# Patient Record
Sex: FEMALE | Race: WHITE | NOT HISPANIC OR LATINO | Employment: OTHER | URBAN - METROPOLITAN AREA
[De-identification: names, ages, dates, MRNs, and addresses within clinical notes are randomized per-mention and may not be internally consistent; named-entity substitution may affect disease eponyms.]

---

## 2017-01-16 DIAGNOSIS — M06.09 RHEUMATOID ARTHRITIS OF MULTIPLE SITES WITH NEGATIVE RHEUMATOID FACTOR (H): ICD-10-CM

## 2017-01-16 DIAGNOSIS — Z79.899 HIGH RISK MEDICATION USE: ICD-10-CM

## 2017-01-16 LAB
ALBUMIN SERPL-MCNC: 3.8 G/DL (ref 3.4–5)
ALP SERPL-CCNC: 112 U/L (ref 40–150)
ALT SERPL W P-5'-P-CCNC: 11 U/L (ref 0–50)
AST SERPL W P-5'-P-CCNC: 22 U/L (ref 0–45)
BASOPHILS # BLD AUTO: 0.1 10E9/L (ref 0–0.2)
BASOPHILS NFR BLD AUTO: 1.7 %
BILIRUB DIRECT SERPL-MCNC: <0.1 MG/DL (ref 0–0.2)
BILIRUB SERPL-MCNC: 0.4 MG/DL (ref 0.2–1.3)
CREAT SERPL-MCNC: 0.44 MG/DL (ref 0.52–1.04)
CRP SERPL-MCNC: 8.8 MG/L (ref 0–8)
DIFFERENTIAL METHOD BLD: ABNORMAL
EOSINOPHIL # BLD AUTO: 0.1 10E9/L (ref 0–0.7)
EOSINOPHIL NFR BLD AUTO: 2.1 %
ERYTHROCYTE [DISTWIDTH] IN BLOOD BY AUTOMATED COUNT: 16.5 % (ref 10–15)
ERYTHROCYTE [SEDIMENTATION RATE] IN BLOOD BY WESTERGREN METHOD: 28 MM/H (ref 0–30)
GFR SERPL CREATININE-BSD FRML MDRD: ABNORMAL ML/MIN/1.7M2
HCT VFR BLD AUTO: 36.5 % (ref 35–47)
HGB BLD-MCNC: 11.4 G/DL (ref 11.7–15.7)
LYMPHOCYTES # BLD AUTO: 1.7 10E9/L (ref 0.8–5.3)
LYMPHOCYTES NFR BLD AUTO: 27.2 %
MCH RBC QN AUTO: 28.1 PG (ref 26.5–33)
MCHC RBC AUTO-ENTMCNC: 31.2 G/DL (ref 31.5–36.5)
MCV RBC AUTO: 90 FL (ref 78–100)
MONOCYTES # BLD AUTO: 0.5 10E9/L (ref 0–1.3)
MONOCYTES NFR BLD AUTO: 8.4 %
NEUTROPHILS # BLD AUTO: 3.8 10E9/L (ref 1.6–8.3)
NEUTROPHILS NFR BLD AUTO: 60.6 %
PLATELET # BLD AUTO: 280 10E9/L (ref 150–450)
PROT SERPL-MCNC: 7.8 G/DL (ref 6.8–8.8)
RBC # BLD AUTO: 4.05 10E12/L (ref 3.8–5.2)
WBC # BLD AUTO: 6.3 10E9/L (ref 4–11)

## 2017-01-16 PROCEDURE — 82565 ASSAY OF CREATININE: CPT | Performed by: INTERNAL MEDICINE

## 2017-01-16 PROCEDURE — 85652 RBC SED RATE AUTOMATED: CPT | Performed by: INTERNAL MEDICINE

## 2017-01-16 PROCEDURE — 85025 COMPLETE CBC W/AUTO DIFF WBC: CPT | Performed by: INTERNAL MEDICINE

## 2017-01-16 PROCEDURE — 36415 COLL VENOUS BLD VENIPUNCTURE: CPT | Performed by: INTERNAL MEDICINE

## 2017-01-16 PROCEDURE — 86140 C-REACTIVE PROTEIN: CPT | Performed by: INTERNAL MEDICINE

## 2017-01-16 PROCEDURE — 80076 HEPATIC FUNCTION PANEL: CPT | Performed by: INTERNAL MEDICINE

## 2017-01-18 ENCOUNTER — OFFICE VISIT (OUTPATIENT)
Dept: RHEUMATOLOGY | Facility: CLINIC | Age: 77
End: 2017-01-18
Payer: MEDICARE

## 2017-01-18 VITALS
WEIGHT: 115.4 LBS | OXYGEN SATURATION: 99 % | SYSTOLIC BLOOD PRESSURE: 142 MMHG | HEART RATE: 84 BPM | BODY MASS INDEX: 19.8 KG/M2 | DIASTOLIC BLOOD PRESSURE: 67 MMHG | TEMPERATURE: 95.9 F

## 2017-01-18 DIAGNOSIS — M06.09 RHEUMATOID ARTHRITIS OF MULTIPLE SITES WITH NEGATIVE RHEUMATOID FACTOR (H): Primary | ICD-10-CM

## 2017-01-18 DIAGNOSIS — M15.0 PRIMARY OSTEOARTHRITIS INVOLVING MULTIPLE JOINTS: ICD-10-CM

## 2017-01-18 DIAGNOSIS — R76.8 ANA POSITIVE: ICD-10-CM

## 2017-01-18 DIAGNOSIS — Z79.899 HIGH RISK MEDICATION USE: ICD-10-CM

## 2017-01-18 PROCEDURE — 87340 HEPATITIS B SURFACE AG IA: CPT | Performed by: INTERNAL MEDICINE

## 2017-01-18 PROCEDURE — 36415 COLL VENOUS BLD VENIPUNCTURE: CPT | Performed by: INTERNAL MEDICINE

## 2017-01-18 PROCEDURE — 99214 OFFICE O/P EST MOD 30 MIN: CPT | Performed by: INTERNAL MEDICINE

## 2017-01-18 PROCEDURE — 86480 TB TEST CELL IMMUN MEASURE: CPT | Performed by: INTERNAL MEDICINE

## 2017-01-18 PROCEDURE — 86803 HEPATITIS C AB TEST: CPT | Performed by: INTERNAL MEDICINE

## 2017-01-18 PROCEDURE — 86704 HEP B CORE ANTIBODY TOTAL: CPT | Performed by: INTERNAL MEDICINE

## 2017-01-18 PROCEDURE — 87389 HIV-1 AG W/HIV-1&-2 AB AG IA: CPT | Performed by: INTERNAL MEDICINE

## 2017-01-18 RX ORDER — METHOTREXATE 2.5 MG/1
20 TABLET ORAL WEEKLY
Qty: 96 TABLET | Refills: 1 | Status: SHIPPED | OUTPATIENT
Start: 2017-01-18 | End: 2017-07-07

## 2017-01-18 NOTE — PROGRESS NOTES
Rheumatology Clinic Visit      Alannah Leos MRN# [id number]   YOB: 1940 Age: 76 year old      Date of visit: 1/18/17   PCP: Dr. Leah Blanca    Chief Complaint   Patient presents with:  RECHECK: 3mo follow up  PT states nothing has been bothering her.      Assessment and Plan     1. Seronegative erosive rheumatoid arthritis: Diagnosed in the 1970s. Previously on methotrexate when first diagnosed (unclear if ineffective or not, stopped by patient preference to not treat her RA at that time).  Synovitis, subluxation, and fixed flexion deformities on exam. Steroid responsive. Also with ankylosis of the cervical spine and erosive changes seen on x-rays. Currently on methotrexate 15 mg once weekly. She has improved with methotrexate, but still has severe disease. Therefore, increase methotrexate to 20 mg once weekly and start Orencia. It is unclear about her pharmacy benefits so we will try for Orencia SQ, and if not covered well then may consider Orencia infusions. She will notify me of the specialty pharmacy that her insurance requires. Note that Orencia is preferred over using TNF inhibitors because of her previously positive PIERO and dsDNA.  - Start Orencia SQ if labs are okay  - Increase methotrexate to 20 mg once weekly   - Continue folic acid 1mg daily  - Labs today: Hepatitis B/C, HIV, QuantiFERON TB  - Labs monthly x2: CBC, Cr, Hepatic Panel  - Labs 2-3 days prior to the next rheumatology clinic visit: CBC, Creatinine, Hepatic Panel, ESR, CRP    # Abatacept (Orencia) Risks and Benefits: The risks and benefits of abatacept were discussed in detail and the patient verbalized understanding.  The risks discussed include, but are not limited to, the risk for hypersensitivity, anaphylaxis, anaphylactoid reactions, an increased risk for serious infections leading to hospitalization or death, and an increased risk for more frequent respiratory adverse events in patients with COPD.  If subcutaneous  injections are used, then injection site reactions and/or pain may occur at the site of injection.  The most common side effects were discussed that include headache, upper respiratory tract infections, nasopharyngitis, and nausea.  It was discussed that the medication would need to be discontinued if a serious infection develops.  It was discussed that live vaccinations should not be received while using abatacept or within 30 days prior to starting abatacept.  I encouraged reviewing the package insert and asking any questions about the medication.      2. Positive PIERO and dsDNA: These were noted on record review. She does not have symptoms to support an PIERO-associated rheumatologic disease at this time. No further workup with regard to these labs at this time.    3. Neck pain in the setting of rheumatoid arthritis: No evidence of instability by x-rays on 10/12/2016.    4. Parkinson's Disease: Followed by Dr. Peterson (neurology)    5. Osteoarthritis of multiple joints: Seen by Dr. Hernandez and received steroid injections that were helpful.    Ms. Leos verbalized agreement with and understanding of the rational for the diagnosis and treatment plan.  All questions were answered to best of my ability and the patient's satisfaction. Ms. Leos was advised to contact the clinic with any questions that may arise after the clinic visit.      Thank you for involving me in the care of the patient    Return to clinic: 3 months      HPI   Alannah Leos is a 76 year old female with a medical history significant for Parkinson's disease, osteoarthritis, status post TKA, osteoporosis, and rheumatoid arthritis who presents for follow-up of rheumatoid arthritis.    From record review, she was previously seen by Dr. Tucker (rheumatology) on 7/30/2013. At that time, it is documented that she was diagnosed with rheumatoid arthritis in the 1970s. Methotrexate was ineffective. Fixed flexion deformities of the shoulders, elbows, and  "wrists. Synovitis on exam at that time. Also fusion of the C-spine with decreased range of motion noted. Has osteoporosis and was on Fosamax. PIERO was positive and dsDNA was checked that was positive.    Previously, Ms. Leos reported that she was diagnosed with rheumatoid arthritis in the past but did not want to take toxic medications and did not want to keep taking prednisone because of potential toxicities. Therefore, she decided that she would \"tough out\" her symptoms and try to enjoy her life. Then, more recently she was diagnosed with Parkinson's disease that has been significantly affecting her quality of life. She is treated for the Parkinson's disease and she believes that the treatment is helping. However, she is also having worsening joint pain and stiffness. Morning stiffness lasts for all day. All of her joints hurt, including her bilateral shoulders, neck, spine, hands, wrists, elbows, hips, knees, ankles, and feet. She tells me that she is unable to raise her arms without assistance from the contralateral arm, if she is able to get that hand over to the other side. Mainly, her right shoulder is affected. She tells me that she has a little more mobility in her left shoulder. She tells me that her entire spine is fused, and that it \"occurred naturally.\"  Overall, she tells me that she feels miserable and is willing to start treatment, but is still scared of most of the medication side effects.    At her previous clinic visit, methotrexate was started.    Today, she reports that methotrexate was very helpful. Before, she had bad days every day. Now, she has good days and bad days. She tells me that this is a significant improvement. Her daughter who is with her today, mentions that she is now dressing herself more and has been happier.  Steroid injections by Dr. Hernandez were very helpful.    Denies fevers, chills, nausea, vomiting, constipation, diarrhea. No abdominal pain. No chest pain/pressure, " palpitations, or shortness of breath. No LE swelling.  No oral or nasal sores.  No rash. No sicca symptoms.      Tobacco: none  EtOH: 1 drink at [place] only  Drugs: none    ROS   GEN: No fevers, chills, night sweats, or weight change  SKIN: No itching, rashes, sores  HEENT: No epistaxis. No oral or nasal ulcers.  CV: No chest pain, pressure, palpitations, or dyspnea on exertion.  PULM: No SOB, wheeze, cough.  GI: No nausea, vomiting, constipation, diarrhea. No blood in stool. No abdominal pain.  : No blood in urine.  MSK: See HPI.  NEURO: No numbness or tingling  ENDO: No heat/cold intolerance.  EXT: No LE swelling  PSYCH: Negative    Active Problem List     Patient Active Problem List   Diagnosis     Cataract, OU     Osteoporosis     Rheumatoid arthritis (H)     Parkinson disease (H)     Osteoarthritis of wrist     Osteoarthritis of shoulder     History of total knee arthroplasty     Osteoarthritis of left knee     Advanced directives, counseling/discussion     CARDIOVASCULAR SCREENING; LDL GOAL LESS THAN 160     High risk medication use     Underweight     Past Medical History     Past Medical History   Diagnosis Date     Osteopenia      Osteoarthrosis, unspecified whether generalized or localized, lower leg      Hyperlipidemia      Murmur, heart      hsm     Rheumatoid arthritis(714.0)      Nonsenile cataract      Past Surgical History     Past Surgical History   Procedure Laterality Date     Knee surgery       left      Gallbladder surgery       Laminectomy lumbar one level       Hysterectomy       Tonsillectomy       Allergy     Allergies   Allergen Reactions     Decadrol [Dexamethasone Sodium Phosphate]      Shrimp      Current Medication List     Current Outpatient Prescriptions   Medication Sig     methotrexate sodium 2.5 MG TABS Take 15 mg by mouth once a week . Take all 6 tablets on the same day of each week.     folic acid (FOLVITE) 1 MG tablet Take 1 tablet (1 mg) by mouth daily      "carbidopa-levodopa (SINEMET)  MG per tablet 1.5 tablets at 6 AM, 5 PM and 9 PM. Daily. 2 tablets at 12 Noon daily.     ACE NOT PRESCRIBED, INTENTIONAL, 1 each continuous prn. ACE Inhibitor not prescribed due to Refusal by patient           CALCIUM + D OR 600mg twice a day      ASPIRIN 81 MG PO TABS 1 TABLET DAILY (*)     MULTI-VITAMIN OR 1 a day      No current facility-administered medications for this visit.         Social History   See HPI    Family History     Family History   Problem Relation Age of Onset     CANCER Sister      pancreatic       Physical Exam     Temp Readings from Last 3 Encounters:   01/18/17 95.9  F (35.5  C) Oral   11/03/16 97.1  F (36.2  C) Oral   10/12/16 96.7  F (35.9  C) Oral     BP Readings from Last 5 Encounters:   01/18/17 142/67   11/03/16 118/61   10/27/16 135/73   10/20/16 125/72   10/12/16 137/68     Pulse Readings from Last 1 Encounters:   01/18/17 84     Resp Readings from Last 1 Encounters:   05/26/15 22     Estimated body mass index is 19.8 kg/(m^2) as calculated from the following:    Height as of 11/7/16: 1.626 m (5' 4\").    Weight as of this encounter: 52.345 kg (115 lb 6.4 oz).    GEN: NAD, thin and frail appearing  HEENT: MMM. No oral lesions. Anicteric, noninjected sclera  CV: S1, S2. RRR. No m/r/g.  PULM: CTA bilaterally. No w/c.  MSK: Mild synovial swelling and tenderness palpation of all MCPs and PIPs. Bilateral wrist tender to palpation and with synovial swelling; minimal range of motion of the bilateral wrists. Elbows without swelling or tenderness to palpation. Right shoulder with diffuse muscle atrophy, tenderness to palpation, and almost no active range of motion; limited passive range of motion. Left shoulder with minimal range of motion, muscle atrophy, and tenderness to palpation. Bilateral hips tender to direct palpation. Spine diffusely tender to palpation. Knees without swelling or tenderness to palpation; no increased warmth. Bilateral ankles without " swelling or tenderness to palpation; limited range of motion. MTP squeeze positive bilaterally.  NEURO: UE and LE strengths 5/5 and equal bilaterally.   SKIN: No rash. Hair thinning.  EXT: No LE edema  PSYCH: Alert. Appropriate.    Labs / Imaging (select studies)   RF/CCP  Recent Labs   Lab Test  10/12/16   1142  07/30/13   1621  10/25/12   1127   CCPABY   --   <20 Interpretation:  Negative   --    CCPIGG  2   --    --    RHF  <20   --   9     PIERO/RNP/Sm/SSA/SSB/dsDNA  Recent Labs   Lab Test  07/30/13   1621  10/25/12   1127   ERA   --   3.3*   ENASM  0   --    ENASSA  1   --    ENASSB  0   --    ENARMP  0   --    L2FCBKH  106   --    G8ORCIL  24   --      Recent Labs   Lab Test  07/30/13   1621   DNA  52*     CBC  Recent Labs   Lab Test  01/16/17   1334  12/19/16   1332  11/21/16   1340   WBC  6.3  5.9  6.3   RBC  4.05  4.23  4.45   HGB  11.4*  11.9  12.4   HCT  36.5  37.8  39.4   MCV  90  89  89   RDW  16.5*  16.5*  16.5*   PLT  280  286  270   MCH  28.1  28.1  27.9   MCHC  31.2*  31.5  31.5   NEUTROPHIL  60.6  63.1  64.3   LYMPH  27.2  26.1  25.3   MONOCYTE  8.4  8.1  7.4   EOSINOPHIL  2.1  1.2  1.7   BASOPHIL  1.7  1.5  1.3   ANEU  3.8  3.7  4.1   ALYM  1.7  1.5  1.6   DORITA  0.5  0.5  0.5   AEOS  0.1  0.1  0.1   ABAS  0.1  0.1  0.1     CMP  Recent Labs   Lab Test  01/16/17   1334  12/19/16   1332  11/21/16   1340   05/27/16   1413  05/26/15   1416  04/28/14   1209   NA   --    --    --    --   136  137  136   POTASSIUM   --    --    --    --   3.7  4.0  4.2   CHLORIDE   --    --    --    --   102  103  98   CO2   --    --    --    --   25  28  27   ANIONGAP   --    --    --    --   9  6  11   GLC   --    --    --    --   94  100*  99   BUN   --    --    --    --   11  9  12   CR  0.44*  0.42*  0.51*   < >  0.41*  0.47*  0.52   GFRESTIMATED  >90  Non  GFR Calc    >90  Non  GFR Calc    >90  Non  GFR Calc     < >  >90  Non  GFR Calc    >90  Non   GFR Calc    >90   GFRESTBLACK  >90   GFR Calc    >90   GFR Calc    >90   GFR Calc     < >  >90   GFR Calc    >90   GFR Calc    >90   PAKO   --    --    --    --   9.2  9.2  9.4   BILITOTAL  0.4  0.5  0.5   < >  0.5   --   0.5   ALBUMIN  3.8  3.9  4.0   < >  3.8   --   4.4   PROTTOTAL  7.8  8.0  8.0   < >  8.8   --   8.6   ALKPHOS  112  113  97   < >  108   --   119   AST  22  22  15   < >  15   --   27   ALT  11  8  12   < >  8   --   19    < > = values in this interval not displayed.     HgA1c  Recent Labs   Lab Test  05/27/16   1413  05/26/15   1416  10/28/13   1204   A1C  5.9  6.0  5.8     Calcium/VitaminD  Recent Labs   Lab Test  05/27/16   1413  05/26/15   1416  04/28/14   1209   04/26/13   1128   10/26/11   1113   PAKO  9.2  9.2  9.4   < >  9.2   < >  9.7   D3VIT   --    --    --    --    --    --   46   VITDT  95*   --    --    --   63   --    --     < > = values in this interval not displayed.     ESR/CRP  Recent Labs   Lab Test  01/16/17   1334  10/12/16   1142  07/30/13   1621  10/25/12   1127   SED  28  26  37*  37*   CRP  8.8*  7.0   --   <5.0     CK/Aldolase  Recent Labs   Lab Test  04/26/13   1128  10/02/09   1135   CKT  73  37     TSH/T4  Recent Labs   Lab Test  05/27/16   1413  05/26/15   1416  04/28/14   1209   TSH  2.36  2.43  2.92     Lipid Panel  Recent Labs   Lab Test  05/26/15   1416  04/26/13   1128  04/26/12   1151   CHOL  179  154  159   TRIG  50  44  56   HDL  80  72  71   LDL  89  73  77   VLDL  10  9  11   CHOLHDLRATIO  2.2  2.1  2.2     Recent Results (from the past 744 hour(s))   XR Cervical Spine G/E 4 Views    Narrative    CERVICAL SPINE FOUR OR MORE VIEWS October 12, 2016 1:22 PM     HISTORY: Neck pain in the setting of rheumatoid arthritis; please  evaluated for instability. Rheumatoid arthritis without rheumatoid  factor, multiple sites. Cervicalgia.    COMPARISON: Lateral views of the  cervical spine 7/30/2013.      Impression    IMPRESSION: Ankylosis of the entire cervical spine again noted  consistent with the patient's history of rheumatoid arthritis. There  are no findings to suggest fracture of the cervical spine. There is no  prevertebral soft tissue swelling. The cervical vertebrae remain  rigidly aligned with no evidence for displacement or articulation in  either the flexed or extended positions. There does appear to be  flexion articulation at the C7-T1 level. There are no findings to  suggest instability. There is likely a solid fusion from the occiput  down to C7.    BASIL STUART MD   XR Sacroiliac Joint 1/2 Views    Narrative    XR SACROILIAC JOINT 1/2 VW 10/12/2016 1:22 PM    COMPARISON: None.    HISTORY: Low back pain      Impression    IMPRESSION: Stool and bowel gas significantly obscure the sacrum.  Sacroiliac joints appear grossly patent. Pelvic enthesopathy is noted.    JESSY BILLINGS   XR Chest 2 Views    Narrative    XR CHEST 2 VW  10/12/2016 1:23 PM    HISTORY:  Rheumatoid arthritis without rheumatoid factor, multiple  sites    COMPARISON:  None.      Impression    IMPRESSION:  Hyperinflation. Lungs are clear. Aortic calcification.  Otherwise negative.     ALYCIA AGUIRRE MD   XR Lumbar Spine 2/3 Views    Narrative    XR LUMBAR SPINE 2-3 VIEWS  10/12/2016 1:23 PM    HISTORY:  Low back pain, Other chronic pain    COMPARISON:  None.      Impression    IMPRESSION:  Mild lumbar scoliosis convex left. Multilevel  degenerative changes. Disc space narrowing at L2-S1, most prominent at  L5-S1 where there is complete disc space loss posteriorly. Posterior  facet degenerative changes L5.    ALYCIA AGUIRRE MD   XR Thoracic Spine 3 Views    Narrative    XR THORACIC SPINE 3 VW  10/12/2016 1:23 PM    HISTORY:  Pain in thoracic spine, Other chronic pain    COMPARISON:  None.      Impression    IMPRESSION:  Osteopenia. Thoracolumbar scoliosis. Mild multilevel  degenerative changes. Suspect  mild compression fracture of T12, poorly  visualized and age indeterminate. Aortic calcification.     ALYCIA AGUIRRE MD   XR Foot Bilateral G/E 3 Views    Narrative    XR FOOT BILATERAL G/E 3 VW 10/12/2016 1:24 PM    COMPARISON: 7/30/2013    HISTORY: Rheumatoid arthritis    FINDINGS:  Right foot: Mild hallux valgus with associated mild degenerative  changes at the first metatarsophalangeal joint. Osteopenia is noted  about the right foot. No fractures are seen. No erosive changes are  identified. Achilles enthesopathy is noted.    Left foot: Osteopenia is noted. Mild hallux valgus. No fractures are  seen. No definite erosive changes are identified. Achilles  enthesopathy.      Impression    IMPRESSION: Bilateral foot osteopenia again noted, as well as mild  hallux valgus in both feet. No erosive changes to suggest inflammatory  arthropathy in the feet.    JESSY BILLINGS   XR Hand Bilateral G/E 3 Views    Narrative    HAND BILATERAL THREE OR MORE VIEWS October 12, 2016 1:24 PM    HISTORY: Rheumatoid arthritis.    COMPARISON: 7/30/2015.    FINDINGS:    Right hand: Diffuse osteopenia is again noted. Polyarticular erosive  changes are again demonstrated, and do not appear significantly  changed on radiographs since 7/30/2013. These are most pronounced at  the first interphalangeal joint, second proximal interphalangeal  joint, first, second and third metacarpophalangeal joints.  Additionally, there is near complete destruction of the radiocarpal  joint, this has progressed since 7/30/2013 with the proximal carpal  row being indistinguishable from the distal radius and ulna. No  fractures are seen.    Left hand: Diffuse osteopenia is again noted. Polyarticular erosive  changes are also again seen on this side, unchanged at the first,  second and third metacarpophalangeal joints as well as the first  interphalangeal joint and the second and fifth proximal  interphalangeal joints, but slightly worsened at the third  proximal  interphalangeal joint. Destructive changes in the carpus and  radiocarpal joint are again noted, slightly worsened since 7/30/2013.  No fractures are identified.      Impression    IMPRESSION: Bilateral hand osteopenia and polyarticular erosive  changes consistent with patient's history of rheumatoid arthritis.  Findings are worsened in the carpus and radiocarpal joint on the right  as well as the third proximal interphalangeal joint and  carpus/radiocarpal joint on the left.    JESSY BILLINGS       Immunization History     Immunization History   Administered Date(s) Administered     Pneumococcal (PCV 13) 11/03/2016     Pneumococcal 23 valent 11/01/2007, 11/13/2007     TD (ADULT, 7+) 11/13/2007     Tdap (Adacel,Boostrix) 11/13/2007          Chart documentation done in part with Dragon Voice recognition Software. Although reviewed after completion, some word and grammatical error may remain.    Merrick Del Cid MD

## 2017-01-18 NOTE — PATIENT INSTRUCTIONS
Increase methotrexate 20mg (8 tabs) once weekly    Continue folic acid 1mg daily    Call to notify me what specialty pharmacy your insurance prefers for the Orencia.

## 2017-01-18 NOTE — MR AVS SNAPSHOT
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        Follow-ups after your visit        Your next 10 appointments already scheduled     Feb 20, 2017  1:30 PM   LAB with FZ LAB   [facility] ([facility])    [address] [place] [address] [phone]           Patient must bring picture ID.  Patient should be prepared to give a urine specimen  Please do not eat 10-12 hours before your appointment if you are coming in fasting for labs on lipids, cholesterol, or glucose (sugar).  Pregnant women should follow their Care Team instructions. Water with medications is okay. Do not drink coffee or other fluids.   If you have concerns about taking  your medications, please ask at office or if scheduling via [employer], send a message by clicking on Secure Messaging, Message Your Care Team.            Mar 13, 2017  1:30 PM   New Visit with Marcial Daigle MD   [facility] ([facility]    [address] [facility] [address]   [phone]            Mar 20, 2017  1:30 PM   LAB with FZ LAB   [facility])    [address] [place] [address]   [phone]           Patient must bring picture ID.  Patient should be prepared to give a urine specimen  Please do not eat 10-12 hours before your appointment if you are coming in fasting for labs on lipids,  cholesterol, or glucose (sugar).  Pregnant women should follow their Care Team instructions. Water with medications is okay. Do not drink coffee or other fluids.   If you have concerns about taking  your medications, please ask at office or if scheduling via [employer], send a message by clicking on Secure Messaging, Message Your Care Team.            Apr 17, 2017  1:30 PM   LAB with FZ LAB   [facility] ([facility])    [address] [facility] [address]   [phone]           Patient must bring picture ID.  Patient should be prepared to give a urine specimen  Please do not eat 10-12 hours before your appointment if you are coming in fasting for labs on lipids, cholesterol, or glucose (sugar).  Pregnant women should follow their Care Team instructions. Water with medications is okay. Do not drink coffee or other fluids.   If you have concerns about taking  your medications, please ask at office or if scheduling via [employer], send a message by clicking on Secure Messaging, Message Your Care Team.            Apr 19, 2017  3:00 PM   Return Visit with Merrick Del Cid MD   [facility] ([facility])    [address] [facility]  [place] [address]   [phone]              Future tests that were ordered for you today     Open Standing Orders        Priority Remaining Interval Expires Ordered    CBC with platelets differential Routine 2/2 Every 4 Weeks 7/17/2017 1/18/2017    Creatinine Routine 2/2 Every 4 Weeks 7/17/2017 1/18/2017    Hepatic panel Routine 2/2 Every 4 Weeks 7/17/2017 1/18/2017          Open Future Orders        Priority Expected Expires Ordered    CBC with platelets differential Routine 4/14/2017 5/3/2017 1/18/2017    Creatinine Routine 4/14/2017 5/3/2017 1/18/2017    Erythrocyte sedimentation rate auto Routine 4/14/2017 5/3/2017 1/18/2017    CRP inflammation Routine 4/14/2017 5/3/2017 1/18/2017    Hepatic panel Routine 4/14/2017 5/3/2017  "2017            Who to contact     If you have questions or need follow up information about today's clinic visit or your schedule please contact [facility] directly at [phone].  Normal or non-critical lab and imaging results will be communicated to you by MyChart, letter or phone within 4 business days after the clinic has received the results. If you do not hear from us within 7 days, please contact the clinic through MyChart or phone. If you have a critical or abnormal lab result, we will notify you by phone as soon as possible.  Submit refill requests through [employer] or call your pharmacy and they will forward the refill request to us. Please allow 3 business days for your refill to be completed.          Additional Information About Your Visit        [facility] Information     [employer] lets you send messages to your doctor, view your test results, renew your prescriptions, schedule appointments and more. To sign up, go to [email or web address] . Click on \"Log in\" on the left side of the screen, which will take you to the Welcome page. Then click on \"Sign up Now\" on the right side of the page.     You will be asked to enter the access code listed below, as well as some personal information. Please follow the directions to create your username and password.     Your access code is: [email or web address]  Expires: 2017  3:43 PM     Your access code will  in 90 days. If you need help or a new code, please call your [place] clinic or [phone].        Care EveryWhere ID     This is your Care EveryWhere ID. This could be used by other organizations to access your [facility] medical records  [id number]        Your Vitals Were     Pulse Temperature Pulse Oximetry             84 95.9  F (35.5  C) (Oral) 99%          Blood Pressure from Last 3 Encounters:   17 142/67   16 118/61   10/27/16 135/73    Weight from Last 3 Encounters:   17 52.345 kg (115 lb 6.4 oz)   16 " 49.896 kg (110 lb)   11/03/16 49.896 kg (110 lb)              We Performed the Following     Hepatitis B core antibody     Hepatitis B surface antigen     Hepatitis C Screen Reflex to HCV RNA Quant and Genotype     HIV Antigen Antibody Combo     M Tuberculosis by Quantiferon          Today's Medication Changes          These changes are accurate as of: 1/18/17  3:43 PM.  If you have any questions, ask your nurse or doctor.               These medicines have changed or have updated prescriptions.        Dose/Directions    methotrexate sodium 2.5 MG Tabs   This may have changed:    - how much to take  - additional instructions   Used for:  Rheumatoid arthritis of multiple sites with negative rheumatoid factor (H), High risk medication use   Changed by:  Merrick Del Cid MD        Dose:  20 mg   Take 20 mg by mouth once a week . Take all 8 tablets on the same day of each week.   Quantity:  96 tablet   Refills:  1            Where to get your medicines      These medications were [facility] to [id number] [address] [facility] AT SEC of [address] [address]     Phone:  [phone]    - methotrexate sodium 2.5 MG Tabs             Primary Care Provider Office Phone # Fax #    Leah Shelby Blanca [phone]       [facility] [address] [facility] [address]        Thank you!     Thank you for choosing [facility]  for your care. Our goal is always to provide you with excellent care. Hearing back from our patients is one way we can continue to improve our services. Please take a few minutes to complete the written survey that you may receive in the mail after your visit with us. Thank you!             Your Updated Medication List - Protect others around you: Learn how to safely use, store and throw away your medicines at www.disposemymeds.org.          This list is accurate as of: 1/18/17  3:43 PM.   Always use your most recent med list.                   Brand Name Dispense Instructions for use    ACE NOT PRESCRIBED (INTENTIONAL)     0 each    1 each continuous prn. ACE Inhibitor not prescribed due to Refusal by patient       aspirin 81 MG tablet      1 TABLET DAILY (*)       CALCIUM + D PO      600mg twice a day       carbidopa-levodopa  MG per tablet    SINEMET    540 tablet    1.5 tablets at 6 AM, 5 PM and 9 PM. Daily. 2 tablets at 12 Noon daily.       folic acid 1 MG tablet    FOLVITE    100 tablet    Take 1 tablet (1 mg) by mouth daily       methotrexate sodium 2.5 MG Tabs     96 tablet    Take 20 mg by mouth once a week . Take all 8 tablets on the same day of each week.       MULTI-VITAMIN PO      1 a day

## 2017-01-18 NOTE — NURSING NOTE
"Chief Complaint   Patient presents with     RECHECK     3mo follow up  PT states nothing has been bothering her.       Initial /67 mmHg  Pulse 84  Temp(Src) 95.9  F (35.5  C) (Oral)  Wt 52.345 kg (115 lb 6.4 oz)  SpO2 99% Estimated body mass index is 19.8 kg/(m^2) as calculated from the following:    Height as of 11/7/16: 1.626 m (5' 4\").    Weight as of this encounter: 52.345 kg (115 lb 6.4 oz).  BP completed using cuff size: regular  Dipti Calle MA           RAPID3 (0-30) Cumulative Score  20.8          RAPID3 Weighted Score (divide #4 by 3 and that is the weighted score)  6.9             "

## 2017-01-19 ENCOUNTER — TELEPHONE (OUTPATIENT)
Dept: RHEUMATOLOGY | Facility: CLINIC | Age: 77
End: 2017-01-19

## 2017-01-19 LAB — HCV AB SERPL QL IA: NORMAL

## 2017-01-20 ENCOUNTER — TELEPHONE (OUTPATIENT)
Dept: RHEUMATOLOGY | Facility: CLINIC | Age: 77
End: 2017-01-20

## 2017-01-20 LAB
HBV CORE AB SERPL QL IA: NONREACTIVE
HBV SURFACE AG SERPL QL IA: NONREACTIVE
HIV 1+2 AB+HIV1 P24 AG SERPL QL IA: NORMAL
M TB TUBERC IFN-G BLD QL: NEGATIVE
M TB TUBERC IFN-G/MITOGEN IGNF BLD: 0 IU/ML

## 2017-01-20 NOTE — TELEPHONE ENCOUNTER
Rheumatology Telephone Note:    I called and spoke with Ms. Leos.  She is hesitant to use Orencia because of possible side effects.  She would like to give MTX more time to work.  This is a reasonable option.  Will re-evaluate in 3 months as scheduled.    All questions were answered and she thanked me for the call.     Merrick Del Cid MD  1/20/2017 12:59 PM

## 2017-02-20 DIAGNOSIS — M06.09 RHEUMATOID ARTHRITIS OF MULTIPLE SITES WITH NEGATIVE RHEUMATOID FACTOR (H): ICD-10-CM

## 2017-02-20 DIAGNOSIS — Z79.899 HIGH RISK MEDICATION USE: ICD-10-CM

## 2017-02-20 LAB
ALBUMIN SERPL-MCNC: 4 G/DL (ref 3.4–5)
ALP SERPL-CCNC: 106 U/L (ref 40–150)
ALT SERPL W P-5'-P-CCNC: 10 U/L (ref 0–50)
AST SERPL W P-5'-P-CCNC: 22 U/L (ref 0–45)
BASOPHILS # BLD AUTO: 0.1 10E9/L (ref 0–0.2)
BASOPHILS NFR BLD AUTO: 1.8 %
BILIRUB DIRECT SERPL-MCNC: <0.1 MG/DL (ref 0–0.2)
BILIRUB SERPL-MCNC: 0.5 MG/DL (ref 0.2–1.3)
CREAT SERPL-MCNC: 0.4 MG/DL (ref 0.52–1.04)
DIFFERENTIAL METHOD BLD: ABNORMAL
EOSINOPHIL # BLD AUTO: 0.1 10E9/L (ref 0–0.7)
EOSINOPHIL NFR BLD AUTO: 2.2 %
ERYTHROCYTE [DISTWIDTH] IN BLOOD BY AUTOMATED COUNT: 15.6 % (ref 10–15)
GFR SERPL CREATININE-BSD FRML MDRD: ABNORMAL ML/MIN/1.7M2
HCT VFR BLD AUTO: 37 % (ref 35–47)
HGB BLD-MCNC: 12 G/DL (ref 11.7–15.7)
LYMPHOCYTES # BLD AUTO: 1.8 10E9/L (ref 0.8–5.3)
LYMPHOCYTES NFR BLD AUTO: 33 %
MCH RBC QN AUTO: 29.3 PG (ref 26.5–33)
MCHC RBC AUTO-ENTMCNC: 32.4 G/DL (ref 31.5–36.5)
MCV RBC AUTO: 90 FL (ref 78–100)
MONOCYTES # BLD AUTO: 0.4 10E9/L (ref 0–1.3)
MONOCYTES NFR BLD AUTO: 6.7 %
NEUTROPHILS # BLD AUTO: 3.1 10E9/L (ref 1.6–8.3)
NEUTROPHILS NFR BLD AUTO: 56.3 %
PLATELET # BLD AUTO: 289 10E9/L (ref 150–450)
PROT SERPL-MCNC: 8 G/DL (ref 6.8–8.8)
RBC # BLD AUTO: 4.1 10E12/L (ref 3.8–5.2)
WBC # BLD AUTO: 5.5 10E9/L (ref 4–11)

## 2017-02-20 PROCEDURE — 85025 COMPLETE CBC W/AUTO DIFF WBC: CPT | Performed by: INTERNAL MEDICINE

## 2017-02-20 PROCEDURE — 36415 COLL VENOUS BLD VENIPUNCTURE: CPT | Performed by: INTERNAL MEDICINE

## 2017-02-20 PROCEDURE — 82565 ASSAY OF CREATININE: CPT | Performed by: INTERNAL MEDICINE

## 2017-02-20 PROCEDURE — 80076 HEPATIC FUNCTION PANEL: CPT | Performed by: INTERNAL MEDICINE

## 2017-02-20 NOTE — LETTER
[address] [place] [address]    February 22, 2017    Alannah BREAUX Kennethisabelle  [address] [facility] [address]          Dear MsRubin Leos,   Your labs are normal.   Enclosed is a copy of your results.     Results for orders placed or performed in visit on 02/20/17   CBC with platelets differential   Result Value Ref Range    WBC 5.5 4.0 - 11.0 10e9/L    RBC Count 4.10 3.8 - 5.2 10e12/L    Hemoglobin 12.0 11.7 - 15.7 g/dL    Hematocrit 37.0 35.0 - 47.0 %    MCV 90 78 - 100 fl    MCH 29.3 26.5 - 33.0 pg    MCHC 32.4 31.5 - 36.5 g/dL    RDW 15.6 (H) 10.0 - 15.0 %    Platelet Count 289 150 - 450 10e9/L    Diff Method Automated Method     % Neutrophils 56.3 %    % Lymphocytes 33.0 %    % Monocytes 6.7 %    % Eosinophils 2.2 %    % Basophils 1.8 %    Absolute Neutrophil 3.1 1.6 - 8.3 10e9/L    Absolute Lymphocytes 1.8 0.8 - 5.3 10e9/L    Absolute Monocytes 0.4 0.0 - 1.3 10e9/L    Absolute Eosinophils 0.1 0.0 - 0.7 10e9/L    Absolute Basophils 0.1 0.0 - 0.2 10e9/L   Creatinine   Result Value Ref Range    Creatinine 0.40 (L) 0.52 - 1.04 mg/dL    GFR Estimate >90  Non  GFR Calc   >60 mL/min/1.7m2    GFR Estimate If Black >90   GFR Calc   >60 mL/min/1.7m2   Hepatic panel   Result Value Ref Range    Bilirubin Direct <0.1 0.0 - 0.2 mg/dL    Bilirubin Total 0.5 0.2 - 1.3 mg/dL    Albumin 4.0 3.4 - 5.0 g/dL    Protein Total 8.0 6.8 - 8.8 g/dL    Alkaline Phosphatase 106 40 - 150 U/L    ALT 10 0 - 50 U/L    AST 22 0 - 45 U/L       If you have any questions or concerns, please call myself or my nurse at [phone].      Sincerely,        Merrick Del Cid MD /pb

## 2017-02-21 NOTE — PROGRESS NOTES
"Please mail Ms. Leos her results with the following message.    \"Ms. Leos,    Your labs are normal.    Please let me know if you have any questions.    Sincerely,  Merrick Del Cid MD  2/21/2017 5:10 PM\"  "

## 2017-03-13 ENCOUNTER — OFFICE VISIT (OUTPATIENT)
Dept: OPHTHALMOLOGY | Facility: CLINIC | Age: 77
End: 2017-03-13
Payer: MEDICARE

## 2017-03-13 DIAGNOSIS — H52.4 PRESBYOPIA: ICD-10-CM

## 2017-03-13 DIAGNOSIS — H04.123 DRY EYES, BILATERAL: ICD-10-CM

## 2017-03-13 DIAGNOSIS — H26.9 CATARACT: Primary | ICD-10-CM

## 2017-03-13 PROCEDURE — 92015 DETERMINE REFRACTIVE STATE: CPT | Mod: GY | Performed by: STUDENT IN AN ORGANIZED HEALTH CARE EDUCATION/TRAINING PROGRAM

## 2017-03-13 PROCEDURE — 92014 COMPRE OPH EXAM EST PT 1/>: CPT | Performed by: STUDENT IN AN ORGANIZED HEALTH CARE EDUCATION/TRAINING PROGRAM

## 2017-03-13 ASSESSMENT — VISUAL ACUITY
METHOD: SNELLEN - LINEAR
OS_CC: 20/30
OS_CC: J2
OD_CC: 20/50+3
OD_CC: J2

## 2017-03-13 ASSESSMENT — REFRACTION_WEARINGRX
SPECS_TYPE: READERS
OD_CYLINDER: +1.00
OD_SPHERE: +4.00
OD_CYLINDER: +1.00
OS_CYLINDER: +0.50
OD_AXIS: 035
OD_SPHERE: +1.00
OS_AXIS: 175
OD_AXIS: 035
OS_SPHERE: +4.25
OS_AXIS: 180
OS_SPHERE: +1.25
SPECS_TYPE: SVL
OS_CYLINDER: +0.50

## 2017-03-13 ASSESSMENT — EXTERNAL EXAM - LEFT EYE: OS_EXAM: NORMAL

## 2017-03-13 ASSESSMENT — CONF VISUAL FIELD
OD_NORMAL: 1
OS_NORMAL: 1

## 2017-03-13 ASSESSMENT — REFRACTION_MANIFEST
OD_CYLINDER: +1.00
OS_AXIS: 180
OS_CYLINDER: +0.50
OD_AXIS: 035
OD_AXIS: 020
OD_SPHERE: +4.00
OS_SPHERE: +1.25
OD_ADD: +3.00
OS_AXIS: 180
OS_SPHERE: +4.25
OS_ADD: +3.00
OD_CYLINDER: +1.00
OD_SPHERE: +1.00
OS_CYLINDER: +0.50

## 2017-03-13 ASSESSMENT — CUP TO DISC RATIO
OD_RATIO: 0.5
OS_RATIO: 0.4

## 2017-03-13 ASSESSMENT — SLIT LAMP EXAM - LIDS
COMMENTS: NORMAL
COMMENTS: NORMAL

## 2017-03-13 ASSESSMENT — TONOMETRY
OD_IOP_MMHG: 17
IOP_METHOD: TONOPEN
OS_IOP_MMHG: 16

## 2017-03-13 ASSESSMENT — EXTERNAL EXAM - RIGHT EYE: OD_EXAM: NORMAL

## 2017-03-13 NOTE — MR AVS SNAPSHOT
"              After Visit Summary   3/13/2017    Alannah Leos    MRN: [id number]           Patient Information     Date Of Birth          1940        Visit Information        Provider Department      3/13/2017 1:30 PM Marcial Daigle MD [facility]        Today's Diagnoses     Presbyopia    -  1    Cataract, OU        Dry eyes, bilateral          Care Instructions    Glasses prescription given - optional   Use artificial tears up to 4 times per day (Refresh Plus, Systane Balance, or generic artificial tears are ok. Avoid \"get the red out\" drops).     Marcial Daigle MD  [phone]    Diabetes weakens the blood vessels all over the body, including the eyes. Damage to the blood vessels in the eyes can cause swelling or bleeding into part of the eye (called the retina). This is called diabetic retinopathy (VIRIDIANA-tin--pu-thee). If not treated, this disease can cause vision loss or blindness.   Symptoms may include blurred or distorted vision, but many people have no symptoms. It's important to see your eye doctor regularly to check for problems.   Early treatment and good control can help protect your vision. Here are the things you can do to help prevent vision loss:      1. Keep your blood sugar levels under tight control.      2. Bring high blood pressure under control.      3. No smoking.      4. Have yearly dilated eye exams.          Follow-ups after your visit        Follow-up notes from your care team     Return in about 1 year (around 3/13/2018) for Complete Exam.      Your next 10 appointments already scheduled     Mar 20, 2017  1:30 PM CDT   LAB with FZ LAB   [facility] ([facility])    [address] [facility] [address] [phone]           Patient must bring picture ID.  Patient should be prepared to give a urine specimen  Please do not eat 10-12 hours before your appointment if you are coming in fasting for labs on lipids, " cholesterol, or glucose (sugar).  Pregnant women should follow their Care Team instructions. Water with medications is okay. Do not drink coffee or other fluids.   If you have concerns about taking  your medications, please ask at office or if scheduling via [employer], send a message by clicking on Secure Messaging, Message Your Care Team.            Apr 17, 2017  1:30 PM CDT   LAB with FZ LAB   [facility] ([facility])    [address] [place] [address]   [phone]           Patient must bring picture ID.  Patient should be prepared to give a urine specimen  Please do not eat 10-12 hours before your appointment if you are coming in fasting for labs on lipids, cholesterol, or glucose (sugar).  Pregnant women should follow their Care Team instructions. Water with medications is okay. Do not drink coffee or other fluids.   If you have concerns about taking  your medications, please ask at office or if scheduling via [employer], send a message by clicking on Secure Messaging, Message Your Care Team.            Apr 19, 2017  3:00 PM CDT   Return Visit with Merrick Del Cid MD   [facility] ([facility])    [address] [facility] [address]   [phone]            Apr 26, 2017  1:40 PM CDT   Return Visit with Daja Peterson MD   [facility] ([facility])    [address] [place] [address]   [phone]            May 08, 2017  1:40 PM CDT   Office Visit with Leah Blanca MD   [facility] ([facility])    [address] [place] [id number] [phone]           Bring a current list of meds and any records pertaining to this visit.  For Physicals, please bring immunization records and any forms needing to be filled out.  Please arrive 10 minutes early to complete paperwork.              Who to contact     If you have questions or need follow up  "information about today's clinic visit or your schedule please contact [facility] directly at [phone].  Normal or non-critical lab and imaging results will be communicated to you by MyChart, letter or phone within 4 business days after the clinic has received the results. If you do not hear from us within 7 days, please contact the clinic through [employer] or phone. If you have a critical or abnormal lab result, we will notify you by phone as soon as possible.  Submit refill requests through [employer] or call your pharmacy and they will forward the refill request to us. Please allow 3 business days for your refill to be completed.          Additional Information About Your Visit        [employer] Information     [employer] lets you send messages to your doctor, view your test results, renew your prescriptions, schedule appointments and more. To sign up, go to [email or web address] . Click on \"Log in\" on the left side of the screen, which will take you to the Welcome page. Then click on \"Sign up Now\" on the right side of the page.     You will be asked to enter the access code listed below, as well as some personal information. Please follow the directions to create your username and password.     Your access code is: [email or web address]  Expires: 2017  4:43 PM     Your access code will  in 90 days. If you need help or a new code, please call your [place] clinic or [phone].        Care EveryWhere ID     This is your Care EveryWhere ID. This could be used by other organizations to access your [place] medical records  [id number]         Blood Pressure from Last 3 Encounters:   17 142/67   16 118/61   10/27/16 135/73    Weight from Last 3 Encounters:   17 52.3 kg (115 lb 6.4 oz)   16 49.9 kg (110 lb)   16 49.9 kg (110 lb)              We Performed the Following     EYE EXAM (SIMPLE-NONBILLABLE)     REFRACTIVE STATUS        Primary Care Provider Office Phone # Fax # "    Leah Blanca [phone]       [facility] [address]  [facility] [address]        Thank you!     Thank you for choosing [facility]  for your care. Our goal is always to provide you with excellent care. Hearing back from our patients is one way we can continue to improve our services. Please take a few minutes to complete the written survey that you may receive in the mail after your visit with us. Thank you!             Your Updated Medication List - Protect others around you: Learn how to safely use, store and throw away your medicines at www.disposemymeds.org.          This list is accurate as of: 3/13/17  2:45 PM.  Always use your most recent med list.                   Brand Name Dispense Instructions for use    ACE NOT PRESCRIBED (INTENTIONAL)     0 each    1 each continuous prn. ACE Inhibitor not prescribed due to Refusal by patient       aspirin 81 MG tablet      1 TABLET DAILY (*)       CALCIUM + D PO      600mg twice a day       carbidopa-levodopa  MG per tablet    SINEMET    540 tablet    1.5 tablets at 6 AM, 5 PM and 9 PM. Daily. 2 tablets at 12 Noon daily.       folic acid 1 MG tablet    FOLVITE    100 tablet    Take 1 tablet (1 mg) by mouth daily       methotrexate sodium 2.5 MG Tabs     96 tablet    Take 20 mg by mouth once a week . Take all 8 tablets on the same day of each week.       MULTI-VITAMIN PO      1 a day

## 2017-03-13 NOTE — PROGRESS NOTES
" Current Eye Medications:  no     Subjective:  Comprehensive eye exam.   Pt states she sees better some days than others. Eye comfortable, no pain.   Not diabetic any longer.     Objective:  See Ophthalmology Exam.      Assessment:  Alannah Leos is a 76 year old female who presents with:     Cataract, OU [facility] visually significant      Dry eyes, bilateral        Plan:  Glasses prescription given - optional   Use artificial tears up to 4 times per day (Refresh Plus, Systane Balance, or generic artificial tears are ok. Avoid \"get the red out\" drops).     Marcial Daigle MD  [phone]               "

## 2017-03-13 NOTE — PATIENT INSTRUCTIONS
"Glasses prescription given - optional   Use artificial tears up to 4 times per day (Refresh Plus, Systane Balance, or generic artificial tears are ok. Avoid \"get the red out\" drops).     Marcial Daigle MD  [phone]    "

## 2017-03-20 DIAGNOSIS — Z79.899 HIGH RISK MEDICATION USE: ICD-10-CM

## 2017-03-20 DIAGNOSIS — M06.09 RHEUMATOID ARTHRITIS OF MULTIPLE SITES WITH NEGATIVE RHEUMATOID FACTOR (H): ICD-10-CM

## 2017-03-20 LAB
ALBUMIN SERPL-MCNC: 3.8 G/DL (ref 3.4–5)
ALP SERPL-CCNC: 108 U/L (ref 40–150)
ALT SERPL W P-5'-P-CCNC: 10 U/L (ref 0–50)
AST SERPL W P-5'-P-CCNC: 19 U/L (ref 0–45)
BASOPHILS # BLD AUTO: 0.1 10E9/L (ref 0–0.2)
BASOPHILS NFR BLD AUTO: 1.3 %
BILIRUB DIRECT SERPL-MCNC: <0.1 MG/DL (ref 0–0.2)
BILIRUB SERPL-MCNC: 0.4 MG/DL (ref 0.2–1.3)
CREAT SERPL-MCNC: 0.44 MG/DL (ref 0.52–1.04)
DIFFERENTIAL METHOD BLD: ABNORMAL
EOSINOPHIL # BLD AUTO: 0.1 10E9/L (ref 0–0.7)
EOSINOPHIL NFR BLD AUTO: 2.1 %
ERYTHROCYTE [DISTWIDTH] IN BLOOD BY AUTOMATED COUNT: 15.3 % (ref 10–15)
GFR SERPL CREATININE-BSD FRML MDRD: ABNORMAL ML/MIN/1.7M2
HCT VFR BLD AUTO: 37.8 % (ref 35–47)
HGB BLD-MCNC: 12.2 G/DL (ref 11.7–15.7)
LYMPHOCYTES # BLD AUTO: 1.8 10E9/L (ref 0.8–5.3)
LYMPHOCYTES NFR BLD AUTO: 28.9 %
MCH RBC QN AUTO: 29.1 PG (ref 26.5–33)
MCHC RBC AUTO-ENTMCNC: 32.3 G/DL (ref 31.5–36.5)
MCV RBC AUTO: 90 FL (ref 78–100)
MONOCYTES # BLD AUTO: 0.5 10E9/L (ref 0–1.3)
MONOCYTES NFR BLD AUTO: 8 %
NEUTROPHILS # BLD AUTO: 3.8 10E9/L (ref 1.6–8.3)
NEUTROPHILS NFR BLD AUTO: 59.7 %
PLATELET # BLD AUTO: 280 10E9/L (ref 150–450)
PROT SERPL-MCNC: 7.9 G/DL (ref 6.8–8.8)
RBC # BLD AUTO: 4.19 10E12/L (ref 3.8–5.2)
WBC # BLD AUTO: 6.3 10E9/L (ref 4–11)

## 2017-03-20 PROCEDURE — 36415 COLL VENOUS BLD VENIPUNCTURE: CPT | Performed by: INTERNAL MEDICINE

## 2017-03-20 PROCEDURE — 85025 COMPLETE CBC W/AUTO DIFF WBC: CPT | Performed by: INTERNAL MEDICINE

## 2017-03-20 PROCEDURE — 80076 HEPATIC FUNCTION PANEL: CPT | Performed by: INTERNAL MEDICINE

## 2017-03-20 PROCEDURE — 82565 ASSAY OF CREATININE: CPT | Performed by: INTERNAL MEDICINE

## 2017-03-20 NOTE — LETTER
[address] [place] [address]    March 22, 2017    Alannah Leos  [address] [facility] [address]          Dear Ms. Leos,     Your labs did not show evidence for medication toxicity.     Enclosed is a copy of your results.     Results for orders placed or performed in visit on 03/20/17   CBC with platelets differential   Result Value Ref Range    WBC 6.3 4.0 - 11.0 10e9/L    RBC Count 4.19 3.8 - 5.2 10e12/L    Hemoglobin 12.2 11.7 - 15.7 g/dL    Hematocrit 37.8 35.0 - 47.0 %    MCV 90 78 - 100 fl    MCH 29.1 26.5 - 33.0 pg    MCHC 32.3 31.5 - 36.5 g/dL    RDW 15.3 (H) 10.0 - 15.0 %    Platelet Count 280 150 - 450 10e9/L    Diff Method Automated Method     % Neutrophils 59.7 %    % Lymphocytes 28.9 %    % Monocytes 8.0 %    % Eosinophils 2.1 %    % Basophils 1.3 %    Absolute Neutrophil 3.8 1.6 - 8.3 10e9/L    Absolute Lymphocytes 1.8 0.8 - 5.3 10e9/L    Absolute Monocytes 0.5 0.0 - 1.3 10e9/L    Absolute Eosinophils 0.1 0.0 - 0.7 10e9/L    Absolute Basophils 0.1 0.0 - 0.2 10e9/L   Creatinine   Result Value Ref Range    Creatinine 0.44 (L) 0.52 - 1.04 mg/dL    GFR Estimate >90  Non  GFR Calc   >60 mL/min/1.7m2    GFR Estimate If Black >90   GFR Calc   >60 mL/min/1.7m2   Hepatic panel   Result Value Ref Range    Bilirubin Direct <0.1 0.0 - 0.2 mg/dL    Bilirubin Total 0.4 0.2 - 1.3 mg/dL    Albumin 3.8 3.4 - 5.0 g/dL    Protein Total 7.9 6.8 - 8.8 g/dL    Alkaline Phosphatase 108 40 - 150 U/L    ALT 10 0 - 50 U/L    AST 19 0 - 45 U/L       If you have any questions or concerns, please call myself or my nurse at [phone].      Sincerely,        Merrick Del Cid MD /avelino

## 2017-03-21 NOTE — PROGRESS NOTES
"Please mail Ms. Leos her results with the following message.    \"Ms. Leos,    Your labs did not show evidence for medication toxicity.    Please let me know if you have any questions.    Sincerely,  Merrick Del Cid MD  3/21/2017 5:00 PM\"  "

## 2017-04-14 DIAGNOSIS — G20.A1 PARKINSON DISEASE (H): ICD-10-CM

## 2017-04-14 NOTE — TELEPHONE ENCOUNTER
carbidopa-levodopa (SINEMET)  MG per tablet      Last Written Prescription Date:  9/30/16  Last Fill Quantity: 540,   # refills: 0  Last Office Visit with [facility], [facility] or [facility] prescribing provider: 9/7/16  Future Office visit:    Next 5 appointments (look out 90 days)     Apr 19, 2017  3:00 PM CDT   Return Visit with Merrick Del Cid MD   [facility] ([facility]    [address] [facility] [address]   [phone]            Apr 26, 2017  1:40 PM CDT   Return Visit with Daja Peterson MD   [facility] ([facility])    [address] [facility] [address]   [phone]            May 08, 2017  1:40 PM CDT   [facility] Visit with Leah Blanca [facility]   [facility]    [address] [facility] [address]   [phone]                   Routing refill request to provider for review/approval because:  Drug not on the [facility], [facility] or [facility] refill protocol or controlled substance

## 2017-04-17 DIAGNOSIS — Z79.899 HIGH RISK MEDICATION USE: ICD-10-CM

## 2017-04-17 DIAGNOSIS — M06.09 RHEUMATOID ARTHRITIS OF MULTIPLE SITES WITH NEGATIVE RHEUMATOID FACTOR (H): ICD-10-CM

## 2017-04-17 LAB
ALBUMIN SERPL-MCNC: 3.8 G/DL (ref 3.4–5)
ALP SERPL-CCNC: 104 U/L (ref 40–150)
ALT SERPL W P-5'-P-CCNC: 13 U/L (ref 0–50)
AST SERPL W P-5'-P-CCNC: 23 U/L (ref 0–45)
BASOPHILS # BLD AUTO: 0.1 10E9/L (ref 0–0.2)
BASOPHILS NFR BLD AUTO: 1.7 %
BILIRUB DIRECT SERPL-MCNC: 0.1 MG/DL (ref 0–0.2)
BILIRUB SERPL-MCNC: 0.4 MG/DL (ref 0.2–1.3)
CREAT SERPL-MCNC: 0.49 MG/DL (ref 0.52–1.04)
CRP SERPL-MCNC: 9.5 MG/L (ref 0–8)
DIFFERENTIAL METHOD BLD: ABNORMAL
EOSINOPHIL # BLD AUTO: 0.1 10E9/L (ref 0–0.7)
EOSINOPHIL NFR BLD AUTO: 1.5 %
ERYTHROCYTE [DISTWIDTH] IN BLOOD BY AUTOMATED COUNT: 15.4 % (ref 10–15)
ERYTHROCYTE [SEDIMENTATION RATE] IN BLOOD BY WESTERGREN METHOD: 30 MM/H (ref 0–30)
GFR SERPL CREATININE-BSD FRML MDRD: ABNORMAL ML/MIN/1.7M2
HCT VFR BLD AUTO: 36.1 % (ref 35–47)
HGB BLD-MCNC: 11.7 G/DL (ref 11.7–15.7)
LYMPHOCYTES # BLD AUTO: 1.6 10E9/L (ref 0.8–5.3)
LYMPHOCYTES NFR BLD AUTO: 27.2 %
MCH RBC QN AUTO: 29.1 PG (ref 26.5–33)
MCHC RBC AUTO-ENTMCNC: 32.4 G/DL (ref 31.5–36.5)
MCV RBC AUTO: 90 FL (ref 78–100)
MONOCYTES # BLD AUTO: 0.4 10E9/L (ref 0–1.3)
MONOCYTES NFR BLD AUTO: 7.5 %
NEUTROPHILS # BLD AUTO: 3.7 10E9/L (ref 1.6–8.3)
NEUTROPHILS NFR BLD AUTO: 62.1 %
PLATELET # BLD AUTO: 310 10E9/L (ref 150–450)
PROT SERPL-MCNC: 8.1 G/DL (ref 6.8–8.8)
RBC # BLD AUTO: 4.02 10E12/L (ref 3.8–5.2)
WBC # BLD AUTO: 5.9 10E9/L (ref 4–11)

## 2017-04-17 PROCEDURE — 36415 COLL VENOUS BLD VENIPUNCTURE: CPT | Performed by: INTERNAL MEDICINE

## 2017-04-17 PROCEDURE — 85652 RBC SED RATE AUTOMATED: CPT | Performed by: INTERNAL MEDICINE

## 2017-04-17 PROCEDURE — 86140 C-REACTIVE PROTEIN: CPT | Performed by: INTERNAL MEDICINE

## 2017-04-17 PROCEDURE — 82565 ASSAY OF CREATININE: CPT | Performed by: INTERNAL MEDICINE

## 2017-04-17 PROCEDURE — 85025 COMPLETE CBC W/AUTO DIFF WBC: CPT | Performed by: INTERNAL MEDICINE

## 2017-04-17 PROCEDURE — 80076 HEPATIC FUNCTION PANEL: CPT | Performed by: INTERNAL MEDICINE

## 2017-04-19 ENCOUNTER — OFFICE VISIT (OUTPATIENT)
Dept: RHEUMATOLOGY | Facility: CLINIC | Age: 77
End: 2017-04-19
Payer: MEDICARE

## 2017-04-19 VITALS
BODY MASS INDEX: 19.84 KG/M2 | OXYGEN SATURATION: 98 % | WEIGHT: 116.2 LBS | SYSTOLIC BLOOD PRESSURE: 137 MMHG | DIASTOLIC BLOOD PRESSURE: 72 MMHG | HEIGHT: 64 IN | HEART RATE: 81 BPM

## 2017-04-19 DIAGNOSIS — M19.212 OTHER SECONDARY OSTEOARTHRITIS OF BOTH SHOULDERS: ICD-10-CM

## 2017-04-19 DIAGNOSIS — Z79.899 HIGH RISK MEDICATION USE: ICD-10-CM

## 2017-04-19 DIAGNOSIS — M19.211 OTHER SECONDARY OSTEOARTHRITIS OF BOTH SHOULDERS: ICD-10-CM

## 2017-04-19 DIAGNOSIS — M06.09 RHEUMATOID ARTHRITIS OF MULTIPLE SITES WITH NEGATIVE RHEUMATOID FACTOR (H): Primary | ICD-10-CM

## 2017-04-19 PROCEDURE — 99214 OFFICE O/P EST MOD 30 MIN: CPT | Performed by: INTERNAL MEDICINE

## 2017-04-19 RX ORDER — SULFASALAZINE 500 MG/1
TABLET, DELAYED RELEASE ORAL
Qty: 120 TABLET | Refills: 3 | Status: SHIPPED | OUTPATIENT
Start: 2017-04-19 | End: 2017-07-03

## 2017-04-19 NOTE — NURSING NOTE
"Chief Complaint   Patient presents with     Arthritis     RA, patient states has her ups and downs but doing better. Walking good today, right shoulder is still bothering her       Initial /72 (BP Location: Right arm, Patient Position: Chair, Cuff Size: Adult Small)  Pulse 81  Ht 1.626 m (5' 4\")  Wt 52.7 kg (116 lb 3.2 oz)  SpO2 98%  BMI 19.95 kg/m2 Estimated body mass index is 19.95 kg/(m^2) as calculated from the following:    Height as of this encounter: 1.626 m (5' 4\").    Weight as of this encounter: 52.7 kg (116 lb 3.2 oz).  BP completed using cuff size: small regular         RAPID3 (0-30) Cumulative Score            RAPID3 Weighted Score (divide #4 by 3 and that is the weighted score)           "

## 2017-04-19 NOTE — Clinical Note
Arthritis is improving!  There is a lot that cannot be reversed, but will see how much better she can get.  Merrick

## 2017-04-19 NOTE — PROGRESS NOTES
Rheumatology Clinic Visit      Alannah Leos MRN# [id number]   YOB: 1940 Age: 76 year old      Date of visit: 4/19/17   PCP: Dr. Leah Blanca    Chief Complaint   Patient presents with:  Arthritis: RA, patient states has her ups and downs but doing better. Walking good today, right shoulder is still bothering her      Assessment and Plan     1. Seronegative erosive rheumatoid arthritis: Diagnosed in the 1970s. Previously on methotrexate when first diagnosed (unclear if ineffective or not, stopped by patient preference to not treat her RA at that time).  Synovitis, subluxation, and fixed flexion deformities on exam. Steroid responsive. Also with ankylosis of the cervical spine and erosive changes seen on x-rays. Currently on methotrexate 20 mg once weekly. She has improved with methotrexate, but still has severe disease. She prefers avoiding SQ or IV medications. Therefore, start sulfasalazine today. Depending on how she responds, may also consider starting hydroxychloroquine at the next clinic visit.   - Continue methotrexate 20 mg once weekly (she tells me that she does not need additional refills at this time)  - Continue folic acid 1mg daily  - Start sulfasalazine 500 mg twice daily ×7 days, then 1000 mg twice daily thereafter  - Labs monthly x2: CBC, Cr, Hepatic Panel  - Labs 2-3 days prior to the next rheumatology clinic visit: CBC, Creatinine, Hepatic Panel, ESR, CRP    # Sulfasalazine Risks and Benefits: The risks and benefits of sulfasalazine were discussed in detail and the patient verbalized understanding.  The risks discussed include, but are not limited to, the risk for hypersensitivity, anaphylaxis, anaphylactoid reactions, infections, bone marrow suppression,  hepatotoxicity, nausea, vomiting, and GI upset.  Oligospermia may occur in males.  I encouraged reviewing the package insert and asking any questions about the medication.      2. Positive PIERO and dsDNA: These were noted on  "record review. She does not have symptoms to support an PIERO-associated rheumatologic disease at this time. No further workup with regard to these labs at this time.    3. Neck pain in the setting of rheumatoid arthritis: No evidence of instability by x-rays on 10/12/2016.    4. Parkinson's Disease: Followed by Dr. Peterson (neurology)    5. Osteoarthritis of the bilateral shoulders: Start capsaicin cream PRN.  Depending on her response to capsaicin, may use Voltaren gel in the future.  Note that previous intra-articular steroid injections were ineffective, and PT made her shoulder pain worse.     Ms. Leos verbalized agreement with and understanding of the rational for the diagnosis and treatment plan.  All questions were answered to best of my ability and the patient's satisfaction. Ms. Leos was advised to contact the clinic with any questions that may arise after the clinic visit.      Thank you for involving me in the care of the patient    Return to clinic: 3 months      HPI   Alannah Leos is a 76 year old female with a medical history significant for Parkinson's disease, osteoarthritis, status post TKA, osteoporosis, and rheumatoid arthritis who presents for follow-up of rheumatoid arthritis.    Previously, Ms. Leos reported that she was diagnosed with rheumatoid arthritis in the past but did not want to take toxic medications and did not want to keep taking prednisone because of potential toxicities. Therefore, she decided that she would \"tough out\" her symptoms and try to enjoy her life. Then, more recently she was diagnosed with Parkinson's disease that has been significantly affecting her quality of life. She is treated for the Parkinson's disease and she believes that the treatment is helping. However, she is also having worsening joint pain and stiffness. Morning stiffness lasts for all day. All of her joints hurt, including her bilateral shoulders, neck, spine, hands, wrists, elbows, hips, " "knees, ankles, and feet. She tells me that she is unable to raise her arms without assistance from the contralateral arm, if she is able to get that hand over to the other side. Mainly, her right shoulder is affected. She tells me that she has a little more mobility in her left shoulder. She tells me that her entire spine is fused, and that it \"occurred naturally.\"  Overall, she tells me that she feels miserable and is willing to start treatment, but is still scared of most of the medication side effects.    Today, she reports that methotrexate is very helpful and she is having much less joint pain. She does not have great range of motion though and her shoulder still bother her the most. Her daughter, who is with her today, mentions that the patient is more active now, and appears much more youthful and happy now. Joint pain is worse in the morning. Morning stiffness occurs all day and improves only slightly with activity. She reports that she has had steroid injections of her shoulders in the past that were ineffective. She is reportedly not a surgical candidate with regard to her shoulder arthritis. She prefers oral medications over SQ or IV medications.    Denies fevers, chills, nausea, vomiting, constipation, diarrhea. No abdominal pain. No chest pain/pressure, palpitations, or shortness of breath. No LE swelling.  No oral or nasal sores.  No rash. No sicca symptoms.      Tobacco: none  EtOH: 1 drink at [place] only  Drugs: none    ROS   GEN: No fevers, chills, night sweats, or weight change  SKIN: No itching, rashes, sores  HEENT: No epistaxis. No oral or nasal ulcers.  CV: No chest pain, pressure, palpitations, or dyspnea on exertion.  PULM: No SOB, wheeze, cough.  GI: No nausea, vomiting, constipation, diarrhea. No blood in stool. No abdominal pain.  : No blood in urine.  MSK: See HPI.  NEURO: No numbness or tingling  ENDO: No heat/cold intolerance.  EXT: No LE swelling  PSYCH: Negative    Active Problem " List     Patient Active Problem List   Diagnosis     Cataract, OU     Osteoporosis     Rheumatoid arthritis (H)     Parkinson disease (H)     Osteoarthritis of wrist     Osteoarthritis of shoulder     History of total knee arthroplasty     Osteoarthritis of left knee     Advanced directives, counseling/discussion     CARDIOVASCULAR SCREENING; LDL GOAL LESS THAN 160     High risk medication use     Underweight     Past Medical History     Past Medical History:   Diagnosis Date     Hyperlipidemia      Murmur, heart     hsm     Nonsenile cataract      Osteoarthrosis, unspecified whether generalized or localized, lower leg      Osteopenia      Rheumatoid arthritis(714.0)      Past Surgical History     Past Surgical History:   Procedure Laterality Date     GALLBLADDER SURGERY       HYSTERECTOMY       KNEE SURGERY      left      LAMINECTOMY LUMBAR ONE LEVEL       TONSILLECTOMY       Allergy     Allergies   Allergen Reactions     Decadrol [Dexamethasone Sodium Phosphate]      Shrimp      Current Medication List     Current Outpatient Prescriptions   Medication Sig     methotrexate sodium 2.5 MG TABS Take 20 mg by mouth once a week . Take all 8 tablets on the same day of each week.     folic acid (FOLVITE) 1 MG tablet Take 1 tablet (1 mg) by mouth daily     carbidopa-levodopa (SINEMET)  MG per tablet 1.5 tablets at 6 AM, 5 PM and 9 PM. Daily. 2 tablets at 12 Noon daily.     CALCIUM + D OR 600mg twice a day      ASPIRIN 81 MG PO TABS 1 TABLET DAILY (*)     MULTI-VITAMIN OR 1 a day      ACE NOT PRESCRIBED, INTENTIONAL, 1 each continuous prn. ACE Inhibitor not prescribed due to Refusal by patient           No current facility-administered medications for this visit.          Social History   See HPI    Family History     Family History   Problem Relation Age of Onset     CANCER Sister      pancreatic       Physical Exam     Temp Readings from Last 3 Encounters:   01/18/17 95.9  F (35.5  C) (Oral)   11/03/16 97.1  F (36.2  " C) (Oral)   10/12/16 96.7  F (35.9  C) (Oral)     BP Readings from Last 5 Encounters:   04/19/17 137/72   01/18/17 142/67   11/03/16 118/61   10/27/16 135/73   10/20/16 125/72     Pulse Readings from Last 1 Encounters:   04/19/17 81     Resp Readings from Last 1 Encounters:   05/26/15 22     Estimated body mass index is 19.95 kg/(m^2) as calculated from the following:    Height as of this encounter: 1.626 m (5' 4\").    Weight as of this encounter: 52.7 kg (116 lb 3.2 oz).    GEN: NAD, thin and frail appearing  HEENT: MMM. No oral lesions. Anicteric, noninjected sclera  CV: S1, S2. RRR. No m/r/g.  PULM: CTA bilaterally. No w/c.  MSK: Mild synovial swelling and tenderness palpation of all MCPs and PIPs. Bilateral wrist tender to palpation and with synovial swelling; minimal range of motion of the bilateral wrists. Elbows without swelling or tenderness to palpation. Right shoulder with diffuse muscle atrophy, tenderness to palpation, and almost no active range of motion; limited passive range of motion. Left shoulder with minimal range of motion, muscle atrophy, and tenderness to palpation. Bilateral hips tender to direct palpation. Spine diffusely tender to palpation. Knees without swelling or tenderness to palpation; no increased warmth. Bilateral ankles without swelling or tenderness to palpation; limited range of motion. MTP squeeze positive bilaterally.  NEURO: UE and LE strengths 5/5 and equal bilaterally.   SKIN: No rash. Hair thinning.  EXT: No LE edema  PSYCH: Alert. Appropriate.    Labs / Imaging (select studies)   RF/CCP  Recent Labs   Lab Test  10/12/16   1142  07/30/13   1621  10/25/12   1127   CCPABY   --   <20 Interpretation:  Negative   --    CCPIGG  2   --    --    RHF  <20   --   9     PIERO/RNP/Sm/SSA/SSB/dsDNA  Recent Labs   Lab Test  07/30/13   1621  10/25/12   1127   ERA   --   3.3*   ENASM  0   --    ENASSA  1   --    ENASSB  0   --    ENARMP  0   --    L7UFGAR  106   --    P7BERIZ  24   --  "     Recent Labs   Lab Test  07/30/13   1621   DNA  52*     CBC  Recent Labs   Lab Test  04/17/17   1333  03/20/17   1335  02/20/17   1326   WBC  5.9  6.3  5.5   RBC  4.02  4.19  4.10   HGB  11.7  12.2  12.0   HCT  36.1  37.8  37.0   MCV  90  90  90   RDW  15.4*  15.3*  15.6*   PLT  310  280  289   MCH  29.1  29.1  29.3   MCHC  32.4  32.3  32.4   NEUTROPHIL  62.1  59.7  56.3   LYMPH  27.2  28.9  33.0   MONOCYTE  7.5  8.0  6.7   EOSINOPHIL  1.5  2.1  2.2   BASOPHIL  1.7  1.3  1.8   ANEU  3.7  3.8  3.1   ALYM  1.6  1.8  1.8   DORITA  0.4  0.5  0.4   AEOS  0.1  0.1  0.1   ABAS  0.1  0.1  0.1     CMP  Recent Labs   Lab Test  04/17/17   1333  03/20/17   1335  02/20/17   1326   05/27/16   1413  05/26/15   1416  04/28/14   1209   NA   --    --    --    --   136  137  136   POTASSIUM   --    --    --    --   3.7  4.0  4.2   CHLORIDE   --    --    --    --   102  103  98   CO2   --    --    --    --   25  28  27   ANIONGAP   --    --    --    --   9  6  11   GLC   --    --    --    --   94  100*  99   BUN   --    --    --    --   11  9  12   CR  0.49*  0.44*  0.40*   < >  0.41*  0.47*  0.52   GFRESTIMATED  >90  Non  GFR Calc    >90  Non  GFR Calc    >90  Non  GFR Calc     < >  >90  Non  GFR Calc    >90  Non  GFR Calc    >90   GFRESTBLACK  >90   GFR Calc    >90   GFR Calc    >90   GFR Calc     < >  >90   GFR Calc    >90   GFR Calc    >90   PAKO   --    --    --    --   9.2  9.2  9.4   BILITOTAL  0.4  0.4  0.5   < >  0.5   --   0.5   ALBUMIN  3.8  3.8  4.0   < >  3.8   --   4.4   PROTTOTAL  8.1  7.9  8.0   < >  8.8   --   8.6   ALKPHOS  104  108  106   < >  108   --   119   AST  23  19  22   < >  15   --   27   ALT  13  10  10   < >  8   --   19    < > = values in this interval not displayed.     HgA1c  Recent Labs   Lab Test  05/27/16   1413  05/26/15   1416  10/28/13    1204   A1C  5.9  6.0  5.8     Calcium/VitaminD  Recent Labs   Lab Test  05/27/16   1413  05/26/15   1416  04/28/14   1209   04/26/13   1128   10/26/11   1113   PAKO  9.2  9.2  9.4   < >  9.2   < >  9.7   D3VIT   --    --    --    --    --    --   46   VITDT  95*   --    --    --   63   --    --     < > = values in this interval not displayed.     ESR/CRP  Recent Labs   Lab Test  04/17/17   1333  01/16/17   1334  10/12/16   1142   SED  30  28  26   CRP  9.5*  8.8*  7.0     CK/Aldolase  Recent Labs   Lab Test  04/26/13   1128  10/02/09   1135   CKT  73  37     TSH/T4  Recent Labs   Lab Test  05/27/16   1413  05/26/15   1416  04/28/14   1209   TSH  2.36  2.43  2.92     Lipid Panel  Recent Labs   Lab Test  05/26/15   1416  04/26/13   1128  04/26/12   1151   CHOL  179  154  159   TRIG  50  44  56   HDL  80  72  71   LDL  89  73  77   VLDL  10  9  11   CHOLHDLRATIO  2.2  2.1  2.2     Hepatitis B  Recent Labs   Lab Test  01/18/17   1601   HBCAB  Nonreactive   HEPBANG  Nonreactive     Hepatitis C  Recent Labs   Lab Test  01/18/17   1601   HCVAB  Nonreactive   Assay performance characteristics have not been established for newborns,   infants, and children       Tuberculosis Screening  Recent Labs   Lab Test  01/18/17   1601   TBRSLT  Negative   TBAGN  0.00     HIV Screening  Recent Labs   Lab Test  01/18/17   1601   HIAGAB  Nonreactive   HIV-1 p24 Ag & HIV-1/HIV-2 Ab Not Detected       Recent Results (from the past 744 hour(s))   XR Cervical Spine G/E 4 Views    Narrative    CERVICAL SPINE FOUR OR MORE VIEWS October 12, 2016 1:22 PM     HISTORY: Neck pain in the setting of rheumatoid arthritis; please  evaluated for instability. Rheumatoid arthritis without rheumatoid  factor, multiple sites. Cervicalgia.    COMPARISON: Lateral views of the cervical spine 7/30/2013.      Impression    IMPRESSION: Ankylosis of the entire cervical spine again noted  consistent with the patient's history of rheumatoid arthritis. There  are no  findings to suggest fracture of the cervical spine. There is no  prevertebral soft tissue swelling. The cervical vertebrae remain  rigidly aligned with no evidence for displacement or articulation in  either the flexed or extended positions. There does appear to be  flexion articulation at the C7-T1 level. There are no findings to  suggest instability. There is likely a solid fusion from the occiput  down to C7.    BASIL STUART MD   XR Sacroiliac Joint 1/2 Views    Narrative    XR SACROILIAC JOINT 1/2 VW 10/12/2016 1:22 PM    COMPARISON: None.    HISTORY: Low back pain      Impression    IMPRESSION: Stool and bowel gas significantly obscure the sacrum.  Sacroiliac joints appear grossly patent. Pelvic enthesopathy is noted.    JESSYKEVIN BILLINGS   XR Chest 2 Views    Narrative    XR CHEST 2 VW  10/12/2016 1:23 PM    HISTORY:  Rheumatoid arthritis without rheumatoid factor, multiple  sites    COMPARISON:  None.      Impression    IMPRESSION:  Hyperinflation. Lungs are clear. Aortic calcification.  Otherwise negative.     ALYCIA AGUIRRE MD   XR Lumbar Spine 2/3 Views    Narrative    XR LUMBAR SPINE 2-3 VIEWS  10/12/2016 1:23 PM    HISTORY:  Low back pain, Other chronic pain    COMPARISON:  None.      Impression    IMPRESSION:  Mild lumbar scoliosis convex left. Multilevel  degenerative changes. Disc space narrowing at L2-S1, most prominent at  L5-S1 where there is complete disc space loss posteriorly. Posterior  facet degenerative changes L5.    ALYCIA AGUIRRE MD   XR Thoracic Spine 3 Views    Narrative    XR THORACIC SPINE 3 VW  10/12/2016 1:23 PM    HISTORY:  Pain in thoracic spine, Other chronic pain    COMPARISON:  None.      Impression    IMPRESSION:  Osteopenia. Thoracolumbar scoliosis. Mild multilevel  degenerative changes. Suspect mild compression fracture of T12, poorly  visualized and age indeterminate. Aortic calcification.     ALYCIA AGUIRRE MD   XR Foot Bilateral G/E 3 Views    Narrative    XR FOOT BILATERAL  G/E 3 VW 10/12/2016 1:24 PM    COMPARISON: 7/30/2013    HISTORY: Rheumatoid arthritis    FINDINGS:  Right foot: Mild hallux valgus with associated mild degenerative  changes at the first metatarsophalangeal joint. Osteopenia is noted  about the right foot. No fractures are seen. No erosive changes are  identified. Achilles enthesopathy is noted.    Left foot: Osteopenia is noted. Mild hallux valgus. No fractures are  seen. No definite erosive changes are identified. Achilles  enthesopathy.      Impression    IMPRESSION: Bilateral foot osteopenia again noted, as well as mild  hallux valgus in both feet. No erosive changes to suggest inflammatory  arthropathy in the feet.    JESSY BILLINGS   XR Hand Bilateral G/E 3 Views    Narrative    HAND BILATERAL THREE OR MORE VIEWS October 12, 2016 1:24 PM    HISTORY: Rheumatoid arthritis.    COMPARISON: 7/30/2015.    FINDINGS:    Right hand: Diffuse osteopenia is again noted. Polyarticular erosive  changes are again demonstrated, and do not appear significantly  changed on radiographs since 7/30/2013. These are most pronounced at  the first interphalangeal joint, second proximal interphalangeal  joint, first, second and third metacarpophalangeal joints.  Additionally, there is near complete destruction of the radiocarpal  joint, this has progressed since 7/30/2013 with the proximal carpal  row being indistinguishable from the distal radius and ulna. No  fractures are seen.    Left hand: Diffuse osteopenia is again noted. Polyarticular erosive  changes are also again seen on this side, unchanged at the first,  second and third metacarpophalangeal joints as well as the first  interphalangeal joint and the second and fifth proximal  interphalangeal joints, but slightly worsened at the third proximal  interphalangeal joint. Destructive changes in the carpus and  radiocarpal joint are again noted, slightly worsened since 7/30/2013.  No fractures are identified.      Impression     IMPRESSION: Bilateral hand osteopenia and polyarticular erosive  changes consistent with patient's history of rheumatoid arthritis.  Findings are worsened in the carpus and radiocarpal joint on the right  as well as the third proximal interphalangeal joint and  carpus/radiocarpal joint on the left.    JESSY BILLINGS       Immunization History     Immunization History   Administered Date(s) Administered     Pneumococcal (PCV 13) 11/03/2016     Pneumococcal 23 valent 11/01/2007, 11/13/2007     TD (ADULT, 7+) 11/13/2007     Tdap (Adacel,Boostrix) 11/13/2007          Chart documentation done in part with Dragon Voice recognition Software. Although reviewed after completion, some word and grammatical error may remain.    Merrick Del Cid MD

## 2017-04-19 NOTE — MR AVS SNAPSHOT
After Visit Summary   4/19/2017    Alannah Leos    MRN: [id number]           Patient Information     Date Of Birth          1940        Visit Information        Provider Department      4/19/2017 3:00 PM Merrick Del Cid MD [facility]        Today's Diagnoses     Rheumatoid arthritis of multiple sites with negative rheumatoid factor (H)    -  1    High risk medication use        Other secondary osteoarthritis of both shoulders          Care Instructions    Continue methotrexate 20mg once weekly    Continue folic acid 1mg daily    Start sulfasalazine    Start capsaicin cream for shoulder osteoarthritis        Follow-ups after your visit        Follow-up notes from your care team     Return in about 3 months (around 7/19/2017) for f/u RA.      Your next 10 appointments already scheduled     Apr 26, 2017  1:40 PM CDT   Return Visit with Daja Peterson MD   [facility]    [address] [address]   [phone]            May 08, 2017  1:40 PM CDT   Office Visit with Leah Blanca MD   [facility] ([facility])    [address] [place] [id number] [phone]           Bring a current list of meds and any records pertaining to this visit.  For Physicals, please bring immunization records and any forms needing to be filled out.  Please arrive 10 minutes early to complete paperwork.            May 15, 2017  1:45 PM CDT   LAB with [facility]   [facility]    [address] [address]   [phone]           Patient must bring picture ID.  Patient should be prepared to give a urine specimen  Please do not eat 10-12 hours before your appointment if you are coming in fasting for labs on lipids, cholesterol, or glucose (sugar).  Pregnant women should follow their Care Team instructions. Water with medications is  okay. Do not drink coffee or other fluids.   If you have concerns about taking  your medications, please ask at office or if scheduling via Slipstream, send a message by clicking on Secure Messaging, Message Your Care Team.            Jun 16, 2017  1:30 PM CDT   LAB with [facility]   [facility]    [address]  [place] [address]   [phone]           Patient must bring picture ID.  Patient should be prepared to give a urine specimen  Please do not eat 10-12 hours before your appointment if you are coming in fasting for labs on lipids, cholesterol, or glucose (sugar).  Pregnant women should follow their Care Team instructions. Water with medications is okay. Do not drink coffee or other fluids.   If you have concerns about taking  your medications, please ask at office or if scheduling via Slipstream, send a message by clicking on Secure Messaging, Message Your Care Team.            Jul 17, 2017  1:30 PM CDT   LAB with [facility]   [facility]    [address] [address]   [phone]           Patient must bring picture ID.  Patient should be prepared to give a urine specimen  Please do not eat 10-12 hours before your appointment if you are coming in fasting for labs on lipids, cholesterol, or glucose (sugar).  Pregnant women should follow their Care Team instructions. Water with medications is okay. Do not drink coffee or other fluids.   If you have concerns about taking  your medications, please ask at office or if scheduling via Slipstream, send a message by clicking on Secure Messaging, Message Your Care Team.            Jul 19, 2017  3:00 PM CDT   Return Visit with Merrick Del Cid MD   [facility]    [address] [address] [phone]              Future tests that were ordered for you today     Open Standing Orders        Priority Remaining  "Interval Expires Ordered    CBC with platelets differential Routine 2/2 Every 4 Weeks 10/16/2017 4/19/2017    Creatinine Routine 2/2 Every 4 Weeks 10/16/2017 4/19/2017    Hepatic panel Routine 2/2 Every 4 Weeks 10/16/2017 4/19/2017          Open Future Orders        Priority Expected Expires Ordered    Hepatic panel Routine 7/14/2017 8/2/2017 4/19/2017    CBC with platelets differential Routine 7/14/2017 8/2/2017 4/19/2017    Creatinine Routine 7/14/2017 8/2/2017 4/19/2017    CRP inflammation Routine 7/14/2017 8/2/2017 4/19/2017    Erythrocyte sedimentation rate auto Routine 7/14/2017 8/2/2017 4/19/2017            Who to contact     If you have questions or need follow up information about today's clinic visit or your schedule please contact [facility] directly at [phone].  Normal or non-critical lab and imaging results will be communicated to you by MyChart, letter or phone within 4 business days after the clinic has received the results. If you do not hear from us within 7 days, please contact the clinic through [facility] or phone. If you have a critical or abnormal lab result, we will notify you by phone as soon as possible.  Submit refill requests through [employer] or call your pharmacy and they will forward the refill request to us. Please allow 3 business days for your refill to be completed.          Additional Information About Your Visit        [facility] Information     [employer] lets you send messages to your doctor, view your test results, renew your prescriptions, schedule appointments and more. To sign up, go to [email or web address] . Click on \"Log in\" on the left side of the screen, which will take you to the Welcome page. Then click on \"Sign up Now\" on the right side of the page.     You will be asked to enter the access code listed below, as well as some personal information. Please follow the directions to create your username and password.     Your access code is: " "[email or web address]  Expires: 2017  3:49 PM     Your access code will  in 90 days. If you need help or a new code, please call your [facility] or [phone].        Care EveryWhere ID     This is your Care EveryWhere ID. This could be used by other organizations to access your [place] medical records  [id number]        Your Vitals Were     Pulse Height Pulse Oximetry BMI (Body Mass Index)          81 1.626 m (5' 4\") 98% 19.95 kg/m2         Blood Pressure from Last 3 Encounters:   17 137/72   17 142/67   16 118/61    Weight from Last 3 Encounters:   17 52.7 kg (116 lb 3.2 oz)   17 52.3 kg (115 lb 6.4 oz)   16 49.9 kg (110 lb)                 Today's Medication Changes          These changes are accurate as of: 17  3:49 PM.  If you have any questions, ask your nurse or doctor.               Start taking these medicines.        Dose/Directions    capsaicin 0.035 % Crea   Used for:  Other secondary osteoarthritis of both shoulders   Started by:  Merrick Del Cid MD        Apply thin film to affected areas 3 to 4 times daily   Quantity:  85 g   Refills:  2       sulfaSALAzine  MG EC tablet   Commonly known as:  AZULFIDINE EN   Used for:  Rheumatoid arthritis of multiple sites with negative rheumatoid factor (H)   Started by:  Merrick Del Cid MD        500mg BID for 7 days, then increase to 1000mg BID and continue 1000mg BID thereafter.   Quantity:  120 tablet   Refills:  3            Where to get your medicines      These medications were sent to [facility] [address]   [facility], [facility] [address]     Phone:  [phone]     capsaicin 0.035 % Crea    sulfaSALAzine  MG EC tablet                Primary Care Provider Office Phone # Fax #    Leah Shelby Blanca [phone]       [place] [address]  [facility] [address]      "   Thank you!     Thank you for choosing [facility]  for your care. Our goal is always to provide you with excellent care. Hearing back from our patients is one way we can continue to improve our services. Please take a few minutes to complete the written survey that you may receive in the mail after your visit with us. Thank you!             Your Updated Medication List - Protect others around you: Learn how to safely use, store and throw away your medicines at www.disposemymeds.org.          This list is accurate as of: 4/19/17  3:49 PM.  Always use your most recent med list.                   Brand Name Dispense Instructions for use    ACE NOT PRESCRIBED (INTENTIONAL)     0 each    1 each continuous prn. ACE Inhibitor not prescribed due to Refusal by patient       aspirin 81 MG tablet      1 TABLET DAILY (*)       CALCIUM + D PO      600mg twice a day       capsaicin 0.035 % Crea     85 g    Apply thin film to affected areas 3 to 4 times daily       carbidopa-levodopa  MG per tablet    SINEMET    540 tablet    1.5 tablets at 6 AM, 5 PM and 9 PM. Daily. 2 tablets at 12 Noon daily.       folic acid 1 MG tablet    FOLVITE    100 tablet    Take 1 tablet (1 mg) by mouth daily       methotrexate sodium 2.5 MG Tabs     96 tablet    Take 20 mg by mouth once a week . Take all 8 tablets on the same day of each week.       MULTI-VITAMIN PO      1 a day       sulfaSALAzine  MG EC tablet    AZULFIDINE EN    120 tablet    500mg BID for 7 days, then increase to 1000mg BID and continue 1000mg BID thereafter.

## 2017-04-19 NOTE — PATIENT INSTRUCTIONS
Continue methotrexate 20mg once weekly    Continue folic acid 1mg daily    Start sulfasalazine    Start capsaicin cream for shoulder osteoarthritis

## 2017-04-20 RX ORDER — CARBIDOPA AND LEVODOPA 25; 100 MG/1; MG/1
TABLET ORAL
Qty: 540 TABLET | Refills: 3 | Status: SHIPPED | OUTPATIENT
Start: 2017-04-20 | End: 2017-10-18 | Stop reason: DRUGHIGH

## 2017-04-26 ENCOUNTER — OFFICE VISIT (OUTPATIENT)
Dept: NEUROLOGY | Facility: CLINIC | Age: 77
End: 2017-04-26
Payer: MEDICARE

## 2017-04-26 DIAGNOSIS — G20.A1 PARKINSON DISEASE (H): Primary | ICD-10-CM

## 2017-04-26 PROCEDURE — 99214 OFFICE O/P EST MOD 30 MIN: CPT | Performed by: PSYCHIATRY & NEUROLOGY

## 2017-04-26 NOTE — NURSING NOTE
IMAGING: None  BLOOD TEST RESULTS: Not recommended on previous visit  Referral: PASTORA  Old records: PASTORA  EEG: PASTORA Nettles MA

## 2017-04-26 NOTE — NURSING NOTE
"Chief Complaint   Patient presents with     RECHECK     Follow up on Parkinson's Disease.       Initial /59 (BP Location: Right arm, Patient Position: Chair, Cuff Size: Adult Regular)  Pulse 95  Ht 5' 4\" (1.626 m)  Wt 117 lb (53.1 kg)  BMI 20.08 kg/m2 Estimated body mass index is 20.08 kg/(m^2) as calculated from the following:    Height as of this encounter: 5' 4\" (1.626 m).    Weight as of this encounter: 117 lb (53.1 kg).  Medication Reconciliation: complete   Hyacinth Nettles MA      "

## 2017-04-26 NOTE — PATIENT INSTRUCTIONS
AFTER VISIT SUMMARY (AVS)  Orders Placed This Encounter   Procedures     DERMATOLOGY REFERRAL       No changes in medications at present    Diagnostic possibilities reviewed    Preventive Neurology: Encouraged to keep physically and mentally active with particular emphasis on daily stretching exercises, walking, and healthy eating.    Additional  recommendations after the work-up    To call us for follow-up appointment in next 9 month(s) or earlier if needed.    Thanks

## 2017-04-26 NOTE — MR AVS SNAPSHOT
After Visit Summary   4/26/2017    Alannah Leos    MRN: [id number]           Patient Information     Date Of Birth          1940        Visit Information        Provider Department      4/26/2017 1:40 PM Daja Peterson MD [facility]        Today's Diagnoses     Parkinson disease (H)    -  1      Care Instructions    AFTER VISIT SUMMARY (AVS)  Orders Placed This Encounter   Procedures     DERMATOLOGY REFERRAL       No changes in medications at present    Diagnostic possibilities reviewed    Preventive Neurology: Encouraged to keep physically and mentally active with particular emphasis on daily stretching exercises, walking, and healthy eating.    Additional  recommendations after the work-up    To call us for follow-up appointment in next 9 month(s) or earlier if needed.    Thanks               Follow-ups after your visit        Additional Services     DERMATOLOGY REFERRAL       REASON: Skin cancer screening in view of Parkinson's Disease and taking Sinemet.     Your provider has referred you to: [facility]: [facility] [phone]   [email or web address]    Please be aware that coverage of these services is subject to the terms and limitations of your health insurance plan.  Call member services at your health plan with any benefit or coverage questions.      Please bring the following with you to your appointment:    (1) Any X-Rays, CTs or MRIs which have been performed.  Contact the facility where they were done to arrange for  prior to your scheduled appointment.    (2) List of current medications  (3) This referral request   (4) Any documents/labs given to you for this referral                  Follow-up notes from your care team     Return in about 9 months (around 1/26/2018).      Your next 10 appointments already scheduled     May 08, 2017  1:40 PM CDT   Office Visit with Leah Ryan  MD [place]   [facility] ([facility]    [address] [place] [address]   [phone]           Bring a current list of meds and any records pertaining to this visit.  For Physicals, please bring immunization records and any forms needing to be filled out.  Please arrive 10 minutes early to complete paperwork.            May 15, 2017  1:45 PM CDT   LAB with FZ LAB   [facility] ([place] [address] [facility] [address]   [phone]           Patient must bring picture ID.  Patient should be prepared to give a urine specimen  Please do not eat 10-12 hours before your appointment if you are coming in fasting for labs on lipids, cholesterol, or glucose (sugar).  Pregnant women should follow their Care Team instructions. Water with medications is okay. Do not drink coffee or other fluids.   If you have concerns about taking  your medications, please ask at office or if scheduling via Oration, send a message by clicking on Secure Messaging, Message Your Care Team.            Jun 16, 2017  1:30 PM CDT   LAB with [facility]   [facility]    [address]  [place] [address] [phone]           Patient must bring picture ID.  Patient should be prepared to give a urine specimen  Please do not eat 10-12 hours before your appointment if you are coming in fasting for labs on lipids, cholesterol, or glucose (sugar).  Pregnant women should follow their Care Team instructions. Water with medications is okay. Do not drink coffee or other fluids.   If you have concerns about taking  your medications, please ask at office or if scheduling via Oration, send a message by clicking on Secure Messaging, Message Your Care Team.            Jul 17, 2017  1:30 PM CDT   LAB with FZ LAB   [facility] ([facility]    [address]  [place]  "[id number] [phone]           Patient must bring picture ID.  Patient should be prepared to give a urine specimen  Please do not eat 10-12 hours before your appointment if you are coming in fasting for labs on lipids, cholesterol, or glucose (sugar).  Pregnant women should follow their Care Team instructions. Water with medications is okay. Do not drink coffee or other fluids.   If you have concerns about taking  your medications, please ask at office or if scheduling via [employer], send a message by clicking on Secure Messaging, Message Your Care Team.            Jul 19, 2017  3:00 PM CDT   Return Visit with Merrick Del Cid MD   [facility]    [address] [place] [place] [address] [address] [phone]              Who to contact     If you have questions or need follow up information about today's clinic visit or your schedule please contact [facility] directly at [phone].  Normal or non-critical lab and imaging results will be communicated to you by [employer], letter or phone within 4 business days after the clinic has received the results. If you do not hear from us within 7 days, please contact the clinic through [employer] or phone. If you have a critical or abnormal lab result, we will notify you by phone as soon as possible.  Submit refill requests through [employer] or call your pharmacy and they will forward the refill request to us. Please allow 3 business days for your refill to be completed.          Additional Information About Your Visit        [employer] Information     [employer] lets you send messages to your doctor, view your test results, renew your prescriptions, schedule appointments and more. To sign up, go to [email or web address] . Click on \"Log in\" on the left side of the screen, which will take you to the Welcome page. Then click on \"Sign up Now\" on the right side of the page.     You will be asked to enter the access code listed below, as " well as some personal information. Please follow the directions to create your username and password.     Your access code is: [email or web address]  Expires: 2017  3:49 PM     Your access code will  in 90 days. If you need help or a new code, please call your [place] clinic or [phone].        Care EveryWhere ID     This is your Care EveryWhere ID. This could be used by other organizations to access your [place] medical records  [id number]         Blood Pressure from Last 3 Encounters:   17 137/72   17 142/67   16 118/61    Weight from Last 3 Encounters:   17 52.7 kg (116 lb 3.2 oz)   17 52.3 kg (115 lb 6.4 oz)   16 49.9 kg (110 lb)              We Performed the Following     DERMATOLOGY REFERRAL        Primary Care Provider Office Phone # Fax #    Leah Shelby Blanca [phone]       [facility] [address]  [place] [address]        Thank you!     Thank you for choosing [facility]  for your care. Our goal is always to provide you with excellent care. Hearing back from our patients is one way we can continue to improve our services. Please take a few minutes to complete the written survey that you may receive in the mail after your visit with us. Thank you!             Your Updated Medication List - Protect others around you: Learn how to safely use, store and throw away your medicines at www.disposemymeds.org.          This list is accurate as of: 17  2:16 PM.  Always use your most recent med list.                   Brand Name Dispense Instructions for use    ACE NOT PRESCRIBED (INTENTIONAL)     0 each    1 each continuous prn. ACE Inhibitor not prescribed due to Refusal by patient       aspirin 81 MG tablet      1 TABLET DAILY (*)       CALCIUM + D PO      600mg twice a day       capsaicin 0.035 % Crea     85 g    Apply thin film to affected areas 3 to 4 times daily       carbidopa-levodopa  MG per  tablet    SINEMET    540 tablet    1.5 tablets at 6 AM, 5 PM and 9 PM. Daily. 2 tablets at 12 Noon daily.       folic acid 1 MG tablet    FOLVITE    100 tablet    Take 1 tablet (1 mg) by mouth daily       methotrexate sodium 2.5 MG Tabs     96 tablet    Take 20 mg by mouth once a week . Take all 8 tablets on the same day of each week.       MULTI-VITAMIN PO      1 a day       sulfaSALAzine  MG EC tablet    AZULFIDINE EN    120 tablet    500mg BID for 7 days, then increase to 1000mg BID and continue 1000mg BID thereafter.

## 2017-04-30 NOTE — PROGRESS NOTES
" ESTABLISHED PATIENT NEUROLOGY NOTE  LOCATION: [facility]  DATE OF VISIT:  2017  [id number]  NAME:  Ms.Nancy SAEID Leos  :  1940 (76 year old)    PRIMARY PROVIDER:  Leah Blanca MD    REASON FOR VISIT:  Parkinson's disease follow-up    INTERVAL HISTORY: Ms. Leos is 76 year old female with diagnosis of Parkinson's disease since . Accompanied by daughter    Medications:     Taking Regular CARBIDOPA/LEVODOPA 25/100 mg (SINEMET) as outlined below.    Helping: Yes. Feeling better overall except for infrequent Left hand tremors    Side Effects: None    Walking: With support for many years. No freezing episodes.    Falls: None recently    Activities of Daily Living : Self, without any help    Physical Exercise: Yes including stretching. Not keen to take part in [employer] Program for Parkinson's Disease patients    Mental Exercise: Keeps himself active    Any New Symptoms:    Increase in saliva: Yes. Reports increasing phlegm recently-non smoker. Advised to talk to Leah Blanca MD        Visual hallucinations: None    Memory problems: No    Depression: No      LABS:    Component      Latest Ref Rng & Units 2016   Vitamin D Deficiency screening      20 - 75 ug/L 95 (H)   Annual skin check up for cancer: No  Physical therapy: No    Past medical & surgical history; Personal & Social history reviewed and updated in  EPIC    GENERAL EXAMINATION:  Pleasant woman sitting comfortably in a chair  /59 (BP Location: Right arm, Patient Position: Chair, Cuff Size: Adult Regular)  Pulse 95  Ht 1.626 m (5' 4\")  Wt 53.1 kg (117 lb)  BMI 20.08 kg/m2    Neurological Examination:  (Movement Disorder Examination)     Facial Expression: Mildly expressionless    Resting Hand Tremors:   Right Hand: Absent   Left Hand: Absent     Finger Tapping:   Right Hand:Slower  Left Hand:Fair    Heel Tapping:  Right Foot:Fair  Left Foot:Slower    Cogwheel Rigidity:  Right Wrist:Present  Left " Wrist:Present    Gait:  Posture:Stooped  Stride Length: Small steps  Right Arm Swing: Normal  Left Arm Swing: Decreased  Right hand tremors increased while walking:No  Left hand tremors increased while walking:No      IMPRESSION:  Encounter Diagnoses   Name Primary?     Parkinson disease (H) Yes     COMMENTS: Good response with current medication(s). Advisable to get skin cancer screening because of risk of melanoma with Parkinson's Disease and on Sinemet.    PLAN:   Patient Instructions     AFTER VISIT SUMMARY (AVS)  Orders Placed This Encounter   Procedures     DERMATOLOGY REFERRAL     No changes in medications at present    Diagnostic possibilities reviewed    Preventive Neurology: Encouraged to keep physically and mentally active with particular emphasis on daily stretching exercises, walking, and healthy eating.    Additional  recommendations after the work-up    To call us for follow-up appointment in next 9 month(s) or earlier if needed.     ADDENDUM 4/30/2017  Leah Blanca MD may consider checking her vitamin D level and review as she is taking Calcium with Vitamin D supplement with this out of range vitamin D in 2016.       Thanks to Leah Blanca MDfor allowing me to participate in Ms. Leos's care.  Please feel free to call me with any questions or concerns.     Total time:  25 minutes. More than 50% of time spent counseling patient regarding the counseling and educational aspects of the care.      Daja Peterson MD, [facility]  Neurologist    CC: Leah Blanca MD

## 2017-05-01 VITALS
HEIGHT: 64 IN | DIASTOLIC BLOOD PRESSURE: 59 MMHG | HEART RATE: 95 BPM | SYSTOLIC BLOOD PRESSURE: 115 MMHG | BODY MASS INDEX: 19.97 KG/M2 | WEIGHT: 117 LBS

## 2017-05-08 ENCOUNTER — OFFICE VISIT (OUTPATIENT)
Dept: FAMILY MEDICINE | Facility: CLINIC | Age: 77
End: 2017-05-08
Payer: MEDICARE

## 2017-05-08 VITALS
SYSTOLIC BLOOD PRESSURE: 113 MMHG | TEMPERATURE: 97.5 F | BODY MASS INDEX: 19.81 KG/M2 | DIASTOLIC BLOOD PRESSURE: 58 MMHG | WEIGHT: 116 LBS | HEIGHT: 64 IN | HEART RATE: 86 BPM | OXYGEN SATURATION: 96 %

## 2017-05-08 DIAGNOSIS — K11.7 EXCESSIVE SALIVATION: Primary | ICD-10-CM

## 2017-05-08 DIAGNOSIS — M06.011: ICD-10-CM

## 2017-05-08 DIAGNOSIS — R23.2 HOT FLASHES NOT DUE TO MENOPAUSE: ICD-10-CM

## 2017-05-08 DIAGNOSIS — G20.A1 PARKINSON DISEASE (H): ICD-10-CM

## 2017-05-08 DIAGNOSIS — R53.82 CHRONIC FATIGUE: ICD-10-CM

## 2017-05-08 LAB — TSH SERPL DL<=0.005 MIU/L-ACNC: 2.12 MU/L (ref 0.4–4)

## 2017-05-08 PROCEDURE — 84443 ASSAY THYROID STIM HORMONE: CPT | Performed by: FAMILY MEDICINE

## 2017-05-08 PROCEDURE — 99214 OFFICE O/P EST MOD 30 MIN: CPT | Performed by: FAMILY MEDICINE

## 2017-05-08 PROCEDURE — 36415 COLL VENOUS BLD VENIPUNCTURE: CPT | Performed by: FAMILY MEDICINE

## 2017-05-08 RX ORDER — GUAIFENESIN 200 MG/1
400 TABLET ORAL EVERY 6 HOURS PRN
Qty: 60 TABLET | Refills: 1 | Status: SHIPPED | OUTPATIENT
Start: 2017-05-08 | End: 2017-07-03

## 2017-05-08 NOTE — PROGRESS NOTES
SUBJECTIVE:                                                    Alannah Leos is a 76 year old female who presents to clinic today for the following health issues:    Joint Pain     Onset: ongoing    Description:   Location: left knee and left ankle  Character: sharp    Intensity: mild to moderate    Progression of Symptoms: varies    Accompanying Signs & Symptoms:  Other symptoms: numbness, tingling and swelling   History:   Previous similar pain: YES  ongoing    Precipitating factors:   Trauma or overuse: no     Alleviating factors:  Improved by: nothing       Therapies Tried and outcome: none      Sweating and increased saliva production. Not sure if this is medication side effect or allergy related. Neurology does not think it is related to the Parkinson's disease.     Problem list and histories reviewed & adjusted, as indicated.  Additional history: as documented    Patient Active Problem List   Diagnosis     Cataract, OU     Osteoporosis     Rheumatoid arthritis (H)     Parkinson disease (H)     Osteoarthritis of wrist     Osteoarthritis of shoulder     History of total knee arthroplasty     Osteoarthritis of left knee     Advanced directives, counseling/discussion     CARDIOVASCULAR SCREENING; LDL GOAL LESS THAN 160     High risk medication use     Underweight     Past Surgical History:   Procedure Laterality Date     GALLBLADDER SURGERY       HYSTERECTOMY       KNEE SURGERY      left      LAMINECTOMY LUMBAR ONE LEVEL       TONSILLECTOMY         Social History   Substance Use Topics     Smoking status: Never Smoker     Smokeless tobacco: Never Used     Alcohol use No     Family History   Problem Relation Age of Onset     CANCER Sister      pancreatic         Current Outpatient Prescriptions   Medication Sig Dispense Refill     guaiFENesin (ORGANIDIN) 200 MG TABS tablet Take 2 tablets (400 mg) by mouth every 6 hours as needed for cough 60 tablet 1     carbidopa-levodopa (SINEMET)  MG per tablet 1.5  tablets at 6 AM, 5 PM and 9 PM. Daily. 2 tablets at 12 Noon daily. 540 tablet 3     capsaicin 0.035 % CREA Apply thin film to affected areas 3 to 4 times daily 85 g 2     sulfaSALAzine ER (AZULFIDINE EN) 500 MG EC tablet 500mg BID for 7 days, then increase to 1000mg BID and continue 1000mg BID thereafter. 120 tablet 3     methotrexate sodium 2.5 MG TABS Take 20 mg by mouth once a week . Take all 8 tablets on the same day of each week. 96 tablet 1     folic acid (FOLVITE) 1 MG tablet Take 1 tablet (1 mg) by mouth daily 100 tablet 3     ACE NOT PRESCRIBED, INTENTIONAL, 1 each continuous prn. ACE Inhibitor not prescribed due to Refusal by patient       0 each 0     CALCIUM + D OR 600mg twice a day        ASPIRIN 81 MG PO TABS 1 TABLET DAILY (*)       MULTI-VITAMIN OR 1 a day        Allergies   Allergen Reactions     Decadrol [Dexamethasone Sodium Phosphate]      Shrimp      Recent Labs   Lab Test  05/08/17   1446  04/17/17   1333  03/20/17   1335  02/20/17   1326   05/27/16   1413  05/26/15   1416   10/28/13   1204   04/26/13   1128   04/26/12   1151   A1C   --    --    --    --    --   5.9  6.0   --   5.8   --   6.3*   < >  6.3*   LDL   --    --    --    --    --    --   89   --    --    --   73   --   77   HDL   --    --    --    --    --    --   80   --    --    --   72   --   71   TRIG   --    --    --    --    --    --   50   --    --    --   44   --   56   ALT   --   13  10  10   < >  8   --    < >   --    < >  13   < >  14   CR   --   0.49*  0.44*  0.40*   < >  0.41*  0.47*   < >   --    < >  0.48*   < >  0.54   GFRESTIMATED   --   >90  Non  GFR Calc    >90  Non  GFR Calc    >90  Non  GFR Calc     < >  >90  Non  GFR Calc    >90  Non  GFR Calc     < >   --    < >  >90   < >  >90   GFRESTBLACK   --   >90   GFR Calc    >90   GFR Calc    >90   GFR Calc     < >  >90   GFR  "Calc    >90   GFR Calc     < >   --    < >  >90   < >  >90   POTASSIUM   --    --    --    --    --   3.7  4.0   < >   --    < >  4.0   < >  4.2   TSH  2.12   --    --    --    --   2.36  2.43   < >   --    --   2.41   --    --     < > = values in this interval not displayed.      BP Readings from Last 3 Encounters:   05/08/17 113/58   05/01/17 115/59   04/19/17 137/72    Wt Readings from Last 3 Encounters:   05/08/17 116 lb (52.6 kg)   05/01/17 117 lb (53.1 kg)   04/19/17 116 lb 3.2 oz (52.7 kg)                  Labs reviewed in EPIC    Reviewed and updated as needed this visit by clinical staff       Reviewed and updated as needed this visit by Provider         ROS:  Constitutional, HEENT, cardiovascular, pulmonary, gi and gu systems are negative, except as otherwise noted.    OBJECTIVE:                                                    /58 (BP Location: Left arm, Patient Position: Chair, Cuff Size: Adult Regular)  Pulse 86  Temp 97.5  F (36.4  C) (Oral)  Ht 5' 4\" (1.626 m)  Wt 116 lb (52.6 kg)  SpO2 96%  BMI 19.91 kg/m2  Body mass index is 19.91 kg/(m^2).  GENERAL: elderly, alert, thin, well hydrated, no distress, moves with stiffness but no tremors  HENT: ear canals- normal; TMs- normal; Nose- normal; Mouth- no ulcers, no lesions, missing dentition  NECK: no tenderness, no adenopathy, no asymmetry, no masses, no stiffness; thyroid- normal to palpation  RESP: lungs clear to auscultation - no rales, no rhonchi, no wheezes  CV: regular rates and rhythm, normal S1 S2, no S3 or S4 and no murmur, no click or rub, normal pulses  ABDOMEN: soft, no tenderness, no  hepatosplenomegaly, no masses, normal bowel sounds  MS: extremities- no gross deformities noted, no edema  SKIN: no suspicious lesions, no rashes, age related skin changes with seborrheic keratosis and no actinic keratosis.    NEURO: strength and tone- decreased, sensory exam- grossly normal, reflexes- symmetric  BACK: no CVA " tenderness, no paralumbar tenderness  MENTAL STATUS EXAM:  Appearance/Behavior: no apparent distress, neatly groomed, dressed appropriately for weather, appears stated age and is frail-appearing  Speech: normal  Mood/Affect: normal affect  Insight: Good     Diagnostic Test Results:  Results for orders placed or performed in visit on 05/08/17   TSH with free T4 reflex   Result Value Ref Range    TSH 2.12 0.40 - 4.00 mU/L        ASSESSMENT/PLAN:                                                              ICD-10-CM    1. Excessive salivation- not really clear if this is sputum or saliva and patient seems to be describing both. Seems mostly a problem when it becomes tenacious.  K11.7 guaiFENesin (ORGANIDIN) 200 MG TABS tablet- as needed to loosen up secretions. Did not want to dry up saliva and this would not be recommended with her age and diseases    2. Parkinson disease (H) G20 Stable on medications, Medication Management referral if needed.    3. Rheumatoid arthritis involving right shoulder with negative rheumatoid factor (H) M06.011 Follow up with rheumatology   4. Hot flashes not due to menopause R23.2 Seems to be having episodes similar to hot flashes. Recommend taking temperature when these occur. Follow up if persistent or getting worse   5. Chronic fatigue R53.82 TSH with free T4 reflex- normal       CONSULTATION/REFERRAL to rheumatology as scheduled  FUTURE LABS:       - Schedule fasting labs in 3 months, call in 2 weeks if not better.   FUTURE APPOINTMENTS:       - Follow-up visit in 3 months or sooner if any questions or concerns.   Regular exercise  See Patient Instructions    Leah Blanca MD  [facility]

## 2017-05-08 NOTE — PATIENT INSTRUCTIONS
This summary includes the important diagnoses, test, medications and other important parts of your medical history.  Below are a few good we sites you can use to learn more about these.     Www.[facility].org : Up to date and easily searchable information on multiple topics.  [email or web address] : [facility] $4.99 medications  Www.medlineplus.gov : medication info, interactive tutorials, watch real surgeries online  Www.familydoctor.org : good info from the Academy of Family Physicians  Www.[facility].com : good info from the [facility]  Www.cdc.gov : public health info, travel advisories, epidemics (H1N1)  Www.aap.org : children's health info, normal development, vaccinations  Www.health.[facility].[place].us : [place] dept of heat, public health issues in [place], N1N1    Based on your medical history and these are the current health maintenance or preventive care services that you are due for (some may have been done at this visit:)  Health Maintenance Due   Topic Date Due     SALBADOR QUESTIONNAIRE 1 YEAR  1940     PHQ-9 Q1YR (NO INBASKET)  1940     =================================================================================  Normal Values   Blood pressure  <140/90 for most adults    <130/80 for some chronic diseases (ask your care team about yours)    BMI (body mass index)  18.5-25 kg/m2 (based on height and weight)     Thank you for visiting [facility]    Normal or non-critical lab and imaging results will be communicated to you by MyChart, letter or phone within 7 days.  If you do not hear from us within 10 days, please call the clinic. If you have a critical or abnormal lab result, we will notify you by phone as soon as possible.     If you have any questions regarding your visit please contact:     Team Comfort:   Clinic Hours Telephone Number   Dr. Martínez Saldaña   7am-5pm  Monday - Friday  [phone]  [facility]   Pharmacy 8am-8pm Monday-Thursday      8am-6pm Friday  9am-5pm Saturday-Sunday [phone]   Urgent Care 11am-8pm Monday-Friday        9am-5pm Saturday-Sunday [phone]     After hours, weekend or if you need to make an appointment with your primary provider please call [phone].   After Hours nurse advise: call [facility]: [phone]    Medication Refills:  Call your pharmacy and they will forward the refill to us. Please allow 3 business days for your refills to be completed.    Use [employer] (secure email communication and access to your chart) to send your primary care provider a message or make an appointment. Ask someone on your Team how to sign up for [employer]. To log on to "[employer] or for more information in [employer] please visit the website at [email or web address].  As of October 8, 2013, all password changes, disabled accounts, or ID changes in Eg[employer]/MyHealth will be done by our Access Services Department.   If you need help with your account or password, call: [phone]. Clinic staff no longer has the ability to change passwords.     Reducing Knee Pain and Swelling    Many treatments can help reduce pain and swelling in your knee. Your healthcare provider or physical therapist may suggest one or more of the following treatments:    Icing your knee helps reduce swelling. You may be asked to ice your knee once a day or more. Apply ice for about 15 to 20 minutes at a time, with at least 40 minutes between sessions. Always keep a towel between the ice and your skin.     Keeping your leg raised above your heart helps excess fluid flow out of your knee joint. This reduces swelling.    Compression means wrapping an elastic bandage or neoprene sleeve snugly around your knees. This keeps fluid from collecting in your knee joint.    Electrical stimulation, done by a physical therapist or , can help reduce excess fluid in your knee  joint.    Anti-inflammatory medicines may be prescribed by your healthcare provider. You may take pills or receive injections in your knee.    Isometric (louise) exercises strengthen the muscles that support your knee joint. They also help reduce excess fluid in your knee.    Massage helps fluid drain away from your knee.    9944-5716 [employer]. [address] [place] [address], [place] [address]. All rights reserved. This information is not intended as a substitute for professional medical care. Always follow your healthcare professional's instructions.

## 2017-05-08 NOTE — NURSING NOTE
"Chief Complaint   Patient presents with     Musculoskeletal Problem     left knee pain follow up 6 months       Initial /58 (BP Location: Left arm, Patient Position: Chair, Cuff Size: Adult Regular)  Pulse 86  Temp 97.5  F (36.4  C) (Oral)  Ht 5' 4\" (1.626 m)  Wt 116 lb (52.6 kg)  SpO2 96%  BMI 19.91 kg/m2 Estimated body mass index is 19.91 kg/(m^2) as calculated from the following:    Height as of this encounter: 5' 4\" (1.626 m).    Weight as of this encounter: 116 lb (52.6 kg).  Medication Reconciliation: complete     Pa Shayan Earl MA      "

## 2017-05-08 NOTE — LETTER
[address] [address]  [phone]             May 9, 2017    Alannah Leos  [id number] [address]            Dear Alannah Leos,     Your test results are attached. I am happy to let you know that they are stable and your medications can stay the same.     The thyroid test is normal. Enclosed are the results.  Results for orders placed or performed in visit on 05/08/17   TSH with free T4 reflex   Result Value Ref Range    TSH 2.12 0.40 - 4.00 mU/L       Please call me if you have any questions about these test results or about your care.     Sincerely,     Leah Blanca MD/ant

## 2017-05-08 NOTE — MR AVS SNAPSHOT
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  Below are a few good we sites you can use to learn more about these.     Www.Annapurna Microfinace."Sid[employer] : Up to date and easily searchable information on multiple topics.  Www.Wonder Workshop (Formerly Play-i)/Pharmacy/c_539084.asp : [facility] [facility] $4.99 medications  Www.RhinoCyte.gov : medication info, interactive tutorials, watch real surgeries online  Www.familydoctor.org : good info from the Academy of Family Physicians  Www.Maxcyte.Caustic Graphics : good info from the [facility]  Www.cdc.gov : public health info, travel advisories, epidemics (H1N1)  Www.aap.org : children's health info, normal development, vaccinations  Www.health.state.[place].us : [place] dept of heatlh, public health issues in [place], N1N1    Based on your medical history and these are the current health maintenance or preventive care services that you are due for (some may have been done at this visit:)  Health Maintenance Due   Topic Date Due     SALBADOR QUESTIONNAIRE 1 YEAR  1940     PHQ-9 Q1YR (NO INBASKET)  1940     =================================================================================  Normal Values   Blood pressure  <140/90 for most adults    <130/80 for some chronic diseases (ask your care team about yours)    BMI (body mass index)  18.5-25 kg/m2 (based on height and weight)     Thank you for visiting [facility]  [facility]    Normal or non-critical lab and imaging results will be communicated to you by MyChart, letter or phone within 7 days.  If you do not hear from us within 10 days, please call the clinic. If you have a critical or abnormal lab result, we will notify you by phone as soon as possible.     If you have any questions regarding your visit please contact:     Team Comfort:   Clinic Hours Telephone Number   Dr. Martínez Saldaña   7am-5pm  Monday - Friday [phone]  [facility]   Pharmacy 8am-8pm Monday-Thursday      8am-6pm Friday  9am-5pm Saturday-Sunday [phone]   Urgent Care 11am-8pm Monday-Friday        9am-5pm Saturday-Sunday [phone]     After hours, weekend or if you need to make an appointment with your primary provider please call [phone].   After Hours nurse advise: call [facility]: [phone]    Medication Refills:  Call your pharmacy and they will forward the refill to us. Please allow 3 business days for your refills to be completed.    Use Guidefitter (secure email communication and access to your chart) to send your primary care provider a message or make an appointment. Ask someone on your Team how to sign up for Guidefitter. To log on to Koality or for more information in Embrace please visit the website at [email or web address].  As of October 8, 2013, all password changes, disabled accounts, or ID changes in Guidefitter/MyHealth will be done by our Access Services Department.   If you need help with your account or password, call: [phone]. Clinic staff no longer has the ability to change passwords.     Reducing Knee Pain and Swelling    Many treatments can help reduce pain and swelling in your knee. Your healthcare provider or physical therapist may suggest one or more of the following treatments:    Icing your knee helps reduce swelling. You may be asked to ice your knee once a  day or more. Apply ice for about 15 to 20 minutes at a time, with at least 40 minutes between sessions. Always keep a towel between the ice and your skin.     Keeping your leg raised above your heart helps excess fluid flow out of your knee joint. This reduces swelling.    Compression means wrapping an elastic bandage or neoprene sleeve snugly around your knees. This keeps fluid from collecting in your knee joint.    Electrical stimulation, done by a physical therapist or , can help reduce excess fluid in your knee joint.    Anti-inflammatory medicines may be prescribed by your healthcare provider. You may take pills or receive injections in your knee.    Isometric (louise) exercises strengthen the muscles that support your knee joint. They also help reduce excess fluid in your knee.    Massage helps fluid drain away from your knee.    5584-0505 [employer]. [address] [place] [address]. All rights reserved. This information is not intended as a substitute for professional medical care. Always follow your healthcare professional's instructions.              Follow-ups after your visit        Follow-up notes from your care team     Return in about 3 months (around 8/8/2017) for Physical Exam.      Your next 10 appointments already scheduled     May 15, 2017  1:45 PM CDT   LAB with FZ LAB   [facility]    [address] [id number] [phone]           Patient must bring picture ID.  Patient should be prepared to give a urine specimen  Please do not eat 10-12 hours before your appointment if you are coming in fasting for labs on lipids, cholesterol, or glucose (sugar).  Pregnant women should follow their Care Team instructions. Water with medications is okay. Do not drink coffee or other fluids.   If you have concerns about taking  your medications, please ask at office or if scheduling via Expensify, send a  message by clicking on Secure Messaging, Message Your Care Team.            Jun 16, 2017  1:30 PM CDT   LAB with FZ LAB   [facility] ([facility])    [address]  [place] [address] [phone]           Patient must bring picture ID.  Patient should be prepared to give a urine specimen  Please do not eat 10-12 hours before your appointment if you are coming in fasting for labs on lipids, cholesterol, or glucose (sugar).  Pregnant women should follow their Care Team instructions. Water with medications is okay. Do not drink coffee or other fluids.   If you have concerns about taking  your medications, please ask at office or if scheduling via ElephantDrivet, send a message by clicking on Secure Messaging, Message Your Care Team.            Jul 17, 2017  1:30 PM CDT   LAB with FZ LAB   [facility] [place] ([facility])    [address]  [place] [address] [phone]           Patient must bring picture ID.  Patient should be prepared to give a urine specimen  Please do not eat 10-12 hours before your appointment if you are coming in fasting for labs on lipids, cholesterol, or glucose (sugar).  Pregnant women should follow their Care Team instructions. Water with medications is okay. Do not drink coffee or other fluids.   If you have concerns about taking  your medications, please ask at office or if scheduling via Lemur IMS, send a message by clicking on Secure Messaging, Message Your Care Team.            Jul 19, 2017  3:00 PM CDT   Return Visit with Merrick Del Cid MD   [facility] [place] ([facility])    [address]  [place] [address] [phone]              Who to contact     If you have questions or need follow up information about today's clinic visit or your schedule please contact [facility] directly at [phone].  Normal or non-critical lab and imaging results will be communicated to  "you by MyChart, letter or phone within 4 business days after the clinic has received the results. If you do not hear from us within 7 days, please contact the clinic through [email or web address] or phone. If you have a critical or abnormal lab result, we will notify you by phone as soon as possible.  Submit refill requests through Tranzeo Wireless Technologies or call your pharmacy and they will forward the refill request to us. Please allow 3 business days for your refill to be completed.          Additional Information About Your Visit        Tranzeo Wireless Technologies Information     Tranzeo Wireless Technologies lets you send messages to your doctor, view your test results, renew your prescriptions, schedule appointments and more. To sign up, go to [email or web address] . Click on \"Log in\" on the left side of the screen, which will take you to the Welcome page. Then click on \"Sign up Now\" on the right side of the page.     You will be asked to enter the access code listed below, as well as some personal information. Please follow the directions to create your username and password.     Your access code is: [email or web address]  Expires: 2017  3:49 PM     Your access code will  in 90 days. If you need help or a new code, please call your [place] clinic or [phone].        Care EveryWhere ID     This is your Care EveryWhere ID. This could be used by other organizations to access your [place] medical records  [id number]        Your Vitals Were     Pulse Temperature Height Pulse Oximetry BMI (Body Mass Index)       86 97.5  F (36.4  C) (Oral) 5' 4\" (1.626 m) 96% 19.91 kg/m2        Blood Pressure from Last 3 Encounters:   17 113/58   17 115/59   17 137/72    Weight from Last 3 Encounters:   17 116 lb (52.6 kg)   17 117 lb (53.1 kg)   17 116 lb 3.2 oz (52.7 kg)              We Performed the Following     TSH with free T4 reflex          Today's Medication Changes          These changes are accurate as of: 17  2:43 PM.  If you have any " questions, ask your nurse or doctor.               Start taking these medicines.        Dose/Directions    guaiFENesin 200 MG Tabs tablet   Commonly known as:  ORGANIDIN   Used for:  Excessive salivation   Started by:  Leah Blanca MD        Dose:  400 mg   Take 2 tablets (400 mg) by mouth every 6 hours as needed for cough   Quantity:  60 tablet   Refills:  1            Where to get your medicines      These medications were sent to [facility] [address] [place] [address] [address]  [address] [address]     Phone:  [phone]     guaiFENesin 200 MG Tabs tablet                Primary Care Provider Office Phone # Fax #    Leah Blanca [phone]       [facility] [address]  [facility] [address]        Thank you!     Thank you for choosing [facility]  for your care. Our goal is always to provide you with excellent care. Hearing back from our patients is one way we can continue to improve our services. Please take a few minutes to complete the written survey that you may receive in the mail after your visit with us. Thank you!             Your Updated Medication List - Protect others around you: Learn how to safely use, store and throw away your medicines at www.disposemymeds.org.          This list is accurate as of: 5/8/17  2:43 PM.  Always use your most recent med list.                   Brand Name Dispense Instructions for use    ACE NOT PRESCRIBED (INTENTIONAL)     0 each    1 each continuous prn. ACE Inhibitor not prescribed due to Refusal by patient       aspirin 81 MG tablet      1 TABLET DAILY (*)       CALCIUM + D PO      600mg twice a day       capsaicin 0.035 % Crea     85 g    Apply thin film to affected areas 3 to 4 times daily       carbidopa-levodopa  MG per tablet    SINEMET    540 tablet    1.5 tablets at 6 AM, 5 PM and 9 PM. Daily. 2 tablets at  12 Noon daily.       folic acid 1 MG tablet    FOLVITE    100 tablet    Take 1 tablet (1 mg) by mouth daily       guaiFENesin 200 MG Tabs tablet    ORGANIDIN    60 tablet    Take 2 tablets (400 mg) by mouth every 6 hours as needed for cough       methotrexate sodium 2.5 MG Tabs     96 tablet    Take 20 mg by mouth once a week . Take all 8 tablets on the same day of each week.       MULTI-VITAMIN PO      1 a day       sulfaSALAzine  MG EC tablet    AZULFIDINE EN    120 tablet    500mg BID for 7 days, then increase to 1000mg BID and continue 1000mg BID thereafter.

## 2017-05-09 NOTE — PROGRESS NOTES
Dear Alannah Leos,    Your test results are attached. I am happy to let you know that they are stable and your medications can stay the same.    The thyroid test is normal.     Please call me if you have any questions about these test results or about your care.    Sincerely,    Leah Blanca MD

## 2017-05-15 ENCOUNTER — OFFICE VISIT (OUTPATIENT)
Dept: ORTHOPEDICS | Facility: CLINIC | Age: 77
End: 2017-05-15
Payer: MEDICARE

## 2017-05-15 VITALS — TEMPERATURE: 97.8 F | RESPIRATION RATE: 18 BRPM | WEIGHT: 118 LBS | HEIGHT: 64 IN | BODY MASS INDEX: 20.14 KG/M2

## 2017-05-15 DIAGNOSIS — M06.062 RHEUMATOID ARTHRITIS INVOLVING LEFT KNEE WITH NEGATIVE RHEUMATOID FACTOR (H): ICD-10-CM

## 2017-05-15 DIAGNOSIS — Z79.899 HIGH RISK MEDICATION USE: ICD-10-CM

## 2017-05-15 DIAGNOSIS — M06.09 RHEUMATOID ARTHRITIS OF MULTIPLE SITES WITH NEGATIVE RHEUMATOID FACTOR (H): ICD-10-CM

## 2017-05-15 DIAGNOSIS — M06.011: Primary | ICD-10-CM

## 2017-05-15 LAB
ALBUMIN SERPL-MCNC: 3.8 G/DL (ref 3.4–5)
ALP SERPL-CCNC: 104 U/L (ref 40–150)
ALT SERPL W P-5'-P-CCNC: 10 U/L (ref 0–50)
AST SERPL W P-5'-P-CCNC: 20 U/L (ref 0–45)
BASOPHILS # BLD AUTO: 0.1 10E9/L (ref 0–0.2)
BASOPHILS NFR BLD AUTO: 1.8 %
BILIRUB DIRECT SERPL-MCNC: 0.1 MG/DL (ref 0–0.2)
BILIRUB SERPL-MCNC: 0.4 MG/DL (ref 0.2–1.3)
CREAT SERPL-MCNC: 0.52 MG/DL (ref 0.52–1.04)
DIFFERENTIAL METHOD BLD: ABNORMAL
EOSINOPHIL # BLD AUTO: 0.1 10E9/L (ref 0–0.7)
EOSINOPHIL NFR BLD AUTO: 1.2 %
ERYTHROCYTE [DISTWIDTH] IN BLOOD BY AUTOMATED COUNT: 16.4 % (ref 10–15)
GFR SERPL CREATININE-BSD FRML MDRD: NORMAL ML/MIN/1.7M2
HCT VFR BLD AUTO: 34.7 % (ref 35–47)
HGB BLD-MCNC: 11.3 G/DL (ref 11.7–15.7)
LYMPHOCYTES # BLD AUTO: 1.9 10E9/L (ref 0.8–5.3)
LYMPHOCYTES NFR BLD AUTO: 31.1 %
MCH RBC QN AUTO: 29.4 PG (ref 26.5–33)
MCHC RBC AUTO-ENTMCNC: 32.6 G/DL (ref 31.5–36.5)
MCV RBC AUTO: 90 FL (ref 78–100)
MONOCYTES # BLD AUTO: 0.6 10E9/L (ref 0–1.3)
MONOCYTES NFR BLD AUTO: 9.9 %
NEUTROPHILS # BLD AUTO: 3.4 10E9/L (ref 1.6–8.3)
NEUTROPHILS NFR BLD AUTO: 56 %
PLATELET # BLD AUTO: 274 10E9/L (ref 150–450)
PROT SERPL-MCNC: 7.8 G/DL (ref 6.8–8.8)
RBC # BLD AUTO: 3.85 10E12/L (ref 3.8–5.2)
WBC # BLD AUTO: 6 10E9/L (ref 4–11)

## 2017-05-15 PROCEDURE — 80076 HEPATIC FUNCTION PANEL: CPT | Performed by: INTERNAL MEDICINE

## 2017-05-15 PROCEDURE — 36415 COLL VENOUS BLD VENIPUNCTURE: CPT | Performed by: INTERNAL MEDICINE

## 2017-05-15 PROCEDURE — 20610 DRAIN/INJ JOINT/BURSA W/O US: CPT | Mod: RT | Performed by: ORTHOPAEDIC SURGERY

## 2017-05-15 PROCEDURE — 82565 ASSAY OF CREATININE: CPT | Performed by: INTERNAL MEDICINE

## 2017-05-15 PROCEDURE — 85025 COMPLETE CBC W/AUTO DIFF WBC: CPT | Performed by: INTERNAL MEDICINE

## 2017-05-15 RX ORDER — TRIAMCINOLONE ACETONIDE 40 MG/ML
40 INJECTION, SUSPENSION INTRA-ARTICULAR; INTRAMUSCULAR ONCE
Qty: 1 ML | Refills: 0 | OUTPATIENT
Start: 2017-05-15 | End: 2017-05-15

## 2017-05-15 RX ORDER — METHYLPREDNISOLONE ACETATE 80 MG/ML
80 INJECTION, SUSPENSION INTRA-ARTICULAR; INTRALESIONAL; INTRAMUSCULAR; SOFT TISSUE ONCE
Qty: 1 ML | Refills: 0 | OUTPATIENT
Start: 2017-05-15 | End: 2017-05-15

## 2017-05-15 NOTE — PROGRESS NOTES
The patient's right shoulder was prepped with betadine solution after verification of allergies. Area approximately 10 cm x 10 cm prepped in a sterile fashion. After injection, betadine removed with soap and water and band-aids applied.    1ml kenalog with 1% lidocaine plain injected into patient's right shoulder by Dr. Michael Hernandez  LOT# [id number]  Exp. 06/2018    The patient's left knee was prepped with betadine solution after verification of allergies. Area approximately 10 cm x 10 cm prepped in a sterile fashion. After injection, betadine removed with soap and water and band-aids applied.    1ml depo-medrol with 1% lidocaine plain injected into patient's left knee by Dr. Michael Hernandez  LOT# [id number]  Exp. 08/2019    Mk Reid PA-C  Supervising physician: Michael Hernandez MD  Dept. of Orthopedics  [facility]

## 2017-05-15 NOTE — MR AVS SNAPSHOT
After Visit Summary   5/15/2017    Alannah Leos    MRN: [id number]           Patient Information     Date Of Birth          1940        Visit Information        Provider Department      5/15/2017 2:15 PM Michael Hernandez MD [facility]        Care Instructions    You have had a steroid injection today.  For the first 2 hours there will likely be some numbing in the joint from the lidocaine.  This is a good sign, indicating that the injection is in the right place.  In 2 hours the lidocaine will wear off, and the joint will hurt like you had a shot.  Each day the cortisone makes it feel better.  It reaches peak effect in 2 weeks.  We expect it to last for 3 months.  You may resume regular activity when you feel ready.  If you are diabetic, your glucoses will be quite high for several days.          Follow-ups after your visit        Your next 10 appointments already scheduled     Jun 16, 2017  1:30 PM CDT   LAB with FZ LAB   [facility] ([facility])    [address] [address] [phone]           Patient must bring picture ID.  Patient should be prepared to give a urine specimen  Please do not eat 10-12 hours before your appointment if you are coming in fasting for labs on lipids, cholesterol, or glucose (sugar).  Pregnant women should follow their Care Team instructions. Water with medications is okay. Do not drink coffee or other fluids.   If you have concerns about taking  your medications, please ask at office or if scheduling via Football Meisterhart, send a message by clicking on Secure Messaging, Message Your Care Team.            Jul 03, 2017  1:30 PM CDT   New Visit with Josh Sotomayor MD   [facility] ([facility])    [id number] [place] [id number] [phone]            Jul 17, 2017  1:30 PM CDT   LAB with FZ LAB   [facility])    [address]  [place] [address]   [phone]           Patient must bring picture ID.  Patient should be prepared to give a urine specimen  Please do not eat 10-12 hours before your appointment if you are coming in fasting for labs on lipids, cholesterol, or glucose (sugar).  Pregnant women should follow their Care Team instructions. Water with medications is okay. Do not drink coffee or other fluids.   If you have concerns about taking  your medications, please ask at office or if scheduling via Ossia, send a message by clicking on Secure Messaging, Message Your Care Team.            Jul 20, 2017  1:40 PM CDT   Return Visit with Merrick Del Cid MD   [facility] ([facility])    [address]  [place] [address]   [phone]            Aug 07, 2017 11:00 AM CDT   Office Visit with Leah Blanca MD   [facility]    [address] [place] [id number] [phone]           Bring a current list of meds and any records pertaining to this visit.  For Physicals, please bring immunization records and any forms needing to be filled out.  Please arrive 10 minutes early to complete paperwork.              Who to contact     If you have questions or need follow up information about today's clinic visit or your schedule please contact [facility] directly at [phone].  Normal or non-critical lab and imaging results will be communicated to you by MyChart, letter or phone within 4 business days after the clinic has received the results. If you do not hear from us within 7 days, please contact the clinic through Finconhart or phone. If you have a critical or abnormal lab result, we will notify you by phone as soon as possible.  Submit refill requests through Ossia or call your pharmacy and they will forward the refill request to us. Please allow 3 business days for your refill to be  "completed.          Additional Information About Your Visit        StageeharnuvoTV Information     Bar Pass lets you send messages to your doctor, view your test results, renew your prescriptions, schedule appointments and more. To sign up, go to [email or web address] . Click on \"Log in\" on the left side of the screen, which will take you to the Welcome page. Then click on \"Sign up Now\" on the right side of the page.     You will be asked to enter the access code listed below, as well as some personal information. Please follow the directions to create your username and password.     Your access code is: [email or web address]  Expires: 2017  3:49 PM     Your access code will  in 90 days. If you need help or a new code, please call your [place] clinic or [phone].        Care EveryWhere ID     This is your [facility] EveryWhere ID. This could be used by other organizations to access your [place] medical records  [id number]        Your Vitals Were     Temperature Respirations Height BMI (Body Mass Index)          97.8  F (36.6  C) 18 1.626 m (5' 4\") 20.25 kg/m2         Blood Pressure from Last 3 Encounters:   17 113/58   17 115/59   17 137/72    Weight from Last 3 Encounters:   05/15/17 53.5 kg (118 lb)   17 52.6 kg (116 lb)   17 53.1 kg (117 lb)              Today, you had the following     No orders found for display       Primary Care Provider Office Phone # Fax #    Leah Blanca [phone]       [facility] [address] [place]  [facility] [address]        Thank you!     Thank you for choosing [facility]  for your care. Our goal is always to provide you with excellent care. Hearing back from our patients is one way we can continue to improve our services. Please take a few minutes to complete the written survey that you may receive in the mail after your visit with us. Thank you!             Your Updated Medication List - Protect others " around you: Learn how to safely use, store and throw away your medicines at www.disposemymeds.org.          This list is accurate as of: 5/15/17  3:08 PM.  Always use your most recent med list.                   Brand Name Dispense Instructions for use    ACE NOT PRESCRIBED (INTENTIONAL)     0 each    1 each continuous prn. ACE Inhibitor not prescribed due to Refusal by patient       aspirin 81 MG tablet      1 TABLET DAILY (*)       CALCIUM + D PO      600mg twice a day       capsaicin 0.035 % Crea     85 g    Apply thin film to affected areas 3 to 4 times daily       carbidopa-levodopa  MG per tablet    SINEMET    540 tablet    1.5 tablets at 6 AM, 5 PM and 9 PM. Daily. 2 tablets at 12 Noon daily.       folic acid 1 MG tablet    FOLVITE    100 tablet    Take 1 tablet (1 mg) by mouth daily       guaiFENesin 200 MG Tabs tablet    ORGANIDIN    60 tablet    Take 2 tablets (400 mg) by mouth every 6 hours as needed for cough       methotrexate sodium 2.5 MG Tabs     96 tablet    Take 20 mg by mouth once a week . Take all 8 tablets on the same day of each week.       MULTI-VITAMIN PO      1 a day       sulfaSALAzine  MG EC tablet    AZULFIDINE EN    120 tablet    500mg BID for 7 days, then increase to 1000mg BID and continue 1000mg BID thereafter.

## 2017-05-15 NOTE — PROGRESS NOTES
HISTORY OF PRESENT ILLNESS:  Ms. Alannah Leos is a 76-year-old female seen with left knee and right shoulder pains.  She has had previous diagnosis of rheumatoid arthritis, serologic negative.  She has had right total knee arthroplasty but had significant difficulty regaining motion and has a persistent 15 degree flexion contracture on the right.  She now has increased pain in the left knee and right shoulder.    Steroid injections 11/7/16 did help.  X-ray of the right shoulder previously showed severe arthritis of the glenohumeral joint.  The humeral head was no longer round.  It is squared off and spurred.  We do not have x-rays of the knees on the computer for the last 6 years.      PHYSICAL EXAMINATION:  Shows decreased mobility of the right shoulder.  She has pain with resisted internal rotation, external rotation and adduction.  She has flexion to only about 40 degrees passively and has pain with rotation.  She has tenderness diffusely about the right shoulder.  She has motion of the right knee from  degrees.  Left knee is  degrees.  This is improved from motion 2 years ago.  She has tenderness primarily at the anteromedial aspect of the left knee.  There is a mild effusion on the left, negative on the right.  There is good ligamentous stability, no increased warmth or erythema.      IMPRESSION:  Rheumatoid arthritis, left knee and right shoulder, both with significant arthritis.  We discussed options and she did not wish to consider joint replacement.  Will inject the left knee with 80 mg Depo-Medrol and the right shoulder in the glenohumeral joint with 40 mg Kenolog and lidocaine.  This was performed today.  We will see back jluis.         FRANCIA SPANGLER MD

## 2017-05-15 NOTE — NURSING NOTE
"Chief Complaint   Patient presents with     RECHECK     Left knee swelling, last injection 11/7/16.       Initial Temp 97.8  F (36.6  C)  Resp 18  Ht 1.626 m (5' 4\")  Wt 53.5 kg (118 lb)  BMI 20.25 kg/m2 Estimated body mass index is 20.25 kg/(m^2) as calculated from the following:    Height as of this encounter: 1.626 m (5' 4\").    Weight as of this encounter: 53.5 kg (118 lb).  Medication Reconciliation: complete   Kenna Simeon MA      "

## 2017-05-15 NOTE — LETTER
5/15/2017       RE: Alannah Leos  [address] [facility] [address]           Dear Colleague,    Thank you for referring your patient, Alannah Leos, to the [facility]. Please see a copy of my visit note below.         HISTORY OF PRESENT ILLNESS:  Ms. Alannah Leos is a 76-year-old female seen with left knee and right shoulder pains.  She has had previous diagnosis of rheumatoid arthritis, serologic negative.  She has had right total knee arthroplasty but had significant difficulty regaining motion and has a persistent 15 degree flexion contracture on the right.  She now has increased pain in the left knee and right shoulder.    Steroid injections 11/7/16 did help.  X-ray of the right shoulder previously showed severe arthritis of the glenohumeral joint.  The humeral head was no longer round.  It is squared off and spurred.  We do not have x-rays of the knees on the computer for the last 6 years.      PHYSICAL EXAMINATION:  Shows decreased mobility of the right shoulder.  She has pain with resisted internal rotation, external rotation and adduction.  She has flexion to only about 40 degrees passively and has pain with rotation.  She has tenderness diffusely about the right shoulder.  She has motion of the right knee from  degrees.  Left knee is  degrees.  This is improved from motion 2 years ago.  She has tenderness primarily at the anteromedial aspect of the left knee.  There is a mild effusion on the left, negative on the right.  There is good ligamentous stability, no increased warmth or erythema.      IMPRESSION:  Rheumatoid arthritis, left knee and right shoulder, both with significant arthritis.  We discussed options and she did not wish to consider joint replacement.  Will inject the left knee with 80 mg Depo-Medrol and the right shoulder in the glenohumeral joint with 40 mg Kenolog and lidocaine.  This was performed today.  We will see back manas AMANDA  MD ANÍBAL              The patient's right shoulder was prepped with betadine solution after verification of allergies. Area approximately 10 cm x 10 cm prepped in a sterile fashion. After injection, betadine removed with soap and water and band-aids applied.    1ml kenalog with 1% lidocaine plain injected into patient's right shoulder by Dr. Michael Hernandez  LOT# [id number]  Exp. 06/2018    The patient's left knee was prepped with betadine solution after verification of allergies. Area approximately 10 cm x 10 cm prepped in a sterile fashion. After injection, betadine removed with soap and water and band-aids applied.    1ml depo-medrol with 1% lidocaine plain injected into patient's left knee by Dr. Michael Hernandez  LOT# [id number]  Exp. 08/2019    Mk Reid PA-C  Supervising physician: Michael Hernandez MD  Dept. of Orthopedics  [facility]          Again, thank you for allowing me to participate in the care of your patient.        Sincerely,              Michael Hernandez MD

## 2017-05-16 NOTE — PROGRESS NOTES
Rheumatology team: Please call to inform Ms. Leos that her labs do not show evidence for medication toxicity.    Merrick Del Cid MD  5/16/2017 5:11 PM

## 2017-06-16 DIAGNOSIS — Z79.899 HIGH RISK MEDICATION USE: ICD-10-CM

## 2017-06-16 DIAGNOSIS — M06.09 RHEUMATOID ARTHRITIS OF MULTIPLE SITES WITH NEGATIVE RHEUMATOID FACTOR (H): ICD-10-CM

## 2017-06-16 LAB
ALBUMIN SERPL-MCNC: 3.9 G/DL (ref 3.4–5)
ALP SERPL-CCNC: 101 U/L (ref 40–150)
ALT SERPL W P-5'-P-CCNC: 10 U/L (ref 0–50)
AST SERPL W P-5'-P-CCNC: 20 U/L (ref 0–45)
BASOPHILS # BLD AUTO: 0.1 10E9/L (ref 0–0.2)
BASOPHILS NFR BLD AUTO: 2.3 %
BILIRUB DIRECT SERPL-MCNC: <0.1 MG/DL (ref 0–0.2)
BILIRUB SERPL-MCNC: 0.4 MG/DL (ref 0.2–1.3)
CREAT SERPL-MCNC: 0.5 MG/DL (ref 0.52–1.04)
DIFFERENTIAL METHOD BLD: ABNORMAL
EOSINOPHIL # BLD AUTO: 0.1 10E9/L (ref 0–0.7)
EOSINOPHIL NFR BLD AUTO: 1.3 %
ERYTHROCYTE [DISTWIDTH] IN BLOOD BY AUTOMATED COUNT: 17.2 % (ref 10–15)
GFR SERPL CREATININE-BSD FRML MDRD: ABNORMAL ML/MIN/1.7M2
HCT VFR BLD AUTO: 35.7 % (ref 35–47)
HGB BLD-MCNC: 11.6 G/DL (ref 11.7–15.7)
LYMPHOCYTES # BLD AUTO: 1.8 10E9/L (ref 0.8–5.3)
LYMPHOCYTES NFR BLD AUTO: 31.8 %
MCH RBC QN AUTO: 29.7 PG (ref 26.5–33)
MCHC RBC AUTO-ENTMCNC: 32.5 G/DL (ref 31.5–36.5)
MCV RBC AUTO: 91 FL (ref 78–100)
MONOCYTES # BLD AUTO: 0.5 10E9/L (ref 0–1.3)
MONOCYTES NFR BLD AUTO: 8.6 %
NEUTROPHILS # BLD AUTO: 3.1 10E9/L (ref 1.6–8.3)
NEUTROPHILS NFR BLD AUTO: 56 %
PLATELET # BLD AUTO: 285 10E9/L (ref 150–450)
PROT SERPL-MCNC: 7.9 G/DL (ref 6.8–8.8)
RBC # BLD AUTO: 3.91 10E12/L (ref 3.8–5.2)
WBC # BLD AUTO: 5.6 10E9/L (ref 4–11)

## 2017-06-16 PROCEDURE — 85025 COMPLETE CBC W/AUTO DIFF WBC: CPT | Performed by: INTERNAL MEDICINE

## 2017-06-16 PROCEDURE — 36415 COLL VENOUS BLD VENIPUNCTURE: CPT | Performed by: INTERNAL MEDICINE

## 2017-06-16 PROCEDURE — 80076 HEPATIC FUNCTION PANEL: CPT | Performed by: INTERNAL MEDICINE

## 2017-06-16 PROCEDURE — 82565 ASSAY OF CREATININE: CPT | Performed by: INTERNAL MEDICINE

## 2017-06-19 NOTE — PROGRESS NOTES
Rheumatology team: Please call to inform Ms. Leos that her labs do not show evidence for medication toxicity.    Merrick Del Cid MD  6/19/2017 5:08 PM

## 2017-06-27 ENCOUNTER — TELEPHONE (OUTPATIENT)
Dept: RHEUMATOLOGY | Facility: CLINIC | Age: 77
End: 2017-06-27

## 2017-06-27 NOTE — TELEPHONE ENCOUNTER
Spoke with patient, informed her to stop the sulfasalazine and he would like to see her back in clinic in about 2-3 weeks. Right now we have no openings but I will watch for any cancellations or figure out a way to get her in. Patient understood and agreed to the plan.    Joan Bucio CMA  6/27/2017 5:02 PM

## 2017-06-27 NOTE — TELEPHONE ENCOUNTER
Reason for Call:  Other call back    Detailed comments:  Patient has been taking the sulfasalazine for a while now. She is not feeling well with it. Please call to discuss.     Phone Number Patient can be reached at: Home number on file [phone] (home)    Best Time:  Any     Can we leave a detailed message on this number? YES    Call taken on 6/27/2017 at 9:36 AM by Mayte Griffiths

## 2017-06-27 NOTE — TELEPHONE ENCOUNTER
Marcio with Dr. Del Cid:  Have patient stop the sulfasalazine and have her come in clinic 2-3 weeks.  Joan Bucio CMA  6/27/2017 5:00 PM

## 2017-06-27 NOTE — TELEPHONE ENCOUNTER
RN placed call to patient. Patient states she has had several symptoms since starting sulfasalazine. Patient notes intermittent nausea. Denies vomiting. Does have belching and intermittent stomachaches as well. No diarrhea. Does note itchiness on skin all over x 1 month. No rashes,open sores. Denies bruising or bleeding.has dry, flaky scaley scalp. Also notes salty taste to mouth no metalic taste,bleeding or sores in mouth.  Patient is not sure if related but does have hot and cold sweats specifically no fever. Has episodes of intermittent dizziness. Denies syncope and falls. Does have weepiness of eyes and eyes have slight burning to them. No redness or dryness. Unsure if related to parkinson's or medications. Does note she is on many different medications and is unsure if specifically related to sulfasalazine.   Notes she had several weeks before starting sulfasalazine so does not contribute towards medication.   Wondering if sulfasalazine is contributing to these symptoms or what  thoughts are on. Wondering if she  should continue medication.     Sanaz Vanegas RN

## 2017-07-03 ENCOUNTER — OFFICE VISIT (OUTPATIENT)
Dept: DERMATOLOGY | Facility: CLINIC | Age: 77
End: 2017-07-03
Attending: PSYCHIATRY & NEUROLOGY
Payer: MEDICARE

## 2017-07-03 DIAGNOSIS — D48.5 NEOPLASM OF UNCERTAIN BEHAVIOR OF SKIN: Primary | ICD-10-CM

## 2017-07-03 DIAGNOSIS — L21.9 DERMATITIS, SEBORRHEIC: ICD-10-CM

## 2017-07-03 DIAGNOSIS — L57.0 AK (ACTINIC KERATOSIS): ICD-10-CM

## 2017-07-03 DIAGNOSIS — L85.3 XEROSIS OF SKIN: ICD-10-CM

## 2017-07-03 DIAGNOSIS — D18.01 CHERRY ANGIOMA: ICD-10-CM

## 2017-07-03 DIAGNOSIS — L72.9 CYST OF SKIN: ICD-10-CM

## 2017-07-03 DIAGNOSIS — L82.1 SK (SEBORRHEIC KERATOSIS): ICD-10-CM

## 2017-07-03 PROCEDURE — 17000 DESTRUCT PREMALG LESION: CPT | Performed by: DERMATOLOGY

## 2017-07-03 PROCEDURE — 99203 OFFICE O/P NEW LOW 30 MIN: CPT | Mod: 25 | Performed by: DERMATOLOGY

## 2017-07-03 PROCEDURE — 17003 DESTRUCT PREMALG LES 2-14: CPT | Performed by: DERMATOLOGY

## 2017-07-03 PROCEDURE — 11100 HC DESTRUCT PREMALIGNANT LESION, FIRST: CPT | Mod: 59 | Performed by: DERMATOLOGY

## 2017-07-03 PROCEDURE — 88305 TISSUE EXAM BY PATHOLOGIST: CPT | Performed by: DERMATOLOGY

## 2017-07-03 RX ORDER — BETAMETHASONE VALERATE 0.1 %
LOTION (ML) TOPICAL
Qty: 60 ML | Refills: 1 | Status: SHIPPED | OUTPATIENT
Start: 2017-07-03 | End: 2017-08-07

## 2017-07-03 RX ORDER — AMMONIUM LACTATE 12 G/100G
CREAM TOPICAL
Qty: 385 G | Refills: 11 | Status: SHIPPED | OUTPATIENT
Start: 2017-07-03 | End: 2017-12-28

## 2017-07-03 RX ORDER — LIDOCAINE HYDROCHLORIDE AND EPINEPHRINE 10; 10 MG/ML; UG/ML
3 INJECTION, SOLUTION INFILTRATION; PERINEURAL ONCE
Qty: 0.5 ML | Refills: 0 | OUTPATIENT
Start: 2017-07-03 | End: 2017-07-03

## 2017-07-03 RX ORDER — KETOCONAZOLE 20 MG/ML
SHAMPOO TOPICAL
Qty: 120 ML | Refills: 11 | Status: SHIPPED | OUTPATIENT
Start: 2017-07-03 | End: 2017-08-07 | Stop reason: ALTCHOICE

## 2017-07-03 NOTE — LETTER
7/3/2017       RE: Alannah Leos  [address] [address] [facility] [address]     Dear Colleague,    Thank you for referring your patient, Alannah Leos, to the [facility]. Please see a copy of my visit note below.    [facility] Dermatology Note      Dermatology Problem List:  H/O Parkinson's Disease  1.NUB, right upper back, shave bx 7/3/2017  2. AK, cryo  3.Seborrheic dermatitis: scalp.  -ketoconazole 2% shampoo 2x week, betamethasone valerate 0.1% lotion    Last TBSE: 7/3/2017    Encounter Date: Jul 3, 2017    CC:  Chief Complaint   Patient presents with     Derm Problem     parkinsons- ref by Dr Rogers       History of Present Illness:  Ms. Alannah Leos is a 76 year old female with a hx of Parkinson's disease who presents for a skin check. There are no specific lesions of concern but she was diagnosed with cradle cap by Dr. Blanca and has used hydrocortisone 1% cream and fluocinonide 0.05% foam without improvement. She washes her hair 2-3x a week and has tried Head and Shoulders and Selsun Blue without much relief. There is a lesion on her back that her daughter (who is here with her today) is concerned about. They will fall off and hardened before coming back. Pt uses regular emollients at home.     There is a lesion in the inguinal region that she believes is a wart. It has previously become inflamed. No pruritus. No other lesions of concern.    Past Medical History:   Patient Active Problem List   Diagnosis     Cataract, OU     Osteoporosis     Rheumatoid arthritis (H)     Parkinson disease (H)     Osteoarthritis of wrist     Osteoarthritis of shoulder     History of total knee arthroplasty     Osteoarthritis of left knee     Advanced directives, counseling/discussion     CARDIOVASCULAR SCREENING; LDL GOAL LESS THAN 160     High risk medication use     Underweight     Past Medical History:   Diagnosis Date      Hyperlipidemia      Murmur, heart     hsm     Nonsenile cataract      Osteoarthrosis, unspecified whether generalized or localized, lower leg      Osteopenia      Rheumatoid arthritis(714.0)      Past Surgical History:   Procedure Laterality Date     GALLBLADDER SURGERY       HYSTERECTOMY       KNEE SURGERY      left      LAMINECTOMY LUMBAR ONE LEVEL       TONSILLECTOMY         Social History:  The patient is retired. The patient denies use of tanning beds. Pt denies drinking or smoking    Family History:  There is no family history of skin cancer. There is a family hx of cardiovascular disease.     Medications:  Current Outpatient Prescriptions   Medication Sig Dispense Refill     carbidopa-levodopa (SINEMET)  MG per tablet 1.5 tablets at 6 AM, 5 PM and 9 PM. Daily. 2 tablets at 12 Noon daily. 540 tablet 3     methotrexate sodium 2.5 MG TABS Take 20 mg by mouth once a week . Take all 8 tablets on the same day of each week. 96 tablet 1     folic acid (FOLVITE) 1 MG tablet Take 1 tablet (1 mg) by mouth daily 100 tablet 3     ACE NOT PRESCRIBED, INTENTIONAL, 1 each continuous prn. ACE Inhibitor not prescribed due to Refusal by patient       0 each 0     CALCIUM + D OR 600mg twice a day        ASPIRIN 81 MG PO TABS 1 TABLET DAILY (*)       MULTI-VITAMIN OR 1 a day          Allergies   Allergen Reactions     Decadrol [Dexamethasone Sodium Phosphate]      Shrimp        Review of Systems:  -Skin/Heme New Pt: The patient denies frequent sun exposure. The patient denies excessive scarring or problems healing except as per HPI. The patient denies excessive bleeding.  -Constitutional: The patient denies fatigue, fevers, chills, unintended weight loss, and night sweats.    Physical exam:  Vitals: There were no vitals taken for this visit.  GEN: This is a well developed, well-nourished female in no acute distress, in a pleasant mood.    NEURO: Alert and oriented  PSYCH: In pleasant mood, appropriate affect  SKIN: Total  skin excluding the undergarment areas was performed. The exam included the head/face, neck, both arms, chest, back, abdomen, both legs, digits and/or nails.   -There are bright red some shaped papules scattered on examined surfaces.   -There are waxy stuck on tan to brown papules on the back.  -on the upper back is a pigmented macule that is asymmetric and jagged in appearance  -lower legs are xerotic  -cyst on the left inguinal fold  -there are poorly demarcated, pink, scaly papules on the scalp  -AK on the right and left brows  -No other lesions of concern on areas examined.     Impression/Plan:    Pt w/ h/o Parkinsons have increased risk of melanoma and worse seborrheic dermatitis at baseline.    1. Neoplasm of uncertain behavior on the right upper back. The differential diagnosis includes Dyplastic nevus, ink spot lentigo, solar lentigo, melanoma, SK.     Shave biopsy:  After discussion of benefits and risks including but not limited to bleeding/bruising, pain/swelling, infection, scar, incomplete removal, nerve damage/numbness, recurrence, and non-diagnostic biopsy, written consent, verbal consent and photographs were obtained. Time-out was performed. The area was cleaned with isopropyl alcohol.  was injected to obtain adequate anesthesia of the lesion on the right upper back. 0.5 ml of 1% lidocaine with 1:100,000 epinephrine was injected to obtain adequate anesthesia. A  shave biopsy was performed. Hemostasis was achieved with aluminium chloride. Vaseline and a sterile dressing were applied. The patient tolerated the procedure and no complications were noted. The patient was provided with verbal and written post care instructions.      2. Actinic keratosis, left and right brows: cryo  Cryotherapy procedure note: After verbal consent and discussion of risks and benefits including but no limited to dyspigmentation/scar, blister, and pain, 2 were treated with 1-2mm freeze border for 2 cycles with liquid nitrogen.  Post cryotherapy instructions were provided.     3. Seborrheic dermatitis: mild but often bad in pt with h/o parkinsons.    Start ketoconazole 2% shampoo, 2-3x a week    Start betamethasone valerate 0.1% lotion, 3 weeks then 1 week break    4. Xerosis, bilateral lower legs    Switch emollient to Amlactin    5. Cherry angiomas, Cyst and Seborrheic keratosis, non irritated    No further intervention required. Patient to report changes.     Sun precaution was advised including the use of sun screens of SPF 30 or higher, sun protective clothing, and avoidance of tanning beds.    CC Leah Blanca MD on close of this encounter.  Follow-up in 1-2 months for scalp recheck, 1 year for skin check, earlier for new or changing lesions, pending biopsy results.       Staff Involved:  Scribe/Staff    Scribe Disclosure:   I, Shen Huynh, am serving as a scribe to document services personally performed by Dr. Josh Sotomayor, based on data collection and the provider's statements to me.     Provider Disclosure:   I have reviewed the documentation recorded by the scribe and have edited it as needed. I have personally performed the services documented here and the documentation accurately represents those services and the decisions made by me.     Josh Sotomayor MD, MS    Department of Dermatology  [facility]: Phone: [phone], Fax:[phone]  [facility]: Phone: [phone], Fax: [phone]          Again, thank you for allowing me to participate in the care of your patient.      Sincerely,    Josh Sotomayor MD

## 2017-07-03 NOTE — PATIENT INSTRUCTIONS
Wound Care After a Biopsy    What is a skin biopsy?  A skin biopsy allows the doctor to examine a very small piece of tissue under the microscope to determine the diagnosis and the best treatment for the skin condition. A local anesthetic (numbing medicine)  is injected with a very small needle into the skin area to be tested. A small piece of skin is taken from the area. Sometimes a suture (stitch) is used.     What are the risks of a skin biopsy?  I will experience scar, bleeding, swelling, pain, crusting and redness. I may experience incomplete removal or recurrence. Risks of this procedure are excessive bleeding, bruising, infection, nerve damage, numbness, thick (hypertrophic or keloidal) scar and non-diagnostic biopsy.    How should I care for my wound for the first 24 hours?    Keep the wound dry and covered for 24 hours    If it bleeds, hold direct pressure on the area for 15 minutes. If bleeding does not stop then go to the emergency room    Avoid strenuous exercise the first 1-2 days or as your doctor instructs you    How should I care for the wound after 24 hours?    After 24 hours, remove the bandage    You may bathe or shower as normal    If you had a scalp biopsy, you can shampoo as usual and can use shower water to clean the biopsy site daily    Clean the wound twice a day with gentle soap and water    Do not scrub, be gentle    Apply white petroleum/Vaseline after cleaning the wound with a cotton swab or a clean finger, and keep the site covered with a Bandaid /bandage. Bandages are not necessary with a scalp biopsy    If you are unable to cover the site with a Bandaid /bandage, re-apply ointment 2-3 times a day to keep the site moist. Moisture will help with healing    Avoid strenuous activity for first 1-2 days    Avoid lakes, rivers, pools, and oceans until the stitches are removed or the site is healed    How do I clean my wound?    Wash hands thoroughly with soap or use hand  before all  wound care    Clean the wound with gentle soap and water    Apply white petroleum/Vaseline  to wound after it is clean    Replace the Bandaid /bandage to keep the wound covered for the first few days or as instructed by your doctor    If you had a scalp biopsy, warm shower water to the area on a daily basis should suffice    What should I use to clean my wound?     Cotton-tipped applicators (Qtips )    White petroleum jelly (Vaseline ). Use a clean new container and use Q-tips to apply.    Bandaids   as needed    Gentle soap     How should I care for my wound long term?    Do not get your wound dirty    Keep up with wound care for one week or until the area is healed.    A small scab will form and fall off by itself when the area is completely healed. The area will be red and will become pink in color as it heals. Sun protection is very important for how your scar will turn out. Sunscreen with an SPF 30 or greater is recommended once the area is healed.    You should have some soreness but it should be mild and slowly go away over several days. Talk to your doctor about using tylenol for pain,    When should I call my doctor?  If you have increased:     Pain or swelling    Pus or drainage (clear or slightly yellow drainage is ok)    Temperature over 100F    Spreading redness or warmth around wound    When will I hear about my results?  The biopsy results can take 2-3 weeks to come back. The clinic will call you with the results, send you a [facility] message, or have you schedule a follow-up clinic or phone time to discuss the results. Contact our clinics if you do not hear from us in 3 weeks.     Who should I call with questions?    [facility]: [phone]     [facility]: [phone]    For urgent needs outside of business hours call the [facility] at [phone] and ask for the dermatology resident on  call    __________________________________________________________    Cryotherapy    What is it?    Use of a very cold liquid, such as liquid nitrogen, to freeze and destroy abnormal skin cells that need to be removed    What should I expect?    Tenderness and redness    A small blister that might grow and fill with dark purple blood. There may be crusting.    More than one treatment may be needed if the lesions do not go away.    How do I care for the treated area?    Gently wash the area with your hands when bathing.    Use a thin layer of Vaseline to help with healing. You may use a Band-Aid.     The area should heal within 7-10 days and may leave behind a pink or lighter color.     Do not use an antibiotic or Neosporin ointment.     You may take acetaminophen (Tylenol) for pain.     Call your Doctor if you have:    Severe pain    Signs of infection (warmth, redness, cloudy yellow drainage, and or a bad smell)    Questions or concerns    Who should I call with questions?       [facility]: [phone]       [facility]: [phone]       For urgent needs outside of business hours call the [facility] at [phone]        and ask for the dermatology resident on call    _________________________________________      Switch moisturizer to Amlactin

## 2017-07-03 NOTE — PROGRESS NOTES
[facility] Dermatology Note      Dermatology Problem List:  H/O Parkinson's Disease  1.NUB, right upper back, shave bx 7/3/2017  2. AK, cryo  3.Seborrheic dermatitis: scalp.  -ketoconazole 2% shampoo 2x week, betamethasone valerate 0.1% lotion    Last TBSE: 7/3/2017    Encounter Date: Jul 3, 2017    CC:  Chief Complaint   Patient presents with     Derm Problem     parkinsons- ref by Dr Rogers       History of Present Illness:  Ms. Alannah Leos is a 76 year old female with a hx of Parkinson's disease who presents for a skin check. There are no specific lesions of concern but she was diagnosed with cradle cap by Dr. Blanca and has used hydrocortisone 1% cream and fluocinonide 0.05% foam without improvement. She washes her hair 2-3x a week and has tried Head and Shoulders and Selsun Blue without much relief. There is a lesion on her back that her daughter (who is here with her today) is concerned about. They will fall off and hardened before coming back. Pt uses regular emollients at home.     There is a lesion in the inguinal region that she believes is a wart. It has previously become inflamed. No pruritus. No other lesions of concern.    Past Medical History:   Patient Active Problem List   Diagnosis     Cataract, OU     Osteoporosis     Rheumatoid arthritis (H)     Parkinson disease (H)     Osteoarthritis of wrist     Osteoarthritis of shoulder     History of total knee arthroplasty     Osteoarthritis of left knee     Advanced directives, counseling/discussion     CARDIOVASCULAR SCREENING; LDL GOAL LESS THAN 160     High risk medication use     Underweight     Past Medical History:   Diagnosis Date     Hyperlipidemia      Murmur, heart     hsm     Nonsenile cataract      Osteoarthrosis, unspecified whether generalized or localized, lower leg      Osteopenia      Rheumatoid arthritis(714.0)      Past Surgical History:   Procedure Laterality Date     GALLBLADDER SURGERY       HYSTERECTOMY        KNEE SURGERY      left      LAMINECTOMY LUMBAR ONE LEVEL       TONSILLECTOMY         Social History:  The patient is retired. The patient denies use of tanning beds. Pt denies drinking or smoking    Family History:  There is no family history of skin cancer. There is a family hx of cardiovascular disease.     Medications:  Current Outpatient Prescriptions   Medication Sig Dispense Refill     carbidopa-levodopa (SINEMET)  MG per tablet 1.5 tablets at 6 AM, 5 PM and 9 PM. Daily. 2 tablets at 12 Noon daily. 540 tablet 3     methotrexate sodium 2.5 MG TABS Take 20 mg by mouth once a week . Take all 8 tablets on the same day of each week. 96 tablet 1     folic acid (FOLVITE) 1 MG tablet Take 1 tablet (1 mg) by mouth daily 100 tablet 3     ACE NOT PRESCRIBED, INTENTIONAL, 1 each continuous prn. ACE Inhibitor not prescribed due to Refusal by patient       0 each 0     CALCIUM + D OR 600mg twice a day        ASPIRIN 81 MG PO TABS 1 TABLET DAILY (*)       MULTI-VITAMIN OR 1 a day          Allergies   Allergen Reactions     Decadrol [Dexamethasone Sodium Phosphate]      Shrimp        Review of Systems:  -Skin/Heme New Pt: The patient denies frequent sun exposure. The patient denies excessive scarring or problems healing except as per HPI. The patient denies excessive bleeding.  -Constitutional: The patient denies fatigue, fevers, chills, unintended weight loss, and night sweats.    Physical exam:  Vitals: There were no vitals taken for this visit.  GEN: This is a well developed, well-nourished female in no acute distress, in a pleasant mood.    NEURO: Alert and oriented  PSYCH: In pleasant mood, appropriate affect  SKIN: Total skin excluding the undergarment areas was performed. The exam included the head/face, neck, both arms, chest, back, abdomen, both legs, digits and/or nails.   -There are bright red some shaped papules scattered on examined surfaces.   -There are waxy stuck on tan to brown papules on the back.  -on  the upper back is a pigmented macule that is asymmetric and jagged in appearance  -lower legs are xerotic  -cyst on the left inguinal fold  -there are poorly demarcated, pink, scaly papules on the scalp  -AK on the right and left brows  -No other lesions of concern on areas examined.     Impression/Plan:    Pt w/ h/o Parkinsons have increased risk of melanoma and worse seborrheic dermatitis at baseline.    1. Neoplasm of uncertain behavior on the right upper back. The differential diagnosis includes Dyplastic nevus, ink spot lentigo, solar lentigo, melanoma, SK.     Shave biopsy:  After discussion of benefits and risks including but not limited to bleeding/bruising, pain/swelling, infection, scar, incomplete removal, nerve damage/numbness, recurrence, and non-diagnostic biopsy, written consent, verbal consent and photographs were obtained. Time-out was performed. The area was cleaned with isopropyl alcohol.  was injected to obtain adequate anesthesia of the lesion on the right upper back. 0.5 ml of 1% lidocaine with 1:100,000 epinephrine was injected to obtain adequate anesthesia. A  shave biopsy was performed. Hemostasis was achieved with aluminium chloride. Vaseline and a sterile dressing were applied. The patient tolerated the procedure and no complications were noted. The patient was provided with verbal and written post care instructions.      2. Actinic keratosis, left and right brows: cryo  Cryotherapy procedure note: After verbal consent and discussion of risks and benefits including but no limited to dyspigmentation/scar, blister, and pain, 2 were treated with 1-2mm freeze border for 2 cycles with liquid nitrogen. Post cryotherapy instructions were provided.     3. Seborrheic dermatitis: mild but often bad in pt with h/o parkinsons.    Start ketoconazole 2% shampoo, 2-3x a week    Start betamethasone valerate 0.1% lotion, 3 weeks then 1 week break    4. Xerosis, bilateral lower legs    Switch emollient to  Amlactin    5. Cherry angiomas, Cyst and Seborrheic keratosis, non irritated    No further intervention required. Patient to report changes.     Sun precaution was advised including the use of sun screens of SPF 30 or higher, sun protective clothing, and avoidance of tanning beds.    CC Leah Blanca MD on close of this encounter.  Follow-up in 1-2 months for scalp recheck, 1 year for skin check, earlier for new or changing lesions, pending biopsy results.       Staff Involved:  Scribe/Staff    Scribe Disclosure:   I, Shen Huynh, am serving as a scribe to document services personally performed by Dr. Josh Sotomayor, based on data collection and the provider's statements to me.     Provider Disclosure:   I have reviewed the documentation recorded by the scribe and have edited it as needed. I have personally performed the services documented here and the documentation accurately represents those services and the decisions made by me.     Jsoh Sotomayor MD, MS    Department of Dermatology  [facility]: Phone: [phone], Fax:[phone]  [facility]: Phone: [phone], Fax: [phone]

## 2017-07-03 NOTE — NURSING NOTE
Dermatology Rooming Note    Alannah Leos's goals for this visit include:   Chief Complaint   Patient presents with     Derm Problem     parkinsons- ref by Dr Rogers       Is a scribe okay for this visit:YES    Are records needed for this visit(If yes, obtain release of information): Not applicable     Vitals: There were no vitals taken for this visit.    Referring Provider:  Daja Peterson MD  [facility] [place]  [address] [place] [address]

## 2017-07-03 NOTE — MR AVS SNAPSHOT
After Visit Summary   7/3/2017    Alannah Leso    MRN: [id number]           Patient Information     Date Of Birth          1940        Visit Information        Provider Department      7/3/2017 1:30 PM Josh Sotomayor MD [facility]        Today's Diagnoses     Neoplasm of uncertain behavior of skin    -  1    SK (seborrheic keratosis)        Cherry angioma        Xerosis of skin        Cyst of skin        Dermatitis, seborrheic        AK (actinic keratosis)          Care Instructions    Wound Care After a Biopsy    What is a skin biopsy?  A skin biopsy allows the doctor to examine a very small piece of tissue under the microscope to determine the diagnosis and the best treatment for the skin condition. A local anesthetic (numbing medicine)  is injected with a very small needle into the skin area to be tested. A small piece of skin is taken from the area. Sometimes a suture (stitch) is used.     What are the risks of a skin biopsy?  I will experience scar, bleeding, swelling, pain, crusting and redness. I may experience incomplete removal or recurrence. Risks of this procedure are excessive bleeding, bruising, infection, nerve damage, numbness, thick (hypertrophic or keloidal) scar and non-diagnostic biopsy.    How should I care for my wound for the first 24 hours?    Keep the wound dry and covered for 24 hours    If it bleeds, hold direct pressure on the area for 15 minutes. If bleeding does not stop then go to the emergency room    Avoid strenuous exercise the first 1-2 days or as your doctor instructs you    How should I care for the wound after 24 hours?    After 24 hours, remove the bandage    You may bathe or shower as normal    If you had a scalp biopsy, you can shampoo as usual and can use shower water to clean the biopsy site daily    Clean the wound twice a day with gentle soap and water    Do not scrub, be gentle    Apply white petroleum/Vaseline after cleaning  the wound with a cotton swab or a clean finger, and keep the site covered with a Bandaid /bandage. Bandages are not necessary with a scalp biopsy    If you are unable to cover the site with a Bandaid /bandage, re-apply ointment 2-3 times a day to keep the site moist. Moisture will help with healing    Avoid strenuous activity for first 1-2 days    Avoid lakes, rivers, pools, and oceans until the stitches are removed or the site is healed    How do I clean my wound?    Wash hands thoroughly with soap or use hand  before all wound care    Clean the wound with gentle soap and water    Apply white petroleum/Vaseline  to wound after it is clean    Replace the Bandaid /bandage to keep the wound covered for the first few days or as instructed by your doctor    If you had a scalp biopsy, warm shower water to the area on a daily basis should suffice    What should I use to clean my wound?     Cotton-tipped applicators (Qtips )    White petroleum jelly (Vaseline ). Use a clean new container and use Q-tips to apply.    Bandaids   as needed    Gentle soap     How should I care for my wound long term?    Do not get your wound dirty    Keep up with wound care for one week or until the area is healed.    A small scab will form and fall off by itself when the area is completely healed. The area will be red and will become pink in color as it heals. Sun protection is very important for how your scar will turn out. Sunscreen with an SPF 30 or greater is recommended once the area is healed.    You should have some soreness but it should be mild and slowly go away over several days. Talk to your doctor about using tylenol for pain,    When should I call my doctor?  If you have increased:     Pain or swelling    Pus or drainage (clear or slightly yellow drainage is ok)    Temperature over 100F    Spreading redness or warmth around wound    When will I hear about my results?  The biopsy results can take 2-3 weeks to come back.  The clinic will call you with the results, send you a Jada [employer] message, or have you schedule a follow-up clinic or phone time to discuss the results. Contact our clinics if you do not hear from us in 3 weeks.     Who should I call with questions?    [facility]: [phone]     [facility]: [phone]    For urgent needs outside of business hours call the [facility] at [phone] and ask for the dermatology resident on call    __________________________________________________________    Cryotherapy    What is it?    Use of a very cold liquid, such as liquid nitrogen, to freeze and destroy abnormal skin cells that need to be removed    What should I expect?    Tenderness and redness    A small blister that might grow and fill with dark purple blood. There may be crusting.    More than one treatment may be needed if the lesions do not go away.    How do I care for the treated area?    Gently wash the area with your hands when bathing.    Use a thin layer of Vaseline to help with healing. You may use a Band-Aid.     The area should heal within 7-10 days and may leave behind a pink or lighter color.     Do not use an antibiotic or Neosporin ointment.     You may take acetaminophen (Tylenol) for pain.     Call your Doctor if you have:    Severe pain    Signs of infection (warmth, redness, cloudy yellow drainage, and or a bad smell)    Questions or concerns    Who should I call with questions?       [facility]: [phone]       [facility]: [phone]       For urgent needs outside of business hours call the [facility] at [phone]        and ask for the dermatology resident on call    _________________________________________      Switch moisturizer to Amlactin          Follow-ups after your visit        Your next 10 appointments already scheduled     Jul 17, 2017  1:30 PM  CDT   LAB with [facility]   [facility] ([facility])    [address]  [place] [address]   [phone]           Patient must bring picture ID.  Patient should be prepared to give a urine specimen  Please do not eat 10-12 hours before your appointment if you are coming in fasting for labs on lipids, cholesterol, or glucose (sugar).  Pregnant women should follow their Care Team instructions. Water with medications is okay. Do not drink coffee or other fluids.   If you have concerns about taking  your medications, please ask at office or if scheduling via [employer], send a message by clicking on Secure Messaging, Message Your Care Team.            Jul 20, 2017  1:40 PM CDT   Return Visit with Merrick Del Cid MD   [facility] ([address] [address]   [phone]            Aug 07, 2017 11:00 AM CDT   Office Visit with Leah Blanca MD   [facility]    [address] [id number] [phone]           Bring a current list of meds and any records pertaining to this visit.  For Physicals, please bring immunization records and any forms needing to be filled out.  Please arrive 10 minutes early to complete paperwork.            Aug 21, 2017  1:45 PM CDT   Return Visit with Josh Sotomayor MD   [facility]    [address] [place] [address] [phone]              Who to contact     If you have questions or need follow up information about today's clinic visit or your schedule please contact [facility] directly at [phone].  Normal or non-critical lab and imaging results will be communicated to you by MyChart, letter or phone within 4 business days after the clinic has received the results. If you do not hear from us within 7 days, please contact the  clinic through [employer] or phone. If you have a critical or abnormal lab result, we will notify you by phone as soon as possible.  Submit refill requests through [employer] or call your pharmacy and they will forward the refill request to us. Please allow 3 business days for your refill to be completed.          Additional Information About Your Visit        [employer] Information     [employer] is an electronic gateway that provides easy, online access to your medical records. With [employer], you can request a clinic appointment, read your test results, renew a prescription or communicate with your care team.     To sign up for [employer] visit the website at [email or web address]   You will be asked to enter the access code listed below, as well as some personal information. Please follow the directions to create your username and password.     Your access code is: [email or web address]  Expires: 2017  3:49 PM     Your access code will  in 90 days. If you need help or a new code, please contact your [facility] or call [phone] for assistance.        Care EveryWhere ID     This is your Care EveryWhere ID. This could be used by other organizations to access your [place] medical records  [id number]         Blood Pressure from Last 3 Encounters:   17 113/58   17 115/59   17 137/72    Weight from Last 3 Encounters:   05/15/17 53.5 kg (118 lb)   17 52.6 kg (116 lb)   17 53.1 kg (117 lb)              We Performed the Following     BIOPSY SKIN/SUBQ/MUC MEM, SINGLE LESION     DESTRUCT PREMALIGNANT LESION, 2-14     DESTRUCT PREMALIGNANT LESION, FIRST     Surgical pathology exam          Today's Medication Changes          These changes are accurate as of: 7/3/17  1:45 PM.  If you have any questions, ask your nurse or doctor.               Start taking these medicines.        Dose/Directions    ammonium lactate 12 % cream   Commonly known as:  AMLACTIN   Used for:   Xerosis of skin        Apply once to twice a day to dry skin. Can burn temporarily if there is a cut on the skin.   Quantity:  385 g   Refills:  11       betamethasone valerate 0.1 % lotion   Commonly known as:  VALISONE   Used for:  Dermatitis, seborrheic        Apply twice a day to the affected area of the scalp and brow for up to 3 weeks then need a 1 week break.   Quantity:  60 mL   Refills:  1       ketoconazole 2 % shampoo   Commonly known as:  NIZORAL   Used for:  Dermatitis, seborrheic        Lather onto scalp and let sit for 3-5min then rinse. Do 2-3 days a week. Can use any other product afterwards.   Quantity:  120 mL   Refills:  11       lidocaine 1% with EPINEPHrine 1:100,000 1 %-1:749680 injection   Used for:  Neoplasm of uncertain behavior of skin        Dose:  3 mL   Inject 3 mLs into the skin once for 1 dose   Quantity:  0.5 mL   Refills:  0            Where to get your medicines      These medications were sent to [facility] [address] [place] [address] AT [address] [address]     Phone:  [phone]     ammonium lactate 12 % cream    betamethasone valerate 0.1 % lotion    ketoconazole 2 % shampoo         Some of these will need a paper prescription and others can be bought over the counter.  Ask your nurse if you have questions.     You don't need a prescription for these medications     lidocaine 1% with EPINEPHrine 1:100,000 1 %-1:396510 injection                Primary Care Provider Office Phone # Fax #    Leah Blanca [phone]       [facility] [address]  [facility] [address]        Equal Access to Services     EDDI HOLCOMB AH: Marie Saul, wamacarioda luqadaha, qaybta kaalmada adeegyada, danyell sparrow. So [facility] [phone].    ATENCIÓN: Si habla español, tiene a mahan disposición servicios gratuitos de asistencia lingüística.  Leanne al [phone].    We comply with applicable federal civil rights laws and [place] laws. We do not discriminate on the basis of race, color, national origin, age, disability sex, sexual orientation or gender identity.            Thank you!     Thank you for choosing [facility]  for your care. Our goal is always to provide you with excellent care. Hearing back from our patients is one way we can continue to improve our services. Please take a few minutes to complete the written survey that you may receive in the mail after your visit with us. Thank you!             Your Updated Medication List - Protect others around you: Learn how to safely use, store and throw away your medicines at www.disposemymeds.org.          This list is accurate as of: 7/3/17  1:45 PM.  Always use your most recent med list.                   Brand Name Dispense Instructions for use Diagnosis    ACE NOT PRESCRIBED (INTENTIONAL)     0 each    1 each continuous prn. ACE Inhibitor not prescribed due to Refusal by patient    Type 2 diabetes, HbA1c goal < 7% (H)       ammonium lactate 12 % cream    AMLACTIN    385 g    Apply once to twice a day to dry skin. Can burn temporarily if there is a cut on the skin.    Xerosis of skin       aspirin 81 MG tablet      1 TABLET DAILY (*)        betamethasone valerate 0.1 % lotion    VALISONE    60 mL    Apply twice a day to the affected area of the scalp and brow for up to 3 weeks then need a 1 week break.    Dermatitis, seborrheic       CALCIUM + D PO      600mg twice a day        carbidopa-levodopa  MG per tablet    SINEMET    540 tablet    1.5 tablets at 6 AM, 5 PM and 9 PM. Daily. 2 tablets at 12 Noon daily.    Parkinson disease (H)       folic acid 1 MG tablet    FOLVITE    100 tablet    Take 1 tablet (1 mg) by mouth daily    Rheumatoid arthritis of multiple sites with negative rheumatoid factor (H), High risk medication use       ketoconazole 2 % shampoo    NIZORAL     120 mL    Lather onto scalp and let sit for 3-5min then rinse. Do 2-3 days a week. Can use any other product afterwards.    Dermatitis, seborrheic       lidocaine 1% with EPINEPHrine 1:100,000 1 %-1:263876 injection     0.5 mL    Inject 3 mLs into the skin once for 1 dose    Neoplasm of uncertain behavior of skin       methotrexate sodium 2.5 MG Tabs     96 tablet    Take 20 mg by mouth once a week . Take all 8 tablets on the same day of each week.    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

## 2017-07-07 DIAGNOSIS — M06.09 RHEUMATOID ARTHRITIS OF MULTIPLE SITES WITH NEGATIVE RHEUMATOID FACTOR (H): ICD-10-CM

## 2017-07-07 DIAGNOSIS — Z79.899 HIGH RISK MEDICATION USE: ICD-10-CM

## 2017-07-07 NOTE — TELEPHONE ENCOUNTER
methotrexate sodium 2.5 MG TABS      Last Written Prescription Date:  1/18/17  Last Fill Quantity: 96,   # refills: 1  Last Office Visit with [facility], [facility] or [facility] prescribing provider: 4/19/17  Future Office visit:    Next 5 appointments (look out 90 days)     Jul 20, 2017  1:40 PM CDT   Return Visit with Merrick Del Cid MD   [facility] ([facility]    [address] [place] [address]   [phone]            Aug 07, 2017 11:00 AM CDT   [facility]    [address] [facility] [address]   [phone]            Aug 21, 2017  1:45 PM CDT   Return Visit with Josh Sotomayor MD   [facility] ([facility])    [address] [place] [address]   [phone]                   Routing refill request to provider for review/approval because:  Drug not on the [facility], [facility] or [facility] refill protocol or controlled substance

## 2017-07-10 RX ORDER — METHOTREXATE 2.5 MG/1
20 TABLET ORAL WEEKLY
Qty: 96 TABLET | Refills: 0 | Status: SHIPPED | OUTPATIENT
Start: 2017-07-10 | End: 2017-07-20

## 2017-07-12 ENCOUNTER — TELEPHONE (OUTPATIENT)
Dept: DERMATOLOGY | Facility: CLINIC | Age: 77
End: 2017-07-12

## 2017-07-12 DIAGNOSIS — L21.9 DERMATITIS, SEBORRHEIC: Primary | ICD-10-CM

## 2017-07-12 LAB — COPATH REPORT: NORMAL

## 2017-07-12 RX ORDER — CLOBETASOL PROPIONATE 0.5 MG/ML
SOLUTION TOPICAL
Qty: 50 ML | Refills: 3 | Status: SHIPPED | OUTPATIENT
Start: 2017-07-12 | End: 2018-02-09

## 2017-07-12 NOTE — TELEPHONE ENCOUNTER
Unclear what the issue with the eyes are. Will need follow up for that.    Will switch topical treatment to:  1) flucinolone shampoo a few times a week.  2) clobetasol solution daily on days not using the shampoo.    Josh Sotomayor MD, MS    Department of Dermatology  [facility]: Phone: [phone], Fax:[phone]  [facility]: Phone: [phone], Fax: [phone]

## 2017-07-12 NOTE — TELEPHONE ENCOUNTER
"Patients washing eyes with cold water and using Vaseline around the eye. Patient states right eye is worse than left but she can see fine from both eyes. Patient states that the skin around the eye is reddened and the inside of the eye is burning and itchy at times but if \"I close my eyes really hard this makes my eyes water a bit and helps\". Patient states that her eyes are improving but the scalp has worsened and wondering if she should continue the use of her shampoo.    Dorita Mcdowell, PAL    "

## 2017-07-12 NOTE — TELEPHONE ENCOUNTER
Patient called clinic stating that she has used the Nizoral shampoo twice, on 7/7 and 7/10, to which she states both times the bumps on her scalp have grown larger and multiplied. The areas are raised and itchy and when patient itches them they will start to bleed. Patient has also been using the cream and lotion prescribed but states that she is not seeing improvement and her eyes still continue to burn. Patient questioning if she should continue the treatment or if the plan should change. Writer informed patient to stop the products until we call her back with Dr. Patsy hairston.     Dorita Mcdowell LPN

## 2017-07-12 NOTE — TELEPHONE ENCOUNTER
Writer spoke with patient and informed her that I had spoke with Dr. Sotomayor who will be changing her medication and she is to stop the betamethasone lotion and the nizoral shampoo and a new prescription would be sent to her pharmacy [facility] in [place]. Patient verbalized understanding. Writer informed patient that Dr. Sotomayor would like to see her back in clinic in regards to her eyes so they can be assessed. Patient refused to schedule at this time stating that she has had improvement and therefore she would like to wait a few days to see if they continue to improve before scheduling. Patient will contact clinic if eyes dont improve in the next few days.    Dorita Mcdowell LPN

## 2017-07-13 ENCOUNTER — TELEPHONE (OUTPATIENT)
Dept: DERMATOLOGY | Facility: CLINIC | Age: 77
End: 2017-07-13

## 2017-07-13 NOTE — PROGRESS NOTES
Please let her know that the biopsy came back benign and it was a broad and flat mole. There is nothing to worry about.    Josh Sotomayor MD, MS    Department of Dermatology  [facility]: Phone: [phone], Fax:[phone]  [facility]: Phone: [phone], Fax: [phone]

## 2017-07-17 DIAGNOSIS — M06.09 RHEUMATOID ARTHRITIS OF MULTIPLE SITES WITH NEGATIVE RHEUMATOID FACTOR (H): ICD-10-CM

## 2017-07-17 DIAGNOSIS — Z79.899 HIGH RISK MEDICATION USE: ICD-10-CM

## 2017-07-17 LAB
ALBUMIN SERPL-MCNC: 3.7 G/DL (ref 3.4–5)
ALP SERPL-CCNC: 107 U/L (ref 40–150)
ALT SERPL W P-5'-P-CCNC: 11 U/L (ref 0–50)
AST SERPL W P-5'-P-CCNC: 17 U/L (ref 0–45)
BASOPHILS # BLD AUTO: 0.1 10E9/L (ref 0–0.2)
BASOPHILS NFR BLD AUTO: 1.5 %
BILIRUB DIRECT SERPL-MCNC: <0.1 MG/DL (ref 0–0.2)
BILIRUB SERPL-MCNC: 0.4 MG/DL (ref 0.2–1.3)
CREAT SERPL-MCNC: 0.42 MG/DL (ref 0.52–1.04)
CRP SERPL-MCNC: 17.5 MG/L (ref 0–8)
DIFFERENTIAL METHOD BLD: ABNORMAL
EOSINOPHIL # BLD AUTO: 0.1 10E9/L (ref 0–0.7)
EOSINOPHIL NFR BLD AUTO: 1.7 %
ERYTHROCYTE [DISTWIDTH] IN BLOOD BY AUTOMATED COUNT: 16.2 % (ref 10–15)
ERYTHROCYTE [SEDIMENTATION RATE] IN BLOOD BY WESTERGREN METHOD: 32 MM/H (ref 0–30)
GFR SERPL CREATININE-BSD FRML MDRD: ABNORMAL ML/MIN/1.7M2
HCT VFR BLD AUTO: 36.2 % (ref 35–47)
HGB BLD-MCNC: 11.9 G/DL (ref 11.7–15.7)
LYMPHOCYTES # BLD AUTO: 1.6 10E9/L (ref 0.8–5.3)
LYMPHOCYTES NFR BLD AUTO: 30.9 %
MCH RBC QN AUTO: 29.9 PG (ref 26.5–33)
MCHC RBC AUTO-ENTMCNC: 32.9 G/DL (ref 31.5–36.5)
MCV RBC AUTO: 91 FL (ref 78–100)
MONOCYTES # BLD AUTO: 0.4 10E9/L (ref 0–1.3)
MONOCYTES NFR BLD AUTO: 8 %
NEUTROPHILS # BLD AUTO: 3 10E9/L (ref 1.6–8.3)
NEUTROPHILS NFR BLD AUTO: 57.9 %
PLATELET # BLD AUTO: 317 10E9/L (ref 150–450)
PROT SERPL-MCNC: 7.8 G/DL (ref 6.8–8.8)
RBC # BLD AUTO: 3.98 10E12/L (ref 3.8–5.2)
WBC # BLD AUTO: 5.2 10E9/L (ref 4–11)

## 2017-07-17 PROCEDURE — 85025 COMPLETE CBC W/AUTO DIFF WBC: CPT | Performed by: INTERNAL MEDICINE

## 2017-07-17 PROCEDURE — 80076 HEPATIC FUNCTION PANEL: CPT | Performed by: INTERNAL MEDICINE

## 2017-07-17 PROCEDURE — 85652 RBC SED RATE AUTOMATED: CPT | Performed by: INTERNAL MEDICINE

## 2017-07-17 PROCEDURE — 36415 COLL VENOUS BLD VENIPUNCTURE: CPT | Performed by: INTERNAL MEDICINE

## 2017-07-17 PROCEDURE — 82565 ASSAY OF CREATININE: CPT | Performed by: INTERNAL MEDICINE

## 2017-07-17 PROCEDURE — 86140 C-REACTIVE PROTEIN: CPT | Performed by: INTERNAL MEDICINE

## 2017-07-20 ENCOUNTER — OFFICE VISIT (OUTPATIENT)
Dept: RHEUMATOLOGY | Facility: CLINIC | Age: 77
End: 2017-07-20
Payer: MEDICARE

## 2017-07-20 VITALS
OXYGEN SATURATION: 96 % | SYSTOLIC BLOOD PRESSURE: 142 MMHG | BODY MASS INDEX: 19.36 KG/M2 | DIASTOLIC BLOOD PRESSURE: 66 MMHG | HEART RATE: 88 BPM | WEIGHT: 113.4 LBS | HEIGHT: 64 IN

## 2017-07-20 DIAGNOSIS — R76.8 POSITIVE DOUBLE STRANDED DNA ANTIBODY TEST: ICD-10-CM

## 2017-07-20 DIAGNOSIS — M06.09 RHEUMATOID ARTHRITIS OF MULTIPLE SITES WITH NEGATIVE RHEUMATOID FACTOR (H): Primary | ICD-10-CM

## 2017-07-20 DIAGNOSIS — Z79.899 HIGH RISK MEDICATION USE: ICD-10-CM

## 2017-07-20 DIAGNOSIS — R76.8 ANA POSITIVE: ICD-10-CM

## 2017-07-20 PROCEDURE — 99214 OFFICE O/P EST MOD 30 MIN: CPT | Performed by: INTERNAL MEDICINE

## 2017-07-20 RX ORDER — FOLIC ACID 1 MG/1
1 TABLET ORAL DAILY
Qty: 100 TABLET | Refills: 3 | Status: SHIPPED | OUTPATIENT
Start: 2017-07-20 | End: 2018-05-03

## 2017-07-20 RX ORDER — METHOTREXATE 2.5 MG/1
20 TABLET ORAL WEEKLY
Qty: 96 TABLET | Refills: 0 | Status: SHIPPED | OUTPATIENT
Start: 2017-07-20 | End: 2017-10-19

## 2017-07-20 NOTE — PROGRESS NOTES
Rheumatology Clinic Visit      Alannah Leos MRN# [id number]   YOB: 1940 Age: 76 year old      Date of visit: 7/20/17   PCP: Dr. Leah Blanca    Chief Complaint   Patient presents with:  Arthritis: patient states she is still stiff, has more bad days then good days      Assessment and Plan     1. Seronegative erosive rheumatoid arthritis: Diagnosed in the 1970s. Previously on methotrexate when first diagnosed (unclear if ineffective or not, stopped by patient preference to not treat her RA at that time).  Synovitis, subluxation, and fixed flexion deformities on exam. Steroid responsive. Also with ankylosis of the cervical spine and erosive changes seen on x-rays. Currently on methotrexate 20 mg once weekly. She has improved with methotrexate, but still has severe disease. SSZ caused GI upset.  I do not think that HCQ would provide much benefit as she has severe disease. Therefore, we reviewed biologic DMARDs.  Given her positive PIERO and dsDNA in the past, I would prefer avoiding TNF inhibitors and therefore will start Orencia IV.    - Continue methotrexate 20 mg once weekly (she tells me that she does not need additional refills at this time)  - Continue folic acid 1mg daily  - Labs 2-3 days prior to the next rheumatology clinic visit: CBC, Creatinine, Hepatic Panel, ESR, CRP    # Abatacept (Orencia) Risks and Benefits: The risks and benefits of abatacept were discussed in detail and the patient verbalized understanding.  The risks discussed include, but are not limited to, the risk for hypersensitivity, anaphylaxis, anaphylactoid reactions, an increased risk for serious infections leading to hospitalization or death, and an increased risk for more frequent respiratory adverse events in patients with COPD.  If subcutaneous injections are used, then injection site reactions and/or pain may occur at the site of injection.  The most common side effects were discussed that include headache, upper  respiratory tract infections, nasopharyngitis, and nausea.  It was discussed that the medication would need to be discontinued if a serious infection develops.  It was discussed that live vaccinations should not be received while using abatacept or within 30 days prior to starting abatacept.  I encouraged reviewing the package insert and asking any questions about the medication.      # Etanercept (Enbrel) Risks and Benefits: The risks and benefits of etanercept were discussed in detail and the patient verbalized understanding.  The risks discussed include, but are not limited to, the risk for hypersensitivity, anaphylaxis, anaphylactoid reactions, an increased risk for serious infections leading to hospitalization or death, a possible increased risk for lymphoma and other malignancies, a possible worsening of demyelinating diseases, a possible worsening of heart failure, possible reactivation of hepatitis B, and possible reactivation of latent tuberculosis.  Subcutaneous injections may result in injection site reactions and/or pain at the site of injection.  It was discussed that the medication would need to be discontinued if a serious infection develops.  It was discussed that live vaccinations should not be received while using etanercept or within 30 days prior to starting etanercept.  I encouraged reviewing the package insert and asking any questions about the medication.      # Adalimumab (Humira) Risks and Benefits: The risks and benefits of adalimumab were discussed in detail and the patient verbalized understanding.  The risks discussed include, but are not limited to, the risk for hypersensitivity, anaphylaxis, anaphylactoid reactions, an increased risk for serious infections leading to hospitalization or death, a possible increased risk for lymphoma and other malignancies, a possible worsening of demyelinating diseases, a possible worsening of heart failure, risk for cytopenias, risk for drug induced  lupus, possible reactivation of hepatitis B, and possible reactivation of latent tuberculosis.  Subcutaneous injections may result in injection site reactions and/or pain at the site of injection.  The most common adverse reactions are infections, injection site reactions, headache, and rash.  It was discussed that the medication would need to be discontinued if a serious infection develops.  It was discussed that live vaccinations should not be received while using adalimumab or within 30 days prior to starting adalimumab.  I encouraged reviewing the package insert and asking any questions about the medication.      2. Positive PIERO and dsDNA: These were noted on record review. She does not have symptoms to support an PIERO-associated rheumatologic disease at this time.     3. Neck pain in the setting of rheumatoid arthritis: No evidence of instability by x-rays on 10/12/2016.  Stable symptoms per patient.     4. Parkinson's Disease: Followed by Dr. Peterson (neurology)    5. Osteoarthritis of the bilateral shoulders: Capsaicin cream PRN.  Depending on her response to capsaicin, may use Voltaren gel in the future.  Note that previous intra-articular steroid injections were ineffective, and PT made her shoulder pain worse.     Ms. Leos verbalized agreement with and understanding of the rational for the diagnosis and treatment plan.  All questions were answered to best of my ability and the patient's satisfaction. Ms. Leos was advised to contact the clinic with any questions that may arise after the clinic visit.      Thank you for involving me in the care of the patient    Return to clinic: 3 months      HPI   Alannah Leos is a 76 year old female with a medical history significant for Parkinson's disease, osteoarthritis, status post TKA, osteoporosis, and rheumatoid arthritis who presents for follow-up of rheumatoid arthritis.    Previously, Ms. Leos reported that she was diagnosed with rheumatoid arthritis  "in the past but did not want to take toxic medications and did not want to keep taking prednisone because of potential toxicities. Therefore, she decided that she would \"tough out\" her symptoms and try to enjoy her life. Then, more recently she was diagnosed with Parkinson's disease that has been significantly affecting her quality of life. She is treated for the Parkinson's disease and she believes that the treatment is helping. However, she is also having worsening joint pain and stiffness. Morning stiffness lasts for all day. All of her joints hurt, including her bilateral shoulders, neck, spine, hands, wrists, elbows, hips, knees, ankles, and feet. She tells me that she is unable to raise her arms without assistance from the contralateral arm, if she is able to get that hand over to the other side. Mainly, her right shoulder is affected. She tells me that she has a little more mobility in her left shoulder. She tells me that her entire spine is fused, and that it \"occurred naturally.\"  Overall, she tells me that she feels miserable and is willing to start treatment, but is still scared of most of the medication side effects.    Today, she reports that she continues to do well with methotrexate but still has joint pain and stiffness but does not completely resolve. She started sulfasalazine previously they caused terrible stomach upset and muscle cramping that resolved after she stopped using it. She prefers using IV medication over SQ. Currently with morning stiffness that last all day and improves with activity. Stable neck pain for years.     Denies fevers, chills, nausea, vomiting, constipation, diarrhea. No abdominal pain. No chest pain/pressure, palpitations, or shortness of breath. No LE swelling.  No oral or nasal sores.  No rash. No sicca symptoms.      Tobacco: none  EtOH: 1 drink at [place] only  Drugs: none    ROS   GEN: No fevers, chills, night sweats.  Weight loss when on SSZ due to GI upset that " has resolved with discontinuation of SSZ  SKIN: No itching, rashes, sores  HEENT: No epistaxis. No oral or nasal ulcers.  CV: No chest pain, pressure, palpitations, or dyspnea on exertion.  PULM: No SOB, wheeze, cough.  GI: No nausea, vomiting, constipation, diarrhea. No blood in stool. No abdominal pain.  : No blood in urine.  MSK: See HPI.  NEURO: No numbness or tingling  ENDO: No heat/cold intolerance.  EXT: No LE swelling  PSYCH: Negative    Active Problem List     Patient Active Problem List   Diagnosis     Cataract, OU     Osteoporosis     Rheumatoid arthritis (H)     Parkinson disease (H)     Osteoarthritis of wrist     Osteoarthritis of shoulder     History of total knee arthroplasty     Osteoarthritis of left knee     Advanced directives, counseling/discussion     CARDIOVASCULAR SCREENING; LDL GOAL LESS THAN 160     High risk medication use     Underweight     Past Medical History     Past Medical History:   Diagnosis Date     Hyperlipidemia      Murmur, heart     hsm     Nonsenile cataract      Osteoarthrosis, unspecified whether generalized or localized, lower leg      Osteopenia      Rheumatoid arthritis(714.0)      Past Surgical History     Past Surgical History:   Procedure Laterality Date     GALLBLADDER SURGERY       HYSTERECTOMY       KNEE SURGERY      left      LAMINECTOMY LUMBAR ONE LEVEL       TONSILLECTOMY       Allergy     Allergies   Allergen Reactions     Decadrol [Dexamethasone Sodium Phosphate]      Shrimp      Current Medication List     Current Outpatient Prescriptions   Medication Sig     fluocinolone acetonide 0.01 % SHAM Lather onto scalp, let sit for 5 min, then rinse off.     clobetasol (TEMOVATE) 0.05 % external solution Apply to affected area of the scalp daily on days not shampooing.  Do not apply to face.     methotrexate sodium 2.5 MG TABS Take 20 mg by mouth once a week . Take all 8 tablets on the same day of each week.     betamethasone valerate (VALISONE) 0.1 % lotion Apply  "twice a day to the affected area of the scalp and brow for up to 3 weeks then need a 1 week break.     ketoconazole (NIZORAL) 2 % shampoo Lather onto scalp and let sit for 3-5min then rinse. Do 2-3 days a week. Can use any other product afterwards.     ammonium lactate (AMLACTIN) 12 % cream Apply once to twice a day to dry skin. Can burn temporarily if there is a cut on the skin.     carbidopa-levodopa (SINEMET)  MG per tablet 1.5 tablets at 6 AM, 5 PM and 9 PM. Daily. 2 tablets at 12 Noon daily.     folic acid (FOLVITE) 1 MG tablet Take 1 tablet (1 mg) by mouth daily     ACE NOT PRESCRIBED, INTENTIONAL, 1 each continuous prn. ACE Inhibitor not prescribed due to Refusal by patient           CALCIUM + D OR 600mg twice a day      ASPIRIN 81 MG PO TABS 1 TABLET DAILY (*)     MULTI-VITAMIN OR 1 a day      No current facility-administered medications for this visit.          Social History   See HPI    Family History     Family History   Problem Relation Age of Onset     CANCER Sister      pancreatic       Physical Exam     Temp Readings from Last 3 Encounters:   05/15/17 97.8  F (36.6  C)   05/08/17 97.5  F (36.4  C) (Oral)   01/18/17 95.9  F (35.5  C) (Oral)     BP Readings from Last 5 Encounters:   05/08/17 113/58   05/01/17 115/59   04/19/17 137/72   01/18/17 142/67   11/03/16 118/61     Pulse Readings from Last 1 Encounters:   05/08/17 86     Resp Readings from Last 1 Encounters:   05/15/17 18     Estimated body mass index is 20.25 kg/(m^2) as calculated from the following:    Height as of 5/15/17: 1.626 m (5' 4\").    Weight as of 5/15/17: 53.5 kg (118 lb).    GEN: NAD, thin and frail appearing  HEENT: MMM. No oral lesions. Anicteric, noninjected sclera  CV: S1, S2. RRR. No m/r/g.  PULM: CTA bilaterally. No w/c.  MSK: Mild synovial swelling and tenderness palpation of all MCPs and PIPs. Bilateral wrist tender to palpation and with synovial swelling; minimal range of motion of the bilateral wrists. Elbows " without swelling or tenderness to palpation. Right shoulder with diffuse muscle atrophy, tenderness to palpation, and almost no active range of motion; limited passive range of motion. Left shoulder with minimal range of motion, muscle atrophy, and tenderness to palpation. Bilateral hips tender to direct palpation. Spine diffusely tender to palpation. Knees without swelling or tenderness to palpation; no increased warmth. Bilateral ankles without swelling or tenderness to palpation; limited range of motion. MTP squeeze positive bilaterally.  NEURO: UE and LE strengths 5/5 and equal bilaterally.   SKIN: No rash. Diffuse scalp hair thinning.  EXT: No LE edema  PSYCH: Alert. Appropriate.    Labs / Imaging (select studies)   RF/CCP  Recent Labs   Lab Test  10/12/16   1142  07/30/13   1621  10/25/12   1127   CCPABY   --   <20 Interpretation:  Negative   --    CCPIGG  2   --    --    RHF  <20   --   9     PIERO/RNP/Sm/SSA/SSB  Recent Labs   Lab Test  07/30/13   1621  10/25/12   1127   ERA   --   3.3*   ENASM  0   --    ENASSA  1   --    ENASSB  0   --    ENARMP  0   --      dsDNA  Recent Labs   Lab Test  07/30/13   1621   DNA  52*     C3/C4  Recent Labs   Lab Test  07/30/13   1621   I5AZFLK  106   N8PIBQO  24     CBC  Recent Labs   Lab Test  07/17/17   1341  06/16/17   1326  05/15/17   1346   WBC  5.2  5.6  6.0   RBC  3.98  3.91  3.85   HGB  11.9  11.6*  11.3*   HCT  36.2  35.7  34.7*   MCV  91  91  90   RDW  16.2*  17.2*  16.4*   PLT  317  285  274   MCH  29.9  29.7  29.4   MCHC  32.9  32.5  32.6   NEUTROPHIL  57.9  56.0  56.0   LYMPH  30.9  31.8  31.1   MONOCYTE  8.0  8.6  9.9   EOSINOPHIL  1.7  1.3  1.2   BASOPHIL  1.5  2.3  1.8   ANEU  3.0  3.1  3.4   ALYM  1.6  1.8  1.9   DORITA  0.4  0.5  0.6   AEOS  0.1  0.1  0.1   ABAS  0.1  0.1  0.1     CMP  Recent Labs   Lab Test  07/17/17   1341  06/16/17   1326  05/15/17   1346  04/17/17   1333   05/27/16   1413  05/26/15   1416  04/28/14   1209  07/30/13   1621  04/26/13   1128   10/25/12   1127   NA   --    --    --    --    --   136  137  136  140  138  137   POTASSIUM   --    --    --    --    --   3.7  4.0  4.2  4.2  4.0  4.0   CHLORIDE   --    --    --    --    --   102  103  98  101  97  99   CO2   --    --    --    --    --   25  28  27  28  25  25   ANIONGAP   --    --    --    --    --   9  6  11  11  16  13   GLC   --    --    --    --    --   94  100*  99  113*  107*  108*   BUN   --    --    --    --    --   11  9  12  15  11  11   CR  0.42*  0.50*  0.52  0.49*   < >  0.41*  0.47*  0.52  0.48*  0.48*  0.52   GFRESTIMATED  >90  Non  GFR Calc    >90  Non  GFR Calc    >90  Non  GFR Calc    >90  Non  GFR Calc     < >  >90  Non  GFR Calc    >90  Non  GFR Calc    >90  >90  >90  >90   GFRESTBLACK  >90   GFR Calc    >90   GFR Calc    >90   GFR Calc    >90   GFR Calc     < >  >90   GFR Calc    >90   GFR Calc    >90  >90  >90  >90   PAKO   --    --    --    --    --   9.2  9.2  9.4  9.4  9.2  9.4   BILITOTAL  0.4  0.4  0.4  0.4   < >  0.5   --   0.5  0.3  0.4  0.4   ALBUMIN  3.7  3.9  3.8  3.8   < >  3.8   --   4.4  4.2  4.4  4.3   PROTTOTAL  7.8  7.9  7.8  8.1   < >  8.8   --   8.6  8.4  8.5  8.9*   ALKPHOS  107  101  104  104   < >  108   --   119  105  104  103   AST  17  20  20  23   < >  15   --   27  23  25  26   ALT  11  10  10  13   < >  8   --   19  19  13  21    < > = values in this interval not displayed.     HgA1c  Recent Labs   Lab Test  05/27/16   1413  05/26/15   1416  10/28/13   1204   A1C  5.9  6.0  5.8     Calcium/VitaminD  Recent Labs   Lab Test  05/27/16   1413  05/26/15   1416  04/28/14   1209   04/26/13   1128   10/26/11   1113   PAKO  9.2  9.2  9.4   < >  9.2   < >  9.7   D3VIT   --    --    --    --    --    --   46   VITDT  95*   --    --    --   63   --    --     < > = values  in this interval not displayed.     ESR/CRP  Recent Labs   Lab Test  07/17/17   1341  04/17/17   1333  01/16/17   1334   SED  32*  30  28   CRP  17.5*  9.5*  8.8*     CK/Aldolase  Recent Labs   Lab Test  04/26/13   1128  10/02/09   1135   CKT  73  37     TSH/T4  Recent Labs   Lab Test  05/08/17   1446  05/27/16   1413  05/26/15   1416   TSH  2.12  2.36  2.43     Lipid Panel  Recent Labs   Lab Test  05/26/15   1416  04/26/13   1128  04/26/12   1151   CHOL  179  154  159   TRIG  50  44  56   HDL  80  72  71   LDL  89  73  77   VLDL  10  9  11   CHOLHDLRATIO  2.2  2.1  2.2     Hepatitis B  Recent Labs   Lab Test  01/18/17   1601   HBCAB  Nonreactive   HEPBANG  Nonreactive     Hepatitis C  Recent Labs   Lab Test  01/18/17   1601   HCVAB  Nonreactive   Assay performance characteristics have not been established for newborns,   infants, and children       Tuberculosis Screening  Recent Labs   Lab Test  01/18/17   1601   TBRSLT  Negative   TBAGN  0.00     HIV Screening  Recent Labs   Lab Test  01/18/17   1601   HIAGAB  Nonreactive   HIV-1 p24 Ag & HIV-1/HIV-2 Ab Not Detected       UA  Recent Labs   Lab Test  07/30/13   1621   COLOR  Yellow   APPEARANCE  Clear   URINEGLC  Negative   URINEBILI  Negative   SG  1.019   URINEPH  5.5   PROTEIN  Negative   NITRITE  Negative   UBLD  Negative   LEUKEST  Trace*   WBCU  2-5*   RBCU  O - 2     Urine Microscopic  Recent Labs   Lab Test  07/30/13   1621   WBCU  2-5*   RBCU  O - 2     Urine Protein  Recent Labs   Lab Test  04/26/13   1216  04/26/12   1151  04/26/11   1128   UCRR  96  108  129     PATH  Recent Labs   Lab Test  07/03/17   1330   PATH  Patient Name: URSULA MEDEIROS  MR#: [id number]  Specimen #: [id number]  Collected: 7/3/2017  Received: 7/5/2017  Reported: 7/12/2017 15:43  Ordering Phy(s): MAYCO BRAGA    For improved result formatting, select 'View Enhanced Report Format'  under Linked Documents section.    SPECIMEN(S):  Skin, right upper back    FINAL DIAGNOSIS:  Skin,  "back, right upper:  - Lentiginous junctional melanocytic nevus - (see description)    I have personally reviewed all specimens and or slides, including the  listed special stains, and used them with my medical judgement to  determine the final diagnosis.    Electronically signed out by:    Garrick Woods M.D., [facility]    CLINICAL HISTORY:  The patient is a 76-year-old female.    GROSS:  The specimen is received in formalin with proper patient identification,  labeled \"R. Upper back\" and the specimen consists of a single, irregular  skin shave measuring 0.6 x 0.5 cm.  The skin surface displays diffuse  dark brown discoloration.  The resecti on margin is inked black.  It is  trisected and entirely submitted in cassette 1. (Dictated by: Medina Lechuga [facility] 7/5/2017 01:48 PM)    MICROSCOPIC:  The specimen exhibits a junctional melanocytic proliferation with a  narrow lateral diameter, which is predominantly single celled but with  rare small nests, with melanocytes accentuated at the sides and bases of  rete ridges, and scattered perivascular melanophages.  The lesion  appears to be completely excised.    CPT Codes:  A: 99192-UP3.T, 10447-JV1.P    TESTING LAB LOCATION:  [facility], [address]   [id number] [phone]    COLLECTION SITE:  Client: [facility]  Location: [facility] (B)         Recent Results (from the past 744 hour(s))   XR Cervical Spine G/E 4 Views    Narrative    CERVICAL SPINE FOUR OR MORE VIEWS October 12, 2016 1:22 PM     HISTORY: Neck pain in the setting of rheumatoid arthritis; please  evaluated for instability. Rheumatoid arthritis without rheumatoid  factor, multiple sites. Cervicalgia.    COMPARISON: Lateral views of the cervical spine 7/30/2013.      Impression    IMPRESSION: Ankylosis of the entire cervical spine again noted  consistent with the patient's history of " rheumatoid arthritis. There  are no findings to suggest fracture of the cervical spine. There is no  prevertebral soft tissue swelling. The cervical vertebrae remain  rigidly aligned with no evidence for displacement or articulation in  either the flexed or extended positions. There does appear to be  flexion articulation at the C7-T1 level. There are no findings to  suggest instability. There is likely a solid fusion from the occiput  down to C7.    BASIL STUART MD   XR Sacroiliac Joint 1/2 Views    Narrative    XR SACROILIAC JOINT 1/2 VW 10/12/2016 1:22 PM    COMPARISON: None.    HISTORY: Low back pain      Impression    IMPRESSION: Stool and bowel gas significantly obscure the sacrum.  Sacroiliac joints appear grossly patent. Pelvic enthesopathy is noted.    JESSY BILLINGS   XR Chest 2 Views    Narrative    XR CHEST 2 VW  10/12/2016 1:23 PM    HISTORY:  Rheumatoid arthritis without rheumatoid factor, multiple  sites    COMPARISON:  None.      Impression    IMPRESSION:  Hyperinflation. Lungs are clear. Aortic calcification.  Otherwise negative.     ALYCIA AGUIRRE MD   XR Lumbar Spine 2/3 Views    Narrative    XR LUMBAR SPINE 2-3 VIEWS  10/12/2016 1:23 PM    HISTORY:  Low back pain, Other chronic pain    COMPARISON:  None.      Impression    IMPRESSION:  Mild lumbar scoliosis convex left. Multilevel  degenerative changes. Disc space narrowing at L2-S1, most prominent at  L5-S1 where there is complete disc space loss posteriorly. Posterior  facet degenerative changes L5.    ALYCIA AGUIRRE MD   XR Thoracic Spine 3 Views    Narrative    XR THORACIC SPINE 3 VW  10/12/2016 1:23 PM    HISTORY:  Pain in thoracic spine, Other chronic pain    COMPARISON:  None.      Impression    IMPRESSION:  Osteopenia. Thoracolumbar scoliosis. Mild multilevel  degenerative changes. Suspect mild compression fracture of T12, poorly  visualized and age indeterminate. Aortic calcification.     ALYCIA AGUIRRE MD   XR Foot Bilateral G/E 3 Views     Narrative    XR FOOT BILATERAL G/E 3 VW 10/12/2016 1:24 PM    COMPARISON: 7/30/2013    HISTORY: Rheumatoid arthritis    FINDINGS:  Right foot: Mild hallux valgus with associated mild degenerative  changes at the first metatarsophalangeal joint. Osteopenia is noted  about the right foot. No fractures are seen. No erosive changes are  identified. Achilles enthesopathy is noted.    Left foot: Osteopenia is noted. Mild hallux valgus. No fractures are  seen. No definite erosive changes are identified. Achilles  enthesopathy.      Impression    IMPRESSION: Bilateral foot osteopenia again noted, as well as mild  hallux valgus in both feet. No erosive changes to suggest inflammatory  arthropathy in the feet.    JESSY BILLINGS   XR Hand Bilateral G/E 3 Views    Narrative    HAND BILATERAL THREE OR MORE VIEWS October 12, 2016 1:24 PM    HISTORY: Rheumatoid arthritis.    COMPARISON: 7/30/2015.    FINDINGS:    Right hand: Diffuse osteopenia is again noted. Polyarticular erosive  changes are again demonstrated, and do not appear significantly  changed on radiographs since 7/30/2013. These are most pronounced at  the first interphalangeal joint, second proximal interphalangeal  joint, first, second and third metacarpophalangeal joints.  Additionally, there is near complete destruction of the radiocarpal  joint, this has progressed since 7/30/2013 with the proximal carpal  row being indistinguishable from the distal radius and ulna. No  fractures are seen.    Left hand: Diffuse osteopenia is again noted. Polyarticular erosive  changes are also again seen on this side, unchanged at the first,  second and third metacarpophalangeal joints as well as the first  interphalangeal joint and the second and fifth proximal  interphalangeal joints, but slightly worsened at the third proximal  interphalangeal joint. Destructive changes in the carpus and  radiocarpal joint are again noted, slightly worsened since 7/30/2013.  No fractures are  identified.      Impression    IMPRESSION: Bilateral hand osteopenia and polyarticular erosive  changes consistent with patient's history of rheumatoid arthritis.  Findings are worsened in the carpus and radiocarpal joint on the right  as well as the third proximal interphalangeal joint and  carpus/radiocarpal joint on the left.    JESSY BILLINGS       Immunization History     Immunization History   Administered Date(s) Administered     Pneumococcal (PCV 13) 11/03/2016     Pneumococcal 23 valent 11/01/2007, 11/13/2007     TD (ADULT, 7+) 11/13/2007     Tdap (Adacel,Boostrix) 11/13/2007          Chart documentation done in part with Dragon Voice recognition Software. Although reviewed after completion, some word and grammatical error may remain.    Merrick Del Cid MD

## 2017-07-20 NOTE — NURSING NOTE
"Chief Complaint   Patient presents with     Arthritis     patient states she is still stiff, has more bad days then good days. Has a hard time lifting her arms       Initial /66 (BP Location: Left arm, Patient Position: Chair, Cuff Size: Adult Small)  Pulse 88  Ht 1.626 m (5' 4\")  Wt 51.4 kg (113 lb 6.4 oz)  SpO2 96%  BMI 19.47 kg/m2 Estimated body mass index is 19.47 kg/(m^2) as calculated from the following:    Height as of this encounter: 1.626 m (5' 4\").    Weight as of this encounter: 51.4 kg (113 lb 6.4 oz).  BP completed using cuff size: small regular         RAPID3 (0-30) Cumulative Score  18.2          RAPID3 Weighted Score (divide #4 by 3 and that is the weighted score)  6.07         "

## 2017-07-20 NOTE — PATIENT INSTRUCTIONS
[facility]  [address] [place]  [address]  Ph: [phone]  Monday Friday 8am   5pm

## 2017-07-20 NOTE — MR AVS SNAPSHOT
After Visit Summary   7/20/2017    Alannah Leos    MRN: [id number]           Patient Information     Date Of Birth          1940        Visit Information        Provider Department      7/20/2017 1:40 PM Merrick Del Cid MD [facility]        Today's Diagnoses     Rheumatoid arthritis of multiple sites with negative rheumatoid factor (H)    -  1    High risk medication use          Care Instructions    [facility]  [address] [place] [address]  [address]  Ph: [phone]  Monday - Friday 8am - 5pm          Follow-ups after your visit        Your next 10 appointments already scheduled     Aug 07, 2017 11:00 AM CDT   Office Visit with Leah Blanca MD   [facility] ([facility]    [address] [facility] [address] [phone]           Bring a current list of meds and any records pertaining to this visit.  For Physicals, please bring immunization records and any forms needing to be filled out.  Please arrive 10 minutes early to complete paperwork.            Aug 21, 2017  1:45 PM CDT   [facility]    [address] [place] [address] [phone]            Oct 16, 2017  1:30 PM CDT   LAB with [facility]   [facility]    [address] [facility] [address] [phone]           Patient must bring picture ID.  Patient should be prepared to give a urine specimen  Please do not eat 10-12 hours before your appointment if you are coming in fasting for labs on lipids, cholesterol, or glucose (sugar).  Pregnant women should follow their Care Team instructions. Water with medications is okay. Do not drink coffee or other fluids.   If you have concerns about taking  your medications, please ask at office or if scheduling via [employer], send a message by  "clicking on Secure Messaging, Message Your Care Team.            Oct 19, 2017  1:40 PM CDT   Return Visit with Merrick Del Cid MD   [facility] [place] ([facility])    [address] [facility]  [place] [address] [phone]              Future tests that were ordered for you today     Open Future Orders        Priority Expected Expires Ordered    CRP inflammation Routine 10/14/2017 11/2/2017 7/20/2017    Erythrocyte sedimentation rate auto Routine 10/14/2017 11/2/2017 7/20/2017    Hepatic panel Routine 10/14/2017 11/2/2017 7/20/2017    CBC with platelets differential Routine 10/14/2017 11/2/2017 7/20/2017    Creatinine Routine 10/14/2017 11/2/2017 7/20/2017            Who to contact     If you have questions or need follow up information about today's clinic visit or your schedule please contact [facility] [place] directly at [phone].  Normal or non-critical lab and imaging results will be communicated to you by [employer], letter or phone within 4 business days after the clinic has received the results. If you do not hear from us within 7 days, please contact the clinic through [employer] or phone. If you have a critical or abnormal lab result, we will notify you by phone as soon as possible.  Submit refill requests through [employer] or call your pharmacy and they will forward the refill request to us. Please allow 3 business days for your refill to be completed.          Additional Information About Your Visit        Kinvey Information     [employer]vey lets you send messages to your doctor, view your test results, renew your prescriptions, schedule appointments and more. To sign up, go to [email or web address] . Click on \"Log in\" on the left side of the screen, which will take you to the Welcome page. Then click on \"Sign up Now\" on the right side of the page.     You will be asked to enter the access code listed below, as well as some personal information. Please follow the directions to " "create your username and password.     Your access code is: [email or web address]  Expires: 10/18/2017  2:28 PM     Your access code will  in 90 days. If you need help or a new code, please call your [facility] clinic or [phone].        Care EveryWhere ID     This is your Care EveryWhere ID. This could be used by other organizations to access your [facility] medical records  [id number]        Your Vitals Were     Pulse Height Pulse Oximetry BMI (Body Mass Index)          88 1.626 m (5' 4\") 96% 19.47 kg/m2         Blood Pressure from Last 3 Encounters:   17 142/66   17 113/58   17 115/59    Weight from Last 3 Encounters:   17 51.4 kg (113 lb 6.4 oz)   05/15/17 53.5 kg (118 lb)   17 52.6 kg (116 lb)                 Where to get your medicines      [facility] [address] [place] [address]  [address]  AT [facility]   [facility] [address]     Phone:  [phone]     folic acid 1 MG tablet    methotrexate sodium 2.5 MG Tabs          Primary Care Provider Office Phone # Fax #    Leah Shelby Blanca [phone]       [facility] [address]  [facility] [address]        Equal Access to Services     [facility]: Hadii aad ku hadasho Soomaali, waaxda luqadaha, qaybta kaalmada adeegyada, waxpatti sparrow. So [facility] [phone].    ATENCIÓN: Si habla español, tiene a mahan disposición servicios gratuitos de asistencia lingüística. Llame al [phone].    We comply with applicable federal civil rights laws and [place] laws. We do not discriminate on the basis of race, color, national origin, age, disability sex, sexual orientation or gender identity.            Thank you!     Thank you for choosing [facility]  for your care. Our goal is always to provide you with excellent care. Hearing back from our patients is one way we can continue " to improve our services. Please take a few minutes to complete the written survey that you may receive in the mail after your visit with us. Thank you!             Your Updated Medication List - Protect others around you: Learn how to safely use, store and throw away your medicines at www.disposemymeds.org.          This list is accurate as of: 7/20/17  2:28 PM.  Always use your most recent med list.                   Brand Name Dispense Instructions for use Diagnosis    ACE NOT PRESCRIBED (INTENTIONAL)     0 each    1 each continuous prn. ACE Inhibitor not prescribed due to Refusal by patient    Type 2 diabetes, HbA1c goal < 7% (H)       ammonium lactate 12 % cream    AMLACTIN    385 g    Apply once to twice a day to dry skin. Can burn temporarily if there is a cut on the skin.    Xerosis of skin       aspirin 81 MG tablet      1 TABLET DAILY (*)        betamethasone valerate 0.1 % lotion    VALISONE    60 mL    Apply twice a day to the affected area of the scalp and brow for up to 3 weeks then need a 1 week break.    Dermatitis, seborrheic       CALCIUM + D PO      600mg twice a day        carbidopa-levodopa  MG per tablet    SINEMET    540 tablet    1.5 tablets at 6 AM, 5 PM and 9 PM. Daily. 2 tablets at 12 Noon daily.    Parkinson disease (H)       clobetasol 0.05 % external solution    TEMOVATE    50 mL    Apply to affected area of the scalp daily on days not shampooing.  Do not apply to face.    Dermatitis, seborrheic       fluocinolone acetonide 0.01 % Sham     120 mL    Lather onto scalp, let sit for 5 min, then rinse off.    Dermatitis, seborrheic       folic acid 1 MG tablet    FOLVITE    100 tablet    Take 1 tablet (1 mg) by mouth daily    Rheumatoid arthritis of multiple sites with negative rheumatoid factor (H), High risk medication use       ketoconazole 2 % shampoo    NIZORAL    120 mL    Lather onto scalp and let sit for 3-5min then rinse. Do 2-3 days a week. Can use any other product  afterwards.    Dermatitis, seborrheic       methotrexate sodium 2.5 MG Tabs     96 tablet    Take 20 mg by mouth once a week . Take all 8 tablets on the same day of each week.    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

## 2017-07-20 NOTE — NURSING NOTE
[facility] [place] [facility]  [address] [place]  [address]  Ph: [phone]  Monday - Friday 8am - 5pm

## 2017-07-27 ENCOUNTER — NURSE TRIAGE (OUTPATIENT)
Dept: NURSING | Facility: CLINIC | Age: 77
End: 2017-07-27

## 2017-07-27 ENCOUNTER — TELEPHONE (OUTPATIENT)
Dept: NURSING | Facility: CLINIC | Age: 77
End: 2017-07-27

## 2017-07-27 NOTE — TELEPHONE ENCOUNTER
Please call patient. She said QUIQUE Weber, told her to call if she has questions. She won't elaborate on content of discussion. Please call.  Britt Mina RN-[facility]

## 2017-07-27 NOTE — TELEPHONE ENCOUNTER
Spoke with patient, she is scared to start the [facility]. She is afraid she will get sick and finally she is feeling somewhat better. I told the patient to not think about it for awhile and I will call her back in a couple of weeks and we will discuss it again. Patient was afraid Dr. Del Cid would not see her anymore but I assured her he will be ok with her decision. Patient thanked me for calling her back and making her feel better about her decision.    Dr. Del Cid has been informed that she will not be starting the infusion yet.    Joan Bucio CMA  7/27/2017 1:47 PM

## 2017-08-07 ENCOUNTER — OFFICE VISIT (OUTPATIENT)
Dept: FAMILY MEDICINE | Facility: CLINIC | Age: 77
End: 2017-08-07
Payer: MEDICARE

## 2017-08-07 VITALS
BODY MASS INDEX: 18.95 KG/M2 | DIASTOLIC BLOOD PRESSURE: 65 MMHG | HEART RATE: 88 BPM | HEIGHT: 64 IN | OXYGEN SATURATION: 98 % | SYSTOLIC BLOOD PRESSURE: 134 MMHG | WEIGHT: 111 LBS | TEMPERATURE: 98.3 F

## 2017-08-07 DIAGNOSIS — M06.09 RHEUMATOID ARTHRITIS OF MULTIPLE SITES WITH NEGATIVE RHEUMATOID FACTOR (H): ICD-10-CM

## 2017-08-07 DIAGNOSIS — R63.6 UNDERWEIGHT: ICD-10-CM

## 2017-08-07 DIAGNOSIS — G20.A1 PARKINSON DISEASE (H): ICD-10-CM

## 2017-08-07 DIAGNOSIS — M17.12 PRIMARY OSTEOARTHRITIS OF LEFT KNEE: ICD-10-CM

## 2017-08-07 DIAGNOSIS — L21.9 SEBORRHEIC DERMATITIS: ICD-10-CM

## 2017-08-07 DIAGNOSIS — K42.9 UMBILICAL HERNIA WITHOUT OBSTRUCTION AND WITHOUT GANGRENE: Primary | ICD-10-CM

## 2017-08-07 PROCEDURE — 99214 OFFICE O/P EST MOD 30 MIN: CPT | Performed by: FAMILY MEDICINE

## 2017-08-07 ASSESSMENT — PAIN SCALES - GENERAL: PAINLEVEL: SEVERE PAIN (7)

## 2017-08-07 NOTE — NURSING NOTE
"Chief Complaint   Patient presents with     Derm Problem     Head skin follow up, discoloration belly button       Initial /65 (BP Location: Right arm, Patient Position: Chair, Cuff Size: Adult Regular)  Pulse 88  Temp 98.3  F (36.8  C) (Oral)  Ht 5' 4\" (1.626 m)  Wt 111 lb (50.3 kg)  SpO2 98%  Breastfeeding? No  BMI 19.05 kg/m2 Estimated body mass index is 19.05 kg/(m^2) as calculated from the following:    Height as of this encounter: 5' 4\" (1.626 m).    Weight as of this encounter: 111 lb (50.3 kg).  Medication Reconciliation: complete     Rajat Gray CMA    "

## 2017-08-07 NOTE — PROGRESS NOTES
SUBJECTIVE:                                                    Alannah Leos is a 76 year old female who presents to clinic today for the following health issues:    Concern - Derm Problem  Onset: Persistent since last visit    Description:   Patient's discoloration on belly button have gotten more gray and belly button button protruding more often than going inward the last few weeks. Patient states she was told to monitor in case of possible hernia.    Intensity: moderate    Progression of Symptoms:  worsening    Accompanying Signs & Symptoms:  None    Previous history of similar problem:   Yes    Precipitating factors:   Worsened by: None    Alleviating factors:  Improved by: None    Therapies Tried and outcome: None      Concern - Head Skin Problem  Onset: Persistent since last visit    Description:   Patient seen dermatologist for skin screening and will be seeing the dermatologist again on 8/21/17. Bx normal. Still itching at the end of the day and has red bumps on scalp. Over all doing better. Lost her eye brows.     Intensity: moderate    Progression of Symptoms:  better    Accompanying Signs & Symptoms:  Itchiness    Previous history of similar problem:   Yes    Precipitating factors:   Worsened by: None    Alleviating factors:  Improved by: None    Therapies Tried and outcome: Shampoo, cream    Chronic rheumatoid arthritis and Parkinson's disease and on medications for these. Trying to decide if she will do a newer infusion or not.       Problem list and histories reviewed & adjusted, as indicated.  Additional history: as documented    Patient Active Problem List   Diagnosis     Cataract, OU     Osteoporosis     Rheumatoid arthritis (H)     Parkinson disease (H)     Osteoarthritis of wrist     Osteoarthritis of shoulder     History of total knee arthroplasty     Osteoarthritis of left knee     Advanced directives, counseling/discussion     CARDIOVASCULAR SCREENING; LDL GOAL LESS THAN 160     High risk  medication use     Underweight     Past Surgical History:   Procedure Laterality Date     GALLBLADDER SURGERY       HYSTERECTOMY       KNEE SURGERY      left      LAMINECTOMY LUMBAR ONE LEVEL       TONSILLECTOMY         Social History   Substance Use Topics     Smoking status: Never Smoker     Smokeless tobacco: Never Used     Alcohol use No     Family History   Problem Relation Age of Onset     CANCER Sister      pancreatic         Current Outpatient Prescriptions   Medication Sig Dispense Refill     folic acid (FOLVITE) 1 MG tablet Take 1 tablet (1 mg) by mouth daily 100 tablet 3     methotrexate sodium 2.5 MG TABS Take 20 mg by mouth once a week . Take all 8 tablets on the same day of each week. 96 tablet 0     fluocinolone acetonide 0.01 % SHAM Lather onto scalp, let sit for 5 min, then rinse off. 120 mL 3     clobetasol (TEMOVATE) 0.05 % external solution Apply to affected area of the scalp daily on days not shampooing.  Do not apply to face. 50 mL 3     ammonium lactate (AMLACTIN) 12 % cream Apply once to twice a day to dry skin. Can burn temporarily if there is a cut on the skin. 385 g 11     carbidopa-levodopa (SINEMET)  MG per tablet 1.5 tablets at 6 AM, 5 PM and 9 PM. Daily. 2 tablets at 12 Noon daily. 540 tablet 3     ACE NOT PRESCRIBED, INTENTIONAL, 1 each continuous prn. ACE Inhibitor not prescribed due to Refusal by patient       0 each 0     CALCIUM + D OR 600mg twice a day        ASPIRIN 81 MG PO TABS 1 TABLET DAILY (*)       MULTI-VITAMIN OR 1 a day        Allergies   Allergen Reactions     Decadrol [Dexamethasone Sodium Phosphate]      Shrimp      Recent Labs   Lab Test  07/17/17   1341  06/16/17   1326  05/15/17   1346  05/08/17   1446   05/27/16   1413  05/26/15   1416   10/28/13   1204   04/26/13   1128   04/26/12   1151   A1C   --    --    --    --    --   5.9  6.0   --   5.8   --   6.3*   < >  6.3*   LDL   --    --    --    --    --    --   89   --    --    --   73   --   77   HDL   --    " --    --    --    --    --   80   --    --    --   72   --   71   TRIG   --    --    --    --    --    --   50   --    --    --   44   --   56   ALT  11  10  10   --    < >  8   --    < >   --    < >  13   < >  14   CR  0.42*  0.50*  0.52   --    < >  0.41*  0.47*   < >   --    < >  0.48*   < >  0.54   GFRESTIMATED  >90  Non  GFR Calc    >90  Non  GFR Calc    >90  Non  GFR Calc     --    < >  >90  Non  GFR Calc    >90  Non  GFR Calc     < >   --    < >  >90   < >  >90   GFRESTBLACK  >90   GFR Calc    >90   GFR Calc    >90   GFR Calc     --    < >  >90   GFR Calc    >90   GFR Calc     < >   --    < >  >90   < >  >90   POTASSIUM   --    --    --    --    --   3.7  4.0   < >   --    < >  4.0   < >  4.2   TSH   --    --    --   2.12   --   2.36  2.43   < >   --    --   2.41   --    --     < > = values in this interval not displayed.      BP Readings from Last 3 Encounters:   08/07/17 134/65   07/20/17 142/66   05/08/17 113/58    Wt Readings from Last 3 Encounters:   08/07/17 111 lb (50.3 kg)   07/20/17 113 lb 6.4 oz (51.4 kg)   05/15/17 118 lb (53.5 kg)                  Labs reviewed in EPIC          Reviewed and updated as needed this visit by clinical staffTobao  Allergies  Meds       Reviewed and updated as needed this visit by Provider         ROS:  Constitutional, HEENT, cardiovascular, pulmonary, gi and gu systems are negative, except as otherwise noted.      OBJECTIVE:   /65 (BP Location: Right arm, Patient Position: Chair, Cuff Size: Adult Regular)  Pulse 88  Temp 98.3  F (36.8  C) (Oral)  Ht 5' 4\" (1.626 m)  Wt 111 lb (50.3 kg)  SpO2 98%  Breastfeeding? No  BMI 19.05 kg/m2  Body mass index is 19.05 kg/(m^2).  GENERAL: elderly, alert, thin, well hydrated, no distress  HENT: ear canals- normal; TMs- normal; Nose- normal; Mouth- no " "ulcers, no lesions, missing dentition  NECK: no tenderness, no adenopathy, no asymmetry, no masses, no stiffness; thyroid- normal to palpation  RESP: lungs clear to auscultation - no rales, no rhonchi, no wheezes  CV: regular rates and rhythm, normal S1 S2, no S3 or S4 and no murmur, no click or rub, normal pulses  ABDOMEN: soft, no tenderness, no  hepatosplenomegaly, no masses, normal bowel sounds  MS: extremities- no gross deformities noted, no edema  SKIN: no suspicious lesions, no rashes, age related skin changes with seborrheic keratosis and no actinic keratosis.    NEURO: strength and tone- decreased, sensory exam- grossly normal, reflexes- symmetric  BACK: no CVA tenderness, no paralumbar tenderness  MENTAL STATUS EXAM:  Appearance/Behavior: no apparent distress, neatly groomed, dressed appropriately for weather, appears stated age and is frail-appearing  Speech: normal  Mood/Affect: normal affect  Insight: Good     Diagnostic Test Results:  No results found for this or any previous visit (from the past 24 hour(s)).    ASSESSMENT/PLAN:         Tobacco Cessation:   reports that she has never smoked. She has never used smokeless tobacco.      BMI:   Estimated body mass index is 19.05 kg/(m^2) as calculated from the following:    Height as of this encounter: 5' 4\" (1.626 m).    Weight as of this encounter: 111 lb (50.3 kg).   Weight management plan return visit in 3 months            ICD-10-CM    1. Umbilical hernia without obstruction and without gangrene K42.9 GENERAL SURG ADULT REFERRAL   2. Parkinson disease (H) G20 Some feelings of restlessness lasting 1/2 to 2 hours a day, otherwise doing better.   3. Rheumatoid arthritis of multiple sites with negative rheumatoid factor (H) M06.09 Arthritis symptoms still on methotrexate but not sure that she wants to try a new infusion. Had a bad reaction to the last medication. Wants to try to regain back some weight first.    4. Primary osteoarthritis of left knee " M17.12 Intermittent swelling and pain in left leg. Doing better with some new exercises.   5. Underweight R63.6 Discussed diet and increasing calories/protein   6. Seborrheic dermatitis L21.9 Still having some itching but it is better on new medication and has fewer red bumps.        CONSULTATION/REFERRAL to neurology and rheumatology as scheduled.   FUTURE LABS:       - Schedule fasting labs in 6 months  FUTURE APPOINTMENTS:       - Follow-up visit in 6 months or sooner if any questions or concerns.   Work on weight loss  Regular exercise  See Patient Instructions    Leah Blanca MD  [facility]

## 2017-08-07 NOTE — MR AVS SNAPSHOT
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   Urgent care M-F 11am-9pm  Sat/Sun 9am-5pm.   Pharmacy   Mon-Th:  8:00am-8pm   Fri:  8:00am-6:00pm  Sat/Sun  8:00am-5:00 pm       Hernia (Adult)    A hernia can happen when there is a weakness or defect in the wall of the abdomen or groin. Intestines or nearby tissues may move from their usual location and push through the weakness in the wall. This can cause a hernia (bulge) you may see or feel.  Causes and Risk Factors   A hernia may be present at birth. Or it may be caused by the wear and tear of daily living. Certain factors can make a hernia more likely. These can include:    Heavy lifting    Straining, whether from lifting, movement, or constipation    Chronic cough    Injury to the abdominal wall    Excess weight    Pregnancy    Prior surgery    Older age    Family history of hernia  Symptoms  Symptoms of a hernia may come on suddenly. Or they may appear slowly over time. Some common symptoms include:    Bulge in the groin area, around the navel, or in the scrotum (the bulge may get bigger when you stand and go away when you lie down)    Pain or pressure around the bulge    Pain during activities such as lifting, coughing, or sneezing    A  feeling of weakness or pressure in the groin    Pain or swelling in the scrotum  Types of hernias  There are different types of hernia. The type you have depends on its location:    Inguinal: This type is in the groin or scrotum. It is more common in men.    Femoral: This type is in the groin, upper thigh (where the leg bends), or labia. It is more common in women.    Ventral: This type is in the abdominal wall.    Umbilical: This type occurs around the navel (belly button).    Incisional: This type occurs at the site of a previous surgery.  The condition of the hernia can help determine how urgently it needs to be treated.    Reducible: It goes back in by itself, or it can be pushed back in.    Irreducible: It can t be pushed back in.    Incarcerated/Strangulated: The intestine is trapped (incarcerated). If this happens, you won t be able to push the bulge back in. If the incarcerated hernia isn t treated, it may become strangulated. This means the area loses blood supply and the tissue may die. This requires emergency surgery! Treatment is needed right away!  In most cases, a hernia will not heal on its own. Surgery is usually needed to repair the defect in the abdominal wall or groin. You ll be told more about surgery, if needed.  If your symptoms are not severe, treatment may sometimes be delayed. In such cases, regular follow-up visits with the provider will be needed. You ll be asked to keep track of your symptoms and to watch for signs of more serious problems. You may also be given guidelines similar to the home care instructions below.  Home Care  To help keep a hernia from getting worse, you may be advised to:    Avoid heavy lifting and straining as directed.    Take steps to prevent constipation, such as eating more fiber and drinking more water. This may help reduce straining that can occur when having a bowel movement. Reducing straining may help keep your symptoms from getting worse.    Maintain a  healthy weight or lose excess weight. This can help reduce strain on abdominal muscles and tissues.    Stop smoking. This can help prevent coughing that may also strain abdominal muscles and tissues.  Follow-up care  Follow up with your healthcare provider, or as directed. If imaging tests were done, they will be reviewed a doctor. You will be told the results and any new findings that may affect your care.  When to seek medical advice  Call your healthcare provider right away if any of these occur:    Hernia hardens, swells, or grows larger    Hernia can no longer be pushed back in    Pain moves to the lower right abdomen (just below the waistline), or spreads to the back  Call 911  Call 911 right away if any of these occur:    Nausea and vomiting    Severe pain, redness, or tenderness in the area near the hernia    Pain worsens quickly and doesn t get better    Inability to have a bowel movement or pass gas    Fever of 100.4 F (38 C) or higher    Trouble breathing    Fainting    Rapid heart rate    Vomiting blood    Large amounts of blood in stool  Date Last Reviewed: 6/9/2015 2000-2017 The [employer] [address] [place] [address]. All rights reserved. This information is not intended as a substitute for professional medical care. Always follow your healthcare professional's instructions.                Follow-ups after your visit        Additional Services     GENERAL SURG ADULT REFERRAL       Your provider has referred you to: [facility]: [facility] [phone]   [email or web address]    Please be aware that coverage of these services is subject to the terms and limitations of your health insurance plan.  Call member services at your health plan with any benefit or coverage questions.      Please bring the following with you to your appointment:    (1) Any X-Rays, CTs or MRIs which have been performed.  Contact the facility where  they were done to arrange for  prior to your scheduled appointment.   (2) List of current medications   (3) This referral request   (4) Any documents/labs given to you for this referral                  Follow-up notes from your care team     Return in about 6 months (around 2/7/2018) for medication follow up, Routine Visit.      Your next 10 appointments already scheduled     Aug 21, 2017  1:45 PM CDT   [facility]or [facility]   [facility]    [address] [place] [id number] [phone]            Oct 16, 2017  1:30 PM CDT   LAB with  LAB   [facility]    [address] [place] [id number] [phone]           Patient must bring picture ID. Patient should be prepared to give a urine specimen  Please do not eat 10-12 hours before your appointment if you are coming in fasting for labs on lipids, cholesterol, or glucose (sugar). Pregnant women should follow their Care Team instructions. Water with medications is okay. Do not drink coffee or other fluids. If you have concerns about taking  your medications, please ask at office or if scheduling via [employer], send a message by clicking on Secure Messaging, Message Your Care Team.            Oct 19, 2017  1:40 PM CDT   Return Visit with Merrick Del Cid MD   [facility] ([address] [id number] [phone]              Who to contact     If you have questions or need follow up information about today's clinic visit or your schedule please contact [facility] directly at [phone].  Normal or non-critical lab and imaging results will be communicated to you by MyChart, letter or phone within 4 business days after the clinic has received the results. If you do not hear from us within 7 days, please contact the clinic through MyChart or phone. If you  "have a critical or abnormal lab result, we will notify you by phone as soon as possible.  Submit refill requests through [employer] or call your pharmacy and they will forward the refill request to us. Please allow 3 business days for your refill to be completed.          Additional Information About Your Visit        [employer] Information     [employer] lets you send messages to your doctor, view your test results, renew your prescriptions, schedule appointments and more. To sign up, go to [email or web address] . Click on \"Log in\" on the left side of the screen, which will take you to the Welcome page. Then click on \"Sign up Now\" on the right side of the page.     You will be asked to enter the access code listed below, as well as some personal information. Please follow the directions to create your username and password.     Your access code is: [email or web address]  Expires: 10/18/2017  2:28 PM     Your access code will  in 90 days. If you need help or a new code, please call your [facility] clinic or [phone].        Care EveryWhere ID     This is your Care EveryWhere ID. This could be used by other organizations to access your [facility] medical records  [id number]        Your Vitals Were     Pulse Temperature Height Pulse Oximetry Breastfeeding? BMI (Body Mass Index)    88 98.3  F (36.8  C) (Oral) 5' 4\" (1.626 m) 98% No 19.05 kg/m2       Blood Pressure from Last 3 Encounters:   17 134/65   17 142/66   17 113/58    Weight from Last 3 Encounters:   17 111 lb (50.3 kg)   17 113 lb 6.4 oz (51.4 kg)   05/15/17 118 lb (53.5 kg)              We Performed the Following     GENERAL SURG ADULT REFERRAL          Today's Medication Changes          These changes are accurate as of: 17 11:55 AM.  If you have any questions, ask your nurse or doctor.               Stop taking these medicines if you haven't already. Please contact your care team if you have questions.     ketoconazole 2 % shampoo "   Commonly known as:  NIZORAL   Stopped by:  Leah Blanca MD                    Primary Care Provider Office Phone # Fax #    Leah Blanca [phone]       [facility] [address]  [facility] [address]        Equal Access to Services     KENNETH HOLCOMB : Hadii aad ku hadasho Soomaali, waaxda luqadaha, qaybta kaalmada adeegyada, waxay idiin hayaan adeeg kharash la'aan . So [facility] [phone].    ATENCIÓN: Si habla español, tiene a mahan disposición servicios gratuitos de asistencia lingüística. Llame al [phone].    We comply with applicable federal civil rights laws and [place] laws. We do not discriminate on the basis of race, color, national origin, age, disability sex, sexual orientation or gender identity.            Thank you!     Thank you for choosing [facility]  for your care. Our goal is always to provide you with excellent care. Hearing back from our patients is one way we can continue to improve our services. Please take a few minutes to complete the written survey that you may receive in the mail after your visit with us. Thank you!             Your Updated Medication List - Protect others around you: Learn how to safely use, store and throw away your medicines at www.disposemymeds.org.          This list is accurate as of: 8/7/17 11:55 AM.  Always use your most recent med list.                   Brand Name Dispense Instructions for use Diagnosis    ACE NOT PRESCRIBED (INTENTIONAL)     0 each    1 each continuous prn. ACE Inhibitor not prescribed due to Refusal by patient    Type 2 diabetes, HbA1c goal < 7% (H)       ammonium lactate 12 % cream    AMLACTIN    385 g    Apply once to twice a day to dry skin. Can burn temporarily if there is a cut on the skin.    Xerosis of skin       aspirin 81 MG tablet      1 TABLET DAILY (*)        CALCIUM + D PO      600mg twice a day        carbidopa-levodopa  MG per tablet    SINEMET     540 tablet    1.5 tablets at 6 AM, 5 PM and 9 PM. Daily. 2 tablets at 12 Noon daily.    Parkinson disease (H)       clobetasol 0.05 % external solution    TEMOVATE    50 mL    Apply to affected area of the scalp daily on days not shampooing.  Do not apply to face.    Dermatitis, seborrheic       fluocinolone acetonide 0.01 % Sham     120 mL    Lather onto scalp, let sit for 5 min, then rinse off.    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 Take all 8 tablets on the same day of each week.    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

## 2017-08-07 NOTE — PATIENT INSTRUCTIONS
How to contact your care team: [phone] Pharmacy [phone]   MD CAITLYN ETIENNE PA-C CHRIS JONES, PA-C NAM HO, MD JONATHAN BATES, MD ARVIN VOCAL, MD    Clinic hours M-Th 7am-7pm Fri 7am-5pm.   Urgent care M-F 11am-9pm  Sat/Sun 9am-5pm.   Pharmacy   Mon-Th:  8:00am-8pm   Fri:  8:00am-6:00pm  Sat/Sun  8:00am-5:00 pm       Hernia (Adult)    A hernia can happen when there is a weakness or defect in the wall of the abdomen or groin. Intestines or nearby tissues may move from their usual location and push through the weakness in the wall. This can cause a hernia (bulge) you may see or feel.  Causes and Risk Factors   A hernia may be present at birth. Or it may be caused by the wear and tear of daily living. Certain factors can make a hernia more likely. These can include:    Heavy lifting    Straining, whether from lifting, movement, or constipation    Chronic cough    Injury to the abdominal wall    Excess weight    Pregnancy    Prior surgery    Older age    Family history of hernia  Symptoms  Symptoms of a hernia may come on suddenly. Or they may appear slowly over time. Some common symptoms include:    Bulge in the groin area, around the navel, or in the scrotum (the bulge may get bigger when you stand and go away when you lie down)    Pain or pressure around the bulge    Pain during activities such as lifting, coughing, or sneezing    A feeling of weakness or pressure in the groin    Pain or swelling in the scrotum  Types of hernias  There are different types of hernia. The type you have depends on its location:    Inguinal: This type is in the groin or scrotum. It is more common in men.    Femoral: This type is in the groin, upper thigh (where the leg bends), or labia. It is more common in women.    Ventral: This type is in the abdominal wall.    Umbilical: This type occurs around the navel (belly button).    Incisional: This type occurs at the site of a previous surgery.  The  condition of the hernia can help determine how urgently it needs to be treated.    Reducible: It goes back in by itself, or it can be pushed back in.    Irreducible: It can t be pushed back in.    Incarcerated/Strangulated: The intestine is trapped (incarcerated). If this happens, you won t be able to push the bulge back in. If the incarcerated hernia isn t treated, it may become strangulated. This means the area loses blood supply and the tissue may die. This requires emergency surgery! Treatment is needed right away!  In most cases, a hernia will not heal on its own. Surgery is usually needed to repair the defect in the abdominal wall or groin. You ll be told more about surgery, if needed.  If your symptoms are not severe, treatment may sometimes be delayed. In such cases, regular follow-up visits with the provider will be needed. You ll be asked to keep track of your symptoms and to watch for signs of more serious problems. You may also be given guidelines similar to the home care instructions below.  Home Care  To help keep a hernia from getting worse, you may be advised to:    Avoid heavy lifting and straining as directed.    Take steps to prevent constipation, such as eating more fiber and drinking more water. This may help reduce straining that can occur when having a bowel movement. Reducing straining may help keep your symptoms from getting worse.    Maintain a healthy weight or lose excess weight. This can help reduce strain on abdominal muscles and tissues.    Stop smoking. This can help prevent coughing that may also strain abdominal muscles and tissues.  Follow-up care  Follow up with your healthcare provider, or as directed. If imaging tests were done, they will be reviewed a doctor. You will be told the results and any new findings that may affect your care.  When to seek medical advice  Call your healthcare provider right away if any of these occur:    Hernia hardens, swells, or grows  larger    Hernia can no longer be pushed back in    Pain moves to the lower right abdomen (just below the waistline), or spreads to the back  Call 911  Call 911 right away if any of these occur:    Nausea and vomiting    Severe pain, redness, or tenderness in the area near the hernia    Pain worsens quickly and doesn t get better    Inability to have a bowel movement or pass gas    Fever of 100.4 F (38 C) or higher    Trouble breathing    Fainting    Rapid heart rate    Vomiting blood    Large amounts of blood in stool  Date Last Reviewed: 6/9/2015 2000-2017 [employer]. [address] [place] [address]. All rights reserved. This information is not intended as a substitute for professional medical care. Always follow your healthcare professional's instructions.

## 2017-08-21 ENCOUNTER — OFFICE VISIT (OUTPATIENT)
Dept: DERMATOLOGY | Facility: CLINIC | Age: 77
End: 2017-08-21
Payer: MEDICARE

## 2017-08-21 DIAGNOSIS — L29.9 SCALP ITCH: ICD-10-CM

## 2017-08-21 DIAGNOSIS — Z86.69 H/O PARKINSON'S DISEASE: ICD-10-CM

## 2017-08-21 DIAGNOSIS — L21.9 DERMATITIS, SEBORRHEIC: Primary | ICD-10-CM

## 2017-08-21 PROCEDURE — 99212 OFFICE O/P EST SF 10 MIN: CPT | Performed by: DERMATOLOGY

## 2017-08-21 RX ORDER — BETAMETHASONE DIPROPIONATE 0.5 MG/ML
LOTION, AUGMENTED TOPICAL
Qty: 60 ML | Refills: 3 | Status: SHIPPED | OUTPATIENT
Start: 2017-08-21 | End: 2017-12-28

## 2017-08-21 RX ORDER — GABAPENTIN 300 MG/1
CAPSULE ORAL
Qty: 30 CAPSULE | Refills: 2 | Status: SHIPPED | OUTPATIENT
Start: 2017-08-21 | End: 2017-10-18

## 2017-08-21 NOTE — LETTER
8/21/2017       RE: Alannah Leos  [address] [address] [facility] [facility] [address]     Dear Colleague,    Thank you for referring your patient, Alannah Leos, to the [facility]. Please see a copy of my visit note below.    [facility] Dermatology Note      Dermatology Problem List:  1.H/O Parkinson's Disease  2. AK, cryo  3.Seborrheic dermatitis: scalp.  -Current tx: fluocinolone acetonide 0.01% shampoo, augmented betamethasone 0.05% lotion, gabapentin possible if ok by neurologist (Dr. Peterson)  -s/p clobetasol 0.05% sol'n    Last TBSE: 7/3/2017    Encounter Date: Aug 21, 2017    CC:  Chief Complaint   Patient presents with     RECHECK     scalp and eyebrows- little better no more pustules but still have the red bumps        History of Present Illness:  Ms. Alannah Leos is a 76 year old female with a hx of Parkinson's disease who presents as a follow-up for AKs of the bilateral brows and seborrheic dermatitis. Pt was last seen 7/3/2017 when the AKs were treated with cyro and started fluocinolone acetonide 0.01% shampoo and clobetasol 0.05% solution. Pt is using both medications, ketoconazole x2 a week and clobetasol 3-4x a week. Her scalp is extremely pruritic with the areas most affected including behind the ears, parietal scalp, and occipital scalp. Pt admits to scratching at her scalp. She is experiencing fewer associated lesional breakouts than before.     Pt is with her daughter today.    Past Medical History:   Patient Active Problem List   Diagnosis     Cataract, OU     Osteoporosis     Rheumatoid arthritis (H)     Parkinson disease (H)     Osteoarthritis of wrist     Osteoarthritis of shoulder     History of total knee arthroplasty     Osteoarthritis of left knee     Advanced directives, counseling/discussion     CARDIOVASCULAR SCREENING; LDL GOAL LESS THAN 160     High risk medication use     Underweight     Past Medical  History:   Diagnosis Date     Hyperlipidemia      Murmur, heart     hsm     Nonsenile cataract      Osteoarthrosis, unspecified whether generalized or localized, lower leg      Osteopenia      Rheumatoid arthritis(714.0)      Past Surgical History:   Procedure Laterality Date     GALLBLADDER SURGERY       HYSTERECTOMY       KNEE SURGERY      left      LAMINECTOMY LUMBAR ONE LEVEL       TONSILLECTOMY         Social History:  Reviewed and unchanged but kept in chart for clinician convenience  The patient is retired. The patient denies use of tanning beds. Pt denies drinking or smoking    Family History:  Reviewed and unchanged but kept in chart for clinician convenience  There is no family history of skin cancer. There is a family hx of cardiovascular disease.     Medications:  Current Outpatient Prescriptions   Medication Sig Dispense Refill     folic acid (FOLVITE) 1 MG tablet Take 1 tablet (1 mg) by mouth daily 100 tablet 3     methotrexate sodium 2.5 MG TABS Take 20 mg by mouth once a week . Take all 8 tablets on the same day of each week. 96 tablet 0     fluocinolone acetonide 0.01 % SHAM Lather onto scalp, let sit for 5 min, then rinse off. 120 mL 3     clobetasol (TEMOVATE) 0.05 % external solution Apply to affected area of the scalp daily on days not shampooing.  Do not apply to face. 50 mL 3     ammonium lactate (AMLACTIN) 12 % cream Apply once to twice a day to dry skin. Can burn temporarily if there is a cut on the skin. 385 g 11     carbidopa-levodopa (SINEMET)  MG per tablet 1.5 tablets at 6 AM, 5 PM and 9 PM. Daily. 2 tablets at 12 Noon daily. 540 tablet 3     ACE NOT PRESCRIBED, INTENTIONAL, 1 each continuous prn. ACE Inhibitor not prescribed due to Refusal by patient       0 each 0     CALCIUM + D OR 600mg twice a day        ASPIRIN 81 MG PO TABS 1 TABLET DAILY (*)       MULTI-VITAMIN OR 1 a day          Allergies   Allergen Reactions     Decadrol [Dexamethasone Sodium Phosphate]      Shrimp         Review of Systems:  -Skin Establ Pt: The patient denies any new rash, pruritus, or lesions that are symptomatic, changing or bleeding, except as per HPI.  -Constitutional: The patient denies fatigue, fevers, chills, unintended weight loss, and night sweats.    Physical exam:  Vitals: There were no vitals taken for this visit.  GEN: This is a well developed, well-nourished female in no acute distress, in a pleasant mood.    NEURO: Alert and oriented  PSYCH: In pleasant mood, appropriate affect  SKIN: Focused examination of the scalp, face, and ears was performed.  -scattered pink scaling papules in the scalp with excoriations  -no rash on the ears or brow  -No other lesions of concern on areas examined.     Impression/Plan:    Pt w/ h/o Parkinsons have increased risk of melanoma and worse seborrheic dermatitis at baseline.    1. Seborrheic dermatitis: mild but often bad in pt with h/o parkinsons. Itching is a big issue    Continue fluocinolone acetonide 0.01% shampoo    Stop clobetasol 0.05% sol'n    Start augmented betamethasone 0.05% lotion as this is less drying.    Consider use of Gabapentin for scalp itching. However will need Dr. Peterson to ok medication given h/o Parkinson disease.    CC Leah Blanca MD on close of this encounter.  Follow-up in 3 months with Dr. Ventura.       Staff Involved:  Scribe/Staff    Scribe Disclosure:   I, Shen Huynh, am serving as a scribe to document services personally performed by Dr. Josh Sotomayor, based on data collection and the provider's statements to me.     Provider Disclosure:   I have reviewed the documentation recorded by the scribe and have edited it as needed. I have personally performed the services documented here and the documentation accurately represents those services and the decisions made by me.     Josh Sotomayor MD, MS    Department of Dermatology  [facility]: Phone:  [phone], Fax:[phone]  [facility]: Phone: [phone], Fax: [phone]          Again, thank you for allowing me to participate in the care of your patient.      Sincerely,    Josh Sotomayor MD

## 2017-08-21 NOTE — LETTER
8/21/2017       RE: Alannah Leos  [address] [facility] [address]     Dear Colleague,    Thank you for referring your patient, Alannah Leos, to the [facility] at [facility]. Please see a copy of my visit note below.    [facility] Dermatology Note      Dermatology Problem List:  1.H/O Parkinson's Disease  2. AK, cryo  3.Seborrheic dermatitis: scalp.  -Current tx: fluocinolone acetonide 0.01% shampoo, augmented betamethasone 0.05% lotion, gabapentin 300mg qhs (Ok'd by Dr. Peterson - neurologist)  -s/p clobetasol 0.05% sol'n    Last TBSE: 7/3/2017    Encounter Date: Aug 21, 2017    CC:  Chief Complaint   Patient presents with     RECHECK     scalp and eyebrows- little better no more pustules but still have the red bumps        History of Present Illness:  Ms. Alannah Leos is a 76 year old female with a hx of Parkinson's disease who presents as a follow-up for AKs of the bilateral brows and seborrheic dermatitis. Pt was last seen 7/3/2017 when the AKs were treated with cyro and started fluocinolone acetonide 0.01% shampoo and clobetasol 0.05% solution. Pt is using both medications, ketoconazole x2 a week and clobetasol 3-4x a week. Her scalp is extremely pruritic with the areas most affected including behind the ears, parietal scalp, and occipital scalp. Pt admits to scratching at her scalp. She is experiencing fewer associated lesional breakouts than before.     Pt is with her daughter today.    Past Medical History:   Patient Active Problem List   Diagnosis     Cataract, OU     Osteoporosis     Rheumatoid arthritis (H)     Parkinson disease (H)     Osteoarthritis of wrist     Osteoarthritis of shoulder     History of total knee arthroplasty     Osteoarthritis of left knee     Advanced directives, counseling/discussion     CARDIOVASCULAR SCREENING; LDL GOAL LESS THAN 160     High risk medication use     Underweight     Past Medical  History:   Diagnosis Date     Hyperlipidemia      Murmur, heart     hsm     Nonsenile cataract      Osteoarthrosis, unspecified whether generalized or localized, lower leg      Osteopenia      Rheumatoid arthritis(714.0)      Past Surgical History:   Procedure Laterality Date     GALLBLADDER SURGERY       HYSTERECTOMY       KNEE SURGERY      left      LAMINECTOMY LUMBAR ONE LEVEL       TONSILLECTOMY         Social History:  Reviewed and unchanged but kept in chart for clinician convenience  The patient is retired. The patient denies use of tanning beds. Pt denies drinking or smoking    Family History:  Reviewed and unchanged but kept in chart for clinician convenience  There is no family history of skin cancer. There is a family hx of cardiovascular disease.     Medications:  Current Outpatient Prescriptions   Medication Sig Dispense Refill     folic acid (FOLVITE) 1 MG tablet Take 1 tablet (1 mg) by mouth daily 100 tablet 3     methotrexate sodium 2.5 MG TABS Take 20 mg by mouth once a week . Take all 8 tablets on the same day of each week. 96 tablet 0     fluocinolone acetonide 0.01 % SHAM Lather onto scalp, let sit for 5 min, then rinse off. 120 mL 3     clobetasol (TEMOVATE) 0.05 % external solution Apply to affected area of the scalp daily on days not shampooing.  Do not apply to face. 50 mL 3     ammonium lactate (AMLACTIN) 12 % cream Apply once to twice a day to dry skin. Can burn temporarily if there is a cut on the skin. 385 g 11     carbidopa-levodopa (SINEMET)  MG per tablet 1.5 tablets at 6 AM, 5 PM and 9 PM. Daily. 2 tablets at 12 Noon daily. 540 tablet 3     ACE NOT PRESCRIBED, INTENTIONAL, 1 each continuous prn. ACE Inhibitor not prescribed due to Refusal by patient       0 each 0     CALCIUM + D OR 600mg twice a day        ASPIRIN 81 MG PO TABS 1 TABLET DAILY (*)       MULTI-VITAMIN OR 1 a day          Allergies   Allergen Reactions     Decadrol [Dexamethasone Sodium Phosphate]      Shrimp         Review of Systems:  -Skin Establ Pt: The patient denies any new rash, pruritus, or lesions that are symptomatic, changing or bleeding, except as per HPI.  -Constitutional: The patient denies fatigue, fevers, chills, unintended weight loss, and night sweats.    Physical exam:  Vitals: There were no vitals taken for this visit.  GEN: This is a well developed, well-nourished female in no acute distress, in a pleasant mood.    NEURO: Alert and oriented  PSYCH: In pleasant mood, appropriate affect  SKIN: Focused examination of the scalp, face, and ears was performed.  -scattered pink scaling papules in the scalp with excoriations  -no rash on the ears or brow  -No other lesions of concern on areas examined.     Impression/Plan:    Pt w/ h/o Parkinsons have increased risk of melanoma and worse seborrheic dermatitis at baseline.    1. Seborrheic dermatitis: mild but often bad in pt with h/o parkinsons. Itching is a big issue    Continue fluocinolone acetonide 0.01% shampoo    Stop clobetasol 0.05% sol'n    Start augmented betamethasone 0.05% lotion as this is less drying.    Gabapentin 300mg qhs. Warned of sleepiness. Will be managed by Dr. Peterson and ordered with Dr. Peterson (Neurologist's) knowledge.    Given pt's concern for sleepiness, will only do it qhs and not increase except by Dr. Peterson.    CC Leah Blanca MD on close of this encounter.  Follow-up in 3 months with Dr. Ventura.       Staff Involved:  Scribe/Staff    Scribe Disclosure:   I, Shen Huynh, am serving as a scribe to document services personally performed by Dr. Josh Sotomayor, based on data collection and the provider's statements to me.     Provider Disclosure:   I have reviewed the documentation recorded by the scribe and have edited it as needed. I have personally performed the services documented here and the documentation accurately represents those services and the decisions made by me.     Josh Sotomayor MD, MS    Department  of Dermatology  [facility]: Phone: [phone], Fax:[phone]  [facility]: Phone: [phone], Fax: [phone]          Again, thank you for allowing me to participate in the care of your patient.      Sincerely,    Josh Sotomayor MD

## 2017-08-21 NOTE — NURSING NOTE
Dermatology Rooming Note    Alannah Leos's goals for this visit include:   Chief Complaint   Patient presents with     RECHECK     scalp and eyebrows- little better no more pustules but still have the red bumps        Is a scribe okay for this visit:YES    Are records needed for this visit(If yes, obtain release of information): No     Vitals: There were no vitals taken for this visit.    Referring Provider:  No referring provider defined for this encounter.

## 2017-08-21 NOTE — Clinical Note
Thanks for the message. I have started her on Gabapentin 300mg qhs. She says she is often sensitive to medication so I am leaving it at that dose without increasing it to TID. I'll let you make that decision. Pt's can often take a while to start getting relief with this med.  [place],  Kun Sotomayor

## 2017-08-21 NOTE — MR AVS SNAPSHOT
After Visit Summary   8/21/2017    Alannah Leos    MRN: [id number]           Patient Information     Date Of Birth          1940        Visit Information        Provider Department      8/21/2017 1:45 PM Josh Sotomayor MD [facility]        Today's Diagnoses     Dermatitis, seborrheic    -  1    H/O Parkinson's disease           Follow-ups after your visit        Your next 10 appointments already scheduled     Oct 16, 2017  1:30 PM CDT   LAB with [facility]   [place] [id number] [facility] [address]   [phone]           Patient must bring picture ID. Patient should be prepared to give a urine specimen  Please do not eat 10-12 hours before your appointment if you are coming in fasting for labs on lipids, cholesterol, or glucose (sugar). Pregnant women should follow their Care Team instructions. Water with medications is okay. Do not drink coffee or other fluids. If you have concerns about taking  your medications, please ask at office or if scheduling via [employer], send a message by clicking on Secure Messaging, Message Your Care Team.            Oct 19, 2017  1:40 PM CDT   Return Visit with Merrick Del Cid MD   [place] [id number] [facility] [address]   [phone]            Dec 28, 2017  2:30 PM CST   Return Visit with Cori Ventura MD   [facility] ([facility])    [id number] [place] [id number] [phone]            Mar 12, 2018 11:40 AM CDT   [facility]    [address] [facility] [id number] [phone]           Bring a current list of meds and any records pertaining to this visit. For Physicals, please bring immunization records and any forms needing to be filled out. Please arrive 10  minutes early to complete paperwork.              Who to contact     If you have questions or need follow up information about today's clinic visit or your schedule please contact [facility] directly at [phone].  Normal or non-critical lab and imaging results will be communicated to you by MyChart, letter or phone within 4 business days after the clinic has received the results. If you do not hear from us within 7 days, please contact the clinic through MyChart or phone. If you have a critical or abnormal lab result, we will notify you by phone as soon as possible.  Submit refill requests through [employer] or call your pharmacy and they will forward the refill request to us. Please allow 3 business days for your refill to be completed.          Additional Information About Your Visit        [employer] Information     [employer] is an electronic gateway that provides easy, online access to your medical records. With [employer], you can request a clinic appointment, read your test results, renew a prescription or communicate with your care team.     To sign up for [employer] visit the website at [email or web address]   You will be asked to enter the access code listed below, as well as some personal information. Please follow the directions to create your username and password.     Your access code is: [email or web address]  Expires: 10/18/2017  2:28 PM     Your access code will  in 90 days. If you need help or a new code, please contact your [facility] or call [phone] for assistance.        Care EveryWhere ID     This is your Care EveryWhere ID. This could be used by other organizations to access your [facility] medical records  [id number]         Blood Pressure from Last 3 Encounters:   17 134/65   17 142/66   17 113/58    Weight from Last 3 Encounters:   17 50.3 kg (111 lb)   17 51.4 kg (113 lb 6.4 oz)   05/15/17 53.5 kg (118 lb)               Today, you had the following     No orders found for display         Today's Medication Changes          These changes are accurate as of: 8/21/17  2:31 PM.  If you have any questions, ask your nurse or doctor.               Start taking these medicines.        Dose/Directions    betamethasone (augmented) 0.05 % lotion   Commonly known as:  DIPROLENE   Used for:  Dermatitis, seborrheic, H/O Parkinson's disease        Apply daily on itchy areas of the scalp as needed, on days not using the shampoo.   Quantity:  60 mL   Refills:  3            Where to get your medicines      These medications were sent to "[facility] [address] [facility] [address]  AT [address] [facility] [place]  [address] [facility] [address]     Phone:  [phone]     betamethasone (augmented) 0.05 % lotion                Primary Care Provider Office Phone # Fax #    Leah Shelby Blanca [phone]       [address]  [facility] [address]        Equal Access to Services     [facility]: Hadii aad ku hadasho Soomaali, waaxda luqadaha, qaybta kaalmada adeegyada, danyell kessler . So [facility] [phone].    ATENCIÓN: Si habla español, tiene a mahan disposición servicios gratuitos de asistencia lingüística. Llame al [phone].    We comply with applicable federal civil rights laws and [place] laws. We do not discriminate on the basis of race, color, national origin, age, disability sex, sexual orientation or gender identity.            Thank you!     Thank you for choosing [facility]  for your care. Our goal is always to provide you with excellent care. Hearing back from our patients is one way we can continue to improve our services. Please take a few minutes to complete the written survey that you may receive in the mail after your visit with us. Thank you!             Your Updated Medication List - Protect others around you: Learn how to  safely use, store and throw away your medicines at www.disposemymeds.org.          This list is accurate as of: 8/21/17  2:31 PM.  Always use your most recent med list.                   Brand Name Dispense Instructions for use Diagnosis    ACE NOT PRESCRIBED (INTENTIONAL)     0 each    1 each continuous prn. ACE Inhibitor not prescribed due to Refusal by patient    Type 2 diabetes, HbA1c goal < 7% (H)       ammonium lactate 12 % cream    AMLACTIN    385 g    Apply once to twice a day to dry skin. Can burn temporarily if there is a cut on the skin.    Xerosis of skin       aspirin 81 MG tablet      1 TABLET DAILY (*)        betamethasone (augmented) 0.05 % lotion    DIPROLENE    60 mL    Apply daily on itchy areas of the scalp as needed, on days not using the shampoo.    Dermatitis, seborrheic, H/O Parkinson's disease       CALCIUM + D PO      600mg twice a day        carbidopa-levodopa  MG per tablet    SINEMET    540 tablet    1.5 tablets at 6 AM, 5 PM and 9 PM. Daily. 2 tablets at 12 Noon daily.    Parkinson disease (H)       clobetasol 0.05 % external solution    TEMOVATE    50 mL    Apply to affected area of the scalp daily on days not shampooing.  Do not apply to face.    Dermatitis, seborrheic       fluocinolone acetonide 0.01 % Sham     120 mL    Lather onto scalp, let sit for 5 min, then rinse off.    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 Take all 8 tablets on the same day of each week.    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

## 2017-08-21 NOTE — PROGRESS NOTES
[facility] Dermatology Note      Dermatology Problem List:  1.H/O Parkinson's Disease  2. AK, cryo  3.Seborrheic dermatitis: scalp.  -Current tx: fluocinolone acetonide 0.01% shampoo, augmented betamethasone 0.05% lotion, gabapentin 300mg qhs (Ok'd by Dr. Peterson - neurologist)  -s/p clobetasol 0.05% sol'n    Last TBSE: 7/3/2017    Encounter Date: Aug 21, 2017    CC:  Chief Complaint   Patient presents with     RECHECK     scalp and eyebrows- little better no more pustules but still have the red bumps        History of Present Illness:  Ms. Alannah Leos is a 76 year old female with a hx of Parkinson's disease who presents as a follow-up for AKs of the bilateral brows and seborrheic dermatitis. Pt was last seen 7/3/2017 when the AKs were treated with cyro and started fluocinolone acetonide 0.01% shampoo and clobetasol 0.05% solution. Pt is using both medications, ketoconazole x2 a week and clobetasol 3-4x a week. Her scalp is extremely pruritic with the areas most affected including behind the ears, parietal scalp, and occipital scalp. Pt admits to scratching at her scalp. She is experiencing fewer associated lesional breakouts than before.     Pt is with her daughter today.    Past Medical History:   Patient Active Problem List   Diagnosis     Cataract, OU     Osteoporosis     Rheumatoid arthritis (H)     Parkinson disease (H)     Osteoarthritis of wrist     Osteoarthritis of shoulder     History of total knee arthroplasty     Osteoarthritis of left knee     Advanced directives, counseling/discussion     CARDIOVASCULAR SCREENING; LDL GOAL LESS THAN 160     High risk medication use     Underweight     Past Medical History:   Diagnosis Date     Hyperlipidemia      Murmur, heart     hsm     Nonsenile cataract      Osteoarthrosis, unspecified whether generalized or localized, lower leg      Osteopenia      Rheumatoid arthritis(714.0)      Past Surgical History:   Procedure Laterality Date      GALLBLADDER SURGERY       HYSTERECTOMY       KNEE SURGERY      left      LAMINECTOMY LUMBAR ONE LEVEL       TONSILLECTOMY         Social History:  Reviewed and unchanged but kept in chart for clinician convenience  The patient is retired. The patient denies use of tanning beds. Pt denies drinking or smoking    Family History:  Reviewed and unchanged but kept in chart for clinician convenience  There is no family history of skin cancer. There is a family hx of cardiovascular disease.     Medications:  Current Outpatient Prescriptions   Medication Sig Dispense Refill     folic acid (FOLVITE) 1 MG tablet Take 1 tablet (1 mg) by mouth daily 100 tablet 3     methotrexate sodium 2.5 MG TABS Take 20 mg by mouth once a week . Take all 8 tablets on the same day of each week. 96 tablet 0     fluocinolone acetonide 0.01 % SHAM Lather onto scalp, let sit for 5 min, then rinse off. 120 mL 3     clobetasol (TEMOVATE) 0.05 % external solution Apply to affected area of the scalp daily on days not shampooing.  Do not apply to face. 50 mL 3     ammonium lactate (AMLACTIN) 12 % cream Apply once to twice a day to dry skin. Can burn temporarily if there is a cut on the skin. 385 g 11     carbidopa-levodopa (SINEMET)  MG per tablet 1.5 tablets at 6 AM, 5 PM and 9 PM. Daily. 2 tablets at 12 Noon daily. 540 tablet 3     ACE NOT PRESCRIBED, INTENTIONAL, 1 each continuous prn. ACE Inhibitor not prescribed due to Refusal by patient       0 each 0     CALCIUM + D OR 600mg twice a day        ASPIRIN 81 MG PO TABS 1 TABLET DAILY (*)       MULTI-VITAMIN OR 1 a day          Allergies   Allergen Reactions     Decadrol [Dexamethasone Sodium Phosphate]      Shrimp        Review of Systems:  -Skin Establ Pt: The patient denies any new rash, pruritus, or lesions that are symptomatic, changing or bleeding, except as per HPI.  -Constitutional: The patient denies fatigue, fevers, chills, unintended weight loss, and night sweats.    Physical  exam:  Vitals: There were no vitals taken for this visit.  GEN: This is a well developed, well-nourished female in no acute distress, in a pleasant mood.    NEURO: Alert and oriented  PSYCH: In pleasant mood, appropriate affect  SKIN: Focused examination of the scalp, face, and ears was performed.  -scattered pink scaling papules in the scalp with excoriations  -no rash on the ears or brow  -No other lesions of concern on areas examined.     Impression/Plan:    Pt w/ h/o Parkinsons have increased risk of melanoma and worse seborrheic dermatitis at baseline.    1. Seborrheic dermatitis: mild but often bad in pt with h/o parkinsons. Itching is a big issue    Continue fluocinolone acetonide 0.01% shampoo    Stop clobetasol 0.05% sol'n    Start augmented betamethasone 0.05% lotion as this is less drying.    Gabapentin 300mg qhs. Warned of sleepiness. Will be managed by Dr. Peterson and ordered with Dr. Peterson (Neurologist's) knowledge.    Given pt's concern for sleepiness, will only do it qhs and not increase except by Dr. Peterson.    CC Leah Blanca MD on close of this encounter.  Follow-up in 3 months with Dr. Ventura.       Staff Involved:  Scribe/Staff    Scribe Disclosure:   I, Shen Huynh, am serving as a scribe to document services personally performed by Dr. Josh Sotomayor, based on data collection and the provider's statements to me.     Provider Disclosure:   I have reviewed the documentation recorded by the scribe and have edited it as needed. I have personally performed the services documented here and the documentation accurately represents those services and the decisions made by me.     Josh Sotomayor MD, MS    Department of Dermatology  [facility]: Phone: [phone], Fax:[phone]  [facility] [place]: Phone: [phone], Fax: [phone]

## 2017-09-21 ENCOUNTER — OFFICE VISIT (OUTPATIENT)
Dept: SURGERY | Facility: CLINIC | Age: 77
End: 2017-09-21
Payer: MEDICARE

## 2017-09-21 VITALS
HEART RATE: 88 BPM | HEIGHT: 64 IN | WEIGHT: 115 LBS | SYSTOLIC BLOOD PRESSURE: 126 MMHG | BODY MASS INDEX: 19.63 KG/M2 | DIASTOLIC BLOOD PRESSURE: 66 MMHG

## 2017-09-21 DIAGNOSIS — K42.9 UMBILICAL HERNIA WITHOUT OBSTRUCTION AND WITHOUT GANGRENE: Primary | ICD-10-CM

## 2017-09-21 PROCEDURE — 99204 OFFICE O/P NEW MOD 45 MIN: CPT | Performed by: SURGERY

## 2017-09-21 NOTE — NURSING NOTE
"Chief Complaint   Patient presents with     Hernia       Initial /66  Pulse 88  Ht 5' 4\" (1.626 m)  Wt 115 lb (52.2 kg)  BMI 19.74 kg/m2 Estimated body mass index is 19.74 kg/(m^2) as calculated from the following:    Height as of this encounter: 5' 4\" (1.626 m).    Weight as of this encounter: 115 lb (52.2 kg).  Medication Reconciliation: ashleigh Bucio Cma      "

## 2017-09-21 NOTE — MR AVS SNAPSHOT
After Visit Summary   9/21/2017    Alannah Leos    MRN: [id number]           Patient Information     Date Of Birth          1940        Visit Information        Provider Department      9/21/2017 9:00 AM Martínez Murphy,  [facility]        Today's Diagnoses     Umbilical hernia without obstruction and without gangrene    -  1       Follow-ups after your visit        Your next 10 appointments already scheduled     Oct 16, 2017  1:30 PM CDT   LAB with [facility]   [facility] ([facility])    [address] [facility] [address]   [phone]           Patient must bring picture ID. Patient should be prepared to give a urine specimen  Please do not eat 10-12 hours before your appointment if you are coming in fasting for labs on lipids, cholesterol, or glucose (sugar). Pregnant women should follow their Care Team instructions. Water with medications is okay. Do not drink coffee or other fluids. If you have concerns about taking  your medications, please ask at office or if scheduling via [employer], send a message by clicking on Secure Messaging, Message Your Care Team.            Oct 19, 2017  1:40 PM CDT   Return Visit with Merrick Del Cid MD   [facility] ([facility]    [address] [facility] [address]   [phone]            Dec 28, 2017  2:30 PM CST   Return Visit with Cori Ventura MD   [email or web address] ([email or web address]    [address] [place] [id number] [phone]            Mar 12, 2018 11:40 AM CDT   Office Visit with Leah Blanca MD   [facility] ([facility]    [address] [facility] [id number] [phone]           Bring a current list of meds and any records pertaining to this visit. For Physicals, please bring immunization records and any forms needing to be filled out. Please arrive 10  "minutes early to complete paperwork.              Who to contact     If you have questions or need follow up information about today's clinic visit or your schedule please contact [facility] directly at [phone].  Normal or non-critical lab and imaging results will be communicated to you by MyChart, letter or phone within 4 business days after the clinic has received the results. If you do not hear from us within 7 days, please contact the clinic through MyChart or phone. If you have a critical or abnormal lab result, we will notify you by phone as soon as possible.  Submit refill requests through [employer] or call your pharmacy and they will forward the refill request to us. Please allow 3 business days for your refill to be completed.          Additional Information About Your Visit        [facility] [employer] Information     [employer] lets you send messages to your doctor, view your test results, renew your prescriptions, schedule appointments and more. To sign up, go to [email or web address] . Click on \"Log in\" on the left side of the screen, which will take you to the Welcome page. Then click on \"Sign up Now\" on the right side of the page.     You will be asked to enter the access code listed below, as well as some personal information. Please follow the directions to create your username and password.     Your access code is: [email or web address]  Expires: 10/18/2017  2:28 PM     Your access code will  in 90 days. If you need help or a new code, please call your [place] clinic or [phone].        Care EveryWhere ID     This is your Care EveryWhere ID. This could be used by other organizations to access your [place] medical records  [id number]        Your Vitals Were     Pulse Height BMI (Body Mass Index)             88 1.626 m (5' 4\") 19.74 kg/m2          Blood Pressure from Last 3 Encounters:   17 126/66   17 134/65   17 142/66    Weight from Last 3 Encounters:   17 52.2 kg " (115 lb)   08/07/17 50.3 kg (111 lb)   07/20/17 51.4 kg (113 lb 6.4 oz)              Today, you had the following     No orders found for display       Primary Care Provider Office Phone # Fax #    Leah Shelby Blanca [phone]       [address]  [place] [facility] [address]        Equal Access to Services     [facility]: Hadii aad ku hadasho Soomaali, waaxda luqadaha, qaybta kaalmada adeegyada, waxay idiin hayaan adeeg kharash la'aan . So [facility] [phone].    ATENCIÓN: Si chanda chong, tiene a mahan disposición servicios gratuitos de asistencia lingüística. Llame al [phone].    We comply with applicable federal civil rights laws and [place] laws. We do not discriminate on the basis of race, color, national origin, age, disability sex, sexual orientation or gender identity.            Thank you!     Thank you for choosing [facility]  for your care. Our goal is always to provide you with excellent care. Hearing back from our patients is one way we can continue to improve our services. Please take a few minutes to complete the written survey that you may receive in the mail after your visit with us. Thank you!             Your Updated Medication List - Protect others around you: Learn how to safely use, store and throw away your medicines at www.disposemymeds.org.          This list is accurate as of: 9/21/17 11:39 AM.  Always use your most recent med list.                   Brand Name Dispense Instructions for use Diagnosis    ACE NOT PRESCRIBED (INTENTIONAL)     0 each    1 each continuous prn. ACE Inhibitor not prescribed due to Refusal by patient    Type 2 diabetes, HbA1c goal < 7% (H)       ammonium lactate 12 % cream    AMLACTIN    385 g    Apply once to twice a day to dry skin. Can burn temporarily if there is a cut on the skin.    Xerosis of skin       aspirin 81 MG tablet      1 TABLET DAILY (*)        betamethasone (augmented) 0.05 % lotion    DIPROLENE    60 mL     Apply daily on itchy areas of the scalp as needed, on days not using the shampoo.    Dermatitis, seborrheic, H/O Parkinson's disease       CALCIUM + D PO      600mg twice a day        carbidopa-levodopa  MG per tablet    SINEMET    540 tablet    1.5 tablets at 6 AM, 5 PM and 9 PM. Daily. 2 tablets at 12 Noon daily.    Parkinson disease (H)       clobetasol 0.05 % external solution    TEMOVATE    50 mL    Apply to affected area of the scalp daily on days not shampooing.  Do not apply to face.    Dermatitis, seborrheic       fluocinolone acetonide 0.01 % Sham     120 mL    Lather onto scalp, let sit for 5 min, then rinse off.    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 May cause sleepiness.    Scalp itch       methotrexate sodium 2.5 MG Tabs     96 tablet    Take 20 mg by mouth once a week . Take all 8 tablets on the same day of each week.    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

## 2017-09-21 NOTE — Clinical Note
I had the pleasure of seeing Alannah SAEID Leos in the clinic. She elected to watch the hernia for the time being. I think this is reasonable. She seems very clear on when to come back and see me or when to go the the ER.  Thanks for allowing me the chance to participate in her care.  Sincerely,  Martínez Murphy  General Surgery  Head and Neck/Endocrine Surgery

## 2017-09-21 NOTE — PROGRESS NOTES
"HPI:  Alannah is a 76 year old female who was seen with an umbilical hernia by Dr. Leah Blanca.  She first noticed it  1 year  ago.  She denies any pain, she had some tenderness when Dr. Blanca reduced her hernia.  It occurs with bending, walking and bowel movements. Negative for associated symptoms of nausea, vomiting and constipation.  She has noticed an associated lump.  She has not had a previous surgery in this location. she has not had a previous herniorrhaphy in this location.  Her employment does not require heavy lifting.    Past Medical History:   has a past medical history of Hyperlipidemia; Murmur, heart; Nonsenile cataract; Osteoarthrosis, unspecified whether generalized or localized, lower leg; Osteopenia; and Rheumatoid arthritis(714.0).    Past Surgical History:  Past Surgical History:   Procedure Laterality Date     GALLBLADDER SURGERY       HYSTERECTOMY       KNEE SURGERY      left      LAMINECTOMY LUMBAR ONE LEVEL       TONSILLECTOMY          Review of Systems  General: negative for fever or chills  Skin: negative except mass noted above  Ears/Nose/Throat: negative for, epistaxis, bleeding gums  Respiratory: No shortness of breath, dyspnea on exertion, cough, or hemoptysis  Cardiovascular: negative for and chest pain  Gastrointestinal:  negative for, nausea, vomiting and abdominal pain  Genitourinary: negative for and frequency    Neurologic: negative for and local weakness  Hematologic/Lymphatic/Immunologic: negative for, bleeding disorder and frequent infections  Endocrine: negative for, diabetes, polydipsia and polyuria      PE:    /66  Pulse 88  Ht 1.626 m (5' 4\")  Wt 52.2 kg (115 lb)  BMI 19.74 kg/m2  General - Well developed, well nourished female in no apparent distress  HEENT:  Head normocephalic and atraumatic, pupils equal and round, conjunctivae clear, no scleral icterus, mucous membranes moist, external ears and nose normal  Lymphatic: No cervical, or supraclavicular " lymphadenopathy  Lungs: Clear to auscultation bilaterally  Heart: Regular rate and rhythm, no murmurs  Abdomen:  abdomen is soft without significant tenderness, masses, organomegaly or guarding  Hernia:  umbilical, 1 cm, reducible, mildly tender  Extremities: Warm without edema  Musculoskeletal:  Normal station and gait  Neurologic: Nonfocal  Psychiatric: Mood and affect appropriate  Skin: Without lesions or rashes, or juandice    Assessment: Primary, reducible umbilical hernia.    Plan:    We have discussed observation, reduction techniques and importance, incarceration and strangulation signs, symptoms and importance as well as need to seek emergency treatment.      We have discussed open umbilical hernia repair with mesh in detail, including benefits, alternatives, complications, incision, scar, mesh, infection, anesthesia, bleeding, blood transfusion, DVT, PE, hernia recurrence, lifting and activity limits after surgery.  All questions have been answered to the best of my ability.    She has been given literature to review.   She has elected to watch it at this time, this is reasonable as she doesn't have any symptoms. She will follow up with me as needed. We again reviewed when to seek emergent medical help        Martínez Muprhy, DO

## 2017-10-16 DIAGNOSIS — M06.09 RHEUMATOID ARTHRITIS OF MULTIPLE SITES WITH NEGATIVE RHEUMATOID FACTOR (H): ICD-10-CM

## 2017-10-16 DIAGNOSIS — Z79.899 HIGH RISK MEDICATION USE: ICD-10-CM

## 2017-10-16 LAB
ALBUMIN SERPL-MCNC: 3.7 G/DL (ref 3.4–5)
ALP SERPL-CCNC: 118 U/L (ref 40–150)
ALT SERPL W P-5'-P-CCNC: 10 U/L (ref 0–50)
AST SERPL W P-5'-P-CCNC: 17 U/L (ref 0–45)
BASOPHILS # BLD AUTO: 0.1 10E9/L (ref 0–0.2)
BASOPHILS NFR BLD AUTO: 1.2 %
BILIRUB DIRECT SERPL-MCNC: <0.1 MG/DL (ref 0–0.2)
BILIRUB SERPL-MCNC: 0.6 MG/DL (ref 0.2–1.3)
CREAT SERPL-MCNC: 0.48 MG/DL (ref 0.52–1.04)
CRP SERPL-MCNC: 10.4 MG/L (ref 0–8)
DIFFERENTIAL METHOD BLD: ABNORMAL
EOSINOPHIL # BLD AUTO: 0.1 10E9/L (ref 0–0.7)
EOSINOPHIL NFR BLD AUTO: 1.5 %
ERYTHROCYTE [DISTWIDTH] IN BLOOD BY AUTOMATED COUNT: 16 % (ref 10–15)
ERYTHROCYTE [SEDIMENTATION RATE] IN BLOOD BY WESTERGREN METHOD: 37 MM/H (ref 0–30)
GFR SERPL CREATININE-BSD FRML MDRD: >90 ML/MIN/1.7M2
HCT VFR BLD AUTO: 33.7 % (ref 35–47)
HGB BLD-MCNC: 10.9 G/DL (ref 11.7–15.7)
LYMPHOCYTES # BLD AUTO: 1.7 10E9/L (ref 0.8–5.3)
LYMPHOCYTES NFR BLD AUTO: 25.1 %
MCH RBC QN AUTO: 28.9 PG (ref 26.5–33)
MCHC RBC AUTO-ENTMCNC: 32.3 G/DL (ref 31.5–36.5)
MCV RBC AUTO: 89 FL (ref 78–100)
MONOCYTES # BLD AUTO: 0.5 10E9/L (ref 0–1.3)
MONOCYTES NFR BLD AUTO: 7.8 %
NEUTROPHILS # BLD AUTO: 4.3 10E9/L (ref 1.6–8.3)
NEUTROPHILS NFR BLD AUTO: 64.4 %
PLATELET # BLD AUTO: 313 10E9/L (ref 150–450)
PROT SERPL-MCNC: 8 G/DL (ref 6.8–8.8)
RBC # BLD AUTO: 3.77 10E12/L (ref 3.8–5.2)
WBC # BLD AUTO: 6.7 10E9/L (ref 4–11)

## 2017-10-16 PROCEDURE — 85652 RBC SED RATE AUTOMATED: CPT | Performed by: INTERNAL MEDICINE

## 2017-10-16 PROCEDURE — 36415 COLL VENOUS BLD VENIPUNCTURE: CPT | Performed by: INTERNAL MEDICINE

## 2017-10-16 PROCEDURE — 85025 COMPLETE CBC W/AUTO DIFF WBC: CPT | Performed by: INTERNAL MEDICINE

## 2017-10-16 PROCEDURE — 82565 ASSAY OF CREATININE: CPT | Performed by: INTERNAL MEDICINE

## 2017-10-16 PROCEDURE — 86140 C-REACTIVE PROTEIN: CPT | Performed by: INTERNAL MEDICINE

## 2017-10-16 PROCEDURE — 80076 HEPATIC FUNCTION PANEL: CPT | Performed by: INTERNAL MEDICINE

## 2017-10-18 ENCOUNTER — OFFICE VISIT (OUTPATIENT)
Dept: NEUROLOGY | Facility: CLINIC | Age: 77
End: 2017-10-18
Payer: MEDICARE

## 2017-10-18 VITALS
SYSTOLIC BLOOD PRESSURE: 127 MMHG | HEIGHT: 64 IN | BODY MASS INDEX: 19.19 KG/M2 | WEIGHT: 112.4 LBS | HEART RATE: 87 BPM | DIASTOLIC BLOOD PRESSURE: 67 MMHG

## 2017-10-18 DIAGNOSIS — G20.A1 PARKINSON DISEASE (H): Primary | ICD-10-CM

## 2017-10-18 PROCEDURE — 99213 OFFICE O/P EST LOW 20 MIN: CPT | Performed by: PSYCHIATRY & NEUROLOGY

## 2017-10-18 RX ORDER — CARBIDOPA AND LEVODOPA 25; 100 MG/1; MG/1
TABLET ORAL
Qty: 675 TABLET | Refills: 3 | Status: SHIPPED | OUTPATIENT
Start: 2017-10-18 | End: 2018-02-09

## 2017-10-18 NOTE — MR AVS SNAPSHOT
After Visit Summary   10/18/2017    Alannah Leos    MRN: [id number]           Patient Information     Date Of Birth          1940        Visit Information        Provider Department      10/18/2017 2:40 PM Daja Dale MD [facility]        Today's Diagnoses     Parkinson disease (H)    -  1      Care Instructions    AFTER VISIT SUMMARY (AVS)  Signed Prescriptions:                        Disp   Refills    carbidopa-levodopa (SINEMET)  MG per*675 ta*3        Si.5 tablets at 6 am. 2.5 tablets at Noon. 2 tablets           at 5PM. 0.5  Tablet at 9 PM. 1 tablet at           Midnight.  Authorizing Provider: DAJA DALE      Diagnostic possibilities reviewed    Preventive Neurology: Encouraged to keep physically and mentally active with particular emphasis on daily stretching exercises, walking, and healthy eating.    To call us for follow-up appointment in next 6 month(s) or earlier if needed.                Follow-ups after your visit        Follow-up notes from your care team     Return in about 6 months (around 2018) for Parkinson's disease follow-up.      Your next 10 appointments already scheduled     Oct 19, 2017  1:40 PM CDT   Return Visit with Merrick Del Cid MD   [facility] ([facility]    [address] [place] [id number]   [phone]            Dec 28, 2017  2:30 PM CST   [facility] Visit with Cori [facility]ura MD   [facility]    [id number] [place] [id number] [phone]            Mar 12, 2018 11:40 AM CDT   Office Visit with Leah Blanca MD   [facility] ([facility]    [address] [facility] [id number] [phone]           Bring a current list of meds and any records pertaining to this visit. For Physicals, please bring immunization records and any forms needing to be filled out.  "Please arrive 10 minutes early to complete paperwork.              Who to contact     If you have questions or need follow up information about today's clinic visit or your schedule please contact [facility] directly at [phone].  Normal or non-critical lab and imaging results will be communicated to you by MyChart, letter or phone within 4 business days after the clinic has received the results. If you do not hear from us within 7 days, please contact the clinic through [employer] or phone. If you have a critical or abnormal lab result, we will notify you by phone as soon as possible.  Submit refill requests through [employer] or call your pharmacy and they will forward the refill request to us. Please allow 3 business days for your refill to be completed.          Additional Information About Your Visit        [employer] Information     [employer] lets you send messages to your doctor, view your test results, renew your prescriptions, schedule appointments and more. To sign up, go to [email or web address] . Click on \"Log in\" on the left side of the screen, which will take you to the Welcome page. Then click on \"Sign up Now\" on the right side of the page.     You will be asked to enter the access code listed below, as well as some personal information. Please follow the directions to create your username and password.     Your access code is: [email or web address]  Expires: 2018  3:51 PM     Your access code will  in 90 days. If you need help or a new code, please call your [place] clinic or [phone].        Care EveryWhere ID     This is your Care EveryWhere ID. This could be used by other organizations to access your [place] medical records  [id number]        Your Vitals Were     Pulse Height BMI (Body Mass Index)             87 1.626 m (5' 4\") 19.29 kg/m2          Blood Pressure from Last 3 Encounters:   10/18/17 127/67   17 126/66   17 134/65    Weight from Last 3 Encounters: "   10/18/17 51 kg (112 lb 6.4 oz)   09/21/17 52.2 kg (115 lb)   08/07/17 50.3 kg (111 lb)              Today, you had the following     No orders found for display         Today's Medication Changes          These changes are accurate as of: 10/18/17  3:51 PM.  If you have any questions, ask your nurse or doctor.               These medicines have changed or have updated prescriptions.        Dose/Directions    ammonium lactate 12 % cream   Commonly known as:  AMLACTIN   This may have changed:    - when to take this  - reasons to take this  - additional instructions   Used for:  Xerosis of skin        Apply once to twice a day to dry skin. Can burn temporarily if there is a cut on the skin.   Quantity:  385 g   Refills:  11       carbidopa-levodopa  MG per tablet   Commonly known as:  SINEMET   This may have changed:  additional instructions   Used for:  Parkinson disease (H)   Changed by:  Daja Peterson MD        1.5 tablets at 6 am. 2.5 tablets at Noon. 2 tablets at 5PM. 0.5  Tablet at 9 PM. 1 tablet at Midnight.   Quantity:  675 tablet   Refills:  3       clobetasol 0.05 % external solution   Commonly known as:  TEMOVATE   This may have changed:  additional instructions   Used for:  Dermatitis, seborrheic        Apply to affected area of the scalp daily on days not shampooing.  Do not apply to face.   Quantity:  50 mL   Refills:  3       fluocinolone acetonide 0.01 % Sham   This may have changed:  additional instructions   Used for:  Dermatitis, seborrheic        Lather onto scalp, let sit for 5 min, then rinse off.   Quantity:  120 mL   Refills:  3            Where to get your medicines      These medications [facility] to [facility] [address] [facility] [address] [facility] SEC of [address] [address]     Phone:  [phone]     carbidopa-levodopa  MG per tablet                Primary Care Provider Office Phone # Fax #    Leah  Shelby Blanca [phone]       [address]  [facility] [address]        Equal Access to Services     EDDI HOLCOMB : Marie adelaida jenkins andrey Saul, romelia flower, qaurmilata kaalmada daryn, danyell beltran laadelinajanelle andrews. So [facility] [phone].    ATENCIÓN: Si habla español, tiene a mahan disposición servicios gratuitos de asistencia lingüística. Llame al [phone].    We comply with applicable federal civil rights laws and [place] laws. We do not discriminate on the basis of race, color, national origin, age, disability, sex, sexual orientation, or gender identity.            Thank you!     Thank you for choosing [facility] [place] [facility]\""  for your care. Our goal is always to provide you with excellent care. Hearing back from our patients is one way we can continue to improve our services. Please take a few minutes to complete the written survey that you may receive in the mail after your visit with us. Thank you!             Your Updated Medication List - Protect others around you: Learn how to safely use, store and throw away your medicines at www.disposemymeds.org.          This list is accurate as of: 10/18/17  3:51 PM.  Always use your most recent med list.                   Brand Name Dispense Instructions for use Diagnosis    ACE NOT PRESCRIBED (INTENTIONAL)     0 each    1 each continuous prn. ACE Inhibitor not prescribed due to Refusal by patient    Type 2 diabetes, HbA1c goal < 7% (H)       ammonium lactate 12 % cream    AMLACTIN    385 g    Apply once to twice a day to dry skin. Can burn temporarily if there is a cut on the skin.    Xerosis of skin       aspirin 81 MG tablet      PATIENT REPORTS TAKING 4 TIMES PER WEEK AND NOT EVERYDAY        betamethasone (augmented) 0.05 % lotion    DIPROLENE    60 mL    Apply daily on itchy areas of the scalp as needed, on days not using the shampoo.    Dermatitis, seborrheic, H/O Parkinson's disease       CALCIUM + D PO      600mg  twice a day        carbidopa-levodopa  MG per tablet    SINEMET    675 tablet    1.5 tablets at 6 am. 2.5 tablets at Noon. 2 tablets at 5PM. 0.5  Tablet at 9 PM. 1 tablet at Midnight.    Parkinson disease (H)       clobetasol 0.05 % external solution    TEMOVATE    50 mL    Apply to affected area of the scalp daily on days not shampooing.  Do not apply to face.    Dermatitis, seborrheic       fluocinolone acetonide 0.01 % Sham     120 mL    Lather onto scalp, let sit for 5 min, then rinse off.    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 Take all 8 tablets on the same day of each week.    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

## 2017-10-18 NOTE — NURSING NOTE
"Chief Complaint   Patient presents with     RECHECK     Medication check       Initial /67 (BP Location: Right arm, Patient Position: Chair, Cuff Size: Adult Regular)  Pulse 87  Ht 1.626 m (5' 4\")  Wt 51 kg (112 lb 6.4 oz)  BMI 19.29 kg/m2 Estimated body mass index is 19.29 kg/(m^2) as calculated from the following:    Height as of this encounter: 1.626 m (5' 4\").    Weight as of this encounter: 51 kg (112 lb 6.4 oz).  Medication Reconciliation: complete   Jes Blake MA      "

## 2017-10-18 NOTE — PATIENT INSTRUCTIONS
AFTER VISIT SUMMARY (AVS)  Signed Prescriptions:                        Disp   Refills    carbidopa-levodopa (SINEMET)  MG per*675 ta*3        Si.5 tablets at 6 am. 2.5 tablets at Noon. 2 tablets           at 5PM. 0.5  Tablet at 9 PM. 1 tablet at           Midnight.  Authorizing Provider: DANIEL DALE      Diagnostic possibilities reviewed    Preventive Neurology: Encouraged to keep physically and mentally active with particular emphasis on daily stretching exercises, walking, and healthy eating.    To call us for follow-up appointment in next 6 month(s) or earlier if needed.

## 2017-10-19 ENCOUNTER — OFFICE VISIT (OUTPATIENT)
Dept: RHEUMATOLOGY | Facility: CLINIC | Age: 77
End: 2017-10-19
Payer: MEDICARE

## 2017-10-19 VITALS
WEIGHT: 112 LBS | DIASTOLIC BLOOD PRESSURE: 58 MMHG | OXYGEN SATURATION: 96 % | HEIGHT: 64 IN | RESPIRATION RATE: 14 BRPM | BODY MASS INDEX: 19.12 KG/M2 | HEART RATE: 89 BPM | TEMPERATURE: 98.3 F | SYSTOLIC BLOOD PRESSURE: 112 MMHG

## 2017-10-19 DIAGNOSIS — Z79.899 HIGH RISK MEDICATION USE: ICD-10-CM

## 2017-10-19 DIAGNOSIS — M06.09 RHEUMATOID ARTHRITIS OF MULTIPLE SITES WITH NEGATIVE RHEUMATOID FACTOR (H): Primary | ICD-10-CM

## 2017-10-19 PROCEDURE — 99214 OFFICE O/P EST MOD 30 MIN: CPT | Performed by: INTERNAL MEDICINE

## 2017-10-19 RX ORDER — HYDROXYCHLOROQUINE SULFATE 200 MG/1
200 TABLET, FILM COATED ORAL DAILY
Qty: 30 TABLET | Refills: 3 | Status: SHIPPED | OUTPATIENT
Start: 2017-10-19 | End: 2018-01-25

## 2017-10-19 RX ORDER — METHOTREXATE 2.5 MG/1
20 TABLET ORAL WEEKLY
Qty: 96 TABLET | Refills: 0 | Status: SHIPPED | OUTPATIENT
Start: 2017-10-19 | End: 2018-01-17

## 2017-10-19 NOTE — MR AVS SNAPSHOT
After Visit Summary   10/19/2017    Alannah Leos    MRN: [id number]           Patient Information     Date Of Birth          1940        Visit Information        Provider Department      10/19/2017 1:40 PM Merrick Del Cid MD [facility]        Today's Diagnoses     Rheumatoid arthritis of multiple sites with negative rheumatoid factor (H)    -  1    High risk medication use           Follow-ups after your visit        Additional Services     OPHTHALMOLOGY ADULT REFERRAL       Your provider has referred you to:  [facility]: [facility] [phone]   [email or web address]    Reason for referral: Hydroxychloroquine (plaquenil) toxicity monitoring.     Please be aware that coverage of these services is subject to the terms and limitations of your health insurance plan.  Call member services at your health plan with any benefit or coverage questions.      Please bring the following to your appointment:  >>   Any x-rays, CTs or MRIs which have been performed.  Contact the facility where they were done to arrange for  prior to your scheduled appointment.  Any new CT, MRI or other procedures ordered by your specialist must be performed at a [place] facility or coordinated by your clinic's referral office.    >>   List of current medications   >>   This referral request   >>   Any documents/labs given to you for this referral                  Follow-up notes from your care team     Return in about 3 months (around 1/19/2018) for f/lori RA.      Your next 10 appointments already scheduled     Dec 28, 2017  2:30 PM CST   Return Visit with Cori Ventura MD   [facility] ([facility])    [address] [place] [id number] [phone]            Jan 22, 2018  1:30 PM CST   LAB with [facility]   [facility] ([facility])    [address] [place] [address] [phone]            Patient must bring picture ID. Patient should be prepared to give a urine specimen  Please do not eat 10-12 hours before your appointment if you are coming in fasting for labs on lipids, cholesterol, or glucose (sugar). Pregnant women should follow their Care Team instructions. Water with medications is okay. Do not drink coffee or other fluids. If you have concerns about taking  your medications, please ask at office or if scheduling via [employer], send a message by clicking on Secure Messaging, Message Your Care Team.            Jan 25, 2018  1:00 PM CST   Return Visit with Merrick Del Cid MD   [facility] ([facility])    [address] [place] [id number]   [phone]            Mar 12, 2018 11:40 AM CDT   Office Visit with Leah Blanca MD   [facility] ([facility])    [address] [place] [id number]   [phone]           Bring a current list of meds and any records pertaining to this visit. For Physicals, please bring immunization records and any forms needing to be filled out. Please arrive 10 minutes early to complete paperwork.              Who to contact     If you have questions or need follow up information about today's clinic visit or your schedule please contact [facility] directly at [phone].  Normal or non-critical lab and imaging results will be communicated to you by MyChart, letter or phone within 4 business days after the clinic has received the results. If you do not hear from us within 7 days, please contact the clinic through MyChart or phone. If you have a critical or abnormal lab result, we will notify you by phone as soon as possible.  Submit refill requests through [employer] or call your pharmacy and they will forward the refill request to us. Please allow 3 business days for your refill to be completed.          Additional Information About Your Visit       "  MyChart Information     [employer] lets you send messages to your doctor, view your test results, renew your prescriptions, schedule appointments and more. To sign up, go to [email or web address] . Click on \"Log in\" on the left side of the screen, which will take you to the Welcome page. Then click on \"Sign up Now\" on the right side of the page.     You will be asked to enter the access code listed below, as well as some personal information. Please follow the directions to create your username and password.     Your access code is: [email or web address]  Expires: 2018  3:51 PM     Your access code will  in 90 days. If you need help or a new code, please call your [place] clinic or [phone].        Care EveryWhere ID     This is your Care EveryWhere ID. This could be used by other organizations to access your [place] medical records  [id number]        Your Vitals Were     Pulse Temperature Respirations Height Pulse Oximetry BMI (Body Mass Index)    89 98.3  F (36.8  C) 14 1.626 m (5' 4\") 96% 19.22 kg/m2       Blood Pressure from Last 3 Encounters:   10/19/17 112/58   10/18/17 127/67   17 126/66    Weight from Last 3 Encounters:   10/19/17 50.8 kg (112 lb)   10/18/17 51 kg (112 lb 6.4 oz)   17 52.2 kg (115 lb)              We Performed the Following     OPHTHALMOLOGY ADULT REFERRAL          Today's Medication Changes          These changes are accurate as of: 10/19/17 11:59 PM.  If you have any questions, ask your nurse or doctor.               Start taking these medicines.        Dose/Directions    hydroxychloroquine 200 MG tablet   Commonly known as:  PLAQUENIL   Used for:  Rheumatoid arthritis of multiple sites with negative rheumatoid factor (H)   Started by:  Merrick Del Cid MD        Dose:  200 mg   Take 1 tablet (200 mg) by mouth daily   Quantity:  30 tablet   Refills:  3         These medicines have changed or have updated prescriptions.        Dose/Directions    ammonium lactate 12 % " cream   Commonly known as:  AMLACTIN   This may have changed:    - when to take this  - reasons to take this  - additional instructions   Used for:  Xerosis of skin        Apply once to twice a day to dry skin. Can burn temporarily if there is a cut on the skin.   Quantity:  385 g   Refills:  11       clobetasol 0.05 % external solution   Commonly known as:  TEMOVATE   This may have changed:  additional instructions   Used for:  Dermatitis, seborrheic        Apply to affected area of the scalp daily on days not shampooing.  Do not apply to face.   Quantity:  50 mL   Refills:  3       fluocinolone acetonide 0.01 % Sham   This may have changed:  additional instructions   Used for:  Dermatitis, seborrheic        Lather onto scalp, let sit for 5 min, then rinse off.   Quantity:  120 mL   Refills:  3            Where to get your medicines      These medications were sent to [facility] [address] [facility] [address] [facility] [address] [facility] [place] [address] [employer] [facility] [address]     Phone:  [phone]     hydroxychloroquine 200 MG tablet    methotrexate sodium 2.5 MG Tabs                Primary Care Provider Office Phone # Fax #    Leah Shelby Blanca [phone]       [address]  [facility] [address]        Equal Access to Services     KENNETH HOLCOMB AH: Hadii aad ku hadasho Soomaali, waaxda luqadaha, qaybta kaalmada adeegyada, waxay idiin hayaan alissa kessler . So [facility] [phone].    ATENCIÓN: Si habla español, tiene a mahan disposición servicios gratuitos de asistencia lingüística. Llame al [phone].    We comply with applicable federal civil rights laws and [place] laws. We do not discriminate on the basis of race, color, national origin, age, disability, sex, sexual orientation, or gender identity.            Thank you!     Thank you for choosing [facility]  for your care. Our goal is always to provide you with excellent care.  Hearing back from our patients is one way we can continue to improve our services. Please take a few minutes to complete the written survey that you may receive in the mail after your visit with us. Thank you!             Your Updated Medication List - Protect others around you: Learn how to safely use, store and throw away your medicines at www.disposemymeds.org.          This list is accurate as of: 10/19/17 11:59 PM.  Always use your most recent med list.                   Brand Name Dispense Instructions for use Diagnosis    ACE NOT PRESCRIBED (INTENTIONAL)     0 each    1 each continuous prn. ACE Inhibitor not prescribed due to Refusal by patient    Type 2 diabetes, HbA1c goal < 7% (H)       ammonium lactate 12 % cream    AMLACTIN    385 g    Apply once to twice a day to dry skin. Can burn temporarily if there is a cut on the skin.    Xerosis of skin       aspirin 81 MG tablet      PATIENT REPORTS TAKING 4 TIMES PER WEEK AND NOT EVERYDAY        betamethasone (augmented) 0.05 % lotion    DIPROLENE    60 mL    Apply daily on itchy areas of the scalp as needed, on days not using the shampoo.    Dermatitis, seborrheic, H/O Parkinson's disease       CALCIUM + D PO      600mg twice a day        carbidopa-levodopa  MG per tablet    SINEMET    675 tablet    1.5 tablets at 6 am. 2.5 tablets at Noon. 2 tablets at 5PM. 0.5  Tablet at 9 PM. 1 tablet at Midnight.    Parkinson disease (H)       clobetasol 0.05 % external solution    TEMOVATE    50 mL    Apply to affected area of the scalp daily on days not shampooing.  Do not apply to face.    Dermatitis, seborrheic       fluocinolone acetonide 0.01 % Sham     120 mL    Lather onto scalp, let sit for 5 min, then rinse off.    Dermatitis, seborrheic       folic acid 1 MG tablet    FOLVITE    100 tablet    Take 1 tablet (1 mg) by mouth daily    Rheumatoid arthritis of multiple sites with negative rheumatoid factor (H), High risk medication use       hydroxychloroquine  200 MG tablet    PLAQUENIL    30 tablet    Take 1 tablet (200 mg) by mouth daily    Rheumatoid arthritis of multiple sites with negative rheumatoid factor (H)       methotrexate sodium 2.5 MG Tabs     96 tablet    Take 20 mg by mouth once a week . Take all 8 tablets on the same day of each week.    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

## 2017-10-19 NOTE — PROGRESS NOTES
Rheumatology Clinic Visit      Alannah Leos MRN# [id number]   YOB: 1940 Age: 77 year old      Date of visit: 10/19/17   PCP: Dr. Leah Blanca    Chief Complaint   Patient presents with:  Clinic Care Coordination - Follow-up: 3 month follow up rheumatoid arthritis      Assessment and Plan     1. Seronegative erosive rheumatoid arthritis: Diagnosed in the 1970s. Previously on methotrexate when first diagnosed (unclear if ineffective or not, stopped by patient preference to not treat her RA at that time).  Synovitis, subluxation, and fixed flexion deformities on exam. Steroid responsive. Also with ankylosis of the cervical spine and erosive changes seen on x-rays. Currently on methotrexate 20 mg once weekly. She has improved with methotrexate, but still has severe disease. SSZ caused GI upset.  Biologic DMARD's were going to be used, preferentially Orencia because of the positive PIERO and dsDNA in the past. After prescribing it, the patient decided that she did not want to do it. We discussed other treatment options and will start hydroxychloroquine today. She may continue to use ibuprofen PRN as it provides some pain relief. She still has active disease today but was not interested in starting a biologic DMARD at this time; she says that she is still thinking about it.    - Continue methotrexate 20 mg once weekly   - Continue folic acid 1mg daily  - Start hydroxychloroquine 200mg daily  - Ophthalmology referral for hydroxychloroquine toxicity monitoring  - Labs 2-3 days prior to the next rheumatology clinic visit: CBC, Creatinine, Hepatic Panel, ESR, CRP    # Hydroxychloroquine (Plaquenil) Risks and Benefits:  The risks and benefits of hydroxychloroquine were discussed in detail and the patient verbalized understanding; the patient also verbalized agreement to get the required ophthalmologic toxicity monitoring.  The risks discussed include, but are not limited to, the risk for hypersensitivity,  "anaphylaxis, anaphylactoid reactions, irreversible retinal damage, rare hematologic reactions, and rare cardiomyopathy. I encouraged reviewing the package insert and asking any questions about the medication.      2. Positive PIERO and dsDNA: These were noted on record review. She does not have symptoms to support an PIERO-associated rheumatologic disease at this time.     3. Neck pain in the setting of rheumatoid arthritis: No evidence of instability by x-rays on 10/12/2016.  Stable symptoms per patient.     4. Parkinson's Disease: Followed by Dr. Peterson (neurology)    5. Bilateral pitting edema: likely dependent edema.  She is going to see Dr. Blanca for this issue.     Ms. Leos verbalized agreement with and understanding of the rational for the diagnosis and treatment plan.  All questions were answered to best of my ability and the patient's satisfaction. Ms. Leos was advised to contact the clinic with any questions that may arise after the clinic visit.      Thank you for involving me in the care of the patient    Return to clinic: 3 months      HPI   Alannah Leos is a 77 year old female with a medical history significant for Parkinson's disease, osteoarthritis, status post TKA, osteoporosis, and rheumatoid arthritis who presents for follow-up of rheumatoid arthritis.    Previously, Ms. Leos reported that she was diagnosed with rheumatoid arthritis in the past but did not want to take toxic medications and did not want to keep taking prednisone because of potential toxicities. Therefore, she decided that she would \"tough out\" her symptoms and try to enjoy her life. Then, more recently she was diagnosed with Parkinson's disease that has been significantly affecting her quality of life. She is treated for the Parkinson's disease and she believes that the treatment is helping. However, she is also having worsening joint pain and stiffness. Morning stiffness lasts for all day. All of her joints hurt, " "including her bilateral shoulders, neck, spine, hands, wrists, elbows, hips, knees, ankles, and feet. She tells me that she is unable to raise her arms without assistance from the contralateral arm, if she is able to get that hand over to the other side. Mainly, her right shoulder is affected. She tells me that she has a little more mobility in her left shoulder. She tells me that her entire spine is fused, and that it \"occurred naturally.\"  Overall, she tells me that she feels miserable and is willing to start treatment, but is still scared of most of the medication side effects.    Today, she reports that she continues to have pain. She says that she still feels much better with methotrexate, and is afraid to use biologic DMARD's but is considering it still. She would be happy to take another pill if there is one available. Osteoarthritis is still an issue, primarily in her shoulders. She also notices having lower extremity swelling that may occur at anytime of the day, occurs worse on the left lower extremity, but often occurs in both. Stable neck pain for years.     Denies fevers, chills, nausea, vomiting, constipation, diarrhea. No abdominal pain. No chest pain/pressure, palpitations, or shortness of breath.   No oral or nasal sores.  No rash. No sicca symptoms.      Tobacco: none  EtOH: 1 drink at [place] only  Drugs: none    ROS   GEN: No fevers, chills, night sweats.    SKIN: No itching, rashes, sores  HEENT: No oral or nasal ulcers.  CV: No chest pain, pressure, palpitations, or dyspnea on exertion.  PULM: No SOB, wheeze, cough.  GI: No nausea, vomiting, constipation, diarrhea. No blood in stool. No abdominal pain.  : No blood in urine.  MSK: See HPI.  NEURO: No numbness or tingling  EXT: See history of present illness  PSYCH: Negative    Active Problem List     Patient Active Problem List   Diagnosis     Cataract, OU     Osteoporosis     Rheumatoid arthritis (H)     Parkinson disease (H)     " Osteoarthritis of wrist     Osteoarthritis of shoulder     History of total knee arthroplasty     Osteoarthritis of left knee     Advanced directives, counseling/discussion     CARDIOVASCULAR SCREENING; LDL GOAL LESS THAN 160     High risk medication use     Underweight     Past Medical History     Past Medical History:   Diagnosis Date     Hyperlipidemia      Murmur, heart     hsm     Nonsenile cataract      Osteoarthrosis, unspecified whether generalized or localized, lower leg      Osteopenia      Rheumatoid arthritis(714.0)      Past Surgical History     Past Surgical History:   Procedure Laterality Date     GALLBLADDER SURGERY       HYSTERECTOMY       KNEE SURGERY      left      LAMINECTOMY LUMBAR ONE LEVEL       TONSILLECTOMY       Allergy     Allergies   Allergen Reactions     Decadrol [Dexamethasone Sodium Phosphate]      Shrimp      Current Medication List     Current Outpatient Prescriptions   Medication Sig     carbidopa-levodopa (SINEMET)  MG per tablet 1.5 tablets at 6 am. 2.5 tablets at Noon. 2 tablets at 5PM. 0.5  Tablet at 9 PM. 1 tablet at Midnight.     betamethasone, augmented, (DIPROLENE) 0.05 % lotion Apply daily on itchy areas of the scalp as needed, on days not using the shampoo.     folic acid (FOLVITE) 1 MG tablet Take 1 tablet (1 mg) by mouth daily     methotrexate sodium 2.5 MG TABS Take 20 mg by mouth once a week . Take all 8 tablets on the same day of each week.     fluocinolone acetonide 0.01 % SHAM Lather onto scalp, let sit for 5 min, then rinse off. (Patient taking differently: AS NEEDED Lather onto scalp, let sit for 5 min, then rinse off.)     clobetasol (TEMOVATE) 0.05 % external solution Apply to affected area of the scalp daily on days not shampooing.  Do not apply to face. (Patient taking differently: AS NEEDED Apply to affected area of the scalp daily on days not shampooing.  Do not apply to face.)     ammonium lactate (AMLACTIN) 12 % cream Apply once to twice a day to  "dry skin. Can burn temporarily if there is a cut on the skin. (Patient taking differently: daily as needed Apply once to twice a day to dry skin. Can burn temporarily if there is a cut on the skin.)     ACE NOT PRESCRIBED, INTENTIONAL, 1 each continuous prn. ACE Inhibitor not prescribed due to Refusal by patient           CALCIUM + D OR 600mg twice a day      ASPIRIN 81 MG PO TABS PATIENT REPORTS TAKING 4 TIMES PER WEEK AND NOT EVERYDAY     MULTI-VITAMIN OR 1 a day      No current facility-administered medications for this visit.          Social History   See HPI    Family History     Family History   Problem Relation Age of Onset     CANCER Sister      pancreatic       Physical Exam     Temp Readings from Last 3 Encounters:   10/19/17 98.3  F (36.8  C)   08/07/17 98.3  F (36.8  C) (Oral)   05/15/17 97.8  F (36.6  C)     BP Readings from Last 5 Encounters:   10/19/17 112/58   10/18/17 127/67   09/21/17 126/66   08/07/17 134/65   07/20/17 142/66     Pulse Readings from Last 1 Encounters:   10/19/17 89     Resp Readings from Last 1 Encounters:   10/19/17 14     Estimated body mass index is 19.22 kg/(m^2) as calculated from the following:    Height as of this encounter: 1.626 m (5' 4\").    Weight as of this encounter: 50.8 kg (112 lb).    GEN: NAD, thin and frail appearing  HEENT: MMM. No oral lesions. Anicteric, noninjected sclera  CV: S1, S2. RRR. No m/r/g.  PULM: CTA bilaterally. No w/c.  MSK: Synovial swelling and tenderness palpation of all MCPs and PIPs. Bilateral wrist tender to palpation and with synovial swelling; minimal range of motion of the bilateral wrists. Elbows without swelling or tenderness to palpation. Right shoulder with diffuse muscle atrophy, tenderness to palpation, and almost no active range of motion; limited passive range of motion. Left shoulder with minimal range of motion, muscle atrophy, and tenderness to palpation. Bilateral hips tender to direct palpation. Spine diffusely tender to " palpation. Knees without swelling or tenderness to palpation; no increased warmth. Bilateral ankles without swelling or tenderness to palpation; limited range of motion. MTP squeeze positive bilaterally.  NEURO: UE and LE strengths 5/5 and equal bilaterally.   SKIN: No rash. Diffuse scalp hair thinning.  EXT: pitting edema of the bilateral LE distal to the ankles  PSYCH: Alert. Appropriate.    Labs / Imaging (select studies)   RF/CCP  Recent Labs   Lab Test  10/12/16   1142  07/30/13   1621  10/25/12   1127   CCPABY   --   <20 Interpretation:  Negative   --    CCPIGG  2   --    --    RHF  <20   --   9     PIERO  Recent Labs   Lab Test  10/25/12   1127   ERA  3.3*     RNP/Sm/SSA/SSB  Recent Labs   Lab Test  07/30/13   1621   ENASM  0   ENASSA  1   ENASSB  0   ENARMP  0     dsDNA  Recent Labs   Lab Test  07/30/13   1621   DNA  52*     C3/C4  Recent Labs   Lab Test  07/30/13   1621   [id number]  106   D6XBXWA  24     CBC  Recent Labs   Lab Test  10/16/17   1334  07/17/17   1341  06/16/17   1326   WBC  6.7  5.2  5.6   RBC  3.77*  3.98  3.91   HGB  10.9*  11.9  11.6*   HCT  33.7*  36.2  35.7   MCV  89  91  91   RDW  16.0*  16.2*  17.2*   PLT  313  317  285   MCH  28.9  29.9  29.7   MCHC  32.3  32.9  32.5   NEUTROPHIL  64.4  57.9  56.0   LYMPH  25.1  30.9  31.8   MONOCYTE  7.8  8.0  8.6   EOSINOPHIL  1.5  1.7  1.3   BASOPHIL  1.2  1.5  2.3   ANEU  4.3  3.0  3.1   ALYM  1.7  1.6  1.8   DORITA  0.5  0.4  0.5   AEOS  0.1  0.1  0.1   ABAS  0.1  0.1  0.1     CMP  Recent Labs   Lab Test  10/16/17   1334  07/17/17   1341  06/16/17   1326  05/15/17   1346   05/27/16   1413  05/26/15   1416  04/28/14   1209  07/30/13   1621  04/26/13   1128  10/25/12   1127   NA   --    --    --    --    --   136  137  136  140  138  137   POTASSIUM   --    --    --    --    --   3.7  4.0  4.2  4.2  4.0  4.0   CHLORIDE   --    --    --    --    --   102  103  98  101  97  99   CO2   --    --    --    --    --   25  28  27  28  25  25   ANIONGAP   --     --    --    --    --   9  6  11  11  16  13   GLC   --    --    --    --    --   94  100*  99  113*  107*  108*   BUN   --    --    --    --    --   11  9  12  15  11  11   CR  0.48*  0.42*  0.50*  0.52   < >  0.41*  0.47*  0.52  0.48*  0.48*  0.52   GFRESTIMATED  >90  >90  Non African American GFR Calc    >90  Non  GFR Calc    >90  Non  GFR Calc     < >  >90  Non  GFR Calc    >90  Non  GFR Calc    >90  >90  >90  >90   GFRESTBLACK  >90  >90  African American GFR Calc    >90   GFR Calc    >90   GFR Calc     < >  >90   GFR Calc    >90   GFR Calc    >90  >90  >90  >90   PAKO   --    --    --    --    --   9.2  9.2  9.4  9.4  9.2  9.4   BILITOTAL  0.6  0.4  0.4  0.4   < >  0.5   --   0.5  0.3  0.4  0.4   ALBUMIN  3.7  3.7  3.9  3.8   < >  3.8   --   4.4  4.2  4.4  4.3   PROTTOTAL  8.0  7.8  7.9  7.8   < >  8.8   --   8.6  8.4  8.5  8.9*   ALKPHOS  118  107  101  104   < >  108   --   119  105  104  103   AST  17  17  20  20   < >  15   --   27  23  25  26   ALT  10  11  10  10   < >  8   --   19  19  13  21    < > = values in this interval not displayed.     HgA1c  Recent Labs   Lab Test  05/27/16   1413  05/26/15   1416  10/28/13   1204   A1C  5.9  6.0  5.8     ESR/CRP  Recent Labs   Lab Test  10/16/17   1334  07/17/17   1341  04/17/17   1333   SED  37*  32*  30   CRP  10.4*  17.5*  9.5*     CK/Aldolase  Recent Labs   Lab Test  04/26/13   1128  10/02/09   1135   CKT  73  37     TSH/T4  Recent Labs   Lab Test  05/08/17   1446  05/27/16   1413  05/26/15   1416   TSH  2.12  2.36  2.43     Hepatitis B  Recent Labs   Lab Test  01/18/17   1601   HBCAB  Nonreactive   HEPBANG  Nonreactive     Hepatitis C  Recent Labs   Lab Test  01/18/17   1601   HCVAB  Nonreactive   Assay performance characteristics have not been established for newborns,   infants, and children       Tuberculosis Screening  Recent  Labs   Lab Test  01/18/17   1601   TBRSLT  Negative   TBAGN  0.00     HIV Screening  Recent Labs   Lab Test  01/18/17   1601   HIAGAB  Nonreactive   HIV-1 p24 Ag & HIV-1/HIV-2 Ab Not Detected         Recent Results (from the past 744 hour(s))   XR Cervical Spine G/E 4 Views    Narrative    CERVICAL SPINE FOUR OR MORE VIEWS October 12, 2016 1:22 PM     HISTORY: Neck pain in the setting of rheumatoid arthritis; please  evaluated for instability. Rheumatoid arthritis without rheumatoid  factor, multiple sites. Cervicalgia.    COMPARISON: Lateral views of the cervical spine 7/30/2013.      Impression    IMPRESSION: Ankylosis of the entire cervical spine again noted  consistent with the patient's history of rheumatoid arthritis. There  are no findings to suggest fracture of the cervical spine. There is no  prevertebral soft tissue swelling. The cervical vertebrae remain  rigidly aligned with no evidence for displacement or articulation in  either the flexed or extended positions. There does appear to be  flexion articulation at the C7-T1 level. There are no findings to  suggest instability. There is likely a solid fusion from the occiput  down to C7.    BASIL STUART MD   XR Sacroiliac Joint 1/2 Views    Narrative    XR SACROILIAC JOINT 1/2 VW 10/12/2016 1:22 PM    COMPARISON: None.    HISTORY: Low back pain      Impression    IMPRESSION: Stool and bowel gas significantly obscure the sacrum.  Sacroiliac joints appear grossly patent. Pelvic enthesopathy is noted.    JESSY BILLINGS   XR Chest 2 Views    Narrative    XR CHEST 2 VW  10/12/2016 1:23 PM    HISTORY:  Rheumatoid arthritis without rheumatoid factor, multiple  sites    COMPARISON:  None.      Impression    IMPRESSION:  Hyperinflation. Lungs are clear. Aortic calcification.  Otherwise negative.     ALYCIA AGUIRRE MD   XR Lumbar Spine 2/3 Views    Narrative    XR LUMBAR SPINE 2-3 VIEWS  10/12/2016 1:23 PM    HISTORY:  Low back pain, Other chronic pain    COMPARISON:   None.      Impression    IMPRESSION:  Mild lumbar scoliosis convex left. Multilevel  degenerative changes. Disc space narrowing at L2-S1, most prominent at  L5-S1 where there is complete disc space loss posteriorly. Posterior  facet degenerative changes L5.    ALYCIA AGUIRRE MD   XR Thoracic Spine 3 Views    Narrative    XR THORACIC SPINE 3 VW  10/12/2016 1:23 PM    HISTORY:  Pain in thoracic spine, Other chronic pain    COMPARISON:  None.      Impression    IMPRESSION:  Osteopenia. Thoracolumbar scoliosis. Mild multilevel  degenerative changes. Suspect mild compression fracture of T12, poorly  visualized and age indeterminate. Aortic calcification.     ALYCIA AGUIRRE MD   XR Foot Bilateral G/E 3 Views    Narrative    XR FOOT BILATERAL G/E 3 VW 10/12/2016 1:24 PM    COMPARISON: 7/30/2013    HISTORY: Rheumatoid arthritis    FINDINGS:  Right foot: Mild hallux valgus with associated mild degenerative  changes at the first metatarsophalangeal joint. Osteopenia is noted  about the right foot. No fractures are seen. No erosive changes are  identified. Achilles enthesopathy is noted.    Left foot: Osteopenia is noted. Mild hallux valgus. No fractures are  seen. No definite erosive changes are identified. Achilles  enthesopathy.      Impression    IMPRESSION: Bilateral foot osteopenia again noted, as well as mild  hallux valgus in both feet. No erosive changes to suggest inflammatory  arthropathy in the feet.    JESSY BILLINGS   XR Hand Bilateral G/E 3 Views    Narrative    HAND BILATERAL THREE OR MORE VIEWS October 12, 2016 1:24 PM    HISTORY: Rheumatoid arthritis.    COMPARISON: 7/30/2015.    FINDINGS:    Right hand: Diffuse osteopenia is again noted. Polyarticular erosive  changes are again demonstrated, and do not appear significantly  changed on radiographs since 7/30/2013. These are most pronounced at  the first interphalangeal joint, second proximal interphalangeal  joint, first, second and third metacarpophalangeal  joints.  Additionally, there is near complete destruction of the radiocarpal  joint, this has progressed since 7/30/2013 with the proximal carpal  row being indistinguishable from the distal radius and ulna. No  fractures are seen.    Left hand: Diffuse osteopenia is again noted. Polyarticular erosive  changes are also again seen on this side, unchanged at the first,  second and third metacarpophalangeal joints as well as the first  interphalangeal joint and the second and fifth proximal  interphalangeal joints, but slightly worsened at the third proximal  interphalangeal joint. Destructive changes in the carpus and  radiocarpal joint are again noted, slightly worsened since 7/30/2013.  No fractures are identified.      Impression    IMPRESSION: Bilateral hand osteopenia and polyarticular erosive  changes consistent with patient's history of rheumatoid arthritis.  Findings are worsened in the carpus and radiocarpal joint on the right  as well as the third proximal interphalangeal joint and  carpus/radiocarpal joint on the left.    JESSY BILLINGS       Immunization History     Immunization History   Administered Date(s) Administered     Pneumococcal (PCV 13) 11/03/2016     Pneumococcal 23 valent 11/01/2007, 11/13/2007     TD (ADULT, 7+) 11/13/2007     Tdap (Adacel,Boostrix) 11/13/2007          Chart documentation done in part with Dragon Voice recognition Software. Although reviewed after completion, some word and grammatical error may remain.    Merrick Del Cid MD

## 2017-10-19 NOTE — Clinical Note
New swelling in the bilateral feet that she is going to talk to you about - seems like dependent edema

## 2017-10-19 NOTE — NURSING NOTE
"Chief Complaint   Patient presents with     Clinic Care Coordination - Follow-up     3 month follow up rheumatoid arthritis       Initial /58  Pulse 89  Temp 98.3  F (36.8  C)  Resp 14  Ht 5' 4\" (1.626 m)  Wt 112 lb (50.8 kg)  SpO2 96%  BMI 19.22 kg/m2 Estimated body mass index is 19.22 kg/(m^2) as calculated from the following:    Height as of this encounter: 5' 4\" (1.626 m).    Weight as of this encounter: 112 lb (50.8 kg).  BP completed using cuff size: tyler Bucio Cma                "

## 2017-10-20 NOTE — PROGRESS NOTES
ESTABLISHED PATIENT NEUROLOGY NOTE    LOCATION: [facility]  DATE OF VISIT: 10/18/2017    NAME: Ms.Nancy SAEID Leos  : 1940 (77 year old)  MR #: [id number]    PRIMARY/REFERRING PROVIDER: Leah Blanca MD    REASON FOR VISIT: Parkinson's disease    HISTORY OF PRESENT ILLNESS: Ms. Leos is accompanied by daughter. 77-year-old woman with Parkinson disease since .    She has been compliant with regular Sinemet 25/100.  Taking 1-1/2 tablets at 6 AM, 2 tablets at 12 noon, 1-1/2 tablets between 5 and 5:30 PM, 1/2 tablet between 9 and 9:30 PM, 1 tablet between 12 and 12:30 midnight.  She relates that she has been having episodes of freezing during the day. These episodes are predominantly in the afternoon and evening.  She thinks that these episodes tend to happen when effect of the medication is likely to be wearing off.  In addition to this she has been having sudden jerk of the leg at times.  Denies any associated symptoms of these myoclonic jerks in the night, especially with the onset of the sleep or at the time she is about to wake up.  No history of having dreams of falling from the stairs or heights.    She saw a dermatologist and her gabapentin was suggested for itching.  She did not want to take it.  She had seborrheic dermatitis.    We were running late due to 2 patients arriving late and one of them was late by 30 minutes. Apologized to her and her daughter for it.     Allergies   Allergen Reactions     Decadrol [Dexamethasone Sodium Phosphate]      Shrimp      Current Outpatient Prescriptions   Medication Sig     carbidopa-levodopa (SINEMET)  MG per tablet 1.5 tablets at 6 am. 2.5 tablets at Noon. 2 tablets at 5PM. 0.5  Tablet at 9 PM. 1 tablet at Midnight.     betamethasone, augmented, (DIPROLENE) 0.05 % lotion Apply daily on itchy areas of the scalp as needed, on days not using the shampoo.     folic acid (FOLVITE) 1 MG tablet Take 1 tablet (1 mg) by  mouth daily     fluocinolone acetonide 0.01 % SHAM Lather onto scalp, let sit for 5 min, then rinse off. (Patient taking differently: AS NEEDED Lather onto scalp, let sit for 5 min, then rinse off.)     clobetasol (TEMOVATE) 0.05 % external solution Apply to affected area of the scalp daily on days not shampooing.  Do not apply to face. (Patient taking differently: AS NEEDED Apply to affected area of the scalp daily on days not shampooing.  Do not apply to face.)     ammonium lactate (AMLACTIN) 12 % cream Apply once to twice a day to dry skin. Can burn temporarily if there is a cut on the skin. (Patient taking differently: daily as needed Apply once to twice a day to dry skin. Can burn temporarily if there is a cut on the skin.)     CALCIUM + D OR 600mg twice a day      MULTI-VITAMIN OR 1 a day      methotrexate sodium 2.5 MG TABS Take 20 mg by mouth once a week . Take all 8 tablets on the same day of each week.     hydroxychloroquine (PLAQUENIL) 200 MG tablet Take 1 tablet (200 mg) by mouth daily     ACE NOT PRESCRIBED, INTENTIONAL, 1 each continuous prn. ACE Inhibitor not prescribed due to Refusal by patient           ASPIRIN 81 MG PO TABS PATIENT REPORTS TAKING 4 TIMES PER WEEK AND NOT EVERYDAY     No current facility-administered medications for this visit.        Current Outpatient Prescriptions on File Prior to Visit:  betamethasone, augmented, (DIPROLENE) 0.05 % lotion Apply daily on itchy areas of the scalp as needed, on days not using the shampoo.   folic acid (FOLVITE) 1 MG tablet Take 1 tablet (1 mg) by mouth daily   fluocinolone acetonide 0.01 % SHAM Lather onto scalp, let sit for 5 min, then rinse off. (Patient taking differently: AS NEEDED Lather onto scalp, let sit for 5 min, then rinse off.)   clobetasol (TEMOVATE) 0.05 % external solution Apply to affected area of the scalp daily on days not shampooing.  Do not apply to face. (Patient taking differently: AS NEEDED Apply to affected area of the scalp  "daily on days not shampooing.  Do not apply to face.)   ammonium lactate (AMLACTIN) 12 % cream Apply once to twice a day to dry skin. Can burn temporarily if there is a cut on the skin. (Patient taking differently: daily as needed Apply once to twice a day to dry skin. Can burn temporarily if there is a cut on the skin.)   CALCIUM + D OR 600mg twice a day    MULTI-VITAMIN OR 1 a day    ACE NOT PRESCRIBED, INTENTIONAL, 1 each continuous prn. ACE Inhibitor not prescribed due to Refusal by patient   ASPIRIN 81 MG PO TABS PATIENT REPORTS TAKING 4 TIMES PER WEEK AND NOT EVERYDAY     No current facility-administered medications on file prior to visit.   Past Medical History:   Diagnosis Date     Hyperlipidemia      Murmur, heart     hsm     Nonsenile cataract      Osteoarthrosis, unspecified whether generalized or localized, lower leg      Osteopenia      Rheumatoid arthritis(714.0)      Past Surgical History:   Procedure Laterality Date     GALLBLADDER SURGERY       HYSTERECTOMY       KNEE SURGERY      left      LAMINECTOMY LUMBAR ONE LEVEL       TONSILLECTOMY       PERSONAL & SOCIAL HISTORY Reviewed and documented in [facility]    GENERAL EXAMINATION: /67 (BP Location: Right arm, Patient Position: Chair, Cuff Size: Adult Regular)  Pulse 87  Ht 1.626 m (5' 4\")  Wt 51 kg (112 lb 6.4 oz)  BMI 19.29 kg/m2    IMPRESSION:   Encounter Diagnoses   Name Primary?     Parkinson disease (H) Yes     COMMENTS: Freezing episodes and Parkinson disease can be due to wearing off phenomena but they can be independent of these timings.  Minimal increases in regular Sinemet as outlined below to help her with these freezing episodes.  She is also giving history of infrequent myoclonic jerks.  Overall she is doing good with regular Sinemet for her Parkinson disease.    PLANS:   Patient Instructions   AFTER VISIT SUMMARY (AVS)  Signed Prescriptions:                        Disp   Refills    carbidopa-levodopa (SINEMET)  MG per*675 " ta*3        Si.5 tablets at 6 am. 2.5 tablets at Noon. 2 tablets           at 5PM. 0.5  Tablet at 9 PM. 1 tablet at           Midnight.  Authorizing Provider: DAJA PETERSON    Diagnostic possibilities reviewed    Preventive Neurology: Encouraged to keep physically and mentally active with particular emphasis on daily stretching exercises, walking, and healthy eating.    To call us for follow-up appointment in next 6 month(s) or earlier if needed.      Thanks to Leah Blanca MD for allowing me to participate in Ms. Leos's care. Please feel free to call me with any questions or concerns.     *Chart documentation was completed in part with Dragon voice-recognition software. Even though reviewed, some grammatical, spelling, and word errors may remain.         Daja Peterson MD, [facility]  Neurologist    Cc: Leah Blanca MD

## 2017-12-28 ENCOUNTER — OFFICE VISIT (OUTPATIENT)
Dept: DERMATOLOGY | Facility: CLINIC | Age: 77
End: 2017-12-28
Payer: MEDICARE

## 2017-12-28 DIAGNOSIS — L85.3 XEROSIS OF SKIN: ICD-10-CM

## 2017-12-28 DIAGNOSIS — Z86.69 H/O PARKINSON'S DISEASE: ICD-10-CM

## 2017-12-28 DIAGNOSIS — L21.9 DERMATITIS, SEBORRHEIC: ICD-10-CM

## 2017-12-28 PROCEDURE — 99213 OFFICE O/P EST LOW 20 MIN: CPT | Performed by: DERMATOLOGY

## 2017-12-28 ASSESSMENT — PAIN SCALES - GENERAL: PAINLEVEL: SEVERE PAIN (7)

## 2017-12-28 NOTE — LETTER
12/28/2017       RE: Alannah Leos  [address] [address] [address]     Dear Colleague,    Thank you for referring your patient, Alannah Leos, to the [facility] at [facility]. Please see a copy of my visit note below.    [facility] Dermatology Note      Dermatology Problem List:  1. H/O Parkinson's Disease  2. NUB, right upper back, shave bx 7/3/2017  3. AK, cryo  4. Seborrheic dermatitis: scalp.  -ketoconazole 2% shampoo 2x week, betamethasone valerate 0.1% lotion    Encounter Date: Dec 28, 2017    CC:  Chief Complaint   Patient presents with     RECHECK     scalp and eyebrows          History of Present Illness:  Ms. Alannah Leos is a 77 year old female who presents as a follow-up for scalp and eyebrows. The patient was last seen 7/3/17 by Dr. Sotomayor when a total skin exam was performed.  She has a shave biopsy done on her right upper back with resulted as a benign nevus. She also had cryotherapy done on her bilateral eyebrows for Actinic Keratosis.    No other lesions of concern.      Past Medical History:   Patient Active Problem List   Diagnosis     Cataract, OU     Osteoporosis     Rheumatoid arthritis (H)     Parkinson disease (H)     Osteoarthritis of wrist     Osteoarthritis of shoulder     History of total knee arthroplasty     Osteoarthritis of left knee     Advanced directives, counseling/discussion     CARDIOVASCULAR SCREENING; LDL GOAL LESS THAN 160     High risk medication use     Underweight     Past Medical History:   Diagnosis Date     Hyperlipidemia      Murmur, heart     hsm     Nonsenile cataract      Osteoarthrosis, unspecified whether generalized or localized, lower leg      Osteopenia      Rheumatoid arthritis(714.0)      Past Surgical History:   Procedure Laterality Date     GALLBLADDER SURGERY       HYSTERECTOMY       KNEE SURGERY      left      LAMINECTOMY LUMBAR ONE LEVEL       TONSILLECTOMY          Social History:  The patient is retired.  The patient denies use of tanning beds.  Patient denies drinking    Family History:  There is no family history of skin cancer.  There is a family history of cardiovascular disease.    Medications:  Current Outpatient Prescriptions   Medication Sig Dispense Refill     methotrexate sodium 2.5 MG TABS Take 20 mg by mouth once a week . Take all 8 tablets on the same day of each week. 96 tablet 0     hydroxychloroquine (PLAQUENIL) 200 MG tablet Take 1 tablet (200 mg) by mouth daily 30 tablet 3     carbidopa-levodopa (SINEMET)  MG per tablet 1.5 tablets at 6 am. 2.5 tablets at Noon. 2 tablets at 5PM. 0.5  Tablet at 9 PM. 1 tablet at Midnight. 675 tablet 3     betamethasone, augmented, (DIPROLENE) 0.05 % lotion Apply daily on itchy areas of the scalp as needed, on days not using the shampoo. 60 mL 3     folic acid (FOLVITE) 1 MG tablet Take 1 tablet (1 mg) by mouth daily 100 tablet 3     fluocinolone acetonide 0.01 % SHAM Lather onto scalp, let sit for 5 min, then rinse off. (Patient taking differently: AS NEEDED Lather onto scalp, let sit for 5 min, then rinse off.) 120 mL 3     clobetasol (TEMOVATE) 0.05 % external solution Apply to affected area of the scalp daily on days not shampooing.  Do not apply to face. (Patient taking differently: AS NEEDED Apply to affected area of the scalp daily on days not shampooing.  Do not apply to face.) 50 mL 3     ammonium lactate (AMLACTIN) 12 % cream Apply once to twice a day to dry skin. Can burn temporarily if there is a cut on the skin. (Patient taking differently: daily as needed Apply once to twice a day to dry skin. Can burn temporarily if there is a cut on the skin.) 385 g 11     ACE NOT PRESCRIBED, INTENTIONAL, 1 each continuous prn. ACE Inhibitor not prescribed due to Refusal by patient       0 each 0     CALCIUM + D OR 600mg twice a day        ASPIRIN 81 MG PO TABS PATIENT REPORTS TAKING 4 TIMES PER WEEK AND NOT  EVERYDAY       MULTI-VITAMIN OR 1 a day          Allergies   Allergen Reactions     Decadrol [Dexamethasone Sodium Phosphate]      Shrimp        Review of Systems:  -As per HPI  -Constitutional: The patient denies fatigue, fevers, chills, unintended weight loss, and night sweats.  -Skin: As above in HPI. No additional skin concerns.    Physical exam:  Vitals: There were no vitals taken for this visit.  GEN: This is a well developed, well-nourished male in no acute distress, in a pleasant mood.    SKIN: Focused examination of the scalp, face, neck and upper chest was performed.Full skin exam offered and   -There is macular erythema of the scalp with mild flaky white scale.  -No other lesions of concern on areas examined.       Impression/Plan:  1. History of Parkinson's Disease with increased risk of Melanoma    Sun precaution was advised including the use of sun screens of SPF 30 or higher, sun protective clothing, and avoidance of tanning beds.    Last total skin exam July 2017    2. Seborrheic dermatitis in the setting of Parkinson's Disease    Continues synalar shampoo and betamethasone lotion as prescribed.     Gabapentin - being managed by Dr. Peterson    Follow-up in 1 year, earlier for new or changing lesions.       Staff Involved:  Scribe/Staff      Scribe Disclosure:   I, Kenyatta Joaquin, am serving as a scribe to document services personally performed by Dr. Cori Ventura, based on data collection and the provider's statements to me.     Provider Disclosure:   The documentation recorded by the scribe accurately reflects the services I personally performed and the decisions made by me.    Cori Ventura MD    Department of Dermatology  [facility]: Phone: [phone], Fax:[phone]  [facility]: Phone: [phone], Fax: [phone]        Again, thank you for allowing me to participate in  the care of your patient.      Sincerely,    Cori Ventura MD

## 2017-12-28 NOTE — MR AVS SNAPSHOT
After Visit Summary   12/28/2017    Alannah Leos    MRN: [id number]           Patient Information     Date Of Birth          1940        Visit Information        Provider Department      12/28/2017 2:30 PM Cori Ventura MD [facility]        Today's Diagnoses     Dermatitis, seborrheic        H/O Parkinson's disease        Xerosis of skin           Follow-ups after your visit        Follow-up notes from your care team     Return in about 1 year (around 12/28/2018) for increased risk for melanoma.      Your next 10 appointments already scheduled     Jan 22, 2018  1:30 PM CST   [facility]    [address] [address]   [phone]           Please do not eat 10-12 hours before your appointment if you are coming in fasting for labs on lipids, cholesterol, or glucose (sugar). This does not apply to pregnant women. Water, hot tea and black coffee (with nothing added) are okay. Do not drink other fluids, diet soda or chew gum.            Jan 25, 2018  1:00 PM CST   [facility] Visit with Merrick Del Cid MD   [facility]    [address] [address]   [phone]            Mar 12, 2018 11:40 AM CDT   Office Visit with Leah Blanca MD   [facility] ([facility])    [address] [address] [phone]           Bring a current list of meds and any records pertaining to this visit. For Physicals, please bring immunization records and any forms needing to be filled out. Please arrive 10 minutes early to complete paperwork.            Dec 13, 2018  2:00 PM CST   [facility] Visit with [facility]   [facility]    [id number] [place] [address] [phone]              Who to contact     If you have questions or need  follow up information about today's clinic visit or your schedule please contact [facility] directly at [phone].  Normal or non-critical lab and imaging results will be communicated to you by [employer], letter or phone within 4 business days after the clinic has received the results. If you do not hear from us within 7 days, please contact the clinic through [employer] or phone. If you have a critical or abnormal lab result, we will notify you by phone as soon as possible.  Submit refill requests through [employer] or call your pharmacy and they will forward the refill request to us. Please allow 3 business days for your refill to be completed.          Additional Information About Your Visit        [employer] Information     [employer] is an electronic gateway that provides easy, online access to your medical records. With [employer], you can request a clinic appointment, read your test results, renew a prescription or communicate with your care team.     To sign up for [employer] visit the website at [email or web address]   You will be asked to enter the access code listed below, as well as some personal information. Please follow the directions to create your username and password.     Your access code is: [email or web address]  Expires: 2018  2:51 PM     Your access code will  in 90 days. If you need help or a new code, please contact your [facility] or call [phone] for assistance.        Care EveryWhere ID     This is your Care EveryWhere ID. This could be used by other organizations to access your [place] medical records  [id number]         Blood Pressure from Last 3 Encounters:   10/19/17 112/58   10/18/17 127/67   17 126/66    Weight from Last 3 Encounters:   10/19/17 50.8 kg (112 lb)   10/18/17 51 kg (112 lb 6.4 oz)   17 52.2 kg (115 lb)              Today, you had the following     No orders found for display         Today's Medication Changes           These changes are accurate as of: 12/28/17  2:52 PM.  If you have any questions, ask your nurse or doctor.               These medicines have changed or have updated prescriptions.        Dose/Directions    ammonium lactate 12 % cream   Commonly known as:  AMLACTIN   This may have changed:    - when to take this  - reasons to take this  - additional instructions   Used for:  Xerosis of skin        Apply once to twice a day to dry skin. Can burn temporarily if there is a cut on the skin.   Quantity:  385 g   Refills:  11       fluocinolone acetonide 0.01 % Sham   This may have changed:  additional instructions   Used for:  Dermatitis, seborrheic        Lather onto scalp, let sit for 5 min, then rinse off.   Quantity:  120 mL   Refills:  3                Primary Care Provider Office Phone # Fax #    Leah Shelby Blanca [phone]       [address] [place]  [facility] [address]        Equal Access to Services     [facility]: Hadii adelaida jenkins hadasho Soomaali, waaxda luqadaha, qaybta kaalmada adeegyada, waxay amanda hayaparna kessler . So [facility] [phone].    ATENCIÓN: Si habla español, tiene a mahan disposición servicios gratuitos de asistencia lingüística. Llame al [phone].    We comply with applicable federal civil rights laws and [place] laws. We do not discriminate on the basis of race, color, national origin, age, disability, sex, sexual orientation, or gender identity.            Thank you!     Thank you for choosing [facility]  for your care. Our goal is always to provide you with excellent care. Hearing back from our patients is one way we can continue to improve our services. Please take a few minutes to complete the written survey that you may receive in the mail after your visit with us. Thank you!             Your Updated Medication List - Protect others around you: Learn how to safely use, store and throw away your medicines at  www.disposemymeds.org.          This list is accurate as of: 12/28/17  2:52 PM.  Always use your most recent med list.                   Brand Name Dispense Instructions for use Diagnosis    ACE NOT PRESCRIBED (INTENTIONAL)     0 each    1 each continuous prn. ACE Inhibitor not prescribed due to Refusal by patient    Type 2 diabetes, HbA1c goal < 7% (H)       ammonium lactate 12 % cream    AMLACTIN    385 g    Apply once to twice a day to dry skin. Can burn temporarily if there is a cut on the skin.    Xerosis of skin       aspirin 81 MG tablet      PATIENT REPORTS TAKING 4 TIMES PER WEEK AND NOT EVERYDAY        betamethasone (augmented) 0.05 % lotion    DIPROLENE    60 mL    Apply daily on itchy areas of the scalp as needed, on days not using the shampoo.    Dermatitis, seborrheic, H/O Parkinson's disease       CALCIUM + D PO      600mg twice a day        carbidopa-levodopa  MG per tablet    SINEMET    675 tablet    1.5 tablets at 6 am. 2.5 tablets at Noon. 2 tablets at 5PM. 0.5  Tablet at 9 PM. 1 tablet at Midnight.    Parkinson disease (H)       clobetasol 0.05 % external solution    TEMOVATE    50 mL    Apply to affected area of the scalp daily on days not shampooing.  Do not apply to face.    Dermatitis, seborrheic       fluocinolone acetonide 0.01 % Sham     120 mL    Lather onto scalp, let sit for 5 min, then rinse off.    Dermatitis, seborrheic       folic acid 1 MG tablet    FOLVITE    100 tablet    Take 1 tablet (1 mg) by mouth daily    Rheumatoid arthritis of multiple sites with negative rheumatoid factor (H), High risk medication use       hydroxychloroquine 200 MG tablet    PLAQUENIL    30 tablet    Take 1 tablet (200 mg) by mouth daily    Rheumatoid arthritis of multiple sites with negative rheumatoid factor (H)       methotrexate sodium 2.5 MG Tabs     96 tablet    Take 20 mg by mouth once a week . Take all 8 tablets on the same day of each week.    Rheumatoid arthritis of multiple sites with  negative rheumatoid factor (H), High risk medication use       MULTI-VITAMIN PO      1 a day

## 2017-12-28 NOTE — NURSING NOTE
Dermatology Rooming Note    Alannah Leos's goals for this visit include:   Chief Complaint   Patient presents with     RECHECK     scalp and eyebrows        Is a scribe okay for this visit:YES    Are records needed for this visit(If yes, obtain release of information): No     Vitals: There were no vitals taken for this visit.    Referring Provider:  No referring provider defined for this encounter.

## 2017-12-28 NOTE — PROGRESS NOTES
[facility] Dermatology Note      Dermatology Problem List:  1. H/O Parkinson's Disease  2. NUB, right upper back, shave bx 7/3/2017  3. AK, cryo  4. Seborrheic dermatitis: scalp.  -ketoconazole 2% shampoo 2x week, betamethasone valerate 0.1% lotion    Encounter Date: Dec 28, 2017    CC:  Chief Complaint   Patient presents with     RECHECK     scalp and eyebrows          History of Present Illness:  Ms. Alannah Leos is a 77 year old female who presents as a follow-up for scalp and eyebrows. The patient was last seen 7/3/17 by Dr. Sotomayor when a total skin exam was performed.  She has a shave biopsy done on her right upper back with resulted as a benign nevus. She also had cryotherapy done on her bilateral eyebrows for Actinic Keratosis.    No other lesions of concern.      Past Medical History:   Patient Active Problem List   Diagnosis     Cataract, OU     Osteoporosis     Rheumatoid arthritis (H)     Parkinson disease (H)     Osteoarthritis of wrist     Osteoarthritis of shoulder     History of total knee arthroplasty     Osteoarthritis of left knee     Advanced directives, counseling/discussion     CARDIOVASCULAR SCREENING; LDL GOAL LESS THAN 160     High risk medication use     Underweight     Past Medical History:   Diagnosis Date     Hyperlipidemia      Murmur, heart     hsm     Nonsenile cataract      Osteoarthrosis, unspecified whether generalized or localized, lower leg      Osteopenia      Rheumatoid arthritis(714.0)      Past Surgical History:   Procedure Laterality Date     GALLBLADDER SURGERY       HYSTERECTOMY       KNEE SURGERY      left      LAMINECTOMY LUMBAR ONE LEVEL       TONSILLECTOMY         Social History:  The patient is retired.  The patient denies use of tanning beds.  Patient denies drinking    Family History:  There is no family history of skin cancer.  There is a family history of cardiovascular disease.    Medications:  Current Outpatient Prescriptions   Medication Sig  Dispense Refill     methotrexate sodium 2.5 MG TABS Take 20 mg by mouth once a week . Take all 8 tablets on the same day of each week. 96 tablet 0     hydroxychloroquine (PLAQUENIL) 200 MG tablet Take 1 tablet (200 mg) by mouth daily 30 tablet 3     carbidopa-levodopa (SINEMET)  MG per tablet 1.5 tablets at 6 am. 2.5 tablets at Noon. 2 tablets at 5PM. 0.5  Tablet at 9 PM. 1 tablet at Midnight. 675 tablet 3     betamethasone, augmented, (DIPROLENE) 0.05 % lotion Apply daily on itchy areas of the scalp as needed, on days not using the shampoo. 60 mL 3     folic acid (FOLVITE) 1 MG tablet Take 1 tablet (1 mg) by mouth daily 100 tablet 3     fluocinolone acetonide 0.01 % SHAM Lather onto scalp, let sit for 5 min, then rinse off. (Patient taking differently: AS NEEDED Lather onto scalp, let sit for 5 min, then rinse off.) 120 mL 3     clobetasol (TEMOVATE) 0.05 % external solution Apply to affected area of the scalp daily on days not shampooing.  Do not apply to face. (Patient taking differently: AS NEEDED Apply to affected area of the scalp daily on days not shampooing.  Do not apply to face.) 50 mL 3     ammonium lactate (AMLACTIN) 12 % cream Apply once to twice a day to dry skin. Can burn temporarily if there is a cut on the skin. (Patient taking differently: daily as needed Apply once to twice a day to dry skin. Can burn temporarily if there is a cut on the skin.) 385 g 11     ACE NOT PRESCRIBED, INTENTIONAL, 1 each continuous prn. ACE Inhibitor not prescribed due to Refusal by patient       0 each 0     CALCIUM + D OR 600mg twice a day        ASPIRIN 81 MG PO TABS PATIENT REPORTS TAKING 4 TIMES PER WEEK AND NOT EVERYDAY       MULTI-VITAMIN OR 1 a day          Allergies   Allergen Reactions     Decadrol [Dexamethasone Sodium Phosphate]      Shrimp        Review of Systems:  -As per HPI  -Constitutional: The patient denies fatigue, fevers, chills, unintended weight loss, and night sweats.  -Skin: As above in  HPI. No additional skin concerns.    Physical exam:  Vitals: There were no vitals taken for this visit.  GEN: This is a well developed, well-nourished male in no acute distress, in a pleasant mood.    SKIN: Focused examination of the scalp, face, neck and upper chest was performed.Full skin exam offered and   -There is macular erythema of the scalp with mild flaky white scale.  -No other lesions of concern on areas examined.       Impression/Plan:  1. History of Parkinson's Disease with increased risk of Melanoma    Sun precaution was advised including the use of sun screens of SPF 30 or higher, sun protective clothing, and avoidance of tanning beds.    Last total skin exam July 2017    2. Seborrheic dermatitis in the setting of Parkinson's Disease    Continues synalar shampoo and betamethasone lotion as prescribed.     Gabapentin - being managed by Dr. Peterson    Follow-up in 1 year, earlier for new or changing lesions.       Staff Involved:  Scribe/Staff      Scribe Disclosure:   I, Kenyatta Joaquin, am serving as a scribe to document services personally performed by Dr. Cori Ventura, based on data collection and the provider's statements to me.     Provider Disclosure:   The documentation recorded by the scribe accurately reflects the services I personally performed and the decisions made by me.    Cori Ventura MD    Department of Dermatology  [facility]: Phone: [phone], Fax:[phone]  [facility]: Phone: [phone], Fax: [phone]

## 2018-01-08 RX ORDER — AMMONIUM LACTATE 12 G/100G
CREAM TOPICAL
Qty: 385 G | Refills: 11 | Status: SHIPPED | OUTPATIENT
Start: 2018-01-08 | End: 2022-05-12

## 2018-01-08 RX ORDER — BETAMETHASONE DIPROPIONATE 0.5 MG/ML
LOTION, AUGMENTED TOPICAL
Qty: 60 ML | Refills: 6 | Status: SHIPPED | OUTPATIENT
Start: 2018-01-08 | End: 2018-12-13

## 2018-01-17 DIAGNOSIS — Z79.899 HIGH RISK MEDICATION USE: ICD-10-CM

## 2018-01-17 DIAGNOSIS — M06.09 RHEUMATOID ARTHRITIS OF MULTIPLE SITES WITH NEGATIVE RHEUMATOID FACTOR (H): ICD-10-CM

## 2018-01-17 RX ORDER — METHOTREXATE 2.5 MG/1
20 TABLET ORAL WEEKLY
Qty: 96 TABLET | Refills: 0 | Status: SHIPPED | OUTPATIENT
Start: 2018-01-17 | End: 2018-01-25

## 2018-01-22 DIAGNOSIS — M06.09 RHEUMATOID ARTHRITIS OF MULTIPLE SITES WITH NEGATIVE RHEUMATOID FACTOR (H): ICD-10-CM

## 2018-01-22 DIAGNOSIS — Z79.899 HIGH RISK MEDICATION USE: ICD-10-CM

## 2018-01-22 LAB
ALBUMIN SERPL-MCNC: 3.7 G/DL (ref 3.4–5)
ALP SERPL-CCNC: 102 U/L (ref 40–150)
ALT SERPL W P-5'-P-CCNC: 8 U/L (ref 0–50)
AST SERPL W P-5'-P-CCNC: 19 U/L (ref 0–45)
BASOPHILS # BLD AUTO: 0.1 10E9/L (ref 0–0.2)
BASOPHILS NFR BLD AUTO: 1.8 %
BILIRUB DIRECT SERPL-MCNC: 0.1 MG/DL (ref 0–0.2)
BILIRUB SERPL-MCNC: 0.5 MG/DL (ref 0.2–1.3)
CREAT SERPL-MCNC: 0.42 MG/DL (ref 0.52–1.04)
CRP SERPL-MCNC: 7.4 MG/L (ref 0–8)
DIFFERENTIAL METHOD BLD: ABNORMAL
EOSINOPHIL # BLD AUTO: 0.1 10E9/L (ref 0–0.7)
EOSINOPHIL NFR BLD AUTO: 1.5 %
ERYTHROCYTE [DISTWIDTH] IN BLOOD BY AUTOMATED COUNT: 17.4 % (ref 10–15)
ERYTHROCYTE [SEDIMENTATION RATE] IN BLOOD BY WESTERGREN METHOD: 31 MM/H (ref 0–30)
GFR SERPL CREATININE-BSD FRML MDRD: >90 ML/MIN/1.7M2
HCT VFR BLD AUTO: 35.6 % (ref 35–47)
HGB BLD-MCNC: 11.2 G/DL (ref 11.7–15.7)
LYMPHOCYTES # BLD AUTO: 1.6 10E9/L (ref 0.8–5.3)
LYMPHOCYTES NFR BLD AUTO: 28.5 %
MCH RBC QN AUTO: 27.9 PG (ref 26.5–33)
MCHC RBC AUTO-ENTMCNC: 31.5 G/DL (ref 31.5–36.5)
MCV RBC AUTO: 89 FL (ref 78–100)
MONOCYTES # BLD AUTO: 0.5 10E9/L (ref 0–1.3)
MONOCYTES NFR BLD AUTO: 9.2 %
NEUTROPHILS # BLD AUTO: 3.2 10E9/L (ref 1.6–8.3)
NEUTROPHILS NFR BLD AUTO: 59 %
PLATELET # BLD AUTO: 258 10E9/L (ref 150–450)
PROT SERPL-MCNC: 7.4 G/DL (ref 6.8–8.8)
RBC # BLD AUTO: 4.02 10E12/L (ref 3.8–5.2)
WBC # BLD AUTO: 5.4 10E9/L (ref 4–11)

## 2018-01-22 PROCEDURE — 82565 ASSAY OF CREATININE: CPT | Performed by: INTERNAL MEDICINE

## 2018-01-22 PROCEDURE — 80076 HEPATIC FUNCTION PANEL: CPT | Performed by: INTERNAL MEDICINE

## 2018-01-22 PROCEDURE — 85025 COMPLETE CBC W/AUTO DIFF WBC: CPT | Performed by: INTERNAL MEDICINE

## 2018-01-22 PROCEDURE — 86140 C-REACTIVE PROTEIN: CPT | Performed by: INTERNAL MEDICINE

## 2018-01-22 PROCEDURE — 36415 COLL VENOUS BLD VENIPUNCTURE: CPT | Performed by: INTERNAL MEDICINE

## 2018-01-22 PROCEDURE — 85652 RBC SED RATE AUTOMATED: CPT | Performed by: INTERNAL MEDICINE

## 2018-01-25 ENCOUNTER — OFFICE VISIT (OUTPATIENT)
Dept: RHEUMATOLOGY | Facility: CLINIC | Age: 78
End: 2018-01-25
Payer: MEDICARE

## 2018-01-25 ENCOUNTER — RADIANT APPOINTMENT (OUTPATIENT)
Dept: GENERAL RADIOLOGY | Facility: CLINIC | Age: 78
End: 2018-01-25
Attending: INTERNAL MEDICINE
Payer: MEDICARE

## 2018-01-25 VITALS
BODY MASS INDEX: 20.05 KG/M2 | HEART RATE: 84 BPM | SYSTOLIC BLOOD PRESSURE: 106 MMHG | RESPIRATION RATE: 16 BRPM | WEIGHT: 116.8 LBS | TEMPERATURE: 96.5 F | DIASTOLIC BLOOD PRESSURE: 50 MMHG | OXYGEN SATURATION: 97 %

## 2018-01-25 DIAGNOSIS — G20.A1 PARKINSON'S DISEASE (H): ICD-10-CM

## 2018-01-25 DIAGNOSIS — M54.42 MIDLINE LOW BACK PAIN WITH LEFT-SIDED SCIATICA, UNSPECIFIED CHRONICITY: ICD-10-CM

## 2018-01-25 DIAGNOSIS — Z79.899 HIGH RISK MEDICATION USE: ICD-10-CM

## 2018-01-25 DIAGNOSIS — M06.09 RHEUMATOID ARTHRITIS OF MULTIPLE SITES WITH NEGATIVE RHEUMATOID FACTOR (H): Primary | ICD-10-CM

## 2018-01-25 DIAGNOSIS — M81.8 OTHER OSTEOPOROSIS WITHOUT CURRENT PATHOLOGICAL FRACTURE: ICD-10-CM

## 2018-01-25 LAB — CALCIUM SERPL-MCNC: 9.1 MG/DL (ref 8.5–10.1)

## 2018-01-25 PROCEDURE — 72100 X-RAY EXAM L-S SPINE 2/3 VWS: CPT

## 2018-01-25 PROCEDURE — 36415 COLL VENOUS BLD VENIPUNCTURE: CPT | Performed by: INTERNAL MEDICINE

## 2018-01-25 PROCEDURE — 82306 VITAMIN D 25 HYDROXY: CPT | Performed by: INTERNAL MEDICINE

## 2018-01-25 PROCEDURE — 82310 ASSAY OF CALCIUM: CPT | Performed by: INTERNAL MEDICINE

## 2018-01-25 PROCEDURE — 86480 TB TEST CELL IMMUN MEASURE: CPT | Performed by: INTERNAL MEDICINE

## 2018-01-25 PROCEDURE — 99215 OFFICE O/P EST HI 40 MIN: CPT | Performed by: INTERNAL MEDICINE

## 2018-01-25 RX ORDER — IBUPROFEN 200 MG
200 TABLET ORAL EVERY 8 HOURS PRN
Start: 2018-01-25

## 2018-01-25 RX ORDER — METHOTREXATE 2.5 MG/1
20 TABLET ORAL WEEKLY
Qty: 96 TABLET | Refills: 1 | Status: SHIPPED | OUTPATIENT
Start: 2018-01-25 | End: 2018-05-03

## 2018-01-25 RX ORDER — HYDROXYCHLOROQUINE SULFATE 200 MG/1
200 TABLET, FILM COATED ORAL DAILY
Qty: 90 TABLET | Refills: 1 | Status: SHIPPED | OUTPATIENT
Start: 2018-01-25 | End: 2018-05-03

## 2018-01-25 NOTE — PROGRESS NOTES
Rheumatology Clinic Visit      Alannah Leos MRN# [id number]   YOB: 1940 Age: 77 year old      Date of visit: 1/25/18   PCP: Dr. Leah Blanca    Chief Complaint   Patient presents with:  RECHECK      Assessment and Plan     1. Seronegative erosive rheumatoid arthritis: Diagnosed in the 1970s. Previously on methotrexate when first diagnosed (unclear if ineffective or not, stopped by patient preference to not treat her RA at that time).  Synovitis, subluxation, and fixed flexion deformities on exam. Steroid responsive. Also with ankylosis of the cervical spine and erosive changes seen on x-rays. Currently on methotrexate 20 mg once weekly. She has improved with methotrexate, but still has severe disease. SSZ caused GI upset.  Biologic DMARDs were going to be used, preferentially Orencia because of the positive PIERO and dsDNA in the past. After prescribing it, the patient decided that she did not want to do it. We discussed other treatment options and hydroxychloroquine was started.  She continues to have significant synovitis.  We discussed treatment options again.  She is now willing to start Orencia again.  Check TB screening and if negative will put orders in for Orencia IV.    - Continue methotrexate 20 mg once weekly   - Continue folic acid 1mg daily  - Continue hydroxychloroquine 200mg daily  - Start Orencia IV if quantiferon TB is negative  - Labs today: Quantiferon TB  - Labs 2-3 days prior to the next rheumatology clinic visit: CBC, Creatinine, Hepatic Panel, ESR, CRP    # Abatacept (Orencia) Risks and Benefits: The risks and benefits of abatacept were discussed in detail and the patient verbalized understanding.  The risks discussed include, but are not limited to, the risk for hypersensitivity, anaphylaxis, anaphylactoid reactions, an increased risk for serious infections leading to hospitalization or death, and an increased risk for more frequent respiratory adverse events in patients  with COPD.  If subcutaneous injections are used, then injection site reactions and/or pain may occur at the site of injection.  The most common side effects were discussed that include headache, upper respiratory tract infections, nasopharyngitis, and nausea.  It was discussed that the medication would need to be discontinued if a serious infection develops.  It was discussed that live vaccinations should not be received while using abatacept or within 30 days prior to starting abatacept.  I encouraged reviewing the package insert and asking any questions about the medication.      2. Positive PIERO and dsDNA: These were noted on record review. She does not have symptoms to support an PIERO-associated rheumatologic disease at this time.     3. Neck pain in the setting of rheumatoid arthritis: No evidence of instability by x-rays on 10/12/2016.  Stable symptoms per patient.     4. Parkinson's Disease: Followed by Dr. Peterson (neurology) previously but he has since left.  She would like to see another neurologist.  - Neurology referral    5. Osteoporosis: per chart review.  Was on fosamax from [id number], but no f/u DEXA since 2011 from what I can find in the chart.  Check DEXA, Ca, Vitamin D  - Lab today: Calcium, Vitamin D  - DEXA ordered    6. Lower back pain: check L-spine x-ray today.  Sudden onset within the last few weeks with burning sensation down her left leg. She notes having had spine surgery in the past to clean out debris.   - X-ray: L-spine    Ms. Leos verbalized agreement with and understanding of the rational for the diagnosis and treatment plan.  All questions were answered to best of my ability and the patient's satisfaction. Ms. Leos was advised to contact the clinic with any questions that may arise after the clinic visit.      More than 50% of the face-to-face time was spent counseling the patient.  Total face-to-face time was at least 40 minutes.    Thank you for involving me in the care of the  "patient    Return to clinic: 3 months      HPI   Alannah Leos is a 77 year old female with a medical history significant for Parkinson's disease, osteoarthritis, status post TKA, osteoporosis, and rheumatoid arthritis who presents for follow-up of rheumatoid arthritis.    Previously, Ms. Leos reported that she was diagnosed with rheumatoid arthritis in the past but did not want to take toxic medications and did not want to keep taking prednisone because of potential toxicities. Therefore, she decided that she would \"tough out\" her symptoms and try to enjoy her life. Then, more recently she was diagnosed with Parkinson's disease that has been significantly affecting her quality of life. She is treated for the Parkinson's disease and she believes that the treatment is helping. However, she is also having worsening joint pain and stiffness. Morning stiffness lasts for all day. All of her joints hurt, including her bilateral shoulders, neck, spine, hands, wrists, elbows, hips, knees, ankles, and feet. She tells me that she is unable to raise her arms without assistance from the contralateral arm, if she is able to get that hand over to the other side. Mainly, her right shoulder is affected. She tells me that she has a little more mobility in her left shoulder. She tells me that her entire spine is fused, and that it \"occurred naturally.\"  Overall, she tells me that she feels miserable and is willing to start treatment, but is still scared of most of the medication side effects.    Today, she reports that she has had minimal to no improvement with hydroxychloroquine.  She believes that methotrexate has made a difference.  She says that she is okay with starting a biologic DMARD at this time.  Still with pain and stiffness in her hands.  Limited range of motion of many joints, especially her shoulders.      A few weeks ago she had sudden onset lower back pain with an associated burning sensation down her left leg.  " This pain has slightly improved.    History of osteoporosis treated with Fosamax from 2000 .  She reports only having had one bone density scan in the past.    Denies fevers, chills, nausea, vomiting, constipation, diarrhea. No abdominal pain. No chest pain/pressure, palpitations, or shortness of breath.   No oral or nasal sores.  No rash. No sicca symptoms.      Tobacco: none  EtOH: 1 drink at [place] only  Drugs: none    ROS   GEN: No fevers, chills, night sweats.    SKIN: No itching, rashes, sores  HEENT: No oral or nasal ulcers.  CV: No chest pain, pressure, palpitations, or dyspnea on exertion.  PULM: No SOB, wheeze, cough.  GI: No nausea, vomiting, constipation, diarrhea. No blood in stool. No abdominal pain.  : No blood in urine.  MSK: See HPI.  NEURO: No numbness or tingling  EXT: See history of present illness  PSYCH: Negative    Active Problem List     Patient Active Problem List   Diagnosis     Cataract, OU     Osteoporosis     Rheumatoid arthritis (H)     Parkinson disease (H)     Osteoarthritis of wrist     Osteoarthritis of shoulder     History of total knee arthroplasty     Osteoarthritis of left knee     Advanced directives, counseling/discussion     CARDIOVASCULAR SCREENING; LDL GOAL LESS THAN 160     High risk medication use     Underweight     Past Medical History     Past Medical History:   Diagnosis Date     Hyperlipidemia      Murmur, heart     hsm     Nonsenile cataract      Osteoarthrosis, unspecified whether generalized or localized, lower leg      Osteopenia      Rheumatoid arthritis(714.0)      Past Surgical History     Past Surgical History:   Procedure Laterality Date     GALLBLADDER SURGERY       HYSTERECTOMY       KNEE SURGERY      left      LAMINECTOMY LUMBAR ONE LEVEL       TONSILLECTOMY       Allergy     Allergies   Allergen Reactions     Decadrol [Dexamethasone Sodium Phosphate]      Shrimp      Current Medication List     Current Outpatient Prescriptions   Medication  Sig     methotrexate sodium 2.5 MG TABS Take 20 mg by mouth once a week . Take all 8 tablets on the same day of each week.     betamethasone, augmented, (DIPROLENE) 0.05 % lotion Apply daily on itchy areas of the scalp as needed, on days not using the shampoo only when flaring     ammonium lactate (AMLACTIN) 12 % cream Apply once to twice a day to dry skin. Can burn temporarily if there is a cut on the skin.     fluocinolone acetonide 0.01 % SHAM Lather onto scalp, let sit for 5 min, then rinse off.Use for up to 1 week in a row for flares     hydroxychloroquine (PLAQUENIL) 200 MG tablet Take 1 tablet (200 mg) by mouth daily     carbidopa-levodopa (SINEMET)  MG per tablet 1.5 tablets at 6 am. 2.5 tablets at Noon. 2 tablets at 5PM. 0.5  Tablet at 9 PM. 1 tablet at Midnight.     folic acid (FOLVITE) 1 MG tablet Take 1 tablet (1 mg) by mouth daily     clobetasol (TEMOVATE) 0.05 % external solution Apply to affected area of the scalp daily on days not shampooing.  Do not apply to face. (Patient not taking: Reported on 12/28/2017)     ACE NOT PRESCRIBED, INTENTIONAL, 1 each continuous prn. ACE Inhibitor not prescribed due to Refusal by patient           CALCIUM + D OR 600mg twice a day      ASPIRIN 81 MG PO TABS PATIENT REPORTS TAKING 4 TIMES PER WEEK AND NOT EVERYDAY     MULTI-VITAMIN OR 1 a day      No current facility-administered medications for this visit.          Social History   See HPI    Family History     Family History   Problem Relation Age of Onset     CANCER Sister      pancreatic       Physical Exam     Temp Readings from Last 3 Encounters:   10/19/17 98.3  F (36.8  C)   08/07/17 98.3  F (36.8  C) (Oral)   05/15/17 97.8  F (36.6  C)     BP Readings from Last 5 Encounters:   10/19/17 112/58   10/18/17 127/67   09/21/17 126/66   08/07/17 134/65   07/20/17 142/66     Pulse Readings from Last 1 Encounters:   10/19/17 89     Resp Readings from Last 1 Encounters:   10/19/17 14     Estimated body mass index  "is 19.22 kg/(m^2) as calculated from the following:    Height as of 10/19/17: 1.626 m (5' 4\").    Weight as of 10/19/17: 50.8 kg (112 lb).    GEN: NAD, thin and frail appearing  HEENT: MMM. No oral lesions. Anicteric, noninjected sclera  CV: S1, S2. RRR. No m/r/g.  PULM: CTA bilaterally. No w/c.  MSK: Synovial swelling and tenderness palpation of all MCPs and PIPs. Bilateral wrist tender to palpation and with synovial swelling; minimal range of motion of the bilateral wrists. Elbows without swelling or tenderness to palpation. Right shoulder with diffuse muscle atrophy, tenderness to palpation, and almost no active range of motion; limited passive range of motion. Left shoulder with minimal range of motion, muscle atrophy, and tenderness to palpation. Bilateral hips tender to direct palpation. Spine diffusely tender to palpation. Knees without swelling or tenderness to palpation; no increased warmth. Bilateral ankles without swelling or tenderness to palpation; limited range of motion. MTP squeeze positive bilaterally.  NEURO: UE and LE strengths 5/5 and equal bilaterally.   SKIN: No rash. Diffuse scalp hair thinning.  EXT: pitting edema of the bilateral LE distal to the ankles  PSYCH: Alert. Appropriate.    Labs / Imaging (select studies)   RF/CCP  Recent Labs   Lab Test  10/12/16   1142  07/30/13   1621  10/25/12   1127   CCPABY   --   <20 Interpretation:  Negative   --    CCPIGG  2   --    --    RHF  <20   --   9     PIERO  Recent Labs   Lab Test  10/25/12   1127   ERA  3.3*     RNP/Sm/SSA/SSB  Recent Labs   Lab Test  07/30/13   1621   ENASM  0   ENASSA  1   ENASSB  0   ENARMP  0     dsDNA  Recent Labs   Lab Test  07/30/13   1621   DNA  52*     C3/C4  Recent Labs   Lab Test  07/30/13   1621   I8ZGCUY  106   O6LWHGY  24     CBC  Recent Labs   Lab Test  01/22/18   1336  10/16/17   1334  07/17/17   1341   WBC  5.4  6.7  5.2   RBC  4.02  3.77*  3.98   HGB  11.2*  10.9*  11.9   HCT  35.6  33.7*  36.2   MCV  89  89  91 "   RDW  17.4*  16.0*  16.2*   PLT  258  313  317   MCH  27.9  28.9  29.9   MCHC  31.5  32.3  32.9   NEUTROPHIL  59.0  64.4  57.9   LYMPH  28.5  25.1  30.9   MONOCYTE  9.2  7.8  8.0   EOSINOPHIL  1.5  1.5  1.7   BASOPHIL  1.8  1.2  1.5   ANEU  3.2  4.3  3.0   ALYM  1.6  1.7  1.6   DORITA  0.5  0.5  0.4   AEOS  0.1  0.1  0.1   ABAS  0.1  0.1  0.1     CMP  Recent Labs   Lab Test  01/22/18   1336  10/16/17   1334  07/17/17   1341  06/16/17   1326   05/27/16   1413  05/26/15   1416  04/28/14   1209  07/30/13   1621  04/26/13   1128  10/25/12   1127   NA   --    --    --    --    --   136  137  136  140  138  137   POTASSIUM   --    --    --    --    --   3.7  4.0  4.2  4.2  4.0  4.0   CHLORIDE   --    --    --    --    --   102  103  98  101  97  99   CO2   --    --    --    --    --   25  28  27  28  25  25   ANIONGAP   --    --    --    --    --   9  6  11  11  16  13   GLC   --    --    --    --    --   94  100*  99  113*  107*  108*   BUN   --    --    --    --    --   11  9  12  15  11  11   CR  0.42*  0.48*  0.42*  0.50*   < >  0.41*  0.47*  0.52  0.48*  0.48*  0.52   GFRESTIMATED  >90  >90  >90  Non African American GFR Calc    >90  Non  GFR Calc     < >  >90  Non  GFR Calc    >90  Non  GFR Calc    >90  >90  >90  >90   GFRESTBLACK  >90  >90  >90  African American GFR Calc    >90   GFR Calc     < >  >90   GFR Calc    >90   GFR Calc    >90  >90  >90  >90   PAKO   --    --    --    --    --   9.2  9.2  9.4  9.4  9.2  9.4   BILITOTAL  0.5  0.6  0.4  0.4   < >  0.5   --   0.5  0.3  0.4  0.4   ALBUMIN  3.7  3.7  3.7  3.9   < >  3.8   --   4.4  4.2  4.4  4.3   PROTTOTAL  7.4  8.0  7.8  7.9   < >  8.8   --   8.6  8.4  8.5  8.9*   ALKPHOS  102  118  107  101   < >  108   --   119  105  104  103   AST  19  17  17  20   < >  15   --   27  23  25  26   ALT  8  10  11  10   < >  8   --   19  19  13  21    < > = values in this interval  not displayed.     HgA1c  Recent Labs   Lab Test  05/27/16   1413  05/26/15   1416  10/28/13   1204   A1C  5.9  6.0  5.8     Calcium/VitaminD  Recent Labs   Lab Test  05/27/16   1413  05/26/15   1416  04/28/14   1209   04/26/13   1128   10/26/11   1113   PAKO  9.2  9.2  9.4   < >  9.2   < >  9.7   D3VIT   --    --    --    --    --    --   46   VITDT  95*   --    --    --   63   --    --     < > = values in this interval not displayed.     ESR/CRP  Recent Labs   Lab Test  01/22/18   1336  10/16/17   1334  07/17/17   1341   SED  31*  37*  32*   CRP  7.4  10.4*  17.5*     CK/Aldolase  Recent Labs   Lab Test  04/26/13   1128   CKT  73     TSH/T4  Recent Labs   Lab Test  05/08/17   1446  05/27/16   1413  05/26/15   1416   TSH  2.12  2.36  2.43     Hepatitis B  Recent Labs   Lab Test  01/18/17   1601   HBCAB  Nonreactive   HEPBANG  Nonreactive     Hepatitis C  Recent Labs   Lab Test  01/18/17   1601   HCVAB  Nonreactive   Assay performance characteristics have not been established for newborns,   infants, and children       Tuberculosis Screening  Recent Labs   Lab Test  01/18/17   1601   TBRSLT  Negative   TBAGN  0.00     HIV Screening  Recent Labs   Lab Test  01/18/17   1601   HIAGAB  Nonreactive   HIV-1 p24 Ag & HIV-1/HIV-2 Ab Not Detected       UA  Recent Labs   Lab Test  07/30/13   1621   COLOR  Yellow   APPEARANCE  Clear   URINEGLC  Negative   URINEBILI  Negative   SG  1.019   URINEPH  5.5   PROTEIN  Negative   NITRITE  Negative   UBLD  Negative   LEUKEST  Trace*   WBCU  2-5*   RBCU  O - 2     Urine Microscopic  Recent Labs   Lab Test  07/30/13   1621   WBCU  2-5*   RBCU  O - 2     Urine Protein  Recent Labs   Lab Test  04/26/13   1216  04/26/12   1151  04/26/11   1128   UCRR  96  108  129     Recent Results (from the past 744 hour(s))   XR Cervical Spine G/E 4 Views    Narrative    CERVICAL SPINE FOUR OR MORE VIEWS October 12, 2016 1:22 PM     HISTORY: Neck pain in the setting of rheumatoid arthritis;  please  evaluated for instability. Rheumatoid arthritis without rheumatoid  factor, multiple sites. Cervicalgia.    COMPARISON: Lateral views of the cervical spine 7/30/2013.      Impression    IMPRESSION: Ankylosis of the entire cervical spine again noted  consistent with the patient's history of rheumatoid arthritis. There  are no findings to suggest fracture of the cervical spine. There is no  prevertebral soft tissue swelling. The cervical vertebrae remain  rigidly aligned with no evidence for displacement or articulation in  either the flexed or extended positions. There does appear to be  flexion articulation at the C7-T1 level. There are no findings to  suggest instability. There is likely a solid fusion from the occiput  down to C7.    BASIL STUART MD   XR Sacroiliac Joint 1/2 Views    Narrative    XR SACROILIAC JOINT 1/2 VW 10/12/2016 1:22 PM    COMPARISON: None.    HISTORY: Low back pain      Impression    IMPRESSION: Stool and bowel gas significantly obscure the sacrum.  Sacroiliac joints appear grossly patent. Pelvic enthesopathy is noted.    JESSY BILLINGS   XR Chest 2 Views    Narrative    XR CHEST 2 VW  10/12/2016 1:23 PM    HISTORY:  Rheumatoid arthritis without rheumatoid factor, multiple  sites    COMPARISON:  None.      Impression    IMPRESSION:  Hyperinflation. Lungs are clear. Aortic calcification.  Otherwise negative.     ALYCIA AGUIRRE MD   XR Lumbar Spine 2/3 Views    Narrative    XR LUMBAR SPINE 2-3 VIEWS  10/12/2016 1:23 PM    HISTORY:  Low back pain, Other chronic pain    COMPARISON:  None.      Impression    IMPRESSION:  Mild lumbar scoliosis convex left. Multilevel  degenerative changes. Disc space narrowing at L2-S1, most prominent at  L5-S1 where there is complete disc space loss posteriorly. Posterior  facet degenerative changes L5.    ALYCIA AGUIRRE MD   XR Thoracic Spine 3 Views    Narrative    XR THORACIC SPINE 3 VW  10/12/2016 1:23 PM    HISTORY:  Pain in thoracic spine, Other  chronic pain    COMPARISON:  None.      Impression    IMPRESSION:  Osteopenia. Thoracolumbar scoliosis. Mild multilevel  degenerative changes. Suspect mild compression fracture of T12, poorly  visualized and age indeterminate. Aortic calcification.     ALYCIA AGUIRRE MD   XR Foot Bilateral G/E 3 Views    Narrative    XR FOOT BILATERAL G/E 3 VW 10/12/2016 1:24 PM    COMPARISON: 7/30/2013    HISTORY: Rheumatoid arthritis    FINDINGS:  Right foot: Mild hallux valgus with associated mild degenerative  changes at the first metatarsophalangeal joint. Osteopenia is noted  about the right foot. No fractures are seen. No erosive changes are  identified. Achilles enthesopathy is noted.    Left foot: Osteopenia is noted. Mild hallux valgus. No fractures are  seen. No definite erosive changes are identified. Achilles  enthesopathy.      Impression    IMPRESSION: Bilateral foot osteopenia again noted, as well as mild  hallux valgus in both feet. No erosive changes to suggest inflammatory  arthropathy in the feet.    JESSY BILLINGS   XR Hand Bilateral G/E 3 Views    Narrative    HAND BILATERAL THREE OR MORE VIEWS October 12, 2016 1:24 PM    HISTORY: Rheumatoid arthritis.    COMPARISON: 7/30/2015.    FINDINGS:    Right hand: Diffuse osteopenia is again noted. Polyarticular erosive  changes are again demonstrated, and do not appear significantly  changed on radiographs since 7/30/2013. These are most pronounced at  the first interphalangeal joint, second proximal interphalangeal  joint, first, second and third metacarpophalangeal joints.  Additionally, there is near complete destruction of the radiocarpal  joint, this has progressed since 7/30/2013 with the proximal carpal  row being indistinguishable from the distal radius and ulna. No  fractures are seen.    Left hand: Diffuse osteopenia is again noted. Polyarticular erosive  changes are also again seen on this side, unchanged at the first,  second and third metacarpophalangeal  joints as well as the first  interphalangeal joint and the second and fifth proximal  interphalangeal joints, but slightly worsened at the third proximal  interphalangeal joint. Destructive changes in the carpus and  radiocarpal joint are again noted, slightly worsened since 7/30/2013.  No fractures are identified.      Impression    IMPRESSION: Bilateral hand osteopenia and polyarticular erosive  changes consistent with patient's history of rheumatoid arthritis.  Findings are worsened in the carpus and radiocarpal joint on the right  as well as the third proximal interphalangeal joint and  carpus/radiocarpal joint on the left.    JESSY BILLINGS       Immunization History     Immunization History   Administered Date(s) Administered     Pneumo Conj 13-V (2010&after) 11/03/2016     Pneumococcal 23 valent 11/01/2007, 11/13/2007     TD (ADULT, 7+) 11/13/2007     Tdap (Adacel,Boostrix) 11/13/2007          Chart documentation done in part with Dragon Voice recognition Software. Although reviewed after completion, some word and grammatical error may remain.    Merrick Del Cid MD

## 2018-01-25 NOTE — NURSING NOTE
"Chief Complaint   Patient presents with     RECHECK       Initial /50  Pulse 84  Temp 96.5  F (35.8  C) (Oral)  Resp 16  Wt 53 kg (116 lb 12.8 oz)  SpO2 97%  BMI 20.05 kg/m2 Estimated body mass index is 20.05 kg/(m^2) as calculated from the following:    Height as of 10/19/17: 1.626 m (5' 4\").    Weight as of this encounter: 53 kg (116 lb 12.8 oz).           RAPID3 (0-30) Cumulative Score  18.2          RAPID3 Weighted Score (divide #4 by 3 and that is the weighted score)  6.06         "

## 2018-01-25 NOTE — MR AVS SNAPSHOT
After Visit Summary   1/25/2018    Alannah Leos    MRN: [id number]           Patient Information     Date Of Birth          1940        Visit Information        Provider Department      1/25/2018 1:00 PM Merrick Del Cid MD [facility]        Today's Diagnoses     Rheumatoid arthritis of multiple sites with negative rheumatoid factor (H)    -  1    Midline low back pain with left-sided sciatica, unspecified chronicity        High risk medication use        Other osteoporosis without current pathological fracture        Parkinson's disease (H)          Care Instructions    For your DEXA scan (bone scan) please call:  [facility]       [phone]          Follow-ups after your visit        Additional Services     NEUROLOGY ADULT REFERRAL       Your provider has referred you for the following:   Consult at [facility]: [facility] [phone]   [email or web address]    Please be aware that coverage of these services is subject to the terms and limitations of your health insurance plan.  Call member services at your health plan with any benefit or coverage questions.      Please bring the following with you to your appointment:    (1) Any X-Rays, CTs or MRIs which have been performed.  Contact the facility where they were done to arrange for  prior to your scheduled appointment.    (2) List of current medications  (3) This referral request   (4) Any documents/labs given to you for this referral                  Your next 10 appointments already scheduled     Mar 12, 2018 11:40 AM CDT   Office Visit with Leah Blanca MD   [facility] ([facility])    [address] [place] [address] [phone]           Bring a current list of meds and any records pertaining to this visit. For Physicals, please bring immunization records and  any forms needing to be filled out. Please arrive 10 minutes early to complete paperwork.            Apr 30, 2018  1:30 PM CDT   LAB with FZ LAB   [facility] [place] ([facility])    [address] [facility] [address] [facility] [facility]  [place] [address]   [phone]           Please do not eat 10-12 hours before your appointment if you are coming in fasting for labs on lipids, cholesterol, or glucose (sugar). This does not apply to pregnant women. Water, hot tea and black coffee (with nothing added) are okay. Do not drink other fluids, diet soda or chew gum.            May 02, 2018  2:40 PM CDT   Return Visit with Merrick Del Cid MD   [facility] [place] ([facility])    [address] [place] [address]  [place] [address]   [phone]            Dec 13, 2018  2:00 PM CST   Return Visit with Cori Ventura MD   [facility] ([facility])    [address] [place] [address] [phone]              Future tests that were ordered for you today     Open Future Orders        Priority Expected Expires Ordered    Erythrocyte sedimentation rate auto Routine 4/21/2018 5/10/2018 1/25/2018    CRP inflammation Routine 4/21/2018 5/10/2018 1/25/2018    Hepatic panel Routine 4/21/2018 5/10/2018 1/25/2018    XR Lumbar Spine 2/3 Views Routine 1/25/2018 1/26/2019 1/25/2018    DX Hip/Pelvis/Spine Routine  1/26/2019 1/25/2018    CBC with platelets differential Routine 4/21/2018 5/10/2018 1/25/2018    Creatinine Routine 4/21/2018 5/10/2018 1/25/2018            Who to contact     If you have questions or need follow up information about today's clinic visit or your schedule please contact [facility] [place] directly at [phone].  Normal or non-critical lab and imaging results will be communicated to you by MyChart, letter or phone within 4 business days after the clinic has received the results. If you do not hear from us within 7 days, please contact the clinic through  "Withlocalshart or phone. If you have a critical or abnormal lab result, we will notify you by phone as soon as possible.  Submit refill requests through Quick TV or call your pharmacy and they will forward the refill request to us. Please allow 3 business days for your refill to be completed.          Additional Information About Your Visit        Withlocalshart Information     Quick TV lets you send messages to your doctor, view your test results, renew your prescriptions, schedule appointments and more. To sign up, go to [email or web address] . Click on \"Log in\" on the left side of the screen, which will take you to the Welcome page. Then click on \"Sign up Now\" on the right side of the page.     You will be asked to enter the access code listed below, as well as some personal information. Please follow the directions to create your username and password.     Your access code is: [email or web address]  Expires: 2018  1:50 PM     Your access code will  in 90 days. If you need help or a new code, please call your [place] clinic or [phone].        Care EveryWhere ID     This is your Care EveryWhere ID. This could be used by other organizations to access your [place] medical records  [id number]        Your Vitals Were     Pulse Temperature Respirations Pulse Oximetry BMI (Body Mass Index)       84 96.5  F (35.8  C) (Oral) 16 97% 20.05 kg/m2        Blood Pressure from Last 3 Encounters:   18 106/50   10/19/17 112/58   10/18/17 127/67    Weight from Last 3 Encounters:   18 53 kg (116 lb 12.8 oz)   10/19/17 50.8 kg (112 lb)   10/18/17 51 kg (112 lb 6.4 oz)              We Performed the Following     Calcium     M Tuberculosis by Quantiferon     NEUROLOGY ADULT REFERRAL     Vitamin D Deficiency          Today's Medication Changes          These changes are accurate as of 18  1:50 PM.  If you have any questions, ask your nurse or doctor.               Start taking these medicines.        Dose/Directions    " ibuprofen 200 MG tablet   Commonly known as:  ADVIL/MOTRIN   Used for:  Rheumatoid arthritis of multiple sites with negative rheumatoid factor (H)   Started by:  Merrick Del Cid MD        Dose:  200 mg   Take 1 tablet (200 mg) by mouth every 8 hours as needed for moderate pain   Refills:  0            Where to get your medicines      These medications were sent to [facility] [address] [place] [address] [address] [place] [address] AT [facility] [place]  [address] [facility] [address]     Phone:  [phone]     hydroxychloroquine 200 MG tablet    methotrexate sodium 2.5 MG Tabs         Some of these will need a paper prescription and others can be bought over the counter.  Ask your nurse if you have questions.     You don't need a prescription for these medications     ibuprofen 200 MG tablet                Primary Care Provider Office Phone # Fax #    Leah Shelby Blanca [phone]       [address]  [facility] [address]        Equal Access to Services     [facility]: Hadii aad ku hadasho Soomaali, waaxda luqadaha, qaybta kaalmada adeegyada, waxay idiin hayaparna kessler . So [facility] [phone].    ATENCIÓN: Si habla español, tiene a mahan disposición servicios gratuitos de asistencia lingüística. Llame al [phone].    We comply with applicable federal civil rights laws and [place] laws. We do not discriminate on the basis of race, color, national origin, age, disability, sex, sexual orientation, or gender identity.            Thank you!     Thank you for choosing [facility]  for your care. Our goal is always to provide you with excellent care. Hearing back from our patients is one way we can continue to improve our services. Please take a few minutes to complete the written survey that you may receive in the mail after your visit with us. Thank you!             Your Updated Medication List - Protect others around you: Learn how to  safely use, store and throw away your medicines at www.disposemymeds.org.          This list is accurate as of 1/25/18  1:50 PM.  Always use your most recent med list.                   Brand Name Dispense Instructions for use Diagnosis    ACE NOT PRESCRIBED (INTENTIONAL)     0 each    1 each continuous prn. ACE Inhibitor not prescribed due to Refusal by patient    Type 2 diabetes, HbA1c goal < 7% (H)       ammonium lactate 12 % cream    AMLACTIN    385 g    Apply once to twice a day to dry skin. Can burn temporarily if there is a cut on the skin.    Xerosis of skin       aspirin 81 MG tablet      PATIENT REPORTS TAKING 4 TIMES PER WEEK AND NOT EVERYDAY        betamethasone (augmented) 0.05 % lotion    DIPROLENE    60 mL    Apply daily on itchy areas of the scalp as needed, on days not using the shampoo only when flaring    Dermatitis, seborrheic, H/O Parkinson's disease       CALCIUM + D PO      600mg twice a day        carbidopa-levodopa  MG per tablet    SINEMET    675 tablet    1.5 tablets at 6 am. 2.5 tablets at Noon. 2 tablets at 5PM. 0.5  Tablet at 9 PM. 1 tablet at Midnight.    Parkinson disease (H)       clobetasol 0.05 % external solution    TEMOVATE    50 mL    Apply to affected area of the scalp daily on days not shampooing.  Do not apply to face.    Dermatitis, seborrheic       fluocinolone acetonide 0.01 % Sham     120 mL    Lather onto scalp, let sit for 5 min, then rinse off.Use for up to 1 week in a row for flares    Dermatitis, seborrheic       folic acid 1 MG tablet    FOLVITE    100 tablet    Take 1 tablet (1 mg) by mouth daily    Rheumatoid arthritis of multiple sites with negative rheumatoid factor (H), High risk medication use       hydroxychloroquine 200 MG tablet    PLAQUENIL    90 tablet    Take 1 tablet (200 mg) by mouth daily    Rheumatoid arthritis of multiple sites with negative rheumatoid factor (H)       ibuprofen 200 MG tablet    ADVIL/MOTRIN     Take 1 tablet (200 mg) by mouth  every 8 hours as needed for moderate pain    Rheumatoid arthritis of multiple sites with negative rheumatoid factor (H)       methotrexate sodium 2.5 MG Tabs     96 tablet    Take 20 mg by mouth once a week . Take all 8 tablets on the same day of each week.    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

## 2018-01-25 NOTE — PATIENT INSTRUCTIONS
For your DEXA scan (bone scan) please call:  [facility]       [phone]

## 2018-01-26 LAB — DEPRECATED CALCIDIOL+CALCIFEROL SERPL-MC: 39 UG/L (ref 20–75)

## 2018-01-26 NOTE — PROGRESS NOTES
Rheumatology team: Please call to notify Ms. Leos that the lumbar spine x-ray shows severe degenerative changes.  Physical therapy or seeing a spine specialist is advised. Let me know what she would like to do so I can put in the appropriate order.    Merrick Del Cid MD  1/26/2018 12:10 PM

## 2018-01-29 ENCOUNTER — RADIANT APPOINTMENT (OUTPATIENT)
Dept: BONE DENSITY | Facility: CLINIC | Age: 78
End: 2018-01-29
Attending: INTERNAL MEDICINE
Payer: MEDICARE

## 2018-01-29 DIAGNOSIS — M06.09 RHEUMATOID ARTHRITIS OF MULTIPLE SITES WITH NEGATIVE RHEUMATOID FACTOR (H): ICD-10-CM

## 2018-01-29 DIAGNOSIS — M81.8 OTHER OSTEOPOROSIS WITHOUT CURRENT PATHOLOGICAL FRACTURE: ICD-10-CM

## 2018-01-29 LAB
M TB TUBERC IFN-G BLD QL: NEGATIVE
M TB TUBERC IFN-G/MITOGEN IGNF BLD: 0 IU/ML

## 2018-01-29 PROCEDURE — 77080 DXA BONE DENSITY AXIAL: CPT | Performed by: INTERNAL MEDICINE

## 2018-01-30 ENCOUNTER — TELEPHONE (OUTPATIENT)
Dept: RHEUMATOLOGY | Facility: CLINIC | Age: 78
End: 2018-01-30

## 2018-01-30 DIAGNOSIS — G89.29 CHRONIC MIDLINE LOW BACK PAIN, WITH SCIATICA PRESENCE UNSPECIFIED: Primary | ICD-10-CM

## 2018-01-30 DIAGNOSIS — M54.5 CHRONIC MIDLINE LOW BACK PAIN, WITH SCIATICA PRESENCE UNSPECIFIED: Primary | ICD-10-CM

## 2018-01-30 NOTE — PROGRESS NOTES
Rheumatology team: Please call to notify Ms. Leos that her bone density scan shows osteoporosis.  There has been a significant decrease in bone density in the lumbar spine and hips.  Treatment for this is to restart Fosamax (alendronate) that she was on previously; please let me know if she would like to start this now or we can discuss this at her follow-up clinic visit.  Additionally, she needs to ensure that she is taking calcium 1200 mg daily and vitamin D 1000 international units daily.    Merrick Del Cid MD  1/30/2018 7:53 AM

## 2018-01-30 NOTE — PROGRESS NOTES
Rheumatology team: Please call to notify Ms. Leos that the tuberculosis screening was negative.  Therefore, I have placed the orders for intravenous Orencia.  She needs to call the infusion center at [place] to schedule her appointment.  Please provide her with the phone number so that she may call to schedule the infusion appointment.    Merrick Del Cid MD  1/30/2018 7:43 AM

## 2018-01-30 NOTE — TELEPHONE ENCOUNTER
I called Ms. Leos to review the DEXA report but there was no answer.  Will put in a result note for her to be called and may review at f/u appointment.    Merrick Del Cid MD  1/30/2018 7:50 AM

## 2018-01-31 NOTE — PROGRESS NOTES
Rheumatology team: Please call to notify Ms. Leos that the PT referral has been placed.  If no improvement with PT then she may see the spine specialist (sports medicine) and I have put in this order too. Please mail her the referrals.    Merrick Del Cid MD  1/30/2018 7:31 PM

## 2018-02-05 ENCOUNTER — THERAPY VISIT (OUTPATIENT)
Dept: PHYSICAL THERAPY | Facility: CLINIC | Age: 78
End: 2018-02-05
Payer: MEDICARE

## 2018-02-05 DIAGNOSIS — M54.5 ACUTE BILATERAL LOW BACK PAIN, WITH SCIATICA PRESENCE UNSPECIFIED: Primary | ICD-10-CM

## 2018-02-05 PROCEDURE — G8981 BODY POS CURRENT STATUS: HCPCS | Mod: GP | Performed by: PHYSICAL THERAPIST

## 2018-02-05 PROCEDURE — G8982 BODY POS GOAL STATUS: HCPCS | Mod: GP | Performed by: PHYSICAL THERAPIST

## 2018-02-05 PROCEDURE — 97112 NEUROMUSCULAR REEDUCATION: CPT | Mod: GP | Performed by: PHYSICAL THERAPIST

## 2018-02-05 PROCEDURE — 97161 PT EVAL LOW COMPLEX 20 MIN: CPT | Mod: GP | Performed by: PHYSICAL THERAPIST

## 2018-02-05 PROCEDURE — 97110 THERAPEUTIC EXERCISES: CPT | Mod: GP | Performed by: PHYSICAL THERAPIST

## 2018-02-05 NOTE — MR AVS SNAPSHOT
After Visit Summary   2/5/2018    Alannah Leos    MRN: [id number]           Patient Information     Date Of Birth          1940        Visit Information        Provider Department      2/5/2018 1:40 PM Gogo Christensen, PT [facility]        Today's Diagnoses     Acute bilateral low back pain, with sciatica presence unspecified    -  1       Follow-ups after your visit        Your next 10 appointments already scheduled     Feb 08, 2018  3:00 PM CST   Level 1 with Byron 5 [facility] ([facility])    [address] [place] [address]   [phone]            Feb 09, 2018  1:00 PM CST   New Visit with Ricardo Johnson MD   [facility])    [address] [place] [address]   [phone]            Feb 12, 2018  1:20 PM CST   [facility] Spine with Quoc VELAZQUEZ Sonol, PT   [facility] ([facility] [place])    [address] [facility] [address]  [facility] [address]   [phone]            Feb 19, 2018  1:20 PM CST   [facility] Sp[facility] with Quoc VELAZQUEZ Sondrol, PT   [facility] ([facility] [place])    [address] [address]   [phone]            Mar 12, 2018 11:40 AM CDT   Office Visit with Leah Blanca MD   [facility] ([facility])    [address] [place] [id number] [phone]           Bring a current list of meds and any records pertaining to this visit. For Physicals, please bring immunization records and any forms needing to be filled out. Please arrive 10 minutes early to complete paperwork.            Apr 30, 2018  1:30 PM CDT   LAB with FZ LAB   [facility] ([facility])    [address] [facility] [address]   [phone]           Please do not eat 10-12 hours before your appointment if you are coming in fasting for labs on lipids, cholesterol, or  "glucose (sugar). This does not apply to pregnant women. Water, hot tea and black coffee (with nothing added) are okay. Do not drink other fluids, diet soda or chew gum.            May 02, 2018  2:40 PM CDT   Return Visit with Merrick Del Cid MD   [facility]    [address] [facility]  [place] [address]   [phone]            Dec 13, 2018  2:00 PM CST   Return Visit with Cori Ventura MD   [facility] ([facility])    [address] [place] [address] [phone]              Who to contact     If you have questions or need follow up information about today's clinic visit or your schedule please contact [facility] directly at [phone].  Normal or non-critical lab and imaging results will be communicated to you by MyChart, letter or phone within 4 business days after the clinic has received the results. If you do not hear from us within 7 days, please contact the clinic through MyChart or phone. If you have a critical or abnormal lab result, we will notify you by phone as soon as possible.  Submit refill requests through PneumRx or call your pharmacy and they will forward the refill request to us. Please allow 3 business days for your refill to be completed.          Additional Information About Your Visit        EP[employer] Research & Diagnosticshart Information     PneumRx lets you send messages to your doctor, view your test results, renew your prescriptions, schedule appointments and more. To sign up, go to [email or web address] . Click on \"Log in\" on the left side of the screen, which will take you to the Welcome page. Then click on \"Sign up Now\" on the right side of the page.     You will be asked to enter the access code listed below, as well as some personal information. Please follow the directions to create your username and password.     Your access code is: [email or web address]  Expires: 2018  1:50 PM     Your access code will  in " 90 days. If you need help or a new code, please call your [place] clinic or [phone].        Care EveryWhere ID     This is your Care EveryWhere ID. This could be used by other organizations to access your [place] medical records  [id number]         Blood Pressure from Last 3 Encounters:   01/25/18 106/50   10/19/17 112/58   10/18/17 127/67    Weight from Last 3 Encounters:   01/25/18 53 kg (116 lb 12.8 oz)   10/19/17 50.8 kg (112 lb)   10/18/17 51 kg (112 lb 6.4 oz)              We Performed the Following     HC PT EVAL, LOW COMPLEXITY     [facility] CERT REPORT     NEUROMUSCULAR RE-EDUCATION     THERAPEUTIC EXERCISES        Primary Care Provider Office Phone # Fax #    Leah Shelby Blanca [phone] [address] [place]  [facility] [address]        Equal Access to Services     [facility]: Hadii aad ku hadasho Soomaali, waaxda luqadaha, qaybta kaalmada adeegyada, waxay idiin hayaan alissa kessler . So [facility] [phone].    ATENCIÓN: Si habla español, tiene a mhaan disposición servicios gratuitos de asistencia lingüística. Leanne al [phone].    We comply with applicable federal civil rights laws and [place] laws. We do not discriminate on the basis of race, color, national origin, age, disability, sex, sexual orientation, or gender identity.            Thank you!     Thank you for choosing [facility]  for your care. Our goal is always to provide you with excellent care. Hearing back from our patients is one way we can continue to improve our services. Please take a few minutes to complete the written survey that you may receive in the mail after your visit with us. Thank you!             Your Updated Medication List - Protect others around you: Learn how to safely use, store and throw away your medicines at www.disposemymeds.org.          This list is accurate as of 2/5/18  3:11 PM.  Always use your most recent med list.                   Brand Name Dispense Instructions for  use Diagnosis    ACE NOT PRESCRIBED (INTENTIONAL)     0 each    1 each continuous prn. ACE Inhibitor not prescribed due to Refusal by patient    Type 2 diabetes, HbA1c goal < 7% (H)       ammonium lactate 12 % cream    AMLACTIN    385 g    Apply once to twice a day to dry skin. Can burn temporarily if there is a cut on the skin.    Xerosis of skin       aspirin 81 MG tablet      PATIENT REPORTS TAKING 4 TIMES PER WEEK AND NOT EVERYDAY        betamethasone (augmented) 0.05 % lotion    DIPROLENE    60 mL    Apply daily on itchy areas of the scalp as needed, on days not using the shampoo only when flaring    Dermatitis, seborrheic, H/O Parkinson's disease       CALCIUM + D PO      600mg twice a day        carbidopa-levodopa  MG per tablet    SINEMET    675 tablet    1.5 tablets at 6 am. 2.5 tablets at Noon. 2 tablets at 5PM. 0.5  Tablet at 9 PM. 1 tablet at Midnight.    Parkinson disease (H)       clobetasol 0.05 % external solution    TEMOVATE    50 mL    Apply to affected area of the scalp daily on days not shampooing.  Do not apply to face.    Dermatitis, seborrheic       fluocinolone acetonide 0.01 % Sham     120 mL    Lather onto scalp, let sit for 5 min, then rinse off.Use for up to 1 week in a row for flares    Dermatitis, seborrheic       folic acid 1 MG tablet    FOLVITE    100 tablet    Take 1 tablet (1 mg) by mouth daily    Rheumatoid arthritis of multiple sites with negative rheumatoid factor (H), High risk medication use       hydroxychloroquine 200 MG tablet    PLAQUENIL    90 tablet    Take 1 tablet (200 mg) by mouth daily    Rheumatoid arthritis of multiple sites with negative rheumatoid factor (H)       ibuprofen 200 MG tablet    ADVIL/MOTRIN     Take 1 tablet (200 mg) by mouth every 8 hours as needed for moderate pain    Rheumatoid arthritis of multiple sites with negative rheumatoid factor (H)       methotrexate sodium 2.5 MG Tabs     96 tablet    Take 20 mg by mouth once a week . Take all 8  tablets on the same day of each week.    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

## 2018-02-05 NOTE — PROGRESS NOTES
"[facility] Initial Evaluation  Subjective:  Patient is a 77 year old female presenting with rehab back hpi.   Alannah Leos is a 77 year old female with a lumbar condition.  Condition occurred with:  Insidious onset.  Condition occurred: for unknown reasons.  This is a chronic condition   Patient and daughter (Shiloh)  present to PT today with c/o back and leg pain.  Patient stated 3-4 weeks ago the pain started in her back and \"shot\" down the legs to both feet when she bent over or tried to stand up from sitting positions. Pt was sick around the same time but is unsure if this pain is related to it or not.  Patient stated prior to this episode of pain she was doing all kinds of standing leg exercises to keep her joints moving due to history of RA; Since episode she has stopped all of her exercises due to fear of pain.  Patient referred to PT 1/29/18.  .    Patient reports pain:  Central lumbar spine, lumbar spine left and lumbar spine right.  Radiates to:  Gluteals right, gluteals left, foot left and foot right (L>R side).  Pain is described as stabbing, sharp and aching and is constant and reported as 6/10.  Associated symptoms:  Tingling and numbness. Pain is the same all the time.  Symptoms are exacerbated by other, bending, walking, standing and sitting (sit to stand; stairs) and relieved by rest and NSAID's.  Since onset symptoms are gradually improving.        General health as reported by patient is fair.  Pertinent medical history includes:  Rheumatoid arthritis, osteoarthritis and osteoporosis.  Medical allergies: yes (adhesive).  Other surgeries include:  Orthopedic surgery (Back (>10 years ago) and L TKA).  Current medications:  Anti-inflammatory.  Current occupation is Patient lives in a split level home.  Her daughter Shiloh helps her with lots of Activities of Daily Living..        Barriers include:  Requires assistance with ADL's.    Red flags:  None as reported by the " patient.                        Objective:  Standing Alignment:    Cervical/Thoracic:  Forward head and thoracic kyphosis increased  Shoulder/UE:  Rounded shoulders  Lumbar:  Lordosis decr, lateral shift R and convex scoliosis R            Gait:  Assessed rolling walker for correct height;  Encouraged pt to keep walker closer; activate TA and buttock to help with upright posture.  Also encouraged pt to look straight ahead for down at the floor to help with head position.  Gait Type:  Antalgic   Weight Bearing Status:  WBAT   Assistive Devices:  Walker  Deviations:  Lumbar:  Trunk flexionPelvis:  Decr pelvic rotation               Lumbar/SI Evaluation  ROM:    AROM Lumbar:   Flexion:              Upper thigh  Ext:                    Unable to reach neutral spine without renee knee flexion   Side Bend:        Left:  NT    Right:  NT  Rotation:           Left:  Pt refused; fear of pain    Right:  Pt refused; fear of pain  Side Glide:        Left:  NT    Right:  NT          Lumbar Myotomes:    T12-L3 (Hip Flex):  Left: 4    Right: 4  L2-4 (Quads):  Left:  4    Right:  4  L4 (Ankle DF):  Left:  4+    Right:  4+      Lumbar DTR's:  not assessed        Lumbar Dermtomes:  not assessed                  Lumbar Palpation:  Palpation (lumbar): done in standing or seated positions; pt unable to lay down on side or prone.  Tenderness present at Left:    Quadratus Lumborum; Erector Spinae; Piriformis and PSIS  Tenderness present at Right: Quadratus Lumborum; Erector Spinae; Piriformis and PSIS                                               Knee Evaluation:  ROM:  Arom wnl knee: unable to reach full extesion actively; unable to stand with knees fully extended.    AROM      Extension:  Left: 6    Right:  8  Flexion: Left: at least 100    Right: at least 100        Strength:     Extension:  Left: 4/5   Pain:      Right: 4/5   Pain:  Flexion:  Left: 4/5   Pain:      Right: 4/5   Pain:    Quad Set Left: Fair    Pain:   Quad Set Right:  Fair    Pain:                  General     ROS    Assessment/Plan:    Patient is a 77 year old female with lumbar complaints.    Patient has the following significant findings with corresponding treatment plan.                Diagnosis 1:  LBP with sciatica  Pain -  hot/cold therapy, US and manual therapy  Decreased ROM/flexibility - manual therapy and therapeutic exercise  Decreased strength - therapeutic exercise and therapeutic activities  Impaired gait - gait training  Impaired muscle performance - neuro re-education  Decreased function - therapeutic activities  Impaired posture - neuro re-education    Therapy Evaluation Codes:   1) History comprised of:   Personal factors that impact the plan of care:      Age, Past/current experiences and Time since onset of symptoms.    Comorbidity factors that impact the plan of care are:      Osteoarthritis and osteoporosis.     Medications impacting care: Anti-inflammatory.  2) Examination of Body Systems comprised of:   Body structures and functions that impact the plan of care:      Lumbar spine.   Activity limitations that impact the plan of care are:      Bending, Sitting, Stairs, Standing, Walking and sit to stand transfers.  3) Clinical presentation characteristics are:   Evolving/Changing.  4) Decision-Making    Low complexity using standardized patient assessment instrument and/or measureable assessment of functional outcome.  Cumulative Therapy Evaluation is: Low complexity.    Previous and current functional limitations:  (See Goal Flow Sheet for this information)    Short term and Long term goals: (See Goal Flow Sheet for this information)     Communication ability:  Patient appears to be able to clearly communicate and understand verbal and written communication and follow directions correctly.  Treatment Explanation - The following has been discussed with the patient:   RX ordered/plan of care  Anticipated outcomes  Possible risks and side effects  This  patient would benefit from PT intervention to resume normal activities.   Rehab potential is good.    Frequency:  1 X week, once daily  Duration:  for 8 weeks  Discharge Plan:  Achieve all LTG.  Independent in home treatment program.  Reach maximal therapeutic benefit.    Please refer to the daily flowsheet for treatment today, total treatment time and time spent performing 1:1 timed codes.

## 2018-02-05 NOTE — LETTER
"DEPARTMENT OF HEALTH AND HUMAN SERVICES  [facility] FOR [facility] & MEDICAID SERVICES    PLAN/UPDATED PLAN OF PROGRESS FOR OUTPATIENT REHABILITATION    PATIENTS NAME:  Alannah Leos   : 1940  PROVIDER NUMBER:    [id number]  [id number]   PROVIDER NAME: EDMOND RABAGO PT  MEDICAL RECORD NUMBER: [id number]     START OF CARE DATE:  SOC Date: 18   TYPE:  PT    PRIMARY/TREATMENT DIAGNOSIS: (Pertinent Medical Diagnosis)  Acute bilateral low back pain, with sciatica presence unspecified    VISITS FROM START OF CARE:  Rxs Used: 1     [facility] Initial Evaluation    Subjective:  Patient is a 77 year old female presenting with rehab back hpi.   Alannah Leos is a 77 year old female with a lumbar condition.  Condition occurred with:  Insidious onset.  Condition occurred: for unknown reasons.  This is a chronic condition   Patient and daughter (Shiloh)  present to PT today with c/o back and leg pain.  Patient stated 3-4 weeks ago the pain started in her back and \"shot\" down the legs to both feet when she bent over or tried to stand up from sitting positions. Pt was sick around the same time but is unsure if this pain is related to it or not.  Patient stated prior to this episode of pain she was doing all kinds of standing leg exercises to keep her joints moving due to history of RA; Since episode she has stopped all of her exercises due to fear of pain.  Patient referred to PT 18.    Patient reports pain:  Central lumbar spine, lumbar spine left and lumbar spine right.  Radiates to:  Gluteals right, gluteals left, foot left and foot right (L>R side).  Pain is described as stabbing, sharp and aching and is constant and reported as 6/10.  Associated symptoms:  Tingling and numbness. Pain is the same all the time.  Symptoms are exacerbated by other, bending, walking, standing and sitting (sit to stand; stairs) and relieved by rest and NSAID's.  Since onset symptoms are gradually improving. " General health as reported by patient is fair.  Pertinent medical history includes:  Rheumatoid arthritis, osteoarthritis and osteoporosis.  Medical allergies: yes (adhesive). Other surgeries include:  Orthopedic surgery (Back (>10 years ago) and L TKA).  Current medications:  Anti-inflammatory.  Current occupation is Patient lives in a split level home.  Her daughter Shiloh helps her with lots of Activities of Daily Living..        Barriers include:  Requires assistance with ADL's.  Red flags:  None as reported by the patient.    Objective:  Standing Alignment:    Cervical/Thoracic:  Forward head and thoracic kyphosis increased  Shoulder/UE:  Rounded shoulders  Lumbar:  Lordosis decr, lateral shift R and convex scoliosis R  PATIENTS NAME:  Alannah Leos   : 1940    Gait:  Assessed rolling walker for correct height;  Encouraged pt to keep walker closer; activate TA and buttock to help with upright posture.  Also encouraged pt to look straight ahead for down at the floor to help with head position.  Gait Type:  Antalgic   Weight Bearing Status:  WBAT   Assistive Devices:  Walker  Deviations:  Lumbar:  Trunk flexionPelvis:  Decr pelvic rotation    Lumbar/SI Evaluation  ROM:    AROM Lumbar:   Flexion:              Upper thigh  Ext:                    Unable to reach neutral spine without renee knee flexion   Side Bend:        Left:  NT    Right:  NT  Rotation:           Left:  Pt refused; fear of pain    Right:  Pt refused; fear of pain  Side Glide:        Left:  NT    Right:  NT        Lumbar Myotomes:    T12-L3 (Hip Flex):  Left: 4    Right: 4  L2-4 (Quads):  Left:  4    Right:  4  L4 (Ankle DF):  Left:  4+    Right:  4+  Lumbar DTR's:  not assessed  Lumbar Dermtomes:  not assessed  Lumbar Palpation:  Palpation (lumbar): done in standing or seated positions; pt unable to lay down on side or prone.  Tenderness present at Left:    Quadratus Lumborum; Erector Spinae; Piriformis and PSIS  Tenderness present at  Right: Quadratus Lumborum; Erector Spinae; Piriformis and PSIS  Knee Evaluation:  ROM:  Arom wnl knee: unable to reach full extesion actively; unable to stand with knees fully extended.    AROM  Extension:  Left: 6    Right:  8  Flexion: Left: at least 100    Right: at least 100  Strength:   Extension:  Left: 4/5   Pain:      Right: 4/5   Pain:  Flexion:  Left: 4/5   Pain:      Right: 4/5   Pain:    Quad Set Left: Fair    Pain:   Quad Set Right: Fair    Pain:    Assessment/Plan:    Patient is a 77 year old female with lumbar complaints.    Patient has the following significant findings with corresponding treatment plan.                Diagnosis 1:  LBP with sciatica  Pain -  hot/cold therapy, US and manual therapy  Decreased ROM/flexibility - manual therapy and therapeutic exercise  Decreased strength - therapeutic exercise and therapeutic activities  Impaired gait - gait training  Impaired muscle performance - neuro re-education  Decreased function - therapeutic activities  Impaired posture - neuro re-education        PATIENTS NAME:  Alannah Leos   : 1940    Therapy Evaluation Codes:   1) History comprised of:   Personal factors that impact the plan of care:      Age, Past/current experiences and Time since onset of symptoms.    Comorbidity factors that impact the plan of care are:      Osteoarthritis and osteoporosis.     Medications impacting care: Anti-inflammatory.  2) Examination of Body Systems comprised of:   Body structures and functions that impact the plan of care:      Lumbar spine.   Activity limitations that impact the plan of care are:      Bending, Sitting, Stairs, Standing, Walking and sit to stand transfers.  3) Clinical presentation characteristics are:   Evolving/Changing.  4) Decision-Making    Low complexity using standardized patient assessment instrument and/or measureable assessment of functional outcome.  Cumulative Therapy Evaluation is: Low complexity.    Previous and current  "functional limitations:  (See Goal Flow Sheet for this information)    Short term and Long term goals: (See Goal Flow Sheet for this information)     Communication ability:  Patient appears to be able to clearly communicate and understand verbal and written communication and follow directions correctly.  Treatment Explanation - The following has been discussed with the patient:   RX ordered/plan of care  Anticipated outcomes  Possible risks and side effects  This patient would benefit from PT intervention to resume normal activities.   Rehab potential is good.    Frequency:  1 X week, once daily  Duration:  for 8 weeks  Discharge Plan:  Achieve all LTG.  Independent in home treatment program.  Reach maximal therapeutic benefit.    Please refer to the daily flowsheet for treatment today, total treatment time and time spent performing 1:1 timed codes.                         PATIENTS NAME:  Alannah Leos   : 1940    Caregiver Signature/Credentials _____________________________ Date ________       Treating Provider: Gogo Christensen MPT   I have reviewed and certified the need for these services and plan of treatment while under my care.        PHYSICIAN'S SIGNATURE:   _________________________________________  Date___________   Merrick Del Cid MD    Certification period:  Beginning of Cert date period: 18 to  End of Cert period date: 18     Functional Level Progress Report: Please see attached \"Goal Flow sheet for Functional level.\"    ____X____ Continue Services or       ________ DC Services                Service dates: From  SOC Date: 18 date to present                         "

## 2018-02-06 ENCOUNTER — PRE VISIT (OUTPATIENT)
Dept: NEUROLOGY | Facility: CLINIC | Age: 78
End: 2018-02-06

## 2018-02-06 NOTE — TELEPHONE ENCOUNTER
PREVISIT INFORMATION                                                    Alannah Leos scheduled for future visit at [facility] specialty clinics.    Patient is scheduled to see Dr. Johnson on 2/9  Reason for visit: Parkinson's   Referring provider Dr. Peterson  Has patient seen previous specialist? Yes.  Name of provider Dr. Peterson, Clinic/Facility [facility].   Medical Records:  Available in chart.  Patient was previously seen at a [facility] or [facility] facility.     REVIEW                                                      New patient packet mailed to patient: No, will come early to fill out NPP  Medication reconciliation complete: Yes, pt was unsure about some of the creams/lotions      Current Outpatient Prescriptions   Medication Sig Dispense Refill     methotrexate sodium 2.5 MG TABS Take 20 mg by mouth once a week . Take all 8 tablets on the same day of each week. 96 tablet 1     hydroxychloroquine (PLAQUENIL) 200 MG tablet Take 1 tablet (200 mg) by mouth daily 90 tablet 1     ibuprofen (ADVIL/MOTRIN) 200 MG tablet Take 1 tablet (200 mg) by mouth every 8 hours as needed for moderate pain       betamethasone, augmented, (DIPROLENE) 0.05 % lotion Apply daily on itchy areas of the scalp as needed, on days not using the shampoo only when flaring 60 mL 6     ammonium lactate (AMLACTIN) 12 % cream Apply once to twice a day to dry skin. Can burn temporarily if there is a cut on the skin. 385 g 11     fluocinolone acetonide 0.01 % SHAM Lather onto scalp, let sit for 5 min, then rinse off.Use for up to 1 week in a row for flares 120 mL 11     carbidopa-levodopa (SINEMET)  MG per tablet 1.5 tablets at 6 am. 2.5 tablets at Noon. 2 tablets at 5PM. 0.5  Tablet at 9 PM. 1 tablet at Midnight. 675 tablet 3     folic acid (FOLVITE) 1 MG tablet Take 1 tablet (1 mg) by mouth daily 100 tablet 3     clobetasol (TEMOVATE) 0.05 % external solution Apply to affected area of the scalp daily on  days not shampooing.  Do not apply to face. 50 mL 3     ACE NOT PRESCRIBED, INTENTIONAL, 1 each continuous prn. ACE Inhibitor not prescribed due to Refusal by patient       0 each 0     CALCIUM + D OR 600mg twice a day        ASPIRIN 81 MG PO TABS PATIENT REPORTS TAKING 4 TIMES PER WEEK AND NOT EVERYDAY       MULTI-VITAMIN OR 1 a day          Allergies: Decadrol [dexamethasone sodium phosphate] and Shrimp        PLAN/FOLLOW-UP NEEDED                                                      Previsit review complete.  Patient will see provider at future scheduled appointment.     Patient Reminders Given:  Please, make sure you bring an updated list of your medications.   If you are having a procedure, please, present 15 minutes early.  If you need to cancel or reschedule,please call [phone].    Yelitza Warren

## 2018-02-07 ENCOUNTER — TELEPHONE (OUTPATIENT)
Dept: RHEUMATOLOGY | Facility: CLINIC | Age: 78
End: 2018-02-07

## 2018-02-07 NOTE — TELEPHONE ENCOUNTER
Called patient back and she stated she has talked to someone from [facility] about the cost. Patient stated she can not afford it as it was way to much. Spoke with Dr. Del Cid and he said they can discuss other medications at her follow up or she may call her insurance to see what they cover.    Will call patient back on 2/8/2018.    Joan Bucio CMA  2/7/2018 2:44 PM

## 2018-02-07 NOTE — TELEPHONE ENCOUNTER
Patient called and stated she has been trying to call the infusion center to see how much she will have to pay for her Orencia infusion, stated she has talked to several people but no one will tell her. Patient is also very nervous about getting this medication because she thinks she will get sick from it. I explained I will call the infusion center and see what I can find out for her and then call her back.     Called the infusion center and spoke with Caryn. She stated she had just spoke with the patient earlier and that she was having [facility] Cost information call patient, I will call patient back this afternoon to see if she has heard from them.    Joan Bucio, MELLISA  2/7/2018 11:55 AM

## 2018-02-09 ENCOUNTER — OFFICE VISIT (OUTPATIENT)
Dept: NEUROLOGY | Facility: CLINIC | Age: 78
End: 2018-02-09
Attending: INTERNAL MEDICINE
Payer: MEDICARE

## 2018-02-09 VITALS
HEART RATE: 83 BPM | OXYGEN SATURATION: 99 % | BODY MASS INDEX: 19.85 KG/M2 | DIASTOLIC BLOOD PRESSURE: 65 MMHG | HEIGHT: 64 IN | WEIGHT: 116.3 LBS | SYSTOLIC BLOOD PRESSURE: 133 MMHG

## 2018-02-09 DIAGNOSIS — G20.A1 PARKINSON'S DISEASE (H): Primary | ICD-10-CM

## 2018-02-09 DIAGNOSIS — M12.9 ARTHROPATHY: ICD-10-CM

## 2018-02-09 DIAGNOSIS — G25.81 RESTLESS LEGS SYNDROME (RLS): ICD-10-CM

## 2018-02-09 DIAGNOSIS — G20.A1 PARKINSON DISEASE (H): ICD-10-CM

## 2018-02-09 LAB — FERRITIN SERPL-MCNC: 45 NG/ML (ref 8–252)

## 2018-02-09 PROCEDURE — 82728 ASSAY OF FERRITIN: CPT | Performed by: PSYCHIATRY & NEUROLOGY

## 2018-02-09 PROCEDURE — 36415 COLL VENOUS BLD VENIPUNCTURE: CPT | Performed by: PSYCHIATRY & NEUROLOGY

## 2018-02-09 PROCEDURE — 99205 OFFICE O/P NEW HI 60 MIN: CPT | Performed by: PSYCHIATRY & NEUROLOGY

## 2018-02-09 RX ORDER — CARBIDOPA AND LEVODOPA 25; 100 MG/1; MG/1
TABLET ORAL
Qty: 900 TABLET | Refills: 3 | Status: SHIPPED | OUTPATIENT
Start: 2018-02-09 | End: 2018-04-13

## 2018-02-09 RX ORDER — FERROUS SULFATE 325(65) MG
325 TABLET ORAL 2 TIMES DAILY
Qty: 60 TABLET | Refills: 1 | Status: SHIPPED | OUTPATIENT
Start: 2018-02-09 | End: 2018-04-13

## 2018-02-09 ASSESSMENT — UNIFIED PARKINSONS DISEASE RATING SCALE (UPDRS)
TOTAL_SCORE: 53
POSTURAL_STABILITY: MODERATE: STANDS SAFELY, BUT WITH ABSENCE OF POSTURAL RESPONSE,  FALLS IF NOT CAUGHT BY EXAMINER.
AXIAL_SCORE: 17
LEG_AGILITY_LEFT: MILD: ANY OF THE FOLLOWING: A) 3 TO 5 INTERRUPTIONS DURING TAPPING B) MILD SLOWING C) THE AMPLITUDE DECREMENTS MIDWAY IN THE 10-MOVEMENT SEQUENCE
RIGIDITY_LUE: MODERATE: RIGIDITY DETECTED WITHOUT THE ACTIVATION MANEUVER. FULL RANGE OF MOTION IS ACHIEVED WITH EFFORT.
AMPLITUDE_RUE: NORMAL: NO TREMOR.
CONSTANCY_TREMOR_ATREST: NORMAL: NO TREMOR.
HANDMOVEMENTS_LEFT: MODERATE: ANY OF THE FOLLOWING:  A) MORE THAN 5 INTERRUPTIONS  OR AT LEAST ONE LONGER ARREST (FREEZE) IN ONGOING MOVEMENT  B) MODERATE SLOWING C) THE AMPLITUDE DECREMENTS STARTING AFTER THE FIRST MOVEMENT.
SPEECH: MILD: LOSS OF MODULATION, DICTION OR VOLUME, WITH A FEW WORDS UNCLEAR, BUT THE OVERALL SENTENCES EASY TO FOLLOW.
AMPLITUDE_LLE: NORMAL: NO TREMOR.
POSTURE: 2 MILD.  DEFINITE FLEXION, SCOLIOSIS OR LEANING OT ONE SIDE, BUT CAN CORRECT POSTURE TO NORMAL WHEN ASKED.
RIGIDITY_NECK: MILD: RIGIDITY DETECTED WITHOUT THE ACTIVATION MANEUVER.  FULL RANGE OF MOTION IS EASILY ACHIEVED.
AMPLITUDE_LUE: NORMAL: NO TREMOR.
PRONATION_SUPINATION_RIGHT: MILD: ANY OF THE FOLLOWING: A) 3 TO 5 INTERRUPTIONS DURING TAPPING B) MILD SLOWING C) THE AMPLITUDE DECREMENTS MIDWAY IN THE 10-MOVEMENT SEQUENCE
FINGER_TAPPING_RIGHT: MODERATE: ANY OF THE FOLLOWING:  A) MORE THAN 5 INTERRUPTIONS OR AT LEAST ONE LONGER ARREST (FREEZE) IN ONGOING MOVEMENT  B) MODERATE SLOWING C) THE AMPLITUDE DECREMENTS STARTING AFTER THE FIRST MOVEMENT.
RIGIDITY_RUE: MODERATE: RIGIDITY DETECTED WITHOUT THE ACTIVATION MANEUVER. FULL RANGE OF MOTION IS ACHIEVED WITH EFFORT.
FACIAL_EXPRESSION: SLIGHT: MINIMAL MASKED FACIES MANIFESTED ONLY BY DECREASED FREQUENCY OF BLINKING.
FREEZING_GAIT: NORMAL
RIGIDITY_RLE: MODERATE: RIGIDITY DETECTED WITHOUT THE ACTIVATION MANEUVER. FULL RANGE OF MOTION IS ACHIEVED WITH EFFORT.
TOTAL_SCORE: 18
SPONTANEITY_OF_MOVEMENT: 2: MILD: MILD GLOBAL SLOWNESS AND POVERTY OF SPONTANEOUS MOVEMENTS.
AMPLITUDE_RLE: NORMAL: NO TREMOR.
PRONATION_SUPINATION_LEFT: MILD: ANY OF THE FOLLOWING: A) 3 TO 5 INTERRUPTIONS DURING TAPPING B) MILD SLOWING C) THE AMPLITUDE DECREMENTS MIDWAY IN THE 10-MOVEMENT SEQUENCE
RIGIDITY_LLE: MODERATE: RIGIDITY DETECTED WITHOUT THE ACTIVATION MANEUVER. FULL RANGE OF MOTION IS ACHIEVED WITH EFFORT.
LEG_AGILITY_RIGHT: MILD: ANY OF THE FOLLOWING: A) 3 TO 5 INTERRUPTIONS DURING TAPPING B) MILD SLOWING C) THE AMPLITUDE DECREMENTS MIDWAY IN THE 10-MOVEMENT SEQUENCE
HANDMOVEMENTS_RIGHT: MODERATE: ANY OF THE FOLLOWING:  A) MORE THAN 5 INTERRUPTIONS  OR AT LEAST ONE LONGER ARREST (FREEZE) IN ONGOING MOVEMENT  B) MODERATE SLOWING C) THE AMPLITUDE DECREMENTS STARTING AFTER THE FIRST MOVEMENT.
TOETAPPING_LEFT: MILD: ANY OF THE FOLLOWING: A) 3 TO 5 INTERRUPTIONS DURING TAPPING B) MILD SLOWING C) THE AMPLITUDE DECREMENTS MIDWAY IN THE 10-MOVEMENT SEQUENCE
TOTAL_SCORE_LEFT: 18
FINGER_TAPPING_LEFT: MODERATE: ANY OF THE FOLLOWING:  A) MORE THAN 5 INTERRUPTIONS  OR AT LEAST ONE LONGER ARREST (FREEZE) IN ONGOING MOVEMENT  B) MODERATE SLOWING C) THE AMPLITUDE DECREMENTS STARTING AFTER THE FIRST MOVEMENT.
AMPLITUDE_LIP_JAW: NORMAL: NO TREMOR.
GAIT: MILD: INDEPENDENT WALKING BUT WITH SUBSTANTIAL GAIT IMPAIRMENT.
ARISING_CHAIR: MODERATE: NEEDS TO PUSH OFF, BUT TENDS TO FALL BACK, OR MAY HAVE TO TRY MORE THAN ONE TIME USING ARMS OF CHAIR, BUT CAN GET UP WITHOUT HELP.
TOETAPPING_RIGHT: MILD: ANY OF THE FOLLOWING: A) 3 TO 5 INTERRUPTIONS DURING TAPPING B) MILD SLOWING C) THE AMPLITUDE DECREMENTS MIDWAY IN THE 10-MOVEMENT SEQUENCE

## 2018-02-09 ASSESSMENT — PAIN SCALES - GENERAL: PAINLEVEL: SEVERE PAIN (7)

## 2018-02-09 NOTE — PROGRESS NOTES
"Department of Neurology  Movement Disorders Division   Initial Clinic Evaluation     Patient: Alannah Leos   MRN: [id number]   : 1940   Date of Visit: 2018     Referring Physician: Merrick Del Cid MD    Reason for Referral: Parkinson's disease    Impression:     #1  Idiopathic Parkinson's disease  #2  Sialorrhea secondary to #1  #3  Mild cognitive impairment secondary to #1  #4  Freezing of gait, levodopa responsive by history    Recommendations:    #1  The patient has significant residual clinical evidence of parkinsonism on her current dose of carbidopa levodopa.  Currently 1-1/2 hours after her last levodopa dose she has a UPDRS part 3 motor score of greater than 50.  It is not clear if she is underdosed or if she has levodopa unresponsive symptoms (\"inadequate on\").  #2  I think it is important to determine whether or not she has potential for a better clinical response.  I recommended that she take carbidopa levodopa 25/100 IR, 2 tablets at each dosage.  She should continue to take this on an empty stomach one half hour before meals.  If she reaches a satisfactory response on this measured by her ability to walk then we would say to stay at this dose.  If not satisfactory I would recommend going to carbidopa/levodopa immediate release 25/250 tablet at each dosage.  All of this was discussed in detail with the patient and her daughter.  It was reinforced by Esther Warren RN.  The patient was warned that this could induce dyskinesia.  #3  We offered the patient botulinum toxin B injections for her sialorrhea but she declines.  #4  We will see her back in 2 months to see her and re-score her best levodopa response.  #5  We will check a serum ferritin.  If the restless legs become more pervasive we might consider adding a dopamine agonist or Neurontin.      Please call or write with questions or concerns,    Sincerely yours,    Ricardo Johnson MD      History of Present Illness  Ms. Leos is a 77 " "year old right-handed female.  She comes with her daughter Shiloh.  In 2012 or thereabouts she began to have walking problems.  She was unsteady and had some falls.  She developed her right hand resting tremor.  She saw Dr. Peterson.  He is followed her since that time with a diagnosis of Parkinson's disease.  He started her on carbidopa/levodopa.  She had a very good initial response.  Her walking her handwriting and her movement improved almost immediately.  She has not fallen since.    In recent years she began to freeze.  Dr. Peterson obtain the history that her freezing was better when she took her levodopa.  He increased her dose of carbidopa levodopa.  She felt this helped her walking almost immediately.  She takes carbidopa levodopa 25 100 immediate release 1-1/2 tablets in the morning 2-1/2 at noon and 2 at 5 PM 2 at 1 at 9 PM and 1 at midnight.  She feels she is doing better on this.    The patient has no sense of \"on\" or \"off\".  She has not noticed dyskinesia.  She does have restless leg type symptoms intermittently happening sometimes daily and sometimes not for a month.  Her legs ache and she feels she must get up.  This is extremely bothersome to her.    She denies significant tremor at this time.  She does have excessive salivation.  She can get up from a chair if it is high enough.  She cannot turn in bed.    There is no family history of Parkinson's disease except in a maternal cousin.  There is no history of significant head injury.  She has had no toxic exposures.  She is not taking dopamine blocking drugs.    She is not freezing at this time.  She denies faints.  She has some cognitive problems according to her daughter Shiloh.  She has no hallucinations.  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  Her sense of smell is reduced but still present.    Past Medical History:   Past Medical History:   Diagnosis Date     Hyperlipidemia      Murmur, heart     hsm     Nonsenile cataract      Osteoarthrosis, " unspecified whether generalized or localized, lower leg      Osteopenia      Rheumatoid arthritis(714.0)        Past Surgical History:   Past Surgical History:   Procedure Laterality Date     GALLBLADDER SURGERY       HYSTERECTOMY       KNEE SURGERY      left      LAMINECTOMY LUMBAR ONE LEVEL       TONSILLECTOMY         Medications:  Current Outpatient Prescriptions   Medication Sig Dispense Refill     methotrexate sodium 2.5 MG TABS Take 20 mg by mouth once a week . Take all 8 tablets on the same day of each week. 96 tablet 1     hydroxychloroquine (PLAQUENIL) 200 MG tablet Take 1 tablet (200 mg) by mouth daily 90 tablet 1     ibuprofen (ADVIL/MOTRIN) 200 MG tablet Take 1 tablet (200 mg) by mouth every 8 hours as needed for moderate pain       betamethasone, augmented, (DIPROLENE) 0.05 % lotion Apply daily on itchy areas of the scalp as needed, on days not using the shampoo only when flaring 60 mL 6     ammonium lactate (AMLACTIN) 12 % cream Apply once to twice a day to dry skin. Can burn temporarily if there is a cut on the skin. 385 g 11     fluocinolone acetonide 0.01 % SHAM Lather onto scalp, let sit for 5 min, then rinse off.Use for up to 1 week in a row for flares 120 mL 11     carbidopa-levodopa (SINEMET)  MG per tablet 1.5 tablets at 6 am. 2.5 tablets at Noon. 2 tablets at 5PM. 0.5  Tablet at 9 PM. 1 tablet at Midnight. (Patient taking differently: 1.5 tablets at 6 am. 2 tablets at Noon. 2 tablets at 5PM. 1Tablet at 9 PM. 1 tablet at Midnight.) 675 tablet 3     folic acid (FOLVITE) 1 MG tablet Take 1 tablet (1 mg) by mouth daily 100 tablet 3     clobetasol (TEMOVATE) 0.05 % external solution Apply to affected area of the scalp daily on days not shampooing.  Do not apply to face. 50 mL 3     ACE NOT PRESCRIBED, INTENTIONAL, 1 each continuous prn. ACE Inhibitor not prescribed due to Refusal by patient       0 each 0     CALCIUM + D OR 600mg twice a day        ASPIRIN 81 MG PO TABS PATIENT REPORTS TAKING  4 TIMES PER WEEK AND NOT EVERYDAY       MULTI-VITAMIN OR 1 a day           Movement Disorders Medications   6 Am 12 noon 5 pm  9 pm  MN   Carbidopa/levodopa 25/100 IR                                                1.5 2 2 1 1                                                                               Allergies: is allergic to decadrol [dexamethasone sodium phosphate] and shrimp.    Social History:   Social History     Social History     Marital status:      Spouse name: Willis      Number of children: 2     Years of education: 12     Occupational History      Retired     [employer], retired in 12/2006     Social History Main Topics     Smoking status: Never Smoker     Smokeless tobacco: Never Used     Alcohol use No     Drug use: No     Sexual activity: Not Currently     Partners: Male     Birth control/ protection: None     Other Topics Concern      Service No     Blood Transfusions Yes     Caffeine Concern No     Occupational Exposure No     Hobby Hazards No     Sleep Concern No     Stress Concern No     Weight Concern No     Special Diet Yes     Diabetic      Back Care Yes     Back surgeries x2     Exercise Yes     Bike Helmet No     Seat Belt Yes     Self-Exams Yes     Social History Narrative       Family History:  Family History   Problem Relation Age of Onset     CANCER Sister      pancreatic       ROS:  General:  Fever : no, Chills: no, Sweats: no, Fatigue: no, ,Weight loss: no  Cardiovascular  Chest Pains: no, Palpitations: no, Edema: no, Shortness of breath: no  Respiratory  Cough: no  Gastrointestinal  Nausea: no, Vomiting: no, Diarrhea: no, Constipation: no  Genitourinary  Dysuria: no, Frequency: no, Urgency: no,  Nocturia: no, Incontinence: no Women:  Abnormal vaginal bleeding: no  Musculoskeletal  Back pain: no, Neck Pain: no  Joint pain, swelling, redness: no, Stiffness: no  Skin  Rash: no, Itching: no,  Suspicious lesions: no  Psychiatric  Depression: no, Anxiety/Panic: no   Endocrine  Cold intolerance: no, Heat intolerance: no, Excessive thirst: no  Hematologic/LymphatiAbnormal bruising, bleeding: no ,Enlarged lymph nodes: {.:[id number]  Allergic/Immunologic  Hives (Urticaria): no, HIV exposure: no    Neurologic  Visual loss: no, Double vision: no, Headache: no, Loss of consciousness:  no, Seizure: no, Fainting (syncope): no, Dysarthria: no, Dysphagia:  no, Vertigo:  no, Weakness: no, Atrophy: no, Twitches: no, Lhermitte's: no, Numbness or tingling: no, Handwriting change: no, Tremors: no, Involuntary movements: no, Imbalance: no, Abnormal gait:  no, Falls :no, Memory loss: no, RBD: YES, Sleep disorder: no, Hallucination: no, Loss of concentration: no,  Behavioral change: no,  Loss of motivation: no      Comprehensive Neurologic Exam    Mental Status Exam   The patient is alert, oriented and exhibits no difficulty with cognition or memory.  A formal short test of mental status was performed.     Neurovascular         Speech and Language   Right Left     Carotid Bruit Absent Absent  Speech is normal.   Dorsalis Pedis Pulse Normal Normal  Description   Posterior Tibial Pulse Normal Normal  Aphasia is present     Cranial Nerve Exam                 Right Left   Right Left   II                                        Normal Normal  Hearing (VIII) Normal Normal      III, IV, V!                   Normal Normal  Nystagmus (VIII) Normal Normal   EOM description                              Gag (IX, X) Normal Normal   Facial Sensation (V) Normal Normal  SCM (XI) Normal Normal   Muscles of Mastication (V) Normal Normal  Trapezius (XI) Normal Normal   Facial Strength (VII) Normal Normal  Tongue (XII) Normal Normal     Motor Exam  Strength (*/5 grading)  Muscle                  Right Left  Muscle Right Left   Frontalis                                           Normal Normal  Iliopsoas Normal    Normal          Orbicularis Oculi                     Normal Normal  Quadrideps Normal    Normal         Orbicularis Oris                         Normal Normal  Anterior Tibial Normal Normal      Deltoid Normal Normal  Gastrocnemius Normal    Normal   Biceps Normal Normal  Extensor Hallucis Longus Normal    Normal   Triceps Normal Normal  Toe Extensors Normal    Normal   EDC Normal Normal  Toe Flexor Normal    Normal   Finger Flexors Normal Normal  Other Normal Normal   First Dorsal Interosseous Normal Normal  Other Normal Normal   Hypothenar Normal Normal  Other Normal Normal   Thenar Normal Normal  Other Normal Normal     Reflexes      Tone   Right Left   Right Left   Biceps Normal Normal    Rigidity (R)     Triceps Normal Normal  Neck 2+    Brachioradialis Normal Normal  Arm 3+ 3+   Quadriceps Normal Normal  Leg 3+ 3+   Ankle Normal Normal       Babkinski Flexor Flexor         AMR       Coordination   Right Left   Right Left   Fingers -3 -3  Finger nose finger Normal Normal   Hand -3 -3  Drift Normal Normal   Leg -2 -2  Heel Shin Normal Normal   Foot -2 -2  Other     Other               Involuntary Movements    Gait and Station  None            Right Left  Stand & Sit Normal   (Movement type) Normal Normal  Gait Normal   (Movement type) Normal Normal  Tandem Normal   (Movement type) Normal Normal  Romberg Absent       Sensory Exam          Right Left    Pin Normal Normal    Vibration Normal Normal    Joint position Normal Normal    Other             Coding statement:     Duration of  Services: face-to-face 75min.   Greater than 50% of this visit was spent in counseling and coordination of care.    Medical decision making is high due to the following components:    Number of diagnoses:[id number]  ManagementDiagnostic tests orders or reviewed. Medications were adjusted.  Complexity of medical data:Outside records reviewed.  Review/order clinical lab tests. Took collateral history.  Risk  One or more chronic illnesses with severe exacerbation, progression, or side effects of treatment.

## 2018-02-09 NOTE — LETTER
"    2018         RE: Alannah Leos  [address] [facility] [address]        Dear Colleague,    Thank you for referring your patient, Alannah Leos, to the [facility]. Please see a copy of my visit note below.    Department of Neurology  Movement Disorders Division   Initial Clinic Evaluation     Patient: Alannah Leos   MRN: [id number]   : 1940   Date of Visit: 2018     Referring Physician: Merrick Del Cid MD    Reason for Referral: Parkinson's disease    Impression:     #1  Idiopathic Parkinson's disease  #2  Sialorrhea secondary to #1  #3  Mild cognitive impairment secondary to #1  #4  Freezing of gait, levodopa responsive by history    Recommendations:    #1  The patient has significant residual clinical evidence of parkinsonism on her current dose of carbidopa levodopa.  Currently 1-1/2 hours after her last levodopa dose she has a UPDRS part 3 motor score of greater than 50.  It is not clear if she is underdosed or if she has levodopa unresponsive symptoms (\"inadequate on\").  #2  I think it is important to determine whether or not she has potential for a better clinical response.  I recommended that she take carbidopa levodopa 25/100 IR, 2 tablets at each dosage.  She should continue to take this on an empty stomach one half hour before meals.  If she reaches a satisfactory response on this measured by her ability to walk then we would say to stay at this dose.  If not satisfactory I would recommend going to carbidopa/levodopa immediate release 25/250 tablet at each dosage.  All of this was discussed in detail with the patient and her daughter.  It was reinforced by Esther Warren RN.  The patient was warned that this could induce dyskinesia.  #3  We offered the patient botulinum toxin B injections for her sialorrhea but she declines.  #4  We will see her back in 2 months to see her and re-score her best levodopa response.  #5  We will check a serum ferritin.  If the " "restless legs become more pervasive we might consider adding a dopamine agonist or Neurontin.      Please call or write with questions or concerns,    Sincerely yours,    Ricardo Johnson MD      History of Present Illness  Ms. Leos is a 77 year old right-handed female.  She comes with her daughter Shiloh.  In 2012 or thereabouts she began to have walking problems.  She was unsteady and had some falls.  She developed her right hand resting tremor.  She saw Dr. Peterson.  He is followed her since that time with a diagnosis of Parkinson's disease.  He started her on carbidopa/levodopa.  She had a very good initial response.  Her walking her handwriting and her movement improved almost immediately.  She has not fallen since.    In recent years she began to freeze.  Dr. Peterson obtain the history that her freezing was better when she took her levodopa.  He increased her dose of carbidopa levodopa.  She felt this helped her walking almost immediately.  She takes carbidopa levodopa 25 100 immediate release 1-1/2 tablets in the morning 2-1/2 at noon and 2 at 5 PM 2 at 1 at 9 PM and 1 at midnight.  She feels she is doing better on this.    The patient has no sense of \"on\" or \"off\".  She has not noticed dyskinesia.  She does have restless leg type symptoms intermittently happening sometimes daily and sometimes not for a month.  Her legs ache and she feels she must get up.  This is extremely bothersome to her.    She denies significant tremor at this time.  She does have excessive salivation.  She can get up from a chair if it is high enough.  She cannot turn in bed.    There is no family history of Parkinson's disease except in a maternal cousin.  There is no history of significant head injury.  She has had no toxic exposures.  She is not taking dopamine blocking drugs.    She is not freezing at this time.  She denies faints.  She has some cognitive problems according to her daughter Shiloh.  She has no hallucinations.  She " does have REM behavior disorder.  Her sense of smell is reduced but still present.    Past Medical History:   Past Medical History:   Diagnosis Date     Hyperlipidemia      Murmur, heart     hsm     Nonsenile cataract      Osteoarthrosis, unspecified whether generalized or localized, lower leg      Osteopenia      Rheumatoid arthritis(714.0)        Past Surgical History:   Past Surgical History:   Procedure Laterality Date     GALLBLADDER SURGERY       HYSTERECTOMY       KNEE SURGERY      left      LAMINECTOMY LUMBAR ONE LEVEL       TONSILLECTOMY         Medications:  Current Outpatient Prescriptions   Medication Sig Dispense Refill     methotrexate sodium 2.5 MG TABS Take 20 mg by mouth once a week . Take all 8 tablets on the same day of each week. 96 tablet 1     hydroxychloroquine (PLAQUENIL) 200 MG tablet Take 1 tablet (200 mg) by mouth daily 90 tablet 1     ibuprofen (ADVIL/MOTRIN) 200 MG tablet Take 1 tablet (200 mg) by mouth every 8 hours as needed for moderate pain       betamethasone, augmented, (DIPROLENE) 0.05 % lotion Apply daily on itchy areas of the scalp as needed, on days not using the shampoo only when flaring 60 mL 6     ammonium lactate (AMLACTIN) 12 % cream Apply once to twice a day to dry skin. Can burn temporarily if there is a cut on the skin. 385 g 11     fluocinolone acetonide 0.01 % SHAM Lather onto scalp, let sit for 5 min, then rinse off.Use for up to 1 week in a row for flares 120 mL 11     carbidopa-levodopa (SINEMET)  MG per tablet 1.5 tablets at 6 am. 2.5 tablets at Noon. 2 tablets at 5PM. 0.5  Tablet at 9 PM. 1 tablet at Midnight. (Patient taking differently: 1.5 tablets at 6 am. 2 tablets at Noon. 2 tablets at 5PM. 1Tablet at 9 PM. 1 tablet at Midnight.) 675 tablet 3     folic acid (FOLVITE) 1 MG tablet Take 1 tablet (1 mg) by mouth daily 100 tablet 3     clobetasol (TEMOVATE) 0.05 % external solution Apply to affected area of the scalp daily on days not shampooing.  Do not  apply to face. 50 mL 3     ACE NOT PRESCRIBED, INTENTIONAL, 1 each continuous prn. ACE Inhibitor not prescribed due to Refusal by patient       0 each 0     CALCIUM + D OR 600mg twice a day        ASPIRIN 81 MG PO TABS PATIENT REPORTS TAKING 4 TIMES PER WEEK AND NOT EVERYDAY       MULTI-VITAMIN OR 1 a day           Movement Disorders Medications   6 Am 12 noon 5 pm  9 pm  MN   Carbidopa/levodopa 25/100 IR                                                1.5 2 2 1 1                                                                               Allergies: is allergic to decadrol [dexamethasone sodium phosphate] and shrimp.    Social History:   Social History     Social History     Marital status:      Spouse name: Willis      Number of children: 2     Years of education: 12     Occupational History      Retired     [employer], retired in 12/2006     Social History Main Topics     Smoking status: Never Smoker     Smokeless tobacco: Never Used     Alcohol use No     Drug use: No     Sexual activity: Not Currently     Partners: Male     Birth control/ protection: None     Other Topics Concern      Service No     Blood Transfusions Yes     Caffeine Concern No     Occupational Exposure No     Hobby Hazards No     Sleep Concern No     Stress Concern No     Weight Concern No     Special Diet Yes     Diabetic      Back Care Yes     Back surgeries x2     Exercise Yes     Bike Helmet No     Seat Belt Yes     Self-Exams Yes     Social History Narrative       Family History:  Family History   Problem Relation Age of Onset     CANCER Sister      pancreatic       ROS:  General:  Fever : no, Chills: no, Sweats: no, Fatigue: no, ,Weight loss: no  Cardiovascular  Chest Pains: no, Palpitations: no, Edema: no, Shortness of breath: no  Respiratory  Cough: no  Gastrointestinal  Nausea: no, Vomiting: no, Diarrhea: no, Constipation: no  Genitourinary  Dysuria: no, Frequency: no, Urgency: no,  Nocturia: no, Incontinence: no  Women:  Abnormal vaginal bleeding: no  Musculoskeletal  Back pain: no, Neck Pain: no  Joint pain, swelling, redness: no, Stiffness: no  Skin  Rash: no, Itching: no,  Suspicious lesions: no  Psychiatric  Depression: no, Anxiety/Panic: no  Endocrine  Cold intolerance: no, Heat intolerance: no, Excessive thirst: no  Hematologic/LymphatiAbnormal bruising, bleeding: no ,Enlarged lymph nodes: {.:[id number]  Allergic/Immunologic  Hives (Urticaria): no, HIV exposure: no    Neurologic  Visual loss: no, Double vision: no, Headache: no, Loss of consciousness:  no, Seizure: no, Fainting (syncope): no, Dysarthria: no, Dysphagia:  no, Vertigo:  no, Weakness: no, Atrophy: no, Twitches: no, Lhermitte's: no, Numbness or tingling: no, Handwriting change: no, Tremors: no, Involuntary movements: no, Imbalance: no, Abnormal gait:  no, Falls :no, Memory loss: no, RBD: YES, Sleep disorder: no, Hallucination: no, Loss of concentration: no,  Behavioral change: no,  Loss of motivation: no      Comprehensive Neurologic Exam    Mental Status Exam   The patient is alert, oriented and exhibits no difficulty with cognition or memory.  A formal short test of mental status was performed.     Neurovascular         Speech and Language   Right Left     Carotid Bruit Absent Absent  Speech is normal.   Dorsalis Pedis Pulse Normal Normal  Description   Posterior Tibial Pulse Normal Normal  Aphasia is present     Cranial Nerve Exam                 Right Left   Right Left   II                                        Normal Normal  Hearing (VIII) Normal Normal      III, IV, V!                   Normal Normal  Nystagmus (VIII) Normal Normal   EOM description                              Gag (IX, X) Normal Normal   Facial Sensation (V) Normal Normal  SCM (XI) Normal Normal   Muscles of Mastication (V) Normal Normal  Trapezius (XI) Normal Normal   Facial Strength (VII) Normal Normal  Tongue (XII) Normal Normal     Motor Exam  Strength (*/5 grading)  Muscle                   Right Left  Muscle Right Left   Frontalis                                           Normal Normal  Iliopsoas Normal    Normal          Orbicularis Oculi                     Normal Normal  Quadrideps Normal    Normal        Orbicularis Oris                         Normal Normal  Anterior Tibial Normal Normal      Deltoid Normal Normal  Gastrocnemius Normal    Normal   Biceps Normal Normal  Extensor Hallucis Longus Normal    Normal   Triceps Normal Normal  Toe Extensors Normal    Normal   EDC Normal Normal  Toe Flexor Normal    Normal   Finger Flexors Normal Normal  Other Normal Normal   First Dorsal Interosseous Normal Normal  Other Normal Normal   Hypothenar Normal Normal  Other Normal Normal   Thenar Normal Normal  Other Normal Normal     Reflexes      Tone   Right Left   Right Left   Biceps Normal Normal    Rigidity (R)     Triceps Normal Normal  Neck 2+    Brachioradialis Normal Normal  Arm 3+ 3+   Quadriceps Normal Normal  Leg 3+ 3+   Ankle Normal Normal       Babkinski Flexor Flexor         AMR       Coordination   Right Left   Right Left   Fingers -3 -3  Finger nose finger Normal Normal   Hand -3 -3  Drift Normal Normal   Leg -2 -2  Heel Shin Normal Normal   Foot -2 -2  Other     Other               Involuntary Movements    Gait and Station  None            Right Left  Stand & Sit Normal   (Movement type) Normal Normal  Gait Normal   (Movement type) Normal Normal  Tandem Normal   (Movement type) Normal Normal  Romberg Absent       Sensory Exam          Right Left    Pin Normal Normal    Vibration Normal Normal    Joint position Normal Normal    Other             Coding statement:     Duration of  Services: face-to-face 75min.   Greater than 50% of this visit was spent in counseling and coordination of care.    Medical decision making is high due to the following components:    Number of diagnoses:[id number]  ManagementDiagnostic tests orders or reviewed. Medications were adjusted.  Complexity of  medical data:Outside records reviewed.  Review/order clinical lab tests. Took collateral history.  Risk  One or more chronic illnesses with severe exacerbation, progression, or side effects of treatment.                           Again, thank you for allowing me to participate in the care of your patient.        Sincerely,        Ricardo Johnson MD

## 2018-02-09 NOTE — MR AVS SNAPSHOT
After Visit Summary   2/9/2018    Alannah Leos    MRN: [id number]           Patient Information     Date Of Birth          1940        Visit Information        Provider Department      2/9/2018 1:00 PM Ricardo Johnson MD [facility] [place]        Today's Diagnoses     Parkinson's disease (H)    -  1    Parkinson disease (H)        Restless legs syndrome (RLS)        Arthropathy            Follow-ups after your visit        Follow-up notes from your care team     Return in about 2 months (around 4/9/2018).      Your next 10 appointments already scheduled     Feb 12, 2018  1:20 PM CST   [facility] Quoc VELAZQUEZ Sondrol, PT   [facility] ([facility]    [address]  [address]  [facility] [address]   [phone]            Feb 19, 2018  1:20 PM CST   [facility] with Quoc VELAZQUEZ Sondrol, PT   [facility] ([facility] [place])    [address]  [address]  [facility] [address]   [phone]            Mar 12, 2018 11:40 AM CDT   [facility]    [address] [facility] [id number] [phone]           Bring a current list of meds and any records pertaining to this visit. For Physicals, please bring immunization records and any forms needing to be filled out. Please arrive 10 minutes early to complete paperwork.            Apr 13, 2018  2:00 PM CDT   Return Visit with Ricardo Johnson MD   [facility] [place] ([facility] [place])    [address] [place] [address]   [phone]            Apr 30, 2018  1:30 PM CDT   LAB with  LAB   [facility] ([facility])    [address] [facility] [facility] [address] [phone]           Please do not eat 10-12 hours before your appointment if you are coming in fasting for labs on lipids, cholesterol, or glucose (sugar). This does not apply to pregnant  women. Water, hot tea and black coffee (with nothing added) are okay. Do not drink other fluids, diet soda or chew gum.            May 02, 2018  2:40 PM CDT   Return Visit with Merrick Del Cid MD   [facility] ([facility])    [address] [place] [address]  [place] [address]   [phone]            Dec 13, 2018  2:00 PM CST   Return Visit with Cori Ventura MD   [facility] ([facility])    [address] [place] [address] [phone]              Who to contact     If you have questions or need follow up information about today's clinic visit or your schedule please contact [facility] directly at [phone].  Normal or non-critical lab and imaging results will be communicated to you by MyChart, letter or phone within 4 business days after the clinic has received the results. If you do not hear from us within 7 days, please contact the clinic through MyChart or phone. If you have a critical or abnormal lab result, we will notify you by phone as soon as possible.  Submit refill requests through [facility] or call your pharmacy and they will forward the refill request to us. Please allow 3 business days for your refill to be completed.          Additional Information About Your Visit        [facility] Information     [facility] is an electronic gateway that provides easy, online access to your medical records. With [facility], you can request a clinic appointment, read your test results, renew a prescription or communicate with your care team.     To sign up for [facility] visit the website at [email or web address]   You will be asked to enter the access code listed below, as well as some personal information. Please follow the directions to create your username and password.     Your access code is: [email or web address]  Expires: 2018  1:50 PM     Your access code will  in 90 days. If you need help or a new code, please contact  "your [facility] or call [phone] for assistance.        Care EveryWhere ID     This is your Care EveryWhere ID. This could be used by other organizations to access your [place] medical records  [id number]        Your Vitals Were     Pulse Height Pulse Oximetry BMI (Body Mass Index)          83 1.613 m (5' 3.5\") 99% 20.28 kg/m2         Blood Pressure from Last 3 Encounters:   02/09/18 133/65   01/25/18 106/50   10/19/17 112/58    Weight from Last 3 Encounters:   02/09/18 52.8 kg (116 lb 4.8 oz)   01/25/18 53 kg (116 lb 12.8 oz)   10/19/17 50.8 kg (112 lb)                 Today's Medication Changes          These changes are accurate as of 2/9/18  2:09 PM.  If you have any questions, ask your nurse or doctor.               These medicines have changed or have updated prescriptions.        Dose/Directions    carbidopa-levodopa  MG per tablet   Commonly known as:  SINEMET   This may have changed:  additional instructions   Used for:  Parkinson disease (H)   Changed by:  Ricardo Johnson MD        2 tablets at 6 am. 2 tablets at Noon. 2 tablets at 5PM. 2 Tablet at 9 PM. 2 tablet at Midnight.   Quantity:  900 tablet   Refills:  3            Where to get your medicines      These medications were sent to [facility] [address] [place] [address] AT SEC of [address]  [address] [place] [address]     Phone:  [phone]     carbidopa-levodopa  MG per tablet                Primary Care Provider Office Phone # Fax #    Leah Blanca [phone] [address]  [place] [address]        Equal Access to Services     KENNETH HOLCOMB : Marie balderas Sochana, waaxda luqadaha, qaybta kaalmada adeegyada, danyell sparrow. [facility] [phone].    ATENCIÓN: Si habla español, tiene a mahan disposición servicios gratuitos de asistencia lingüística. Llame al " [phone].    We comply with applicable federal civil rights laws and [place] laws. We do not discriminate on the basis of race, color, national origin, age, disability, sex, sexual orientation, or gender identity.            Thank you!     Thank you for choosing [facility]  for your care. Our goal is always to provide you with excellent care. Hearing back from our patients is one way we can continue to improve our services. Please take a few minutes to complete the written survey that you may receive in the mail after your visit with us. Thank you!             Your Updated Medication List - Protect others around you: Learn how to safely use, store and throw away your medicines at www.disposemymeds.org.          This list is accurate as of 2/9/18  2:09 PM.  Always use your most recent med list.                   Brand Name Dispense Instructions for use Diagnosis    ACE NOT PRESCRIBED (INTENTIONAL)     0 each    1 each continuous prn. ACE Inhibitor not prescribed due to Refusal by patient    Type 2 diabetes, HbA1c goal < 7% (H)       ammonium lactate 12 % cream    AMLACTIN    385 g    Apply once to twice a day to dry skin. Can burn temporarily if there is a cut on the skin.    Xerosis of skin       aspirin 81 MG tablet      PATIENT REPORTS TAKING 4 TIMES PER WEEK AND NOT EVERYDAY        betamethasone (augmented) 0.05 % lotion    DIPROLENE    60 mL    Apply daily on itchy areas of the scalp as needed, on days not using the shampoo only when flaring    Dermatitis, seborrheic, H/O Parkinson's disease       CALCIUM + D PO      600mg twice a day        carbidopa-levodopa  MG per tablet    SINEMET    900 tablet    2 tablets at 6 am. 2 tablets at Noon. 2 tablets at 5PM. 2 Tablet at 9 PM. 2 tablet at Midnight.    Parkinson disease (H)       fluocinolone acetonide 0.01 % Sham     120 mL    Lather onto scalp, let sit for 5 min, then rinse off.Use for up to 1 week in a row for flares    Dermatitis,  seborrheic       folic acid 1 MG tablet    FOLVITE    100 tablet    Take 1 tablet (1 mg) by mouth daily    Rheumatoid arthritis of multiple sites with negative rheumatoid factor (H), High risk medication use       hydroxychloroquine 200 MG tablet    PLAQUENIL    90 tablet    Take 1 tablet (200 mg) by mouth daily    Rheumatoid arthritis of multiple sites with negative rheumatoid factor (H)       ibuprofen 200 MG tablet    ADVIL/MOTRIN     Take 1 tablet (200 mg) by mouth every 8 hours as needed for moderate pain    Rheumatoid arthritis of multiple sites with negative rheumatoid factor (H)       methotrexate sodium 2.5 MG Tabs     96 tablet    Take 20 mg by mouth once a week . Take all 8 tablets on the same day of each week.    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

## 2018-02-09 NOTE — NURSING NOTE
"Alannah Leos's goals for this visit include: consult  She requests these members of her care team be copied on today's visit information:     PCP: Leah Blanca    Referring Provider:  Merrick Del Cid MD  [address] [facility] [address] [facility]  [place] [address]    Chief Complaint   Patient presents with     Consult       Initial /65  Pulse 83  Ht 1.613 m (5' 3.5\")  Wt 52.8 kg (116 lb 4.8 oz)  SpO2 99%  BMI 20.28 kg/m2 Estimated body mass index is 20.28 kg/(m^2) as calculated from the following:    Height as of this encounter: 1.613 m (5' 3.5\").    Weight as of this encounter: 52.8 kg (116 lb 4.8 oz).  Medication Reconciliation: complete    Do you need any medication refills at today's visit? y  "

## 2018-02-12 ENCOUNTER — THERAPY VISIT (OUTPATIENT)
Dept: PHYSICAL THERAPY | Facility: CLINIC | Age: 78
End: 2018-02-12
Payer: MEDICARE

## 2018-02-12 ENCOUNTER — TELEPHONE (OUTPATIENT)
Dept: NEUROLOGY | Facility: CLINIC | Age: 78
End: 2018-02-12

## 2018-02-12 DIAGNOSIS — M54.5 ACUTE BILATERAL LOW BACK PAIN, WITH SCIATICA PRESENCE UNSPECIFIED: ICD-10-CM

## 2018-02-12 PROCEDURE — 97140 MANUAL THERAPY 1/> REGIONS: CPT | Mod: GP | Performed by: PHYSICAL THERAPIST

## 2018-02-12 PROCEDURE — 97110 THERAPEUTIC EXERCISES: CPT | Mod: GP | Performed by: PHYSICAL THERAPIST

## 2018-02-12 NOTE — TELEPHONE ENCOUNTER
----- Message from Ricardo Johnson MD sent at 2/9/2018  4:35 PM CST -----  Diaz Torre,    Could you call her and tell her to stay on ferrous sulfate until we see her again.  Prescription sent to pharmacy.    Thanks!!    Alphonso Johnson

## 2018-02-12 NOTE — TELEPHONE ENCOUNTER
Called and informed pt of Dr. Johnson's instructions, pt verbalized understanding. Yelitza Warren RN

## 2018-02-19 ENCOUNTER — THERAPY VISIT (OUTPATIENT)
Dept: PHYSICAL THERAPY | Facility: CLINIC | Age: 78
End: 2018-02-19
Payer: MEDICARE

## 2018-02-19 DIAGNOSIS — M54.5 ACUTE BILATERAL LOW BACK PAIN, WITH SCIATICA PRESENCE UNSPECIFIED: ICD-10-CM

## 2018-02-19 PROCEDURE — 97110 THERAPEUTIC EXERCISES: CPT | Mod: GP | Performed by: PHYSICAL THERAPIST

## 2018-02-19 PROCEDURE — G8982 BODY POS GOAL STATUS: HCPCS | Mod: GP | Performed by: PHYSICAL THERAPIST

## 2018-02-19 PROCEDURE — G8983 BODY POS D/C STATUS: HCPCS | Mod: GP | Performed by: PHYSICAL THERAPIST

## 2018-02-19 PROCEDURE — 97530 THERAPEUTIC ACTIVITIES: CPT | Mod: GP | Performed by: PHYSICAL THERAPIST

## 2018-02-19 PROCEDURE — G8981 BODY POS CURRENT STATUS: HCPCS | Mod: GP | Performed by: PHYSICAL THERAPIST

## 2018-02-19 NOTE — MR AVS SNAPSHOT
After Visit Summary   2/19/2018    Alannah Leos    MRN: [id number]           Patient Information     Date Of Birth          1940        Visit Information        Provider Department      2/19/2018 1:20 PM Quoc Shirley, PT [facility]        Today's Diagnoses     Acute bilateral low back pain, with sciatica presence unspecified           Follow-ups after your visit        Your next 10 appointments already scheduled     Mar 12, 2018 11:40 AM CDT   Office Visit with Leah Blanca MD   [facility]    [address] [place] [address]   [phone]           Bring a current list of meds and any records pertaining to this visit. For Physicals, please bring immunization records and any forms needing to be filled out. Please arrive 10 minutes early to complete paperwork.            Apr 13, 2018  2:00 PM CDT   Return Visit with Ricardo Johnson MD   [facility])    [address] [place] [address]   [phone]            Apr 30, 2018  1:30 PM CDT   LAB with [facility] LAB   [facility] ([facility])    [address] [address]   [phone]           Please do not eat 10-12 hours before your appointment if you are coming in fasting for labs on lipids, cholesterol, or glucose (sugar). This does not apply to pregnant women. Water, hot tea and black coffee (with nothing added) are okay. Do not drink other fluids, diet soda or chew gum.            May 02, 2018  2:40 PM CDT   Return Visit with Merrick Del Cid MD   [facility] ([facility])    [address] [address]   [phone]            Dec 13, 2018  2:00 PM CST   Return Visit with Cori Ventura MD   [facility])    [address] [place] [address]   [phone]  "             Who to contact     If you have questions or need follow up information about today's clinic visit or your schedule please contact [facility] directly at [phone].  Normal or non-critical lab and imaging results will be communicated to you by MyChart, letter or phone within 4 business days after the clinic has received the results. If you do not hear from us within 7 days, please contact the clinic through MyChart or phone. If you have a critical or abnormal lab result, we will notify you by phone as soon as possible.  Submit refill requests through [employer] or call your pharmacy and they will forward the refill request to us. Please allow 3 business days for your refill to be completed.          Additional Information About Your Visit        [facility] Information     [employer] lets you send messages to your doctor, view your test results, renew your prescriptions, schedule appointments and more. To sign up, go to [email or web address] . Click on \"Log in\" on the left side of the screen, which will take you to the Welcome page. Then click on \"Sign up Now\" on the right side of the page.     You will be asked to enter the access code listed below, as well as some personal information. Please follow the directions to create your username and password.     Your access code is: [email or web address]  Expires: 2018  1:50 PM     Your access code will  in 90 days. If you need help or a new code, please call your [facility] clinic or [phone].        Care EveryWhere ID     This is your Care EveryWhere ID. This could be used by other organizations to access your [facility] medical records  [id number]         Blood Pressure from Last 3 Encounters:   18 133/65   18 106/50   10/19/17 112/58    Weight from Last 3 Encounters:   18 52.8 kg (116 lb 4.8 oz)   18 53 kg (116 lb 12.8 oz)   10/19/17 50.8 kg (112 lb)              We Performed the Following     THERAPEUTIC ACTIVITIES     " THERAPEUTIC EXERCISES        Primary Care Provider Office Phone # Fax #    Leah Shelby Blanca [phone] [address]  [place] [facility] [address]        Equal Access to Services     EDDI HOLCOMB : Hadii adelaida ku rylano Soomaali, waaxda luqadaha, qaybta kaalmada adeegyada, danyell beltran laMarcoaparna sparrow. So [facility] [phone].    ATENCIÓN: Si habla español, tiene a mahan disposición servicios gratuitos de asistencia lingüística. Llame al [phone].    We comply with applicable federal civil rights laws and [place] laws. We do not discriminate on the basis of race, color, national origin, age, disability, sex, sexual orientation, or gender identity.            Thank you!     Thank you for choosing [facility]  for your care. Our goal is always to provide you with excellent care. Hearing back from our patients is one way we can continue to improve our services. Please take a few minutes to complete the written survey that you may receive in the mail after your visit with us. Thank you!             Your Updated Medication List - Protect others around you: Learn how to safely use, store and throw away your medicines at www.disposemymeds.org.          This list is accurate as of 2/19/18  2:03 PM.  Always use your most recent med list.                   Brand Name Dispense Instructions for use Diagnosis    ACE NOT PRESCRIBED (INTENTIONAL)     0 each    1 each continuous prn. ACE Inhibitor not prescribed due to Refusal by patient    Type 2 diabetes, HbA1c goal < 7% (H)       ammonium lactate 12 % cream    AMLACTIN    385 g    Apply once to twice a day to dry skin. Can burn temporarily if there is a cut on the skin.    Xerosis of skin       aspirin 81 MG tablet      PATIENT REPORTS TAKING 4 TIMES PER WEEK AND NOT EVERYDAY        betamethasone (augmented) 0.05 % lotion    DIPROLENE    60 mL    Apply daily on itchy areas of the scalp as needed, on days not using the shampoo only when  flaring    Dermatitis, seborrheic, H/O Parkinson's disease       CALCIUM + D PO      600mg twice a day        carbidopa-levodopa  MG per tablet    SINEMET    900 tablet    2 tablets at 6 am. 2 tablets at Noon. 2 tablets at 5PM. 2 Tablet at 9 PM. 2 tablet at Midnight.    Parkinson disease (H)       ferrous sulfate 325 (65 FE) MG tablet    IRON    60 tablet    Take 1 tablet (325 mg) by mouth 2 times daily Stay on until seen back    Restless legs syndrome (RLS)       fluocinolone acetonide 0.01 % Sham     120 mL    Lather onto scalp, let sit for 5 min, then rinse off.Use for up to 1 week in a row for flares    Dermatitis, seborrheic       folic acid 1 MG tablet    FOLVITE    100 tablet    Take 1 tablet (1 mg) by mouth daily    Rheumatoid arthritis of multiple sites with negative rheumatoid factor (H), High risk medication use       hydroxychloroquine 200 MG tablet    PLAQUENIL    90 tablet    Take 1 tablet (200 mg) by mouth daily    Rheumatoid arthritis of multiple sites with negative rheumatoid factor (H)       ibuprofen 200 MG tablet    ADVIL/MOTRIN     Take 1 tablet (200 mg) by mouth every 8 hours as needed for moderate pain    Rheumatoid arthritis of multiple sites with negative rheumatoid factor (H)       methotrexate sodium 2.5 MG Tabs     96 tablet    Take 20 mg by mouth once a week . Take all 8 tablets on the same day of each week.    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

## 2018-02-19 NOTE — PROGRESS NOTES
Subjective:  HPI                    Objective:  System    Physical Exam    General     ROS    Assessment/Plan:    DISCHARGE REPORT    Progress reporting period is from 1.28 to 2.19.18.     SUBJECTIVE  Subjective: no back pain now. only RA /OA pain but my back feels normal , only a tightness   Current Pain level: 0/10   Initial Pain level: 6/10   Changes in function: Yes, see goal flow sheet for change in function   Adverse reactions: None;   ,     The subjective and objective information are from the last SOAP note on this patient.    OBJECTIVE  Objective: neg SI tests, indep HEP,       ASSESSMENT/PLAN  Updated problem list and treatment plan: Diagnosis 1:  Back pain    STG/LTGs have been met or progress has been made towards goals:  Yes (See Goal flow sheet completed today.)  Assessment of Progress: The patient's condition is improving.  The patient has met all of their long term goals.  Self Management Plans:  Patient is independent in a home treatment program.  Patient is independent in self management of symptoms.    Alannah continues to require the following intervention to meet STG and LTG's: PT intervention is no longer required to meet STG/LTG.  We will discharge this patient from PT.    Recommendations:  This patient is ready to be discharged from therapy and continue their home treatment program.    Please refer to the daily flowsheet for treatment today, total treatment time and time spent performing 1:1 timed codes.

## 2018-03-12 ENCOUNTER — OFFICE VISIT (OUTPATIENT)
Dept: FAMILY MEDICINE | Facility: CLINIC | Age: 78
End: 2018-03-12
Payer: MEDICARE

## 2018-03-12 VITALS
HEIGHT: 64 IN | WEIGHT: 115.3 LBS | TEMPERATURE: 97.8 F | OXYGEN SATURATION: 99 % | DIASTOLIC BLOOD PRESSURE: 68 MMHG | BODY MASS INDEX: 19.68 KG/M2 | SYSTOLIC BLOOD PRESSURE: 146 MMHG | HEART RATE: 90 BPM

## 2018-03-12 DIAGNOSIS — M81.8 OTHER OSTEOPOROSIS WITHOUT CURRENT PATHOLOGICAL FRACTURE: Primary | ICD-10-CM

## 2018-03-12 DIAGNOSIS — G20.A1 PARKINSON DISEASE (H): ICD-10-CM

## 2018-03-12 DIAGNOSIS — Z23 NEED FOR VACCINATION: ICD-10-CM

## 2018-03-12 DIAGNOSIS — M06.09 RHEUMATOID ARTHRITIS OF MULTIPLE SITES WITH NEGATIVE RHEUMATOID FACTOR (H): ICD-10-CM

## 2018-03-12 PROCEDURE — G0009 ADMIN PNEUMOCOCCAL VACCINE: HCPCS | Performed by: FAMILY MEDICINE

## 2018-03-12 PROCEDURE — 99214 OFFICE O/P EST MOD 30 MIN: CPT | Mod: 25 | Performed by: FAMILY MEDICINE

## 2018-03-12 PROCEDURE — 90732 PPSV23 VACC 2 YRS+ SUBQ/IM: CPT | Performed by: FAMILY MEDICINE

## 2018-03-12 ASSESSMENT — PAIN SCALES - GENERAL: PAINLEVEL: SEVERE PAIN (6)

## 2018-03-12 NOTE — PATIENT INSTRUCTIONS
"  * Sciatica    Sciatica (\"Lumbar Radiculopathy\") causes a pain that spreads from the lower back down into the buttock, hip and leg. Sometimes leg pain can occur without any back pain. Sciatica is due to irritation or pressure on a spinal nerve as it comes out of the spinal canal. This is most often due to a bulge or rupture of a nearby spinal disk (the cartilage cushion between each spinal bone), which presses on a nearby nerve. Other causes include spinal stenosis (narrowing of the spinal canal) and spasm of the piriformis muscle (a muscle in the buttocks that the sciatic nerve passes through).  Sciatica may begin after a sudden twisting/bending force (such as in a car accident), or sometimes after a simple awkward movement. In either case, muscle spasm is commonly present and contributes to the pain.  The diagnosis of sciatica is made from the symptoms and physical exam. Unless you had a physical injury (such as a car accident or fall), X-rays are usually not ordered for the initial evaluation of sciatica because the nerves and disks cannot be seen on an X-ray. Most sciatica (80-90%) gets better with time.  What can I do about my low back pain?  There are three main things you can do to ease low back pain and help it go away.    Use heat or cold packs.    Take medicine as directed.    Use positions, movements and exercises. Stay active! Too much rest can make your symptoms worse.  Using heat or cold packs  Try cold packs or gentle heat to ease your pain. Use whichever gives the most relief. Apply the cold pack or heat for 15 minutes at a time, as often as needed.  Taking medicine  If taking over-the-counter medicine:    Take ibuprofen (Advil, Motrin) 600 mg. three times a day as needed for pain.  OR    Take Aleve (naproxen sodium) 220 to 440 mg. two times a day as needed for pain  If your doctor prescribed a muscle relaxant (cyclobenzapine 10 mg.):    Take one half ( ) to 1 tablet at bedtime    Do not drive when " taking this medicine. This drug may make you sleepy.  Using positions, movements and exercises  Research tells us that moving your joints and muscles can help you recover from back pain. Such activity should be simple and gentle.  Use the positions below as well as walking to help relieve your discomfort. Try taking a short walk every 3 to 4 hours during the day. Walk for a few minutes inside your home or take longer walks outside, on a treadmill or at a mall. Slowly increase the amount of time you walk. Expect discomfort when you begin, but it should lessen as your back starts to recover.  Finding a position that is comfortable  When your back pain is new, you may find that certain positions will ease your pain. Gently try each of the following positions until you find one that eases your pain. Once you find a position of comfort, use it as often as you like while you recover. Return to your daily routine as soon as possible.     Lie on your back with your legs bent. You can do this by placing a pillow under your knees or lie on the floor and rest your lower legs on the seat of a chair.    Lie on your side with your knees bent and place a pillow between your knees.    Lie on your stomach over pillows.  When should I call my doctor?  Your back pain should improve over the first couple of weeks. As it improves, you should be able to return to your normal activities. But call your doctor if:    You have a sudden change in your ability to control? your bladder or bowels.    You begin to feel tingling in your groin or legs.    The pain spreads down your leg and into your foot.    Your toes, feet or leg muscles begin to feel weak.    You feel generally unwell or sick.    Your pain gets worse.    5840-2461 [employer]. [address] [place] [address], [place] [address]. All rights reserved. This information is not intended as a substitute for professional medical care. Always follow your healthcare professional's  instructions.  This information has been modified by your health care provider with permission from the publisher.

## 2018-03-12 NOTE — MR AVS SNAPSHOT
"              After Visit Summary   3/12/2018    Alannah Leos    MRN: [id number]           Patient Information     Date Of Birth          1940        Visit Information        Provider Department      3/12/2018 11:40 AM Leah Blanca MD [facility]        Today's Diagnoses     Other osteoporosis without current pathological fracture    -  1    Rheumatoid arthritis of multiple sites with negative rheumatoid factor (H)        Parkinson disease (H)        Need for vaccination          Care Instructions      * Sciatica    Sciatica (\"Lumbar Radiculopathy\") causes a pain that spreads from the lower back down into the buttock, hip and leg. Sometimes leg pain can occur without any back pain. Sciatica is due to irritation or pressure on a spinal nerve as it comes out of the spinal canal. This is most often due to a bulge or rupture of a nearby spinal disk (the cartilage cushion between each spinal bone), which presses on a nearby nerve. Other causes include spinal stenosis (narrowing of the spinal canal) and spasm of the piriformis muscle (a muscle in the buttocks that the sciatic nerve passes through).  Sciatica may begin after a sudden twisting/bending force (such as in a car accident), or sometimes after a simple awkward movement. In either case, muscle spasm is commonly present and contributes to the pain.  The diagnosis of sciatica is made from the symptoms and physical exam. Unless you had a physical injury (such as a car accident or fall), X-rays are usually not ordered for the initial evaluation of sciatica because the nerves and disks cannot be seen on an X-ray. Most sciatica (80-90%) gets better with time.  What can I do about my low back pain?  There are three main things you can do to ease low back pain and help it go away.    Use heat or cold packs.    Take medicine as directed.    Use positions, movements and exercises. Stay active! Too much rest can make your symptoms " worse.  Using heat or cold packs  Try cold packs or gentle heat to ease your pain. Use whichever gives the most relief. Apply the cold pack or heat for 15 minutes at a time, as often as needed.  Taking medicine  If taking over-the-counter medicine:    Take ibuprofen (Advil, Motrin) 600 mg. three times a day as needed for pain.  OR    Take Aleve (naproxen sodium) 220 to 440 mg. two times a day as needed for pain  If your doctor prescribed a muscle relaxant (cyclobenzapine 10 mg.):    Take one half ( ) to 1 tablet at bedtime    Do not drive when taking this medicine. This drug may make you sleepy.  Using positions, movements and exercises  Research tells us that moving your joints and muscles can help you recover from back pain. Such activity should be simple and gentle.  Use the positions below as well as walking to help relieve your discomfort. Try taking a short walk every 3 to 4 hours during the day. Walk for a few minutes inside your home or take longer walks outside, on a treadmill or at a mall. Slowly increase the amount of time you walk. Expect discomfort when you begin, but it should lessen as your back starts to recover.  Finding a position that is comfortable  When your back pain is new, you may find that certain positions will ease your pain. Gently try each of the following positions until you find one that eases your pain. Once you find a position of comfort, use it as often as you like while you recover. Return to your daily routine as soon as possible.     Lie on your back with your legs bent. You can do this by placing a pillow under your knees or lie on the floor and rest your lower legs on the seat of a chair.    Lie on your side with your knees bent and place a pillow between your knees.    Lie on your stomach over pillows.  When should I call my doctor?  Your back pain should improve over the first couple of weeks. As it improves, you should be able to return to your normal activities. But call  your doctor if:    You have a sudden change in your ability to control? your bladder or bowels.    You begin to feel tingling in your groin or legs.    The pain spreads down your leg and into your foot.    Your toes, feet or leg muscles begin to feel weak.    You feel generally unwell or sick.    Your pain gets worse.    2000-5818 [employer]. [address] [place] [address]. All rights reserved. This information is not intended as a substitute for professional medical care. Always follow your healthcare professional's instructions.  This information has been modified by your health care provider with permission from the publisher.                Follow-ups after your visit        Follow-up notes from your care team     Return in about 1 year (around 3/12/2019) for Physical Exam.      Your next 10 appointments already scheduled     Apr 13, 2018  2:00 PM CDT   Return Visit with Ricardo Johnson MD   [facility])    [address] [place] [id number]   [phone]            Apr 30, 2018  1:30 PM CDT   LAB with  LAB   [facility] ([address] [address]   [phone]           Please do not eat 10-12 hours before your appointment if you are coming in fasting for labs on lipids, cholesterol, or glucose (sugar). This does not apply to pregnant women. Water, hot tea and black coffee (with nothing added) are okay. Do not drink other fluids, diet soda or chew gum.            May 02, 2018  2:40 PM CDT   Return Visit with Merrick Del Cid MD   [facility] ([address] [address]   [phone]            Dec 13, 2018  2:00 PM CST   Return Visit with Cori Ventura MD   [facility])    [address] [place] [id number] [phone]              Who to  "contact     If you have questions or need follow up information about today's clinic visit or your schedule please contact [facility] directly at [phone].  Normal or non-critical lab and imaging results will be communicated to you by MyChart, letter or phone within 4 business days after the clinic has received the results. If you do not hear from us within 7 days, please contact the clinic through MyChart or phone. If you have a critical or abnormal lab result, we will notify you by phone as soon as possible.  Submit refill requests through at[employer] or call your pharmacy and they will forward the refill request to us. Please allow 3 business days for your refill to be completed.          Additional Information About Your Visit        [employer] Information     at[employer] lets you send messages to your doctor, view your test results, renew your prescriptions, schedule appointments and more. To sign up, go to [email or web address] . Click on \"Log in\" on the left side of the screen, which will take you to the Welcome page. Then click on \"Sign up Now\" on the right side of the page.     You will be asked to enter the access code listed below, as well as some personal information. Please follow the directions to create your username and password.     Your access code is: [email or web address]  Expires: 2018  2:50 PM     Your access code will  in 90 days. If you need help or a new code, please call your [place] clinic or [phone].        Care EveryWhere ID     This is your Care EveryWhere ID. This could be used by other organizations to access your [place] medical records  [id number]        Your Vitals Were     Pulse Temperature Height Pulse Oximetry BMI (Body Mass Index)       90 97.8  F (36.6  C) (Oral) 5' 3.5\" (1.613 m) 99% 20.1 kg/m2        Blood Pressure from Last 3 Encounters:   18 146/68   18 133/65   18 106/50    Weight from Last 3 Encounters:   18 115 lb 4.8 oz " (52.3 kg)   02/09/18 116 lb 4.8 oz (52.8 kg)   01/25/18 116 lb 12.8 oz (53 kg)              We Performed the Following     ADMIN MEDICARE: Pneumococcal Vaccine ()     Pneumococcal vaccine 23 valent PPSV23  (Pneumovax) [21931]        Primary Care Provider Office Phone # Fax #    Leah Shelby Blanca [phone]       [address]  [facility] [address]        Equal Access to Services     [facility] : Hadii aad ku hadasho Soomaali, waaxda luqadaha, qaybta kaalmada adeegyada, waxay idiin hayaan adeeg khararufino kessler . So [facility] [phone].    ATENCIÓN: Si habla español, tiene a mahan disposición servicios gratuitos de asistencia lingüística. LlKet[facility] [phone].    We comply with applicable federal civil rights laws and [place] laws. We do not discriminate on the basis of race, color, national origin, age, disability, sex, sexual orientation, or gender identity.            Thank you!     Thank you for choosing [facility]  for your care. Our goal is always to provide you with excellent care. Hearing back from our patients is one way we can continue to improve our services. Please take a few minutes to complete the written survey that you may receive in the mail after your visit with us. Thank you!             Your Updated Medication List - Protect others around you: Learn how to safely use, store and throw away your medicines at www.disposemymeds.org.          This list is accurate as of 3/12/18 12:31 PM.  Always use your most recent med list.                   Brand Name Dispense Instructions for use Diagnosis    ACE NOT PRESCRIBED (INTENTIONAL)     0 each    1 each continuous prn. ACE Inhibitor not prescribed due to Refusal by patient    Type 2 diabetes, HbA1c goal < 7% (H)       ammonium lactate 12 % cream    AMLACTIN    385 g    Apply once to twice a day to dry skin. Can burn temporarily if there is a cut on the skin.    Xerosis of skin       aspirin 81 MG tablet       PATIENT REPORTS TAKING 4 TIMES PER WEEK AND NOT EVERYDAY        betamethasone (augmented) 0.05 % lotion    DIPROLENE    60 mL    Apply daily on itchy areas of the scalp as needed, on days not using the shampoo only when flaring    Dermatitis, seborrheic, H/O Parkinson's disease       CALCIUM + D PO      600mg twice a day        carbidopa-levodopa  MG per tablet    SINEMET    900 tablet    2 tablets at 6 am. 2 tablets at Noon. 2 tablets at 5PM. 2 Tablet at 9 PM. 2 tablet at Midnight.    Parkinson disease (H)       ferrous sulfate 325 (65 FE) MG tablet    IRON    60 tablet    Take 1 tablet (325 mg) by mouth 2 times daily Stay on until seen back    Restless legs syndrome (RLS)       fluocinolone acetonide 0.01 % Sham     120 mL    Lather onto scalp, let sit for 5 min, then rinse off.Use for up to 1 week in a row for flares    Dermatitis, seborrheic       folic acid 1 MG tablet    FOLVITE    100 tablet    Take 1 tablet (1 mg) by mouth daily    Rheumatoid arthritis of multiple sites with negative rheumatoid factor (H), High risk medication use       hydroxychloroquine 200 MG tablet    PLAQUENIL    90 tablet    Take 1 tablet (200 mg) by mouth daily    Rheumatoid arthritis of multiple sites with negative rheumatoid factor (H)       ibuprofen 200 MG tablet    ADVIL/MOTRIN     Take 1 tablet (200 mg) by mouth every 8 hours as needed for moderate pain    Rheumatoid arthritis of multiple sites with negative rheumatoid factor (H)       methotrexate sodium 2.5 MG Tabs     96 tablet    Take 20 mg by mouth once a week . Take all 8 tablets on the same day of each week.    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

## 2018-03-12 NOTE — PROGRESS NOTES
SUBJECTIVE:   Alannah Leos is a 77 year old female who presents to clinic today for the following health issues:       Back Pain Follow Up- had a severe cold for 6 weeks this winter and had problems with coughing that seemed to last until now. Improving.      Description:   Location of pain:  bilateral  Character of pain: Intermittent agigation   Pain radiation: Does Goes down to tail bone then to the legs.  Since last visit, pain is:  improved  New numbness or weakness in legs, not attributed to pain:  YES    Intensity: Currently 5-6/10    History:   Pain interferes with job: Not applicable  Therapies tried without relief: none  Therapies tried with relief: PT and motrin           Accompanying Signs & Symptoms:  Risk of Fracture:  None  Risk of Cauda Equina:  None  Risk of Infection:  None  Risk of Cancer:  None        Amount of exercise or physical activity: 2-3 days/week for an average of 15-30 minutes    Problems taking medications regularly: No    Medication side effects: none    Diet: low fat/cholesterol    Medication for rheumatoid arthritis and parkinson's disease.     Problem list and histories reviewed & adjusted, as indicated.  Additional history: as documented    Patient Active Problem List   Diagnosis     Cataract, OU     Osteoporosis     Rheumatoid arthritis (H)     Parkinson disease (H)     Osteoarthritis of wrist     Osteoarthritis of shoulder     History of total knee arthroplasty     Osteoarthritis of left knee     Advanced directives, counseling/discussion     CARDIOVASCULAR SCREENING; LDL GOAL LESS THAN 160     High risk medication use     Underweight     Past Surgical History:   Procedure Laterality Date     GALLBLADDER SURGERY       HYSTERECTOMY       KNEE SURGERY      left      LAMINECTOMY LUMBAR ONE LEVEL       TONSILLECTOMY         Social History   Substance Use Topics     Smoking status: Never Smoker     Smokeless tobacco: Never Used     Alcohol use No     Family History   Problem  Relation Age of Onset     CANCER Sister      pancreatic         Current Outpatient Prescriptions   Medication Sig Dispense Refill     carbidopa-levodopa (SINEMET)  MG per tablet 2 tablets at 6 am. 2 tablets at Noon. 2 tablets at 5PM. 2 Tablet at 9 PM. 2 tablet at Midnight. 900 tablet 3     ferrous sulfate (IRON) 325 (65 FE) MG tablet Take 1 tablet (325 mg) by mouth 2 times daily Stay on until seen back 60 tablet 1     methotrexate sodium 2.5 MG TABS Take 20 mg by mouth once a week . Take all 8 tablets on the same day of each week. 96 tablet 1     hydroxychloroquine (PLAQUENIL) 200 MG tablet Take 1 tablet (200 mg) by mouth daily 90 tablet 1     ibuprofen (ADVIL/MOTRIN) 200 MG tablet Take 1 tablet (200 mg) by mouth every 8 hours as needed for moderate pain       betamethasone, augmented, (DIPROLENE) 0.05 % lotion Apply daily on itchy areas of the scalp as needed, on days not using the shampoo only when flaring 60 mL 6     ammonium lactate (AMLACTIN) 12 % cream Apply once to twice a day to dry skin. Can burn temporarily if there is a cut on the skin. 385 g 11     fluocinolone acetonide 0.01 % SHAM Lather onto scalp, let sit for 5 min, then rinse off.Use for up to 1 week in a row for flares 120 mL 11     folic acid (FOLVITE) 1 MG tablet Take 1 tablet (1 mg) by mouth daily 100 tablet 3     ACE NOT PRESCRIBED, INTENTIONAL, 1 each continuous prn. ACE Inhibitor not prescribed due to Refusal by patient       0 each 0     CALCIUM + D OR 600mg twice a day        ASPIRIN 81 MG PO TABS PATIENT REPORTS TAKING 4 TIMES PER WEEK AND NOT EVERYDAY       MULTI-VITAMIN OR 1 a day        Allergies   Allergen Reactions     Decadrol [Dexamethasone Sodium Phosphate]      Shrimp      Recent Labs   Lab Test  01/22/18   1336  10/16/17   1334  07/17/17   1341   05/08/17   1446   05/27/16   1413  05/26/15   1416   10/28/13   1204   04/26/13   1128   04/26/12   1151   A1C   --    --    --    --    --    --   5.9  6.0   --   5.8   --   6.3*   " < >  6.3*   LDL   --    --    --    --    --    --    --   89   --    --    --   73   --   77   HDL   --    --    --    --    --    --    --   80   --    --    --   72   --   71   TRIG   --    --    --    --    --    --    --   50   --    --    --   44   --   56   ALT  8  10  11   < >   --    < >  8   --    < >   --    < >  13   < >  14   CR  0.42*  0.48*  0.42*   < >   --    < >  0.41*  0.47*   < >   --    < >  0.48*   < >  0.54   GFRESTIMATED  >90  >90  >90  Non African American GFR Calc     < >   --    < >  >90  Non  GFR Calc    >90  Non  GFR Calc     < >   --    < >  >90   < >  >90   GFRESTBLACK  >90  >90  >90  African American GFR Calc     < >   --    < >  >90   GFR Calc    >90   GFR Calc     < >   --    < >  >90   < >  >90   POTASSIUM   --    --    --    --    --    --   3.7  4.0   < >   --    < >  4.0   < >  4.2   TSH   --    --    --    --   2.12   --   2.36  2.43   < >   --    --   2.41   --    --     < > = values in this interval not displayed.      BP Readings from Last 3 Encounters:   03/12/18 146/68   02/09/18 133/65   01/25/18 106/50    Wt Readings from Last 3 Encounters:   03/12/18 115 lb 4.8 oz (52.3 kg)   02/09/18 116 lb 4.8 oz (52.8 kg)   01/25/18 116 lb 12.8 oz (53 kg)                  Labs reviewed in EPIC    Reviewed and updated as needed this visit by clinical staff  Tobacco  Allergies  Meds  Med Hx  Surg Hx  Fam Hx  Soc Hx      Reviewed and updated as needed this visit by Provider         ROS:  Constitutional, HEENT, cardiovascular, pulmonary, gi and gu systems are negative, except as otherwise noted.    OBJECTIVE:     /68 (BP Location: Right arm, Patient Position: Sitting, Cuff Size: Adult Regular)  Pulse 90  Temp 97.8  F (36.6  C) (Oral)  Ht 5' 3.5\" (1.613 m)  Wt 115 lb 4.8 oz (52.3 kg)  SpO2 99%  BMI 20.1 kg/m2  Body mass index is 20.1 kg/(m^2).  GENERAL: elderly, alert, well nourished, well hydrated, no " "distress  HENT: ear canals- normal; TMs- normal; Nose- normal; Mouth- no ulcers, no lesions, missing dentition  NECK: no tenderness, no adenopathy, no asymmetry, no masses, no stiffness; thyroid- normal to palpation  RESP: lungs clear to auscultation - no rales, no rhonchi, no wheezes  CV: regular rates and rhythm, normal S1 S2, no S3 or S4 and no murmur, no click or rub, normal pulses  ABDOMEN: soft, no tenderness, no  hepatosplenomegaly, no masses, normal bowel sounds  MS: extremities- no gross deformities noted, no edema  SKIN: no suspicious lesions, no rashes, age related skin changes with seborrheic keratosis and no actinic keratosis.    NEURO: strength and tone- decreased, sensory exam- grossly normal, reflexes- symmetric  BACK: no CVA tenderness, no paralumbar tenderness  MENTAL STATUS EXAM:  Appearance/Behavior: no apparent distress, neatly groomed, dressed appropriately for weather, appears stated age and is frail-appearing  Speech: normal  Mood/Affect: normal affect  Insight: Good     Diagnostic Test Results:  No results found for this or any previous visit (from the past 24 hour(s)).    ASSESSMENT/PLAN:         Tobacco Cessation:   reports that she has never smoked. She has never used smokeless tobacco.      BMI:   Estimated body mass index is 20.1 kg/(m^2) as calculated from the following:    Height as of this encounter: 5' 3.5\" (1.613 m).    Weight as of this encounter: 115 lb 4.8 oz (52.3 kg).           ICD-10-CM    1. Other osteoporosis without current pathological fracture M81.8 No signs or symptoms of recurrent fracture.    2. Rheumatoid arthritis of multiple sites with negative rheumatoid factor (H) M06.09 Stav,e on current medications.    3. Parkinson disease (H) G20 adjusting dose   4. Need for vaccination Z23 Pneumococcal vaccine 23 valent PPSV23  (Pneumovax) [68347]     ADMIN MEDICARE: Pneumococcal Vaccine ()       CONSULTATION/REFERRAL to neurology  FUTURE LABS:       - Schedule fasting " labs in 6 months  FUTURE APPOINTMENTS:       - Follow-up visit in 6 months or sooner if any questions or concerns.     Leah Blanca MD  [facility]

## 2018-04-02 ENCOUNTER — OFFICE VISIT (OUTPATIENT)
Dept: ORTHOPEDICS | Facility: CLINIC | Age: 78
End: 2018-04-02
Payer: MEDICARE

## 2018-04-02 VITALS
WEIGHT: 114 LBS | BODY MASS INDEX: 19.46 KG/M2 | HEIGHT: 64 IN | DIASTOLIC BLOOD PRESSURE: 63 MMHG | RESPIRATION RATE: 18 BRPM | SYSTOLIC BLOOD PRESSURE: 126 MMHG

## 2018-04-02 DIAGNOSIS — M06.9 RHEUMATOID ARTHRITIS, INVOLVING UNSPECIFIED SITE, UNSPECIFIED RHEUMATOID FACTOR PRESENCE: ICD-10-CM

## 2018-04-02 DIAGNOSIS — M75.42 IMPINGEMENT SYNDROME, SHOULDER, LEFT: ICD-10-CM

## 2018-04-02 DIAGNOSIS — M49.82: Primary | ICD-10-CM

## 2018-04-02 PROCEDURE — 99214 OFFICE O/P EST MOD 30 MIN: CPT | Mod: 25 | Performed by: ORTHOPAEDIC SURGERY

## 2018-04-02 PROCEDURE — 20610 DRAIN/INJ JOINT/BURSA W/O US: CPT | Mod: LT | Performed by: ORTHOPAEDIC SURGERY

## 2018-04-02 RX ORDER — TRIAMCINOLONE ACETONIDE 40 MG/ML
40 INJECTION, SUSPENSION INTRA-ARTICULAR; INTRAMUSCULAR ONCE
Qty: 1 ML | Refills: 0 | OUTPATIENT
Start: 2018-04-02 | End: 2018-04-02

## 2018-04-02 NOTE — MR AVS SNAPSHOT
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  For the first 2 hours there will likely be some numbing in the joint from the lidocaine.  This is a good sign, indicating that the injection is in the right place.  In 2 hours the lidocaine will wear off, and the joint will hurt like you had a shot.  Each day the cortisone makes it feel better.  It reaches peak effect in 2 weeks.  We expect it to last for 3 months.  You may resume regular activity when you feel ready.  If you are diabetic, your glucoses will be quite high for several days.            Follow-ups after your visit        Additional Services     NEUROSURGERY REFERRAL       Your provider has referred you to: Dr. Armando Schulte - Neurosurgery - Referred by Michael Hernandez MD for rheumatoid arthritis with cervical spondylopathy    Please be aware that coverage of these services is subject to the terms and limitations of your health insurance plan.  Call member services at your health plan with any benefit or coverage questions.      Please bring the following with you to your appointment:    (1) Any X-Rays, CTs or MRIs which have been performed.  Contact the facility where they were done to arrange for  prior to your scheduled appointment.   (2) List of current medications  (3) This referral request   (4) Any documents/labs given to you for this referral                  Your next 10 appointments already scheduled     Apr 13, 2018  2:00 PM CDT   [facility]    [address] [address] [place] [id number]   [phone]            Apr 30, 2018  1:30 PM CDT   LAB with [facility]   [facility] ([facility])    [address] [facility] [address]   [phone]           Please do not eat 10-12 hours before your appointment if you are coming in fasting for labs on lipids, cholesterol, or glucose (sugar). This does not apply to pregnant women. Water, hot tea and black coffee (with nothing added) are okay. Do not drink other fluids, diet soda or chew gum.            May 02, 2018  2:40 PM CDT   Return Visit with Merrick Del Cid MD   [facility] [place] ([facility])    [address] [facility]  [place] [address]   [phone]            Dec 13, 2018  2:00 PM CST   [facility]   [facility]    [address] [place] [address]   [phone]              Who to contact     If you have questions or need follow up information about today's clinic visit or your schedule please contact [facility] directly at [phone].  Normal or non-critical lab and imaging results will be communicated to you by [employer]hart, letter or phone within 4 business days after the clinic has received the results. If you do not hear from us within 7 days, please contact the clinic through [employer]hart or phone. If you have a critical or abnormal lab result, we will notify you by phone as soon as possible.  Submit refill requests through [employer] or call your pharmacy and they will forward the refill request to us. Please allow 3 business days for your refill to be completed.          Additional Information About Your Visit        [employer] Information     [employer] lets you send messages to your doctor, view your test results, renew your prescriptions, schedule appointments and more.  "To sign up, go to [email or web address] . Click on \"Log in\" on the left side of the screen, which will take you to the Welcome page. Then click on \"Sign up Now\" on the right side of the page.     You will be asked to enter the access code listed below, as well as some personal information. Please follow the directions to create your username and password.     Your access code is: [email or web address]  Expires: 2018  2:50 PM     Your access code will  in 90 days. If you need help or a new code, please call your [facility] clinic or [phone].        Care EveryWhere ID     This is your Care EveryWhere ID. This could be used by other organizations to access your [facility] medical records  [id number]        Your Vitals Were     Respirations Height BMI (Body Mass Index)             18 1.613 m (5' 3.5\") 19.88 kg/m2          Blood Pressure from Last 3 Encounters:   18 126/63   18 146/68   18 133/65    Weight from Last 3 Encounters:   18 51.7 kg (114 lb)   18 52.3 kg (115 lb 4.8 oz)   18 52.8 kg (116 lb 4.8 oz)              We Performed the Following     NEUROSURGERY REFERRAL        Primary Care Provider Office Phone # Fax #    Leah Shelby Blanca [phone]       [address]  [facility] [address]        Equal Access to Services     [facility]: Hadii aad ku hadasho Soomaali, waaxda luqadaha, qaybta kaalmada adeegyada, danyell kessler . So [facility] [phone].    ATENCIÓN: Si habla español, tiene a mahan disposición servicios gratuitos de asistencia lingüística. Leanne al [phone].    We comply with applicable federal civil rights laws and [place] laws. We do not discriminate on the basis of race, color, national origin, age, disability, sex, sexual orientation, or gender identity.            Thank you!     Thank you for choosing [facility] [place]  for your care. Our goal is always to provide you with excellent care. " Hearing back from our patients is one way we can continue to improve our services. Please take a few minutes to complete the written survey that you may receive in the mail after your visit with us. Thank you!             Your Updated Medication List - Protect others around you: Learn how to safely use, store and throw away your medicines at www.disposemymeds.org.          This list is accurate as of 4/2/18  2:28 PM.  Always use your most recent med list.                   Brand Name Dispense Instructions for use Diagnosis    ACE NOT PRESCRIBED (INTENTIONAL)     0 each    1 each continuous prn. ACE Inhibitor not prescribed due to Refusal by patient    Type 2 diabetes, HbA1c goal < 7% (H)       ammonium lactate 12 % cream    AMLACTIN    385 g    Apply once to twice a day to dry skin. Can burn temporarily if there is a cut on the skin.    Xerosis of skin       aspirin 81 MG tablet      PATIENT REPORTS TAKING 4 TIMES PER WEEK AND NOT EVERYDAY        betamethasone (augmented) 0.05 % lotion    DIPROLENE    60 mL    Apply daily on itchy areas of the scalp as needed, on days not using the shampoo only when flaring    Dermatitis, seborrheic, H/O Parkinson's disease       CALCIUM + D PO      600mg twice a day        carbidopa-levodopa  MG per tablet    SINEMET    900 tablet    2 tablets at 6 am. 2 tablets at Noon. 2 tablets at 5PM. 2 Tablet at 9 PM. 2 tablet at Midnight.    Parkinson disease (H)       ferrous sulfate 325 (65 FE) MG tablet    IRON    60 tablet    Take 1 tablet (325 mg) by mouth 2 times daily Stay on until seen back    Restless legs syndrome (RLS)       fluocinolone acetonide 0.01 % Sham     120 mL    Lather onto scalp, let sit for 5 min, then rinse off.Use for up to 1 week in a row for flares    Dermatitis, seborrheic       folic acid 1 MG tablet    FOLVITE    100 tablet    Take 1 tablet (1 mg) by mouth daily    Rheumatoid arthritis of multiple sites with negative rheumatoid factor (H), High risk  medication use       hydroxychloroquine 200 MG tablet    PLAQUENIL    90 tablet    Take 1 tablet (200 mg) by mouth daily    Rheumatoid arthritis of multiple sites with negative rheumatoid factor (H)       ibuprofen 200 MG tablet    ADVIL/MOTRIN     Take 1 tablet (200 mg) by mouth every 8 hours as needed for moderate pain    Rheumatoid arthritis of multiple sites with negative rheumatoid factor (H)       methotrexate sodium 2.5 MG Tabs     96 tablet    Take 20 mg by mouth once a week . Take all 8 tablets on the same day of each week.    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

## 2018-04-02 NOTE — PROGRESS NOTES
The patient's left shoulder was prepped with betadine solution after verification of allergies. Area approximately 10 cm x 10 cm prepped in a sterile fashion. After injection, betadine removed with soap and water and band-aids applied.    1ml kenalog with 1% lidocaine plain injected into patient's left shoulder by Dr. Michael Hernandez  LOT# [id number]  Exp. 02/2019    Mk Reid PA-C  Supervising physician: Michael Hernandez MD  Dept. of Orthopedics  [facility]

## 2018-04-02 NOTE — LETTER
4/2/2018         RE: Alannah Leos  [address] [facility] [address]        Dear Colleague,    Thank you for referring your patient, Alannah Leos, to the [facility]. Please see a copy of my visit note below.    The patient's left shoulder was prepped with betadine solution after verification of allergies. Area approximately 10 cm x 10 cm prepped in a sterile fashion. After injection, betadine removed with soap and water and band-aids applied.    1ml kenalog with 1% lidocaine plain injected into patient's left shoulder by Dr. Michael Hernandez  LOT# [id number]  Exp. 02/2019    Mk Reid PA-C  Supervising physician: Michael Hernandez MD  Dept. of Orthopedics  [facility]          Alannah Leos is a 77 year old female who is seen as self referral for left shoulder pain and swelling, neck pain and stiffness.    Past Medical History:   Diagnosis Date     Hyperlipidemia      Murmur, heart     hsm     Nonsenile cataract      Osteoarthrosis, unspecified whether generalized or localized, lower leg      Osteopenia      Rheumatoid arthritis(714.0)        Past Surgical History:   Procedure Laterality Date     GALLBLADDER SURGERY       HYSTERECTOMY       KNEE SURGERY      left      LAMINECTOMY LUMBAR ONE LEVEL       TONSILLECTOMY         Family History   Problem Relation Age of Onset     CANCER Sister      pancreatic       Social History     Social History     Marital status:      Spouse name: Willis      Number of children: 2     Years of education: 12     Occupational History      Retired     [employer], retired in 12/2006     Social History Main Topics     Smoking status: Never Smoker     Smokeless tobacco: Never Used     Alcohol use No     Drug use: No     Sexual activity: Not Currently     Partners: Male     Birth control/ protection: None     Other Topics Concern      Service No     Blood Transfusions Yes     Caffeine Concern No     Occupational Exposure  Rounding complete. Pt is sleeping in bed with mother by her side. Arousable to writer's voice. Pt and family updated on status and plan of care. No needs at this time. Call light remains within reach for pt use.    No     Hobby Hazards No     Sleep Concern No     Stress Concern No     Weight Concern No     Special Diet Yes     Diabetic      Back Care Yes     Back surgeries x2     Exercise Yes     Bike Helmet No     Seat Belt Yes     Self-Exams Yes     Social History Narrative       Current Outpatient Prescriptions   Medication Sig Dispense Refill     triamcinolone acetonide (KENALOG) 40 MG/ML injection 1 mL (40 mg) by INTRA-ARTICULAR route once for 1 dose 1 mL 0     carbidopa-levodopa (SINEMET)  MG per tablet 2 tablets at 6 am. 2 tablets at Noon. 2 tablets at 5PM. 2 Tablet at 9 PM. 2 tablet at Midnight. 900 tablet 3     ferrous sulfate (IRON) 325 (65 FE) MG tablet Take 1 tablet (325 mg) by mouth 2 times daily Stay on until seen back 60 tablet 1     methotrexate sodium 2.5 MG TABS Take 20 mg by mouth once a week . Take all 8 tablets on the same day of each week. 96 tablet 1     hydroxychloroquine (PLAQUENIL) 200 MG tablet Take 1 tablet (200 mg) by mouth daily 90 tablet 1     ibuprofen (ADVIL/MOTRIN) 200 MG tablet Take 1 tablet (200 mg) by mouth every 8 hours as needed for moderate pain       betamethasone, augmented, (DIPROLENE) 0.05 % lotion Apply daily on itchy areas of the scalp as needed, on days not using the shampoo only when flaring 60 mL 6     ammonium lactate (AMLACTIN) 12 % cream Apply once to twice a day to dry skin. Can burn temporarily if there is a cut on the skin. 385 g 11     fluocinolone acetonide 0.01 % SHAM Lather onto scalp, let sit for 5 min, then rinse off.Use for up to 1 week in a row for flares 120 mL 11     folic acid (FOLVITE) 1 MG tablet Take 1 tablet (1 mg) by mouth daily 100 tablet 3     ACE NOT PRESCRIBED, INTENTIONAL, 1 each continuous prn. ACE Inhibitor not prescribed due to Refusal by patient       0 each 0     CALCIUM + D OR 600mg twice a day        ASPIRIN 81 MG PO TABS PATIENT REPORTS TAKING 4 TIMES PER WEEK AND NOT EVERYDAY       MULTI-VITAMIN OR 1 a day          Allergies   Allergen  "Reactions     Decadrol [Dexamethasone Sodium Phosphate]      Shrimp        REVIEW OF SYSTEMS:  CONSTITUTIONAL:  NEGATIVE for fever, chills, change in weight, not feeling tired  SKIN:  NEGATIVE for worrisome rashes, no skin lumps, no skin ulcers and no non-healing wounds  EYES:  NEGATIVE for vision changes or irritation.  ENT/MOUTH:  NEGATIVE.  No hearing loss, no hoarseness, no difficulty swallowing.  RESP:  NEGATIVE. No cough or shortness of breath.  CV:  NEGATIVE for chest pain, palpitations or peripheral edema  GI:  NEGATIVE for nausea, abdominal pain, heartburn, or change in bowel habits  :  Negative. No dysuria, no hematuria  MUSCULOSKELETAL:  See HPI above  NEURO:  NEGATIVE . No headaches, no dizziness,  no numbness  ENDOCRINE:  NEGATIVE for temperature intolerance, skin/hair changes  HEME/ALLERGY/IMMUNE:  NEGATIVE for bleeding problems  PSYCHIATRIC:  NEGATIVE. no anxiety, no depression.     Exam:  Vitals: /63  Resp 18  Ht 1.613 m (5' 3.5\")  Wt 51.7 kg (114 lb)  BMI 19.88 kg/m2  BMI= Body mass index is 19.88 kg/(m^2).  Constitutional:  healthy, alert and no distress  Neuro: Alert and Oriented x 3, Gait normal. Sensation grossly WNL.  Psych: Affect normal   Respiratory: Breathing not labored.  Cardiovascular: normal peripheral pulses  Lymph: no adenopathy  Skin: No rashes,worrisome lesions or skin problems      Again, thank you for allowing me to participate in the care of your patient.        Sincerely,        Michael Hernandez MD    "

## 2018-04-03 NOTE — PROGRESS NOTES
Visit Date:   04/02/2018      HISTORY OF PRESENT ILLNESS:  Ms. Leso is a 77-year-old female seen with left shoulder pains.  She has previous diagnoses of rheumatoid arthritis serologic negative.  Has had right total knee arthroplasty but had significant difficulty regaining motion and has significant contracture on that knee.  She has now pain in the left shoulder.  She has had problems last year in the right shoulder.  X-ray of the right shoulder previously showed severe arthritis of the glenohumeral joint.  X-ray of the left shoulder has not been obtained.  She has noted swelling in the left shoulder lately and this has concerned her.  She also has significant stiffness of the neck, is holding her neck forward, has very little movement and has pain when she does move the neck.      PHYSICAL EXAMINATION:  Shows significant stiffness of the neck with very little flexion, extension, almost no twist.  She has decreased motion of both shoulders with flexion passively to about 130 degrees and external rotation to 45-50 degrees, internal rotation to about 45 degrees.  She has pain mostly on the left shoulder with this.  She has equal motion on the right, but does not have as much pain on that shoulder.  She has swelling in the shoulder and it is unclear if it is in the bursa or deep to the joint in the glenohumeral joint.  Sensation and circulation are intact.  She does have fairly good strength of resisted internal rotation with some pain on the left.  She has mild weakness of external rotation on the left and has mild pain with resisted abduction on both.  Sensation and circulation are intact.      IMPRESSION:     1.  Left shoulder rheumatoid arthritis with impingement as well.   2.  Cervical pain and stiffness, likely with instability from her rheumatoid arthritis as she has very little mobility and pain when she does move the neck.  I am concerned about the position of her neck and stiffness and the rheumatoid  arthritis.  The shoulder does not worry me as much, although she is bothered by the swelling and pain.  We discussed options and will inject the left shoulder today.  This was performed with 40 mg Kenalog and lidocaine in the subacromial space.  I did not find any fluid to aspirate here and so it may be intra-articular where she has her effusion.  We have injected 40 mg Kenalog and lidocaine.  Will see if this relieves pain.  If it does not, we will repeat the injection in the glenohumeral joint through a posterior approach.  We will refer to Dr. Schulte for evaluation of her neck as I am concerned with her possible instability and rheumatoid arthritis.         FRANCIA SPANGLER MD             D: 2018   T: 2018   MT: OLESYA      Name:     URSULA MEDEIROS   MRN:      [id number]        Account:      [id number]   :      1940           Visit Date:   2018      Document: [id number]

## 2018-04-03 NOTE — PROGRESS NOTES
Alannah Leos is a 77 year old female who is seen as self referral for left shoulder pain and swelling, neck pain and stiffness.    Past Medical History:   Diagnosis Date     Hyperlipidemia      Murmur, heart     hsm     Nonsenile cataract      Osteoarthrosis, unspecified whether generalized or localized, lower leg      Osteopenia      Rheumatoid arthritis(714.0)        Past Surgical History:   Procedure Laterality Date     GALLBLADDER SURGERY       HYSTERECTOMY       KNEE SURGERY      left      LAMINECTOMY LUMBAR ONE LEVEL       TONSILLECTOMY         Family History   Problem Relation Age of Onset     CANCER Sister      pancreatic       Social History     Social History     Marital status:      Spouse name: Willis      Number of children: 2     Years of education: 12     Occupational History      Retired     [employer], retired in 12/2006     Social History Main Topics     Smoking status: Never Smoker     Smokeless tobacco: Never Used     Alcohol use No     Drug use: No     Sexual activity: Not Currently     Partners: Male     Birth control/ protection: None     Other Topics Concern      Service No     Blood Transfusions Yes     Caffeine Concern No     Occupational Exposure No     Hobby Hazards No     Sleep Concern No     Stress Concern No     Weight Concern No     Special Diet Yes     Diabetic      Back Care Yes     Back surgeries x2     Exercise Yes     Bike Helmet No     Seat Belt Yes     Self-Exams Yes     Social History Narrative       Current Outpatient Prescriptions   Medication Sig Dispense Refill     triamcinolone acetonide (KENALOG) 40 MG/ML injection 1 mL (40 mg) by INTRA-ARTICULAR route once for 1 dose 1 mL 0     carbidopa-levodopa (SINEMET)  MG per tablet 2 tablets at 6 am. 2 tablets at Noon. 2 tablets at 5PM. 2 Tablet at 9 PM. 2 tablet at Midnight. 900 tablet 3     ferrous sulfate (IRON) 325 (65 FE) MG tablet Take 1 tablet (325 mg) by mouth 2 times daily Stay on until seen back  60 tablet 1     methotrexate sodium 2.5 MG TABS Take 20 mg by mouth once a week . Take all 8 tablets on the same day of each week. 96 tablet 1     hydroxychloroquine (PLAQUENIL) 200 MG tablet Take 1 tablet (200 mg) by mouth daily 90 tablet 1     ibuprofen (ADVIL/MOTRIN) 200 MG tablet Take 1 tablet (200 mg) by mouth every 8 hours as needed for moderate pain       betamethasone, augmented, (DIPROLENE) 0.05 % lotion Apply daily on itchy areas of the scalp as needed, on days not using the shampoo only when flaring 60 mL 6     ammonium lactate (AMLACTIN) 12 % cream Apply once to twice a day to dry skin. Can burn temporarily if there is a cut on the skin. 385 g 11     fluocinolone acetonide 0.01 % SHAM Lather onto scalp, let sit for 5 min, then rinse off.Use for up to 1 week in a row for flares 120 mL 11     folic acid (FOLVITE) 1 MG tablet Take 1 tablet (1 mg) by mouth daily 100 tablet 3     ACE NOT PRESCRIBED, INTENTIONAL, 1 each continuous prn. ACE Inhibitor not prescribed due to Refusal by patient       0 each 0     CALCIUM + D OR 600mg twice a day        ASPIRIN 81 MG PO TABS PATIENT REPORTS TAKING 4 TIMES PER WEEK AND NOT EVERYDAY       MULTI-VITAMIN OR 1 a day          Allergies   Allergen Reactions     Decadrol [Dexamethasone Sodium Phosphate]      Shrimp        REVIEW OF SYSTEMS:  CONSTITUTIONAL:  NEGATIVE for fever, chills, change in weight, not feeling tired  SKIN:  NEGATIVE for worrisome rashes, no skin lumps, no skin ulcers and no non-healing wounds  EYES:  NEGATIVE for vision changes or irritation.  ENT/MOUTH:  NEGATIVE.  No hearing loss, no hoarseness, no difficulty swallowing.  RESP:  NEGATIVE. No cough or shortness of breath.  CV:  NEGATIVE for chest pain, palpitations or peripheral edema  GI:  NEGATIVE for nausea, abdominal pain, heartburn, or change in bowel habits  :  Negative. No dysuria, no hematuria  MUSCULOSKELETAL:  See HPI above  NEURO:  NEGATIVE . No headaches, no dizziness,  no  "numbness  ENDOCRINE:  NEGATIVE for temperature intolerance, skin/hair changes  HEME/ALLERGY/IMMUNE:  NEGATIVE for bleeding problems  PSYCHIATRIC:  NEGATIVE. no anxiety, no depression.     Exam:  Vitals: /63  Resp 18  Ht 1.613 m (5' 3.5\")  Wt 51.7 kg (114 lb)  BMI 19.88 kg/m2  BMI= Body mass index is 19.88 kg/(m^2).  Constitutional:  healthy, alert and no distress  Neuro: Alert and Oriented x 3, Gait normal. Sensation grossly WNL.  Psych: Affect normal   Respiratory: Breathing not labored.  Cardiovascular: normal peripheral pulses  Lymph: no adenopathy  Skin: No rashes,worrisome lesions or skin problems    "

## 2018-04-13 ENCOUNTER — OFFICE VISIT (OUTPATIENT)
Dept: NEUROLOGY | Facility: CLINIC | Age: 78
End: 2018-04-13
Payer: MEDICARE

## 2018-04-13 VITALS
DIASTOLIC BLOOD PRESSURE: 66 MMHG | OXYGEN SATURATION: 97 % | HEART RATE: 89 BPM | BODY MASS INDEX: 20.23 KG/M2 | SYSTOLIC BLOOD PRESSURE: 142 MMHG | WEIGHT: 116 LBS

## 2018-04-13 DIAGNOSIS — G25.81 RESTLESS LEGS SYNDROME (RLS): ICD-10-CM

## 2018-04-13 DIAGNOSIS — G20.A1 PARKINSON DISEASE (H): ICD-10-CM

## 2018-04-13 DIAGNOSIS — G20.A1 PARKINSON'S DISEASE (H): Primary | ICD-10-CM

## 2018-04-13 DIAGNOSIS — E61.1 IRON DEFICIENCY: ICD-10-CM

## 2018-04-13 LAB — FERRITIN SERPL-MCNC: 52 NG/ML (ref 8–252)

## 2018-04-13 PROCEDURE — 36415 COLL VENOUS BLD VENIPUNCTURE: CPT | Performed by: PSYCHIATRY & NEUROLOGY

## 2018-04-13 PROCEDURE — 82728 ASSAY OF FERRITIN: CPT | Performed by: PSYCHIATRY & NEUROLOGY

## 2018-04-13 PROCEDURE — 99213 OFFICE O/P EST LOW 20 MIN: CPT | Performed by: PSYCHIATRY & NEUROLOGY

## 2018-04-13 RX ORDER — FERROUS SULFATE 325(65) MG
325 TABLET ORAL 2 TIMES DAILY
Qty: 60 TABLET | Refills: 1 | Status: SHIPPED | OUTPATIENT
Start: 2018-04-13 | End: 2018-06-08

## 2018-04-13 RX ORDER — CARBIDOPA AND LEVODOPA 25; 100 MG/1; MG/1
TABLET ORAL
Qty: 900 TABLET | Refills: 3 | Status: SHIPPED | OUTPATIENT
Start: 2018-04-13 | End: 2018-12-07

## 2018-04-13 ASSESSMENT — UNIFIED PARKINSONS DISEASE RATING SCALE (UPDRS)
TOTAL_SCORE: 36
LEG_AGILITY_LEFT: SLIGHT: ANY OF THE FOLLOWING: A) THE REGULAR RHYTHM IS BROKEN WITH ONE WITH ONE OR TWO INTERRUPTIONS OR HESITATIONS OF THE MOVEMENT B) SLIGHT SLOWING C) THE AMPLITUDE DECREMENTS NEAR THE END OF THE 10 MOVEMENTS.
AXIAL_SCORE: 10
FREEZING_GAIT: NORMAL
AMPLITUDE_LUE: NORMAL: NO TREMOR.
RIGIDITY_RLE: MILD: RIGIDITY DETECTED WITHOUT THE ACTIVATION MANEUVER.  FULL RANGE OF MOTION IS EASILY ACHIEVED.
AMPLITUDE_RUE: NORMAL: NO TREMOR.
AMPLITUDE_LIP_JAW: NORMAL: NO TREMOR.
FACIAL_EXPRESSION: SLIGHT: MINIMAL MASKED FACIES MANIFESTED ONLY BY DECREASED FREQUENCY OF BLINKING.
TOTAL_SCORE_LEFT: 12
ARISING_CHAIR: MILD:  PUSHES SELF UP FROM ARMS OF CHAIR WITHOUT DIFFICULTY.
AMPLITUDE_LLE: NORMAL: NO TREMOR.
POSTURAL_STABILITY: SLIGHT: 3-5 STEPS, BUT RECOVERS UNAIDED.
FINGER_TAPPING_RIGHT: MODERATE: ANY OF THE FOLLOWING:  A) MORE THAN 5 INTERRUPTIONS OR AT LEAST ONE LONGER ARREST (FREEZE) IN ONGOING MOVEMENT  B) MODERATE SLOWING C) THE AMPLITUDE DECREMENTS STARTING AFTER THE FIRST MOVEMENT.
RIGIDITY_NECK: MILD: RIGIDITY DETECTED WITHOUT THE ACTIVATION MANEUVER.  FULL RANGE OF MOTION IS EASILY ACHIEVED.
RIGIDITY_RUE: MILD: RIGIDITY DETECTED WITHOUT THE ACTIVATION MANEUVER.  FULL RANGE OF MOTION IS EASILY ACHIEVED.
TOTAL_SCORE: 14
SPEECH: SLIGHT: LOSS OF MODULATION, DICTION OR VOLUME, BUT STILL ALL WORDS EASY TO UNDERSTAND.
RIGIDITY_LLE: NORMAL
CONSTANCY_TREMOR_ATREST: NORMAL: NO TREMOR.
PRONATION_SUPINATION_RIGHT: MILD: ANY OF THE FOLLOWING: A) 3 TO 5 INTERRUPTIONS DURING TAPPING B) MILD SLOWING C) THE AMPLITUDE DECREMENTS MIDWAY IN THE 10-MOVEMENT SEQUENCE
LEG_AGILITY_RIGHT: SLIGHT: ANY OF THE FOLLOWING: A) THE REGULAR RHYTHM IS BROKEN WITH ONE WITH ONE OR TWO INTERRUPTIONS OR HESITATIONS OF THE MOVEMENT B) SLIGHT SLOWING C) THE AMPLITUDE DECREMENTS NEAR THE END OF THE 10 MOVEMENTS.
TOETAPPING_RIGHT: MILD: ANY OF THE FOLLOWING: A) 3 TO 5 INTERRUPTIONS DURING TAPPING B) MILD SLOWING C) THE AMPLITUDE DECREMENTS MIDWAY IN THE 10-MOVEMENT SEQUENCE
HANDMOVEMENTS_RIGHT: MILD: ANY OF THE FOLLOWING: A) 3 TO 5 INTERRUPTIONS DURING TAPPING B) MILD SLOWING C) THE AMPLITUDE DECREMENTS MIDWAY IN THE 10-MOVEMENT SEQUENCE
PRONATION_SUPINATION_LEFT: MILD: ANY OF THE FOLLOWING: A) 3 TO 5 INTERRUPTIONS DURING TAPPING B) MILD SLOWING C) THE AMPLITUDE DECREMENTS MIDWAY IN THE 10-MOVEMENT SEQUENCE
SPONTANEITY_OF_MOVEMENT: 0: NORMAL.  NO PROBLEMS.
HANDMOVEMENTS_LEFT: MILD: ANY OF THE FOLLOWING: A) 3 TO 5 INTERRUPTIONS DURING TAPPING B) MILD SLOWING C) THE AMPLITUDE DECREMENTS MIDWAY IN THE 10-MOVEMENT SEQUENCE
TOETAPPING_LEFT: MILD: ANY OF THE FOLLOWING: A) 3 TO 5 INTERRUPTIONS DURING TAPPING B) MILD SLOWING C) THE AMPLITUDE DECREMENTS MIDWAY IN THE 10-MOVEMENT SEQUENCE
POSTURE: 1 SLIGHT.  NOT QUITE ERECT BUT COULD BE NORMAL FOR OLDER PERSONS.
RIGIDITY_LUE: MILD: RIGIDITY DETECTED WITHOUT THE ACTIVATION MANEUVER.  FULL RANGE OF MOTION IS EASILY ACHIEVED.
GAIT: MILD: INDEPENDENT WALKING BUT WITH SUBSTANTIAL GAIT IMPAIRMENT.
AMPLITUDE_RLE: NORMAL: NO TREMOR.
FINGER_TAPPING_LEFT: MODERATE: ANY OF THE FOLLOWING:  A) MORE THAN 5 INTERRUPTIONS  OR AT LEAST ONE LONGER ARREST (FREEZE) IN ONGOING MOVEMENT  B) MODERATE SLOWING C) THE AMPLITUDE DECREMENTS STARTING AFTER THE FIRST MOVEMENT.

## 2018-04-13 ASSESSMENT — PAIN SCALES - GENERAL: PAINLEVEL: MODERATE PAIN (5)

## 2018-04-13 NOTE — NURSING NOTE
"Alannah Leos's goals for this visit include: return  She requests these members of her care team be copied on today's visit information:     PCP: Leah Blanca    Referring Provider:  No referring provider defined for this encounter.    Chief Complaint   Patient presents with     RECHECK       Initial /66  Pulse 89  Wt 52.6 kg (116 lb)  SpO2 97%  BMI 20.23 kg/m2 Estimated body mass index is 20.23 kg/(m^2) as calculated from the following:    Height as of 4/2/18: 1.613 m (5' 3.5\").    Weight as of this encounter: 52.6 kg (116 lb).  Medication Reconciliation: complete    Do you need any medication refills at today's visit? n  "

## 2018-04-13 NOTE — PROGRESS NOTES
[facility] follow up note    Patient: Alannah Leos  Medical Record Number: [id number]  Encounter Date: April 13, 2018  PCP:Leah Blanca    CC: Parkinson's disease    Impression:   #1  Idiopathic Parkinson's disease  #2  Mild peak dose dyskinesia  #3  Restless leg syndrome    Recommendations:    #1  Continue carbidopa levodopa 25 100 immediate release 2 tablets at 6, 12, 5, 9, 1.  #2  We checked her serum ferritin and this came back in the low 50s.  We want to push it up above 72 for her restless leg.  Iron will be represcribed and we will check it again at her next visit.  #3  Return to clinic 6 months    Return to clinic: 6 months    Interval Hx:: Ms. Alannah Leos reports that she has had a rough 6 months.  She had a severe flu with the beginning of the year.  She then had swelling of her arm which turned out to be a severe bursitis around her left shoulder.  She has had orthopedic care but her shoulders remained extremely painful.  This makes moving about difficult.    In terms of her Parkinson's disease she does feel she is better on the increased dose of carbidopa/levodopa 25/100  2 tablets at 6, 12, 5, 9, 1.  She has no sense of on and no sense of wearing off.  She does not notice dyskinesia but her daughter does notice some mild dyskinesia.  She is able to walk with her walker.  She seems a bit freer in her movements.    Her restless leg is better with the iron replacement but it recurs at night.  Sometimes it will last an hour or 2 at nighttime.      Current medications  Current Outpatient Prescriptions   Medication     carbidopa-levodopa (SINEMET)  MG per tablet     ferrous sulfate (IRON) 325 (65 FE) MG tablet     methotrexate sodium 2.5 MG TABS     hydroxychloroquine (PLAQUENIL) 200 MG tablet     ibuprofen (ADVIL/MOTRIN) 200 MG tablet     betamethasone, augmented, (DIPROLENE) 0.05 % lotion     ammonium lactate (AMLACTIN) 12 % cream     fluocinolone acetonide 0.01 % SHAM      folic acid (FOLVITE) 1 MG tablet     CALCIUM + D OR     ASPIRIN 81 MG PO TABS     MULTI-VITAMIN OR     ACE NOT PRESCRIBED, INTENTIONAL,     No current facility-administered medications for this visit.        Examination:  /66  Pulse 89  Wt 52.6 kg (116 lb)  SpO2 97%  BMI 20.23 kg/m2  General- no distress, no rash, good pulses, no edema  Respiratory-auscultation over the anterior lung fields is clear  Cardiac- regular rate and rhythm    Neurologic  Mental status  Patient is alert, appropriate, speech is fluent and comprehension is intact    Cranial nerve testing  Pupils are equal and reactive to light, visual fields are full to confrontation bilaterally  Extraocular movements are full  Facial sensation is intact, face is symmetric with rest and activation  Palate rises symmetrically, tongue protrudes at midline with normal movements  Sterocleidomastoid and trapezius strength is normal and symmetric    Please  See UPDRS flowsheet.   Patient has mild dyskinesia in the on state.

## 2018-04-13 NOTE — MR AVS SNAPSHOT
After Visit Summary   4/13/2018    Alannah Leos    MRN: [id number]           Patient Information     Date Of Birth          1940        Visit Information        Provider Department      4/13/2018 2:00 PM Ricardo Johnson MD [facility]        Today's Diagnoses     Parkinson's disease (H)    -  1    Restless legs syndrome (RLS)        Iron deficiency        Parkinson disease (H)          Care Instructions    #1 Serum ferritin today  #2 See back in 6 months          Follow-ups after your visit        Follow-up notes from your care team     Return in about 6 months (around 10/13/2018).      Your next 10 appointments already scheduled     Apr 30, 2018  1:30 PM CDT   LAB with  [facility]   [facility]    [address] [address]   [phone]           Please do not eat 10-12 hours before your appointment if you are coming in fasting for labs on lipids, cholesterol, or glucose (sugar). This does not apply to pregnant women. Water, hot tea and black coffee (with nothing added) are okay. Do not drink other fluids, diet soda or chew gum.            May 02, 2018  2:40 PM CDT   Return Visit with Merrick Del Cid MD   [facility]    [address] [address]   [phone]            May 08, 2018  3:00 PM CDT   New Visit with Armando Schulte MD   [facility]    [address] [facility] [address]   [phone]            Oct 19, 2018  2:00 PM CDT   Return Visit with Ricardo Johnson MD   [facility] ([facility])    [address] [facility] [address] [phone]            Dec 13, 2018  2:00 PM CST   Return Visit with Cori Ventura MD   [facility] ([facility])    [address] [facility] [address] [phone]               Future tests that were ordered for you today     Open Future Orders        Priority Expected Expires Ordered    Ferritin Routine  2018            Who to contact     If you have questions or need follow up information about today's clinic visit or your schedule please contact [facility] directly at [phone].  Normal or non-critical lab and imaging results will be communicated to you by MyChart, letter or phone within 4 business days after the clinic has received the results. If you do not hear from us within 7 days, please contact the clinic through MyChart or phone. If you have a critical or abnormal lab result, we will notify you by phone as soon as possible.  Submit refill requests through [employer] or call your pharmacy and they will forward the refill request to us. Please allow 3 business days for your refill to be completed.          Additional Information About Your Visit        Spare to [employer] Information     [employer] is an electronic gateway that provides easy, online access to your medical records. With [employer], you can request a clinic appointment, read your test results, renew a prescription or communicate with your care team.     To sign up for [employer] visit the website at [email or web address]   You will be asked to enter the access code listed below, as well as some personal information. Please follow the directions to create your username and password.     Your access code is: [email or web address]  Expires: 2018  2:50 PM     Your access code will  in 90 days. If you need help or a new code, please contact your [facility] or call [phone] for assistance.        Care EveryWhere ID     This is your Care EveryWhere ID. This could be used by other organizations to access your [place] medical records  [id number]        Your Vitals Were     Pulse Pulse Oximetry BMI (Body Mass Index)             89 97% 20.23 kg/m2          Blood  Pressure from Last 3 Encounters:   04/13/18 142/66   04/02/18 126/63   03/12/18 146/68    Weight from Last 3 Encounters:   04/13/18 52.6 kg (116 lb)   04/02/18 51.7 kg (114 lb)   03/12/18 52.3 kg (115 lb 4.8 oz)                 Where to get your medicines      These medications were sent to [facility] [id number] [place] [address] [address] [facility]  [address] [facility] [address]     Phone:  [phone]     carbidopa-levodopa  MG per tablet          Primary Care Provider Office Phone # Fax #    Leah Blanca [phone] [address] [place]  [facility] [address]        Equal Access to Services     [facility]: Hadii aad ku hadasho Soomaali, waaxda luqadaha, qaybta kaalmada adeegyada, waxay rooseveltin hayaparna kessler . So [facility] [phone].    ATENCIÓN: Si habla español, tiene a mahan disposición servicios gratuitos de asistencia lingüística. Llame al [phone].    We comply with applicable federal civil rights laws and [place] laws. We do not discriminate on the basis of race, color, national origin, age, disability, sex, sexual orientation, or gender identity.            Thank you!     Thank you for choosing [facility]  for your care. Our goal is always to provide you with excellent care. Hearing back from our patients is one way we can continue to improve our services. Please take a few minutes to complete the written survey that you may receive in the mail after your visit with us. Thank you!             Your Updated Medication List - Protect others around you: Learn how to safely use, store and throw away your medicines at www.disposemymeds.org.          This list is accurate as of 4/13/18  2:55 PM.  Always use your most recent med list.                   Brand Name Dispense Instructions for use Diagnosis    ACE NOT PRESCRIBED (INTENTIONAL)     0 each    1 each continuous prn. ACE Inhibitor not  prescribed due to Refusal by patient    Type 2 diabetes, HbA1c goal < 7% (H)       ammonium lactate 12 % cream    AMLACTIN    385 g    Apply once to twice a day to dry skin. Can burn temporarily if there is a cut on the skin.    Xerosis of skin       aspirin 81 MG tablet      PATIENT REPORTS TAKING 4 TIMES PER WEEK AND NOT EVERYDAY        betamethasone (augmented) 0.05 % lotion    DIPROLENE    60 mL    Apply daily on itchy areas of the scalp as needed, on days not using the shampoo only when flaring    Dermatitis, seborrheic, H/O Parkinson's disease       CALCIUM + D PO      600mg twice a day        carbidopa-levodopa  MG per tablet    SINEMET    900 tablet    2 tablets at 6 am. 2 tablets at Noon. 2 tablets at 5PM. 2 Tablet at 9 PM. 2 tablet at Midnight.    Parkinson disease (H)       ferrous sulfate 325 (65 Fe) MG tablet    IRON    60 tablet    Take 1 tablet (325 mg) by mouth 2 times daily Stay on until seen back    Restless legs syndrome (RLS)       fluocinolone acetonide 0.01 % Sham     120 mL    Lather onto scalp, let sit for 5 min, then rinse off.Use for up to 1 week in a row for flares    Dermatitis, seborrheic       folic acid 1 MG tablet    FOLVITE    100 tablet    Take 1 tablet (1 mg) by mouth daily    Rheumatoid arthritis of multiple sites with negative rheumatoid factor (H), High risk medication use       hydroxychloroquine 200 MG tablet    PLAQUENIL    90 tablet    Take 1 tablet (200 mg) by mouth daily    Rheumatoid arthritis of multiple sites with negative rheumatoid factor (H)       ibuprofen 200 MG tablet    ADVIL/MOTRIN     Take 1 tablet (200 mg) by mouth every 8 hours as needed for moderate pain    Rheumatoid arthritis of multiple sites with negative rheumatoid factor (H)       methotrexate sodium 2.5 MG Tabs     96 tablet    Take 20 mg by mouth once a week . Take all 8 tablets on the same day of each week.    Rheumatoid arthritis of multiple sites with negative rheumatoid factor (H), High  risk medication use       MULTI-VITAMIN PO      1 a day

## 2018-04-13 NOTE — LETTER
4/13/2018         RE: Alannah Leos  [address] [address]        Dear Colleague,    Thank you for referring your patient, Alannah Leos, to the [facility]. Please see a copy of my visit note below.    [facility] Disorder Clinic follow up note    Patient: Alannah Leos  Medical Record Number: [id number]  Encounter Date: April 13, 2018  PCP:Leah Blanca    CC: Parkinson's disease    Impression:   #1  Idiopathic Parkinson's disease  #2  Mild peak dose dyskinesia  #3  Restless leg syndrome    Recommendations:    #1  Continue carbidopa levodopa 25 100 immediate release 2 tablets at 6, 12, 5, 9, 1.  #2  We checked her serum ferritin and this came back in the low 50s.  We want to push it up above 72 for her restless leg.  Iron will be represcribed and we will check it again at her next visit.  #3  Return to clinic 6 months    Return to clinic: 6 months    Interval Hx:: Ms. Alannah Leos reports that she has had a rough 6 months.  She had a severe flu with the beginning of the year.  She then had swelling of her arm which turned out to be a severe bursitis around her left shoulder.  She has had orthopedic care but her shoulders remained extremely painful.  This makes moving about difficult.    In terms of her Parkinson's disease she does feel she is better on the increased dose of carbidopa/levodopa 25/100  2 tablets at 6, 12, 5, 9, 1.  She has no sense of on and no sense of wearing off.  She does not notice dyskinesia but her daughter does notice some mild dyskinesia.  She is able to walk with her walker.  She seems a bit freer in her movements.    Her restless leg is better with the iron replacement but it recurs at night.  Sometimes it will last an hour or 2 at nighttime.      Current medications  Current Outpatient Prescriptions   Medication     carbidopa-levodopa (SINEMET)  MG per tablet     ferrous sulfate (IRON) 325 (65 FE) MG tablet     methotrexate sodium  2.5 MG TABS     hydroxychloroquine (PLAQUENIL) 200 MG tablet     ibuprofen (ADVIL/MOTRIN) 200 MG tablet     betamethasone, augmented, (DIPROLENE) 0.05 % lotion     ammonium lactate (AMLACTIN) 12 % cream     fluocinolone acetonide 0.01 % SHAM     folic acid (FOLVITE) 1 MG tablet     CALCIUM + D OR     ASPIRIN 81 MG PO TABS     MULTI-VITAMIN OR     ACE NOT PRESCRIBED, INTENTIONAL,     No current facility-administered medications for this visit.        Examination:  /66  Pulse 89  Wt 52.6 kg (116 lb)  SpO2 97%  BMI 20.23 kg/m2  General- no distress, no rash, good pulses, no edema  Respiratory-auscultation over the anterior lung fields is clear  Cardiac- regular rate and rhythm    Neurologic  Mental status  Patient is alert, appropriate, speech is fluent and comprehension is intact    Cranial nerve testing  Pupils are equal and reactive to light, visual fields are full to confrontation bilaterally  Extraocular movements are full  Facial sensation is intact, face is symmetric with rest and activation  Palate rises symmetrically, tongue protrudes at midline with normal movements  Sterocleidomastoid and trapezius strength is normal and symmetric    Please  See UPDRS flowsheet.   Patient has mild dyskinesia in the on state.    Again, thank you for allowing me to participate in the care of your patient.        Sincerely,        Ricardo Johnson MD

## 2018-04-30 DIAGNOSIS — M06.09 RHEUMATOID ARTHRITIS OF MULTIPLE SITES WITH NEGATIVE RHEUMATOID FACTOR (H): ICD-10-CM

## 2018-04-30 DIAGNOSIS — Z79.899 HIGH RISK MEDICATION USE: ICD-10-CM

## 2018-04-30 LAB
ALBUMIN SERPL-MCNC: 4.2 G/DL (ref 3.4–5)
ALP SERPL-CCNC: 84 U/L (ref 40–150)
ALT SERPL W P-5'-P-CCNC: 9 U/L (ref 0–50)
AST SERPL W P-5'-P-CCNC: 23 U/L (ref 0–45)
BASOPHILS # BLD AUTO: 0.1 10E9/L (ref 0–0.2)
BASOPHILS NFR BLD AUTO: 1.2 %
BILIRUB DIRECT SERPL-MCNC: 0.1 MG/DL (ref 0–0.2)
BILIRUB SERPL-MCNC: 0.4 MG/DL (ref 0.2–1.3)
CREAT SERPL-MCNC: 0.43 MG/DL (ref 0.52–1.04)
CRP SERPL-MCNC: <2.9 MG/L (ref 0–8)
DIFFERENTIAL METHOD BLD: ABNORMAL
EOSINOPHIL # BLD AUTO: 0.1 10E9/L (ref 0–0.7)
EOSINOPHIL NFR BLD AUTO: 1.7 %
ERYTHROCYTE [DISTWIDTH] IN BLOOD BY AUTOMATED COUNT: 16.4 % (ref 10–15)
ERYTHROCYTE [SEDIMENTATION RATE] IN BLOOD BY WESTERGREN METHOD: 17 MM/H (ref 0–30)
GFR SERPL CREATININE-BSD FRML MDRD: >90 ML/MIN/1.7M2
HCT VFR BLD AUTO: 37.3 % (ref 35–47)
HGB BLD-MCNC: 12.1 G/DL (ref 11.7–15.7)
LYMPHOCYTES # BLD AUTO: 1.9 10E9/L (ref 0.8–5.3)
LYMPHOCYTES NFR BLD AUTO: 28.8 %
MCH RBC QN AUTO: 29.9 PG (ref 26.5–33)
MCHC RBC AUTO-ENTMCNC: 32.4 G/DL (ref 31.5–36.5)
MCV RBC AUTO: 92 FL (ref 78–100)
MONOCYTES # BLD AUTO: 0.5 10E9/L (ref 0–1.3)
MONOCYTES NFR BLD AUTO: 8.4 %
NEUTROPHILS # BLD AUTO: 3.8 10E9/L (ref 1.6–8.3)
NEUTROPHILS NFR BLD AUTO: 59.9 %
PLATELET # BLD AUTO: 223 10E9/L (ref 150–450)
PROT SERPL-MCNC: 7.8 G/DL (ref 6.8–8.8)
RBC # BLD AUTO: 4.05 10E12/L (ref 3.8–5.2)
WBC # BLD AUTO: 6.4 10E9/L (ref 4–11)

## 2018-04-30 PROCEDURE — 36415 COLL VENOUS BLD VENIPUNCTURE: CPT | Performed by: INTERNAL MEDICINE

## 2018-04-30 PROCEDURE — 86140 C-REACTIVE PROTEIN: CPT | Performed by: INTERNAL MEDICINE

## 2018-04-30 PROCEDURE — 85025 COMPLETE CBC W/AUTO DIFF WBC: CPT | Performed by: INTERNAL MEDICINE

## 2018-04-30 PROCEDURE — 80076 HEPATIC FUNCTION PANEL: CPT | Performed by: INTERNAL MEDICINE

## 2018-04-30 PROCEDURE — 82565 ASSAY OF CREATININE: CPT | Performed by: INTERNAL MEDICINE

## 2018-04-30 PROCEDURE — 85652 RBC SED RATE AUTOMATED: CPT | Performed by: INTERNAL MEDICINE

## 2018-05-02 ENCOUNTER — OFFICE VISIT (OUTPATIENT)
Dept: RHEUMATOLOGY | Facility: CLINIC | Age: 78
End: 2018-05-02
Payer: MEDICARE

## 2018-05-02 VITALS
HEIGHT: 64 IN | BODY MASS INDEX: 19.97 KG/M2 | DIASTOLIC BLOOD PRESSURE: 72 MMHG | HEART RATE: 85 BPM | OXYGEN SATURATION: 96 % | WEIGHT: 117 LBS | SYSTOLIC BLOOD PRESSURE: 131 MMHG

## 2018-05-02 DIAGNOSIS — M06.09 RHEUMATOID ARTHRITIS OF MULTIPLE SITES WITH NEGATIVE RHEUMATOID FACTOR (H): Primary | ICD-10-CM

## 2018-05-02 DIAGNOSIS — Z79.899 HIGH RISK MEDICATION USE: ICD-10-CM

## 2018-05-02 PROCEDURE — 99214 OFFICE O/P EST MOD 30 MIN: CPT | Performed by: INTERNAL MEDICINE

## 2018-05-02 NOTE — Clinical Note
Link: she's someone who I think will qualify for free med from the  based on what she has told me.  IV orencia is cost prohibitive. Please assist.  Thanks! Merrick

## 2018-05-02 NOTE — PROGRESS NOTES
Rheumatology Clinic Visit      Alannah Leos MRN# [id number]   YOB: 1940 Age: 77 year old      Date of visit: 5/02/18   PCP: Dr. Leah Blanca    Chief Complaint   Patient presents with:  RECHECK: Left shoulder is having problems.       Assessment and Plan     1. Seronegative erosive rheumatoid arthritis: Diagnosed in the 1970s. Previously on methotrexate when first diagnosed (unclear if ineffective or not, stopped by patient preference to not treat her RA at that time).  Synovitis, subluxation, and fixed flexion deformities on initial exam. Steroid responsive. Also with ankylosis of the cervical spine and erosive changes seen on x-rays. Currently on methotrexate 20 mg once weekly, and hydroxychloroquine 200 mg daily. She has improved with methotrexate, but still has severe disease. SSZ caused GI upset.  Biologic DMARDs were going to be used, preferentially Orencia because of the positive PIERO and dsDNA in the past; in the past it was prescribed and then the patient decided to not use it; then IV was attempted but was cost prohibitive; given the financial situation she is in she might qualify from free medication from the ; she would like to see if she qualifies to get Orencia SQ.  - Continue methotrexate 20 mg once weekly   - Continue folic acid 1mg daily  - Continue hydroxychloroquine 200mg daily  - Start Orencia SQ  - Labs in 3 months: CBC, Creatinine, Hepatic Panel, ESR, CRP    2. Positive PIERO and dsDNA: These were noted on record review. She does not have symptoms to support an PIERO-associated rheumatologic disease at this time.     3. Neck pain in the setting of rheumatoid arthritis: No evidence of instability by x-rays on 10/12/2016.  Stable symptoms per patient. She is going to see neurosurgery for evaluation next week    4. Parkinson's Disease: Followed by Dr. Ricardo Johnson at the [facility].     5. Osteoporosis: Based on 1/29/2018 DEXA.  Was on fosamax from  "[id number], but no f/u DEXA since 2011.  We discussed osteoporosis and the treatment of osteoporosis.  We reviewed the risks of treatment and the risks of not treating.  At this time she prefers to not treat.    6.  Left shoulder pain: Mechanical symptoms; RA likely played/playing a role.  Treatment is noted in #1.  She also follows with orthopedic surgery.    Ms. Leos verbalized agreement with and understanding of the rational for the diagnosis and treatment plan.  All questions were answered to best of my ability and the patient's satisfaction. Ms. Leos was advised to contact the clinic with any questions that may arise after the clinic visit.      More than 50% of the face-to-face time was spent counseling the patient.  Total face-to-face time was at least 25 minutes.    Thank you for involving me in the care of the patient    Return to clinic: 4 months (delayed f/u so that she is evaluated after having been on Orencia for 3 months)      JUAN Leos is a 77 year old female with a medical history significant for Parkinson's disease, osteoarthritis, status post TKA, osteoporosis, and rheumatoid arthritis who presents for follow-up of rheumatoid arthritis.    Previously, Ms. Leos reported that she was diagnosed with rheumatoid arthritis in the past but did not want to take toxic medications and did not want to keep taking prednisone because of potential toxicities. Therefore, she decided that she would \"tough out\" her symptoms and try to enjoy her life. Then, more recently she was diagnosed with Parkinson's disease that has been significantly affecting her quality of life. She is treated for the Parkinson's disease and she believes that the treatment is helping. However, she is also having worsening joint pain and stiffness. Morning stiffness lasts for all day. All of her joints hurt, including her bilateral shoulders, neck, spine, hands, wrists, elbows, hips, knees, ankles, and feet. She tells me " "that she is unable to raise her arms without assistance from the contralateral arm, if she is able to get that hand over to the other side. Mainly, her right shoulder is affected. She tells me that she has a little more mobility in her left shoulder. She tells me that her entire spine is fused, and that it \"occurred naturally.\"  Overall, she tells me that she feels miserable and is willing to start treatment, but is still scared of most of the medication side effects.    Today, she reports that she is doing better but still has joint pain.  Is planning to see neurosurgery regarding her neck pain.  Saw orthopedic surgery for her shoulder pain that improved somewhat with an injection and she is going to continue following with orthopedic surgery.  History of osteoporosis treated with Fosamax from 6570-1885. Has not started Orencia because the infusions are too expensive.    Denies fevers, chills, nausea, vomiting, constipation, diarrhea. No abdominal pain. No chest pain/pressure, palpitations, or shortness of breath.   No oral or nasal sores.  No rash. No sicca symptoms.      Tobacco: none  EtOH: 1 drink at [place] only  Drugs: none    ROS   GEN: No fevers, chills, night sweats.    SKIN: No itching, rashes, sores  HEENT: No oral or nasal ulcers.  CV: No chest pain, pressure, palpitations, or dyspnea on exertion.  PULM: No SOB, wheeze, cough.  GI: No nausea, vomiting, constipation, diarrhea. No blood in stool. No abdominal pain.  : No blood in urine.  MSK: See HPI.  NEURO: No numbness or tingling  EXT: See history of present illness  PSYCH: Negative    Active Problem List     Patient Active Problem List   Diagnosis     Cataract, OU     Osteoporosis     Rheumatoid arthritis (H)     Parkinson disease (H)     Osteoarthritis of wrist     Osteoarthritis of shoulder     History of total knee arthroplasty     Osteoarthritis of left knee     Advanced directives, counseling/discussion     CARDIOVASCULAR SCREENING; LDL GOAL " LESS THAN 160     High risk medication use     Underweight     Impingement syndrome, shoulder, left     Past Medical History     Past Medical History:   Diagnosis Date     Hyperlipidemia      Murmur, heart     hsm     Nonsenile cataract      Osteoarthrosis, unspecified whether generalized or localized, lower leg      Osteopenia      Rheumatoid arthritis(714.0)      Past Surgical History     Past Surgical History:   Procedure Laterality Date     GALLBLADDER SURGERY       HYSTERECTOMY       KNEE SURGERY      left      LAMINECTOMY LUMBAR ONE LEVEL       TONSILLECTOMY       Allergy     Allergies   Allergen Reactions     Decadrol [Dexamethasone Sodium Phosphate]      Shrimp      Current Medication List     Current Outpatient Prescriptions   Medication Sig     ACE NOT PRESCRIBED, INTENTIONAL, 1 each continuous prn. ACE Inhibitor not prescribed due to Refusal by patient           ammonium lactate (AMLACTIN) 12 % cream Apply once to twice a day to dry skin. Can burn temporarily if there is a cut on the skin.     ASPIRIN 81 MG PO TABS PATIENT REPORTS TAKING 4 TIMES PER WEEK AND NOT EVERYDAY     betamethasone, augmented, (DIPROLENE) 0.05 % lotion Apply daily on itchy areas of the scalp as needed, on days not using the shampoo only when flaring     CALCIUM + D OR 600mg twice a day      carbidopa-levodopa (SINEMET)  MG per tablet 2 tablets at 6 am. 2 tablets at Noon. 2 tablets at 5PM. 2 Tablet at 9 PM. 2 tablet at Midnight.     ferrous sulfate (IRON) 325 (65 Fe) MG tablet Take 1 tablet (325 mg) by mouth 2 times daily Stay on until seen back     fluocinolone acetonide 0.01 % SHAM Lather onto scalp, let sit for 5 min, then rinse off.Use for up to 1 week in a row for flares     folic acid (FOLVITE) 1 MG tablet Take 1 tablet (1 mg) by mouth daily     hydroxychloroquine (PLAQUENIL) 200 MG tablet Take 1 tablet (200 mg) by mouth daily     ibuprofen (ADVIL/MOTRIN) 200 MG tablet Take 1 tablet (200 mg) by mouth every 8 hours as  "needed for moderate pain     methotrexate sodium 2.5 MG TABS Take 20 mg by mouth once a week . Take all 8 tablets on the same day of each week.     MULTI-VITAMIN OR 1 a day      No current facility-administered medications for this visit.          Social History   See HPI    Family History     Family History   Problem Relation Age of Onset     CANCER Sister      pancreatic       Physical Exam     Temp Readings from Last 3 Encounters:   03/12/18 97.8  F (36.6  C) (Oral)   01/25/18 96.5  F (35.8  C) (Oral)   10/19/17 98.3  F (36.8  C)     BP Readings from Last 5 Encounters:   05/02/18 131/72   04/13/18 142/66   04/02/18 126/63   03/12/18 146/68   02/09/18 133/65     Pulse Readings from Last 1 Encounters:   05/02/18 85     Resp Readings from Last 1 Encounters:   04/02/18 18     Estimated body mass index is 20.4 kg/(m^2) as calculated from the following:    Height as of this encounter: 1.613 m (5' 3.5\").    Weight as of this encounter: 53.1 kg (117 lb).    GEN: NAD, thin and frail appearing  HEENT: MMM. No oral lesions. Anicteric, noninjected sclera  CV: S1, S2. RRR. No m/r/g.  PULM: CTA bilaterally. No w/c.  MSK: MCPs and PIPs without swelling or tenderness to palpation.  Right wrist with swelling and tenderness to palpation. Left wrist without swelling or tenderness to palpation.  Elbows without swelling or tenderness to palpation.  Bilateral shoulders diffusely tenderness to palpation and the left shoulder appears to hang low; left shoulder painful with abduction above 90 degrees.  Knees, ankles, and MTPs without swelling or tenderness to palpation.  Holds neck in a neutral position.    NEURO: UE and LE strengths 5/5 and equal bilaterally.   SKIN: No rash. Diffuse scalp hair thinning.  EXT: No lower extremity edema  PSYCH: Alert. Appropriate.    Labs / Imaging (select studies)   RF/CCP  Recent Labs   Lab Test  10/12/16   1142  07/30/13   1621  10/25/12   1127   CCPABY   --   <20 Interpretation:  Negative   --  "   CCPIGG  2   --    --    RHF  <20   --   9     PIERO  Recent Labs   Lab Test  10/25/12   1127   ERA  3.3*     RNP/Sm/SSA/SSB  Recent Labs   Lab Test  07/30/13   1621   ENASM  0   ENASSA  1   ENASSB  0   ENARMP  0     dsDNA  Recent Labs   Lab Test  07/30/13   1621   DNA  52*     C3/C4  Recent Labs   Lab Test  07/30/13   1621   E9FQZWX  106   U3UFIZQ  24     CBC  Recent Labs   Lab Test  04/30/18   1339  01/22/18   1336  10/16/17   1334   WBC  6.4  5.4  6.7   RBC  4.05  4.02  3.77*   HGB  12.1  11.2*  10.9*   HCT  37.3  35.6  33.7*   MCV  92  89  89   RDW  16.4*  17.4*  16.0*   PLT  223  258  313   MCH  29.9  27.9  28.9   MCHC  32.4  31.5  32.3   NEUTROPHIL  59.9  59.0  64.4   LYMPH  28.8  28.5  25.1   MONOCYTE  8.4  9.2  7.8   EOSINOPHIL  1.7  1.5  1.5   BASOPHIL  1.2  1.8  1.2   ANEU  3.8  3.2  4.3   ALYM  1.9  1.6  1.7   DORITA  0.5  0.5  0.5   AEOS  0.1  0.1  0.1   ABAS  0.1  0.1  0.1     CMP  Recent Labs   Lab Test  04/30/18   1339  01/25/18   1353  01/22/18   1336  10/16/17   1334  07/17/17   1341   05/27/16   1413  05/26/15   1416  04/28/14   1209  07/30/13   1621  04/26/13   1128  10/25/12   1127   NA   --    --    --    --    --    --   136  137  136  140  138  137   POTASSIUM   --    --    --    --    --    --   3.7  4.0  4.2  4.2  4.0  4.0   CHLORIDE   --    --    --    --    --    --   102  103  98  101  97  99   CO2   --    --    --    --    --    --   25  28  27  28  25  25   ANIONGAP   --    --    --    --    --    --   9  6  11  11  16  13   GLC   --    --    --    --    --    --   94  100*  99  113*  107*  108*   BUN   --    --    --    --    --    --   11 9  12  15  11  11   CR  0.43*   --   0.42*  0.48*  0.42*   < >  0.41*  0.47*  0.52  0.48*  0.48*  0.52   GFRESTIMATED  >90   --   >90  >90  >90  Non African American GFR Calc     < >  >90  Non  GFR Calc    >90  Non  GFR Calc    >90  >90  >90  >90   GFRESTBLACK  >90   --   >90  >90  >90  African American GFR Calc     < >   >90   GFR Calc    >90   GFR Calc    >90  >90  >90  >90   PAKO   --   9.1   --    --    --    --   9.2  9.2  9.4  9.4  9.2  9.4   BILITOTAL  0.4   --   0.5  0.6  0.4   < >  0.5   --   0.5  0.3  0.4  0.4   ALBUMIN  4.2   --   3.7  3.7  3.7   < >  3.8   --   4.4  4.2  4.4  4.3   PROTTOTAL  7.8   --   7.4  8.0  7.8   < >  8.8   --   8.6  8.4  8.5  8.9*   ALKPHOS  84   --   102  118  107   < >  108   --   119  105  104  103   AST  23   --   19  17  17   < >  15   --   27  23  25  26   ALT  9   --   8  10  11   < >  8   --   19  19  13  21    < > = values in this interval not displayed.     HgA1c  Recent Labs   Lab Test  05/27/16   1413  05/26/15   1416  10/28/13   1204   A1C  5.9  6.0  5.8     Iron Studies  Recent Labs   Lab Test  04/13/18   1510  02/09/18   1419   DAMON  52  45     Calcium/VitaminD  Recent Labs   Lab Test  01/25/18   1353  05/27/16   1413  05/26/15   1416   04/26/13   1128   10/26/11   1113   PAKO  9.1  9.2  9.2   < >  9.2   < >  9.7   D3VIT   --    --    --    --    --    --   46   VITDT  39  95*   --    --   63   --    --     < > = values in this interval not displayed.     ESR/CRP  Recent Labs   Lab Test  04/30/18   1339  01/22/18   1336  10/16/17   1334   SED  17  31*  37*   CRP  <2.9  7.4  10.4*     CK/Aldolase  Recent Labs   Lab Test  04/26/13   1128   CKT  73     TSH/T4  Recent Labs   Lab Test  05/08/17   1446  05/27/16   1413  05/26/15   1416   TSH  2.12  2.36  2.43     Hepatitis B  Recent Labs   Lab Test  01/18/17   1601   HBCAB  Nonreactive   HEPBANG  Nonreactive     Hepatitis C  Recent Labs   Lab Test  01/18/17   1601   HCVAB  Nonreactive   Assay performance characteristics have not been established for newborns,   infants, and children       Tuberculosis Screening  Recent Labs   Lab Test  01/25/18   1352  01/18/17   1601   TBRSLT  Negative  Negative   TBAGN  0.00  0.00     HIV Screening  Recent Labs   Lab Test  01/18/17   1601   HIAGAB  Nonreactive   HIV-1 p24 Ag  & HIV-1/HIV-2 Ab Not Detected         Immunization History     Immunization History   Administered Date(s) Administered     Pneumo Conj 13-V (2010&after) 11/03/2016     Pneumococcal 23 valent 11/01/2007, 11/13/2007, 03/12/2018     TD (ADULT, 7+) 11/13/2007     Tdap (Adacel,Boostrix) 11/13/2007          Chart documentation done in part with Dragon Voice recognition Software. Although reviewed after completion, some word and grammatical error may remain.    Merrick Del Cid MD

## 2018-05-02 NOTE — PATIENT INSTRUCTIONS
Rheumatology    Dr. Merrick Del Cid         [facility]   (Monday)  [address]  [place] [address]       [facility]   (Tuesday)  [address]  [place] [address]    [facility]   (Wed., Thurs., and Friday)  [address] [place] [address]    Phone number: [phone]  Thank you for choosing [facility].  Joan Bucio CMA

## 2018-05-02 NOTE — MR AVS SNAPSHOT
After Visit Summary   5/2/2018    Alannah Leso    MRN: [id number]           Patient Information     Date Of Birth          1940        Visit Information        Provider Department      5/2/2018 2:40 PM Merrick Del Cid MD [facility]        Today's Diagnoses     Rheumatoid arthritis of multiple sites with negative rheumatoid factor (H)    -  1      Care Instructions    Rheumatology    Dr. Merrick Carson [facility]   (Monday)  [address]  [place] [address]       [facility]   (Tuesday)  [address]  [place] [address]    [facility]   (Wed., Thurs., and Friday)  [address] [facility] [place] [address]    Phone number: [phone]  Thank you for choosing [facility].  Joan Bucio CMA            Follow-ups after your visit        Your next 10 appointments already scheduled     May 08, 2018  3:00 PM CDT   New Visit with Armando Schulte MD   [facility] ([facility]    [address] [facility] [address]   [phone]            Aug 06, 2018  1:30 PM CDT   LAB with [facility]   [facility]    [address] [address] [phone]           Please do not eat 10-12 hours before your appointment if you are coming in fasting for labs on lipids, cholesterol, or glucose (sugar). This does not apply to pregnant women. Water, hot tea and black coffee (with nothing added) are okay. Do not drink other fluids, diet soda or chew gum.            Sep 05, 2018  2:00 PM CDT   Return Visit with Merrick Del Cid MD   [facility] ([facility]    [address] [facility] [address]   [phone]            Oct 19, 2018  2:00 PM CDT   Return Visit with Ricardo Johnson MD   [facility] ([facility]    [address] [place] [address] [phone]            Dec 13, 2018   "2:00 PM CST   Return Visit with Cori Ventura MD   [facility]    [address] [place] [address] [phone]              Who to contact     If you have questions or need follow up information about today's clinic visit or your schedule please contact [facility] directly at [phone].  Normal or non-critical lab and imaging results will be communicated to you by [employer], letter or phone within 4 business days after the clinic has received the results. If you do not hear from us within 7 days, please contact the clinic through [employer] or phone. If you have a critical or abnormal lab result, we will notify you by phone as soon as possible.  Submit refill requests through [employer] or call your pharmacy and they will forward the refill request to us. Please allow 3 business days for your refill to be completed.          Additional Information About Your Visit        [employer] Doctors Information     [employer] lets you send messages to your doctor, view your test results, renew your prescriptions, schedule appointments and more. To sign up, go to [email or web address] . Click on \"Log in\" on the left side of the screen, which will take you to the Welcome page. Then click on \"Sign up Now\" on the right side of the page.     You will be asked to enter the access code listed below, as well as some personal information. Please follow the directions to create your username and password.     Your access code is: [email or web address]  Expires: 2018  4:00 PM     Your access code will  in 90 days. If you need help or a new code, please call your [facility] or [phone].        Care EveryWhere ID     This is your Care EveryWhere ID. This could be used by other organizations to access your [facility] medical records  [id number]        Your Vitals Were     Pulse Height Pulse Oximetry BMI (Body Mass Index)          85 1.613 m (5' 3.5\") 96% 20.4 kg/m2   "       Blood Pressure from Last 3 Encounters:   05/02/18 131/72   04/13/18 142/66   04/02/18 126/63    Weight from Last 3 Encounters:   05/02/18 53.1 kg (117 lb)   04/13/18 52.6 kg (116 lb)   04/02/18 51.7 kg (114 lb)              Today, you had the following     No orders found for display         Today's Medication Changes          These changes are accurate as of 5/2/18  4:00 PM.  If you have any questions, ask your nurse or doctor.               Start taking these medicines.        Dose/Directions    Abatacept 125 MG/ML Soaj auto-injector   Commonly known as:  ORENCIA CLICKJECT   Used for:  Rheumatoid arthritis of multiple sites with negative rheumatoid factor (H)   Started by:  Merrick Del Cid MD        Dose:  125 mg   Inject 1 mL (125 mg) Subcutaneous every 7 days   Quantity:  4 Syringe   Refills:  3            Where to get your medicines      Call your pharmacy to confirm that your medication is ready for pickup. It may take up to 24 hours for them to receive the prescription. If the prescription is not ready within 3 business days, please contact your clinic or your provider.     We will let you know when these medications are ready. If you don't hear back within 3 business days, please contact us.     Abatacept 125 MG/ML Soaj auto-injector                Primary Care Provider Office Phone # Fax #    Leah Shelby Blanca [phone]       [address] [facility] [address]        Equal Access to Services     [facility] AH: Hadii adelaida fagano Sochana, waaxda luqadaha, qaybta kaalmada adeegyada, waxay amanda kessler . So [facility] [phone].    ATENCIÓN: Si habla español, tiene a mahan disposición servicios gratuitos de asistencia lingüística. Llame al [phone].    We comply with applicable federal civil rights laws and [place] laws. We do not discriminate on the basis of race, color, national origin, age, disability, sex, sexual orientation, or gender  identity.            Thank you!     Thank you for choosing [facility]  for your care. Our goal is always to provide you with excellent care. Hearing back from our patients is one way we can continue to improve our services. Please take a few minutes to complete the written survey that you may receive in the mail after your visit with us. Thank you!             Your Updated Medication List - Protect others around you: Learn how to safely use, store and throw away your medicines at www.disposemymeds.org.          This list is accurate as of 5/2/18  4:00 PM.  Always use your most recent med list.                   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 ACE Inhibitor not prescribed due to Refusal by patient    Type 2 diabetes, HbA1c goal < 7% (H)       ammonium lactate 12 % cream    AMLACTIN    385 g    Apply once to twice a day to dry skin. Can burn temporarily if there is a cut on the skin.    Xerosis of skin       aspirin 81 MG tablet      PATIENT REPORTS TAKING 4 TIMES PER WEEK AND NOT EVERYDAY        betamethasone (augmented) 0.05 % lotion    DIPROLENE    60 mL    Apply daily on itchy areas of the scalp as needed, on days not using the shampoo only when flaring    Dermatitis, seborrheic, H/O Parkinson's disease       CALCIUM + D PO      600mg twice a day        carbidopa-levodopa  MG per tablet    SINEMET    900 tablet    2 tablets at 6 am. 2 tablets at Noon. 2 tablets at 5PM. 2 Tablet at 9 PM. 2 tablet at Midnight.    Parkinson disease (H)       ferrous sulfate 325 (65 Fe) MG tablet    IRON    60 tablet    Take 1 tablet (325 mg) by mouth 2 times daily Stay on until seen back    Restless legs syndrome (RLS)        fluocinolone acetonide 0.01 % Sham     120 mL    Lather onto scalp, let sit for 5 min, then rinse off.Use for up to 1 week in a row for flares    Dermatitis, seborrheic       folic acid 1 MG tablet    FOLVITE    100 tablet    Take 1 tablet (1 mg) by mouth daily    Rheumatoid arthritis of multiple sites with negative rheumatoid factor (H), High risk medication use       hydroxychloroquine 200 MG tablet    PLAQUENIL    90 tablet    Take 1 tablet (200 mg) by mouth daily    Rheumatoid arthritis of multiple sites with negative rheumatoid factor (H)       ibuprofen 200 MG tablet    ADVIL/MOTRIN     Take 1 tablet (200 mg) by mouth every 8 hours as needed for moderate pain    Rheumatoid arthritis of multiple sites with negative rheumatoid factor (H)       methotrexate sodium 2.5 MG Tabs     96 tablet    Take 20 mg by mouth once a week . Take all 8 tablets on the same day of each week.    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

## 2018-05-03 RX ORDER — METHOTREXATE 2.5 MG/1
20 TABLET ORAL WEEKLY
Qty: 96 TABLET | Refills: 1 | Status: SHIPPED | OUTPATIENT
Start: 2018-05-03 | End: 2018-09-05

## 2018-05-03 RX ORDER — FOLIC ACID 1 MG/1
1 TABLET ORAL DAILY
Qty: 100 TABLET | Refills: 3 | Status: SHIPPED | OUTPATIENT
Start: 2018-05-03 | End: 2019-01-23

## 2018-05-03 RX ORDER — HYDROXYCHLOROQUINE SULFATE 200 MG/1
200 TABLET, FILM COATED ORAL DAILY
Qty: 90 TABLET | Refills: 1 | Status: SHIPPED | OUTPATIENT
Start: 2018-05-03 | End: 2018-09-05

## 2018-05-04 ENCOUNTER — TRANSFERRED RECORDS (OUTPATIENT)
Dept: HEALTH INFORMATION MANAGEMENT | Facility: CLINIC | Age: 78
End: 2018-05-04

## 2018-05-08 ENCOUNTER — OFFICE VISIT (OUTPATIENT)
Dept: NEUROSURGERY | Facility: CLINIC | Age: 78
End: 2018-05-08
Payer: MEDICARE

## 2018-05-08 ENCOUNTER — PRE VISIT (OUTPATIENT)
Dept: NEUROSURGERY | Facility: CLINIC | Age: 78
End: 2018-05-08

## 2018-05-08 VITALS
HEART RATE: 89 BPM | OXYGEN SATURATION: 97 % | WEIGHT: 115.4 LBS | HEIGHT: 64 IN | BODY MASS INDEX: 19.7 KG/M2 | SYSTOLIC BLOOD PRESSURE: 132 MMHG | DIASTOLIC BLOOD PRESSURE: 66 MMHG

## 2018-05-08 DIAGNOSIS — M49.82: Primary | ICD-10-CM

## 2018-05-08 DIAGNOSIS — M45.3 ANKYLOSING SPONDYLITIS OF CERVICOTHORACIC REGION (H): Primary | ICD-10-CM

## 2018-05-08 PROCEDURE — 99204 OFFICE O/P NEW MOD 45 MIN: CPT | Performed by: NEUROLOGICAL SURGERY

## 2018-05-08 ASSESSMENT — PAIN SCALES - GENERAL: PAINLEVEL: MODERATE PAIN (5)

## 2018-05-08 NOTE — PATIENT INSTRUCTIONS
- CT scans of cervical, thoracic, and lumbar spine. Please call [phone] to schedule.    -We will call you with results.

## 2018-05-08 NOTE — PROGRESS NOTES
Neurosurgery Consult Note   [facility]          CC: Neck pain and decreased range of motion in her neck    Primary care Provider: Leah Blanca    I was asked to see the patient by:  No referring provider defined for this encounter.      [place]: Alannah Leos is a 77 year old female that presents to clinic with a complaint of progressive neck pain with stiffening and decreased range of motion.  She has a history of Parkinson's disease and states that she has progressively had significant increasing stiffness in her neck.  She is unable to extend or flex her neck and also with has significant difficulty with her rotating her neck.      Past Medical History:   Diagnosis Date     Hyperlipidemia      Murmur, heart     hsm     Nonsenile cataract      Osteoarthrosis, unspecified whether generalized or localized, lower leg      Osteopenia      Rheumatoid arthritis(714.0)        Past Surgical History:   Procedure Laterality Date     GALLBLADDER SURGERY       HYSTERECTOMY       KNEE SURGERY      left      LAMINECTOMY LUMBAR ONE LEVEL       TONSILLECTOMY         Current Outpatient Prescriptions   Medication     Abatacept (ORENCIA CLICKJECT) 125 MG/ML SOAJ auto-injector     ACE NOT PRESCRIBED, INTENTIONAL,     ammonium lactate (AMLACTIN) 12 % cream     ASPIRIN 81 MG PO TABS     betamethasone, augmented, (DIPROLENE) 0.05 % lotion     CALCIUM + D OR     carbidopa-levodopa (SINEMET)  MG per tablet     ferrous sulfate (IRON) 325 (65 Fe) MG tablet     fluocinolone acetonide 0.01 % SHAM     folic acid (FOLVITE) 1 MG tablet     hydroxychloroquine (PLAQUENIL) 200 MG tablet     ibuprofen (ADVIL/MOTRIN) 200 MG tablet     methotrexate sodium 2.5 MG TABS     MULTI-VITAMIN OR     No current facility-administered medications for this visit.        Allergies   Allergen Reactions     Decadrol [Dexamethasone Sodium Phosphate]      Shrimp        Social History     Social History     Marital status:       Spouse name: Willis      Number of children: 2     Years of education: 12     Occupational History      Retired     [employer], retired in 12/2006     Social History Main Topics     Smoking status: Never Smoker     Smokeless tobacco: Never Used     Alcohol use No     Drug use: No     Sexual activity: Not Currently     Partners: Male     Birth control/ protection: None     Other Topics Concern      Service No     Blood Transfusions Yes     Caffeine Concern No     Occupational Exposure No     Hobby Hazards No     Sleep Concern No     Stress Concern No     Weight Concern No     Special Diet Yes     Diabetic      Back Care Yes     Back surgeries x2     Exercise Yes     Bike Helmet No     Seat Belt Yes     Self-Exams Yes     Social History Narrative       Family History   Problem Relation Age of Onset     CANCER Sister      pancreatic         Review Of Systems  Skin: negative  Eyes: negative  Ears/Nose/Throat: negative  Respiratory: No shortness of breath, dyspnea on exertion, cough, or hemoptysis  Cardiovascular: negative  Gastrointestinal: negative  Genitourinary: negative  Musculoskeletal: as above  Neurologic: as above  Psychiatric: negative  Hematologic/Lymphatic/Immunologic: negative  Endocrine: negative    B/P: 132/66, T: Data Unavailable, P: 89, R: Data Unavailable    Examination:  Normal affect and mood  No obvious labored respiration  No skin lesions noted   No obvious pitting edema  No abnormal psychiatric behavior  No obvious musculoskeletal abnormalities   Awake  Alert  Oriented x 3  Speech clear  Cranial nerves II - XII intact  Face symmetric  Tongue midline  Neck nontender  Significant decreased range of motion of neck  Motor exam nonfocal  Sensation intact  Clonus negative  DTR 1 + throughout  Javier's sign negative  Spurling's sign negative  Ambulation with a rolling walker    Imaging:   MRI of her cervical and thoracic spine show significant kyphotic posture from this upper  cervical down to the mid thoracic spine ankylosis throughout.  She has no focal areas of stenosis or disc herniation    Assessment/Plan:   The patient clearly has some form of ankylosing spondylitis with decreased range of motion and stiffness in her neck and thoracic spine.  She also has a history of Parkinson's disease.  Do not feel that she is in surgical candidate I discussed this with the patient and family, however, we will obtain a CT scan of her cervical thoracic and lumbar spine to evaluate the extent of her ankylosis.        Armando Schulte MD, MS, FAANS  Neurosurgeon  [facility]  [address] [place] [address]  [address]  Tel [phone]

## 2018-05-08 NOTE — NURSING NOTE
"Alannah Leos is a 77 year old female who presents for:  Chief Complaint   Patient presents with     Neck Pain     Neck and bilateral shoulder pain. Onset: years. NKI. MRI 5/4/18, Xray 10/12/16. Patient states she was told she has bad arthritis and it is getting really bad. Over Easter her left shoulder filled with fluid. Right side her shoulder has been fused without surgery. She has had shoulder injections. She is becoming more hunched over. ROM in neck is limited. Hx of couple back surgeries. Has left leg tingling, and drop foot. No treatment for neck.         Initial Vitals:  /66  Pulse 89  Ht 5' 3.5\" (1.613 m)  Wt 115 lb 6.4 oz (52.3 kg)  SpO2 97%  BMI 20.12 kg/m2 Estimated body mass index is 20.12 kg/(m^2) as calculated from the following:    Height as of this encounter: 5' 3.5\" (1.613 m).    Weight as of this encounter: 115 lb 6.4 oz (52.3 kg).. Body surface area is 1.53 meters squared. BP completed using cuff size: regular  Moderate Pain (5)    Do you feel safe in your environment?  Yes  Do you need any refills today? No    Nursing Comments: Neck and bilateral shoulder pain. Onset: years. NKI. MRI 5/4/18, Xray 10/12/16. Patient states she was told she has bad arthritis and it is getting really bad. Over Easter her left shoulder filled with fluid. Right side her shoulder has been fused without surgery. She has had shoulder injections. She is becoming more hunched over. ROM in neck is limited. Hx of couple back surgeries. Has left leg tingling, and drop foot. No treatment for neck.         Corinne Grant    "

## 2018-05-08 NOTE — MR AVS SNAPSHOT
After Visit Summary   5/8/2018    Alannah Leos    MRN: [id number]           Patient Information     Date Of Birth          1940        Visit Information        Provider Department      5/8/2018 3:00 PM Armando Schulte MD [facility]        Today's Diagnoses     Ankylosing spondylitis (H)    -  1      Care Instructions    - CT scans of cervical, thoracic, and lumbar spine. Please call [phone] to schedule.    -We will call you with results.           Follow-ups after your visit        Your next 10 appointments already scheduled     Aug 06, 2018  1:30 PM CDT   LAB with [facility]   [facility]    [address] [place] [address]   [phone]           Please do not eat 10-12 hours before your appointment if you are coming in fasting for labs on lipids, cholesterol, or glucose (sugar). This does not apply to pregnant women. Water, hot tea and black coffee (with nothing added) are okay. Do not drink other fluids, diet soda or chew gum.            Sep 05, 2018  2:00 PM CDT   Return Visit with Merrick Del Cid MD   [facility]    [address] [facility] [address]   [phone]            Oct 19, 2018  2:00 PM CDT   Return Visit with Ricardo Johnson MD   [facility])    [address] [address] [place] [address]   [phone]            Dec 13, 2018  2:00 PM CST   Return Visit with Cori Ventura MD   [facility])    [address] [address] [place] [id number] [phone]              Future tests that were ordered for you today     Open Future Orders        Priority Expected Expires Ordered    CT Cervical Spine w/o Contrast Routine  5/8/2019 5/8/2018    CT Thoracic Spine w/o Contrast Routine  5/8/2019 5/8/2018    CT Lumbar Spine w/o Contrast Routine  5/8/2019 5/8/2018     "MR Cervical Spine w/o Contrast Routine  5/3/2019 5/3/2018            Who to contact     If you have questions or need follow up information about today's clinic visit or your schedule please contact [facility] directly at [phone].  Normal or non-critical lab and imaging results will be communicated to you by MyChart, letter or phone within 4 business days after the clinic has received the results. If you do not hear from us within 7 days, please contact the clinic through MyChart or phone. If you have a critical or abnormal lab result, we will notify you by phone as soon as possible.  Submit refill requests through [facility] or call your pharmacy and they will forward the refill request to us. Please allow 3 business days for your refill to be completed.          Additional Information About Your Visit        Ing[facility] Lab[facility]art Information     [facility] lets you send messages to your doctor, view your test results, renew your prescriptions, schedule appointments and more. To sign up, go to [email or web address] . Click on \"Log in\" on the left side of the screen, which will take you to the Welcome page. Then click on \"Sign up Now\" on the right side of the page.     You will be asked to enter the access code listed below, as well as some personal information. Please follow the directions to create your username and password.     Your access code is: [email or web address]  Expires: 2018  4:00 PM     Your access code will  in 90 days. If you need help or a new code, please call your [place] clinic or [phone].        Care EveryWhere ID     This is your Care EveryWhere ID. This could be used by other organizations to access your [place] medical records  [id number]        Your Vitals Were     Pulse Height Pulse Oximetry BMI (Body Mass Index)          89 5' 3.5\" (1.613 m) 97% 20.12 kg/m2         Blood Pressure from Last 3 Encounters:   18 132/66   18 131/72   18 142/66    Weight from Last " 3 Encounters:   05/08/18 115 lb 6.4 oz (52.3 kg)   05/02/18 117 lb (53.1 kg)   04/13/18 116 lb (52.6 kg)               Primary Care Provider Office Phone # Fax #    Leah Shelby Blanca [phone]       [address]  [place] [facility] [address]        Equal Access to Services     KENNETH HOLCOMB : Hadii aad ku hadasho Soomaali, waaxda luqadaha, qaybta kaalmada adeegyada, waxay idiin hayaan adeeg kharash la'aan ah. So [facility] [phone].    ATENCIÓN: Si chanda chong, tiene a mahan disposición servicios gratuitos de asistencia lingüística. Llame al [phone].    We comply with applicable federal civil rights laws and [place] laws. We do not discriminate on the basis of race, color, national origin, age, disability, sex, sexual orientation, or gender identity.            Thank you!     Thank you [facility]  for your [facility]. Our goal is always to provide you with excellent care. Hearing back from our patients is one way we can continue to improve our services. Please take a few minutes to complete the written survey that you may receive in the mail after your visit with us. Thank you!             Your Updated Medication List - Protect others around you: Learn how to safely use, store and throw away your medicines at www.disposemymeds.org.          This list is accurate as of 5/8/18  3:38 PM.  Always use your most recent med list.                   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 ACE Inhibitor not prescribed due to Refusal by patient    Type 2 diabetes, HbA1c goal < 7% (H)       ammonium lactate 12 % cream    AMLACTIN    385 g    Apply once to twice a day to dry skin. Can burn temporarily  if there is a cut on the skin.    Xerosis of skin       aspirin 81 MG tablet      PATIENT REPORTS TAKING 4 TIMES PER WEEK AND NOT EVERYDAY        betamethasone (augmented) 0.05 % lotion    DIPROLENE    60 mL    Apply daily on itchy areas of the scalp as needed, on days not using the shampoo only when flaring    Dermatitis, seborrheic, H/O Parkinson's disease       CALCIUM + D PO      600mg twice a day        carbidopa-levodopa  MG per tablet    SINEMET    900 tablet    2 tablets at 6 am. 2 tablets at Noon. 2 tablets at 5PM. 2 Tablet at 9 PM. 2 tablet at Midnight.    Parkinson disease (H)       ferrous sulfate 325 (65 Fe) MG tablet    IRON    60 tablet    Take 1 tablet (325 mg) by mouth 2 times daily Stay on until seen back    Restless legs syndrome (RLS)       fluocinolone acetonide 0.01 % Sham     120 mL    Lather onto scalp, let sit for 5 min, then rinse off.Use for up to 1 week in a row for flares    Dermatitis, seborrheic       folic acid 1 MG tablet    FOLVITE    100 tablet    Take 1 tablet (1 mg) by mouth daily    Rheumatoid arthritis of multiple sites with negative rheumatoid factor (H), High risk medication use       hydroxychloroquine 200 MG tablet    PLAQUENIL    90 tablet    Take 1 tablet (200 mg) by mouth daily    Rheumatoid arthritis of multiple sites with negative rheumatoid factor (H)       ibuprofen 200 MG tablet    ADVIL/MOTRIN     Take 1 tablet (200 mg) by mouth every 8 hours as needed for moderate pain    Rheumatoid arthritis of multiple sites with negative rheumatoid factor (H)       methotrexate sodium 2.5 MG Tabs     96 tablet    Take 20 mg by mouth once a week . Take all 8 tablets on the same day of each week.    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

## 2018-05-08 NOTE — LETTER
5/8/2018         RE: Alannah Leos  [address] [facility] [address]        Dear Colleague,    Thank you for referring your patient, Alannah Leos, to the [facility]. Please see a copy of my visit note below.     Neurosurgery Consult Note   [facility]          CC: Neck pain and decreased range of motion in her neck    Primary care Provider: Leah Blanca    I was asked to see the patient by:  No referring provider defined for this encounter.      [place]: Alannah Leos is a 77 year old female that presents to clinic with a complaint of progressive neck pain with stiffening and decreased range of motion.  She has a history of Parkinson's disease and states that she has progressively had significant increasing stiffness in her neck.  She is unable to extend or flex her neck and also with has significant difficulty with her rotating her neck.      Past Medical History:   Diagnosis Date     Hyperlipidemia      Murmur, heart     hsm     Nonsenile cataract      Osteoarthrosis, unspecified whether generalized or localized, lower leg      Osteopenia      Rheumatoid arthritis(714.0)        Past Surgical History:   Procedure Laterality Date     GALLBLADDER SURGERY       HYSTERECTOMY       KNEE SURGERY      left      LAMINECTOMY LUMBAR ONE LEVEL       TONSILLECTOMY         Current Outpatient Prescriptions   Medication     Abatacept (ORENCIA CLICKJECT) 125 MG/ML SOAJ auto-injector     ACE NOT PRESCRIBED, INTENTIONAL,     ammonium lactate (AMLACTIN) 12 % cream     ASPIRIN 81 MG PO TABS     betamethasone, augmented, (DIPROLENE) 0.05 % lotion     CALCIUM + D OR     carbidopa-levodopa (SINEMET)  MG per tablet     ferrous sulfate (IRON) 325 (65 Fe) MG tablet     fluocinolone acetonide 0.01 % SHAM     folic acid (FOLVITE) 1 MG tablet     hydroxychloroquine (PLAQUENIL) 200 MG tablet     ibuprofen (ADVIL/MOTRIN) 200 MG tablet     methotrexate sodium 2.5 MG TABS      MULTI-VITAMIN OR     No current facility-administered medications for this visit.        Allergies   Allergen Reactions     Decadrol [Dexamethasone Sodium Phosphate]      Shrimp        Social History     Social History     Marital status:      Spouse name: Willis      Number of children: 2     Years of education: 12     Occupational History      Retired     [employer], retired in 12/2006     Social History Main Topics     Smoking status: Never Smoker     Smokeless tobacco: Never Used     Alcohol use No     Drug use: No     Sexual activity: Not Currently     Partners: Male     Birth control/ protection: None     Other Topics Concern      Service No     Blood Transfusions Yes     Caffeine Concern No     Occupational Exposure No     Hobby Hazards No     Sleep Concern No     Stress Concern No     Weight Concern No     Special Diet Yes     Diabetic      Back Care Yes     Back surgeries x2     Exercise Yes     Bike Helmet No     Seat Belt Yes     Self-Exams Yes     Social History Narrative       Family History   Problem Relation Age of Onset     CANCER Sister      pancreatic         Review Of Systems  Skin: negative  Eyes: negative  Ears/Nose/Throat: negative  Respiratory: No shortness of breath, dyspnea on exertion, cough, or hemoptysis  Cardiovascular: negative  Gastrointestinal: negative  Genitourinary: negative  Musculoskeletal: as above  Neurologic: as above  Psychiatric: negative  Hematologic/Lymphatic/Immunologic: negative  Endocrine: negative    B/P: 132/66, T: Data Unavailable, P: 89, R: Data Unavailable    Examination:  Normal affect and mood  No obvious labored respiration  No skin lesions noted   No obvious pitting edema  No abnormal psychiatric behavior  No obvious musculoskeletal abnormalities   Awake  Alert  Oriented x 3  Speech clear  Cranial nerves II - XII intact  Face symmetric  Tongue midline  Neck nontender  Significant decreased range of motion of neck  Motor exam nonfocal  Sensation  intact  Clonus negative  DTR 1 + throughout  Javier's sign negative  Spurling's sign negative  Ambulation with a rolling walker    Imaging:   MRI of her cervical and thoracic spine show significant kyphotic posture from this upper cervical down to the mid thoracic spine ankylosis throughout.  She has no focal areas of stenosis or disc herniation    Assessment/Plan:   The patient clearly has some form of ankylosing spondylitis with decreased range of motion and stiffness in her neck and thoracic spine.  She also has a history of Parkinson's disease.  Do not feel that she is in surgical candidate I discussed this with the patient and family, however, we will obtain a CT scan of her cervical thoracic and lumbar spine to evaluate the extent of her ankylosis.        Armando Schulte MD, MS, FAANS  Neurosurgeon  [facility]  [address] [place] [address]  [address]  Tel [phone]    Again, thank you for allowing me to participate in the care of your patient.        Sincerely,        Armando Schulte MD

## 2018-05-15 ENCOUNTER — TELEPHONE (OUTPATIENT)
Dept: RHEUMATOLOGY | Facility: CLINIC | Age: 78
End: 2018-05-15

## 2018-05-15 NOTE — TELEPHONE ENCOUNTER
Prior Authorization Approval    Authorization Effective Date: 5/15/2018  Authorization Expiration Date: 5/15/2019  Medication: Orencia - [place] Approved  Approved Dose/Quantity:   Reference #: [id number]   Insurance Company: [employer] ([facility]) - Phone [phone] Fax [phone]  Expected CoPay:       CoPay Card Available: Yes   Foundation Assistance Needed:    Which Pharmacy is filling the prescription (Not needed for infusion/clinic administered): [facility] [address]  Pharmacy Notified: No  Patient Notified: Yes

## 2018-05-15 NOTE — TELEPHONE ENCOUNTER
PA Initiation    Medication: Orencia - PA Initiated  Insurance Company: [employer] ([facility]) - Phone [phone] Fax [phone]  Pharmacy Filling the Rx: [facility] - [address]  Filling Pharmacy Phone:    Filling Pharmacy Fax:    Start Date: 5/15/2018

## 2018-05-16 ENCOUNTER — RADIANT APPOINTMENT (OUTPATIENT)
Dept: CT IMAGING | Facility: CLINIC | Age: 78
End: 2018-05-16
Attending: NEUROLOGICAL SURGERY
Payer: MEDICARE

## 2018-05-16 DIAGNOSIS — M45.3 ANKYLOSING SPONDYLITIS OF CERVICOTHORACIC REGION (H): ICD-10-CM

## 2018-05-16 PROCEDURE — 72131 CT LUMBAR SPINE W/O DYE: CPT | Mod: TC

## 2018-05-16 PROCEDURE — 72128 CT CHEST SPINE W/O DYE: CPT | Mod: TC

## 2018-05-16 PROCEDURE — 72125 CT NECK SPINE W/O DYE: CPT | Mod: TC

## 2018-05-30 ENCOUNTER — TELEPHONE (OUTPATIENT)
Dept: RHEUMATOLOGY | Facility: CLINIC | Age: 78
End: 2018-05-30

## 2018-05-30 NOTE — TELEPHONE ENCOUNTER
"Patient called to let us know she does not want to take Orencia, she thought it was only 2 injections a month not 4, she feels like since she has signed up for the co pay card she has been receiving more calls and she does not like that, and she feels she is getting a lot of \"red flags\" that she should not take the medication. She would like us to call the pharmacy and cancel her prescription and have Link cancel any of the co pay card forms he has been doing for her. Patient stated she is very sorry and would like to thank everyone for all the work they have done but she feels she should not take the medication.  Joan Bucio, MELLISA  5/30/2018 12:17 PM      "

## 2018-05-30 NOTE — TELEPHONE ENCOUNTER
Prescription has been cancelled with pharmacy.  Joan Bucio CMA  5/30/2018 12:38 PM      Link,     An FYI that patient will not be taking this medication.    Joan Bucio CMA  5/30/2018 12:39 PM

## 2018-06-06 ENCOUNTER — OFFICE VISIT (OUTPATIENT)
Dept: OPHTHALMOLOGY | Facility: CLINIC | Age: 78
End: 2018-06-06
Payer: MEDICARE

## 2018-06-06 DIAGNOSIS — H25.813 COMBINED FORMS OF AGE-RELATED CATARACT OF BOTH EYES: Primary | ICD-10-CM

## 2018-06-06 DIAGNOSIS — H52.4 PRESBYOPIA: ICD-10-CM

## 2018-06-06 DIAGNOSIS — Z79.899 HIGH RISK MEDICATION USE: ICD-10-CM

## 2018-06-06 DIAGNOSIS — H04.123 DRY EYES, BILATERAL: ICD-10-CM

## 2018-06-06 DIAGNOSIS — H52.03 HYPERMETROPIA OF BOTH EYES: ICD-10-CM

## 2018-06-06 PROCEDURE — 92014 COMPRE OPH EXAM EST PT 1/>: CPT | Performed by: STUDENT IN AN ORGANIZED HEALTH CARE EDUCATION/TRAINING PROGRAM

## 2018-06-06 PROCEDURE — 92015 DETERMINE REFRACTIVE STATE: CPT | Mod: GY | Performed by: STUDENT IN AN ORGANIZED HEALTH CARE EDUCATION/TRAINING PROGRAM

## 2018-06-06 ASSESSMENT — REFRACTION_MANIFEST
OD_SPHERE: +1.25
OD_CYLINDER: SPHERE
OS_CYLINDER: SPHERE
OS_ADD: +3.00
OS_SPHERE: +1.25
OD_ADD: +3.00

## 2018-06-06 ASSESSMENT — VISUAL ACUITY
OD_SC: 20/60
METHOD: SNELLEN - LINEAR
OD_PH_SC: 30
OD_SC+: -2
OS_SC: 20/40
OS_SC+: -2
OS_PH_SC: 30+1

## 2018-06-06 ASSESSMENT — SLIT LAMP EXAM - LIDS
COMMENTS: NORMAL
COMMENTS: NORMAL

## 2018-06-06 ASSESSMENT — TONOMETRY
IOP_METHOD: APPLANATION
OS_IOP_MMHG: 14
OD_IOP_MMHG: 13

## 2018-06-06 ASSESSMENT — CUP TO DISC RATIO
OS_RATIO: 0.4
OD_RATIO: 0.5

## 2018-06-06 ASSESSMENT — EXTERNAL EXAM - LEFT EYE: OS_EXAM: NORMAL

## 2018-06-06 ASSESSMENT — EXTERNAL EXAM - RIGHT EYE: OD_EXAM: NORMAL

## 2018-06-06 ASSESSMENT — CONF VISUAL FIELD
OS_NORMAL: 1
OD_NORMAL: 1

## 2018-06-06 NOTE — LETTER
6/6/2018         RE: Alannah Leos  [address] [facility] [address]        Dear Colleague,    Thank you for referring your patient, Alannah Leos, to the [facility]. Please see a copy of my visit note below.     Current Eye Medications:  None.       Subjective:  Comprehensive Eye Exam.  Patient is taking Plaquenil.  Occasionally, her vision appears cloudy and blurry.  She wears separate glasses for distance and reading - she did not bring them today.      Objective:  See Ophthalmology Exam.       Assessment:      Plan:   See Patient Instructions.          Current Eye Medications:  None.       Subjective:  Comprehensive Eye Exam.  Patient is taking Plaquenil.  Occasionally, her vision appears cloudy and blurry.  She wears separate glasses for distance and reading - she did not bring them today.     Assessment:  Alannah Leos is a 77 year old female who presents with:     Combined forms of age-related cataract of both eyes Not visually significant      High risk medication use        Plan:  Glasses prescription given - optional    Marcial Daigle MD  [phone]             Again, thank you for allowing me to participate in the care of your patient.        Sincerely,        Marcial Daigle MD

## 2018-06-06 NOTE — MR AVS SNAPSHOT
After Visit Summary   6/6/2018    Alannah Leos    MRN: [id number]           Patient Information     Date Of Birth          1940        Visit Information        Provider Department      6/6/2018 2:00 PM Marcial Daigle MD [facility]        Today's Diagnoses     Combined forms of age-related cataract of both eyes    -  1    High risk medication use        Hypermetropia of both eyes        Presbyopia          Care Instructions    Glasses prescription given - optional    Marcial Daigle MD  [phone]            Follow-ups after your visit        Follow-up notes from your care team     Return in about 6 months (around 12/6/2018) for Plaquenil 10-2 HVF and OCT.      Your next 10 appointments already scheduled     Aug 06, 2018  1:30 PM CDT   LAB with  LAB   [facility] ([facility])    [address] [place] [address]   [phone]           Please do not eat 10-12 hours before your appointment if you are coming in fasting for labs on lipids, cholesterol, or glucose (sugar). This does not apply to pregnant women. Water, hot tea and black coffee (with nothing added) are okay. Do not drink other fluids, diet soda or chew gum.            Sep 05, 2018  2:00 PM CDT   Return Visit with Merrick Del Cid MD   [facility] ([facility])    [address] [place] [address]   [phone]            Oct 19, 2018  2:00 PM CDT   Return Visit with Ricardo Johnson MD   [facility])    [address] [place] [address] [phone]            Dec 13, 2018  2:00 PM CST   Return Visit with Cori Ventura MD   [facility])    [address] [address] [place] [address] [phone]              Who to contact     If you have questions or need follow up information about today's clinic visit or your schedule  please contact [facility] directly at [phone].  Normal or non-critical lab and imaging results will be communicated to you by MyChart, letter or phone within 4 business days after the clinic has received the results. If you do not hear from us within 7 days, please contact the clinic through MyChart or phone. If you have a critical or abnormal lab result, we will notify you by phone as soon as possible.  Submit refill requests through [employer] or call your pharmacy and they will forward the refill request to us. Please allow 3 business days for your refill to be completed.          Additional Information About Your Visit        Care EveryWhere ID     This is your Care EveryWhere ID. This could be used by other organizations to access your [place] medical records  [id number]         Blood Pressure from Last 3 Encounters:   05/08/18 132/66   05/02/18 131/72   04/13/18 142/66    Weight from Last 3 Encounters:   05/08/18 52.3 kg (115 lb 6.4 oz)   05/02/18 53.1 kg (117 lb)   04/13/18 52.6 kg (116 lb)              We Performed the Following     EYE EXAM (SIMPLE-NONBILLABLE)     REFRACTIVE STATUS        Primary Care Provider Office Phone # Fax #    Leah Shelby Blanca [phone]       [address]  [facility] [address]        Equal Access to Services     [facility] : Hadii aad ku hadasho Soomaali, waaxda luqadaha, qaybta kaalmada adeegyada, danyell kessler . So [facility] [phone].    ATENCIÓN: Si habla español, tiene a mahan disposición servicios gratuitos de asistencia lingüística. Llame al [phone].    We comply with applicable federal civil rights laws and [place] laws. We do not discriminate on the basis of race, color, national origin, age, disability, sex, sexual orientation, or gender identity.            Thank you!     Thank you for choosing [facility]  for your care. Our goal is always to provide you with excellent care.  Hearing back from our patients is one way we can continue to improve our services. Please take a few minutes to complete the written survey that you may receive in the mail after your visit with us. Thank you!             Your Updated Medication List - Protect others around you: Learn how to safely use, store and throw away your medicines at www.disposemymeds.org.          This list is accurate as of 6/6/18  2:56 PM.  Always use your most recent med list.                   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 ACE Inhibitor not prescribed due to Refusal by patient    Type 2 diabetes, HbA1c goal < 7% (H)       ammonium lactate 12 % cream    AMLACTIN    385 g    Apply once to twice a day to dry skin. Can burn temporarily if there is a cut on the skin.    Xerosis of skin       aspirin 81 MG tablet      PATIENT REPORTS TAKING 4 TIMES PER WEEK AND NOT EVERYDAY        betamethasone (augmented) 0.05 % lotion    DIPROLENE    60 mL    Apply daily on itchy areas of the scalp as needed, on days not using the shampoo only when flaring    Dermatitis, seborrheic, H/O Parkinson's disease       CALCIUM + D PO      600mg twice a day        carbidopa-levodopa  MG per tablet    SINEMET    900 tablet    2 tablets at 6 am. 2 tablets at Noon. 2 tablets at 5PM. 2 Tablet at 9 PM. 2 tablet at Midnight.    Parkinson disease (H)       ferrous sulfate 325 (65 Fe) MG tablet    IRON    60 tablet    Take 1 tablet (325 mg) by mouth 2 times daily Stay on until seen back    Restless legs syndrome (RLS)       fluocinolone acetonide 0.01 % Sham     120 mL    Lather onto scalp, let sit for 5 min, then rinse off.Use for up to 1 week in a row for flares     Dermatitis, seborrheic       folic acid 1 MG tablet    FOLVITE    100 tablet    Take 1 tablet (1 mg) by mouth daily    Rheumatoid arthritis of multiple sites with negative rheumatoid factor (H), High risk medication use       hydroxychloroquine 200 MG tablet    PLAQUENIL    90 tablet    Take 1 tablet (200 mg) by mouth daily    Rheumatoid arthritis of multiple sites with negative rheumatoid factor (H)       ibuprofen 200 MG tablet    ADVIL/MOTRIN     Take 1 tablet (200 mg) by mouth every 8 hours as needed for moderate pain    Rheumatoid arthritis of multiple sites with negative rheumatoid factor (H)       methotrexate sodium 2.5 MG Tabs     96 tablet    Take 20 mg by mouth once a week . Take all 8 tablets on the same day of each week.    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

## 2018-06-06 NOTE — PROGRESS NOTES
Current Eye Medications:  None.       Subjective:  Comprehensive Eye Exam.  Patient is taking Plaquenil.  Occasionally, her vision appears cloudy and blurry.  She wears separate glasses for distance and reading - she did not bring them today.      Objective:  See Ophthalmology Exam.       Assessment:      Plan:   See Patient Instructions.

## 2018-06-06 NOTE — PROGRESS NOTES
Current Eye Medications:  None.       Subjective:  Comprehensive Eye Exam.  Patient is taking Plaquenil.  Occasionally, her vision appears cloudy and blurry.  She wears separate glasses for distance and reading - she did not bring them today.     Assessment:  Alannah Leos is a 77 year old female who presents with:     Combined forms of age-related cataract of both eyes Not visually significant      High risk medication use Plaquenil started March 2018.       Plan:  Glasses prescription given - optional  Return for Plaquenil eye tests in 6 months    Marcial Daigle MD  [phone]

## 2018-06-08 DIAGNOSIS — G25.81 RESTLESS LEGS SYNDROME (RLS): ICD-10-CM

## 2018-06-08 RX ORDER — FERROUS SULFATE 325(65) MG
325 TABLET ORAL 2 TIMES DAILY
Qty: 60 TABLET | Refills: 1 | Status: SHIPPED | OUTPATIENT
Start: 2018-06-08 | End: 2018-08-06

## 2018-08-06 DIAGNOSIS — G25.81 RESTLESS LEGS SYNDROME (RLS): ICD-10-CM

## 2018-08-06 DIAGNOSIS — Z79.899 HIGH RISK MEDICATION USE: ICD-10-CM

## 2018-08-06 DIAGNOSIS — Z79.899 HIGH RISK MEDICATION USE: Primary | ICD-10-CM

## 2018-08-06 LAB
ALBUMIN SERPL-MCNC: 4.1 G/DL (ref 3.4–5)
ALP SERPL-CCNC: 83 U/L (ref 40–150)
ALT SERPL W P-5'-P-CCNC: 13 U/L (ref 0–50)
AST SERPL W P-5'-P-CCNC: 20 U/L (ref 0–45)
BASOPHILS # BLD AUTO: 0.1 10E9/L (ref 0–0.2)
BASOPHILS NFR BLD AUTO: 1.3 %
BILIRUB DIRECT SERPL-MCNC: 0.1 MG/DL (ref 0–0.2)
BILIRUB SERPL-MCNC: 0.5 MG/DL (ref 0.2–1.3)
CREAT SERPL-MCNC: 0.42 MG/DL (ref 0.52–1.04)
DIFFERENTIAL METHOD BLD: ABNORMAL
EOSINOPHIL # BLD AUTO: 0.1 10E9/L (ref 0–0.7)
EOSINOPHIL NFR BLD AUTO: 1 %
ERYTHROCYTE [DISTWIDTH] IN BLOOD BY AUTOMATED COUNT: 15.3 % (ref 10–15)
GFR SERPL CREATININE-BSD FRML MDRD: >90 ML/MIN/1.7M2
HCT VFR BLD AUTO: 34.3 % (ref 35–47)
HGB BLD-MCNC: 11.1 G/DL (ref 11.7–15.7)
LYMPHOCYTES # BLD AUTO: 1.5 10E9/L (ref 0.8–5.3)
LYMPHOCYTES NFR BLD AUTO: 24.2 %
MCH RBC QN AUTO: 30.7 PG (ref 26.5–33)
MCHC RBC AUTO-ENTMCNC: 32.4 G/DL (ref 31.5–36.5)
MCV RBC AUTO: 95 FL (ref 78–100)
MONOCYTES # BLD AUTO: 0.5 10E9/L (ref 0–1.3)
MONOCYTES NFR BLD AUTO: 8.4 %
NEUTROPHILS # BLD AUTO: 4 10E9/L (ref 1.6–8.3)
NEUTROPHILS NFR BLD AUTO: 65.1 %
PLATELET # BLD AUTO: 229 10E9/L (ref 150–450)
PROT SERPL-MCNC: 7.8 G/DL (ref 6.8–8.8)
RBC # BLD AUTO: 3.61 10E12/L (ref 3.8–5.2)
WBC # BLD AUTO: 6.2 10E9/L (ref 4–11)

## 2018-08-06 PROCEDURE — 85025 COMPLETE CBC W/AUTO DIFF WBC: CPT | Performed by: INTERNAL MEDICINE

## 2018-08-06 PROCEDURE — 80076 HEPATIC FUNCTION PANEL: CPT | Performed by: INTERNAL MEDICINE

## 2018-08-06 PROCEDURE — 82565 ASSAY OF CREATININE: CPT | Performed by: INTERNAL MEDICINE

## 2018-08-06 PROCEDURE — 36415 COLL VENOUS BLD VENIPUNCTURE: CPT | Performed by: INTERNAL MEDICINE

## 2018-08-06 RX ORDER — FERROUS SULFATE 325(65) MG
325 TABLET ORAL 2 TIMES DAILY
Qty: 60 TABLET | Refills: 2 | Status: SHIPPED | OUTPATIENT
Start: 2018-08-06 | End: 2019-06-06

## 2018-08-06 NOTE — TELEPHONE ENCOUNTER
Writer received a refill request from: hussein valencia   Pending Prescriptions:                       Disp   Refills    ferrous sulfate (IRON) 325 (65 Fe) MG tab*60 tab*2            Sig: Take 1 tablet (325 mg) by mouth 2 times daily           Stay on until seen back        Pt's last office visit: 6/8/2018  Next scheduled office visit: 10/19/18      Per the RN/LPN medication refill protocol, writer is able to refill this request. If able to refill, please call the pt to inform them the RX was sent to the pharmacy. If unable to refill, route this encounter to the prescribing physician for authorization or further instructions.    Daja Mnier LPN

## 2018-08-13 ENCOUNTER — OFFICE VISIT (OUTPATIENT)
Dept: OPHTHALMOLOGY | Facility: CLINIC | Age: 78
End: 2018-08-13
Payer: MEDICARE

## 2018-08-13 DIAGNOSIS — H52.4 PRESBYOPIA: Primary | ICD-10-CM

## 2018-08-13 PROCEDURE — 99207 ZZC NO BILLABLE SERVICE THIS VISIT: CPT | Performed by: STUDENT IN AN ORGANIZED HEALTH CARE EDUCATION/TRAINING PROGRAM

## 2018-08-13 ASSESSMENT — VISUAL ACUITY
OS_CC: J2
METHOD: SNELLEN - LINEAR
OS_CC: 20/30-1
CORRECTION_TYPE: GLASSES
OD_CC: 20/40+3
OD_CC: J2

## 2018-08-13 ASSESSMENT — REFRACTION_MANIFEST
OS_SPHERE: +1.75
OD_CYLINDER: +0.75
OS_AXIS: 050
OS_CYLINDER: +0.25
OS_AXIS: 050
OS_SPHERE: +4.00
OD_AXIS: 137
OD_SPHERE: +1.75
OS_ADD: +2.25
OS_CYLINDER: +0.25
OD_SPHERE: +4.00
OD_AXIS: 137
OD_CYLINDER: +0.75
OD_ADD: +2.25

## 2018-08-13 ASSESSMENT — REFRACTION_WEARINGRX
OD_SPHERE: +1.25
OD_CYLINDER: SPHERE
OS_SPHERE: +1.25
OS_CYLINDER: SPHERE

## 2018-08-13 NOTE — LETTER
8/13/2018         RE: Alannah Leos  [address] [address]        Dear Colleague,    Thank you for referring your patient, Alannah Leos, to the [facility]. Please see a copy of my visit note below.     Current Eye Medications:  no     Subjective:  Problem with glasses  Pt reports is not seeing very well with her new SVLs for distance and her readers make her dizzy.      Objective:  See Ophthalmology Exam.       Assessment: I found change in mr that gave good vision , I do believe the problem with the new readers is that the lenses were quite large twice the size of the older readers she brought in. The old readers rx was very close to what I found today and told pt they will work for her as good as anything. Pt states she plans to get a regrind for her SVL and get a refund for the new readers and use her older readers which she is happy with for reading.    Nima Weber, technologist      Plan:   Agree with assessment and plan of technician.    Marcial Daigle MD  [phone]           Again, thank you for allowing me to participate in the care of your patient.        Sincerely,        Marcial Daigle MD

## 2018-08-13 NOTE — PROGRESS NOTES
Current Eye Medications:  no     Subjective:  Problem with glasses  Pt reports is not seeing very well with her new SVLs for distance and her readers make her dizzy.      Objective:  See Ophthalmology Exam.       Assessment: I found change in mr that gave good vision , I do believe the problem with the new readers is that the lenses were quite large twice the size of the older readers she brought in. The old readers rx was very close to what I found today and told pt they will work for her as good as anything. Pt states she plans to get a regrind for her SVL and get a refund for the new readers and use her older readers which she is happy with for reading.    Nima Weber, technologist      Plan:   Agree with assessment and plan of technician.    Marcial Daigle MD  [phone]

## 2018-09-05 ENCOUNTER — OFFICE VISIT (OUTPATIENT)
Dept: RHEUMATOLOGY | Facility: CLINIC | Age: 78
End: 2018-09-05
Payer: MEDICARE

## 2018-09-05 VITALS
TEMPERATURE: 97.5 F | RESPIRATION RATE: 18 BRPM | HEIGHT: 64 IN | HEART RATE: 88 BPM | BODY MASS INDEX: 19.46 KG/M2 | OXYGEN SATURATION: 97 % | DIASTOLIC BLOOD PRESSURE: 64 MMHG | WEIGHT: 114 LBS | SYSTOLIC BLOOD PRESSURE: 128 MMHG

## 2018-09-05 DIAGNOSIS — Z79.899 HIGH RISK MEDICATION USE: ICD-10-CM

## 2018-09-05 DIAGNOSIS — M06.09 RHEUMATOID ARTHRITIS OF MULTIPLE SITES WITH NEGATIVE RHEUMATOID FACTOR (H): Primary | ICD-10-CM

## 2018-09-05 PROCEDURE — 99213 OFFICE O/P EST LOW 20 MIN: CPT | Performed by: INTERNAL MEDICINE

## 2018-09-05 RX ORDER — METHOTREXATE 2.5 MG/1
20 TABLET ORAL WEEKLY
Qty: 96 TABLET | Refills: 1 | Status: SHIPPED | OUTPATIENT
Start: 2018-09-05 | End: 2018-10-29

## 2018-09-05 RX ORDER — HYDROXYCHLOROQUINE SULFATE 200 MG/1
200 TABLET, FILM COATED ORAL DAILY
Qty: 90 TABLET | Refills: 1 | Status: SHIPPED | OUTPATIENT
Start: 2018-09-05 | End: 2018-10-29

## 2018-09-05 NOTE — MR AVS SNAPSHOT
After Visit Summary   9/5/2018    Alannah Leos    MRN: [id number]           Patient Information     Date Of Birth          1940        Visit Information        Provider Department      9/5/2018 2:00 PM Merrick Del Cid MD [facility] [place]        Today's Diagnoses     Rheumatoid arthritis of multiple sites with negative rheumatoid factor (H)    -  1    High risk medication use           Follow-ups after your visit        Your next 10 appointments already scheduled     Oct 19, 2018  2:00 PM CDT   Return Visit with Ricardo Johnson MD   [facility])    [address] [place] [address]   [phone]            Dec 03, 2018  1:30 PM CST   LAB with  LAB   [facility] ([facility])    [address] [facility]  [place] [address]   [phone]           Please do not eat 10-12 hours before your appointment if you are coming in fasting for labs on lipids, cholesterol, or glucose (sugar). This does not apply to pregnant women. Water, hot tea and black coffee (with nothing added) are okay. Do not drink other fluids, diet soda or chew gum.            Dec 06, 2018  2:15 PM CST   Return Visit with Marcial Daigle MD   [facility] [place] ([facility])    [address]  [place] [address]   [phone]            Dec 13, 2018  2:00 PM CST   Return Visit with Cori Ventura MD   [facility])    [address] [place] [address]   [phone]            Jan 23, 2019  2:40 PM CST   Return Visit with Merrick Del Cid MD   [facility]    [address]  [place] [address]   [phone]              Future tests that were ordered for you today     Open Future Orders        Priority Expected Expires Ordered    CBC with platelets differential Routine 11/30/2018 1/3/2019 9/5/2018     "Creatinine Routine 11/30/2018 1/3/2019 9/5/2018    Erythrocyte sedimentation rate auto Routine 11/30/2018 1/3/2019 9/5/2018    CRP inflammation Routine 11/30/2018 1/3/2019 9/5/2018    Hepatic panel Routine 11/30/2018 1/3/2019 9/5/2018            Who to contact     If you have questions or need follow up information about today's clinic visit or your schedule please contact [facility] directly at [phone].  Normal or non-critical lab and imaging results will be communicated to you by MyChart, letter or phone within 4 business days after the clinic has received the results. If you do not hear from us within 7 days, please contact the clinic through MyChart or phone. If you have a critical or abnormal lab result, we will notify you by phone as soon as possible.  Submit refill requests through [employer] or call your pharmacy and they will forward the refill request to us. Please allow 3 business days for your refill to be completed.          Additional Information About Your Visit        Care EveryWhere ID     This is your Care EveryWhere ID. This could be used by other organizations to access your [place] medical records  [id number]        Your Vitals Were     Pulse Temperature Respirations Height Pulse Oximetry BMI (Body Mass Index)    88 97.5  F (36.4  C) (Oral) 18 1.613 m (5' 3.5\") 97% 19.88 kg/m2       Blood Pressure from Last 3 Encounters:   09/05/18 128/64   05/08/18 132/66   05/02/18 131/72    Weight from Last 3 Encounters:   09/05/18 51.7 kg (114 lb)   05/08/18 52.3 kg (115 lb 6.4 oz)   05/02/18 53.1 kg (117 lb)                 Where to get your medicines      These medications were sent to [facility] [id number] [place] [address] [address] [facility]  [address] [facility] [address]     Phone:  [phone]     hydroxychloroquine 200 MG tablet    methotrexate sodium 2.5 MG Tabs          Primary Care Provider Office Phone # Fax #    Elah " Shelby Blanca [phone]       [address]  [facility] [address]        Equal Access to Services     [facility]OL[facility] : Marie adelaida jenkins andrey Saul, wamacarioda keeganbillie, qakadi kaalmada daryn, danyell beltran victoria sparrow. So [facility] [phone].    ATENCIÓN: Si habla español, tiene a mahan disposición servicios gratuitos de asistencia lingüística. Llame al [phone].    We comply with applicable federal civil rights laws and [place] laws. We do not discriminate on the basis of race, color, national origin, age, disability, sex, sexual orientation, or gender identity.            Thank you!     Thank you for choosing [facility]  for your care. Our goal is always to provide you with excellent care. Hearing back from our patients is one way we can continue to improve our services. Please take a few minutes to complete the written survey that you may receive in the mail after your visit with us. Thank you!             Your Updated Medication List - Protect others around you: Learn how to safely use, store and throw away your medicines at www.disposemymeds.org.          This list is accurate as of 9/5/18  2:49 PM.  Always use your most recent med list.                   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 ACE Inhibitor not prescribed due to Refusal by patient    Type 2 diabetes, HbA1c goal < 7% (H)       ammonium lactate 12 % cream    AMLACTIN    385 g    Apply once to twice a day to dry skin. Can burn temporarily if there is a cut on the skin.    Xerosis of skin       aspirin 81 MG tablet      PATIENT REPORTS TAKING 4 TIMES PER WEEK AND NOT EVERYDAY        betamethasone (augmented)  0.05 % lotion    DIPROLENE    60 mL    Apply daily on itchy areas of the scalp as needed, on days not using the shampoo only when flaring    Dermatitis, seborrheic, H/O Parkinson's disease       CALCIUM + D PO      600mg twice a day        carbidopa-levodopa  MG per tablet    SINEMET    900 tablet    2 tablets at 6 am. 2 tablets at Noon. 2 tablets at 5PM. 2 Tablet at 9 PM. 2 tablet at Midnight.    Parkinson disease (H)       ferrous sulfate 325 (65 Fe) MG tablet    IRON    60 tablet    Take 1 tablet (325 mg) by mouth 2 times daily Stay on until seen back    Restless legs syndrome (RLS)       fluocinolone acetonide 0.01 % Sham     120 mL    Lather onto scalp, let sit for 5 min, then rinse off.Use for up to 1 week in a row for flares    Dermatitis, seborrheic       folic acid 1 MG tablet    FOLVITE    100 tablet    Take 1 tablet (1 mg) by mouth daily    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 Take all 8 tablets on the same day of each week.    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

## 2018-09-05 NOTE — PROGRESS NOTES
Rheumatology Clinic Visit      Alannah Leos MRN# [id number]   YOB: 1940 Age: 77 year old      Date of visit: 9/05/18   PCP: Dr. Leah Blanca    Chief Complaint   Patient presents with:  RECHECK: 4 month      Assessment and Plan     1. Seronegative erosive rheumatoid arthritis: Diagnosed in the 1970s. Previously on methotrexate when first diagnosed (unclear if ineffective or not, stopped by patient preference to not treat her RA at that time).  Synovitis, subluxation, and fixed flexion deformities on initial exam. Steroid responsive. Also with ankylosis of the cervical spine and erosive changes seen on x-rays. Currently on methotrexate 20 mg once weekly, and hydroxychloroquine 200 mg daily. She has improved with conventional DMARD's but would benefit from a biologic DMARD; preferentially Orencia because of the positive PIERO and dsDNA in the past; in the past it was prescribed and then the patient decided to not use it; then IV was attempted but was cost prohibitive; given the financial situation she is in she might qualify from free medication from the  and it appeared that she was working towards this well but decided that she did not want to use Orencia anymore so she never started.  Will leave on her medication list in case she changes her mind but she understands that she does not need to start it if she chooses not to.  She also notes having some left eyelid issue that appears to be blepharitis; we discussed the treatment for blepharitis and she is following with ophthalmology; she is going to try holding hydroxychloroquine because she feels like it might be related but if no improvement with holding hydroxychloroquine then she will restart it.  - Continue methotrexate 20 mg once weekly   - Continue folic acid 1mg daily  - Continue hydroxychloroquine 200mg daily  - Orencia SQ -see above  - Labs in 3 months: CBC, Creatinine, Hepatic Panel, ESR, CRP    2. Positive PIERO and dsDNA:  "These were noted on record review. She does not have symptoms to support an PIERO-associated rheumatologic disease at this time.     3. Neck pain in the setting of rheumatoid arthritis: No evidence of instability by x-rays on 10/12/2016.  Seen by neurosurgery but reportedly the procedure she would need is very intense so she does not want to do anything right now.    4. Parkinson's Disease: Followed by Dr. Ricardo Johnson at the [facility].     5. Osteoporosis: Based on 1/29/2018 DEXA.  Was on fosamax from [id number], but no f/u DEXA since 2011.  We discussed osteoporosis and the treatment of osteoporosis.  We reviewed the risks of treatment and the risks of not treating.  At this time she prefers to not treat.    Ms. Leos verbalized agreement with and understanding of the rational for the diagnosis and treatment plan.  All questions were answered to best of my ability and the patient's satisfaction. Ms. Leos was advised to contact the clinic with any questions that may arise after the clinic visit.      Thank you for involving me in the care of the patient    Return to clinic: 4 months      HPI   Alannah Leos is a 77 year old female with a medical history significant for Parkinson's disease, osteoarthritis, status post TKA, osteoporosis, and rheumatoid arthritis who presents for follow-up of rheumatoid arthritis.    Previously, Ms. Leos reported that she was diagnosed with rheumatoid arthritis in the past but did not want to take toxic medications and did not want to keep taking prednisone because of potential toxicities. Therefore, she decided that she would \"tough out\" her symptoms and try to enjoy her life. Then, more recently she was diagnosed with Parkinson's disease that has been significantly affecting her quality of life. She is treated for the Parkinson's disease and she believes that the treatment is helping. However, she is also having worsening joint pain and stiffness. Morning " "stiffness lasts for all day. All of her joints hurt, including her bilateral shoulders, neck, spine, hands, wrists, elbows, hips, knees, ankles, and feet. She tells me that she is unable to raise her arms without assistance from the contralateral arm, if she is able to get that hand over to the other side. Mainly, her right shoulder is affected. She tells me that she has a little more mobility in her left shoulder. She tells me that her entire spine is fused, and that it \"occurred naturally.\"  Overall, she tells me that she feels miserable and is willing to start treatment, but is still scared of most of the medication side effects.    Today, she reports that she has neck pain and has seen neurosurgery about the surgery would be too intense and she does not want to do that.  She would reportedly have to see a neurosurgeon at the [facility].  She still does not want to treat osteoporosis.  She did not start Orencia because she decided that she did not want to start it; she said that it costs other people too much for her to use it and she does not want one more medication.  Still with some joint pain but says that it is tolerable and overall she improved with the other medications, methotrexate, and hydroxychloroquine, so she does want to stay on those.  However, some left eyelid pain that she is following with ophthalmology for; she is not sure if it is related to hydroxychloroquine so she would like to hold hydroxychloroquine for a period of time to see if it improves..    Denies fevers, chills, nausea, vomiting, constipation, diarrhea. No abdominal pain. No chest pain/pressure, palpitations, or shortness of breath.   No oral or nasal sores.  No rash. No sicca symptoms.      Tobacco: none  EtOH: 1 drink at [place] only  Drugs: none    ROS   GEN: No fevers, chills, night sweats.    SKIN: No itching, rashes, sores  HEENT: No oral or nasal ulcers.  CV: No chest pain, pressure, palpitations, or dyspnea " on exertion.  PULM: No SOB, wheeze, cough.  GI: No nausea, vomiting, constipation, diarrhea. No blood in stool. No abdominal pain.  : No blood in urine.  MSK: See HPI.  NEURO: No numbness or tingling  EXT: See history of present illness  PSYCH: Negative    Active Problem List     Patient Active Problem List   Diagnosis     Osteoporosis     Rheumatoid arthritis (H)     Parkinson disease (H)     Osteoarthritis of wrist     Osteoarthritis of shoulder     History of total knee arthroplasty     Osteoarthritis of left knee     Advanced directives, counseling/discussion     CARDIOVASCULAR SCREENING; LDL GOAL LESS THAN 160     High risk medication use     Underweight     Impingement syndrome, shoulder, left     Combined forms of age-related cataract of both eyes     Past Medical History     Past Medical History:   Diagnosis Date     Hyperlipidemia      Murmur, heart     hsm     Nonsenile cataract      Osteoarthrosis, unspecified whether generalized or localized, lower leg      Osteopenia      Rheumatoid arthritis(714.0)      Past Surgical History     Past Surgical History:   Procedure Laterality Date     GALLBLADDER SURGERY       HYSTERECTOMY       KNEE SURGERY      left      LAMINECTOMY LUMBAR ONE LEVEL       TONSILLECTOMY       Allergy     Allergies   Allergen Reactions     Decadrol [Dexamethasone Sodium Phosphate]      Shrimp      Current Medication List     Current Outpatient Prescriptions   Medication Sig     ACE NOT PRESCRIBED, INTENTIONAL, 1 each continuous prn. ACE Inhibitor not prescribed due to Refusal by patient           ammonium lactate (AMLACTIN) 12 % cream Apply once to twice a day to dry skin. Can burn temporarily if there is a cut on the skin.     ASPIRIN 81 MG PO TABS PATIENT REPORTS TAKING 4 TIMES PER WEEK AND NOT EVERYDAY     CALCIUM + D OR 600mg twice a day      carbidopa-levodopa (SINEMET)  MG per tablet 2 tablets at 6 am. 2 tablets at Noon. 2 tablets at 5PM. 2 Tablet at 9 PM. 2 tablet at  "Midnight.     ferrous sulfate (IRON) 325 (65 Fe) MG tablet Take 1 tablet (325 mg) by mouth 2 times daily Stay on until seen back     folic acid (FOLVITE) 1 MG tablet Take 1 tablet (1 mg) by mouth daily     hydroxychloroquine (PLAQUENIL) 200 MG tablet Take 1 tablet (200 mg) by mouth daily     ibuprofen (ADVIL/MOTRIN) 200 MG tablet Take 1 tablet (200 mg) by mouth every 8 hours as needed for moderate pain     methotrexate sodium 2.5 MG TABS Take 20 mg by mouth once a week . Take all 8 tablets on the same day of each week.     MULTI-VITAMIN OR 1 a day      Abatacept (ORENCIA CLICKJECT) 125 MG/ML SOAJ auto-injector Inject 1 mL (125 mg) Subcutaneous every 7 days (Patient not taking: Reported on 9/5/2018)     betamethasone, augmented, (DIPROLENE) 0.05 % lotion Apply daily on itchy areas of the scalp as needed, on days not using the shampoo only when flaring (Patient not taking: Reported on 9/5/2018)     fluocinolone acetonide 0.01 % SHAM Lather onto scalp, let sit for 5 min, then rinse off.Use for up to 1 week in a row for flares (Patient not taking: Reported on 9/5/2018)     No current facility-administered medications for this visit.          Social History   See HPI    Family History     Family History   Problem Relation Age of Onset     Cancer Sister      pancreatic       Physical Exam     Temp Readings from Last 3 Encounters:   09/05/18 97.5  F (36.4  C) (Oral)   03/12/18 97.8  F (36.6  C) (Oral)   01/25/18 96.5  F (35.8  C) (Oral)     BP Readings from Last 5 Encounters:   09/05/18 128/64   05/08/18 132/66   05/02/18 131/72   04/13/18 142/66   04/02/18 126/63     Pulse Readings from Last 1 Encounters:   09/05/18 88     Resp Readings from Last 1 Encounters:   09/05/18 18     Estimated body mass index is 19.88 kg/(m^2) as calculated from the following:    Height as of this encounter: 1.613 m (5' 3.5\").    Weight as of this encounter: 51.7 kg (114 lb).    GEN: NAD, thin and frail appearing  HEENT: MMM. No oral lesions. " Anicteric, noninjected sclera  CV: S1, S2. RRR. No m/r/g.  PULM: CTA bilaterally. No w/c.  MSK: MCPs and PIPs without swelling or tenderness to palpation.  Right wrist with swelling and tenderness to palpation. Left wrist without swelling or tenderness to palpation.  [place]-neck deformity of the left third and fifth fingers.  Elbows without swelling or tenderness to palpation.  Bilateral shoulders diffusely tenderness to palpation and the left shoulder appears to hang low; left shoulder painful with abduction above 90 degrees.  Knees, ankles, and MTPs without swelling or tenderness to palpation.  Holds neck in a neutral position.    NEURO: UE and LE strengths 5/5 and equal bilaterally.   SKIN: No rash. Diffuse scalp hair thinning.  EXT: No lower extremity edema  PSYCH: Alert. Appropriate.    Labs / Imaging (select studies)   RF/CCP  Recent Labs   Lab Test  10/12/16   1142  07/30/13   1621  10/25/12   1127   CCPABY   --   <20 Interpretation:  Negative   --    CCPIGG  2   --    --    RHF  <20   --   9     CBC  Recent Labs   Lab Test  08/06/18   1333  04/30/18   1339  01/22/18   1336   WBC  6.2  6.4  5.4   RBC  3.61*  4.05  4.02   HGB  11.1*  12.1  11.2*   HCT  34.3*  37.3  35.6   MCV  95  92  89   RDW  15.3*  16.4*  17.4*   PLT  229  223  258   MCH  30.7  29.9  27.9   MCHC  32.4  32.4  31.5   NEUTROPHIL  65.1  59.9  59.0   LYMPH  24.2  28.8  28.5   MONOCYTE  8.4  8.4  9.2   EOSINOPHIL  1.0  1.7  1.5   BASOPHIL  1.3  1.2  1.8   ANEU  4.0  3.8  3.2   ALYM  1.5  1.9  1.6   DORITA  0.5  0.5  0.5   AEOS  0.1  0.1  0.1   ABAS  0.1  0.1  0.1     CMP  Recent Labs   Lab Test  08/06/18   1333  04/30/18   1339  01/25/18   1353  01/22/18   1336   05/27/16   1413  05/26/15   1416  04/28/14   1209   NA   --    --    --    --    --   136  137  136   POTASSIUM   --    --    --    --    --   3.7  4.0  4.2   CHLORIDE   --    --    --    --    --   102  103  98   CO2   --    --    --    --    --   25 28 27   ANIONGAP   --    --    --     --    --   9  6  11   GLC   --    --    --    --    --   94  100*  99   BUN   --    --    --    --    --   11  9  12   CR  0.42*  0.43*   --   0.42*   < >  0.41*  0.47*  0.52   GFRESTIMATED  >90  >90   --   >90   < >  >90  Non  GFR Calc    >90  Non  GFR Calc    >90   GFRESTBLACK  >90  >90   --   >90   < >  >90   GFR Calc    >90   GFR Calc    >90   PAKO   --    --   9.1   --    --   9.2  9.2  9.4   BILITOTAL  0.5  0.4   --   0.5   < >  0.5   --   0.5   ALBUMIN  4.1  4.2   --   3.7   < >  3.8   --   4.4   PROTTOTAL  7.8  7.8   --   7.4   < >  8.8   --   8.6   ALKPHOS  83  84   --   102   < >  108   --   119   AST  20  23   --   19   < >  15   --   27   ALT  13  9   --   8   < >  8   --   19    < > = values in this interval not displayed.     Calcium/VitaminD  Recent Labs   Lab Test  01/25/18   1353  05/27/16   1413  05/26/15   1416   04/26/13   1128   10/26/11   1113   PAKO  9.1  9.2  9.2   < >  9.2   < >  9.7   D3VIT   --    --    --    --    --    --   46   VITDT  39  95*   --    --   63   --    --     < > = values in this interval not displayed.     ESR/CRP  Recent Labs   Lab Test  04/30/18   1339  01/22/18   1336  10/16/17   1334   SED  17  31*  37*   CRP  <2.9  7.4  10.4*     Lipid Panel  Recent Labs   Lab Test  05/26/15   1416  04/26/13   1128  04/26/12   1151   CHOL  179  154  159   TRIG  50  44  56   HDL  80  72  71   LDL  89  73  77   VLDL  10  9  11   CHOLHDLRATIO  2.2  2.1  2.2     Hepatitis B  Recent Labs   Lab Test  01/18/17   1601   HBCAB  Nonreactive   HEPBANG  Nonreactive     Hepatitis C  Recent Labs   Lab Test  01/18/17   1601   HCVAB  Nonreactive   Assay performance characteristics have not been established for newborns,   infants, and children       Tuberculosis Screening  Recent Labs   Lab Test  01/25/18   1352  01/18/17   1601   TBRSLT  Negative  Negative   TBAGN  0.00  0.00     HIV Screening  Recent Labs   Lab Test  01/18/17   1601    HIAGAB  Nonreactive   HIV-1 p24 Ag & HIV-1/HIV-2 Ab Not Detected         Immunization History     Immunization History   Administered Date(s) Administered     Pneumo Conj 13-V (2010&after) 11/03/2016     Pneumococcal 23 valent 11/01/2007, 11/13/2007, 03/12/2018     TD (ADULT, 7+) 11/13/2007     Tdap (Adacel,Boostrix) 11/13/2007          Chart documentation done in part with Dragon Voice recognition Software. Although reviewed after completion, some word and grammatical error may remain.    Merrick Del Cid MD

## 2018-10-19 ENCOUNTER — OFFICE VISIT (OUTPATIENT)
Dept: NEUROLOGY | Facility: CLINIC | Age: 78
End: 2018-10-19
Payer: MEDICARE

## 2018-10-19 VITALS
BODY MASS INDEX: 19.84 KG/M2 | WEIGHT: 113.8 LBS | HEART RATE: 84 BPM | SYSTOLIC BLOOD PRESSURE: 141 MMHG | DIASTOLIC BLOOD PRESSURE: 71 MMHG | OXYGEN SATURATION: 97 %

## 2018-10-19 DIAGNOSIS — E61.1 IRON DEFICIENCY: ICD-10-CM

## 2018-10-19 DIAGNOSIS — G20.A1 PARKINSON'S DISEASE (H): Primary | ICD-10-CM

## 2018-10-19 LAB — FERRITIN SERPL-MCNC: 98 NG/ML (ref 8–252)

## 2018-10-19 PROCEDURE — 99214 OFFICE O/P EST MOD 30 MIN: CPT | Performed by: PSYCHIATRY & NEUROLOGY

## 2018-10-19 PROCEDURE — 36415 COLL VENOUS BLD VENIPUNCTURE: CPT | Performed by: PSYCHIATRY & NEUROLOGY

## 2018-10-19 PROCEDURE — 82728 ASSAY OF FERRITIN: CPT | Performed by: PSYCHIATRY & NEUROLOGY

## 2018-10-19 RX ORDER — CARBIDOPA/LEVODOPA 25MG-250MG
TABLET ORAL
Qty: 100 TABLET | Refills: 1 | Status: SHIPPED | OUTPATIENT
Start: 2018-10-19 | End: 2018-12-07

## 2018-10-19 ASSESSMENT — UNIFIED PARKINSONS DISEASE RATING SCALE (UPDRS)
FINGER_TAPPING_RIGHT: MODERATE: ANY OF THE FOLLOWING:  A) MORE THAN 5 INTERRUPTIONS OR AT LEAST ONE LONGER ARREST (FREEZE) IN ONGOING MOVEMENT  B) MODERATE SLOWING C) THE AMPLITUDE DECREMENTS STARTING AFTER THE FIRST MOVEMENT.
AMPLITUDE_RLE: NORMAL: NO TREMOR.
ARISING_CHAIR: MODERATE: NEEDS TO PUSH OFF, BUT TENDS TO FALL BACK, OR MAY HAVE TO TRY MORE THAN ONE TIME USING ARMS OF CHAIR, BUT CAN GET UP WITHOUT HELP.
SPEECH: MODERATE: SPEECH IS DIFFICULT TO UNDERSTAND TO THE POINT THAT SOME, BUT NOT MOST, SENTENCES ARE POORLY UNDERSTOOD.
TOTAL_SCORE: 28
AMPLITUDE_RUE: NORMAL: NO TREMOR.
HANDMOVEMENTS_LEFT: MILD: ANY OF THE FOLLOWING: A) 3 TO 5 INTERRUPTIONS DURING TAPPING B) MILD SLOWING C) THE AMPLITUDE DECREMENTS MIDWAY IN THE 10-MOVEMENT SEQUENCE
POSTURE: 0 NORMAL, NO PROBLEMS
FACIAL_EXPRESSION: MODERATE: MASKED FACIES WITH LIPS PARTED SOME OF THE TIME WHEN THE MOUTH IS AT REST.
RIGIDITY_NECK: SLIGHT: RIGIDITY ONLY DETECTED WITH ACTIVATION MANEUVER.
FINGER_TAPPING_LEFT: MODERATE: ANY OF THE FOLLOWING:  A) MORE THAN 5 INTERRUPTIONS  OR AT LEAST ONE LONGER ARREST (FREEZE) IN ONGOING MOVEMENT  B) MODERATE SLOWING C) THE AMPLITUDE DECREMENTS STARTING AFTER THE FIRST MOVEMENT.
PRONATION_SUPINATION_RIGHT: SLIGHT: ANY OF THE FOLLOWING: A) THE REGULAR RHYTHM IS BROKEN WITH ONE WITH ONE OR TWO INTERRUPTIONS OR HESITATIONS OF THE MOVEMENT B) SLIGHT SLOWING C) THE AMPLITUDE DECREMENTS NEAR THE END OF THE 10 MOVEMENTS.
AMPLITUDE_LIP_JAW: NORMAL: NO TREMOR.
AMPLITUDE_LLE: NORMAL: NO TREMOR.
RIGIDITY_LLE: SLIGHT: RIGIDITY ONLY DETECTED WITH ACTIVATION MANEUVER.
LEG_AGILITY_RIGHT: NORMAL
TOTAL_SCORE: 8
CONSTANCY_TREMOR_ATREST: NORMAL: NO TREMOR.
LEG_AGILITY_LEFT: NORMAL
TOETAPPING_LEFT: NORMAL
POSTURAL_STABILITY: NORMAL:  RECOVERS WITH ONE OR TWO STEPS.
PRONATION_SUPINATION_LEFT: SLIGHT: ANY OF THE FOLLOWING: A) THE REGULAR RHYTHM IS BROKEN WITH ONE WITH ONE OR TWO INTERRUPTIONS OR HESITATIONS OF THE MOVEMENT B) SLIGHT SLOWING C) THE AMPLITUDE DECREMENTS NEAR THE END OF THE 10 MOVEMENTS.
RIGIDITY_RUE: SLIGHT: RIGIDITY ONLY DETECTED WITH ACTIVATION MANEUVER.
AMPLITUDE_LUE: NORMAL: NO TREMOR.
RIGIDITY_RLE: SLIGHT: RIGIDITY ONLY DETECTED WITH ACTIVATION MANEUVER.
HANDMOVEMENTS_RIGHT: MILD: ANY OF THE FOLLOWING: A) 3 TO 5 INTERRUPTIONS DURING TAPPING B) MILD SLOWING C) THE AMPLITUDE DECREMENTS MIDWAY IN THE 10-MOVEMENT SEQUENCE
AXIAL_SCORE: 12
PARKINSONS_MEDS: ON
FREEZING_GAIT: NORMAL
SPONTANEITY_OF_MOVEMENT: 0: NORMAL.  NO PROBLEMS.
GAIT: MILD: INDEPENDENT WALKING BUT WITH SUBSTANTIAL GAIT IMPAIRMENT.
TOETAPPING_RIGHT: NORMAL
TOTAL_SCORE_LEFT: 8
RIGIDITY_LUE: SLIGHT: RIGIDITY ONLY DETECTED WITH ACTIVATION MANEUVER.

## 2018-10-19 ASSESSMENT — PAIN SCALES - GENERAL: PAINLEVEL: SEVERE PAIN (6)

## 2018-10-19 NOTE — LETTER
"    10/19/2018         RE: Alannah Leos  [address] [facility] [id number]        Dear Colleague,    Thank you for referring your patient, Alannah Leos, to the [facility]. Please see a copy of my visit note below.    Movement Disorder Clinic follow up note    Patient: Alannah Leos  Medical Record Number: [id number]  Encounter Date: October 19, 2018  PCP:Leah Blanca    CC: Parkinson's disease    Impression:  1.  Idiopathic Parkinson's disease  2.  Mild wearing off phenomena  3.  Restless leg syndrome improved on iron replacement    Recommendations:  1.  Recheck serum ferritin today.  If it if it is in the 70s range we can discontinue her oral iron replacement.  2.  We will inch up on her carbidopa levodopa.  She will now take carbidopa/levodopa, 25/250, immediate release, 1 tablet at 6 AM, noon, 5 PM, 9 PM and 12:30 AM.  If she has dyskinesia or other side effects we can cut the medicine back to her other dosage of 25/100, 2 tablets at these dosage intervals.  3.  See back in 6 months    Return to clinic: 6 months    Interval Hx:: Ms. Alannah Leos returns to clinic today for follow-up of Parkinson's disease.  She feels she is a bit worse.  She is having more imbalance and perhaps more tremor.  She is taking her carbidopa/levodopa 25/100 at the time intervals listed below.  She has no sense of \"on\".  She does wear off a bit around 5 PM.  This is not severe wearing off however.  She may get some mild dyskinesia according to her daughter.  She is using a walker.  She feels less balance but she is not falling.  She is having some minor freezing episodes.    She is not falling as noted.  Cognitive function is normal.  Her speech is a bit slower.  She has rare problems swallowing.  Sleep is good.  She has no depression.  She does not faint.    We felt she had restless legs.  She is now not so sure.  She says she has to get up and move.  She denies any severe leg aching or pain or " crawling.  However all of the symptoms have gotten better on oral iron replacement.  She does not like taking the iron however as it causes nausea.  Our plan was to recheck her ferritin today.    She denies sleep disorder or depression        Current medications    Movement Disorder-related Medications                   6 AM 12 noon 5 PM 9 PM 12:30 A   Carbidopa/levodopa 25/100                                                2 2 2 2 2                                                                               Current Outpatient Prescriptions   Medication     ACE NOT PRESCRIBED, INTENTIONAL,     ammonium lactate (AMLACTIN) 12 % cream     ASPIRIN 81 MG PO TABS     CALCIUM + D OR     ferrous sulfate (IRON) 325 (65 Fe) MG tablet     folic acid (FOLVITE) 1 MG tablet     hydroxychloroquine (PLAQUENIL) 200 MG tablet     ibuprofen (ADVIL/MOTRIN) 200 MG tablet     methotrexate sodium 2.5 MG TABS     MULTI-VITAMIN OR     Abatacept (ORENCIA CLICKJECT) 125 MG/ML SOAJ auto-injector     betamethasone, augmented, (DIPROLENE) 0.05 % lotion     carbidopa-levodopa (SINEMET)  MG per tablet     fluocinolone acetonide 0.01 % SHAM     No current facility-administered medications for this visit.        Examination:  /71  Pulse 84  Wt 51.6 kg (113 lb 12.8 oz)  SpO2 97%  BMI 19.84 kg/m2  General- no distress, no rash, good pulses, no edema  Respiratory-auscultation over the anterior lung fields is clear  Cardiac- regular rate and rhythm    Neurologic  Mental status  Patient is alert, appropriate, speech is fluent and comprehension is intact    Cranial nerve testing  Pupils are equal and reactive to light, visual fields are full to confrontation bilaterally  Extraocular movements are full  Facial sensation is intact, face is symmetric with rest and activation  Palate rises symmetrically, tongue protrudes at midline with normal movements  Sterocleidomastoid and trapezius strength is normal and symmetric    Motor  UPDRS Values  2/9/2018 4/13/2018 10/19/2018   Time: 1:41 AM 2:00 AM 2:27 AM   Medication   On   Speech 2 1 3   Facial Expression 1 1 3   Rigidity Neck 2 2 1   Rigidity RUE 3 2 1   Rigidity LUE 3 2 1   Rigidity RLE 3 2 1   Rigidity LLE 3 0 1   Finger Taps R 3 3 3   Finger Taps L 3 3 3   Hand Mvt R 3 2 2   Hand Mvt L 3 2 2   Pron-/Supinate R 2 2 1   Pron-/Supinate L 2 2 1   Toe Tap R 2 2 0   Toe Tap L 2 2 0   Leg Agility R 2 1 0   Leg Agility L 2 1 0   Arise From Chair 3 2 3   Gait 2 2 2   Gait Freezing 0 0 0   Postural Stability 3 1 0   Posture 2 1 0   Global Spont Mvt 2 0 0   Postural Tremor RUE 0 0 0   Postural Tremor LUE 0 0 0   Kinetic Tremor RUE 0 0 0   Kinetic Tremor LUE 0 0 0   Rest Tremor RUE 0 0 0   Rest Tremor LUE 0 0 0   Rest Tremor RLE 0 0 0   Rest Tremor LLE 0 0 0   Rest Tremor Lip/Jaw 0 0 0   Rest Tremor Constancy 0 0 0   Total Right 18 14 8   Total Left 18 12 8   Axial Total 17 10 12   Total 53 36 28   Sensation  Intact to pin, vibration   Proprioception intact in great toes and fingers    Tendon reflexes  Testing at brachioradialis, biceps, triceps, patella and achilles bilaterally showed normal reflexes, symmetrically, without Babinski or Javier signs    Coordination  Finger nose finger testing: normalRapid alternating movements are normal.  Gait and Station: normal    25 minutes of total time was spent face-to-face with the patient over 50% of which was counseling..      Again, thank you for allowing me to participate in the care of your patient.        Sincerely,        Ricardo Johnson MD

## 2018-10-19 NOTE — MR AVS SNAPSHOT
After Visit Summary   10/19/2018    Alannah Leos    MRN: [id number]           Patient Information     Date Of Birth          1940        Visit Information        Provider Department      10/19/2018 2:00 PM Ricardo Johnson MD [facility]        Today's Diagnoses     Parkinson's disease (H)    -  1    Iron deficiency          Care Instructions    1.  Try going to 25/250 tablets (larger dose) at 6 am, noon, 5 pm, 9 pm and 12:30 am.  If not better go back to the 2 x 25/100 tablets at these times.  2. Check ferritin today.  I'll call if you can stop iron  3. See 6 months.          Follow-ups after your visit        Follow-up notes from your care team     Return in about 6 months (around 4/19/2019).      Your next 10 appointments already scheduled     Dec 03, 2018  1:30 PM CST   LAB [facility]   [facility]    [address] [place] [address]   [phone]           Please do not eat 10-12 hours before your appointment if you are coming in fasting for labs on lipids, cholesterol, or glucose (sugar). This does not apply to pregnant women. Water, hot tea and black coffee (with nothing added) are okay. Do not drink other fluids, diet soda or chew gum.            Dec 06, 2018  2:15 PM CST   Return Visit with Marcial Daigle MD   [facility]    [address] [place] [address]   [phone]            Dec 13, 2018  2:00 PM CST   [facility] with [facility]i Ventura MD   [facility]    [address] [place] [address]   [phone]            Jan 23, 2019  2:40 PM CST   Return Visit with Merrick Del Cid MD   [facility]    [address] [place] [address]   [phone]            Apr 19, 2019  2:00 PM CDT   Return Visit with Ricardo Johnson MD   [facility]    [address] [place] [id number] [phone]              Future tests that were ordered for you today     Open Future Orders        Priority Expected Expires Ordered    Ferritin Routine  10/19/2019 10/19/2018            Who to contact     If you have questions or need follow up information about today's clinic visit or your schedule please contact [facility] directly at [phone].  Normal or non-critical lab and imaging results will be communicated to you by MyChart, letter or phone within 4 business days after the clinic has received the results. If you do not hear from us within 7 days, please contact the clinic through MyChart or phone. If you have a critical or abnormal lab result, we will notify you by phone as soon as possible.  Submit refill requests through Ramen or call your pharmacy and they will forward the refill request to us. Please allow 3 business days for your refill to be completed.          Additional Information About Your Visit        Care EveryWhere ID     This is your Care EveryWhere ID. This could be used by other organizations to access your [place] medical records  [id number]        Your Vitals Were     Pulse Pulse Oximetry BMI (Body Mass Index)             84 97% 19.84 kg/m2          Blood Pressure from Last 3 Encounters:   10/19/18 141/71   09/05/18 128/64   05/08/18 132/66    Weight from Last 3 Encounters:   10/19/18 51.6 kg (113 lb 12.8 oz)   09/05/18 51.7 kg (114 lb)   05/08/18 52.3 kg (115 lb 6.4 oz)                 Today's Medication Changes          These changes are accurate as of 10/19/18  2:41 PM.  If you have any questions, ask your nurse or doctor.               These medicines have changed or have updated prescriptions.        Dose/Directions    * carbidopa-levodopa  MG per tablet   Commonly known as:  SINEMET   This may have changed:  Another medication with the same name was added.  Make sure you understand how and when to take each.   Used for:  Parkinson disease (H)   Changed by:  Ricardo Johnson MD        2 tablets at 6 am. 2 tablets at Noon. 2 tablets at 5PM. 2 Tablet at 9 PM. 2 tablet at Midnight.   Quantity:  900 tablet   Refills:  3       * carbidopa-levodopa  MG per tablet   Commonly known as:  SINEMET   This may have changed:  You were already taking a medication with the same name, and this prescription was added. Make sure you understand how and when to take each.   Used for:  Parkinson's disease (H)   Changed by:  Ricardo Johnson MD        Take one at 6 am, noon, 5 pm, 9 pm and midnight   Quantity:  100 tablet   Refills:  1       * Notice:  This list has 2 medication(s) that are the same as other medications prescribed for you. Read the directions carefully, and ask your doctor or other care provider to review them with you.         Where to get your medicines      These medications were sent to [facility] [address] [place] [address] [facility] [place] [address] [facility] [address]     Phone:  [phone]     carbidopa-levodopa  MG per tablet                Primary Care Provider Office Phone # Fax #    Leah Shelby Blanca [phone] [address] [place]  [facility] [address]        Equal Access to Services     [facility] AH: Hadii aad ku hadasho Soomaali, waaxda luqadaha, qaybta kaalmada adeegyada, danyell patel hayaparna kessler . So [facility] [phone].    ATENCIÓN: Si habla español, tiene a mahan disposición servicios gratuitos de asistencia lingüística. Llame al [phone].    We comply with applicable federal civil rights laws and [place] laws. We do not discriminate on the basis of race, color, national origin, age, disability, sex, sexual orientation, or gender identity.            Thank you!     Thank you for choosing [facility]  for your care. Our  goal is always to provide you with excellent care. Hearing back from our patients is one way we can continue to improve our services. Please take a few minutes to complete the written survey that you may receive in the mail after your visit with us. Thank you!             Your Updated Medication List - Protect others around you: Learn how to safely use, store and throw away your medicines at www.disposemymeds.org.          This list is accurate as of 10/19/18  2:41 PM.  Always use your most recent med list.                   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 ACE Inhibitor not prescribed due to Refusal by patient    Type 2 diabetes, HbA1c goal < 7% (H)       ammonium lactate 12 % cream    AMLACTIN    385 g    Apply once to twice a day to dry skin. Can burn temporarily if there is a cut on the skin.    Xerosis of skin       aspirin 81 MG tablet      PATIENT REPORTS TAKING 4 TIMES PER WEEK AND NOT EVERYDAY        betamethasone (augmented) 0.05 % lotion    DIPROLENE    60 mL    Apply daily on itchy areas of the scalp as needed, on days not using the shampoo only when flaring    Dermatitis, seborrheic, H/O Parkinson's disease       CALCIUM + D PO      600mg twice a day        * carbidopa-levodopa  MG per tablet    SINEMET    900 tablet    2 tablets at 6 am. 2 tablets at Noon. 2 tablets at 5PM. 2 Tablet at 9 PM. 2 tablet at Midnight.    Parkinson disease (H)       * carbidopa-levodopa  MG per tablet    SINEMET    100 tablet    Take one at 6 am, noon, 5 pm, 9 pm and midnight    Parkinson's disease (H)       ferrous sulfate 325 (65 Fe) MG tablet    IRON    60 tablet    Take 1 tablet (325 mg) by mouth 2 times daily Stay  on until seen back    Restless legs syndrome (RLS)       fluocinolone acetonide 0.01 % Sham     120 mL    Lather onto scalp, let sit for 5 min, then rinse off.Use for up to 1 week in a row for flares    Dermatitis, seborrheic       folic acid 1 MG tablet    FOLVITE    100 tablet    Take 1 tablet (1 mg) by mouth daily    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 Take all 8 tablets on the same day of each week.    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 Read the directions carefully, and ask your doctor or other care provider to review them with you.

## 2018-10-19 NOTE — PROGRESS NOTES
"[facility] follow up note    Patient: Alannah Leos  Medical Record Number: [id number]  Encounter Date: October 19, 2018  PCP:Leah Blanca    CC: Parkinson's disease    Impression:  1.  Idiopathic Parkinson's disease  2.  Mild wearing off phenomena  3.  Restless leg syndrome improved on iron replacement    Recommendations:  1.  Recheck serum ferritin today.  If it if it is in the 70s range we can discontinue her oral iron replacement.  2.  We will inch up on her carbidopa levodopa.  She will now take carbidopa/levodopa, 25/250, immediate release, 1 tablet at 6 AM, noon, 5 PM, 9 PM and 12:30 AM.  If she has dyskinesia or other side effects we can cut the medicine back to her other dosage of 25/100, 2 tablets at these dosage intervals.  3.  See back in 6 months    Return to clinic: 6 months    Interval Hx:: Ms. Alannah Leos returns to clinic today for follow-up of Parkinson's disease.  She feels she is a bit worse.  She is having more imbalance and perhaps more tremor.  She is taking her carbidopa/levodopa 25/100 at the time intervals listed below.  She has no sense of \"on\".  She does wear off a bit around 5 PM.  This is not severe wearing off however.  She may get some mild dyskinesia according to her daughter.  She is using a walker.  She feels less balance but she is not falling.  She is having some minor freezing episodes.    She is not falling as noted.  Cognitive function is normal.  Her speech is a bit slower.  She has rare problems swallowing.  Sleep is good.  She has no depression.  She does not faint.    We felt she had restless legs.  She is now not so sure.  She says she has to get up and move.  She denies any severe leg aching or pain or crawling.  However all of the symptoms have gotten better on oral iron replacement.  She does not like taking the iron however as it causes nausea.  Our plan was to recheck her ferritin today.    She denies sleep disorder or " depression        Current medications    Movement Disorder-related Medications                   6 AM 12 noon 5 PM 9 PM 12:30 A   Carbidopa/levodopa 25/100                                                2 2 2 2 2                                                                               Current Outpatient Prescriptions   Medication     ACE NOT PRESCRIBED, INTENTIONAL,     ammonium lactate (AMLACTIN) 12 % cream     ASPIRIN 81 MG PO TABS     CALCIUM + D OR     ferrous sulfate (IRON) 325 (65 Fe) MG tablet     folic acid (FOLVITE) 1 MG tablet     hydroxychloroquine (PLAQUENIL) 200 MG tablet     ibuprofen (ADVIL/MOTRIN) 200 MG tablet     methotrexate sodium 2.5 MG TABS     MULTI-VITAMIN OR     Abatacept (ORENCIA CLICKJECT) 125 MG/ML SOAJ auto-injector     betamethasone, augmented, (DIPROLENE) 0.05 % lotion     carbidopa-levodopa (SINEMET)  MG per tablet     fluocinolone acetonide 0.01 % SHAM     No current facility-administered medications for this visit.        Examination:  /71  Pulse 84  Wt 51.6 kg (113 lb 12.8 oz)  SpO2 97%  BMI 19.84 kg/m2  General- no distress, no rash, good pulses, no edema  Respiratory-auscultation over the anterior lung fields is clear  Cardiac- regular rate and rhythm    Neurologic  Mental status  Patient is alert, appropriate, speech is fluent and comprehension is intact    Cranial nerve testing  Pupils are equal and reactive to light, visual fields are full to confrontation bilaterally  Extraocular movements are full  Facial sensation is intact, face is symmetric with rest and activation  Palate rises symmetrically, tongue protrudes at midline with normal movements  Sterocleidomastoid and trapezius strength is normal and symmetric    Motor  UPDRS Values 2/9/2018 4/13/2018 10/19/2018   Time: 1:41 AM 2:00 AM 2:27 AM   Medication   On   Speech 2 1 3   Facial Expression 1 1 3   Rigidity Neck 2 2 1   Rigidity RUE 3 2 1   Rigidity LUE 3 2 1   Rigidity RLE 3 2 1   Rigidity LLE 3 0  1   Finger Taps R 3 3 3   Finger Taps L 3 3 3   Hand Mvt R 3 2 2   Hand Mvt L 3 2 2   Pron-/Supinate R 2 2 1   Pron-/Supinate L 2 2 1   Toe Tap R 2 2 0   Toe Tap L 2 2 0   Leg Agility R 2 1 0   Leg Agility L 2 1 0   Arise From Chair 3 2 3   Gait 2 2 2   Gait Freezing 0 0 0   Postural Stability 3 1 0   Posture 2 1 0   Global Spont Mvt 2 0 0   Postural Tremor RUE 0 0 0   Postural Tremor LUE 0 0 0   Kinetic Tremor RUE 0 0 0   Kinetic Tremor LUE 0 0 0   Rest Tremor RUE 0 0 0   Rest Tremor LUE 0 0 0   Rest Tremor RLE 0 0 0   Rest Tremor LLE 0 0 0   Rest Tremor Lip/Jaw 0 0 0   Rest Tremor Constancy 0 0 0   Total Right 18 14 8   Total Left 18 12 8   Axial Total 17 10 12   Total 53 36 28   Sensation  Intact to pin, vibration   Proprioception intact in great toes and fingers    Tendon reflexes  Testing at brachioradialis, biceps, triceps, patella and achilles bilaterally showed normal reflexes, symmetrically, without Babinski or Javier signs    Coordination  Finger nose finger testing: normalRapid alternating movements are normal.  Gait and Station: normal    25 minutes of total time was spent face-to-face with the patient over 50% of which was counseling..

## 2018-10-19 NOTE — PATIENT INSTRUCTIONS
1.  Try going to 25/250 tablets (larger dose) at 6 am, noon, 5 pm, 9 pm and 12:30 am.  If not better go back to the 2 x 25/100 tablets at these times.  2. Check ferritin today.  I'll call if you can stop iron  3. See 6 months.

## 2018-10-19 NOTE — NURSING NOTE
Alannah Leos's goals for this visit include: return  She requests these members of her care team be copied on today's visit information:     PCP: Leah Blanca    Referring Provider:  No referring provider defined for this encounter.    /71  Pulse 84  Wt 51.6 kg (113 lb 12.8 oz)  SpO2 97%  BMI 19.84 kg/m2    Do you need any medication refills at today's visit? n

## 2018-10-23 ENCOUNTER — TELEPHONE (OUTPATIENT)
Dept: NEUROLOGY | Facility: CLINIC | Age: 78
End: 2018-10-23

## 2018-10-23 NOTE — TELEPHONE ENCOUNTER
Called and informed patient over her voicemail that her ferritin level is now satisfactory at 98.  She can discontinue her oral iron replacement.

## 2018-10-29 DIAGNOSIS — Z79.899 HIGH RISK MEDICATION USE: ICD-10-CM

## 2018-10-29 DIAGNOSIS — M06.09 RHEUMATOID ARTHRITIS OF MULTIPLE SITES WITH NEGATIVE RHEUMATOID FACTOR (H): ICD-10-CM

## 2018-10-29 NOTE — TELEPHONE ENCOUNTER
Requested Prescriptions   Pending Prescriptions Disp Refills     hydroxychloroquine (PLAQUENIL) 200 MG tablet 90 tablet 1     Sig: Take 1 tablet (200 mg) by mouth daily    There is no refill protocol information for this order        Last Written Prescription Date:  9/5/2018  Last Fill Quantity: 90,  # refills: 1   Last office visit: 9/5/2018 with prescribing provider:  Rianna Bhatt Office Visit:   Next 5 appointments (look out 90 days)     Dec 06, 2018  2:15 PM CST   [facility]    [address] [place] [address]   [phone]            Dec 13, 2018  2:00 PM CST   [facility] Visit with Cori [facility]ura [facility]   [facility]    [address] [place] [address] [phone]            Jan 23, 2019  2:40 PM CST   Return Visit with Merrick Del Cid MD   [facility]    [address] [place] [phone]   [phone]

## 2018-10-29 NOTE — TELEPHONE ENCOUNTER
Rheumatology team: Based on last Rx for hydroxychloroquine, Ms. Leos should have a sufficient supply.  Please check with Ms. Leos and her pharmacy.     Merrick Del Cid MD  10/29/2018 3:45 PM

## 2018-10-29 NOTE — TELEPHONE ENCOUNTER
Requested Prescriptions   Pending Prescriptions Disp Refills     methotrexate sodium 2.5 MG TABS 96 tablet 1     Sig: Take 8 tablets (20 mg) by mouth once a week . Take all 8 tablets on the same day of each week.    There is no refill protocol information for this order        Last Written Prescription Date:  9/5/2018  Last Fill Quantity: 96,  # refills: 1   Last office visit: 9/5/2018 with prescribing provider:  Rianna   Future Office Visit:   Next 5 appointments (look out 90 days)     Dec 06, 2018  2:15 PM CST   Return Visit with Marcial Daigle MD   [facility]    [address] [place] [place] [address]   [phone]            Dec 13, 2018  2:00 PM CST   Return Visit with Cori Ventura MD   [facility] ([facility])    [address] [place] [address]   [phone]            Jan 23, 2019  2:40 PM CST   Return Visit with Merrick Del Cid MD   [facility]    [address] [place] [address]   [phone]

## 2018-10-29 NOTE — TELEPHONE ENCOUNTER
Rheumatology team: Based on last Rx for methotrexate, Ms. Leos should have a sufficient supply.  Please check with Ms. Kennethisabelle and her pharmacy.     Merrick Del Cid MD  10/29/2018 3:45 PM

## 2018-10-30 RX ORDER — METHOTREXATE 2.5 MG/1
20 TABLET ORAL WEEKLY
Qty: 32 TABLET | Refills: 6 | Status: SHIPPED | OUTPATIENT
Start: 2018-10-30 | End: 2019-01-23

## 2018-10-30 RX ORDER — HYDROXYCHLOROQUINE SULFATE 200 MG/1
200 TABLET, FILM COATED ORAL DAILY
Qty: 30 TABLET | Refills: 6 | Status: SHIPPED | OUTPATIENT
Start: 2018-10-30 | End: 2019-01-23

## 2018-10-30 NOTE — TELEPHONE ENCOUNTER
Contacted Pt,letting her know she has refills available for pickup.  Pt had no questions or concerns, agrees and understands.    Taty Viera, MELLISA  10/30/2018  2:29 PM

## 2018-10-30 NOTE — TELEPHONE ENCOUNTER
Spoke with Ms. Leos as well as Cherie at pharmacy on file, and the reason Ms. Leos needs refills is because she can only get 30 day supplies for both (Hydroxyplaquenil; and Methotrexate) medications.  Pt does not like to utilize mail order pharmacy which is the only way she can receive 90-day supply.    Taty Viera CMA  10/30/2018  11:43 AM

## 2018-10-30 NOTE — TELEPHONE ENCOUNTER
Rheumatology team: Please call to notify Ms. Leos that methotrexate has been refilled.  Merrick Del Cid MD  10/30/2018 12:32 PM

## 2018-10-30 NOTE — TELEPHONE ENCOUNTER
Rheumatology team: Please call to notify Ms. Leos that hydroxychloroquine has been refilled.    Merrick Del Cid MD  10/30/2018 12:32 PM

## 2018-12-03 DIAGNOSIS — M06.09 RHEUMATOID ARTHRITIS OF MULTIPLE SITES WITH NEGATIVE RHEUMATOID FACTOR (H): ICD-10-CM

## 2018-12-03 DIAGNOSIS — Z79.899 HIGH RISK MEDICATION USE: ICD-10-CM

## 2018-12-03 LAB
ALBUMIN SERPL-MCNC: 3.9 G/DL (ref 3.4–5)
ALP SERPL-CCNC: 78 U/L (ref 40–150)
ALT SERPL W P-5'-P-CCNC: 9 U/L (ref 0–50)
AST SERPL W P-5'-P-CCNC: 33 U/L (ref 0–45)
BASOPHILS # BLD AUTO: 0.1 10E9/L (ref 0–0.2)
BASOPHILS NFR BLD AUTO: 2.4 %
BILIRUB DIRECT SERPL-MCNC: 0.1 MG/DL (ref 0–0.2)
BILIRUB SERPL-MCNC: 0.4 MG/DL (ref 0.2–1.3)
CREAT SERPL-MCNC: 0.43 MG/DL (ref 0.52–1.04)
CRP SERPL-MCNC: <2.9 MG/L (ref 0–8)
DIFFERENTIAL METHOD BLD: ABNORMAL
EOSINOPHIL # BLD AUTO: 0.1 10E9/L (ref 0–0.7)
EOSINOPHIL NFR BLD AUTO: 2.4 %
ERYTHROCYTE [DISTWIDTH] IN BLOOD BY AUTOMATED COUNT: 15.6 % (ref 10–15)
ERYTHROCYTE [SEDIMENTATION RATE] IN BLOOD BY WESTERGREN METHOD: 14 MM/H (ref 0–30)
GFR SERPL CREATININE-BSD FRML MDRD: >90 ML/MIN/1.7M2
HCT VFR BLD AUTO: 35 % (ref 35–47)
HGB BLD-MCNC: 11.5 G/DL (ref 11.7–15.7)
LYMPHOCYTES # BLD AUTO: 1.2 10E9/L (ref 0.8–5.3)
LYMPHOCYTES NFR BLD AUTO: 25 %
MCH RBC QN AUTO: 30.8 PG (ref 26.5–33)
MCHC RBC AUTO-ENTMCNC: 32.9 G/DL (ref 31.5–36.5)
MCV RBC AUTO: 94 FL (ref 78–100)
MONOCYTES # BLD AUTO: 0.6 10E9/L (ref 0–1.3)
MONOCYTES NFR BLD AUTO: 11.8 %
NEUTROPHILS # BLD AUTO: 2.9 10E9/L (ref 1.6–8.3)
NEUTROPHILS NFR BLD AUTO: 58.4 %
PLATELET # BLD AUTO: 192 10E9/L (ref 150–450)
PROT SERPL-MCNC: 7.1 G/DL (ref 6.8–8.8)
RBC # BLD AUTO: 3.73 10E12/L (ref 3.8–5.2)
WBC # BLD AUTO: 4.9 10E9/L (ref 4–11)

## 2018-12-03 PROCEDURE — 36415 COLL VENOUS BLD VENIPUNCTURE: CPT | Performed by: INTERNAL MEDICINE

## 2018-12-03 PROCEDURE — 85652 RBC SED RATE AUTOMATED: CPT | Performed by: INTERNAL MEDICINE

## 2018-12-03 PROCEDURE — 86140 C-REACTIVE PROTEIN: CPT | Performed by: INTERNAL MEDICINE

## 2018-12-03 PROCEDURE — 85025 COMPLETE CBC W/AUTO DIFF WBC: CPT | Performed by: INTERNAL MEDICINE

## 2018-12-03 PROCEDURE — 82565 ASSAY OF CREATININE: CPT | Performed by: INTERNAL MEDICINE

## 2018-12-03 PROCEDURE — 80076 HEPATIC FUNCTION PANEL: CPT | Performed by: INTERNAL MEDICINE

## 2018-12-05 NOTE — PROGRESS NOTES
Rheumatology team: Please call to inform Ms. Leos that her labs do not show evidence for medication toxicity.  Mild stable anemia persists.    Merrick Del Cid MD  12/5/2018 12:16 AM
26-Apr-2017 15:56

## 2018-12-06 ENCOUNTER — OFFICE VISIT (OUTPATIENT)
Dept: OPHTHALMOLOGY | Facility: CLINIC | Age: 78
End: 2018-12-06
Payer: MEDICARE

## 2018-12-06 DIAGNOSIS — M06.09 RHEUMATOID ARTHRITIS OF MULTIPLE SITES WITH NEGATIVE RHEUMATOID FACTOR (H): ICD-10-CM

## 2018-12-06 DIAGNOSIS — Z79.899 HIGH RISK MEDICATION USE: Primary | ICD-10-CM

## 2018-12-06 PROCEDURE — 92083 EXTENDED VISUAL FIELD XM: CPT | Performed by: STUDENT IN AN ORGANIZED HEALTH CARE EDUCATION/TRAINING PROGRAM

## 2018-12-06 PROCEDURE — 92134 CPTRZ OPH DX IMG PST SGM RTA: CPT | Performed by: STUDENT IN AN ORGANIZED HEALTH CARE EDUCATION/TRAINING PROGRAM

## 2018-12-06 PROCEDURE — 92012 INTRM OPH EXAM EST PATIENT: CPT | Performed by: STUDENT IN AN ORGANIZED HEALTH CARE EDUCATION/TRAINING PROGRAM

## 2018-12-06 ASSESSMENT — VISUAL ACUITY
METHOD: SNELLEN - LINEAR
CORRECTION_TYPE: GLASSES
OS_CC: 20/30
OD_CC: 20/60

## 2018-12-06 ASSESSMENT — REFRACTION_MANIFEST
OD_SPHERE: +2.25
OD_CYLINDER: SPHERE

## 2018-12-06 ASSESSMENT — EXTERNAL EXAM - RIGHT EYE: OD_EXAM: NORMAL

## 2018-12-06 ASSESSMENT — CUP TO DISC RATIO
OS_RATIO: 0.4 UD
OD_RATIO: 0.5 UD

## 2018-12-06 ASSESSMENT — SLIT LAMP EXAM - LIDS
COMMENTS: NORMAL
COMMENTS: NORMAL

## 2018-12-06 ASSESSMENT — EXTERNAL EXAM - LEFT EYE: OS_EXAM: NORMAL

## 2018-12-06 NOTE — LETTER
12/6/2018         RE: Alannah Leos  [address] [address] [facility] [place] [address]        Dear Colleague,    Thank you for referring your patient, Alannah Leos, to the [facility].     Her Plaquenil eye tests were normal today. Plan to repeat tests in 1 year.  Please see a copy of my visit note below.     Current Eye Medications:  Plaquenil     Subjective:  HVF Plaq/OCT Mac   No significant change in vision or eyes since last visit here.       Objective:  See Ophthalmology Exam.       Assessment:  Alannah Leos is a 78 year old female who presents with:   Encounter Diagnoses   Name Primary?     High risk medication use Plaquenil started March 2018.    Macular SD-OCT within normal limits both eyes .  [place] visual field (HVF) 10-2 within normal limits both eyes (some mild scattered loss inf right eye - 1st test).    No signs of Plaquenil retinal toxicity at present.         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    Marcial Daigle MD  [phone]        Again, thank you for allowing me to participate in the care of your patient.        Sincerely,        Marcial Daigle MD

## 2018-12-06 NOTE — MR AVS SNAPSHOT
After Visit Summary   12/6/2018    Alannah Leos    MRN: [id number]           Patient Information     Date Of Birth          1940        Visit Information        Provider Department      12/6/2018 2:15 PM Marcial Daigle MD [facility]        Today's Diagnoses     High risk medication use    -  1    Rheumatoid arthritis of multiple sites with negative rheumatoid factor (H)          Care Instructions    Normal Plaquenil eye tests today.    Marcial Daigle MD  [phone]            Follow-ups after your visit        Follow-up notes from your care team     Return in about 6 months (around 6/6/2019) for Complete Exam.      Your next 10 appointments already scheduled     Dec 13, 2018  2:00 PM CST   [facility]   [facility]    [address] [place] [id number] [phone]            Jan 23, 2019  2:40 PM CST   Return Visit with Merrick Del Cid MD   [facility] ([address] [id number] [phone]            Apr 19, 2019  2:00 PM CDT   Return Visit with Ricardo Johnson MD   [facility]    [address] [place] [id number] [phone]              Who to contact     If you have questions or need follow up information about today's clinic visit or your schedule please contact [facility] directly at [phone].  Normal or non-critical lab and imaging results will be communicated to you by MyChart, letter or phone within 4 business days after the clinic has received the results. If you do not hear from us within 7 days, please contact the clinic through MyChart or phone. If you have a critical or abnormal lab result, we will notify you by phone as soon as possible.  Submit refill requests through [facility] or call your pharmacy and they will forward the  refill request to us. Please allow 3 business days for your refill to be completed.          Additional Information About Your Visit        Care EveryWhere ID     This is your Care EveryWhere ID. This could be used by other organizations to access your [facility] medical records  [id number]         Blood Pressure from Last 3 Encounters:   10/19/18 141/71   09/05/18 128/64   05/08/18 132/66    Weight from Last 3 Encounters:   10/19/18 51.6 kg (113 lb 12.8 oz)   09/05/18 51.7 kg (114 lb)   05/08/18 52.3 kg (115 lb 6.4 oz)              We Performed the Following     HC COMPUTERIZED OPHTHALMIC IMAGING RETINA     VISUAL FIELDS EXAM (EXTENDED)        Primary Care Provider Office Phone # Fax #    Leah Shelby Blanca [phone] [address] [place]  [facility] [address]        Equal Access to Services     [facility]: Hadii aad ku hadasho Soomaali, waaxda luqadaha, qaybta kaalmada adeegyada, waxay idiin hayaan alissa kessler . So [facility] [phone].    ATENCIÓN: Si habla español, tiene a mahan disposición servicios gratuitos de asistencia lingüística. Llame al [phone].    We comply with applicable federal civil rights laws and [place] laws. We do not discriminate on the basis of race, color, national origin, age, disability, sex, sexual orientation, or gender identity.            Thank you!     Thank you for choosing [facility]  for your care. Our goal is always to provide you with excellent care. Hearing back from our patients is one way we can continue to improve our services. Please take a few minutes to complete the written survey that you may receive in the mail after your visit with us. Thank you!             Your Updated Medication List - Protect others around you: Learn how to safely use, store and throw away your medicines at www.disposemymeds.org.          This list is accurate as of 12/6/18  3:21 PM.  Always use your most recent med list.                   Brand  Name Dispense Instructions for use Diagnosis    Abatacept 125 MG/ML Soaj auto-injector    ORENCIA CLICKJECT    4 Syringe    Inject 1 mL (125 mg) Subcutaneous every 7 days    Rheumatoid arthritis of multiple sites with negative rheumatoid factor (H)       ACE NOT PRESCRIBED (INTENTIONAL)     0 each    1 each continuous prn. ACE Inhibitor not prescribed due to Refusal by patient    Type 2 diabetes, HbA1c goal < 7% (H)       ammonium lactate 12 % external cream    AMLACTIN    385 g    Apply once to twice a day to dry skin. Can burn temporarily if there is a cut on the skin.    Xerosis of skin       aspirin 81 MG tablet    ASA     PATIENT REPORTS TAKING 4 TIMES PER WEEK AND NOT EVERYDAY        augmented betamethasone dipropionate 0.05 % external lotion    DIPROLENE    60 mL    Apply daily on itchy areas of the scalp as needed, on days not using the shampoo only when flaring    Dermatitis, seborrheic, H/O Parkinson's disease       CALCIUM + D PO      600mg twice a day        * carbidopa-levodopa  MG tablet    SINEMET    900 tablet    2 tablets at 6 am. 2 tablets at Noon. 2 tablets at 5PM. 2 Tablet at 9 PM. 2 tablet at Midnight.    Parkinson disease (H)       * carbidopa-levodopa  MG tablet    SINEMET    100 tablet    Take one at 6 am, noon, 5 pm, 9 pm and midnight    Parkinson's disease (H)       ferrous sulfate 325 (65 Fe) MG tablet    FEROSUL    60 tablet    Take 1 tablet (325 mg) by mouth 2 times daily Stay on until seen back    Restless legs syndrome (RLS)       fluocinolone acetonide 0.01 % Sham     120 mL    Lather onto scalp, let sit for 5 min, then rinse off.Use for up to 1 week in a row for flares    Dermatitis, seborrheic       folic acid 1 MG tablet    FOLVITE    100 tablet    Take 1 tablet (1 mg) by mouth daily    Rheumatoid arthritis of multiple sites with negative rheumatoid factor (H), High risk medication use       hydroxychloroquine 200 MG tablet    PLAQUENIL    30 tablet    Take 1 tablet  (200 mg) by mouth daily    Rheumatoid arthritis of multiple sites with negative rheumatoid factor (H), High risk medication use       ibuprofen 200 MG tablet    ADVIL/MOTRIN     Take 1 tablet (200 mg) by mouth every 8 hours as needed for moderate pain    Rheumatoid arthritis of multiple sites with negative rheumatoid factor (H)       methotrexate sodium 2.5 MG Tabs     32 tablet    Take 8 tablets (20 mg) by mouth once a week . Take all 8 tablets on the same day of each week.    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 Read the directions carefully, and ask your doctor or other care provider to review them with you.

## 2018-12-06 NOTE — PROGRESS NOTES
Current Eye Medications:  Plaquenil     Subjective:  HVF Plaq/OCT Mac   No significant change in vision or eyes since last visit here.       Objective:  See Ophthalmology Exam.       Assessment:  Alannah Leos is a 78 year old female who presents with:   Encounter Diagnoses   Name Primary?     High risk medication use Plaquenil started March 2018.    Macular SD-OCT within normal limits both eyes .  [place] visual field (HVF) 10-2 within normal limits both eyes (some mild scattered loss inf right eye - 1st test).    No signs of Plaquenil retinal toxicity at present.         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    Marcial Daigle MD  [phone]

## 2018-12-07 ENCOUNTER — TELEPHONE (OUTPATIENT)
Dept: NEUROLOGY | Facility: CLINIC | Age: 78
End: 2018-12-07

## 2018-12-07 DIAGNOSIS — G20.A1 PARKINSON'S DISEASE (H): ICD-10-CM

## 2018-12-07 RX ORDER — CARBIDOPA/LEVODOPA 25MG-250MG
TABLET ORAL
Qty: 450 TABLET | Refills: 1 | Status: SHIPPED | OUTPATIENT
Start: 2018-12-07 | End: 2019-04-19

## 2018-12-07 NOTE — TELEPHONE ENCOUNTER
[facility] Call Center    Phone Message  carbidopa-levodopa (SINEMET)  MG per tablet  - patient was supposed to call after change of dose, she would like to talk about that, she is almost out of medication - Patient would like a call this morning is possible      [facility] [employer] [address] [facility] [address] [facility] [address] [employer] [address]        May a detailed message be left on voicemail: yes    Reason for Call: Other: carbidopa-levodopa (SINEMET)  MG per tablet  - patient was supposed to call after change of dose, she would like to talk about that, she is almost out of medication -      Action Taken: Message routed to:  Adult Clinics: Neurology p [phone]

## 2018-12-07 NOTE — TELEPHONE ENCOUNTER
"Spoke with Alannah who reports the 25/250 dose of Sinemet is going well- she is taking it 5x/day and reports she is experiencing more \"on\" time and likes that she only has to take 1 pill vs. 2. She is requesting a refill. Will queue up a refill and send to Dr. Johnson to review. Yelitza Warren, RN    "

## 2018-12-13 ENCOUNTER — OFFICE VISIT (OUTPATIENT)
Dept: DERMATOLOGY | Facility: CLINIC | Age: 78
End: 2018-12-13
Payer: MEDICARE

## 2018-12-13 DIAGNOSIS — L70.0 COMEDONE: ICD-10-CM

## 2018-12-13 DIAGNOSIS — G20.A1 PARKINSON'S DISEASE (H): ICD-10-CM

## 2018-12-13 DIAGNOSIS — L21.9 DERMATITIS, SEBORRHEIC: ICD-10-CM

## 2018-12-13 DIAGNOSIS — L82.1 SEBORRHEIC KERATOSIS: Primary | ICD-10-CM

## 2018-12-13 PROCEDURE — 99213 OFFICE O/P EST LOW 20 MIN: CPT | Performed by: DERMATOLOGY

## 2018-12-13 ASSESSMENT — PAIN SCALES - GENERAL: PAINLEVEL: NO PAIN (0)

## 2018-12-13 NOTE — NURSING NOTE
Alannah Leos's goals for this visit include:   Chief Complaint   Patient presents with     Derm Problem     Alannah is visiting for a full body skin check       She requests these members of her care team be copied on today's visit information:     PCP: Leah Blanca    Referring Provider:  No referring provider defined for this encounter.    There were no vitals taken for this visit.    Do you need any medication refills at today's visit? No  Susannah Harvey LPN

## 2018-12-13 NOTE — LETTER
12/13/2018         RE: Alannah Leos  [id number] [facility] [address]        Dear Colleague,    Thank you for referring your patient, Alannah Leos, to the [facility]. Please see a copy of my visit note below.    [facility] Dermatology Note      Dermatology Problem List:  1. H/O Parkinson's Disease  2. NUB, right upper back, shave bx 7/3/2017  3. AK, cryo  4. Seborrheic dermatitis: scalp.  -ketoconazole 2% shampoo 2x week, betamethasone valerate 0.1% lotion    Encounter Date: Dec 13, 2018    CC:  Chief Complaint   Patient presents with     Derm Problem     Alannah is visiting for a full body skin check         History of Present Illness:  Ms. Alannah Leos is a 78 year old female who presents as a follow-up for scalp and eyebrows. The patient was last seen 12/28/17 when she started betamethasone lotion and synalar shampoo for seborrheic dermatitis. Today, the patient reports that these topicals did not help. She is now alternating Selsun Blue with her own personal shampoo, and this seems to help. Other than the scalp, she has no new bleeding, crusting, or changing lesions.     Past Medical History:   Patient Active Problem List   Diagnosis     Osteoporosis     Rheumatoid arthritis (H)     Parkinson disease (H)     Osteoarthritis of wrist     Osteoarthritis of shoulder     History of total knee arthroplasty     Osteoarthritis of left knee     Advanced directives, counseling/discussion     CARDIOVASCULAR SCREENING; LDL GOAL LESS THAN 160     High risk medication use     Underweight     Impingement syndrome, shoulder, left     Combined forms of age-related cataract of both eyes     Past Medical History:   Diagnosis Date     Hyperlipidemia      Murmur, heart     hsm     Nonsenile cataract      Osteoarthrosis, unspecified whether generalized or localized, lower leg      Osteopenia      Rheumatoid arthritis(714.0)      Past Surgical History:   Procedure Laterality Date      GALLBLADDER SURGERY       HYSTERECTOMY       KNEE SURGERY      left      LAMINECTOMY LUMBAR ONE LEVEL       TONSILLECTOMY         Social History:  The patient is retired.  The patient denies use of tanning beds.  Patient denies drinking  Kept in chart for convenience.       Family History:  There is no family history of skin cancer.  There is a family history of cardiovascular disease.  Kept in chart for convenience.       Medications:  Current Outpatient Medications   Medication Sig Dispense Refill     ACE NOT PRESCRIBED, INTENTIONAL, 1 each continuous prn. ACE Inhibitor not prescribed due to Refusal by patient       0 each 0     ammonium lactate (AMLACTIN) 12 % cream Apply once to twice a day to dry skin. Can burn temporarily if there is a cut on the skin. 385 g 11     ASPIRIN 81 MG PO TABS PATIENT REPORTS TAKING 4 TIMES PER WEEK AND NOT EVERYDAY       CALCIUM + D OR 600mg twice a day        carbidopa-levodopa (SINEMET)  MG tablet Take one at 6 am, noon, 5 pm, 9 pm and midnight 450 tablet 1     folic acid (FOLVITE) 1 MG tablet Take 1 tablet (1 mg) by mouth daily 100 tablet 3     hydroxychloroquine (PLAQUENIL) 200 MG tablet Take 1 tablet (200 mg) by mouth daily 30 tablet 6     ibuprofen (ADVIL/MOTRIN) 200 MG tablet Take 1 tablet (200 mg) by mouth every 8 hours as needed for moderate pain       methotrexate sodium 2.5 MG TABS Take 8 tablets (20 mg) by mouth once a week . Take all 8 tablets on the same day of each week. 32 tablet 6     MULTI-VITAMIN OR 1 a day        Abatacept (ORENCIA CLICKJECT) 125 MG/ML SOAJ auto-injector Inject 1 mL (125 mg) Subcutaneous every 7 days (Patient not taking: Reported on 9/5/2018) 4 Syringe 3     betamethasone, augmented, (DIPROLENE) 0.05 % lotion Apply daily on itchy areas of the scalp as needed, on days not using the shampoo only when flaring (Patient not taking: Reported on 9/5/2018) 60 mL 6     ferrous sulfate (IRON) 325 (65 Fe) MG tablet Take 1 tablet (325 mg) by mouth  2 times daily Stay on until seen back (Patient not taking: Reported on 12/13/2018) 60 tablet 2     fluocinolone acetonide 0.01 % SHAM Lather onto scalp, let sit for 5 min, then rinse off.Use for up to 1 week in a row for flares (Patient not taking: Reported on 9/5/2018) 120 mL 11       Allergies   Allergen Reactions     Decadrol [Dexamethasone Sodium Phosphate]      Shrimp        Review of Systems:  -As per HPI  -Constitutional: The patient denies fatigue, fevers, chills, unintended weight loss, and night sweats.  -Skin: As above in HPI. No additional skin concerns.    Physical exam:  Vitals: There were no vitals taken for this visit.  GEN: This is a well developed, well-nourished male in no acute distress, in a pleasant mood.    SKIN: Total skin excluding the undergarment areas was performed. The exam included the head/face, neck, both arms, chest, back, abdomen, both legs, digits and/or nails. Exam included the left groin  - Scalp is clear today.   - Comedone on the left groin  - There are waxy stuck on tan to brown papules on the back.  -No other lesions of concern on areas examined.       Impression/Plan:  1. History of Parkinson's Disease with increased risk of Melanoma    Sun precaution was advised  Last total skin exam J12/2018  2. Seborrheic dermatitis in the setting of Parkinson's Disease - not active today.     Continue Selsun Blue shampoo.     Declines topical steroids    3. Seborrheic keratosis, back    No further intervention needed.     4. Comedone, left groin    No further intervention needed.     Follow-up in 1 year       Staff Involved:  Scribe/Staff    Scribe Disclosure  I, Huan Luciano, am serving as a scribe to document services personally performed by Dr. Cori Ventura MD, based on data collection and the provider's statements to me.     Provider Disclosure:   The documentation recorded by the scribe accurately reflects the services I personally performed and the decisions made by me.    Cori  MD Lorenzo    Department of Dermatology  [facility]: Phone: [phone], Fax:[phone]  [facility]: Phone: [phone], Fax: [phone]          Again, thank you for allowing me to participate in the care of your patient.        Sincerely,        Cori Ventura MD

## 2018-12-13 NOTE — PROGRESS NOTES
[facility] Dermatology Note      Dermatology Problem List:  1. H/O Parkinson's Disease  2. NUB, right upper back, shave bx 7/3/2017  3. AK, cryo  4. Seborrheic dermatitis: scalp.  -ketoconazole 2% shampoo 2x week, betamethasone valerate 0.1% lotion    Encounter Date: Dec 13, 2018    CC:  Chief Complaint   Patient presents with     Derm Problem     Alannah is visiting for a full body skin check         History of Present Illness:  Ms. Alannah Leos is a 78 year old female who presents as a follow-up for scalp and eyebrows. The patient was last seen 12/28/17 when she started betamethasone lotion and synalar shampoo for seborrheic dermatitis. Today, the patient reports that these topicals did not help. She is now alternating Selsun Blue with her own personal shampoo, and this seems to help. Other than the scalp, she has no new bleeding, crusting, or changing lesions.     Past Medical History:   Patient Active Problem List   Diagnosis     Osteoporosis     Rheumatoid arthritis (H)     Parkinson disease (H)     Osteoarthritis of wrist     Osteoarthritis of shoulder     History of total knee arthroplasty     Osteoarthritis of left knee     Advanced directives, counseling/discussion     CARDIOVASCULAR SCREENING; LDL GOAL LESS THAN 160     High risk medication use     Underweight     Impingement syndrome, shoulder, left     Combined forms of age-related cataract of both eyes     Past Medical History:   Diagnosis Date     Hyperlipidemia      Murmur, heart     hsm     Nonsenile cataract      Osteoarthrosis, unspecified whether generalized or localized, lower leg      Osteopenia      Rheumatoid arthritis(714.0)      Past Surgical History:   Procedure Laterality Date     GALLBLADDER SURGERY       HYSTERECTOMY       KNEE SURGERY      left      LAMINECTOMY LUMBAR ONE LEVEL       TONSILLECTOMY         Social History:  The patient is retired.  The patient denies use of tanning beds.  Patient denies drinking  Kept  in chart for convenience.       Family History:  There is no family history of skin cancer.  There is a family history of cardiovascular disease.  Kept in chart for convenience.       Medications:  Current Outpatient Medications   Medication Sig Dispense Refill     ACE NOT PRESCRIBED, INTENTIONAL, 1 each continuous prn. ACE Inhibitor not prescribed due to Refusal by patient       0 each 0     ammonium lactate (AMLACTIN) 12 % cream Apply once to twice a day to dry skin. Can burn temporarily if there is a cut on the skin. 385 g 11     ASPIRIN 81 MG PO TABS PATIENT REPORTS TAKING 4 TIMES PER WEEK AND NOT EVERYDAY       CALCIUM + D OR 600mg twice a day        carbidopa-levodopa (SINEMET)  MG tablet Take one at 6 am, noon, 5 pm, 9 pm and midnight 450 tablet 1     folic acid (FOLVITE) 1 MG tablet Take 1 tablet (1 mg) by mouth daily 100 tablet 3     hydroxychloroquine (PLAQUENIL) 200 MG tablet Take 1 tablet (200 mg) by mouth daily 30 tablet 6     ibuprofen (ADVIL/MOTRIN) 200 MG tablet Take 1 tablet (200 mg) by mouth every 8 hours as needed for moderate pain       methotrexate sodium 2.5 MG TABS Take 8 tablets (20 mg) by mouth once a week . Take all 8 tablets on the same day of each week. 32 tablet 6     MULTI-VITAMIN OR 1 a day        Abatacept (ORENCIA CLICKJECT) 125 MG/ML SOAJ auto-injector Inject 1 mL (125 mg) Subcutaneous every 7 days (Patient not taking: Reported on 9/5/2018) 4 Syringe 3     betamethasone, augmented, (DIPROLENE) 0.05 % lotion Apply daily on itchy areas of the scalp as needed, on days not using the shampoo only when flaring (Patient not taking: Reported on 9/5/2018) 60 mL 6     ferrous sulfate (IRON) 325 (65 Fe) MG tablet Take 1 tablet (325 mg) by mouth 2 times daily Stay on until seen back (Patient not taking: Reported on 12/13/2018) 60 tablet 2     fluocinolone acetonide 0.01 % SHAM Lather onto scalp, let sit for 5 min, then rinse off.Use for up to 1 week in a row for flares (Patient not  taking: Reported on 9/5/2018) 120 mL 11       Allergies   Allergen Reactions     Decadrol [Dexamethasone Sodium Phosphate]      Shrimp        Review of Systems:  -As per HPI  -Constitutional: The patient denies fatigue, fevers, chills, unintended weight loss, and night sweats.  -Skin: As above in HPI. No additional skin concerns.    Physical exam:  Vitals: There were no vitals taken for this visit.  GEN: This is a well developed, well-nourished male in no acute distress, in a pleasant mood.    SKIN: Total skin excluding the undergarment areas was performed. The exam included the head/face, neck, both arms, chest, back, abdomen, both legs, digits and/or nails. Exam included the left groin  - Scalp is clear today.   - Comedone on the left groin  - There are waxy stuck on tan to brown papules on the back.  -No other lesions of concern on areas examined.       Impression/Plan:  1. History of Parkinson's Disease with increased risk of Melanoma    Sun precaution was advised  Last total skin exam J12/2018  2. Seborrheic dermatitis in the setting of Parkinson's Disease - not active today.     Continue Selsun Blue shampoo.     Declines topical steroids    3. Seborrheic keratosis, back    No further intervention needed.     4. Comedone, left groin    No further intervention needed.     Follow-up in 1 year       Staff Involved:  Scribe/Staff    Scribe Disclosure  I, Huan Luciano, am serving as a scribe to document services personally performed by Dr. Cori Ventura MD, based on data collection and the provider's statements to me.     Provider Disclosure:   The documentation recorded by the scribe accurately reflects the services I personally performed and the decisions made by me.    Cori Ventura MD    Department of Dermatology  [facility]: Phone: [phone], Fax:[phone]  [facility]: Phone:  [phone], Fax: [phone]

## 2019-01-23 ENCOUNTER — OFFICE VISIT (OUTPATIENT)
Dept: RHEUMATOLOGY | Facility: CLINIC | Age: 79
End: 2019-01-23
Payer: MEDICARE

## 2019-01-23 VITALS
DIASTOLIC BLOOD PRESSURE: 67 MMHG | SYSTOLIC BLOOD PRESSURE: 132 MMHG | OXYGEN SATURATION: 99 % | BODY MASS INDEX: 20.05 KG/M2 | HEART RATE: 87 BPM | WEIGHT: 115 LBS

## 2019-01-23 DIAGNOSIS — M06.09 RHEUMATOID ARTHRITIS OF MULTIPLE SITES WITH NEGATIVE RHEUMATOID FACTOR (H): Primary | ICD-10-CM

## 2019-01-23 DIAGNOSIS — Z79.899 HIGH RISK MEDICATION USE: ICD-10-CM

## 2019-01-23 DIAGNOSIS — M26.609 TMJ (TEMPOROMANDIBULAR JOINT SYNDROME): ICD-10-CM

## 2019-01-23 PROCEDURE — 99214 OFFICE O/P EST MOD 30 MIN: CPT | Performed by: INTERNAL MEDICINE

## 2019-01-23 RX ORDER — HYDROXYCHLOROQUINE SULFATE 200 MG/1
200 TABLET, FILM COATED ORAL DAILY
Qty: 30 TABLET | Refills: 12 | Status: SHIPPED | OUTPATIENT
Start: 2019-01-23 | End: 2019-07-17

## 2019-01-23 RX ORDER — FOLIC ACID 1 MG/1
1 TABLET ORAL DAILY
Qty: 100 TABLET | Refills: 3 | Status: SHIPPED | OUTPATIENT
Start: 2019-01-23 | End: 2019-07-17

## 2019-01-23 RX ORDER — METHOTREXATE 2.5 MG/1
20 TABLET ORAL WEEKLY
Qty: 32 TABLET | Refills: 7 | Status: SHIPPED | OUTPATIENT
Start: 2019-01-23 | End: 2019-07-17

## 2019-01-23 NOTE — PROGRESS NOTES
RAPID3 (0-30) Cumulative Score  16.3          RAPID3 Weighted Score (divide #4 by 3 and that is the weighted score)  5.6

## 2019-01-23 NOTE — PROGRESS NOTES
Rheumatology Clinic Visit      Alannah Leos MRN# [id number]   YOB: 1940 Age: 78 year old      Date of visit: 1/23/19   PCP: Dr. Leah Blanca    Chief Complaint   Patient presents with:  Arthritis: RA; has good and bad days; she has stiffness everywhere on bad days; shoulder pain and stiffness is the worst      Assessment and Plan     1. Seronegative erosive rheumatoid arthritis: Diagnosed in the 1970s. Previously on methotrexate when first diagnosed (unclear if ineffective or not, stopped by patient preference to not treat her RA at that time).  Synovitis, subluxation, and fixed flexion deformities on initial exam. Steroid responsive. Also with ankylosis of the cervical spine and erosive changes seen on x-rays. Currently on methotrexate 20 mg once weekly, and hydroxychloroquine 200 mg daily. Previously on SSZ (felt poorly on it).  She has improved with conventional DMARDs but would benefit from a biologic DMARD; preferentially Orencia because of the positive PIERO and dsDNA in the past; in the past it was prescribed and then the patient decided to not use it; then IV was attempted but was cost prohibitive; given the financial situation she is in she might qualify from free medication from the  and it appeared that she was working towards this well but decided that she did not want to use Orencia anymore so she never started.  Discussed bDMARDs again and she says that she will give it more thought. She is resistant to escalating therapy.  - Continue methotrexate 20 mg once weekly   - Continue folic acid 1mg daily  - Continue hydroxychloroquine 200mg daily (normal eye exam on 12/7/2018)  - Orencia SQ -see above  - Labs in 3 months: CBC, Creatinine, Hepatic Panel  - Labs in 6 months: CBC, Creatinine, Hepatic Panel, ESR, CRP      2. Positive PIERO and dsDNA: These were noted on record review. She does not have symptoms to support an PIERO-associated rheumatologic disease at this time.     3.  "Neck pain in the setting of rheumatoid arthritis: No evidence of instability by x-rays on 10/12/2016.  Seen by neurosurgery but reportedly the procedure she would need is very intense so she does not want to do anything right now.    4. Parkinson's Disease: Followed by Dr. Ricardo Johnson at the [facility].     5. Osteoporosis: Based on 1/29/2018 DEXA.  Was on fosamax from [id number], but no f/u DEXA since 2011.  We discussed osteoporosis and the treatment of osteoporosis.  We reviewed the risks of treatment and the risks of not treating.  At this time she prefers to not treat.    6. TMJ: trouble with speaking and eating.  Refer to ENT for guidance.  - ENT referral    Ms. Leos verbalized agreement with and understanding of the rational for the diagnosis and treatment plan.  All questions were answered to best of my ability and the patient's satisfaction. Ms. Leos was advised to contact the clinic with any questions that may arise after the clinic visit.      Thank you for involving me in the care of the patient    Return to clinic: 6 months      HPI   Alannah Leos is a 78 year old female with a medical history significant for Parkinson's disease, osteoarthritis, status post TKA, osteoporosis, and rheumatoid arthritis who presents for follow-up of rheumatoid arthritis.    Previously, Ms. Leos reported that she was diagnosed with rheumatoid arthritis in the past but did not want to take toxic medications and did not want to keep taking prednisone because of potential toxicities. Therefore, she decided that she would \"tough out\" her symptoms and try to enjoy her life. Then, more recently she was diagnosed with Parkinson's disease that has been significantly affecting her quality of life. She is treated for the Parkinson's disease and she believes that the treatment is helping. However, she is also having worsening joint pain and stiffness. Morning stiffness lasts for all day. All of her joints " "hurt, including her bilateral shoulders, neck, spine, hands, wrists, elbows, hips, knees, ankles, and feet. She tells me that she is unable to raise her arms without assistance from the contralateral arm, if she is able to get that hand over to the other side. Mainly, her right shoulder is affected. She tells me that she has a little more mobility in her left shoulder. She tells me that her entire spine is fused, and that it \"occurred naturally.\"  Overall, she tells me that she feels miserable and is willing to start treatment, but is still scared of most of the medication side effects.    Today, she reports that she has more swan neck deformities and now also TMJ.  No dental insurance; and she says her dentist isn't doing anything for it. Still considering orencia but does not want to start it just yet.  Tolerating her other medications well.      Denies fevers, chills, nausea, vomiting, constipation, diarrhea. No abdominal pain. No chest pain/pressure, palpitations, or shortness of breath.   No oral or nasal sores.  No rash. No sicca symptoms.      Tobacco: none  EtOH: 1 drink at [place] only  Drugs: none    ROS   GEN: No fevers, chills, night sweats.    SKIN: No itching, rashes, sores  HEENT: No oral or nasal ulcers.  CV: No chest pain, pressure, palpitations, or dyspnea on exertion.  PULM: No SOB, wheeze, cough.  GI: No nausea, vomiting, constipation, diarrhea. No blood in stool. No abdominal pain.  : No blood in urine.  MSK: See HPI.  NEURO: No numbness or tingling  EXT: See history of present illness  PSYCH: Negative    Active Problem List     Patient Active Problem List   Diagnosis     Osteoporosis     Rheumatoid arthritis (H)     Parkinson disease (H)     Osteoarthritis of wrist     Osteoarthritis of shoulder     History of total knee arthroplasty     Osteoarthritis of left knee     Advanced directives, counseling/discussion     CARDIOVASCULAR SCREENING; LDL GOAL LESS THAN 160     High risk medication " use     Underweight     Impingement syndrome, shoulder, left     Combined forms of age-related cataract of both eyes     Past Medical History     Past Medical History:   Diagnosis Date     Hyperlipidemia      Murmur, heart     hsm     Nonsenile cataract      Osteoarthrosis, unspecified whether generalized or localized, lower leg      Osteopenia      Rheumatoid arthritis(714.0)      Past Surgical History     Past Surgical History:   Procedure Laterality Date     GALLBLADDER SURGERY       HYSTERECTOMY       KNEE SURGERY      left      LAMINECTOMY LUMBAR ONE LEVEL       TONSILLECTOMY       Allergy     Allergies   Allergen Reactions     Decadrol [Dexamethasone Sodium Phosphate]      Shrimp      Current Medication List     Current Outpatient Medications   Medication Sig     ACE NOT PRESCRIBED, INTENTIONAL, 1 each continuous prn. ACE Inhibitor not prescribed due to Refusal by patient           ammonium lactate (AMLACTIN) 12 % cream Apply once to twice a day to dry skin. Can burn temporarily if there is a cut on the skin.     ASPIRIN 81 MG PO TABS PATIENT REPORTS TAKING 4 TIMES PER WEEK AND NOT EVERYDAY     CALCIUM + D OR 600mg twice a day      carbidopa-levodopa (SINEMET)  MG tablet Take one at 6 am, noon, 5 pm, 9 pm and midnight     folic acid (FOLVITE) 1 MG tablet Take 1 tablet (1 mg) by mouth daily     hydroxychloroquine (PLAQUENIL) 200 MG tablet Take 1 tablet (200 mg) by mouth daily     ibuprofen (ADVIL/MOTRIN) 200 MG tablet Take 1 tablet (200 mg) by mouth every 8 hours as needed for moderate pain     methotrexate sodium 2.5 MG TABS Take 8 tablets (20 mg) by mouth once a week . Take all 8 tablets on the same day of each week.     MULTI-VITAMIN OR 1 a day      Abatacept (ORENCIA CLICKJECT) 125 MG/ML SOAJ auto-injector Inject 1 mL (125 mg) Subcutaneous every 7 days (Patient not taking: Reported on 1/23/2019)     ferrous sulfate (IRON) 325 (65 Fe) MG tablet Take 1 tablet (325 mg) by mouth 2 times daily Stay on  "until seen back (Patient not taking: Reported on 12/13/2018)     No current facility-administered medications for this visit.          Social History   See HPI    Family History     Family History   Problem Relation Age of Onset     Cancer Sister         pancreatic       Physical Exam     Temp Readings from Last 3 Encounters:   09/05/18 97.5  F (36.4  C) (Oral)   03/12/18 97.8  F (36.6  C) (Oral)   01/25/18 96.5  F (35.8  C) (Oral)     BP Readings from Last 5 Encounters:   01/23/19 132/67   10/19/18 141/71   09/05/18 128/64   05/08/18 132/66   05/02/18 131/72     Pulse Readings from Last 1 Encounters:   01/23/19 87     Resp Readings from Last 1 Encounters:   09/05/18 18     Estimated body mass index is 20.05 kg/m  as calculated from the following:    Height as of 9/5/18: 1.613 m (5' 3.5\").    Weight as of this encounter: 52.2 kg (115 lb).    GEN: NAD, thin and frail appearing  HEENT: MMM. No oral lesions. Anicteric, noninjected sclera  CV: S1, S2. RRR. No m/r/g.  PULM: CTA bilaterally. No w/c.  MSK: MCPs and PIPs without swelling or tenderness to palpation.  Right wrist with swelling and tenderness to palpation. Left wrist without swelling or tenderness to palpation.  [place]-neck deformity of the left third and fifth fingers.  Elbows without swelling or tenderness to palpation.  Bilateral shoulders diffusely tenderness to palpation and the left shoulder appears to hang low; left shoulder painful with abduction above 90 degrees.  Knees, ankles, and MTPs without swelling or tenderness to palpation.  Holds neck in a neutral position.    NEURO: UE and LE strengths 5/5 and equal bilaterally.   SKIN: No rash. Diffuse scalp hair thinning.  EXT: No lower extremity edema  PSYCH: Alert. Appropriate.    Labs / Imaging (select studies)   RF/CCP  Recent Labs   Lab Test 10/12/16  1142 07/30/13  1621 10/25/12  1127   CCPABY  --  <20 Interpretation:  Negative  --    CCPIGG 2  --   --    RHF <20  --  9     CBC  Recent Labs   Lab Test " 12/03/18  1339 08/06/18  1333 04/30/18  1339   WBC 4.9 6.2 6.4   RBC 3.73* 3.61* 4.05   HGB 11.5* 11.1* 12.1   HCT 35.0 34.3* 37.3   MCV 94 95 92   RDW 15.6* 15.3* 16.4*    229 223   MCH 30.8 30.7 29.9   MCHC 32.9 32.4 32.4   NEUTROPHIL 58.4 65.1 59.9   LYMPH 25.0 24.2 28.8   MONOCYTE 11.8 8.4 8.4   EOSINOPHIL 2.4 1.0 1.7   BASOPHIL 2.4 1.3 1.2   ANEU 2.9 4.0 3.8   ALYM 1.2 1.5 1.9   DORITA 0.6 0.5 0.5   AEOS 0.1 0.1 0.1   ABAS 0.1 0.1 0.1     CMP  Recent Labs   Lab Test 12/03/18 1339 08/06/18 1333 04/30/18  1339 01/25/18  1353  05/27/16  1413 05/26/15  1416 04/28/14  1209   NA  --   --   --   --   --  136 137 136   POTASSIUM  --   --   --   --   --  3.7 4.0 4.2   CHLORIDE  --   --   --   --   --  102 103 98   CO2  --   --   --   --   --  25 28 27   ANIONGAP  --   --   --   --   --  9 6 11   GLC  --   --   --   --   --  94 100* 99   BUN  --   --   --   --   --  11 9 12   CR 0.43* 0.42* 0.43*  --    < > 0.41* 0.47* 0.52   GFRESTIMATED >90 >90 >90  --    < > >90  Non  GFR Calc   >90  Non  GFR Calc   >90   GFRESTBLACK >90 >90 >90  --    < > >90   GFR Calc   >90   GFR Calc   >90   PAKO  --   --   --  9.1  --  9.2 9.2 9.4   BILITOTAL 0.4 0.5 0.4  --    < > 0.5  --  0.5   ALBUMIN 3.9 4.1 4.2  --    < > 3.8  --  4.4   PROTTOTAL 7.1 7.8 7.8  --    < > 8.8  --  8.6   ALKPHOS 78 83 84  --    < > 108  --  119   AST 33 20 23  --    < > 15  --  27   ALT 9 13 9  --    < > 8  --  19    < > = values in this interval not displayed.     Calcium/VitaminD  Recent Labs   Lab Test 01/25/18  1353 05/27/16  1413 05/26/15  1416  04/26/13  1128  10/26/11  1113   PAKO 9.1 9.2 9.2   < > 9.2   < > 9.7   D3VIT  --   --   --   --   --   --  46   VITDT 39 95*  --   --  63  --   --     < > = values in this interval not displayed.     ESR/CRP  Recent Labs   Lab Test 12/03/18  1339 04/30/18  1339 01/22/18  1336   SED 14 17 31*   CRP <2.9 <2.9 7.4     Lipid Panel  Recent Labs   Lab  Test 05/26/15  1416 04/26/13  1128 04/26/12  1151   CHOL 179 154 159   TRIG 50 44 56   HDL 80 72 71   LDL 89 73 77   VLDL 10 9 11   CHOLHDLRATIO 2.2 2.1 2.2     Hepatitis B  Recent Labs   Lab Test 01/18/17  1601   HBCAB Nonreactive   HEPBANG Nonreactive     Hepatitis C  Recent Labs   Lab Test 01/18/17  1601   HCVAB Nonreactive   Assay performance characteristics have not been established for newborns,   infants, and children       Tuberculosis Screening  Recent Labs   Lab Test 01/25/18  1352 01/18/17  1601   TBRSLT Negative Negative   TBAGN 0.00 0.00     HIV Screening  Recent Labs   Lab Test 01/18/17  1601   HIAGAB Nonreactive   HIV-1 p24 Ag & HIV-1/HIV-2 Ab Not Detected       Immunization History     Immunization History   Administered Date(s) Administered     Pneumo Conj 13-V (2010&after) 11/03/2016     Pneumococcal 23 valent 11/01/2007, 11/13/2007, 03/12/2018     TD (ADULT, 7+) 11/13/2007     Tdap (Adacel,Boostrix) 11/13/2007          Chart documentation done in part with Dragon Voice recognition Software. Although reviewed after completion, some word and grammatical error may remain.    Merrick Del Cid MD

## 2019-04-15 DIAGNOSIS — M06.09 RHEUMATOID ARTHRITIS OF MULTIPLE SITES WITH NEGATIVE RHEUMATOID FACTOR (H): ICD-10-CM

## 2019-04-15 DIAGNOSIS — Z79.899 HIGH RISK MEDICATION USE: ICD-10-CM

## 2019-04-15 LAB
BASOPHILS # BLD AUTO: 0.2 10E9/L (ref 0–0.2)
BASOPHILS NFR BLD AUTO: 2.4 %
DIFFERENTIAL METHOD BLD: ABNORMAL
EOSINOPHIL # BLD AUTO: 0.1 10E9/L (ref 0–0.7)
EOSINOPHIL NFR BLD AUTO: 1.4 %
ERYTHROCYTE [DISTWIDTH] IN BLOOD BY AUTOMATED COUNT: 14.9 % (ref 10–15)
HCT VFR BLD AUTO: 35.3 % (ref 35–47)
HGB BLD-MCNC: 11.4 G/DL (ref 11.7–15.7)
LYMPHOCYTES # BLD AUTO: 1.6 10E9/L (ref 0.8–5.3)
LYMPHOCYTES NFR BLD AUTO: 24.6 %
MCH RBC QN AUTO: 31.6 PG (ref 26.5–33)
MCHC RBC AUTO-ENTMCNC: 32.3 G/DL (ref 31.5–36.5)
MCV RBC AUTO: 98 FL (ref 78–100)
MONOCYTES # BLD AUTO: 0.5 10E9/L (ref 0–1.3)
MONOCYTES NFR BLD AUTO: 8.3 %
NEUTROPHILS # BLD AUTO: 4 10E9/L (ref 1.6–8.3)
NEUTROPHILS NFR BLD AUTO: 63.3 %
PLATELET # BLD AUTO: 226 10E9/L (ref 150–450)
RBC # BLD AUTO: 3.61 10E12/L (ref 3.8–5.2)
WBC # BLD AUTO: 6.4 10E9/L (ref 4–11)

## 2019-04-15 PROCEDURE — 85025 COMPLETE CBC W/AUTO DIFF WBC: CPT | Performed by: INTERNAL MEDICINE

## 2019-04-15 PROCEDURE — 82565 ASSAY OF CREATININE: CPT | Performed by: INTERNAL MEDICINE

## 2019-04-15 PROCEDURE — 80076 HEPATIC FUNCTION PANEL: CPT | Performed by: INTERNAL MEDICINE

## 2019-04-15 PROCEDURE — 36415 COLL VENOUS BLD VENIPUNCTURE: CPT | Performed by: INTERNAL MEDICINE

## 2019-04-15 NOTE — LETTER
[address] [place] [address]    April 18, 2019    Alannah Leos  [address] [facility] [id number]          Dear Alannah,    Your labs did not show evidence of medication toxicity     Please let me know if you have any questions    Enclosed is a copy of your results.     Results for orders placed or performed in visit on 04/15/19   Hepatic panel   Result Value Ref Range    Bilirubin Direct 0.1 0.0 - 0.2 mg/dL    Bilirubin Total 0.5 0.2 - 1.3 mg/dL    Albumin 4.1 3.4 - 5.0 g/dL    Protein Total 7.4 6.8 - 8.8 g/dL    Alkaline Phosphatase 67 40 - 150 U/L    ALT 10 0 - 50 U/L    AST 22 0 - 45 U/L   Creatinine   Result Value Ref Range    Creatinine 0.54 0.52 - 1.04 mg/dL    GFR Estimate >90 >60 mL/min/[1.73_m2]    GFR Estimate If Black >90 >60 mL/min/[1.73_m2]   CBC with platelets differential   Result Value Ref Range    WBC 6.4 4.0 - 11.0 10e9/L    RBC Count 3.61 (L) 3.8 - 5.2 10e12/L    Hemoglobin 11.4 (L) 11.7 - 15.7 g/dL    Hematocrit 35.3 35.0 - 47.0 %    MCV 98 78 - 100 fl    MCH 31.6 26.5 - 33.0 pg    MCHC 32.3 31.5 - 36.5 g/dL    RDW 14.9 10.0 - 15.0 %    Platelet Count 226 150 - 450 10e9/L    % Neutrophils 63.3 %    % Lymphocytes 24.6 %    % Monocytes 8.3 %    % Eosinophils 1.4 %    % Basophils 2.4 %    Absolute Neutrophil 4.0 1.6 - 8.3 10e9/L    Absolute Lymphocytes 1.6 0.8 - 5.3 10e9/L    Absolute Monocytes 0.5 0.0 - 1.3 10e9/L    Absolute Eosinophils 0.1 0.0 - 0.7 10e9/L    Absolute Basophils 0.2 0.0 - 0.2 10e9/L    Diff Method Automated Method        If you have any questions or concerns, please call myself or my nurse at [phone].      Sincerely,        Merrick Del Cid MD /bonilla

## 2019-04-16 LAB
ALBUMIN SERPL-MCNC: 4.1 G/DL (ref 3.4–5)
ALP SERPL-CCNC: 67 U/L (ref 40–150)
ALT SERPL W P-5'-P-CCNC: 10 U/L (ref 0–50)
AST SERPL W P-5'-P-CCNC: 22 U/L (ref 0–45)
BILIRUB DIRECT SERPL-MCNC: 0.1 MG/DL (ref 0–0.2)
BILIRUB SERPL-MCNC: 0.5 MG/DL (ref 0.2–1.3)
CREAT SERPL-MCNC: 0.54 MG/DL (ref 0.52–1.04)
GFR SERPL CREATININE-BSD FRML MDRD: >90 ML/MIN/{1.73_M2}
PROT SERPL-MCNC: 7.4 G/DL (ref 6.8–8.8)

## 2019-04-18 NOTE — RESULT ENCOUNTER NOTE
"Please mail Ms. Leos her results with the following message.    \"Ms. Leos,    Your labs did not show evidence of medication toxicity    Please let me know if you have any questions.    Sincerely,  Merrick Del Cid MD  4/17/2019 9:09 PM\"  "

## 2019-04-19 ENCOUNTER — OFFICE VISIT (OUTPATIENT)
Dept: NEUROLOGY | Facility: CLINIC | Age: 79
End: 2019-04-19
Payer: MEDICARE

## 2019-04-19 VITALS
BODY MASS INDEX: 19.53 KG/M2 | WEIGHT: 112 LBS | SYSTOLIC BLOOD PRESSURE: 145 MMHG | RESPIRATION RATE: 12 BRPM | DIASTOLIC BLOOD PRESSURE: 67 MMHG | OXYGEN SATURATION: 98 % | TEMPERATURE: 96.9 F | HEART RATE: 90 BPM

## 2019-04-19 DIAGNOSIS — G20.A1 PARKINSON'S DISEASE (H): ICD-10-CM

## 2019-04-19 DIAGNOSIS — G20.A1 PARKINSON DISEASE (H): Primary | ICD-10-CM

## 2019-04-19 PROCEDURE — 99214 OFFICE O/P EST MOD 30 MIN: CPT | Performed by: PSYCHIATRY & NEUROLOGY

## 2019-04-19 RX ORDER — CARBIDOPA/LEVODOPA 25MG-250MG
TABLET ORAL
Qty: 150 TABLET | Refills: 11 | Status: SHIPPED | OUTPATIENT
Start: 2019-04-19

## 2019-04-19 ASSESSMENT — UNIFIED PARKINSONS DISEASE RATING SCALE (UPDRS)
RIGIDITY_LUE: NORMAL
FACIAL_EXPRESSION: MILD: IN ADDITION TO DECREASED EYE-BLINK FREQUENCY, MASKED FACIES PRESENT IN THE LOWER FACE AS WELL, NAMELY FEWER MOVEMENTS AROUND THE MOUTH, SUCH AS LESS SPONTANEOUS SMILING, BUT LIPS NOT PARTED.
LEG_AGILITY_LEFT: NORMAL
ARISING_CHAIR: MILD:  PUSHES SELF UP FROM ARMS OF CHAIR WITHOUT DIFFICULTY.
FREEZING_GAIT: MILD: FREEZES ON STARTING, TURNING, OR WALKING THROUGH DOORWAY WITH MORE THAN ONE HALT DURING ANY OF THESE ACTIVITIES, BUT CONTINUES SMOOTHLY WITHOUT FREEZING DURING STRAIGHT WALKING.
AMPLITUDE_LLE: NORMAL: NO TREMOR.
AMPLITUDE_RLE: NORMAL: NO TREMOR.
GAIT: MILD: INDEPENDENT WALKING BUT WITH SUBSTANTIAL GAIT IMPAIRMENT.
HANDMOVEMENTS_LEFT: MODERATE: ANY OF THE FOLLOWING:  A) MORE THAN 5 INTERRUPTIONS  OR AT LEAST ONE LONGER ARREST (FREEZE) IN ONGOING MOVEMENT  B) MODERATE SLOWING C) THE AMPLITUDE DECREMENTS STARTING AFTER THE FIRST MOVEMENT.
FINGER_TAPPING_RIGHT: SLIGHT: ANY OF THE FOLLOWING: A) THE REGULAR RHYTHM IS BROKEN WITH ONE WITH ONE OR TWO INTERRUPTIONS OR HESITATIONS OF THE MOVEMENT B) SLIGHT SLOWING C) THE AMPLITUDE DECREMENTS NEAR THE END OF THE 10 MOVEMENTS.
PARKINSONS_MEDS: ON
HANDMOVEMENTS_RIGHT: SLIGHT: ANY OF THE FOLLOWING: A) THE REGULAR RHYTHM IS BROKEN WITH ONE WITH ONE OR TWO INTERRUPTIONS OR HESITATIONS OF THE MOVEMENT B) SLIGHT SLOWING C) THE AMPLITUDE DECREMENTS NEAR THE END OF THE 10 MOVEMENTS.
TOETAPPING_RIGHT: NORMAL
TOTAL_SCORE: 21
SPEECH: MILD: LOSS OF MODULATION, DICTION OR VOLUME, WITH A FEW WORDS UNCLEAR, BUT THE OVERALL SENTENCES EASY TO FOLLOW.
RIGIDITY_LLE: NORMAL
TOTAL_SCORE: 3
PRONATION_SUPINATION_LEFT: MILD: ANY OF THE FOLLOWING: A) 3 TO 5 INTERRUPTIONS DURING TAPPING B) MILD SLOWING C) THE AMPLITUDE DECREMENTS MIDWAY IN THE 10-MOVEMENT SEQUENCE
FINGER_TAPPING_LEFT: MILD: ANY OF THE FOLLOWING: A) 3 TO 5 INTERRUPTIONS DURING TAPPING B) MILD SLOWING C) THE AMPLITUDE DECREMENTS MIDWAY IN THE 10-MOVEMENT SEQUENCE
AMPLITUDE_LUE: NORMAL: NO TREMOR.
RIGIDITY_RUE: NORMAL
AMPLITUDE_LIP_JAW: NORMAL: NO TREMOR.
RIGIDITY_NECK: SLIGHT: RIGIDITY ONLY DETECTED WITH ACTIVATION MANEUVER.
RIGIDITY_RLE: NORMAL
LEG_AGILITY_RIGHT: NORMAL
SPONTANEITY_OF_MOVEMENT: 0: NORMAL.  NO PROBLEMS.
POSTURE: 0 NORMAL, NO PROBLEMS
POSTURAL_STABILITY: NORMAL:  RECOVERS WITH ONE OR TWO STEPS.
PRONATION_SUPINATION_RIGHT: SLIGHT: ANY OF THE FOLLOWING: A) THE REGULAR RHYTHM IS BROKEN WITH ONE WITH ONE OR TWO INTERRUPTIONS OR HESITATIONS OF THE MOVEMENT B) SLIGHT SLOWING C) THE AMPLITUDE DECREMENTS NEAR THE END OF THE 10 MOVEMENTS.
TOTAL_SCORE_LEFT: 7
AMPLITUDE_RUE: NORMAL: NO TREMOR.
CONSTANCY_TREMOR_ATREST: NORMAL: NO TREMOR.
TOETAPPING_LEFT: NORMAL
AXIAL_SCORE: 11

## 2019-04-19 ASSESSMENT — PAIN SCALES - GENERAL: PAINLEVEL: SEVERE PAIN (6)

## 2019-04-19 NOTE — NURSING NOTE
Alannah Leos's goals for this visit include: Return  She requests these members of her care team be copied on today's visit information: PCP    PCP: Leah Blanca    Referring Provider:  No referring provider defined for this encounter.    /67   Pulse 90   Temp 96.9  F (36.1  C)   Resp 12   Wt 50.8 kg (112 lb)   SpO2 98%   BMI 19.53 kg/m      Do you need any medication refills at today's visit? N

## 2019-04-19 NOTE — LETTER
4/19/2019         RE: Alannah Leos  [address] [facility] [id number]        Dear Colleague,    Thank you for referring your patient, Alannah Leos, to the [facility] [place]. Please see a copy of my visit note below.    Movement Disorder Clinic follow up note    Patient: Alannah Leos  Medical Record Number: [id number]  Encounter Date: April 19, 2019  PCP:Leah Blanca    CC: Parkinson's disease    Impression:  1.  Long-standing idiopathic Parkinson's disease  2.  Motor fluctuations with wearing off effect and mild peak dose dyskinesia    Recommendations:  1.  Today Mrs. Halima hurt is doing beautifully.  Her Parkinson's seems under good control with her current dose of carbidopa levodopa.  I would recommend that she continue taking carbidopa levodopa, 25/250, 1 tablet 5 times a day as indicated in the chart below.  2.  She is having mild motor fluctuations with wearing off.  She is not having much dyskinesia.  Her wearing off is really mild.  If it becomes more severe one could consider a do open pump.  One could also consider Rytary.  I think Rytary would be the best next escalation of therapy as it is noninvasive.  I do not think she would be a good deep brain stimulation candidate as she might have deterioration of her gait or balance.  3.  Discussed with her and she is comfortable with this plan.    Return to clinic: 6 months    Interval Hx:: Ms. Alannah Leos returns to clinic today for follow-up of Parkinson's disease.  She is a sirena lady who has had long-standing Parkinson's disease.  We increased her medication dose to carbidopa/levodopa, 25/250, 1 tablet at 6 AM, 12 noon, 5 PM, 9 PM and 1 AM.  She does feel the medicine come on about 30 minutes after ingestion.  She gets a good nonresponse and can walk and move independently with her walker.  She has some wearing off but it does not happen until about 1/2-hour before her last dose.  Her off time is not severe.  Overall  she is fairly happy with her medication dosage.  She is not having severe dyskinesia at this time.    She is here with her 2 daughters.  They reflect that she has up-and-down days.  She is not falling.  She is remaining active.  She has no cognitive decline.  There is no fainting.  There is no falling.  The freezing that she is been experiencing went away with the increase in her carbidopa levodopa dosage.    Current medications    Movement Disorder-related Medications                   6 am 12 noon 5 pm 9 pm  1 AM       Carbidopa/levodopa 25/250                                            1 1 1 1 1                                                                               Current Outpatient Medications   Medication     ACE NOT PRESCRIBED, INTENTIONAL,     ammonium lactate (AMLACTIN) 12 % cream     ASPIRIN 81 MG PO TABS     CALCIUM + D OR     carbidopa-levodopa (SINEMET)  MG tablet     folic acid (FOLVITE) 1 MG tablet     hydroxychloroquine (PLAQUENIL) 200 MG tablet     ibuprofen (ADVIL/MOTRIN) 200 MG tablet     methotrexate sodium 2.5 MG TABS     MULTI-VITAMIN OR     Abatacept (ORENCIA CLICKJECT) 125 MG/ML SOAJ auto-injector     ferrous sulfate (IRON) 325 (65 Fe) MG tablet     No current facility-administered medications for this visit.        Examination:  /67   Pulse 90   Temp 96.9  F (36.1  C)   Resp 12   Wt 50.8 kg (112 lb)   SpO2 98%   BMI 19.53 kg/m     General- no distress, no rash, good pulses, no edema  Respiratory-auscultation over the anterior lung fields is clear  Cardiac- regular rate and rhythm    Neurologic  Mental status  Patient is alert, appropriate, speech is fluent and comprehension is intact    Cranial nerve testing  Pupils are equal and reactive to light, visual fields are full to confrontation bilaterally  Extraocular movements are full  Facial sensation is intact, face is symmetric with rest and activation  Palate rises symmetrically, tongue protrudes at midline with  normal movements  Sterocleidomastoid and trapezius strength is normal and symmetric    Motor  UPDRS Values 2/9/2018 4/13/2018 10/19/2018 4/19/2019   Time: 1:41 AM 2:00 AM 2:27 AM 2:29 AM   Medication   On On   R Brain DBS:    None   L Brain DBS:    None   Speech 2 1 3 2   Facial Expression 1 1 3 2   Rigidity Neck 2 2 1 1   Rigidity RUE 3 2 1 0   Rigidity LUE 3 2 1 0   Rigidity RLE 3 2 1 0   Rigidity LLE 3 0 1 0   Finger Taps R 3 3 3 1   Finger Taps L 3 3 3 2   Hand Mvt R 3 2 2 1   Hand Mvt L 3 2 2 3   Pron-/Supinate R 2 2 1 1   Pron-/Supinate L 2 2 1 2   Toe Tap R 2 2 0 0   Toe Tap L 2 2 0 0   Leg Agility R 2 1 0 0   Leg Agility L 2 1 0 0   Arise From Chair 3 2 3 2   Gait 2 2 2 2   Gait Freezing 0 0 0 2   Postural Stability 3 1 0 0   Posture 2 1 0 0   Global Spont Mvt 2 0 0 0   Postural Tremor RUE 0 0 0 0   Postural Tremor LUE 0 0 0 0   Kinetic Tremor RUE 0 0 0 0   Kinetic Tremor LUE 0 0 0 0   Rest Tremor RUE 0 0 0 0   Rest Tremor LUE 0 0 0 0   Rest Tremor RLE 0 0 0 0   Rest Tremor LLE 0 0 0 0   Rest Tremor Lip/Jaw 0 0 0 0   Rest Tremor Constancy 0 0 0 0   Total Right 18 14 8 3   Total Left 18 12 8 7   Axial Total 17 10 12 11   Total 53 36 28 21       Sensation  Intact to pin, vibration   Proprioception intact in great toes and fingers    Tendon reflexes  Testing at brachioradialis, biceps, triceps, patella and achilles bilaterally showed normal reflexes, symmetrically, without Babinski or Javier signs    Coordination  Finger nose finger testing: normalRapid alternating movements are normal.  Gait and Station: normal    30 minutes of total time was spent face-to-face with the patient over 50% of which was counseling..      Again, thank you for allowing me to participate in the care of your patient.        Sincerely,        Ricardo Johnson MD

## 2019-04-19 NOTE — PROGRESS NOTES
[facility] follow up note    Patient: Alannah Leos  Medical Record Number: [id number]  Encounter Date: April 19, 2019  PCP:Leah Blanca    CC: Parkinson's disease    Impression:  1.  Long-standing idiopathic Parkinson's disease  2.  Motor fluctuations with wearing off effect and mild peak dose dyskinesia    Recommendations:  1.  Today Mrs. Halima hurt is doing beautifully.  Her Parkinson's seems under good control with her current dose of carbidopa levodopa.  I would recommend that she continue taking carbidopa levodopa, 25/250, 1 tablet 5 times a day as indicated in the chart below.  2.  She is having mild motor fluctuations with wearing off.  She is not having much dyskinesia.  Her wearing off is really mild.  If it becomes more severe one could consider a do open pump.  One could also consider Rytary.  I think Rytary would be the best next escalation of therapy as it is noninvasive.  I do not think she would be a good deep brain stimulation candidate as she might have deterioration of her gait or balance.  3.  Discussed with her and she is comfortable with this plan.    Return to clinic: 6 months    Interval Hx:: Ms. Alannah Leos returns to clinic today for follow-up of Parkinson's disease.  She is a sirena lady who has had long-standing Parkinson's disease.  We increased her medication dose to carbidopa/levodopa, 25/250, 1 tablet at 6 AM, 12 noon, 5 PM, 9 PM and 1 AM.  She does feel the medicine come on about 30 minutes after ingestion.  She gets a good nonresponse and can walk and move independently with her walker.  She has some wearing off but it does not happen until about 1/2-hour before her last dose.  Her off time is not severe.  Overall she is fairly happy with her medication dosage.  She is not having severe dyskinesia at this time.    She is here with her 2 daughters.  They reflect that she has up-and-down days.  She is not falling.  She is remaining active.  She has no  cognitive decline.  There is no fainting.  There is no falling.  The freezing that she is been experiencing went away with the increase in her carbidopa levodopa dosage.    Current medications    Movement Disorder-related Medications                   6 am 12 noon 5 pm 9 pm  1 AM       Carbidopa/levodopa 25/250                                            1 1 1 1 1                                                                               Current Outpatient Medications   Medication     ACE NOT PRESCRIBED, INTENTIONAL,     ammonium lactate (AMLACTIN) 12 % cream     ASPIRIN 81 MG PO TABS     CALCIUM + D OR     carbidopa-levodopa (SINEMET)  MG tablet     folic acid (FOLVITE) 1 MG tablet     hydroxychloroquine (PLAQUENIL) 200 MG tablet     ibuprofen (ADVIL/MOTRIN) 200 MG tablet     methotrexate sodium 2.5 MG TABS     MULTI-VITAMIN OR     Abatacept (ORENCIA CLICKJECT) 125 MG/ML SOAJ auto-injector     ferrous sulfate (IRON) 325 (65 Fe) MG tablet     No current facility-administered medications for this visit.        Examination:  /67   Pulse 90   Temp 96.9  F (36.1  C)   Resp 12   Wt 50.8 kg (112 lb)   SpO2 98%   BMI 19.53 kg/m    General- no distress, no rash, good pulses, no edema  Respiratory-auscultation over the anterior lung fields is clear  Cardiac- regular rate and rhythm    Neurologic  Mental status  Patient is alert, appropriate, speech is fluent and comprehension is intact    Cranial nerve testing  Pupils are equal and reactive to light, visual fields are full to confrontation bilaterally  Extraocular movements are full  Facial sensation is intact, face is symmetric with rest and activation  Palate rises symmetrically, tongue protrudes at midline with normal movements  Sterocleidomastoid and trapezius strength is normal and symmetric    Motor  UPDRS Values 2/9/2018 4/13/2018 10/19/2018 4/19/2019   Time: 1:41 AM 2:00 AM 2:27 AM 2:29 AM   Medication   On On   R Brain DBS:    None   L Brain DBS:     None   Speech 2 1 3 2   Facial Expression 1 1 3 2   Rigidity Neck 2 2 1 1   Rigidity RUE 3 2 1 0   Rigidity LUE 3 2 1 0   Rigidity RLE 3 2 1 0   Rigidity LLE 3 0 1 0   Finger Taps R 3 3 3 1   Finger Taps L 3 3 3 2   Hand Mvt R 3 2 2 1   Hand Mvt L 3 2 2 3   Pron-/Supinate R 2 2 1 1   Pron-/Supinate L 2 2 1 2   Toe Tap R 2 2 0 0   Toe Tap L 2 2 0 0   Leg Agility R 2 1 0 0   Leg Agility L 2 1 0 0   Arise From Chair 3 2 3 2   Gait 2 2 2 2   Gait Freezing 0 0 0 2   Postural Stability 3 1 0 0   Posture 2 1 0 0   Global Spont Mvt 2 0 0 0   Postural Tremor RUE 0 0 0 0   Postural Tremor LUE 0 0 0 0   Kinetic Tremor RUE 0 0 0 0   Kinetic Tremor LUE 0 0 0 0   Rest Tremor RUE 0 0 0 0   Rest Tremor LUE 0 0 0 0   Rest Tremor RLE 0 0 0 0   Rest Tremor LLE 0 0 0 0   Rest Tremor Lip/Jaw 0 0 0 0   Rest Tremor Constancy 0 0 0 0   Total Right 18 14 8 3   Total Left 18 12 8 7   Axial Total 17 10 12 11   Total 53 36 28 21       Sensation  Intact to pin, vibration   Proprioception intact in great toes and fingers    Tendon reflexes  Testing at brachioradialis, biceps, triceps, patella and achilles bilaterally showed normal reflexes, symmetrically, without Babinski or Javier signs    Coordination  Finger nose finger testing: normalRapid alternating movements are normal.  Gait and Station: normal    30 minutes of total time was spent face-to-face with the patient over 50% of which was counseling..

## 2019-06-06 ENCOUNTER — OFFICE VISIT (OUTPATIENT)
Dept: OPHTHALMOLOGY | Facility: CLINIC | Age: 79
End: 2019-06-06
Payer: MEDICARE

## 2019-06-06 DIAGNOSIS — Z79.899 HIGH RISK MEDICATION USE: ICD-10-CM

## 2019-06-06 DIAGNOSIS — M06.09 RHEUMATOID ARTHRITIS OF MULTIPLE SITES WITH NEGATIVE RHEUMATOID FACTOR (H): ICD-10-CM

## 2019-06-06 DIAGNOSIS — H04.123 DRY EYES, BILATERAL: ICD-10-CM

## 2019-06-06 DIAGNOSIS — H52.4 PRESBYOPIA: ICD-10-CM

## 2019-06-06 DIAGNOSIS — H25.813 COMBINED FORMS OF AGE-RELATED CATARACT OF BOTH EYES: Primary | ICD-10-CM

## 2019-06-06 PROCEDURE — 92014 COMPRE OPH EXAM EST PT 1/>: CPT | Performed by: STUDENT IN AN ORGANIZED HEALTH CARE EDUCATION/TRAINING PROGRAM

## 2019-06-06 PROCEDURE — 92015 DETERMINE REFRACTIVE STATE: CPT | Mod: GY | Performed by: STUDENT IN AN ORGANIZED HEALTH CARE EDUCATION/TRAINING PROGRAM

## 2019-06-06 ASSESSMENT — VISUAL ACUITY
OS_CC: 20/30
METHOD: SNELLEN - LINEAR
CORRECTION_TYPE: GLASSES
OS_CC+: -2
OD_CC: 20/40
OD_CC+: -1

## 2019-06-06 ASSESSMENT — REFRACTION_WEARINGRX
OD_CYLINDER: +0.75
SPECS_TYPE: SVL DISTANCE
OS_SPHERE: +1.50
OD_CYLINDER: +1.25
OD_SPHERE: +1.50
OD_AXIS: 136
OD_AXIS: 140
SPECS_TYPE: SVL READING
OS_SPHERE: +3.25
OS_CYLINDER: +0.50
OS_AXIS: 053
OS_AXIS: 052
OS_CYLINDER: +0.50
OD_SPHERE: +3.50

## 2019-06-06 ASSESSMENT — EXTERNAL EXAM - RIGHT EYE: OD_EXAM: NORMAL

## 2019-06-06 ASSESSMENT — REFRACTION_MANIFEST
OS_CYLINDER: +0.75
OS_AXIS: 032
OD_SPHERE: +1.50
OS_SPHERE: +1.75
OD_AXIS: 140
OD_CYLINDER: +1.25
OS_ADD: +2.75
OD_ADD: +2.75

## 2019-06-06 ASSESSMENT — TONOMETRY
OS_IOP_MMHG: 17
IOP_METHOD: APPLANATION
OD_IOP_MMHG: 17

## 2019-06-06 ASSESSMENT — CONF VISUAL FIELD
OS_NORMAL: 1
OD_NORMAL: 1

## 2019-06-06 ASSESSMENT — SLIT LAMP EXAM - LIDS
COMMENTS: NORMAL
COMMENTS: NORMAL

## 2019-06-06 ASSESSMENT — CUP TO DISC RATIO
OS_RATIO: 0.4
OD_RATIO: 0.5

## 2019-06-06 ASSESSMENT — EXTERNAL EXAM - LEFT EYE: OS_EXAM: NORMAL

## 2019-06-06 NOTE — PATIENT INSTRUCTIONS
Continue same glasses - can consider cataract surgery in the future if vision becomes more bothersome    Return for Plaquenil eye tests in 6 months     Marcial Daigle MD  [phone]

## 2019-06-06 NOTE — LETTER
6/6/2019         RE: Alannah Leos  [id number] [facility] [id number]        Dear Colleague,    Thank you for referring your patient, Alannah Leos, to the [facility]. Please see a copy of my visit note below.     Current Eye Medications:  None, MVI      Subjective:  Here for complete today. When it gets to be dusk, she needs more light to see. Glasses just don't seem to work, even SVL. Denies that she has diabetes, Dr. Chen said she does not have.  She lost approximately 60 lbs.     Objective:  See Ophthalmology Exam.       Assessment:  Alannah Leos is a 78 year old female who presents with:   Encounter Diagnoses   Name Primary?     Combined forms of age-related cataract of both eyes Early visually significant        High risk medication use Plaquenil since March 2018.      Rheumatoid arthritis of multiple sites with negative rheumatoid factor (H)      Dry eyes, bilateral        Plan:  Continue same glasses - can consider cataract surgery in the future if vision becomes more bothersome    Return for Plaquenil eye tests in 6 months     Marcial Daigle MD  [phone]         Again, thank you for allowing me to participate in the care of your patient.        Sincerely,        Marcial Daigle MD

## 2019-06-06 NOTE — PROGRESS NOTES
Current Eye Medications:  None, MVI      Subjective:  Here for complete today. When it gets to be dusk, she needs more light to see. Glasses just don't seem to work, even SVL. Denies that she has diabetes, Dr. Chen said she does not have.  She lost approximately 60 lbs.     Objective:  See Ophthalmology Exam.       Assessment:  Alannah Leos is a 78 year old female who presents with:   Encounter Diagnoses   Name Primary?     Combined forms of age-related cataract of both eyes Early visually significant        High risk medication use Plaquenil since March 2018.      Rheumatoid arthritis of multiple sites with negative rheumatoid factor (H)      Dry eyes, bilateral        Plan:  Continue same glasses - can consider cataract surgery in the future if vision becomes more bothersome    Return for Plaquenil eye tests in 6 months     Marcial Daigle MD  [phone]

## 2019-07-10 ENCOUNTER — TELEPHONE (OUTPATIENT)
Dept: FAMILY MEDICINE | Facility: CLINIC | Age: 79
End: 2019-07-10

## 2019-07-10 NOTE — TELEPHONE ENCOUNTER
Patient experiencing constipation. Hard stools. She wants to know what OTCs she can take or how much dulcolax she can take. Some days she does not have a BM. Her last BM was today. She denies following: abdominal pain, blood in stools, black tarry stools. Her last OV was 3/12/18.    Told her she would need OV. Assisted her in scheduling apt.    Next 5 appointments (look out 90 days)    Jul 11, 2019  9:40 AM CDT  Office Visit with Lynn Saldaña PA-C  [facility] ([facility]) [address] [place] [id number] [phone]             Freida Egan RN

## 2019-07-10 NOTE — TELEPHONE ENCOUNTER
..Reason for call:  Patient reporting a symptom    Symptom or request: bm's are daily or skips a day but firm stool, used dulcolax x 2 weeks, one tab and drank lots of water, then increased to two, softened stool but not enough; 2 bm's were size of palm, and harder.     Duration (how long have symptoms been present): about one month    Have you been treated for this before? No    Additional comments: please advise/scheduled appointment for first available-advise if nec to keep, change or cancel     Phone Number patient can be reached at:  Home number on file [phone] (home)    Best Time:  anytime    Can we leave a detailed message on this number:  YES    Call taken on 7/10/2019 at 9:40 AM by Alana Verma

## 2019-07-11 ENCOUNTER — ANCILLARY PROCEDURE (OUTPATIENT)
Dept: GENERAL RADIOLOGY | Facility: CLINIC | Age: 79
End: 2019-07-11
Attending: PHYSICIAN ASSISTANT
Payer: MEDICARE

## 2019-07-11 ENCOUNTER — OFFICE VISIT (OUTPATIENT)
Dept: FAMILY MEDICINE | Facility: CLINIC | Age: 79
End: 2019-07-11
Payer: MEDICARE

## 2019-07-11 VITALS
HEIGHT: 64 IN | DIASTOLIC BLOOD PRESSURE: 71 MMHG | HEART RATE: 87 BPM | SYSTOLIC BLOOD PRESSURE: 138 MMHG | OXYGEN SATURATION: 98 % | BODY MASS INDEX: 18.78 KG/M2 | TEMPERATURE: 97.8 F | WEIGHT: 110 LBS

## 2019-07-11 DIAGNOSIS — K59.01 SLOW TRANSIT CONSTIPATION: ICD-10-CM

## 2019-07-11 DIAGNOSIS — K59.01 SLOW TRANSIT CONSTIPATION: Primary | ICD-10-CM

## 2019-07-11 PROCEDURE — 99213 OFFICE O/P EST LOW 20 MIN: CPT | Performed by: PHYSICIAN ASSISTANT

## 2019-07-11 PROCEDURE — 74019 RADEX ABDOMEN 2 VIEWS: CPT

## 2019-07-11 RX ORDER — BISACODYL 10 MG
10 SUPPOSITORY, RECTAL RECTAL DAILY PRN
Qty: 30 SUPPOSITORY | Refills: 3 | Status: SHIPPED | OUTPATIENT
Start: 2019-07-11 | End: 2019-07-25

## 2019-07-11 ASSESSMENT — PAIN SCALES - GENERAL: PAINLEVEL: NO PAIN (0)

## 2019-07-11 ASSESSMENT — MIFFLIN-ST. JEOR: SCORE: 961.02

## 2019-07-11 NOTE — PATIENT INSTRUCTIONS
Magnesium citrate drink entire bottle at once, may drink over ice-bowel movement happens 4-8 hours after ingestion    Dulcolax suppository 1 daily as needed     Increase water intake and fiber         Patient Education     Constipation (Adult)  Constipation means that you have bowel movements that are less frequent than usual. Stools often become very hard and difficult to pass.  Constipation is very common. At some point in life, it affects almost everyone. Since everyone's bowel habits are different, what is constipation to one person may not be to another. Your healthcare provider may do tests to diagnose constipation. It depends on what he or she finds when evaluating you.    Symptoms of constipation include:    Abdominal pain    Bloating    Vomiting    Painful bowel movements    Itching, swelling, bleeding, or pain around the anus  Causes  Constipation can have many causes. These include:    Diet low in fiber    Too much dairy    Not drinking enough liquids    Lack of exercise or physical activity (especially true for older adults)    Changes in lifestyle or daily routine, including pregnancy, aging, work, and travel    Frequent use or misuse of laxatives    Ignoring the urge to have a bowel movement or delaying it until later    Medicines, such as certain prescription pain medicines, iron supplements, antacids, certain antidepressants, and calcium supplements    Diseases like irritable bowel syndrome, bowel obstructions, stroke, diabetes, thyroid disease, Parkinson disease, hemorrhoids, and colon cancer  Complications  Potential complications of constipation can include:    Hemorrhoids    Rectal bleeding from hemorrhoids or anal fissures (skin tears)    Hernias    Dependency on laxatives    Chronic constipation    Fecal impaction, a severe form of constipation in which a large amount of hard stool is in your rectum that you can't pass    Bowel obstruction or perforation  Home care  All treatment should be done  after talking with your healthcare provider. This is especially true if you have another medical problems, are taking prescription medicines, or are an older adult. Treatment most often involves lifestyle changes. You may also need medicines. Your healthcare provider will tell you which will work best for you. Follow the advice below to help avoid this problem in the future.  Lifestyle changes  These lifestyle changes can help prevent constipation:    Diet. Eat a high-fiber diet, with fresh fruit and vegetables, and reduce dairy intake, meats, and processed foods    Fluids. It's important to get enough fluids each day. Drink plenty of water when you eat more fiber. If you are on diet that limits the amount of fluid you can have, talk about this with your healthcare provider.    Regular exercise. Check with your healthcare provider first.  Medicines  Take any medicines as directed. Some laxatives are safe to use only every now and then. Others can be taken on a regular basis. While laxatives don't cause bowel dependence, they are treating the symptoms. So your constipation may return if you don't make other changes. Talk with your healthcare provider or pharmacist if you have questions.  Prescription pain medicines can cause constipation. If you are taking this kind of medicine, ask your healthcare provider if you should also take a stool softener.  Medicines you may take to treat constipation include:    Fiber supplements    Stool softeners    Laxatives    Enemas    Rectal suppositories  Follow-up care  Follow up with your healthcare provider if symptoms don't get better in the next few days. You may need to have more tests or see a specialist.  Call 911  Call 911 if any of these occur:    Trouble breathing    Stiff, rigid abdomen that is severely painful to touch    Confusion    Fainting or loss of consciousness    Rapid heart rate    Chest pain  When to seek medical advice  Call your healthcare provider right away  if any of these occur:    Fever of 100.4 F (38 C) or higher, or as directed by your healthcare provider    Failure to resume normal bowel movements    Pain in your abdomen or back gets worse    Nausea or vomiting    Swelling in your abdomen    Blood in the stool    Black, tarry stool    Involuntary weight loss    Weakness  Date Last Reviewed: 6/1/2018 2000-2018 [employer]. [address] [place] [address]. All rights reserved. This information is not intended as a substitute for professional medical care. Always follow your healthcare professional's instructions.

## 2019-07-11 NOTE — PROGRESS NOTES
Subjective     Alannah Leos is a 78 year old female who presents to clinic today for the following health issues:    HPI   Constipation     Onset: about 3 weeks ago    Description: small dry stool, feeling of incomplete evacuation  Frequency of bowel movements: prn.  Stool consistency: soft formed, hard, small caliber    Progression of Symptoms:  worsening    Accompanying Signs & Symptoms:  Abdominal pain (cramping?): no   Blood in stool: no   Rectal pain: no   Nausea/vomiting: no   Weight loss or gain: no    History:   History of abdominal surgery: Gallbladder Removed     Precipitating factors:   Recent use of narcotics, anticholinergics, calcium channel blockers, antacids, or iron supplements: no   Chronic Laxative Use: no          Therapies Tried and outcome: stool softeners        Patient Active Problem List   Diagnosis     Osteoporosis     Rheumatoid arthritis (H)     Parkinson disease (H)     Osteoarthritis of wrist     Osteoarthritis of shoulder     History of total knee arthroplasty     Osteoarthritis of left knee     Advanced directives, counseling/discussion     CARDIOVASCULAR SCREENING; LDL GOAL LESS THAN 160     High risk medication use     Underweight     Impingement syndrome, shoulder, left     Combined forms of age-related cataract of both eyes     Past Surgical History:   Procedure Laterality Date     GALLBLADDER SURGERY       HYSTERECTOMY       KNEE SURGERY      left      LAMINECTOMY LUMBAR ONE LEVEL       TONSILLECTOMY         Social History     Tobacco Use     Smoking status: Never Smoker     Smokeless tobacco: Never Used   Substance Use Topics     Alcohol use: No     Family History   Problem Relation Age of Onset     Cancer Sister         pancreatic         Current Outpatient Medications   Medication Sig Dispense Refill     Abatacept (ORENCIA CLICKJECT) 125 MG/ML SOAJ auto-injector Inject 1 mL (125 mg) Subcutaneous every 7 days 4 Syringe 3     ACE NOT PRESCRIBED, INTENTIONAL, 1 each continuous  "prn. ACE Inhibitor not prescribed due to Refusal by patient       0 each 0     ammonium lactate (AMLACTIN) 12 % cream Apply once to twice a day to dry skin. Can burn temporarily if there is a cut on the skin. 385 g 11     ASPIRIN 81 MG PO TABS PATIENT REPORTS TAKING 4 TIMES PER WEEK AND NOT EVERYDAY       bisacodyl (DULCOLAX) 10 MG suppository Place 1 suppository (10 mg) rectally daily as needed for constipation 30 suppository 3     CALCIUM + D OR 600mg twice a day        carbidopa-levodopa (SINEMET)  MG tablet Take one at 6 am, noon, 5 pm, 9 pm and midnight 150 tablet 11     folic acid (FOLVITE) 1 MG tablet Take 1 tablet (1 mg) by mouth daily 100 tablet 3     hydroxychloroquine (PLAQUENIL) 200 MG tablet Take 1 tablet (200 mg) by mouth daily 30 tablet 12     ibuprofen (ADVIL/MOTRIN) 200 MG tablet Take 1 tablet (200 mg) by mouth every 8 hours as needed for moderate pain       magnesium citrate solution Take 296 mLs by mouth once for 1 dose 296 mL 0     methotrexate sodium 2.5 MG TABS Take 8 tablets (20 mg) by mouth once a week . Take all 8 tablets on the same day of each week. 32 tablet 7     MULTI-VITAMIN OR 1 a day        Allergies   Allergen Reactions     Decadrol [Dexamethasone Sodium Phosphate]      Shrimp          Reviewed and updated as needed this visit by Provider  Tobacco  Allergies  Meds  Problems  Med Hx  Surg Hx  Fam Hx         Review of Systems   ROS COMP: Constitutional, HEENT, cardiovascular, pulmonary, gi and gu systems are negative, except as otherwise noted.      Objective    /71   Pulse 87   Temp 97.8  F (36.6  C) (Oral)   Ht 1.613 m (5' 3.5\")   Wt 49.9 kg (110 lb)   SpO2 98%   BMI 19.18 kg/m    Body mass index is 19.18 kg/m .  Physical Exam   GENERAL: alert, no distress and elderly  NECK: no adenopathy, no asymmetry, masses, or scars and thyroid normal to palpation  RESP: lungs clear to auscultation - no rales, rhonchi or wheezes  CV: regular rate and rhythm, normal S1 " S2, no S3 or S4, no murmur, click or rub, no peripheral edema and peripheral pulses strong  ABDOMEN: soft, nontender, no hepatosplenomegaly, no masses and bowel sounds normal  MS: no gross musculoskeletal defects noted, no edema    Diagnostic Test Results:  Labs reviewed in Epic  Xray - independent read-no dilated bowel loops  Large amount of stool throughout the colon        Assessment & Plan       ICD-10-CM    1. Slow transit constipation K59.01 XR Abdomen 2 Views     magnesium citrate solution     bisacodyl (DULCOLAX) 10 MG suppository      Magnesium citrate drink entire bottle at once, may drink over ice-bowel movement happens 4-8 hours after ingestion    Dulcolax suppository 1 daily as needed     Increase water intake and fiber   Return in about 1 week (around 7/18/2019), or if symptoms worsen or fail to improve, for pcp.    Lynn Saldaña PA-C  [facility]

## 2019-07-15 DIAGNOSIS — M06.09 RHEUMATOID ARTHRITIS OF MULTIPLE SITES WITH NEGATIVE RHEUMATOID FACTOR (H): ICD-10-CM

## 2019-07-15 LAB
ALBUMIN SERPL-MCNC: 4 G/DL (ref 3.4–5)
ALP SERPL-CCNC: 83 U/L (ref 40–150)
ALT SERPL W P-5'-P-CCNC: 15 U/L (ref 0–50)
AST SERPL W P-5'-P-CCNC: 25 U/L (ref 0–45)
BASOPHILS # BLD AUTO: 0.1 10E9/L (ref 0–0.2)
BASOPHILS NFR BLD AUTO: 1.8 %
BILIRUB DIRECT SERPL-MCNC: <0.1 MG/DL (ref 0–0.2)
BILIRUB SERPL-MCNC: 0.3 MG/DL (ref 0.2–1.3)
CREAT SERPL-MCNC: 0.5 MG/DL (ref 0.52–1.04)
CRP SERPL-MCNC: <2.9 MG/L (ref 0–8)
DIFFERENTIAL METHOD BLD: ABNORMAL
EOSINOPHIL # BLD AUTO: 0.1 10E9/L (ref 0–0.7)
EOSINOPHIL NFR BLD AUTO: 1.4 %
ERYTHROCYTE [DISTWIDTH] IN BLOOD BY AUTOMATED COUNT: 15.3 % (ref 10–15)
ERYTHROCYTE [SEDIMENTATION RATE] IN BLOOD BY WESTERGREN METHOD: 16 MM/H (ref 0–30)
GFR SERPL CREATININE-BSD FRML MDRD: >90 ML/MIN/{1.73_M2}
HCT VFR BLD AUTO: 34.6 % (ref 35–47)
HGB BLD-MCNC: 11 G/DL (ref 11.7–15.7)
LYMPHOCYTES # BLD AUTO: 1.7 10E9/L (ref 0.8–5.3)
LYMPHOCYTES NFR BLD AUTO: 27.1 %
MCH RBC QN AUTO: 30.4 PG (ref 26.5–33)
MCHC RBC AUTO-ENTMCNC: 31.8 G/DL (ref 31.5–36.5)
MCV RBC AUTO: 96 FL (ref 78–100)
MONOCYTES # BLD AUTO: 0.5 10E9/L (ref 0–1.3)
MONOCYTES NFR BLD AUTO: 8.1 %
NEUTROPHILS # BLD AUTO: 3.8 10E9/L (ref 1.6–8.3)
NEUTROPHILS NFR BLD AUTO: 61.6 %
PLATELET # BLD AUTO: 221 10E9/L (ref 150–450)
PROT SERPL-MCNC: 7.4 G/DL (ref 6.8–8.8)
RBC # BLD AUTO: 3.62 10E12/L (ref 3.8–5.2)
WBC # BLD AUTO: 6.2 10E9/L (ref 4–11)

## 2019-07-15 PROCEDURE — 36415 COLL VENOUS BLD VENIPUNCTURE: CPT | Performed by: INTERNAL MEDICINE

## 2019-07-15 PROCEDURE — 85652 RBC SED RATE AUTOMATED: CPT | Performed by: INTERNAL MEDICINE

## 2019-07-15 PROCEDURE — 86140 C-REACTIVE PROTEIN: CPT | Performed by: INTERNAL MEDICINE

## 2019-07-15 PROCEDURE — 85025 COMPLETE CBC W/AUTO DIFF WBC: CPT | Performed by: INTERNAL MEDICINE

## 2019-07-15 PROCEDURE — 82565 ASSAY OF CREATININE: CPT | Performed by: INTERNAL MEDICINE

## 2019-07-15 PROCEDURE — 80076 HEPATIC FUNCTION PANEL: CPT | Performed by: INTERNAL MEDICINE

## 2019-07-17 ENCOUNTER — OFFICE VISIT (OUTPATIENT)
Dept: RHEUMATOLOGY | Facility: CLINIC | Age: 79
End: 2019-07-17
Payer: MEDICARE

## 2019-07-17 VITALS
SYSTOLIC BLOOD PRESSURE: 136 MMHG | BODY MASS INDEX: 19.32 KG/M2 | DIASTOLIC BLOOD PRESSURE: 64 MMHG | HEART RATE: 90 BPM | OXYGEN SATURATION: 97 % | WEIGHT: 110.8 LBS

## 2019-07-17 DIAGNOSIS — Z79.899 HIGH RISK MEDICATIONS (NOT ANTICOAGULANTS) LONG-TERM USE: ICD-10-CM

## 2019-07-17 DIAGNOSIS — M06.09 RHEUMATOID ARTHRITIS OF MULTIPLE SITES WITH NEGATIVE RHEUMATOID FACTOR (H): Primary | ICD-10-CM

## 2019-07-17 PROCEDURE — 99213 OFFICE O/P EST LOW 20 MIN: CPT | Performed by: INTERNAL MEDICINE

## 2019-07-17 RX ORDER — METHOTREXATE 2.5 MG/1
20 TABLET ORAL WEEKLY
Qty: 32 TABLET | Refills: 7 | Status: SHIPPED | OUTPATIENT
Start: 2019-07-17 | End: 2020-01-15

## 2019-07-17 RX ORDER — HYDROXYCHLOROQUINE SULFATE 200 MG/1
200 TABLET, FILM COATED ORAL DAILY
Qty: 30 TABLET | Refills: 7 | Status: SHIPPED | OUTPATIENT
Start: 2019-07-17 | End: 2020-01-15

## 2019-07-17 RX ORDER — FOLIC ACID 1 MG/1
1 TABLET ORAL DAILY
Qty: 100 TABLET | Refills: 3 | Status: SHIPPED | OUTPATIENT
Start: 2019-07-17 | End: 2020-01-15

## 2019-07-17 NOTE — PROGRESS NOTES
Rheumatology Clinic Visit      Alannah Leos MRN# [id number]   YOB: 1940 Age: 78 year old      Date of visit: 7/17/19   PCP: Dr. Leah Blanca    Chief Complaint   Patient presents with:  Arthritis: RA    Assessment and Plan     1. Seronegative erosive rheumatoid arthritis: Diagnosed in the 1970s. Previously on methotrexate when first diagnosed (unclear if ineffective or not, stopped by patient preference to not treat her RA at that time).  Synovitis, subluxation, and fixed flexion deformities on initial exam. Steroid responsive. Also with ankylosis of the cervical spine and erosive changes seen on x-rays. Currently on methotrexate 20 mg once weekly, and hydroxychloroquine 200 mg daily. Previously on SSZ (felt poorly on it).  She has improved with conventional DMARDs but would benefit from a biologic DMARD; preferentially Orencia because of the positive PIERO and dsDNA in the past; in the past it was prescribed and then the patient decided to not use it but she is still considering Orencia and so we will leave on her medication list in case she decides to try it as I think she would benefit from it  - Continue methotrexate 20 mg once weekly   - Continue folic acid 1mg daily  - Continue hydroxychloroquine 200mg daily (normal eye exam on 12/7/2018)  - Orencia SQ -see above  - Labs in 3 months: CBC, Creatinine, Hepatic Panel  - Labs in 6 months: CBC, Creatinine, Hepatic Panel, ESR, CRP      2. Positive PIERO and dsDNA: These were noted on record review. She does not have symptoms to support an PIERO-associated rheumatologic disease at this time.     3. Neck pain in the setting of rheumatoid arthritis: No evidence of instability by x-rays on 10/12/2016.  Seen by neurosurgery but reportedly the procedure she would need is very intense so she does not want to do anything right now.    4. Parkinson's Disease: Followed by Dr. Ricardo Johnson at the [facility].     5. Osteoporosis: Based on  "1/29/2018 DEXA.  Was on fosamax from [id number], but no f/u DEXA since 2011.  We discussed osteoporosis and the treatment of osteoporosis.  We reviewed the risks of treatment and the risks of not treating previously and does not want to treat; reconfirmed today.    6. TMJ: trouble with speaking and eating.  Previously referred to ENT for guidance; she plans to make an appointment.    Ms. Leos verbalized agreement with and understanding of the rational for the diagnosis and treatment plan.  All questions were answered to best of my ability and the patient's satisfaction. Ms. Leos was advised to contact the clinic with any questions that may arise after the clinic visit.      Thank you for involving me in the care of the patient    Return to clinic: 6 months      HPI   Alannah Leos is a 78 year old female with a medical history significant for Parkinson's disease, osteoarthritis, status post TKA, osteoporosis, and rheumatoid arthritis who presents for follow-up of rheumatoid arthritis.    Previously, Ms. Leos reported that she was diagnosed with rheumatoid arthritis in the past but did not want to take toxic medications and did not want to keep taking prednisone because of potential toxicities. Therefore, she decided that she would \"tough out\" her symptoms and try to enjoy her life. Then, more recently she was diagnosed with Parkinson's disease that has been significantly affecting her quality of life. She is treated for the Parkinson's disease and she believes that the treatment is helping. However, she is also having worsening joint pain and stiffness. Morning stiffness lasts for all day. All of her joints hurt, including her bilateral shoulders, neck, spine, hands, wrists, elbows, hips, knees, ankles, and feet. She tells me that she is unable to raise her arms without assistance from the contralateral arm, if she is able to get that hand over to the other side. Mainly, her right shoulder is affected. She " "tells me that she has a little more mobility in her left shoulder. She tells me that her entire spine is fused, and that it \"occurred naturally.\"  Overall, she tells me that she feels miserable and is willing to start treatment, but is still scared of most of the medication side effects.    Today, she reports that she is doing fairly well.  Her mother recently .  She has had some constipation that is being managed by her PCP.  Joint symptoms have been stable and she has not wanted to start Orencia just yet.  She is still considering starting Orencia.  She did not make an appointment with ENT for TMJ but plans to do so.      Denies fevers, chills, nausea, vomiting, constipation, diarrhea. No abdominal pain. No chest pain/pressure, palpitations, or shortness of breath.   No oral or nasal sores.  No rash. No sicca symptoms.      Tobacco: none  EtOH: 1 drink at [place] only  Drugs: none    ROS   GEN: No fevers, chills, night sweats.    SKIN: No itching, rashes, sores  HEENT: No oral or nasal ulcers.  CV: No chest pain, pressure, palpitations, or dyspnea on exertion.  PULM: No SOB, wheeze, cough.  GI: No nausea, vomiting, constipation, diarrhea. No blood in stool. No abdominal pain.  : No blood in urine.  MSK: See HPI.  NEURO: No numbness or tingling  EXT: See history of present illness  PSYCH: Negative    Active Problem List     Patient Active Problem List   Diagnosis     Osteoporosis     Rheumatoid arthritis (H)     Parkinson disease (H)     Osteoarthritis of wrist     Osteoarthritis of shoulder     History of total knee arthroplasty     Osteoarthritis of left knee     Advanced directives, counseling/discussion     CARDIOVASCULAR SCREENING; LDL GOAL LESS THAN 160     High risk medication use     Underweight     Impingement syndrome, shoulder, left     Combined forms of age-related cataract of both eyes     Past Medical History     Past Medical History:   Diagnosis Date     Hyperlipidemia      Murmur, heart     " hsm     Nonsenile cataract      Osteoarthrosis, unspecified whether generalized or localized, lower leg      Osteopenia      Rheumatoid arthritis(714.0)      Past Surgical History     Past Surgical History:   Procedure Laterality Date     GALLBLADDER SURGERY       HYSTERECTOMY       KNEE SURGERY      left      LAMINECTOMY LUMBAR ONE LEVEL       TONSILLECTOMY       Allergy     Allergies   Allergen Reactions     Decadrol [Dexamethasone Sodium Phosphate]      Shrimp      Current Medication List     Current Outpatient Medications   Medication Sig     Abatacept (ORENCIA CLICKJECT) 125 MG/ML SOAJ auto-injector Inject 1 mL (125 mg) Subcutaneous every 7 days     ammonium lactate (AMLACTIN) 12 % cream Apply once to twice a day to dry skin. Can burn temporarily if there is a cut on the skin.     ASPIRIN 81 MG PO TABS PATIENT REPORTS TAKING 4 TIMES PER WEEK AND NOT EVERYDAY     CALCIUM + D OR 600mg twice a day      carbidopa-levodopa (SINEMET)  MG tablet Take one at 6 am, noon, 5 pm, 9 pm and midnight     folic acid (FOLVITE) 1 MG tablet Take 1 tablet (1 mg) by mouth daily     hydroxychloroquine (PLAQUENIL) 200 MG tablet Take 1 tablet (200 mg) by mouth daily     ibuprofen (ADVIL/MOTRIN) 200 MG tablet Take 1 tablet (200 mg) by mouth every 8 hours as needed for moderate pain     methotrexate sodium 2.5 MG TABS Take 8 tablets (20 mg) by mouth once a week . Take all 8 tablets on the same day of each week.     ACE NOT PRESCRIBED, INTENTIONAL, 1 each continuous prn. ACE Inhibitor not prescribed due to Refusal by patient           bisacodyl (DULCOLAX) 10 MG suppository Place 1 suppository (10 mg) rectally daily as needed for constipation (Patient not taking: Reported on 7/17/2019)     No current facility-administered medications for this visit.          Social History   See HPI    Family History     Family History   Problem Relation Age of Onset     Cancer Sister         pancreatic       Physical Exam     Temp Readings from  "Last 3 Encounters:   07/11/19 97.8  F (36.6  C) (Oral)   04/19/19 96.9  F (36.1  C)   09/05/18 97.5  F (36.4  C) (Oral)     BP Readings from Last 5 Encounters:   07/17/19 136/64   07/11/19 138/71   04/19/19 145/67   01/23/19 132/67   10/19/18 141/71     Pulse Readings from Last 1 Encounters:   07/17/19 90     Resp Readings from Last 1 Encounters:   04/19/19 12     Estimated body mass index is 19.32 kg/m  as calculated from the following:    Height as of 7/11/19: 1.613 m (5' 3.5\").    Weight as of this encounter: 50.3 kg (110 lb 12.8 oz).    GEN: NAD, thin and frail appearing  HEENT: MMM. No oral lesions. Anicteric, noninjected sclera  CV: S1, S2. RRR. No m/r/g.  PULM: CTA bilaterally. No w/c.  MSK: MCPs and PIPs without swelling or tenderness to palpation.  Right wrist with swelling and tenderness to palpation. Left wrist without swelling or tenderness to palpation.  [place]-neck deformity of the left third and fifth fingers.  Elbows without swelling or tenderness to palpation.  Bilateral shoulders diffusely tenderness to palpation and the left shoulder appears to hang low; left shoulder painful with abduction above 90 degrees.  Knees, ankles, and MTPs without swelling or tenderness to palpation.  Holds neck in a neutral position.    NEURO: UE and LE strengths 5/5 and equal bilaterally.   SKIN: No rash. Diffuse scalp hair thinning.  EXT: No lower extremity edema  PSYCH: Alert. Appropriate.  Upbeat attitude    Labs / Imaging (select studies)   RF/CCP  Recent Labs   Lab Test 10/12/16  1142 07/30/13  1621 10/25/12  1127   CCPABY  --  <20 Interpretation:  Negative  --    CCPIGG 2  --   --    RHF <20  --  9     CBC  Recent Labs   Lab Test 07/15/19  1341 04/15/19  1337 12/03/18  1339   WBC 6.2 6.4 4.9   RBC 3.62* 3.61* 3.73*   HGB 11.0* 11.4* 11.5*   HCT 34.6* 35.3 35.0   MCV 96 98 94   RDW 15.3* 14.9 15.6*    226 192   MCH 30.4 31.6 30.8   MCHC 31.8 32.3 32.9   NEUTROPHIL 61.6 63.3 58.4   LYMPH 27.1 24.6 25.0 "   MONOCYTE 8.1 8.3 11.8   EOSINOPHIL 1.4 1.4 2.4   BASOPHIL 1.8 2.4 2.4   ANEU 3.8 4.0 2.9   ALYM 1.7 1.6 1.2   DORITA 0.5 0.5 0.6   AEOS 0.1 0.1 0.1   ABAS 0.1 0.2 0.1     CMP  Recent Labs   Lab Test 07/15/19  1341 04/15/19  1337 12/03/18  1339  01/25/18  1353  05/27/16  1413 05/26/15  1416 04/28/14  1209   NA  --   --   --   --   --   --  136 137 136   POTASSIUM  --   --   --   --   --   --  3.7 4.0 4.2   CHLORIDE  --   --   --   --   --   --  102 103 98   CO2  --   --   --   --   --   --  25 28 27   ANIONGAP  --   --   --   --   --   --  9 6 11   GLC  --   --   --   --   --   --  94 100* 99   BUN  --   --   --   --   --   --  11 9 12   CR 0.50* 0.54 0.43*   < >  --    < > 0.41* 0.47* 0.52   GFRESTIMATED >90 >90 >90   < >  --    < > >90  Non  GFR Calc   >90  Non  GFR Calc   >90   GFRESTBLACK >90 >90 >90   < >  --    < > >90   GFR Calc   >90   GFR Calc   >90   PAKO  --   --   --   --  9.1  --  9.2 9.2 9.4   BILITOTAL 0.3 0.5 0.4   < >  --    < > 0.5  --  0.5   ALBUMIN 4.0 4.1 3.9   < >  --    < > 3.8  --  4.4   PROTTOTAL 7.4 7.4 7.1   < >  --    < > 8.8  --  8.6   ALKPHOS 83 67 78   < >  --    < > 108  --  119   AST 25 22 33   < >  --    < > 15  --  27   ALT 15 10 9   < >  --    < > 8  --  19    < > = values in this interval not displayed.     Calcium/VitaminD  Recent Labs   Lab Test 01/25/18  1353 05/27/16  1413 05/26/15  1416  04/26/13  1128  10/26/11  1113   PAKO 9.1 9.2 9.2   < > 9.2   < > 9.7   D3VIT  --   --   --   --   --   --  46   VITDT 39 95*  --   --  63  --   --     < > = values in this interval not displayed.     ESR/CRP  Recent Labs   Lab Test 07/15/19  1341 12/03/18  1339 04/30/18  1339   SED 16 14 17   CRP <2.9 <2.9 <2.9     Lipid Panel  Recent Labs   Lab Test 05/26/15  1416 04/26/13  1128 04/26/12  1151   CHOL 179 154 159   TRIG 50 44 56   HDL 80 72 71   LDL 89 73 77   VLDL 10 9 11   CHOLHDLRATIO 2.2 2.1 2.2     Hepatitis B  Recent Labs    Lab Test 01/18/17  1601   HBCAB Nonreactive   HEPBANG Nonreactive     Hepatitis C  Recent Labs   Lab Test 01/18/17  1601   HCVAB Nonreactive   Assay performance characteristics have not been established for newborns,   infants, and children       Tuberculosis Screening  Recent Labs   Lab Test 01/25/18  1352 01/18/17  1601   TBRSLT Negative Negative   TBAGN 0.00 0.00     HIV Screening  Recent Labs   Lab Test 01/18/17  1601   HIAGAB Nonreactive   HIV-1 p24 Ag & HIV-1/HIV-2 Ab Not Detected       Immunization History     Immunization History   Administered Date(s) Administered     Pneumo Conj 13-V (2010&after) 11/03/2016     Pneumococcal 23 valent 11/01/2007, 11/13/2007, 03/12/2018     TD (ADULT, 7+) 11/13/2007     Tdap (Adacel,Boostrix) 11/13/2007          Chart documentation done in part with Dragon Voice recognition Software. Although reviewed after completion, some word and grammatical error may remain.    Merrick Del Cid MD

## 2019-07-17 NOTE — PATIENT INSTRUCTIONS
Rheumatology    Dr. Merrick Del Cid         [facility]   (Monday)  [address]  [place] [address]       [facility]   (Tuesday)  [address]  [place] [address]    [facility]   (Wed., Thurs., and Friday)  [address] [place] [address]    Phone number: [phone]  Thank you for choosing [facility].  Joan Bucio CMA

## 2019-07-25 ENCOUNTER — OFFICE VISIT (OUTPATIENT)
Dept: FAMILY MEDICINE | Facility: CLINIC | Age: 79
End: 2019-07-25
Payer: MEDICARE

## 2019-07-25 VITALS
HEIGHT: 64 IN | DIASTOLIC BLOOD PRESSURE: 65 MMHG | SYSTOLIC BLOOD PRESSURE: 132 MMHG | RESPIRATION RATE: 18 BRPM | BODY MASS INDEX: 18.78 KG/M2 | HEART RATE: 85 BPM | TEMPERATURE: 97.7 F | OXYGEN SATURATION: 98 % | WEIGHT: 110 LBS

## 2019-07-25 DIAGNOSIS — M06.09 RHEUMATOID ARTHRITIS OF MULTIPLE SITES WITH NEGATIVE RHEUMATOID FACTOR (H): ICD-10-CM

## 2019-07-25 DIAGNOSIS — G20.A1 PARKINSON DISEASE (H): ICD-10-CM

## 2019-07-25 DIAGNOSIS — K59.01 SLOW TRANSIT CONSTIPATION: Primary | ICD-10-CM

## 2019-07-25 DIAGNOSIS — L21.9 SEBORRHEIC DERMATITIS: ICD-10-CM

## 2019-07-25 PROCEDURE — 99214 OFFICE O/P EST MOD 30 MIN: CPT | Performed by: FAMILY MEDICINE

## 2019-07-25 RX ORDER — SENNA AND DOCUSATE SODIUM 50; 8.6 MG/1; MG/1
1 TABLET, FILM COATED ORAL AT BEDTIME
Qty: 100 TABLET | Refills: 1 | Status: SHIPPED | OUTPATIENT
Start: 2019-07-25 | End: 2021-05-26

## 2019-07-25 ASSESSMENT — MIFFLIN-ST. JEOR: SCORE: 961.02

## 2019-07-25 NOTE — PROGRESS NOTES
Subjective     Alannah Leos is a 78 year old female who presents to clinic today for the following health issues:    HPI     Medication Followup of Constipation    Taking Medication as prescribed: yes    Side Effects:  None    Medication Helping Symptoms:  Yes- took dulcolax and this did not work, so was given Magnesium citrate and this caused cramps and diarrhea for a day. Still hard to push out stools but they are softer now.      Also having continued issues with persistent dry itching scalp. None of the medications helped.     Patient Active Problem List   Diagnosis     Osteoporosis     Rheumatoid arthritis (H)     Parkinson disease (H)     Osteoarthritis of wrist     Osteoarthritis of shoulder     History of total knee arthroplasty     Osteoarthritis of left knee     Advanced directives, counseling/discussion     CARDIOVASCULAR SCREENING; LDL GOAL LESS THAN 160     High risk medication use     Underweight     Impingement syndrome, shoulder, left     Combined forms of age-related cataract of both eyes     Seborrheic dermatitis     Past Surgical History:   Procedure Laterality Date     GALLBLADDER SURGERY       HYSTERECTOMY       KNEE SURGERY      left      LAMINECTOMY LUMBAR ONE LEVEL       TONSILLECTOMY         Social History     Tobacco Use     Smoking status: Never Smoker     Smokeless tobacco: Never Used   Substance Use Topics     Alcohol use: No     Family History   Problem Relation Age of Onset     Cancer Sister         pancreatic         Current Outpatient Medications   Medication Sig Dispense Refill     ACE NOT PRESCRIBED, INTENTIONAL, 1 each continuous prn. ACE Inhibitor not prescribed due to Refusal by patient       0 each 0     ammonium lactate (AMLACTIN) 12 % cream Apply once to twice a day to dry skin. Can burn temporarily if there is a cut on the skin. 385 g 11     ASPIRIN 81 MG PO TABS PATIENT REPORTS TAKING 4 TIMES PER WEEK AND NOT EVERYDAY       CALCIUM + D OR 600mg twice a day         carbidopa-levodopa (SINEMET)  MG tablet Take one at 6 am, noon, 5 pm, 9 pm and midnight 150 tablet 11     folic acid (FOLVITE) 1 MG tablet Take 1 tablet (1 mg) by mouth daily 100 tablet 3     hydroxychloroquine (PLAQUENIL) 200 MG tablet Take 1 tablet (200 mg) by mouth daily 30 tablet 7     ibuprofen (ADVIL/MOTRIN) 200 MG tablet Take 1 tablet (200 mg) by mouth every 8 hours as needed for moderate pain       methotrexate sodium 2.5 MG TABS Take 8 tablets (20 mg) by mouth once a week . Take all 8 tablets on the same day of each week. 32 tablet 7     SENNA-docusate sodium (SENNA S) 8.6-50 MG tablet Take 1 tablet by mouth At Bedtime 100 tablet 1     Allergies   Allergen Reactions     Decadrol [Dexamethasone Sodium Phosphate]      Shrimp      Recent Labs   Lab Test 07/15/19  1341 04/15/19  1337 12/03/18  1339  05/08/17  1446  05/27/16  1413 05/26/15  1416  10/28/13  1204  04/26/13  1128  04/26/12  1151   A1C  --   --   --   --   --   --  5.9 6.0  --  5.8  --  6.3*   < > 6.3*   LDL  --   --   --   --   --   --   --  89  --   --   --  73  --  77   HDL  --   --   --   --   --   --   --  80  --   --   --  72  --  71   TRIG  --   --   --   --   --   --   --  50  --   --   --  44  --  56   ALT 15 10 9   < >  --    < > 8  --    < >  --    < > 13   < > 14   CR 0.50* 0.54 0.43*   < >  --    < > 0.41* 0.47*   < >  --    < > 0.48*   < > 0.54   GFRESTIMATED >90 >90 >90   < >  --    < > >90  Non  GFR Calc   >90  Non  GFR Calc     < >  --    < > >90   < > >90   GFRESTBLACK >90 >90 >90   < >  --    < > >90   GFR Calc   >90   GFR Calc     < >  --    < > >90   < > >90   POTASSIUM  --   --   --   --   --   --  3.7 4.0   < >  --    < > 4.0   < > 4.2   TSH  --   --   --   --  2.12  --  2.36 2.43   < >  --   --  2.41  --   --     < > = values in this interval not displayed.      BP Readings from Last 3 Encounters:   07/25/19 132/65   07/17/19 136/64   07/11/19 138/71    Wt  "Readings from Last 3 Encounters:   07/25/19 49.9 kg (110 lb)   07/17/19 50.3 kg (110 lb 12.8 oz)   07/11/19 49.9 kg (110 lb)                    Reviewed and updated as needed this visit by Provider         Review of Systems   ROS COMP: Constitutional, HEENT, cardiovascular, pulmonary, gi and gu systems are negative, except as otherwise noted.      Objective    /65 (BP Location: Right arm, Patient Position: Chair, Cuff Size: Adult Regular)   Pulse 85   Temp 97.7  F (36.5  C) (Oral)   Resp 18   Ht 1.613 m (5' 3.5\")   Wt 49.9 kg (110 lb)   SpO2 98%   BMI 19.18 kg/m    Body mass index is 19.18 kg/m .  Physical Exam   GENERAL: elderly, alert, well nourished, well hydrated, no distress  HENT: ear canals- normal; TMs- normal; Nose- normal; Mouth- no ulcers, no lesions, missing dentition  NECK: no tenderness, no adenopathy, no asymmetry, no masses, no stiffness; thyroid- normal to palpation  RESP: lungs clear to auscultation - no rales, no rhonchi, no wheezes  CV: regular rates and rhythm, normal S1 S2, no S3 or S4 and no murmur, no click or rub, normal pulses  ABDOMEN: soft, no tenderness, no  hepatosplenomegaly, no masses, normal bowel sounds  MS: extremities- no gross deformities noted, no edema  SKIN: no suspicious lesions, no rashes, age related skin changes with seborrheic keratosis and no actinic keratosis.    NEURO: strength and tone- decreased, sensory exam- grossly normal, reflexes- symmetric  BACK: no CVA tenderness, no paralumbar tenderness  MENTAL STATUS EXAM:  Appearance/Behavior: no apparent distress, neatly groomed, dressed appropriately for weather, appears stated age and is frail-appearing  Speech: normal  Mood/Affect: normal affect  Insight: Fair     Diagnostic Test Results:  Labs reviewed in Epic  Results for orders placed or performed in visit on 07/15/19   Hepatic panel   Result Value Ref Range    Bilirubin Direct <0.1 0.0 - 0.2 mg/dL    Bilirubin Total 0.3 0.2 - 1.3 mg/dL    Albumin 4.0 " 3.4 - 5.0 g/dL    Protein Total 7.4 6.8 - 8.8 g/dL    Alkaline Phosphatase 83 40 - 150 U/L    ALT 15 0 - 50 U/L    AST 25 0 - 45 U/L   CRP inflammation   Result Value Ref Range    CRP Inflammation <2.9 0.0 - 8.0 mg/L   Erythrocyte sedimentation rate auto   Result Value Ref Range    Sed Rate 16 0 - 30 mm/h   Creatinine   Result Value Ref Range    Creatinine 0.50 (L) 0.52 - 1.04 mg/dL    GFR Estimate >90 >60 mL/min/[1.73_m2]    GFR Estimate If Black >90 >60 mL/min/[1.73_m2]   CBC with platelets differential   Result Value Ref Range    WBC 6.2 4.0 - 11.0 10e9/L    RBC Count 3.62 (L) 3.8 - 5.2 10e12/L    Hemoglobin 11.0 (L) 11.7 - 15.7 g/dL    Hematocrit 34.6 (L) 35.0 - 47.0 %    MCV 96 78 - 100 fl    MCH 30.4 26.5 - 33.0 pg    MCHC 31.8 31.5 - 36.5 g/dL    RDW 15.3 (H) 10.0 - 15.0 %    Platelet Count 221 150 - 450 10e9/L    % Neutrophils 61.6 %    % Lymphocytes 27.1 %    % Monocytes 8.1 %    % Eosinophils 1.4 %    % Basophils 1.8 %    Absolute Neutrophil 3.8 1.6 - 8.3 10e9/L    Absolute Lymphocytes 1.7 0.8 - 5.3 10e9/L    Absolute Monocytes 0.5 0.0 - 1.3 10e9/L    Absolute Eosinophils 0.1 0.0 - 0.7 10e9/L    Absolute Basophils 0.1 0.0 - 0.2 10e9/L    Diff Method Automated Method            Assessment & Plan       ICD-10-CM    1. Slow transit constipation K59.01 SENNA-docusate sodium (SENNA S) 8.6-50 MG tablet- may be helpful when having difficulty pushing stool out. May use daily or every other day as needed. Continue fiber or Miralax to keep stools soft.   2. Parkinson disease (H) G20 Taking medication that can cause constipation.    3. Rheumatoid arthritis of multiple sites with negative rheumatoid factor (H) M06.09 Stable    4. Seborrheic dermatitis L21.9 Dry scalp with itching. Discussed using baby shampoo and very gentle washing every other day.           CONSULTATION/REFERRAL to neurology for follow up   FUTURE LABS:       - Schedule fasting labs in 6 months  FUTURE APPOINTMENTS:       - Follow-up visit in 3  months or sooner if any questions or concerns.   See Patient Instructions    Return in about 3 months (around 10/25/2019) for recheck, medication follow up.    Leah Blanca MD  [facility]

## 2019-07-25 NOTE — PATIENT INSTRUCTIONS
At [facility], we strive to deliver an exceptional experience to you, every time we see you.  If you receive a survey in the mail, please send us back your thoughts. We really do value your feedback.    Based on your medical history, these are the current health maintenance/preventive care services that you are due for (some may have been done at this visit.)  Health Maintenance Due   Topic Date Due     SALBADOR ASSESSMENT  1940     PHQ-9  1940     ZOSTER IMMUNIZATION (1 of 2) 09/25/1991     MEDICARE ANNUAL WELLNESS VISIT  05/27/2017     DTAP/TDAP/TD IMMUNIZATION (2 - Td) 11/13/2017     FALL RISK ASSESSMENT  08/07/2018     ADVANCE CARE PLANNING  04/28/2019         Suggested websites for health information:  Www.[employer] : Up to date and easily searchable information on multiple topics.  Www.medlineplus.gov : medication info, interactive tutorials, watch real surgeries online  Www.familydoctor.org : good info from the Academy of Family Physicians  Www.cdc.gov : public health info, travel advisories, epidemics (H1N1)  Www.aap.org : children's health info, normal development, vaccinations  Www.health.[facility].[place].us : [place] dept of health, public health issues in [place], N1N1    Your care team:                            Family Medicine Internal Medicine   MD Austen Burch MD Shantel Branch-Fleming, MD Katya Georgiev PA-C Nam Ho, MD Pediatrics   JORDAN Borges, MD Lo Shook CNP, MD Deborah Mielke, MD Kim Thein, APRN CNP      Clinic hours: Monday - Thursday 7 am-7 pm; Fridays 7 am-5 pm.   Urgent care: Monday - Friday 11 am-9 pm; Saturday and Sunday 9 am-5 pm.  Pharmacy : Monday -Thursday 8 am-8 pm; Friday 8 am-6 pm; Saturday and Sunday 9 am-5 pm.     Clinic: [phone]   Pharmacy: [phone]      Patient Education     Constipation (Adult)  Constipation means that you have bowel movements that  are less frequent than usual. Stools often become very hard and difficult to pass.  Constipation is very common. At some point in life, it affects almost everyone. Since everyone's bowel habits are different, what is constipation to one person may not be to another. Your healthcare provider may do tests to diagnose constipation. It depends on what he or she finds when evaluating you.    Symptoms of constipation include:    Abdominal pain    Bloating    Vomiting    Painful bowel movements    Itching, swelling, bleeding, or pain around the anus  Causes  Constipation can have many causes. These include:    Diet low in fiber    Too much dairy    Not drinking enough liquids    Lack of exercise or physical activity (especially true for older adults)    Changes in lifestyle or daily routine, including pregnancy, aging, work, and travel    Frequent use or misuse of laxatives    Ignoring the urge to have a bowel movement or delaying it until later    Medicines, such as certain prescription pain medicines, iron supplements, antacids, certain antidepressants, and calcium supplements    Diseases like irritable bowel syndrome, bowel obstructions, stroke, diabetes, thyroid disease, Parkinson disease, hemorrhoids, and colon cancer  Complications  Potential complications of constipation can include:    Hemorrhoids    Rectal bleeding from hemorrhoids or anal fissures (skin tears)    Hernias    Dependency on laxatives    Chronic constipation    Fecal impaction, a severe form of constipation in which a large amount of hard stool is in your rectum that you can't pass    Bowel obstruction or perforation  Home care  All treatment should be done after talking with your healthcare provider. This is especially true if you have another medical problems, are taking prescription medicines, or are an older adult. Treatment most often involves lifestyle changes. You may also need medicines. Your healthcare provider will tell you which will work  best for you. Follow the advice below to help avoid this problem in the future.  Lifestyle changes  These lifestyle changes can help prevent constipation:    Diet. Eat a high-fiber diet, with fresh fruit and vegetables, and reduce dairy intake, meats, and processed foods    Fluids. It's important to get enough fluids each day. Drink plenty of water when you eat more fiber. If you are on diet that limits the amount of fluid you can have, talk about this with your healthcare provider.    Regular exercise. Check with your healthcare provider first.  Medicines  Take any medicines as directed. Some laxatives are safe to use only every now and then. Others can be taken on a regular basis. While laxatives don't cause bowel dependence, they are treating the symptoms. So your constipation may return if you don't make other changes. Talk with your healthcare provider or pharmacist if you have questions.  Prescription pain medicines can cause constipation. If you are taking this kind of medicine, ask your healthcare provider if you should also take a stool softener.  Medicines you may take to treat constipation include:    Fiber supplements    Stool softeners    Laxatives    Enemas    Rectal suppositories  Follow-up care  Follow up with your healthcare provider if symptoms don't get better in the next few days. You may need to have more tests or see a specialist.  Call 911  Call 911 if any of these occur:    Trouble breathing    Stiff, rigid abdomen that is severely painful to touch    Confusion    Fainting or loss of consciousness    Rapid heart rate    Chest pain  When to seek medical advice  Call your healthcare provider right away if any of these occur:    Fever of 100.4 F (38 C) or higher, or as directed by your healthcare provider    Failure to resume normal bowel movements    Pain in your abdomen or back gets worse    Nausea or vomiting    Swelling in your abdomen    Blood in the stool    Black, tarry  stool    Involuntary weight loss    Weakness  Date Last Reviewed: 6/1/2018 2000-2018 [employer]. [address] [place] [address], [place] [address]. All rights reserved. This information is not intended as a substitute for professional medical care. Always follow your healthcare professional's instructions.

## 2019-08-06 ENCOUNTER — TELEPHONE (OUTPATIENT)
Dept: FAMILY MEDICINE | Facility: CLINIC | Age: 79
End: 2019-08-06

## 2019-08-06 DIAGNOSIS — K59.01 SLOW TRANSIT CONSTIPATION: Primary | ICD-10-CM

## 2019-08-06 NOTE — TELEPHONE ENCOUNTER
Reason for call:  Other   Patient called regarding (reason for call): appointment  Additional comments: Would like to speak to Rianna regarding if patient has to come in for an appointment or not     Phone number to reach patient:  Home number on file [phone] (home)    Best Time:  Any    Can we leave a detailed message on this number?  YES

## 2019-08-06 NOTE — TELEPHONE ENCOUNTER
Called patient regarding her question. Patient states that she still feels backed up even with the new medication that PCP prescribed. Patient would like to know if she needs to follow up sooner, continue medication or change what she is currently doing to feel better, since this has been going on for a month. Patient requested to speak with PCP, informed patient that her message would be forwarded to PCP to advise.    Routing to PCP  Addis Cardenas MA on 8/6/2019 at 2:22 PM

## 2019-08-07 RX ORDER — LACTULOSE 10 G/15ML
20 SOLUTION ORAL 2 TIMES DAILY
Qty: 500 ML | Refills: 3 | Status: SHIPPED | OUTPATIENT
Start: 2019-08-07 | End: 2019-09-12 | Stop reason: SINTOL

## 2019-08-07 NOTE — TELEPHONE ENCOUNTER
I would like to add lactulose to her bowel program. 30 ml twice a day for the next 1-2 weeks or longer if this is helpful. She should take this with the other medication and continue both. Follow up with me if this is not working.   Leah Blanca MD

## 2019-08-08 NOTE — TELEPHONE ENCOUNTER
Patient contacted and informed of the below per provider documentation. Patient verbalizes understanding. Her only concern is all these medications she is taking. She is not sure if any will be reacting with each other. She mentions medications for parkinsons and rheumatoid arthritis. Horace had already notified her of a new Rx she picked this up and has taken one dose of the lactulose.       Fifi Arango RN

## 2019-08-08 NOTE — TELEPHONE ENCOUNTER
Patient contacted and informed of the below per provider documentation. Patient verbalizes understanding.     Fifi Arango RN

## 2019-09-03 ENCOUNTER — TRANSFERRED RECORDS (OUTPATIENT)
Dept: HEALTH INFORMATION MANAGEMENT | Facility: CLINIC | Age: 79
End: 2019-09-03

## 2019-09-12 ENCOUNTER — OFFICE VISIT (OUTPATIENT)
Dept: FAMILY MEDICINE | Facility: CLINIC | Age: 79
End: 2019-09-12
Payer: MEDICARE

## 2019-09-12 VITALS
OXYGEN SATURATION: 97 % | WEIGHT: 111 LBS | HEART RATE: 90 BPM | RESPIRATION RATE: 16 BRPM | TEMPERATURE: 98.2 F | DIASTOLIC BLOOD PRESSURE: 66 MMHG | SYSTOLIC BLOOD PRESSURE: 135 MMHG | BODY MASS INDEX: 18.95 KG/M2 | HEIGHT: 64 IN

## 2019-09-12 DIAGNOSIS — R63.6 UNDERWEIGHT: ICD-10-CM

## 2019-09-12 DIAGNOSIS — D63.8 ANEMIA IN OTHER CHRONIC DISEASES CLASSIFIED ELSEWHERE: ICD-10-CM

## 2019-09-12 DIAGNOSIS — L21.9 SEBORRHEIC DERMATITIS: ICD-10-CM

## 2019-09-12 DIAGNOSIS — Z79.899 HIGH RISK MEDICATION USE: ICD-10-CM

## 2019-09-12 DIAGNOSIS — K59.01 SLOW TRANSIT CONSTIPATION: Primary | ICD-10-CM

## 2019-09-12 PROCEDURE — 99214 OFFICE O/P EST MOD 30 MIN: CPT | Performed by: FAMILY MEDICINE

## 2019-09-12 ASSESSMENT — PATIENT HEALTH QUESTIONNAIRE - PHQ9: SUM OF ALL RESPONSES TO PHQ QUESTIONS 1-9: 2

## 2019-09-12 ASSESSMENT — PAIN SCALES - GENERAL: PAINLEVEL: NO PAIN (0)

## 2019-09-12 ASSESSMENT — MIFFLIN-ST. JEOR: SCORE: 965.55

## 2019-09-12 NOTE — PROGRESS NOTES
Subjective     Alannah Leos is a 78 year old female who presents to clinic today for the following health issues:    HPI   Constipation      Duration: since July 2019    Description:       Frequency of bowel movements: 2-3 days       Consistency of stool: pellets, pudding like     Intensity:  mild    Accompanying signs and symptoms: improving        Abdominal pain: no        Rectal pain: no        Blood in stool: no        Nausea/vomitting: no     History:        Similar problems in past: no     Precipitating or alleviating factors: none        Medications worsening symptoms: no     Therapies tried and outcome: fiber water daily, miralax intermittently, Senna and Magnesium        Chronic laxative use: no     -patient has some questions regarding her constipation     Taking Sinemet for parkinson's and Plaquenil for arthritis. These have been stable and are monitored by specialists. Has sebborrheic dermatitis (dandruff) that is improved but also other skin conditions now that cause raised lesions and sores on her head. Alopecia of scalp and eyebrows.     Patient Active Problem List   Diagnosis     Osteoporosis     Rheumatoid arthritis (H)     Parkinson disease (H)     Osteoarthritis of wrist     Osteoarthritis of shoulder     History of total knee arthroplasty     Osteoarthritis of left knee     Advanced directives, counseling/discussion     CARDIOVASCULAR SCREENING; LDL GOAL LESS THAN 160     High risk medication use     Underweight     Impingement syndrome, shoulder, left     Combined forms of age-related cataract of both eyes     Seborrheic dermatitis     Past Surgical History:   Procedure Laterality Date     GALLBLADDER SURGERY       HYSTERECTOMY       KNEE SURGERY      left      LAMINECTOMY LUMBAR ONE LEVEL       TONSILLECTOMY         Social History     Tobacco Use     Smoking status: Never Smoker     Smokeless tobacco: Never Used   Substance Use Topics     Alcohol use: No     Family History   Problem Relation  Age of Onset     Cancer Sister         pancreatic         Current Outpatient Medications   Medication Sig Dispense Refill     ACE NOT PRESCRIBED, INTENTIONAL, 1 each continuous prn. ACE Inhibitor not prescribed due to Refusal by patient       0 each 0     ammonium lactate (AMLACTIN) 12 % cream Apply once to twice a day to dry skin. Can burn temporarily if there is a cut on the skin. 385 g 11     ASPIRIN 81 MG PO TABS PATIENT REPORTS TAKING 4 TIMES PER WEEK AND NOT EVERYDAY       CALCIUM + D OR 600mg twice a day        carbidopa-levodopa (SINEMET)  MG tablet Take one at 6 am, noon, 5 pm, 9 pm and midnight 150 tablet 11     folic acid (FOLVITE) 1 MG tablet Take 1 tablet (1 mg) by mouth daily 100 tablet 3     hydroxychloroquine (PLAQUENIL) 200 MG tablet Take 1 tablet (200 mg) by mouth daily 30 tablet 7     ibuprofen (ADVIL/MOTRIN) 200 MG tablet Take 1 tablet (200 mg) by mouth every 8 hours as needed for moderate pain       methotrexate sodium 2.5 MG TABS Take 8 tablets (20 mg) by mouth once a week . Take all 8 tablets on the same day of each week. 32 tablet 7     SENNA-docusate sodium (SENNA S) 8.6-50 MG tablet Take 1 tablet by mouth At Bedtime 100 tablet 1     Allergies   Allergen Reactions     Decadrol [Dexamethasone Sodium Phosphate]      Shrimp      Recent Labs   Lab Test 07/15/19  1341 04/15/19  1337 12/03/18  1339  05/08/17  1446  05/27/16  1413 05/26/15  1416  10/28/13  1204  04/26/13  1128  04/26/12  1151   A1C  --   --   --   --   --   --  5.9 6.0  --  5.8  --  6.3*   < > 6.3*   LDL  --   --   --   --   --   --   --  89  --   --   --  73  --  77   HDL  --   --   --   --   --   --   --  80  --   --   --  72  --  71   TRIG  --   --   --   --   --   --   --  50  --   --   --  44  --  56   ALT 15 10 9   < >  --    < > 8  --    < >  --    < > 13   < > 14   CR 0.50* 0.54 0.43*   < >  --    < > 0.41* 0.47*   < >  --    < > 0.48*   < > 0.54   GFRESTIMATED >90 >90 >90   < >  --    < > >90  Non   "GFR Calc   >90  Non  GFR Calc     < >  --    < > >90   < > >90   GFRESTBLACK >90 >90 >90   < >  --    < > >90   GFR Calc   >90   GFR Calc     < >  --    < > >90   < > >90   POTASSIUM  --   --   --   --   --   --  3.7 4.0   < >  --    < > 4.0   < > 4.2   TSH  --   --   --   --  2.12  --  2.36 2.43   < >  --   --  2.41  --   --     < > = values in this interval not displayed.      BP Readings from Last 3 Encounters:   09/12/19 135/66   07/25/19 132/65   07/17/19 136/64    Wt Readings from Last 3 Encounters:   09/12/19 50.3 kg (111 lb)   07/25/19 49.9 kg (110 lb)   07/17/19 50.3 kg (110 lb 12.8 oz)                      Reviewed and updated as needed this visit by Provider  Problems         Review of Systems   ROS COMP: Constitutional, HEENT, cardiovascular, pulmonary, gi and gu systems are negative, except as otherwise noted.      Objective    /66 (BP Location: Left arm, Patient Position: Chair, Cuff Size: Adult Regular)   Pulse 90   Temp 98.2  F (36.8  C) (Oral)   Resp 16   Ht 1.613 m (5' 3.5\")   Wt 50.3 kg (111 lb)   SpO2 97%   BMI 19.35 kg/m    Body mass index is 19.35 kg/m .  Physical Exam   GENERAL: elderly, alert, well nourished, well hydrated, no distress, frail using a walker with some movement disorder tremor  HENT: ear canals- normal; TMs- normal; Nose- normal; Mouth- no ulcers, no lesions, missing dentition  NECK: no tenderness, no adenopathy, no asymmetry, no masses, no stiffness; thyroid- normal to palpation  RESP: lungs clear to auscultation - no rales, no rhonchi, no wheezes  CV: regular rates and rhythm, normal S1 S2, no S3 or S4 and no murmur, no click or rub, normal pulses  ABDOMEN: soft, no tenderness, no  hepatosplenomegaly, no masses, normal bowel sounds  MS: extremities- no gross deformities noted, no edema  SKIN: no suspicious lesions, no rashes, age related skin changes with seborrheic keratosis and no actinic keratosis. Thinning of " hair with some raised lesions and a single scab on the back occiput. No eyebrow hair. No erythema seen.   NEURO: strength and tone- decreased, sensory exam- grossly normal, reflexes- symmetric, some tremor and bradykinesia  BACK: no CVA tenderness, no paralumbar tenderness  MENTAL STATUS EXAM:  Appearance/Behavior: no apparent distress, neatly groomed, dressed appropriately for weather, appears stated age and is frail-appearing  Speech: normal  Mood/Affect: normal affect  Insight: Fair     Diagnostic Test Results:  Labs reviewed in Epic  Results for orders placed or performed in visit on 07/15/19   Hepatic panel   Result Value Ref Range    Bilirubin Direct <0.1 0.0 - 0.2 mg/dL    Bilirubin Total 0.3 0.2 - 1.3 mg/dL    Albumin 4.0 3.4 - 5.0 g/dL    Protein Total 7.4 6.8 - 8.8 g/dL    Alkaline Phosphatase 83 40 - 150 U/L    ALT 15 0 - 50 U/L    AST 25 0 - 45 U/L   CRP inflammation   Result Value Ref Range    CRP Inflammation <2.9 0.0 - 8.0 mg/L   Erythrocyte sedimentation rate auto   Result Value Ref Range    Sed Rate 16 0 - 30 mm/h   Creatinine   Result Value Ref Range    Creatinine 0.50 (L) 0.52 - 1.04 mg/dL    GFR Estimate >90 >60 mL/min/[1.73_m2]    GFR Estimate If Black >90 >60 mL/min/[1.73_m2]   CBC with platelets differential   Result Value Ref Range    WBC 6.2 4.0 - 11.0 10e9/L    RBC Count 3.62 (L) 3.8 - 5.2 10e12/L    Hemoglobin 11.0 (L) 11.7 - 15.7 g/dL    Hematocrit 34.6 (L) 35.0 - 47.0 %    MCV 96 78 - 100 fl    MCH 30.4 26.5 - 33.0 pg    MCHC 31.8 31.5 - 36.5 g/dL    RDW 15.3 (H) 10.0 - 15.0 %    Platelet Count 221 150 - 450 10e9/L    % Neutrophils 61.6 %    % Lymphocytes 27.1 %    % Monocytes 8.1 %    % Eosinophils 1.4 %    % Basophils 1.8 %    Absolute Neutrophil 3.8 1.6 - 8.3 10e9/L    Absolute Lymphocytes 1.7 0.8 - 5.3 10e9/L    Absolute Monocytes 0.5 0.0 - 1.3 10e9/L    Absolute Eosinophils 0.1 0.0 - 0.7 10e9/L    Absolute Basophils 0.1 0.0 - 0.2 10e9/L    Diff Method Automated Method             Assessment & Plan       ICD-10-CM    1. Slow transit constipation K59.01 Discussed use of fiber supplements, Senna-D, and Colace. Did not tolerate lactulose. Uses magnesium intermittently. Daughter is with her and will work on soluble fiber in the diet or supplements with water.    2. Underweight R63.6 Claims she eats plenty but probably has chronic early satiety. Weight has been stable over the past 5 years. Maximum weight in the past 10 years was 126.    3. Seborrheic dermatitis L21.9 Stable but not happy with alopecia and occasional sores   4. High risk medication use Z79.899 Sinemet may be contributing to constipation and dose has been adjusted.    5. Anemia in other chronic diseases classified elsewhere D63.8 Mild anemia. She does not want to go back on iron due to constipation. Discussed taking a children multivitamin with iron once a day.           FUTURE LABS:       - Schedule fasting labs in 6 months  FUTURE APPOINTMENTS:       - Follow-up visit in 3 months or sooner if any questions or concerns. All advise was discussed with patient's daughter also.   See Patient Instructions    Return in about 3 months (around 12/12/2019).    Leah Blanca MD  [facility]

## 2019-09-12 NOTE — PATIENT INSTRUCTIONS
At [facility], we strive to deliver an exceptional experience to you, every time we see you.  If you receive a survey in the mail, please send us back your thoughts. We really do value your feedback.    Based on your medical history, these are the current health maintenance/preventive care services that you are due for (some may have been done at this visit.)  Health Maintenance Due   Topic Date Due     SALBADOR ASSESSMENT  1940     PHQ-9  1940     ZOSTER IMMUNIZATION (1 of 2) 09/25/1991     MEDICARE ANNUAL WELLNESS VISIT  05/27/2017     DTAP/TDAP/TD IMMUNIZATION (2 - Td) 11/13/2017     ADVANCE CARE PLANNING  04/28/2019     INFLUENZA VACCINE (1) 09/01/2019         Suggested websites for health information:  Www.SafeTacMag : Up to date and easily searchable information on multiple topics.  Www.[employer]elliworks.gov : medication info, interactive tutorials, watch real surgeries online  Www.familydoctor.org : good info from the Academy of Family Physicians  Www.cdc.gov : public health info, travel advisories, epidemics (H1N1)  Www.aap.org : children's health info, normal development, vaccinations  Www.health.[facility].[place].us : [place] dept of health, public health issues in [place], N1N1    Your care team:                            Family Medicine Internal Medicine   MD Austen Burch MD Shantel Branch-Fleming, MD Katya Georgiev PA-C Nam Ho, MD Pediatrics   JORDAN Borges, MD Lo Shook CNP, MD Deborah Mielke, MD Kim Thein, APRN CNP      Clinic hours: Monday - Thursday 7 am-7 pm; Fridays 7 am-5 pm.   Urgent care: Monday - Friday 11 am-9 pm; Saturday and Sunday 9 am-5 pm.  Pharmacy : Monday -Thursday 8 am-8 pm; Friday 8 am-6 pm; Saturday and Sunday 9 am-5 pm.     Clinic: [phone]   Pharmacy: [phone]    Patient Education     Constipation (Adult)  Constipation means that you have bowel movements that are  less frequent than usual. Stools often become very hard and difficult to pass.  Constipation is very common. At some point in life, it affects almost everyone. Since everyone's bowel habits are different, what is constipation to one person may not be to another. Your healthcare provider may do tests to diagnose constipation. It depends on what he or she finds when evaluating you.    Symptoms of constipation include:    Abdominal pain    Bloating    Vomiting    Painful bowel movements    Itching, swelling, bleeding, or pain around the anus  Causes  Constipation can have many causes. These include:    Diet low in fiber    Too much dairy    Not drinking enough liquids    Lack of exercise or physical activity (especially true for older adults)    Changes in lifestyle or daily routine, including pregnancy, aging, work, and travel    Frequent use or misuse of laxatives    Ignoring the urge to have a bowel movement or delaying it until later    Medicines, such as certain prescription pain medicines, iron supplements, antacids, certain antidepressants, and calcium supplements    Diseases like irritable bowel syndrome, bowel obstructions, stroke, diabetes, thyroid disease, Parkinson disease, hemorrhoids, and colon cancer  Complications  Potential complications of constipation can include:    Hemorrhoids    Rectal bleeding from hemorrhoids or anal fissures (skin tears)    Hernias    Dependency on laxatives    Chronic constipation    Fecal impaction, a severe form of constipation in which a large amount of hard stool is in your rectum that you can't pass    Bowel obstruction or perforation  Home care  All treatment should be done after talking with your healthcare provider. This is especially true if you have another medical problems, are taking prescription medicines, or are an older adult. Treatment most often involves lifestyle changes. You may also need medicines. Your healthcare provider will tell you which will work best  for you. Follow the advice below to help avoid this problem in the future.  Lifestyle changes  These lifestyle changes can help prevent constipation:    Diet. Eat a high-fiber diet, with fresh fruit and vegetables, and reduce dairy intake, meats, and processed foods    Fluids. It's important to get enough fluids each day. Drink plenty of water when you eat more fiber. If you are on diet that limits the amount of fluid you can have, talk about this with your healthcare provider.    Regular exercise. Check with your healthcare provider first.  Medicines  Take any medicines as directed. Some laxatives are safe to use only every now and then. Others can be taken on a regular basis. While laxatives don't cause bowel dependence, they are treating the symptoms. So your constipation may return if you don't make other changes. Talk with your healthcare provider or pharmacist if you have questions.  Prescription pain medicines can cause constipation. If you are taking this kind of medicine, ask your healthcare provider if you should also take a stool softener.  Medicines you may take to treat constipation include:    Fiber supplements    Stool softeners    Laxatives    Enemas    Rectal suppositories  Follow-up care  Follow up with your healthcare provider if symptoms don't get better in the next few days. You may need to have more tests or see a specialist.  Call 911  Call 911 if any of these occur:    Trouble breathing    Stiff, rigid abdomen that is severely painful to touch    Confusion    Fainting or loss of consciousness    Rapid heart rate    Chest pain  When to seek medical advice  Call your healthcare provider right away if any of these occur:    Fever of 100.4 F (38 C) or higher, or as directed by your healthcare provider    Failure to resume normal bowel movements    Pain in your abdomen or back gets worse    Nausea or vomiting    Swelling in your abdomen    Blood in the stool    Black, tarry stool    Involuntary  weight loss    Weakness  Date Last Reviewed: 6/1/2018 2000-2018 [employer]. [address] [place] [address], [place] [address]. All rights reserved. This information is not intended as a substitute for professional medical care. Always follow your healthcare professional's instructions.

## 2019-10-01 PROBLEM — M54.50 ACUTE BILATERAL LOW BACK PAIN: Status: RESOLVED | Noted: 2018-02-05 | Resolved: 2018-02-19

## 2019-10-14 DIAGNOSIS — M06.09 RHEUMATOID ARTHRITIS OF MULTIPLE SITES WITH NEGATIVE RHEUMATOID FACTOR (H): ICD-10-CM

## 2019-10-14 LAB
ALBUMIN SERPL-MCNC: 4 G/DL (ref 3.4–5)
ALP SERPL-CCNC: 91 U/L (ref 40–150)
ALT SERPL W P-5'-P-CCNC: 17 U/L (ref 0–50)
AST SERPL W P-5'-P-CCNC: 22 U/L (ref 0–45)
BASOPHILS # BLD AUTO: 0.2 10E9/L (ref 0–0.2)
BASOPHILS NFR BLD AUTO: 2.9 %
BILIRUB DIRECT SERPL-MCNC: <0.1 MG/DL (ref 0–0.2)
BILIRUB SERPL-MCNC: 0.3 MG/DL (ref 0.2–1.3)
CREAT SERPL-MCNC: 0.45 MG/DL (ref 0.52–1.04)
DIFFERENTIAL METHOD BLD: ABNORMAL
EOSINOPHIL # BLD AUTO: 0.1 10E9/L (ref 0–0.7)
EOSINOPHIL NFR BLD AUTO: 1.4 %
ERYTHROCYTE [DISTWIDTH] IN BLOOD BY AUTOMATED COUNT: 15.6 % (ref 10–15)
GFR SERPL CREATININE-BSD FRML MDRD: >90 ML/MIN/{1.73_M2}
HCT VFR BLD AUTO: 36.2 % (ref 35–47)
HGB BLD-MCNC: 11.5 G/DL (ref 11.7–15.7)
LYMPHOCYTES # BLD AUTO: 1.4 10E9/L (ref 0.8–5.3)
LYMPHOCYTES NFR BLD AUTO: 20.6 %
MCH RBC QN AUTO: 30.1 PG (ref 26.5–33)
MCHC RBC AUTO-ENTMCNC: 31.8 G/DL (ref 31.5–36.5)
MCV RBC AUTO: 95 FL (ref 78–100)
MONOCYTES # BLD AUTO: 0.5 10E9/L (ref 0–1.3)
MONOCYTES NFR BLD AUTO: 8.1 %
NEUTROPHILS # BLD AUTO: 4.5 10E9/L (ref 1.6–8.3)
NEUTROPHILS NFR BLD AUTO: 67 %
PLATELET # BLD AUTO: 209 10E9/L (ref 150–450)
PROT SERPL-MCNC: 7.3 G/DL (ref 6.8–8.8)
RBC # BLD AUTO: 3.82 10E12/L (ref 3.8–5.2)
WBC # BLD AUTO: 6.7 10E9/L (ref 4–11)

## 2019-10-14 PROCEDURE — 85025 COMPLETE CBC W/AUTO DIFF WBC: CPT | Performed by: INTERNAL MEDICINE

## 2019-10-14 PROCEDURE — 36415 COLL VENOUS BLD VENIPUNCTURE: CPT | Performed by: INTERNAL MEDICINE

## 2019-10-14 PROCEDURE — 80076 HEPATIC FUNCTION PANEL: CPT | Performed by: INTERNAL MEDICINE

## 2019-10-14 PROCEDURE — 82565 ASSAY OF CREATININE: CPT | Performed by: INTERNAL MEDICINE

## 2019-10-14 NOTE — LETTER
[address] [place] [address]    October 16, 2019    Alannah Leos  [address] [facility] [address]          Dear Alannah,    Your labs are stable.  No evidence of medication toxicity.     Please let me know if you have any questions.     Enclosed is a copy of your results.     Results for orders placed or performed in visit on 10/14/19   Hepatic panel   Result Value Ref Range    Bilirubin Direct <0.1 0.0 - 0.2 mg/dL    Bilirubin Total 0.3 0.2 - 1.3 mg/dL    Albumin 4.0 3.4 - 5.0 g/dL    Protein Total 7.3 6.8 - 8.8 g/dL    Alkaline Phosphatase 91 40 - 150 U/L    ALT 17 0 - 50 U/L    AST 22 0 - 45 U/L   Creatinine   Result Value Ref Range    Creatinine 0.45 (L) 0.52 - 1.04 mg/dL    GFR Estimate >90 >60 mL/min/[1.73_m2]    GFR Estimate If Black >90 >60 mL/min/[1.73_m2]   CBC with platelets differential   Result Value Ref Range    WBC 6.7 4.0 - 11.0 10e9/L    RBC Count 3.82 3.8 - 5.2 10e12/L    Hemoglobin 11.5 (L) 11.7 - 15.7 g/dL    Hematocrit 36.2 35.0 - 47.0 %    MCV 95 78 - 100 fl    MCH 30.1 26.5 - 33.0 pg    MCHC 31.8 31.5 - 36.5 g/dL    RDW 15.6 (H) 10.0 - 15.0 %    Platelet Count 209 150 - 450 10e9/L    % Neutrophils 67.0 %    % Lymphocytes 20.6 %    % Monocytes 8.1 %    % Eosinophils 1.4 %    % Basophils 2.9 %    Absolute Neutrophil 4.5 1.6 - 8.3 10e9/L    Absolute Lymphocytes 1.4 0.8 - 5.3 10e9/L    Absolute Monocytes 0.5 0.0 - 1.3 10e9/L    Absolute Eosinophils 0.1 0.0 - 0.7 10e9/L    Absolute Basophils 0.2 0.0 - 0.2 10e9/L    Diff Method Automated Method        If you have any questions or concerns, please call myself or my nurse at [phone].      Sincerely,        Merrick Del Cid MD /chad

## 2019-10-15 NOTE — RESULT ENCOUNTER NOTE
"Please mail Ms. Leos her results with the following message.    \"Ms. Leos,    Your labs are stable.  No evidence of medication toxicity.    Please let me know if you have any questions.    Sincerely,  Merrick Del Cid MD  10/15/2019 5:04 PM\"  "

## 2019-10-29 ENCOUNTER — TRANSFERRED RECORDS (OUTPATIENT)
Dept: HEALTH INFORMATION MANAGEMENT | Facility: CLINIC | Age: 79
End: 2019-10-29

## 2019-12-04 ENCOUNTER — TRANSFERRED RECORDS (OUTPATIENT)
Dept: HEALTH INFORMATION MANAGEMENT | Facility: CLINIC | Age: 79
End: 2019-12-04

## 2019-12-10 ENCOUNTER — TRANSFERRED RECORDS (OUTPATIENT)
Dept: HEALTH INFORMATION MANAGEMENT | Facility: CLINIC | Age: 79
End: 2019-12-10

## 2020-01-08 ENCOUNTER — TRANSFERRED RECORDS (OUTPATIENT)
Dept: HEALTH INFORMATION MANAGEMENT | Facility: CLINIC | Age: 80
End: 2020-01-08

## 2020-01-13 DIAGNOSIS — M06.09 RHEUMATOID ARTHRITIS OF MULTIPLE SITES WITH NEGATIVE RHEUMATOID FACTOR (H): ICD-10-CM

## 2020-01-13 LAB
ALBUMIN SERPL-MCNC: 4 G/DL (ref 3.4–5)
ALP SERPL-CCNC: 116 U/L (ref 40–150)
ALT SERPL W P-5'-P-CCNC: 14 U/L (ref 0–50)
AST SERPL W P-5'-P-CCNC: 20 U/L (ref 0–45)
BASOPHILS # BLD AUTO: 0.2 10E9/L (ref 0–0.2)
BASOPHILS NFR BLD AUTO: 2.8 %
BILIRUB DIRECT SERPL-MCNC: 0.1 MG/DL (ref 0–0.2)
BILIRUB SERPL-MCNC: 0.4 MG/DL (ref 0.2–1.3)
CREAT SERPL-MCNC: 0.42 MG/DL (ref 0.52–1.04)
CRP SERPL-MCNC: <2.9 MG/L (ref 0–8)
DIFFERENTIAL METHOD BLD: ABNORMAL
EOSINOPHIL # BLD AUTO: 0.1 10E9/L (ref 0–0.7)
EOSINOPHIL NFR BLD AUTO: 1.4 %
ERYTHROCYTE [DISTWIDTH] IN BLOOD BY AUTOMATED COUNT: 16.3 % (ref 10–15)
ERYTHROCYTE [SEDIMENTATION RATE] IN BLOOD BY WESTERGREN METHOD: 9 MM/H (ref 0–30)
GFR SERPL CREATININE-BSD FRML MDRD: >90 ML/MIN/{1.73_M2}
HCT VFR BLD AUTO: 37.9 % (ref 35–47)
HGB BLD-MCNC: 12 G/DL (ref 11.7–15.7)
LYMPHOCYTES # BLD AUTO: 1.4 10E9/L (ref 0.8–5.3)
LYMPHOCYTES NFR BLD AUTO: 25 %
MCH RBC QN AUTO: 29.9 PG (ref 26.5–33)
MCHC RBC AUTO-ENTMCNC: 31.7 G/DL (ref 31.5–36.5)
MCV RBC AUTO: 94 FL (ref 78–100)
MONOCYTES # BLD AUTO: 0.5 10E9/L (ref 0–1.3)
MONOCYTES NFR BLD AUTO: 8.2 %
NEUTROPHILS # BLD AUTO: 3.6 10E9/L (ref 1.6–8.3)
NEUTROPHILS NFR BLD AUTO: 62.6 %
PLATELET # BLD AUTO: 211 10E9/L (ref 150–450)
PROT SERPL-MCNC: 7.4 G/DL (ref 6.8–8.8)
RBC # BLD AUTO: 4.02 10E12/L (ref 3.8–5.2)
WBC # BLD AUTO: 5.8 10E9/L (ref 4–11)

## 2020-01-13 PROCEDURE — 82565 ASSAY OF CREATININE: CPT | Performed by: INTERNAL MEDICINE

## 2020-01-13 PROCEDURE — 80076 HEPATIC FUNCTION PANEL: CPT | Performed by: INTERNAL MEDICINE

## 2020-01-13 PROCEDURE — 85025 COMPLETE CBC W/AUTO DIFF WBC: CPT | Performed by: INTERNAL MEDICINE

## 2020-01-13 PROCEDURE — 85652 RBC SED RATE AUTOMATED: CPT | Performed by: INTERNAL MEDICINE

## 2020-01-13 PROCEDURE — 36415 COLL VENOUS BLD VENIPUNCTURE: CPT | Performed by: INTERNAL MEDICINE

## 2020-01-13 PROCEDURE — 86140 C-REACTIVE PROTEIN: CPT | Performed by: INTERNAL MEDICINE

## 2020-01-15 ENCOUNTER — OFFICE VISIT (OUTPATIENT)
Dept: RHEUMATOLOGY | Facility: CLINIC | Age: 80
End: 2020-01-15
Payer: MEDICARE

## 2020-01-15 VITALS — BODY MASS INDEX: 19.35 KG/M2 | HEIGHT: 64 IN | DIASTOLIC BLOOD PRESSURE: 64 MMHG | SYSTOLIC BLOOD PRESSURE: 118 MMHG

## 2020-01-15 DIAGNOSIS — G25.81 RESTLESS LEGS SYNDROME (RLS): ICD-10-CM

## 2020-01-15 DIAGNOSIS — Z79.899 HIGH RISK MEDICATION USE: ICD-10-CM

## 2020-01-15 DIAGNOSIS — G20.A1 PARKINSON DISEASE (H): Primary | ICD-10-CM

## 2020-01-15 DIAGNOSIS — M06.09 RHEUMATOID ARTHRITIS OF MULTIPLE SITES WITH NEGATIVE RHEUMATOID FACTOR (H): Primary | ICD-10-CM

## 2020-01-15 DIAGNOSIS — M81.0 AGE-RELATED OSTEOPOROSIS WITHOUT CURRENT PATHOLOGICAL FRACTURE: ICD-10-CM

## 2020-01-15 LAB
ALBUMIN SERPL-MCNC: 4.3 G/DL (ref 3.4–5)
ALBUMIN UR-MCNC: NEGATIVE MG/DL
ALP SERPL-CCNC: 125 U/L (ref 40–150)
ALT SERPL W P-5'-P-CCNC: 18 U/L (ref 0–50)
ANION GAP SERPL CALCULATED.3IONS-SCNC: 5 MMOL/L (ref 3–14)
APPEARANCE UR: CLEAR
AST SERPL W P-5'-P-CCNC: 25 U/L (ref 0–45)
BILIRUB SERPL-MCNC: 0.4 MG/DL (ref 0.2–1.3)
BILIRUB UR QL STRIP: NEGATIVE
BUN SERPL-MCNC: 15 MG/DL (ref 7–30)
CALCIUM SERPL-MCNC: 10.2 MG/DL (ref 8.5–10.1)
CALCIUM SERPL-MCNC: 9.5 MG/DL (ref 8.5–10.1)
CHLORIDE SERPL-SCNC: 102 MMOL/L (ref 94–109)
CO2 SERPL-SCNC: 30 MMOL/L (ref 20–32)
COLOR UR AUTO: YELLOW
CREAT SERPL-MCNC: 0.55 MG/DL (ref 0.52–1.04)
ERYTHROCYTE [DISTWIDTH] IN BLOOD BY AUTOMATED COUNT: 16.5 % (ref 10–15)
GFR SERPL CREATININE-BSD FRML MDRD: 90 ML/MIN/{1.73_M2}
GLUCOSE SERPL-MCNC: 97 MG/DL (ref 70–99)
GLUCOSE UR STRIP-MCNC: NEGATIVE MG/DL
HCT VFR BLD AUTO: 38.2 % (ref 35–47)
HGB BLD-MCNC: 12.1 G/DL (ref 11.7–15.7)
HGB UR QL STRIP: NEGATIVE
KETONES UR STRIP-MCNC: NEGATIVE MG/DL
LEUKOCYTE ESTERASE UR QL STRIP: NEGATIVE
MCH RBC QN AUTO: 29.7 PG (ref 26.5–33)
MCHC RBC AUTO-ENTMCNC: 31.7 G/DL (ref 31.5–36.5)
MCV RBC AUTO: 94 FL (ref 78–100)
NITRATE UR QL: NEGATIVE
PH UR STRIP: 6.5 PH (ref 5–7)
PLATELET # BLD AUTO: 227 10E9/L (ref 150–450)
POTASSIUM SERPL-SCNC: 4.2 MMOL/L (ref 3.4–5.3)
PROT SERPL-MCNC: 7.8 G/DL (ref 6.8–8.8)
PTH-INTACT SERPL-MCNC: 33 PG/ML (ref 18–80)
RBC # BLD AUTO: 4.08 10E12/L (ref 3.8–5.2)
SODIUM SERPL-SCNC: 137 MMOL/L (ref 133–144)
SOURCE: NORMAL
SP GR UR STRIP: 1.01 (ref 1–1.03)
TSH SERPL DL<=0.005 MIU/L-ACNC: 2.43 MU/L (ref 0.4–4)
UROBILINOGEN UR STRIP-ACNC: 0.2 EU/DL (ref 0.2–1)
VIT B12 SERPL-MCNC: 655 PG/ML (ref 193–986)
WBC # BLD AUTO: 5.4 10E9/L (ref 4–11)

## 2020-01-15 PROCEDURE — 84443 ASSAY THYROID STIM HORMONE: CPT | Performed by: PSYCHIATRY & NEUROLOGY

## 2020-01-15 PROCEDURE — 82607 VITAMIN B-12: CPT | Performed by: PSYCHIATRY & NEUROLOGY

## 2020-01-15 PROCEDURE — 87086 URINE CULTURE/COLONY COUNT: CPT | Performed by: PSYCHIATRY & NEUROLOGY

## 2020-01-15 PROCEDURE — 80053 COMPREHEN METABOLIC PANEL: CPT | Performed by: PSYCHIATRY & NEUROLOGY

## 2020-01-15 PROCEDURE — 99214 OFFICE O/P EST MOD 30 MIN: CPT | Performed by: INTERNAL MEDICINE

## 2020-01-15 PROCEDURE — 83970 ASSAY OF PARATHORMONE: CPT | Performed by: INTERNAL MEDICINE

## 2020-01-15 PROCEDURE — 36415 COLL VENOUS BLD VENIPUNCTURE: CPT | Performed by: PSYCHIATRY & NEUROLOGY

## 2020-01-15 PROCEDURE — 85027 COMPLETE CBC AUTOMATED: CPT | Performed by: PSYCHIATRY & NEUROLOGY

## 2020-01-15 PROCEDURE — 82306 VITAMIN D 25 HYDROXY: CPT | Performed by: INTERNAL MEDICINE

## 2020-01-15 PROCEDURE — 82310 ASSAY OF CALCIUM: CPT | Performed by: INTERNAL MEDICINE

## 2020-01-15 PROCEDURE — 81003 URINALYSIS AUTO W/O SCOPE: CPT | Performed by: PSYCHIATRY & NEUROLOGY

## 2020-01-15 RX ORDER — HYDROCODONE BITARTRATE AND ACETAMINOPHEN 5; 325 MG/1; MG/1
TABLET ORAL
Refills: 0 | COMMUNITY
Start: 2019-11-08 | End: 2020-01-17

## 2020-01-15 RX ORDER — FOLIC ACID 1 MG/1
1 TABLET ORAL DAILY
Qty: 100 TABLET | Refills: 3 | Status: SHIPPED | OUTPATIENT
Start: 2020-01-15 | End: 2020-07-30

## 2020-01-15 RX ORDER — HYDROXYCHLOROQUINE SULFATE 200 MG/1
200 TABLET, FILM COATED ORAL DAILY
Qty: 30 TABLET | Refills: 7 | Status: SHIPPED | OUTPATIENT
Start: 2020-01-15 | End: 2020-07-30

## 2020-01-15 RX ORDER — METHOTREXATE 2.5 MG/1
20 TABLET ORAL WEEKLY
Qty: 32 TABLET | Refills: 7 | Status: SHIPPED | OUTPATIENT
Start: 2020-01-15 | End: 2020-07-30

## 2020-01-15 NOTE — PATIENT INSTRUCTIONS
[facility]  [address]  [place] [address]  [phone]    Rheumatology    Dr. Merrick CARDONA [facility] [address] in [place]   (Monday)  [address]  [place] [address]       [facility] in [place]   (Tuesday)  [address] [place] [address]    [facility] in [place]   (Wed., Thurs., and Friday)  [address] [place] [address]    Phone number: [phone]  Thank you for choosing [facility].  Joan Bucio CMA

## 2020-01-15 NOTE — PROGRESS NOTES
Rheumatology Clinic Visit      Alannah Leos MRN# [id number]   YOB: 1940 Age: 79 year old      Date of visit: 1/15/20   PCP: Dr. Leah Blanca    Chief Complaint   Patient presents with:  Arthritis: RA.    Assessment and Plan     1. Seronegative erosive rheumatoid arthritis: Diagnosed in the 1970s. Previously on methotrexate when first diagnosed (unclear if ineffective or not, stopped by patient preference to not treat her RA at that time).  Synovitis, subluxation, and fixed flexion deformities on initial exam. Steroid responsive. Also with ankylosis of the cervical spine and erosive changes seen on x-rays. Currently on methotrexate 20 mg once weekly, and hydroxychloroquine 200 mg daily. Previously on SSZ (felt poorly on it).  She has improved with conventional DMARDs but would benefit from a biologic DMARD; preferentially Orencia because of the positive PIERO and dsDNA in the past; in the past it was prescribed and then the patient decided to not use it but she is still considering Orencia and so we will leave on her medication list in case she decides to try it as I think she would benefit from it  - Continue methotrexate 20 mg once weekly    - Continue folic acid 1mg daily  - Continue hydroxychloroquine 200mg daily (normal eye exam on 12/7/2018)  - Orencia SQ -see above  - Labs in 3 months: CBC, Creatinine, Hepatic Panel  - Labs in 6 months: CBC, Creatinine, Hepatic Panel, ESR, CRP      2. Positive PIERO and dsDNA: These were noted on record review. She does not have symptoms to support an PIERO-associated rheumatologic disease at this time.     3. Neck pain in the setting of rheumatoid arthritis: No evidence of instability by x-rays on 10/12/2016.  Seen by neurosurgery but reportedly the procedure she would need is very intense so she does not want to do anything right now.    4. Parkinson's Disease: Followed by Dr. Ricardo Johnson at the [facility].     5. Osteoporosis: Based on  1/29/2018 DEXA.  Was on fosamax from [id number].  We discussed osteoporosis and the treatment of osteoporosis.  We reviewed the risks of treatment and the risks of not treating again today and she is willing to start reclast. Check labs first. # given to infusion center.   - Start reclast if labs okay  - Labs today: Ca, PTH, Vitamin D    # Bisphosphonate risks and side effects:  Risks and side effects include esophageal irritation, heartburn, osteonecrosis of the jaw (most often in people with dental disease or a recent dental procedure), and atypical femoral fractures.  IV bisphosphonates can cause a flu-like illness and bone pain lasting up to 2-3 days; premedication with acetaminophen will often prevent or lessen these symptoms. Unusual side effects that have been reported include occular symptoms such as uveitis, keartitis, optic neuritis, and orbital swelling.  Oral bisphosphonates should not be used in patients with esophageal problems (such as strictures, achalasia, or severe dysmotility, varices), malabsorption, or the inability to sit upright.  Oral and IV bisphosphonates should not be used if the CrCl is less than 25-40mL/min, or the patient is pregnant or breastfeeding.  Prior to starting a biosphosphonate, invasive dental work should be completed if needed, and vitamin D level should be adequate.    6. TMJ: trouble with speaking and eating.  Previously referred to ENT for guidance; she plans to make an appointment.    Ms. Leos verbalized agreement with and understanding of the rational for the diagnosis and treatment plan.  All questions were answered to best of my ability and the patient's satisfaction. Ms. Leos was advised to contact the clinic with any questions that may arise after the clinic visit.      Thank you for involving me in the care of the patient    Return to clinic: 6 months      HPI   Alannah Leos is a 79 year old female with a medical history significant for Parkinson's disease,  "osteoarthritis, status post TKA, osteoporosis, and rheumatoid arthritis who presents for follow-up of rheumatoid arthritis.    Previously, Ms. Leos reported that she was diagnosed with rheumatoid arthritis in the past but did not want to take toxic medications and did not want to keep taking prednisone because of potential toxicities. Therefore, she decided that she would \"tough out\" her symptoms and try to enjoy her life. Then, more recently she was diagnosed with Parkinson's disease that has been significantly affecting her quality of life. She is treated for the Parkinson's disease and she believes that the treatment is helping. However, she is also having worsening joint pain and stiffness. Morning stiffness lasts for all day. All of her joints hurt, including her bilateral shoulders, neck, spine, hands, wrists, elbows, hips, knees, ankles, and feet. She tells me that she is unable to raise her arms without assistance from the contralateral arm, if she is able to get that hand over to the other side. Mainly, her right shoulder is affected. She tells me that she has a little more mobility in her left shoulder. She tells me that her entire spine is fused, and that it \"occurred naturally.\"  Overall, she tells me that she feels miserable and is willing to start treatment, but is still scared of most of the medication side effects.    Today, she reports that her RA is doing okay and does not want to change treatment based on that.  Broke her right shoulder and followed with orthopedics.  Improved now and not requiring full-time assistance from her family.  Open to starting osteoporosis treatment now.     Denies fevers, chills, nausea, vomiting, constipation, diarrhea. No abdominal pain. No chest pain/pressure, palpitations, or shortness of breath.   No oral or nasal sores.  No rash. No sicca symptoms.      Tobacco: none  EtOH: 1 drink at [place] only  Drugs: none    ROS   GEN: No fevers, chills, night sweats.  "   SKIN: No itching, rashes, sores  HEENT: No oral or nasal ulcers.  CV: No chest pain, pressure, palpitations, or dyspnea on exertion.  PULM: No SOB, wheeze, cough.  GI: No nausea, vomiting, constipation, diarrhea. No blood in stool. No abdominal pain.  : No blood in urine.  MSK: See HPI.  NEURO: No numbness or tingling  EXT: See history of present illness  PSYCH: Negative    Active Problem List     Patient Active Problem List   Diagnosis     Osteoporosis     Rheumatoid arthritis (H)     Parkinson disease (H)     Osteoarthritis of wrist     Osteoarthritis of shoulder     History of total knee arthroplasty     Osteoarthritis of left knee     Advanced directives, counseling/discussion     CARDIOVASCULAR SCREENING; LDL GOAL LESS THAN 160     High risk medication use     Underweight     Impingement syndrome, shoulder, left     Combined forms of age-related cataract of both eyes     Seborrheic dermatitis     Anemia in other chronic diseases classified elsewhere     Past Medical History     Past Medical History:   Diagnosis Date     Hyperlipidemia      Murmur, heart     hsm     Nonsenile cataract      Osteoarthrosis, unspecified whether generalized or localized, lower leg      Osteopenia      Rheumatoid arthritis(714.0)      Past Surgical History     Past Surgical History:   Procedure Laterality Date     GALLBLADDER SURGERY       HYSTERECTOMY       KNEE SURGERY      left      LAMINECTOMY LUMBAR ONE LEVEL       TONSILLECTOMY       Allergy     Allergies   Allergen Reactions     Decadrol [Dexamethasone Sodium Phosphate]      Shrimp      Current Medication List     Current Outpatient Medications   Medication Sig     ACE NOT PRESCRIBED, INTENTIONAL, 1 each continuous prn. ACE Inhibitor not prescribed due to Refusal by patient           ammonium lactate (AMLACTIN) 12 % cream Apply once to twice a day to dry skin. Can burn temporarily if there is a cut on the skin.     ASPIRIN 81 MG PO TABS PATIENT REPORTS TAKING 4 TIMES PER  "WEEK AND NOT EVERYDAY     CALCIUM + D OR 600mg twice a day      carbidopa-levodopa (SINEMET)  MG tablet Take one at 6 am, noon, 5 pm, 9 pm and midnight     folic acid (FOLVITE) 1 MG tablet Take 1 tablet (1 mg) by mouth daily     hydroxychloroquine (PLAQUENIL) 200 MG tablet Take 1 tablet (200 mg) by mouth daily     ibuprofen (ADVIL/MOTRIN) 200 MG tablet Take 1 tablet (200 mg) by mouth every 8 hours as needed for moderate pain     methotrexate sodium 2.5 MG TABS Take 8 tablets (20 mg) by mouth once a week . Take all 8 tablets on the same day of each week.     SENNA-docusate sodium (SENNA S) 8.6-50 MG tablet Take 1 tablet by mouth At Bedtime     No current facility-administered medications for this visit.          Social History   See HPI    Family History     Family History   Problem Relation Age of Onset     Cancer Sister         pancreatic       Physical Exam     Temp Readings from Last 3 Encounters:   09/12/19 98.2  F (36.8  C) (Oral)   07/25/19 97.7  F (36.5  C) (Oral)   07/11/19 97.8  F (36.6  C) (Oral)     BP Readings from Last 5 Encounters:   09/12/19 135/66   07/25/19 132/65   07/17/19 136/64   07/11/19 138/71   04/19/19 145/67     Pulse Readings from Last 1 Encounters:   09/12/19 90     Resp Readings from Last 1 Encounters:   09/12/19 16     Estimated body mass index is 19.35 kg/m  as calculated from the following:    Height as of this encounter: 1.613 m (5' 3.5\").    Weight as of 9/12/19: 50.3 kg (111 lb).    GEN: NAD, thin and frail appearing  HEENT: MMM. No oral lesions. Anicteric, noninjected sclera  CV: S1, S2. RRR. No m/r/g.  PULM: CTA bilaterally. No w/c.  MSK: MCPs and PIPs without swelling or tenderness to palpation.  Right wrist with swelling and tenderness to palpation. Left wrist without swelling or tenderness to palpation.  [place]-neck deformity of the left third and fifth fingers.  Elbows without swelling or tenderness to palpation.  Knees, ankles, and MTPs without swelling or tenderness to " palpation.  Holds neck in a neutral position.    NEURO: UE and LE strengths 5/5 and equal bilaterally.   SKIN: No rash. Diffuse scalp hair thinning.  EXT: No lower extremity edema  PSYCH: Alert. Appropriate.  Upbeat attitude    Labs / Imaging (select studies)   RF/CCP  Recent Labs   Lab Test 10/12/16  1142 07/30/13  1621 10/25/12  1127   CCPABY  --  <20 Interpretation:  Negative  --    CCPIGG 2  --   --    RHF <20  --  9     CBC  Recent Labs   Lab Test 01/13/20  1332 10/14/19  1331 07/15/19  1341   WBC 5.8 6.7 6.2   RBC 4.02 3.82 3.62*   HGB 12.0 11.5* 11.0*   HCT 37.9 36.2 34.6*   MCV 94 95 96   RDW 16.3* 15.6* 15.3*    209 221   MCH 29.9 30.1 30.4   MCHC 31.7 31.8 31.8   NEUTROPHIL 62.6 67.0 61.6   LYMPH 25.0 20.6 27.1   MONOCYTE 8.2 8.1 8.1   EOSINOPHIL 1.4 1.4 1.4   BASOPHIL 2.8 2.9 1.8   ANEU 3.6 4.5 3.8   ALYM 1.4 1.4 1.7   DORITA 0.5 0.5 0.5   AEOS 0.1 0.1 0.1   ABAS 0.2 0.2 0.1     CMP  Recent Labs   Lab Test 01/13/20  1332 10/14/19  1331 07/15/19  1341  01/25/18  1353  05/27/16  1413 05/26/15  1416 04/28/14  1209   NA  --   --   --   --   --   --  136 137 136   POTASSIUM  --   --   --   --   --   --  3.7 4.0 4.2   CHLORIDE  --   --   --   --   --   --  102 103 98   CO2  --   --   --   --   --   --  25 28 27   ANIONGAP  --   --   --   --   --   --  9 6 11   GLC  --   --   --   --   --   --  94 100* 99   BUN  --   --   --   --   --   --  11 9 12   CR 0.42* 0.45* 0.50*   < >  --    < > 0.41* 0.47* 0.52   GFRESTIMATED >90 >90 >90   < >  --    < > >90  Non  GFR Calc   >90  Non  GFR Calc   >90   GFRESTBLACK >90 >90 >90   < >  --    < > >90   GFR Calc   >90   GFR Calc   >90   PAKO  --   --   --   --  9.1  --  9.2 9.2 9.4   BILITOTAL 0.4 0.3 0.3   < >  --    < > 0.5  --  0.5   ALBUMIN 4.0 4.0 4.0   < >  --    < > 3.8  --  4.4   PROTTOTAL 7.4 7.3 7.4   < >  --    < > 8.8  --  8.6   ALKPHOS 116 91 83   < >  --    < > 108  --  119   AST 20 22 25   < >   --    < > 15  --  27   ALT 14 17 15   < >  --    < > 8  --  19    < > = values in this interval not displayed.     Calcium/VitaminD  Recent Labs   Lab Test 01/25/18  1353 05/27/16  1413 05/26/15  1416  04/26/13  1128   PAKO 9.1 9.2 9.2   < > 9.2   VITDT 39 95*  --   --  63    < > = values in this interval not displayed.     ESR/CRP  Recent Labs   Lab Test 01/13/20  1332 07/15/19  1341 12/03/18  1339   SED 9 16 14   CRP <2.9 <2.9 <2.9     Lipid Panel  Recent Labs   Lab Test 05/26/15  1416 04/26/13  1128 04/26/12  1151   CHOL 179 154 159   TRIG 50 44 56   HDL 80 72 71   LDL 89 73 77   VLDL 10 9 11   CHOLHDLRATIO 2.2 2.1 2.2     Hepatitis B  Recent Labs   Lab Test 01/18/17  1601   HBCAB Nonreactive   HEPBANG Nonreactive     Hepatitis C  Recent Labs   Lab Test 01/18/17  1601   HCVAB Nonreactive   Assay performance characteristics have not been established for newborns,   infants, and children       Tuberculosis Screening  Recent Labs   Lab Test 01/25/18  1352 01/18/17  1601   TBRSLT Negative Negative   TBAGN 0.00 0.00     HIV Screening  Recent Labs   Lab Test 01/18/17  1601   HIAGAB Nonreactive   HIV-1 p24 Ag & HIV-1/HIV-2 Ab Not Detected       Immunization History     Immunization History   Administered Date(s) Administered     Pneumo Conj 13-V (2010&after) 11/03/2016     Pneumococcal 23 valent 11/01/2007, 11/13/2007, 03/12/2018     TD (ADULT, 7+) 11/13/2007     Tdap (Adacel,Boostrix) 11/13/2007          Chart documentation done in part with Dragon Voice recognition Software. Although reviewed after completion, some word and grammatical error may remain.    Merrick Del Cid MD

## 2020-01-16 LAB
BACTERIA SPEC CULT: NO GROWTH
DEPRECATED CALCIDIOL+CALCIFEROL SERPL-MC: 39 UG/L (ref 20–75)
SPECIMEN SOURCE: NORMAL

## 2020-01-17 ENCOUNTER — OFFICE VISIT (OUTPATIENT)
Dept: FAMILY MEDICINE | Facility: CLINIC | Age: 80
End: 2020-01-17
Payer: MEDICARE

## 2020-01-17 VITALS
TEMPERATURE: 97.5 F | OXYGEN SATURATION: 99 % | HEIGHT: 64 IN | WEIGHT: 112 LBS | SYSTOLIC BLOOD PRESSURE: 156 MMHG | BODY MASS INDEX: 19.12 KG/M2 | DIASTOLIC BLOOD PRESSURE: 71 MMHG | HEART RATE: 81 BPM

## 2020-01-17 DIAGNOSIS — Z87.81 HISTORY OF RIGHT SHOULDER FRACTURE: ICD-10-CM

## 2020-01-17 DIAGNOSIS — G20.A1 PARKINSON DISEASE (H): ICD-10-CM

## 2020-01-17 DIAGNOSIS — M06.09 RHEUMATOID ARTHRITIS OF MULTIPLE SITES WITH NEGATIVE RHEUMATOID FACTOR (H): ICD-10-CM

## 2020-01-17 DIAGNOSIS — H61.22 IMPACTED CERUMEN OF LEFT EAR: ICD-10-CM

## 2020-01-17 DIAGNOSIS — R44.1 HALLUCINATIONS, VISUAL: Primary | ICD-10-CM

## 2020-01-17 PROCEDURE — 99214 OFFICE O/P EST MOD 30 MIN: CPT | Performed by: FAMILY MEDICINE

## 2020-01-17 RX ORDER — CIPROFLOXACIN 0.5 MG/.25ML
0.25 SOLUTION/ DROPS AURICULAR (OTIC) 2 TIMES DAILY
Qty: 2.5 ML | Refills: 0 | Status: SHIPPED | OUTPATIENT
Start: 2020-01-17 | End: 2020-01-22

## 2020-01-17 ASSESSMENT — MIFFLIN-ST. JEOR: SCORE: 965.09

## 2020-01-17 ASSESSMENT — PAIN SCALES - GENERAL: PAINLEVEL: SEVERE PAIN (6)

## 2020-01-17 NOTE — PATIENT INSTRUCTIONS
At [facility], we strive to deliver an exceptional experience to you, every time we see you. If you receive a survey, please complete it as we do value your feedback.  If you have MyChart, you can expect to receive results automatically within 24 hours of their completion.  Your provider will send a note interpreting your results as well.   If you do not have MyChart, you should receive your results in about a week by mail.    Your care team:                            Family Medicine Internal Medicine   MD Austen Bucrh MD Shantel Branch-Fleming, MD Katya Georgiev PA-C Megan Hill, APRN CNP    Myles Junior, MD Pediatrics   David Fraga, PANimaC  Allie Owen, MD Estelle Calvillo APRN CNP   MD Lo Weinberg MD Deborah Mielke, MD Kim Thein, APRN CNP      Clinic hours: Monday - Thursday 7 am-7 pm; Fridays 7 am-5 pm.   Urgent care: Monday - Friday 11 am-9 pm; Saturday and Sunday 9 am-5 pm.  Pharmacy : Monday -Thursday 8 am-8 pm; Friday 8 am-6 pm; Saturday and Sunday 9 am-5 pm.     Clinic: [phone]   Pharmacy: [phone]        Patient Education     The Difference Between Delirium and Dementia  Dementia and delirium are both health conditions that change a person s ability to think clearly and care for themselves. They do share some similar signs and symptoms. But they have different causes, treatment, and outcomes.  Delirium is seen as a medical emergency that needs to be treated right away. But it can often be mistaken for dementia. In some cases, these conditions can occur at the same time. Learn how the two are different, and what you can do to help a person who has signs of either or both.  What is delirium?    Delirium is a sudden change in a person s mental state that fluctuates over short periods of time. A person will have trouble paying attention or following a conversation. Thinking and speech may be confused,  illogical, unclear, and unpredictable.    A person s mental state may vary from agitated and watchful to sluggish and sleepy.    Delirium is a medical emergency. Usually, there is some underlying cause.    Delirium is treated by finding the cause. Once the cause is treated, the delirium can go away.  What is dementia?  Dementia is a range of signs that a person s brain is losing function. With dementia, a person s ability to think, remember, and communicate with others gets weaker over time. This process occurs over years and usually progresses slowly. At first, a person may sometimes be forgetful or confused. Questions will be asked over and over. Basic information may be forgotten. Over time, he or she will have trouble following directions and doing daily tasks. The person will have trouble talking with and understanding others. Eventually, a person with dementia may forget who people are and not know where he or she is. The person may also be dunbar or restless.  Knowing the difference    Dementia Delirium   Common signs Signs include forgetfulness and confusion. The person will have trouble speaking with and understanding others. This occurs over long periods of time. Signs include sudden changes in mental state. Changes may range from agitation to tiredness.   When signs appear There is a slow change in mental state and behavior over months or years. There is a sudden change in mental state and behavior over hours or days.   Thinking and attention The person may often seem confused. Over time, the person s thinking will not be sensible or logical. As the disease worsens, the person will not be able to focus well. He or she will often not be able to talk with or understand others clearly. In some cases, the person may see or hear things that others can t (hallucinations). The person will not be able to remember events that just happened, and lose memory of events in the past. The person may not remember who  people are, or where they are. The person may not recognize common objects. The person may be confused and disoriented. The person may have trouble focusing and talking with others. It is likely that the person will not be able to tell a healthcare provider about his or her symptoms. The person may see or hear things that others can t (hallucinations). The person may not be able to remember something that just happened.   Getting a diagnosis  A person with signs of dementia or delirium will need to be diagnosed correctly. In some cases, you can help. You can tell the healthcare provider how the person's mental state is different from their normal state. This can help the provider diagnose the problem.  Healthcare providers will look at different parts of a person's health. They will look at what medicine a person is taking. They will find out if the person has an infection, or if he or she has an illness that has gotten worse. They may talk with the person to learn more about his or her mental state. And they may do tests to see if there may be a cause for delirium.  When to get medical help  Someone with dementia may also have signs of delirium. This can show that there is another problem.  If someone--with or without dementia--has sudden changes in mental state, call his or her healthcare provider right away. Or call 911 or your local emergency number. Tell the healthcare provider about the signs of delirium you have seen.  Date Last Reviewed: 4/1/2017 2000-2019 [employer]. [address] [place] [address], [place] [address]. All rights reserved. This information is not intended as a substitute for professional medical care. Always follow your healthcare professional's instructions.           Patient Education     Recognizing Delirium: A Checklist for Caregivers  Delirium is a sudden change in a person s mental state that comes and goes over short periods of time. It can cause a person to have a hard time  paying attention or following a conversation. Thinking and speech may be confused, illogical, unclear, and unpredictable. A person s mental state may vary from being restless and alert to sluggish and sleepy.  Delirium can sometimes be mistaken for dementia. Often the 2 conditions occur together. But delirium happens quickly over a short time, such as hours or days. Dementia happens more slowly, over months or years.  Delirium is a medical emergency. If delirium is not diagnosed and treated, it can lead to permanent problems or even death.  A checklist of delirium signs  Below is a checklist of the signs of delirium. It s important to remember that the signs of delirium happen quickly, over the course of hours or days. They can come and go, and may be worse later in the day. And a person may shift between the signs.  Does the person:    Seem acutely more sleepy and quiet than usual, or more restless and disturbed?    Seem suddenly confused or disoriented?    Act in a violent way?    Have trouble paying attention and focusing on a conversation?    See things that others can t see?    Hear things that others can t hear?    Believe things that aren t known to be true?    Think that people want to harm them?    Change the subject too often while talking?    Talk about things that others find strange?    Have anxiety, be sad or tearful, or have intense feelings of chau and excitement (euphoria) that changes rapidly?    Seem unsteady while walking and not a result of a prior health condition?    Have a hard time focusing on simple tasks?  If you ve answered yes to any of the signs above, your loved one may have delirium. If you think the person has any of the signs above, you can do two simple tests to check their mental state. One tests their attention. The other tests how clearly they are thinking.  Attention test  Try this test:    Say to the person,  I am going to read you a series of 10 letters. Each time you hear the  letter A, squeeze my hand.     Read each of these letters, 3 seconds apart: S A V E A H A A R T.    Count each time the person does not squeeze your hand when you say the letter A.    Count each time he or she squeezes your hand on a letter other than A.    If the person has 3 or more errors, he or she may have delirium.  Thinking test  Ask the person these 4 questions:  1. Will a stone float on water? (Correct answer: No)  2. Are there fish in the sea? (Correct answer: Yes)  3. Does 1 pound weigh more than 2? (Correct answer: No)  4. Can you use a hammer to pound a nail? (Correct answer: Yes)  Count each time he or she gives a wrong answer. If he or she has 2 or more errors, and they could previously answer these questions, the person may have delirium.  When to get medical help  If someone is showing signs of delirium, call his or her healthcare provider right away. Or call 911 or your local emergency number. Tell the healthcare provider or emergency services person about the signs of delirium you have seen.  Date Last Reviewed: 4/1/2017 2000-2019 [employer]. [address] [place] [address]. All rights reserved. This information is not intended as a substitute for professional medical care. Always follow your healthcare professional's instructions.           Patient Education     How Is Delirium Treated?  Delirium is a sudden change in a person s mental state that happens over short periods of time. It can cause a person to have a hard time paying attention or following a conversation. Thinking and speech may be confused, illogical, unclear, and random. A person s mental state may vary from being restless and alert to sluggish and sleepy.  How delirium causes harm  Delirium is a medical emergency. It has a big effect on the health of older adults. People with delirium tend to have a decline in their day-to-day living. They may also become unable to care for themselves. People with  delirium often need to stay extra days in the hospital. They are also at higher risk for health problems, falls, and earlier death.  Studies have shown certain things about delirium, such as:    It puts someone at higher risk of dying within 6 to 12 months    It can cause faster mental decline in a person with dementia    People in the hospital who have delirium are more likely to have long-term mental health problems    An episode of delirium greatly increases risk of dementia in a person without dementia. It may also be the first sign of dementia.    An episode of delirium can make a person s dementia more severe    An episode of delirium can make a person more likely to live in a long-term care facility    It can cause families financial strain due to high healthcare costs   Finding the cause  Delirium is treated by finding and treating the underlying cause. It has many possible causes, such as reaction to medications, changes in blood chemistry, infections, strokes, and acute heart diseases.  Health care providers will take a complete medical history and perform a physical exam. They may do tests to find the cause of a person s delirium. The tests may include:    Asking questions.    A physical exam. This will check the general health of the person.    Blood and urine tests, as indicated.    Imaging tests. These may include a CT scan or MRI of the head. These check for problems in the brain such as bleeding, infection, or a tumor.     Common treatments for delirium  Once a cause is found, steps are taken to treat the underlying problem. In many cases, the delirium may resolve. For example, fluids may be given if the person is dehydrated. Or antibiotics may be given for an infection. And oxygen may be given if the person has low oxygen levels.  It is important to keep the person safe. Removing unnecessary IV tubes, restraints, and catheters is often helpful. Psychoactive medicines should be reduced or removed. In  rare cases, certain medicines may be given to a person who is severely agitated. Having family members help with care is encouraged, as familiar faces are reassuring. The person s sleep-wake cycle should be restored. To do this, it s helpful to discourage napping and expose to the person to bright light during the day.   How long does delirium last?  Delirium may take days, weeks, or months to go away. Delirium may not go away in people with late stages of illness or near the end of life. Talk with the healthcare provider about your loved one s situation and the treatment options available.  If you think that your loved one may have delirium, seek help from a healthcare provider right away. Or call 911 or your local emergency number.  Date Last Reviewed: 4/1/2017 2000-2019 [employer]. [address] [place] [address], [place] [address]. All rights reserved. This information is not intended as a substitute for professional medical care. Always follow your healthcare professional's instructions.

## 2020-01-17 NOTE — PROGRESS NOTES
Subjective     Alannah Leos is a 79 year old female who presents to clinic today for the following health issues:    HPI   Joint Pain    Onset: 10/29/19 fractured right shoulder falling from a standing position.     Description:   Location: right shoulder  Character: Sharp and Dull ache    Intensity: 6/10    Progression of Symptoms: better    Accompanying Signs & Symptoms:  Other symptoms: weakness of Shoulder    History:   Previous similar pain: YES      Precipitating factors:   Trauma or overuse: YES- fall    Alleviating factors:  Improved by: rest/inactivity and physical therapy    Therapies Tried and outcome: see above      Seeing visual hallucinations for about a month. Started with bugs on the walls. Now sees walls being built in her home and redecorating. These are becoming frightening to patient. Broke her right shoulder in October and was on narcotics for about a month. She was sleeping very soundly from the medication and now is back to sleeping 5-6 hours at night. No other medication changes. Seen by neurologist last month and labs were just completed 2 days ago. Has not heard about these results.     Patient Active Problem List   Diagnosis     Osteoporosis     Rheumatoid arthritis (H)     Parkinson disease (H)     Osteoarthritis of wrist     Osteoarthritis of shoulder     History of total knee arthroplasty     Osteoarthritis of left knee     Advanced directives, counseling/discussion     CARDIOVASCULAR SCREENING; LDL GOAL LESS THAN 160     High risk medication use     Underweight     Impingement syndrome, shoulder, left     Combined forms of age-related cataract of both eyes     Seborrheic dermatitis     Anemia in other chronic diseases classified elsewhere     Past Surgical History:   Procedure Laterality Date     GALLBLADDER SURGERY       HYSTERECTOMY       KNEE SURGERY      left      LAMINECTOMY LUMBAR ONE LEVEL       TONSILLECTOMY         Social History     Tobacco Use     Smoking status: Never  Smoker     Smokeless tobacco: Never Used   Substance Use Topics     Alcohol use: No     Family History   Problem Relation Age of Onset     Cancer Sister         pancreatic         Current Outpatient Medications   Medication Sig Dispense Refill     ACE NOT PRESCRIBED, INTENTIONAL, 1 each continuous prn. ACE Inhibitor not prescribed due to Refusal by patient       0 each 0     ammonium lactate (AMLACTIN) 12 % cream Apply once to twice a day to dry skin. Can burn temporarily if there is a cut on the skin. 385 g 11     ASPIRIN 81 MG PO TABS PATIENT REPORTS TAKING 4 TIMES PER WEEK AND NOT EVERYDAY       CALCIUM + D OR 600mg twice a day        carbidopa-levodopa (SINEMET)  MG tablet Take one at 6 am, noon, 5 pm, 9 pm and midnight 150 tablet 11     ciprofloxacin (CETRAXAL) 0.2 % otic solution Place 0.25 mLs Into the left ear 2 times daily for 5 days 2.5 mL 0     folic acid (FOLVITE) 1 MG tablet Take 1 tablet (1 mg) by mouth daily 100 tablet 3     hydroxychloroquine (PLAQUENIL) 200 MG tablet Take 1 tablet (200 mg) by mouth daily 30 tablet 7     ibuprofen (ADVIL/MOTRIN) 200 MG tablet Take 1 tablet (200 mg) by mouth every 8 hours as needed for moderate pain       methotrexate sodium 2.5 MG TABS Take 8 tablets (20 mg) by mouth once a week . Take all 8 tablets on the same day of each week. 32 tablet 7     SENNA-docusate sodium (SENNA S) 8.6-50 MG tablet Take 1 tablet by mouth At Bedtime 100 tablet 1     Allergies   Allergen Reactions     Decadrol [Dexamethasone Sodium Phosphate]      Shrimp      Recent Labs   Lab Test 01/15/20  1403 01/13/20  1332 10/14/19  1331  05/08/17  1446  05/27/16  1413 05/26/15  1416  10/28/13  1204  04/26/13  1128  04/26/12  1151   A1C  --   --   --   --   --   --  5.9 6.0  --  5.8  --  6.3*   < > 6.3*   LDL  --   --   --   --   --   --   --  89  --   --   --  73  --  77   HDL  --   --   --   --   --   --   --  80  --   --   --  72  --  71   TRIG  --   --   --   --   --   --   --  50  --   --    "--  44  --  56   ALT 18 14 17   < >  --    < > 8  --    < >  --    < > 13   < > 14   CR 0.55 0.42* 0.45*   < >  --    < > 0.41* 0.47*   < >  --    < > 0.48*   < > 0.54   GFRESTIMATED 90 >90 >90   < >  --    < > >90  Non  GFR Calc   >90  Non  GFR Calc     < >  --    < > >90   < > >90   GFRESTBLACK >90 >90 >90   < >  --    < > >90   GFR Calc   >90   GFR Calc     < >  --    < > >90   < > >90   POTASSIUM 4.2  --   --   --   --   --  3.7 4.0   < >  --    < > 4.0   < > 4.2   TSH 2.43  --   --   --  2.12  --  2.36 2.43   < >  --   --  2.41  --   --     < > = values in this interval not displayed.      BP Readings from Last 3 Encounters:   01/17/20 (!) 156/71   01/15/20 118/64   09/12/19 135/66    Wt Readings from Last 3 Encounters:   01/17/20 50.8 kg (112 lb)   09/12/19 50.3 kg (111 lb)   07/25/19 49.9 kg (110 lb)                      Reviewed and updated as needed this visit by Provider         Review of Systems   ROS COMP: Constitutional, HEENT, cardiovascular, pulmonary, gi and gu systems are negative, except as otherwise noted.      Objective    BP (!) 156/71   Pulse 81   Temp 97.5  F (36.4  C) (Oral)   Ht 1.613 m (5' 3.5\")   Wt 50.8 kg (112 lb)   SpO2 99%   BMI 19.53 kg/m    Body mass index is 19.53 kg/m .  Physical Exam   GENERAL: elderly, alert, thin, well hydrated, no distress, using walker for ambulation  HENT: ear canals- normal; TMs- normal; Nose- normal; Mouth- no ulcers, no lesions, missing dentition  NECK: no tenderness, no adenopathy, no asymmetry, no masses, no stiffness; thyroid- normal to palpation  RESP: lungs clear to auscultation - no rales, no rhonchi, no wheezes  CV: regular rates and rhythm, normal S1 S2, no S3 or S4 and no murmur, no click or rub, normal pulses  ABDOMEN: soft, no tenderness, no  hepatosplenomegaly, no masses, normal bowel sounds  MS: extremities- no gross deformities noted, no edema  SKIN: no suspicious lesions, " no rashes, age related skin changes with seborrheic keratosis and no actinic keratosis.    NEURO: strength and tone- decreased, sensory exam- grossly normal, reflexes- symmetric  BACK: no CVA tenderness, no paralumbar tenderness  MENTAL STATUS EXAM:  Appearance/Behavior: no apparent distress, neatly groomed, dressed appropriately for weather, appears stated age and is frail-appearing  Speech: normal  Mood/Affect: normal affect  Insight: Fair     Diagnostic Test Results:  Labs reviewed in Epic  No results found for any visits on 01/17/20.      Orders Only on 01/15/2020   Component Date Value Ref Range Status     TSH 01/15/2020 2.43  0.40 - 4.00 mU/L Final     Vitamin B12 01/15/2020 655  193 - 986 pg/mL Final     Sodium 01/15/2020 137  133 - 144 mmol/L Final     Potassium 01/15/2020 4.2  3.4 - 5.3 mmol/L Final     Chloride 01/15/2020 102  94 - 109 mmol/L Final     Carbon Dioxide 01/15/2020 30  20 - 32 mmol/L Final     Anion Gap 01/15/2020 5  3 - 14 mmol/L Final     Glucose 01/15/2020 97  70 - 99 mg/dL Final     Urea Nitrogen 01/15/2020 15  7 - 30 mg/dL Final     Creatinine 01/15/2020 0.55  0.52 - 1.04 mg/dL Final     GFR Estimate 01/15/2020 90  >60 mL/min/[1.73_m2] Final    Comment: Non  GFR Calc  Starting 12/18/2018, serum creatinine based estimated GFR (eGFR) will be   calculated using the Chronic Kidney Disease Epidemiology Collaboration   (CKD-EPI) equation.       GFR Estimate If Black 01/15/2020 >90  >60 mL/min/[1.73_m2] Final    Comment:  GFR Calc  Starting 12/18/2018, serum creatinine based estimated GFR (eGFR) will be   calculated using the Chronic Kidney Disease Epidemiology Collaboration   (CKD-EPI) equation.       Calcium 01/15/2020 9.5  8.5 - 10.1 mg/dL Final     Bilirubin Total 01/15/2020 0.4  0.2 - 1.3 mg/dL Final     Albumin 01/15/2020 4.3  3.4 - 5.0 g/dL Final     Protein Total 01/15/2020 7.8  6.8 - 8.8 g/dL Final     Alkaline Phosphatase 01/15/2020 125  40 - 150 U/L  Final     ALT 01/15/2020 18  0 - 50 U/L Final     AST 01/15/2020 25  0 - 45 U/L Final     WBC 01/15/2020 5.4  4.0 - 11.0 10e9/L Final     RBC Count 01/15/2020 4.08  3.8 - 5.2 10e12/L Final     Hemoglobin 01/15/2020 12.1  11.7 - 15.7 g/dL Final     Hematocrit 01/15/2020 38.2  35.0 - 47.0 % Final     MCV 01/15/2020 94  78 - 100 fl Final     MCH 01/15/2020 29.7  26.5 - 33.0 pg Final     MCHC 01/15/2020 31.7  31.5 - 36.5 g/dL Final     RDW 01/15/2020 16.5* 10.0 - 15.0 % Final     Platelet Count 01/15/2020 227  150 - 450 10e9/L Final     Specimen Description 01/15/2020 Midstream Urine   Final     Culture Micro 01/15/2020 No growth   Final     Color Urine 01/15/2020 Yellow   Final     Appearance Urine 01/15/2020 Clear   Final     Glucose Urine 01/15/2020 Negative  NEG^Negative mg/dL Final     Bilirubin Urine 01/15/2020 Negative  NEG^Negative Final     Ketones Urine 01/15/2020 Negative  NEG^Negative mg/dL Final     Specific Gravity Urine 01/15/2020 1.010  1.003 - 1.035 Final     Blood Urine 01/15/2020 Negative  NEG^Negative Final     pH Urine 01/15/2020 6.5  5.0 - 7.0 pH Final     Protein Albumin Urine 01/15/2020 Negative  NEG^Negative mg/dL Final     Urobilinogen Urine 01/15/2020 0.2  0.2 - 1.0 EU/dL Final     Nitrite Urine 01/15/2020 Negative  NEG^Negative Final     Leukocyte Esterase Urine 01/15/2020 Negative  NEG^Negative Final     Source 01/15/2020 Midstream Urine   Final         Assessment & Plan       ICD-10-CM    1. Hallucinations, visual R44.1 Lab results faxed to patient's neurologist at [facility] and today's history and physical discussed. Recommend decreasing Sinemet to 4 tablets a day. Every 6 hours to see if this helps. Follow up in 4 days at [facility]. Patient's daughter will need to call to schedule the appointment. May need a CT scan to evaluate. Go to emergency department if symptoms worse or decreased level of consciousness. Signs and symptoms of delirium discussed with patient and her daughter.     2. Parkinson disease (H) G20 Symptoms partially controlled on medication and may be having side effects.    3. Impacted cerumen of left ear H61.22 ciprofloxacin (CETRAXAL) 0.2 % otic solution to left ear for wax build up and irritation.    4. 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   5. History of right shoulder fracture Z87.81 Right arm has decreased function and strength but is not hurting at this time.           CONSULTATION/REFERRAL to neurology for follow up next week.   FUTURE LABS:       - Schedule fasting labs in 3 months  FUTURE APPOINTMENTS:       - Follow-up visit in 1-2 months or sooner if any questions or concerns.   Regular exercise  See Patient Instructions    Return in about 4 weeks (around 2/14/2020).    Leah Blanca MD  [facility]

## 2020-01-21 ENCOUNTER — MEDICAL CORRESPONDENCE (OUTPATIENT)
Dept: HEALTH INFORMATION MANAGEMENT | Facility: CLINIC | Age: 80
End: 2020-01-21

## 2020-01-21 ENCOUNTER — TRANSFERRED RECORDS (OUTPATIENT)
Dept: HEALTH INFORMATION MANAGEMENT | Facility: CLINIC | Age: 80
End: 2020-01-21

## 2020-01-27 ENCOUNTER — OFFICE VISIT (OUTPATIENT)
Dept: OPHTHALMOLOGY | Facility: CLINIC | Age: 80
End: 2020-01-27
Payer: MEDICARE

## 2020-01-27 DIAGNOSIS — Z79.899 HIGH RISK MEDICATION USE: Primary | ICD-10-CM

## 2020-01-27 DIAGNOSIS — M06.09 RHEUMATOID ARTHRITIS OF MULTIPLE SITES WITH NEGATIVE RHEUMATOID FACTOR (H): ICD-10-CM

## 2020-01-27 PROCEDURE — 92083 EXTENDED VISUAL FIELD XM: CPT | Performed by: STUDENT IN AN ORGANIZED HEALTH CARE EDUCATION/TRAINING PROGRAM

## 2020-01-27 PROCEDURE — 92134 CPTRZ OPH DX IMG PST SGM RTA: CPT | Performed by: STUDENT IN AN ORGANIZED HEALTH CARE EDUCATION/TRAINING PROGRAM

## 2020-01-27 PROCEDURE — 92012 INTRM OPH EXAM EST PATIENT: CPT | Performed by: STUDENT IN AN ORGANIZED HEALTH CARE EDUCATION/TRAINING PROGRAM

## 2020-01-27 ASSESSMENT — VISUAL ACUITY
OS_PH_SC: 20/50
OD_PH_SC: 20/40
OS_SC: 20/100
OD_SC: 20/100
OS_PH_SC+: -1
METHOD: SNELLEN - LINEAR

## 2020-01-27 ASSESSMENT — SLIT LAMP EXAM - LIDS
COMMENTS: NORMAL
COMMENTS: NORMAL

## 2020-01-27 ASSESSMENT — EXTERNAL EXAM - LEFT EYE: OS_EXAM: NORMAL

## 2020-01-27 ASSESSMENT — EXTERNAL EXAM - RIGHT EYE: OD_EXAM: NORMAL

## 2020-01-27 ASSESSMENT — TONOMETRY
OD_IOP_MMHG: 20
OS_IOP_MMHG: 16
IOP_METHOD: ICARE

## 2020-01-27 NOTE — PROGRESS NOTES
Current Eye Medications:  Plaquenil 200 mg daily, taking for less then 5 years.     Subjective:  7 month follow up for Plaquenil testing. Vision is doing pretty well today both eyes, comes and goes other days. No eye pain or discomfort in either eye.      Objective:  See Ophthalmology Exam.       Assessment:  Alannah Leos is a 79 year old female who presents with:   Encounter Diagnoses   Name Primary?     High risk medication use Macular SD-OCT within normal limits and stable in both eyes.     Mendosa visual field (HVF) 10-2 is unreliable due to patient's head movement - will rely more on OCT for monitoring.    Plaquenil x almost 2 years (200mg day, started March 2018). No signs of Plaquenil retinal toxicity at present.        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    Marcial Daigle MD  [phone]

## 2020-01-27 NOTE — LETTER
1/27/2020         RE: Alannah Leos  [id number] [facility] [address]        Dear Colleague,    Thank you for referring your patient, Alannah Leos, to the [facility]. Please see a copy of my visit note below.     Current Eye Medications:  Plaquenil 200 mg daily, taking for less then 5 years.     Subjective:  7 month follow up for Plaquenil testing. Vision is doing pretty well today both eyes, comes and goes other days. No eye pain or discomfort in either eye.      Objective:  See Ophthalmology Exam.       Assessment:  Alannah Leos is a 79 year old female who presents with:   Encounter Diagnoses   Name Primary?     High risk medication use Macular SD-OCT within normal limits and stable in both eyes.     Mendosa visual field (HVF) 10-2 is unreliable due to patient's head movement - will rely more on OCT for monitoring.    Plaquenil x almost 2 years (200mg day, started March 2018). No signs of Plaquenil retinal toxicity at present.        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    Marcial Daigle MD  [phone]        Again, thank you for allowing me to participate in the care of your patient.        Sincerely,        Marcial Daigle MD

## 2020-01-30 ENCOUNTER — ANCILLARY PROCEDURE (OUTPATIENT)
Dept: CT IMAGING | Facility: CLINIC | Age: 80
End: 2020-01-30
Attending: PSYCHIATRY & NEUROLOGY
Payer: MEDICARE

## 2020-01-30 DIAGNOSIS — R41.89 COGNITIVE CHANGES: ICD-10-CM

## 2020-01-30 DIAGNOSIS — R26.81 UNSTEADY GAIT: ICD-10-CM

## 2020-01-30 DIAGNOSIS — G20.A1 PARKINSONS DISEASE (H): ICD-10-CM

## 2020-01-30 DIAGNOSIS — R44.3 HALLUCINATIONS: ICD-10-CM

## 2020-01-30 DIAGNOSIS — M54.2 NECK PAIN: ICD-10-CM

## 2020-01-30 DIAGNOSIS — G25.81 RESTLESS LEG SYNDROME: ICD-10-CM

## 2020-01-30 PROCEDURE — 70450 CT HEAD/BRAIN W/O DYE: CPT | Mod: TC

## 2020-01-30 PROCEDURE — 72125 CT NECK SPINE W/O DYE: CPT | Mod: TC

## 2020-02-17 RX ORDER — HEPARIN SODIUM,PORCINE 10 UNIT/ML
5 VIAL (ML) INTRAVENOUS
Status: CANCELLED | OUTPATIENT
Start: 2020-02-17

## 2020-02-17 RX ORDER — ZOLEDRONIC ACID 5 MG/100ML
5 INJECTION, SOLUTION INTRAVENOUS ONCE
Status: CANCELLED
Start: 2020-02-17

## 2020-02-17 RX ORDER — HEPARIN SODIUM (PORCINE) LOCK FLUSH IV SOLN 100 UNIT/ML 100 UNIT/ML
5 SOLUTION INTRAVENOUS
Status: CANCELLED | OUTPATIENT
Start: 2020-02-17

## 2020-04-10 ENCOUNTER — TELEPHONE (OUTPATIENT)
Dept: LAB | Facility: CLINIC | Age: 80
End: 2020-04-10

## 2020-04-10 NOTE — TELEPHONE ENCOUNTER
.Left message for patient to call in regards to 24 hour pre-appointment COVID screening. Patient has an appointment at [facility] on 4/13/2020.  Please ask infection screening questions and update appointment notes on [facility] lab schedule.

## 2020-05-18 DIAGNOSIS — Z79.899 HIGH RISK MEDICATION USE: ICD-10-CM

## 2020-05-18 DIAGNOSIS — M06.09 RHEUMATOID ARTHRITIS OF MULTIPLE SITES WITH NEGATIVE RHEUMATOID FACTOR (H): ICD-10-CM

## 2020-05-18 LAB
ALBUMIN SERPL-MCNC: 4 G/DL (ref 3.4–5)
ALP SERPL-CCNC: 113 U/L (ref 40–150)
ALT SERPL W P-5'-P-CCNC: 11 U/L (ref 0–50)
AST SERPL W P-5'-P-CCNC: 19 U/L (ref 0–45)
BASOPHILS # BLD AUTO: 0.2 10E9/L (ref 0–0.2)
BASOPHILS NFR BLD AUTO: 3 %
BILIRUB DIRECT SERPL-MCNC: 0.1 MG/DL (ref 0–0.2)
BILIRUB SERPL-MCNC: 0.4 MG/DL (ref 0.2–1.3)
CREAT SERPL-MCNC: 0.57 MG/DL (ref 0.52–1.04)
DIFFERENTIAL METHOD BLD: ABNORMAL
EOSINOPHIL # BLD AUTO: 0.1 10E9/L (ref 0–0.7)
EOSINOPHIL NFR BLD AUTO: 0.8 %
ERYTHROCYTE [DISTWIDTH] IN BLOOD BY AUTOMATED COUNT: 15.4 % (ref 10–15)
GFR SERPL CREATININE-BSD FRML MDRD: 88 ML/MIN/{1.73_M2}
HCT VFR BLD AUTO: 35.7 % (ref 35–47)
HGB BLD-MCNC: 11.4 G/DL (ref 11.7–15.7)
LYMPHOCYTES # BLD AUTO: 1.3 10E9/L (ref 0.8–5.3)
LYMPHOCYTES NFR BLD AUTO: 20.4 %
MCH RBC QN AUTO: 29.4 PG (ref 26.5–33)
MCHC RBC AUTO-ENTMCNC: 31.9 G/DL (ref 31.5–36.5)
MCV RBC AUTO: 92 FL (ref 78–100)
MONOCYTES # BLD AUTO: 0.5 10E9/L (ref 0–1.3)
MONOCYTES NFR BLD AUTO: 7.4 %
NEUTROPHILS # BLD AUTO: 4.3 10E9/L (ref 1.6–8.3)
NEUTROPHILS NFR BLD AUTO: 68.4 %
PLATELET # BLD AUTO: 233 10E9/L (ref 150–450)
PROT SERPL-MCNC: 7.5 G/DL (ref 6.8–8.8)
RBC # BLD AUTO: 3.88 10E12/L (ref 3.8–5.2)
WBC # BLD AUTO: 6.2 10E9/L (ref 4–11)

## 2020-05-18 PROCEDURE — 85025 COMPLETE CBC W/AUTO DIFF WBC: CPT | Performed by: INTERNAL MEDICINE

## 2020-05-18 PROCEDURE — 80076 HEPATIC FUNCTION PANEL: CPT | Performed by: INTERNAL MEDICINE

## 2020-05-18 PROCEDURE — 82565 ASSAY OF CREATININE: CPT | Performed by: INTERNAL MEDICINE

## 2020-05-18 PROCEDURE — 36415 COLL VENOUS BLD VENIPUNCTURE: CPT | Performed by: INTERNAL MEDICINE

## 2020-06-10 ENCOUNTER — ANCILLARY PROCEDURE (OUTPATIENT)
Dept: GENERAL RADIOLOGY | Facility: CLINIC | Age: 80
End: 2020-06-10
Attending: PSYCHIATRY & NEUROLOGY
Payer: MEDICARE

## 2020-06-10 DIAGNOSIS — R26.81 UNSTEADY GAIT: ICD-10-CM

## 2020-06-10 DIAGNOSIS — E61.1 IRON DEFICIENCY: ICD-10-CM

## 2020-06-10 DIAGNOSIS — G20.A1 PARKINSON DISEASE (H): ICD-10-CM

## 2020-06-10 DIAGNOSIS — M54.2 NECK PAIN: ICD-10-CM

## 2020-06-10 DIAGNOSIS — R44.3 HALLUCINATIONS: ICD-10-CM

## 2020-06-10 DIAGNOSIS — G25.81 RESTLESS LEG SYNDROME: ICD-10-CM

## 2020-06-10 DIAGNOSIS — R41.89 COGNITIVE CHANGE: ICD-10-CM

## 2020-06-10 PROCEDURE — 72040 X-RAY EXAM NECK SPINE 2-3 VW: CPT

## 2020-06-22 ENCOUNTER — VIRTUAL VISIT (OUTPATIENT)
Dept: FAMILY MEDICINE | Facility: CLINIC | Age: 80
End: 2020-06-22
Payer: MEDICARE

## 2020-06-22 ENCOUNTER — TELEPHONE (OUTPATIENT)
Dept: FAMILY MEDICINE | Facility: CLINIC | Age: 80
End: 2020-06-22

## 2020-06-22 VITALS — BODY MASS INDEX: 19.18 KG/M2 | WEIGHT: 110 LBS

## 2020-06-22 DIAGNOSIS — G20.A1 PARKINSON DISEASE (H): ICD-10-CM

## 2020-06-22 DIAGNOSIS — M45.2 ANKYLOSING SPONDYLITIS OF CERVICAL REGION (H): ICD-10-CM

## 2020-06-22 DIAGNOSIS — M06.09 RHEUMATOID ARTHRITIS OF MULTIPLE SITES WITH NEGATIVE RHEUMATOID FACTOR (H): ICD-10-CM

## 2020-06-22 DIAGNOSIS — H81.10 BENIGN PAROXYSMAL POSITIONAL VERTIGO, UNSPECIFIED LATERALITY: Primary | ICD-10-CM

## 2020-06-22 PROCEDURE — 99442 ZZC PHYSICIAN TELEPHONE EVALUATION 11-20 MIN: CPT | Performed by: FAMILY MEDICINE

## 2020-06-22 RX ORDER — MECLIZINE HYDROCHLORIDE 25 MG/1
25 TABLET ORAL 3 TIMES DAILY PRN
Qty: 60 TABLET | Refills: 3 | Status: SHIPPED | OUTPATIENT
Start: 2020-06-22 | End: 2022-05-12

## 2020-06-22 NOTE — PROGRESS NOTES
"Alannah Leos is a 79 year old female who is being evaluated via a billable telephone visit.      The patient has been notified of following:     \"This telephone visit will be conducted via a call between you and your physician/provider. We have found that certain health care needs can be provided without the need for a physical exam.  This service lets us provide the care you need with a short phone conversation.  If a prescription is necessary we can send it directly to your pharmacy.  If lab work is needed we can place an order for that and you can then stop by our lab to have the test done at a later time.    Telephone visits are billed at different rates depending on your insurance coverage. During this emergency period, for some insurers they may be billed the same as an in-person visit.  Please reach out to your insurance provider with any questions.    If during the course of the call the physician/provider feels a telephone visit is not appropriate, you will not be charged for this service.\"    Patient has given verbal consent for Telephone visit?  Yes    What phone number would you like to be contacted at? [phone]    How would you like to obtain your AVS? Genevievehart    Subjective     Alannah Leos is a 79 year old female who presents via phone visit today for the following health issues:    HPI  Dizziness  Onset: Since May    Description:   Do you feel faint:  no   Does it feel like the surroundings (bed, room) are moving: YES  Unsteady/off balance: YES  Have you passed out or fallen: YES    Intensity: moderate    Progression of Symptoms:  worsening    Accompanying Signs & Symptoms:  Heart palpitations: YES  Nausea, vomiting: no   Weakness in arms or legs: YES  Fatigue: no   Vision or speech changes: YES  Ringing in ears (Tinnitus): no   Hearing Loss: no     History:   Head trauma/concussion hx: no   Previous similar symptoms: no   Recent bleeding history: no     Precipitating factors:   Worse with " activity or head movement: YES  Any new medications (BP?): no   Alcohol/drug abuse/withdrawal: no     Alleviating factors:   Does staying in a fixed position give relief:  YES    Therapies Tried and outcome: meclizine helps       Chronic Pain Follow-Up    Where in your body do you have pain? Neck pain and unable to lift up her head anymore. CT of neck in January showed ankylosing spondylosis.   How has your pain affected your ability to work? Not applicable  Which of these pain treatments have you tried since your last clinic visit? Physical Therapy  How well are you sleeping? Fair  How has your mood been since your last visit? About the same  Have you had a significant life event? Health Concerns  Other aggravating factors: prolonged sitting and sedentary lifestyle  Taking medication as directed? Yes    PHQ-9 SCORE 9/12/2019   PHQ-9 Total Score 2     No flowsheet data found.  No flowsheet data found.  Encounter-Level CSA:    There are no encounter-level csa.     Patient-Level CSA:    There are no patient-level csa.         Patient Active Problem List   Diagnosis     Osteoporosis     Rheumatoid arthritis (H)     Parkinson disease (H)     Osteoarthritis of wrist     Osteoarthritis of shoulder     History of total knee arthroplasty     Osteoarthritis of left knee     Advanced directives, counseling/discussion     CARDIOVASCULAR SCREENING; LDL GOAL LESS THAN 160     High risk medication use     Underweight     Impingement syndrome, shoulder, left     Combined forms of age-related cataract of both eyes     Seborrheic dermatitis     Anemia in other chronic diseases classified elsewhere     Ankylosing spondylitis of cervical region (H)     Past Surgical History:   Procedure Laterality Date     GALLBLADDER SURGERY       HYSTERECTOMY       KNEE SURGERY      left      LAMINECTOMY LUMBAR ONE LEVEL       TONSILLECTOMY         Social History     Tobacco Use     Smoking status: Never Smoker     Smokeless tobacco: Never Used    Substance Use Topics     Alcohol use: No     Family History   Problem Relation Age of Onset     Cancer Sister         pancreatic         Current Outpatient Medications   Medication Sig Dispense Refill     ACE NOT PRESCRIBED, INTENTIONAL, 1 each continuous prn. ACE Inhibitor not prescribed due to Refusal by patient       0 each 0     ammonium lactate (AMLACTIN) 12 % cream Apply once to twice a day to dry skin. Can burn temporarily if there is a cut on the skin. 385 g 11     ASPIRIN 81 MG PO TABS PATIENT REPORTS TAKING 4 TIMES PER WEEK AND NOT EVERYDAY       CALCIUM + D OR 600mg twice a day        carbidopa-levodopa (SINEMET)  MG tablet Take one at 6 am, noon, 5 pm, 9 pm and midnight 150 tablet 11     folic acid (FOLVITE) 1 MG tablet Take 1 tablet (1 mg) by mouth daily 100 tablet 3     hydroxychloroquine (PLAQUENIL) 200 MG tablet Take 1 tablet (200 mg) by mouth daily 30 tablet 7     ibuprofen (ADVIL/MOTRIN) 200 MG tablet Take 1 tablet (200 mg) by mouth every 8 hours as needed for moderate pain       meclizine (ANTIVERT) 25 MG tablet Take 1 tablet (25 mg) by mouth 3 times daily as needed for dizziness 60 tablet 3     methotrexate sodium 2.5 MG TABS Take 8 tablets (20 mg) by mouth once a week . Take all 8 tablets on the same day of each week. 32 tablet 7     SENNA-docusate sodium (SENNA S) 8.6-50 MG tablet Take 1 tablet by mouth At Bedtime 100 tablet 1     Allergies   Allergen Reactions     Decadrol [Dexamethasone Sodium Phosphate]      Shrimp      Recent Labs   Lab Test 05/18/20  1325 01/15/20  1403 01/13/20  1332  05/08/17  1446  05/27/16  1413 05/26/15  1416  10/28/13  1204  04/26/13  1128  04/26/12  1151   A1C  --   --   --   --   --   --  5.9 6.0  --  5.8  --  6.3*   < > 6.3*   LDL  --   --   --   --   --   --   --  89  --   --   --  73  --  77   HDL  --   --   --   --   --   --   --  80  --   --   --  72  --  71   TRIG  --   --   --   --   --   --   --  50  --   --   --  44  --  56   ALT 11 18 14   < >  --     < > 8  --    < >  --    < > 13   < > 14   CR 0.57 0.55 0.42*   < >  --    < > 0.41* 0.47*   < >  --    < > 0.48*   < > 0.54   GFRESTIMATED 88 90 >90   < >  --    < > >90  Non  GFR Calc   >90  Non  GFR Calc     < >  --    < > >90   < > >90   GFRESTBLACK >90 >90 >90   < >  --    < > >90   GFR Calc   >90   GFR Calc     < >  --    < > >90   < > >90   POTASSIUM  --  4.2  --   --   --   --  3.7 4.0   < >  --    < > 4.0   < > 4.2   TSH  --  2.43  --   --  2.12  --  2.36 2.43   < >  --   --  2.41  --   --     < > = values in this interval not displayed.      BP Readings from Last 3 Encounters:   01/17/20 (!) 156/71   01/15/20 118/64   09/12/19 135/66    Wt Readings from Last 3 Encounters:   06/22/20 49.9 kg (110 lb)   01/17/20 50.8 kg (112 lb)   09/12/19 50.3 kg (111 lb)                    Reviewed and updated as needed this visit by Provider         Review of Systems   Constitutional, HEENT, cardiovascular, pulmonary, gi and gu systems are negative, except as otherwise noted.       Objective   Reported vitals:  There were no vitals taken for this visit.   healthy, alert and moderate distress  PSYCH: Alert and oriented times 3; coherent speech, normal   rate and volume, able to articulate logical thoughts, able   to abstract reason, no tangential thoughts, no hallucinations   or delusions  Her affect is anxious and fearful  RESP: No cough, no audible wheezing, able to talk in full sentences  Remainder of exam unable to be completed due to telephone visits    Diagnostic Test Results:  Labs reviewed in Epic        Assessment/Plan:  1. Benign paroxysmal positional vertigo, unspecified laterality  Symptoms improved with meclizine. Will continue with lower dose, due to potential side effects. Recommend communication with neurology and follow up exam in clinic. Symptoms for 1 week.   - meclizine (ANTIVERT) 25 MG tablet; Take 1 tablet (25 mg) by mouth 3 times daily  as needed for dizziness  Dispense: 60 tablet; Refill: 3    2. Ankylosing spondylitis of cervical region (H)  Progressive deformity of neck that is interfering with eating and walking. Worse since January when patient fell. Discussed using a soft collar for neck and head support.     3. Parkinson disease (H)  Follow up with neurology    4. Rheumatoid arthritis of multiple sites with negative rheumatoid factor (H)  Follow up with rheumatology next month.       Return in about 17 days (around 7/9/2020) for medication follow up, recheck, Physical Exam, Lab Work.      Phone call duration:  18 minutes    Leah Blacna MD

## 2020-06-22 NOTE — PATIENT INSTRUCTIONS
At [facility], we strive to deliver an exceptional experience to you, every time we see you. If you receive a survey, please complete it as we do value your feedback.  If you have MyChart, you can expect to receive results automatically within 24 hours of their completion.  Your provider will send a note interpreting your results as well.   If you do not have MyChart, you should receive your results in about a week by mail.    Your care team:                            Family Medicine Internal Medicine   MD Austen Burch MD Shantel Branch-Fleming, MD Katya Georgiev PA-C Megan Hill, APRN CNP    Myles Junior, MD Pediatrics   David Fraga, PAGARY Owen, MD Estelle Calvillo APRN CNP   MD Lo Weinberg MD Deborah Mielke, MD Kim Thein, APRN [facility]      Clinic hours: Monday - Thursday 7 am-7 pm; Fridays 7 am-5 pm.   Urgent care: Monday - Friday 11 am-9 pm; Saturday and Sunday 9 am-5 pm.    Clinic: [phone]       [facility]: Monday - Thursday 8 am - 7 pm; Friday 8 am - 6 pm  [facility]: [phone]     Use [email or web address] for 24/7 diagnosis and treatment of dozens of conditions.    Patient Education     Managing Dizziness (Vertigo) with Medicines    Although medicines can't cure your problem, they can help control symptoms. Your doctor may prescribe medicines for a few weeks and then taper them off. Always take your medicine as prescribed. Never share your medicine with others.  Contact your healthcare provider right away if you have side effects from your medicines.   How medicines can help    Treat infection or inflammation. If you have an infection caused by bacteria, your doctor can prescribe antibiotics.    Limit conflicting balance signals. These medicines are often in pill form.    Ease nausea. Suppositories, pills, or shots can reduce vomiting.    Reduce pressure in the canals.  Diuretics can be used to treat Meniere's disease. These medicines help your body get rid of extra fluid.    Ease other symptoms. Other medicines can help ease depression and anxiety caused by living with dizziness or fainting.  Date Last Reviewed: 11/1/2016 2000-2019 [employer]. [address] [place] [address], [place] [address]. All rights reserved. This information is not intended as a substitute for professional medical care. Always follow your healthcare professional's instructions.

## 2020-06-22 NOTE — TELEPHONE ENCOUNTER
Patient stated you told her this needed to be face to face but you are doing telephone visit that week.    Sana AYALA

## 2020-06-22 NOTE — TELEPHONE ENCOUNTER
.Reason for call:  Other   Patient called regarding (reason for call): call back  Additional comments: provider told her to schedule for the 9th or 10th of July for a face to face and she is only doing phone visits that week. I cannot schedule a clinic visit on a phone visit. I told the pt this and she would like to talk to someone from Dr. Blanca's team    Phone number to reach patient:  Home number on file [phone] (home)    Best Time:  anytime    Can we leave a detailed message on this number?  YES    Travel screening: Negative

## 2020-07-13 ENCOUNTER — OFFICE VISIT (OUTPATIENT)
Dept: FAMILY MEDICINE | Facility: CLINIC | Age: 80
End: 2020-07-13
Payer: MEDICARE

## 2020-07-13 DIAGNOSIS — G20.A1 PARKINSON DISEASE (H): Primary | ICD-10-CM

## 2020-07-13 DIAGNOSIS — M06.09 RHEUMATOID ARTHRITIS OF MULTIPLE SITES WITH NEGATIVE RHEUMATOID FACTOR (H): ICD-10-CM

## 2020-07-13 DIAGNOSIS — M45.2 ANKYLOSING SPONDYLITIS OF CERVICAL REGION (H): ICD-10-CM

## 2020-07-13 DIAGNOSIS — M19.011 PRIMARY OSTEOARTHRITIS OF RIGHT SHOULDER: ICD-10-CM

## 2020-07-13 DIAGNOSIS — R29.6 RECURRENT FALLS: ICD-10-CM

## 2020-07-13 PROCEDURE — 99214 OFFICE O/P EST MOD 30 MIN: CPT | Performed by: FAMILY MEDICINE

## 2020-07-13 NOTE — PATIENT INSTRUCTIONS
Patient Education     Osteoarthritis  Osteoarthritis (also called degenerative joint disease) happens when the cartilage in a joint becomes damaged and worn. This may be due to age, wear and tear, overuse of the joint, or other problems. Osteoarthritis can affect any joint. But it is most common in hands, knees, spine, hips, and feet. Symptoms include joint stiffness, pain, and swelling.  Home care    When a joint is more sore than usual, rest it for a day or two.    Heat can help relieve stiffness. Take a hot bath or apply a heating pad for up to 30 minutes at a time. If symptoms are worse in the morning, using heat just after awakening can help relax the muscle and soothe the joints.     Ice helps relieve pain and swelling. It is often used after activity. Use a cold pack wrapped in a thin cloth on the joint for 10 to 15 minutes at a time.     Alternating hot and cold can also help relieve pain. Try this for 20 minutes at a time, several times per day.    Exercise helps prevent the muscles and ligaments around the joint from becoming weak. It also helps maintain function in the joint.  Be as active as you can. Talk to your healthcare provider about what activity program is best for you.    Excess weight puts a lot of extra strain on weight-bearing joints of the lower back, hips, knees, feet and ankles. If you are overweight, talk to your healthcare provider about a safe and effective weight loss program.    Use anti-inflammatory medicines as prescribed for pain. This includes acetaminophen or NSAIDs such as ibuprofen or naproxen. If needed, topical or injected medicines may be recommended. Talk to your healthcare provider if these options are not enough to manage your pain.    Talk with your healthcare provider about devices that might help improve your function and reduce pain.  Follow-up care  Follow up with your healthcare provider as advised by our staff.  When to seek medical advice  Call your healthcare  provider right away if any of these occur:    Redness or swelling of a painful joint    Discharge or pus from a painful joint    Fever of 100.4 F (38 C) or higher, or as directed by your healthcare provider    Worsening joint pain    Decreased ability to move the joint or bear weight on the joint  Date Last Reviewed: 3/1/2017    3542-2386 [employer] [address] [place] [address]. All rights reserved. This information is not intended as a substitute for professional medical care. Always follow your healthcare professional's instructions.

## 2020-07-13 NOTE — PROGRESS NOTES
Subjective     Alannah Leos is a 79 year old female who presents to clinic today for the following health issues:    HPI   Neck Problems  Onset: Over 1 year but in the last 3 months has been worsening     Description: Patient having problems with lifting head, also having troubles swallowing due to postion of neck  Location: Entire neck   Radiation: into shoulders     Intensity: moderate    Progression of Symptoms:  Worsening with last fall     Accompanying Signs & Symptoms:  Burning, prickly sensation (paresthesias) in arm(s): no   Numbness in arm(s): no   Weakness in arm(s):  no   Fever: no   Headache: no   Nausea and/or vomiting: no     History:   Trauma: YES- unsure has worsened since last fall, no falls but was better with physical therapy. Canceled due to covid 19. Patient is followed by both rheumatology for rheumatoid arthritis and neurology for Parkinson's disease. Has not been able to get a soft neck collar yet to help support the head during the day.   Previous neck pain: YES  Previous surgery or injections: no   Previous Imaging (MRI,X ray): YES    Precipitating factors:   Does movement increase the pain:  YES    Alleviating factors:  None     Therapies Tried and outcome:  None       Patient Active Problem List   Diagnosis     Osteoporosis     Rheumatoid arthritis (H)     Parkinson disease (H)     Osteoarthritis of wrist     Osteoarthritis of shoulder     History of total knee arthroplasty     Osteoarthritis of left knee     Advanced directives, counseling/discussion     CARDIOVASCULAR SCREENING; LDL GOAL LESS THAN 160     High risk medication use     Underweight     Impingement syndrome, shoulder, left     Combined forms of age-related cataract of both eyes     Seborrheic dermatitis     Anemia in other chronic diseases classified elsewhere     Ankylosing spondylitis of cervical region (H)     Past Surgical History:   Procedure Laterality Date     GALLBLADDER SURGERY       HYSTERECTOMY       KNEE  SURGERY      left      LAMINECTOMY LUMBAR ONE LEVEL       TONSILLECTOMY         Social History     Tobacco Use     Smoking status: Never Smoker     Smokeless tobacco: Never Used   Substance Use Topics     Alcohol use: No     Family History   Problem Relation Age of Onset     Cancer Sister         pancreatic         Current Outpatient Medications   Medication Sig Dispense Refill     ACE NOT PRESCRIBED, INTENTIONAL, 1 each continuous prn. ACE Inhibitor not prescribed due to Refusal by patient       0 each 0     ammonium lactate (AMLACTIN) 12 % cream Apply once to twice a day to dry skin. Can burn temporarily if there is a cut on the skin. 385 g 11     ASPIRIN 81 MG PO TABS PATIENT REPORTS TAKING 4 TIMES PER WEEK AND NOT EVERYDAY       CALCIUM + D OR 600mg twice a day        carbidopa-levodopa (SINEMET)  MG tablet Take one at 6 am, noon, 5 pm, 9 pm and midnight 150 tablet 11     folic acid (FOLVITE) 1 MG tablet Take 1 tablet (1 mg) by mouth daily 100 tablet 3     hydroxychloroquine (PLAQUENIL) 200 MG tablet Take 1 tablet (200 mg) by mouth daily 30 tablet 7     ibuprofen (ADVIL/MOTRIN) 200 MG tablet Take 1 tablet (200 mg) by mouth every 8 hours as needed for moderate pain       meclizine (ANTIVERT) 25 MG tablet Take 1 tablet (25 mg) by mouth 3 times daily as needed for dizziness 60 tablet 3     methotrexate sodium 2.5 MG TABS Take 8 tablets (20 mg) by mouth once a week . Take all 8 tablets on the same day of each week. 32 tablet 7     order for DME Equipment being ordered: soft neck brace 1 Device 0     SENNA-docusate sodium (SENNA S) 8.6-50 MG tablet Take 1 tablet by mouth At Bedtime 100 tablet 1     Allergies   Allergen Reactions     Decadrol [Dexamethasone Sodium Phosphate]      Shrimp      Recent Labs   Lab Test 05/18/20  1325 01/15/20  1403 01/13/20  1332  05/08/17  1446  05/27/16  1413 05/26/15  1416  10/28/13  1204  04/26/13  1128   A1C  --   --   --   --   --   --  5.9 6.0  --  5.8  --  6.3*   LDL  --   --    --   --   --   --   --  89  --   --   --  73   HDL  --   --   --   --   --   --   --  80  --   --   --  72   TRIG  --   --   --   --   --   --   --  50  --   --   --  44   ALT 11 18 14   < >  --    < > 8  --    < >  --    < > 13   CR 0.57 0.55 0.42*   < >  --    < > 0.41* 0.47*   < >  --    < > 0.48*   GFRESTIMATED 88 90 >90   < >  --    < > >90  Non  GFR Calc   >90  Non  GFR Calc     < >  --    < > >90   GFRESTBLACK >90 >90 >90   < >  --    < > >90   GFR Calc   >90   GFR Calc     < >  --    < > >90   POTASSIUM  --  4.2  --   --   --   --  3.7 4.0   < >  --    < > 4.0   TSH  --  2.43  --   --  2.12  --  2.36 2.43   < >  --   --  2.41    < > = values in this interval not displayed.      BP Readings from Last 3 Encounters:   07/13/20 (!) 156/70   01/17/20 (!) 156/71   01/15/20 118/64    Wt Readings from Last 3 Encounters:   07/13/20 52.6 kg (116 lb)   06/22/20 49.9 kg (110 lb)   01/17/20 50.8 kg (112 lb)                      Reviewed and updated as needed this visit by Provider         Review of Systems   Constitutional, HEENT, cardiovascular, pulmonary, gi and gu systems are negative, except as otherwise noted.      Objective    BP (!) 172/70 (BP Location: Left arm, Patient Position: Chair, Cuff Size: Adult Regular)   Pulse 62   Wt 52.6 kg (116 lb)   SpO2 94%   BMI 20.23 kg/m    Body mass index is 20.23 kg/m .  Physical Exam   GENERAL: elderly, alert, thin, well hydrated, mild distress, decreased facial expression, wearing mask.   HENT: ear canals- normal; TMs- normal; Nose- normal; Mouth- no ulcers, no lesions, missing dentition  NECK: rigid held in a flexed position with limited motion in all directions. No tenderness over the spine. Thickening of the bony spine.   RESP: lungs clear to auscultation - no rales, no rhonchi, no wheezes  CV: regular rates and rhythm, normal S1 S2, no S3 or S4 and grade 2/6 systolic murmur, no click or rub, normal  pulses  ABDOMEN: soft, no tenderness, no  hepatosplenomegaly, no masses, normal bowel sounds  MS: extremities- no gross deformities noted, no edema  SKIN: no suspicious lesions, no rashes, age related skin changes with seborrheic keratosis and no actinic keratosis.    NEURO: strength and tone- decreased, sensory exam- grossly normal, reflexes- symmetric  BACK: no CVA tenderness, no paralumbar tenderness  MENTAL STATUS EXAM:  Appearance/Behavior: no apparent distress, neatly groomed, dressed appropriately for weather, appears stated age and is frail-appearing  Speech: normal  Mood/Affect: normal affect  Insight: Good     Diagnostic Test Results:  Labs reviewed in Epic  Orders Only on 05/18/2020   Component Date Value Ref Range Status     Bilirubin Direct 05/18/2020 0.1  0.0 - 0.2 mg/dL Final     Bilirubin Total 05/18/2020 0.4  0.2 - 1.3 mg/dL Final     Albumin 05/18/2020 4.0  3.4 - 5.0 g/dL Final     Protein Total 05/18/2020 7.5  6.8 - 8.8 g/dL Final     Alkaline Phosphatase 05/18/2020 113  40 - 150 U/L Final     ALT 05/18/2020 11  0 - 50 U/L Final     AST 05/18/2020 19  0 - 45 U/L Final     Creatinine 05/18/2020 0.57  0.52 - 1.04 mg/dL Final     GFR Estimate 05/18/2020 88  >60 mL/min/[1.73_m2] Final    Comment: Non  GFR Calc  Starting 12/18/2018, serum creatinine based estimated GFR (eGFR) will be   calculated using the Chronic Kidney Disease Epidemiology Collaboration   (CKD-EPI) equation.       GFR Estimate If Black 05/18/2020 >90  >60 mL/min/[1.73_m2] Final    Comment:  GFR Calc  Starting 12/18/2018, serum creatinine based estimated GFR (eGFR) will be   calculated using the Chronic Kidney Disease Epidemiology Collaboration   (CKD-EPI) equation.       WBC 05/18/2020 6.2  4.0 - 11.0 10e9/L Final     RBC Count 05/18/2020 3.88  3.8 - 5.2 10e12/L Final     Hemoglobin 05/18/2020 11.4* 11.7 - 15.7 g/dL Final     Hematocrit 05/18/2020 35.7  35.0 - 47.0 % Final     MCV 05/18/2020 92  78 -  100 fl Final     MCH 05/18/2020 29.4  26.5 - 33.0 pg Final     MCHC 05/18/2020 31.9  31.5 - 36.5 g/dL Final     RDW 05/18/2020 15.4* 10.0 - 15.0 % Final     Platelet Count 05/18/2020 233  150 - 450 10e9/L Final     Diff Method 05/18/2020 Automated Method   Final     % Neutrophils 05/18/2020 68.4  % Final     % Lymphocytes 05/18/2020 20.4  % Final     % Monocytes 05/18/2020 7.4  % Final     % Eosinophils 05/18/2020 0.8  % Final     % Basophils 05/18/2020 3.0  % Final     Absolute Neutrophil 05/18/2020 4.3  1.6 - 8.3 10e9/L Final     Absolute Lymphocytes 05/18/2020 1.3  0.8 - 5.3 10e9/L Final     Absolute Monocytes 05/18/2020 0.5  0.0 - 1.3 10e9/L Final     Absolute Eosinophils 05/18/2020 0.1  0.0 - 0.7 10e9/L Final     Absolute Basophils 05/18/2020 0.2  0.0 - 0.2 10e9/L Final              Assessment & Plan       ICD-10-CM    1. Parkinson disease (H)  G20 HOME CARE NURSING REFERRAL for physical therapy and follow up with neurology. They have her on maximum medication. Higher doses were causing hallucinations.    2. Rheumatoid arthritis of multiple sites with negative rheumatoid factor (H)  M06.09 Scheduled with rheumatology later this month.    3. Ankylosing spondylitis of cervical region (H)  M45.2 Worsening disease with out clear therapeutic interventions at this point. Will use a soft collar to help keep head up with support during the day. Don't use at night for sleeping.      order for DME- soft collar   4. Primary osteoarthritis of right shoulder  M19.011 Chronic right shoulder pain with impingement.    5. Recurrent falls - no recent falls but needs physical therapy to help with strengthening.  R29.6 HOME CARE NURSING REFERRAL          CONSULTATION/REFERRAL to home physical therapy to resume physical therapy, which was helping patient before it was stopped.   FUTURE LABS:       - Schedule fasting labs in 6 months  FUTURE APPOINTMENTS:       - Follow-up visit in 3 months or sooner if any questions or concerns.    See Patient Instructions    Return in about 3 months (around 10/13/2020) for medication follow up.    Leah Blanca MD  [facility]

## 2020-07-13 NOTE — Clinical Note
Diaz Sin  Alannah is having more issues with her neck and decreased ROM due to the ankylosing spondylitis. I am giving her a soft collar just to support her head during the day. Not sure what other options are available to her at this point. Neurology ordered the neck x ray. She is on your schedule for 7-. Ariadne

## 2020-07-15 ENCOUNTER — TELEPHONE (OUTPATIENT)
Dept: CARE COORDINATION | Facility: CLINIC | Age: 80
End: 2020-07-15

## 2020-07-15 VITALS
WEIGHT: 116 LBS | BODY MASS INDEX: 20.23 KG/M2 | SYSTOLIC BLOOD PRESSURE: 156 MMHG | HEART RATE: 62 BPM | OXYGEN SATURATION: 94 % | DIASTOLIC BLOOD PRESSURE: 70 MMHG

## 2020-07-15 NOTE — TELEPHONE ENCOUNTER
Dear Dr. Blanca,   [facility] process is that all ordered disciplines will be involved in the development of the plan of care.  The following disciplines were unable to see Alannah Leos; MRN [id number] within the 5 day evaluation window.  There will be a delay in the evaluation visit by PT per daughter's request. She is requesting PT SOC on 7/20.    Please REPLY TO THIS MESSAGE in order to give authorization for this Delayed SOC order.    Thank you for your timely assistance.    Order for Alannah Leos; MRN [id number]  Sincerely [facility]  Shelley Grant RN  [phone]

## 2020-07-20 ENCOUNTER — TELEPHONE (OUTPATIENT)
Dept: FAMILY MEDICINE | Facility: CLINIC | Age: 80
End: 2020-07-20

## 2020-07-20 NOTE — TELEPHONE ENCOUNTER
Dr. Blanca - [facility] sent update on drug interactions as follows:          Freida Egan RN

## 2020-07-20 NOTE — TELEPHONE ENCOUNTER
These medications are managed by her rheumatologist, so the patient and her daughter should discuss the medications with them. Leah Blanca MD

## 2020-07-20 NOTE — TELEPHONE ENCOUNTER
[facility] now requests orders and shares plan of care/discharge summaries for some patients through [employer].  Please REPLY TO THIS MESSAGE OR ROUTE BACK TO THE AUTHOR in order to give authorization for orders when needed.  This is considered a verbal order, you will still receive a faxed copy of orders for signature.  Thank you for your assistance in improving collaboration for our patients.    ORDER    PT initial evaluation and falls risk assessment completed today.  Also assessed patients current HEP.  Requesting okay for PT 1 more time this week, then 2x next week for home safety education, education to optimize movement/posture,  therapeutic exercises, gait and transfer training, and instruction in home exercise program.  Also Speech Therapy Eval to begin week of 8/3/2020 due to patient wanting to not have so many appointments in a week. Patient declined need for other homecare services.     When completing medication reconciliation, there was an alert for possible severe interaction between ibuprofen and methotrexate and a duplicate warning for  Hydroxychloroquine and methotrexate.   Is it okay for patient to take meds as prescribed?    Thank you,   Maya Salguero, PT  [phone]

## 2020-07-20 NOTE — TELEPHONE ENCOUNTER
Home care orders approved as follows: PT 1 more time this week, then 2x next week     We will notify Dr. Blanca of the drug interactions and will provide you with her response when she answers the  message.    Freida Egan RN

## 2020-07-21 ENCOUNTER — TELEPHONE (OUTPATIENT)
Dept: RHEUMATOLOGY | Facility: CLINIC | Age: 80
End: 2020-07-21

## 2020-07-21 NOTE — TELEPHONE ENCOUNTER
Dr. Del Cid,    I admitted Alannah to homecare and part of the process is to do a medication reconciliation. Her primary MD did not feel comfortable addressing the noted med interactions.There was an alert for possible severe interaction between ibuprofen and methotrexate and a duplicate warning for  Hydroxychloroquine and methotrexate.   Is it okay for patient to take meds as prescribed?    Thank you,  Maya Salguero, PT  [phone]

## 2020-07-21 NOTE — TELEPHONE ENCOUNTER
Okay to continue hydroxychloroquine, methotrexate, and ibuprofen.    Merrick Del Cid MD  7/21/2020 12:10 PM

## 2020-07-27 DIAGNOSIS — Z79.899 HIGH RISK MEDICATION USE: ICD-10-CM

## 2020-07-27 DIAGNOSIS — M06.09 RHEUMATOID ARTHRITIS OF MULTIPLE SITES WITH NEGATIVE RHEUMATOID FACTOR (H): ICD-10-CM

## 2020-07-27 LAB
ALBUMIN SERPL-MCNC: 3.9 G/DL (ref 3.4–5)
ALP SERPL-CCNC: 111 U/L (ref 40–150)
ALT SERPL W P-5'-P-CCNC: 12 U/L (ref 0–50)
AST SERPL W P-5'-P-CCNC: 18 U/L (ref 0–45)
BASOPHILS # BLD AUTO: 0.1 10E9/L (ref 0–0.2)
BASOPHILS NFR BLD AUTO: 2 %
BILIRUB DIRECT SERPL-MCNC: <0.1 MG/DL (ref 0–0.2)
BILIRUB SERPL-MCNC: 0.4 MG/DL (ref 0.2–1.3)
CREAT SERPL-MCNC: 0.48 MG/DL (ref 0.52–1.04)
CRP SERPL-MCNC: 8.7 MG/L (ref 0–8)
DIFFERENTIAL METHOD BLD: ABNORMAL
EOSINOPHIL # BLD AUTO: 0.1 10E9/L (ref 0–0.7)
EOSINOPHIL NFR BLD AUTO: 1 %
ERYTHROCYTE [DISTWIDTH] IN BLOOD BY AUTOMATED COUNT: 15.9 % (ref 10–15)
ERYTHROCYTE [SEDIMENTATION RATE] IN BLOOD BY WESTERGREN METHOD: 24 MM/H (ref 0–30)
GFR SERPL CREATININE-BSD FRML MDRD: >90 ML/MIN/{1.73_M2}
HCT VFR BLD AUTO: 34.6 % (ref 35–47)
HGB BLD-MCNC: 11.2 G/DL (ref 11.7–15.7)
LYMPHOCYTES # BLD AUTO: 1.5 10E9/L (ref 0.8–5.3)
LYMPHOCYTES NFR BLD AUTO: 24 %
MCH RBC QN AUTO: 29 PG (ref 26.5–33)
MCHC RBC AUTO-ENTMCNC: 32.4 G/DL (ref 31.5–36.5)
MCV RBC AUTO: 90 FL (ref 78–100)
MONOCYTES # BLD AUTO: 0.4 10E9/L (ref 0–1.3)
MONOCYTES NFR BLD AUTO: 7 %
NEUTROPHILS # BLD AUTO: 4.3 10E9/L (ref 1.6–8.3)
NEUTROPHILS NFR BLD AUTO: 66 %
PLATELET # BLD AUTO: 257 10E9/L (ref 150–450)
PLATELET # BLD EST: ABNORMAL 10*3/UL
PROT SERPL-MCNC: 7.7 G/DL (ref 6.8–8.8)
RBC # BLD AUTO: 3.86 10E12/L (ref 3.8–5.2)
WBC # BLD AUTO: 6.4 10E9/L (ref 4–11)

## 2020-07-27 PROCEDURE — 85652 RBC SED RATE AUTOMATED: CPT | Performed by: INTERNAL MEDICINE

## 2020-07-27 PROCEDURE — 85025 COMPLETE CBC W/AUTO DIFF WBC: CPT | Performed by: INTERNAL MEDICINE

## 2020-07-27 PROCEDURE — 86140 C-REACTIVE PROTEIN: CPT | Performed by: INTERNAL MEDICINE

## 2020-07-27 PROCEDURE — 36415 COLL VENOUS BLD VENIPUNCTURE: CPT | Performed by: INTERNAL MEDICINE

## 2020-07-27 PROCEDURE — 80076 HEPATIC FUNCTION PANEL: CPT | Performed by: INTERNAL MEDICINE

## 2020-07-27 PROCEDURE — 82565 ASSAY OF CREATININE: CPT | Performed by: INTERNAL MEDICINE

## 2020-07-30 ENCOUNTER — VIRTUAL VISIT (OUTPATIENT)
Dept: RHEUMATOLOGY | Facility: CLINIC | Age: 80
End: 2020-07-30
Payer: MEDICARE

## 2020-07-30 DIAGNOSIS — Z53.9 DIAGNOSIS NOT YET DEFINED: Primary | ICD-10-CM

## 2020-07-30 DIAGNOSIS — M19.019 PRIMARY OSTEOARTHRITIS OF SHOULDER, UNSPECIFIED LATERALITY: ICD-10-CM

## 2020-07-30 DIAGNOSIS — Z79.899 HIGH RISK MEDICATION USE: ICD-10-CM

## 2020-07-30 DIAGNOSIS — M81.0 AGE-RELATED OSTEOPOROSIS WITHOUT CURRENT PATHOLOGICAL FRACTURE: ICD-10-CM

## 2020-07-30 DIAGNOSIS — M06.09 RHEUMATOID ARTHRITIS OF MULTIPLE SITES WITH NEGATIVE RHEUMATOID FACTOR (H): Primary | ICD-10-CM

## 2020-07-30 PROCEDURE — 99443 ZZC PHYSICIAN TELEPHONE EVALUATION 21-30 MIN: CPT | Performed by: INTERNAL MEDICINE

## 2020-07-30 PROCEDURE — G0180 MD CERTIFICATION HHA PATIENT: HCPCS | Performed by: FAMILY MEDICINE

## 2020-07-30 RX ORDER — HYDROXYCHLOROQUINE SULFATE 200 MG/1
200 TABLET, FILM COATED ORAL DAILY
Qty: 30 TABLET | Refills: 7 | Status: SHIPPED | OUTPATIENT
Start: 2020-07-30 | End: 2020-11-19

## 2020-07-30 RX ORDER — METHOTREXATE 2.5 MG/1
20 TABLET ORAL WEEKLY
Qty: 32 TABLET | Refills: 7 | Status: SHIPPED | OUTPATIENT
Start: 2020-07-30 | End: 2021-05-20

## 2020-07-30 RX ORDER — FOLIC ACID 1 MG/1
1 TABLET ORAL DAILY
Qty: 100 TABLET | Refills: 2 | Status: SHIPPED | OUTPATIENT
Start: 2020-07-30 | End: 2020-11-19

## 2020-07-30 NOTE — PROGRESS NOTES
"Alannah Leos is a 79 year old female who is being evaluated via a billable telephone visit.      The patient has been notified of following:     \"This telephone visit will be conducted via a call between you and your physician/provider. We have found that certain health care needs can be provided without the need for a physical exam.  This service lets us provide the care you need with a short phone conversation.  If a prescription is necessary we can send it directly to your pharmacy.  If lab work is needed we can place an order for that and you can then stop by our lab to have the test done at a later time.    Telephone visits are billed at different rates depending on your insurance coverage. During this emergency period, for some insurers they may be billed the same as an in-person visit.  Please reach out to your insurance provider with any questions.    If during the course of the call the physician/provider feels a telephone visit is not appropriate, you will not be charged for this service.\"    Patient has given verbal consent for Telephone visit?  Yes    What phone number would you like to be contacted at? [phone]    How would you like to obtain your AVS? Mail a copy      Rheumatology Telephone/Telehealth  Visit      Alannah Leos MRN# [id number]   YOB: 1940 Age: 79 year old      Date of visit: 7/30/20   PCP: Dr. Leah Blanca    Chief Complaint   Patient presents with:  RECHECK: RA: overall doing well however arm muscles are sore    Assessment and Plan     1. Seronegative erosive rheumatoid arthritis: Diagnosed in the 1970s. Previously on methotrexate when first diagnosed (unclear if ineffective or not, stopped by patient preference to not treat her RA at that time).  Synovitis, subluxation, and fixed flexion deformities on initial exam. Steroid responsive. Also with ankylosis of the cervical spine and erosive changes seen on x-rays. Currently on methotrexate 20 mg once weekly, " and hydroxychloroquine 200 mg daily. Previously on SSZ (felt poorly on it).  She has improved with conventional DMARDs but would benefit from a biologic DMARD; preferentially Orencia because of the positive PIERO and dsDNA in the past; in the past it was prescribed and then the patient decided to not use it but she is still considering Orencia and we discussed in detail again today.  We also discussed her neck pain that has worsened; and I advised that she could see a surgeon; may also benefit from escalating RA treatment of which she is reluctant to do.    - Continue methotrexate 20 mg once weekly    - Continue folic acid 1mg daily  - Continue hydroxychloroquine 200mg daily (normal eye exam on 1/27/2020)  - Labs in 3 months: CBC, Creatinine, Hepatic Panel, ESR, CRP    2. Positive PIERO and dsDNA: These were noted on record review. She does not have symptoms to support an PIERO-associated rheumatologic disease at this time.     3. Neck pain in the setting of rheumatoid arthritis: No evidence of instability by x-rays on 10/12/2016.  Seen by neurosurgery but reportedly the procedure she would need is very intense so she does not want to do anything right now.    4. Parkinson's Disease: Followed by Dr. Ricardo Johnson at the [facility].     5. Osteoporosis: Based on 1/29/2018 DEXA.  Was on fosamax from [id number].  We discussed osteoporosis and the treatment of osteoporosis.  We reviewed the risks of treatment and the risks of not treating again today and she is willing to start reclast, again; phone number for the infusion center was given and I advised her to call to schedule  - Start reclast 5mg IV yearly; advised her to call to schedule  - Continue vitamin D 1000 IU daily  - Continue calcium 1000mg daily      # Relevant labs and imaging were reviewed with the patient    # High risk medication toxicity monitoring: discussion and labs reviewed; appropriate labs ordered. See above.  Instructed that if confirmed  "to have COVID-19 or exposure to someone with confirmed COVID-19 to call this clinic for directions on DMARD management.    # Note that this is a virtual visit to reduce the risk of COVID-19 exposure during this current pandemic.      # Considered to be at high risk of complications from the COVID-19 virus.  It is recommended to limit contact with other people and if possible to work remotely or provide a leave of absence to reduce the risk for COVID-19.      Ms. Leos verbalized agreement with and understanding of the rational for the diagnosis and treatment plan.  All questions were answered to best of my ability and the patient's satisfaction. Ms. Leos was advised to contact the clinic with any questions that may arise after the clinic visit.      Thank you for involving me in the care of the patient    Return to clinic: 6 months      HPI   Alannah Leos is a 79 year old female with a medical history significant for Parkinson's disease, osteoarthritis, status post TKA, osteoporosis, and rheumatoid arthritis who presents for follow-up of rheumatoid arthritis.    Previously, Ms. Leos reported that she was diagnosed with rheumatoid arthritis in the past but did not want to take toxic medications and did not want to keep taking prednisone because of potential toxicities. Therefore, she decided that she would \"tough out\" her symptoms and try to enjoy her life. Then, more recently she was diagnosed with Parkinson's disease that has been significantly affecting her quality of life. She is treated for the Parkinson's disease and she believes that the treatment is helping. However, she is also having worsening joint pain and stiffness. Morning stiffness lasts for all day. All of her joints hurt, including her bilateral shoulders, neck, spine, hands, wrists, elbows, hips, knees, ankles, and feet. She tells me that she is unable to raise her arms without assistance from the contralateral arm, if she is able to get " "that hand over to the other side. Mainly, her right shoulder is affected. She tells me that she has a little more mobility in her left shoulder. She tells me that her entire spine is fused, and that it \"occurred naturally.\"  Overall, she tells me that she feels miserable and is willing to start treatment, but is still scared of most of the medication side effects.    Today, 7/30/2020: fell a couple times due to Parkinson's she says.  Was going to therapy, but since COVID19 she has been staying at home and not doing the therapy as much.  Difficulty with most activities because of her shoulder pain that she says is nonoperable.  Neck is also painful and she is using a collar when she can get it on but she has difficulty getting it on herself.  She does not want to escalate therapy for rheumatoid arthritis because of risk for immunosuppression, especially during the COVID-19 pandemic.  She does not want to see a surgeon.  She says that she is willing to get Reclast    Denies fevers, chills, nausea, vomiting, constipation, diarrhea. No abdominal pain. No chest pain/pressure, palpitations, or shortness of breath.   No oral or nasal sores.  No rash. No sicca symptoms.      Tobacco: none  EtOH: 1 drink at [place] only  Drugs: none    ROS   GEN: No fevers, chills, night sweats.    SKIN: No itching, rashes, sores  HEENT: No oral or nasal ulcers.  CV: No chest pain, pressure, palpitations, or dyspnea on exertion.  PULM: No SOB, wheeze, cough.  GI: No nausea, vomiting, constipation, diarrhea. No blood in stool. No abdominal pain.  : No blood in urine.  MSK: See HPI.  EXT: See history of present illness  PSYCH: Negative    Active Problem List     Patient Active Problem List   Diagnosis     Osteoporosis     Rheumatoid arthritis (H)     Parkinson disease (H)     Osteoarthritis of wrist     Osteoarthritis of shoulder     History of total knee arthroplasty     Osteoarthritis of left knee     Advanced directives, " counseling/discussion     CARDIOVASCULAR SCREENING; LDL GOAL LESS THAN 160     High risk medication use     Underweight     Impingement syndrome, shoulder, left     Combined forms of age-related cataract of both eyes     Seborrheic dermatitis     Anemia in other chronic diseases classified elsewhere     Ankylosing spondylitis of cervical region (H)     Past Medical History     Past Medical History:   Diagnosis Date     Hyperlipidemia      Murmur, heart     hsm     Nonsenile cataract      Osteoarthrosis, unspecified whether generalized or localized, lower leg      Osteopenia      Rheumatoid arthritis(714.0)      Past Surgical History     Past Surgical History:   Procedure Laterality Date     GALLBLADDER SURGERY       HYSTERECTOMY       KNEE SURGERY      left      LAMINECTOMY LUMBAR ONE LEVEL       TONSILLECTOMY       Allergy     Allergies   Allergen Reactions     Decadrol [Dexamethasone Sodium Phosphate]      Shrimp      Current Medication List     Current Outpatient Medications   Medication Sig     ACE NOT PRESCRIBED, INTENTIONAL, 1 each continuous prn. ACE Inhibitor not prescribed due to Refusal by patient           ASPIRIN 81 MG PO TABS PATIENT REPORTS TAKING 4 TIMES PER WEEK AND NOT EVERYDAY     CALCIUM + D OR 600mg twice a day      carbidopa-levodopa (SINEMET)  MG tablet Take one at 6 am, noon, 5 pm, 9 pm and midnight     folic acid (FOLVITE) 1 MG tablet Take 1 tablet (1 mg) by mouth daily     hydroxychloroquine (PLAQUENIL) 200 MG tablet Take 1 tablet (200 mg) by mouth daily     ibuprofen (ADVIL/MOTRIN) 200 MG tablet Take 1 tablet (200 mg) by mouth every 8 hours as needed for moderate pain     meclizine (ANTIVERT) 25 MG tablet Take 1 tablet (25 mg) by mouth 3 times daily as needed for dizziness     methotrexate sodium 2.5 MG TABS Take 8 tablets (20 mg) by mouth once a week . Take all 8 tablets on the same day of each week.     order for DME Equipment being ordered: soft neck brace     SENNA-docusate sodium  "(SENNA S) 8.6-50 MG tablet Take 1 tablet by mouth At Bedtime     ammonium lactate (AMLACTIN) 12 % cream Apply once to twice a day to dry skin. Can burn temporarily if there is a cut on the skin. (Patient not taking: Reported on 7/30/2020)     No current facility-administered medications for this visit.          Social History   See HPI    Family History     Family History   Problem Relation Age of Onset     Cancer Sister         pancreatic       Physical Exam     Temp Readings from Last 3 Encounters:   01/17/20 97.5  F (36.4  C) (Oral)   09/12/19 98.2  F (36.8  C) (Oral)   07/25/19 97.7  F (36.5  C) (Oral)     BP Readings from Last 5 Encounters:   07/13/20 (!) 156/70   01/17/20 (!) 156/71   01/15/20 118/64   09/12/19 135/66   07/25/19 132/65     Pulse Readings from Last 1 Encounters:   07/13/20 62     Resp Readings from Last 1 Encounters:   09/12/19 16     Estimated body mass index is 20.23 kg/m  as calculated from the following:    Height as of 1/17/20: 1.613 m (5' 3.5\").    Weight as of 7/13/20: 52.6 kg (116 lb).    Telephone Visit       Labs / Imaging (select studies)     RF/CCP  Recent Labs   Lab Test 10/12/16  1142 07/30/13  1621 10/25/12  1127   CCPABY  --  <20 Interpretation:  Negative  --    CCPIGG 2  --   --    RHF <20  --  9     CBC  Recent Labs   Lab Test 07/27/20  1334 05/18/20  1325 01/15/20  1403 01/13/20  1332   WBC 6.4 6.2 5.4 5.8   RBC 3.86 3.88 4.08 4.02   HGB 11.2* 11.4* 12.1 12.0   HCT 34.6* 35.7 38.2 37.9   MCV 90 92 94 94   RDW 15.9* 15.4* 16.5* 16.3*    233 227 211   MCH 29.0 29.4 29.7 29.9   MCHC 32.4 31.9 31.7 31.7   NEUTROPHIL 66.0 68.4  --  62.6   LYMPH 24.0 20.4  --  25.0   MONOCYTE 7.0 7.4  --  8.2   EOSINOPHIL 1.0 0.8  --  1.4   BASOPHIL 2.0 3.0  --  2.8   ANEU 4.3 4.3  --  3.6   ALYM 1.5 1.3  --  1.4   DORITA 0.4 0.5  --  0.5   AEOS 0.1 0.1  --  0.1   ABAS 0.1 0.2  --  0.2     CMP  Recent Labs   Lab Test 07/27/20  1334 05/18/20  1325 01/15/20  1403  01/25/18  1353  05/27/16  1413 " 05/26/15  1416   NA  --   --  137  --   --   --  136 137   POTASSIUM  --   --  4.2  --   --   --  3.7 4.0   CHLORIDE  --   --  102  --   --   --  102 103   CO2  --   --  30  --   --   --  25 28   ANIONGAP  --   --  5  --   --   --  9 6   GLC  --   --  97  --   --   --  94 100*   BUN  --   --  15  --   --   --  11 9   CR 0.48* 0.57 0.55   < >  --    < > 0.41* 0.47*   GFRESTIMATED >90 88 90   < >  --    < > >90  Non  GFR Calc   >90  Non  GFR Calc     GFRESTBLACK >90 >90 >90   < >  --    < > >90   GFR Calc   >90   GFR Calc     PAKO  --   --  9.5  10.2*  --  9.1  --  9.2 9.2   BILITOTAL 0.4 0.4 0.4   < >  --    < > 0.5  --    ALBUMIN 3.9 4.0 4.3   < >  --    < > 3.8  --    PROTTOTAL 7.7 7.5 7.8   < >  --    < > 8.8  --    ALKPHOS 111 113 125   < >  --    < > 108  --    AST 18 19 25   < >  --    < > 15  --    ALT 12 11 18   < >  --    < > 8  --     < > = values in this interval not displayed.     Calcium/VitaminD  Recent Labs   Lab Test 01/15/20  1403 01/25/18  1353 05/27/16  1413   PAKO 9.5  10.2* 9.1 9.2   VITDT 39 39 95*     ESR/CRP  Recent Labs   Lab Test 07/27/20  1334 01/13/20  1332 07/15/19  1341   SED 24 9 16   CRP 8.7* <2.9 <2.9     Lipid Panel  Recent Labs   Lab Test 05/26/15  1416 04/26/13  1128   CHOL 179 154   TRIG 50 44   HDL 80 72   LDL 89 73   VLDL 10 9   CHOLHDLRATIO 2.2 2.1     Hepatitis B  Recent Labs   Lab Test 01/18/17  1601   HBCAB Nonreactive   HEPBANG Nonreactive     Hepatitis C  Recent Labs   Lab Test 01/18/17  1601   HCVAB Nonreactive   Assay performance characteristics have not been established for newborns,   infants, and children       Tuberculosis Screening  Recent Labs   Lab Test 01/25/18  1352 01/18/17  1601   TBRSLT Negative Negative   TBAGN 0.00 0.00     HIV Screening  Recent Labs   Lab Test 01/18/17  1601   HIAGAB Nonreactive   HIV-1 p24 Ag & HIV-1/HIV-2 Ab Not Detected       Immunization History     Immunization History    Administered Date(s) Administered     Pneumo Conj 13-V (2010&after) 11/03/2016     Pneumococcal 23 valent 11/01/2007, 11/13/2007, 03/12/2018     TD (ADULT, 7+) 11/13/2007     Tdap (Adacel,Boostrix) 11/13/2007          Chart documentation done in part with Dragon Voice recognition Software. Although reviewed after completion, some word and grammatical error may remain.    Phone call start time: 2:04 PM  Phone call end time: 2:35 PM    Location of patient: home, in [place]  Location of provider: home     Follow up: 3-4 months    Merrick Del Cid MD

## 2020-08-03 ENCOUNTER — MEDICAL CORRESPONDENCE (OUTPATIENT)
Dept: HEALTH INFORMATION MANAGEMENT | Facility: CLINIC | Age: 80
End: 2020-08-03

## 2020-09-14 ENCOUNTER — TRANSFERRED RECORDS (OUTPATIENT)
Dept: HEALTH INFORMATION MANAGEMENT | Facility: CLINIC | Age: 80
End: 2020-09-14

## 2020-11-19 ENCOUNTER — VIRTUAL VISIT (OUTPATIENT)
Dept: RHEUMATOLOGY | Facility: CLINIC | Age: 80
End: 2020-11-19
Payer: MEDICARE

## 2020-11-19 DIAGNOSIS — M06.09 RHEUMATOID ARTHRITIS OF MULTIPLE SITES WITH NEGATIVE RHEUMATOID FACTOR (H): Primary | ICD-10-CM

## 2020-11-19 DIAGNOSIS — M19.019 PRIMARY OSTEOARTHRITIS OF SHOULDER, UNSPECIFIED LATERALITY: ICD-10-CM

## 2020-11-19 DIAGNOSIS — Z79.899 HIGH RISK MEDICATION USE: ICD-10-CM

## 2020-11-19 PROCEDURE — 99442 PR PHYSICIAN TELEPHONE EVALUATION 11-20 MIN: CPT | Performed by: INTERNAL MEDICINE

## 2020-11-19 RX ORDER — HYDROXYCHLOROQUINE SULFATE 200 MG/1
200 TABLET, FILM COATED ORAL DAILY
Qty: 30 TABLET | Refills: 7 | Status: SHIPPED | OUTPATIENT
Start: 2020-11-19 | End: 2021-05-20

## 2020-11-19 RX ORDER — FOLIC ACID 1 MG/1
1 TABLET ORAL DAILY
Qty: 100 TABLET | Refills: 2 | Status: SHIPPED | OUTPATIENT
Start: 2020-11-19 | End: 2021-05-20

## 2020-11-19 NOTE — PROGRESS NOTES
"Alannah Leos is a 80 year old female who is being evaluated via a billable telephone visit.      The patient has been notified of following:     \"This telephone visit will be conducted via a call between you and your physician/provider. We have found that certain health care needs can be provided without the need for a physical exam.  This service lets us provide the care you need with a short phone conversation.  If a prescription is necessary we can send it directly to your pharmacy.  If lab work is needed we can place an order for that and you can then stop by our lab to have the test done at a later time.    Telephone visits are billed at different rates depending on your insurance coverage. During this emergency period, for some insurers they may be billed the same as an in-person visit.  Please reach out to your insurance provider with any questions.    If during the course of the call the physician/provider feels a telephone visit is not appropriate, you will not be charged for this service.\"    Patient has given verbal consent for Telephone visit?  Yes    What phone number would you like to be contacted at? [phone]    How would you like to obtain your AVS? Mail a copy    Joan Bucio CMA Rheumatology  11/19/2020 2:08 PM      Rheumatology Telephone/Telehealth Visit      Alannah Leos MRN# [id number]   YOB: 1940 Age: 80 year old      Date of visit: 11/19/20   PCP: Dr. Leah Blanca    Chief Complaint   Patient presents with:  Arthritis: RA, not doing good, very stiff and head is further bent due to fall    Assessment and Plan     1. Seronegative erosive rheumatoid arthritis: Diagnosed in the 1970s. Previously on methotrexate when first diagnosed (unclear if ineffective or not, stopped by patient preference to not treat her RA at that time).  Synovitis, subluxation, and fixed flexion deformities on initial exam. Steroid responsive. Also with ankylosis of the cervical spine and " erosive changes seen on x-rays. Currently on methotrexate 20 mg once weekly, and hydroxychloroquine 200 mg daily. Previously on SSZ (felt poorly on it).  She has improved with conventional DMARDs but would benefit from a biologic DMARD; preferentially Orencia because of the positive PIERO and dsDNA in the past; in the past it was prescribed and then the patient decided to not use it but she is still considering Orencia and we discussed again today.  We also discussed her neck pain that has worsened; and I advised that she could see a surgeon; may also benefit from escalating RA treatment of which she is reluctant to do.    - Continue methotrexate 20 mg once weekly    - Continue folic acid 1mg daily  - Continue hydroxychloroquine 200mg daily (normal eye exam on 1/27/2020)  - Labs within 1 week: CBC, Creatinine, Hepatic Panel, ESR, CRP  - Labs in 3 months: CBC, Creatinine, Hepatic Panel  - Labs in 6 months: CBC, Creatinine, Hepatic Panel, ESR, CRP     2. Positive PIERO and dsDNA: These were noted on record review. She does not have symptoms to support an PIERO-associated rheumatologic disease at this time.     3. Neck pain in the setting of rheumatoid arthritis: No evidence of instability by x-rays on 10/12/2016.  Seen by neurosurgery but reportedly the procedure she would need is very intense so she does not want to do anything right now.  Due to continued neck pain she may benefit from seeing a spine surgeon again but she does not want to at this time.    4. Parkinson's Disease: Followed by Dr. Ricardo Johnson at the [facility].     5. Osteoporosis: Based on 1/29/2018 DEXA.  Was on fosamax from [id number].  We discussed osteoporosis and the treatment of osteoporosis previously.  Has been determined that she will start Reclast and the order is in that she would like to wait until the COVID-19 disease activity is lessened to reduce her risk for getting COVID-19, prior to going in for the infusion  - Start  "reclast 5mg IV yearly; advised her to call to schedule  - Continue vitamin D 1000 IU daily  - Continue calcium 1000mg daily    # Relevant labs and imaging were reviewed with the patient    # High risk medication toxicity monitoring: discussion and labs reviewed; appropriate labs ordered. See above.  Instructed that if confirmed to have COVID-19 or exposure to someone with confirmed COVID-19 to call this clinic for directions on DMARD management.    # Note that this is a virtual visit to reduce the risk of COVID-19 exposure during this current pandemic.      # Considered to be at high risk of complications from the COVID-19 virus.  It is recommended to limit contact with other people and if possible to work remotely or provide a leave of absence to reduce the risk for COVID-19.      Ms. Leos verbalized agreement with and understanding of the rational for the diagnosis and treatment plan.  All questions were answered to best of my ability and the patient's satisfaction. Ms. Leos was advised to contact the clinic with any questions that may arise after the clinic visit.      Thank you for involving me in the care of the patient    Return to clinic: 6 months      HPI   Alannah Leos is a 80 year old female with a medical history significant for Parkinson's disease, osteoarthritis, status post TKA, osteoporosis, and rheumatoid arthritis who presents for follow-up of rheumatoid arthritis.    Previously, Ms. Leos reported that she was diagnosed with rheumatoid arthritis in the past but did not want to take toxic medications and did not want to keep taking prednisone because of potential toxicities. Therefore, she decided that she would \"tough out\" her symptoms and try to enjoy her life. Then, more recently she was diagnosed with Parkinson's disease that has been significantly affecting her quality of life. She is treated for the Parkinson's disease and she believes that the treatment is helping. However, she is " "also having worsening joint pain and stiffness. Morning stiffness lasts for all day. All of her joints hurt, including her bilateral shoulders, neck, spine, hands, wrists, elbows, hips, knees, ankles, and feet. She tells me that she is unable to raise her arms without assistance from the contralateral arm, if she is able to get that hand over to the other side. Mainly, her right shoulder is affected. She tells me that she has a little more mobility in her left shoulder. She tells me that her entire spine is fused, and that it \"occurred naturally.\"  Overall, she tells me that she feels miserable and is willing to start treatment, but is still scared of most of the medication side effects.    Today, 11/19/2020: she reports worsening joint pains. Fell and since then she's had more neck pain, feeling like something is pushing on her neck again; she has already been evaluated for this and knows that if it worsens she should see her spine surgeon.  Note that she had an x-ray in June, after she had fallen, with another provider.  She notes that she has increased her Parkinson's medication and feels like she is taking medications all day long.  Overall not happy that she has so much arthritis but happy with her current treatment plan and does not want to change it at this time.  She notes that she is due for Reclast but does not want to go outside of her house right now except for labs, to reduce her risk for possibly getting COVID-19.     Denies fevers, chills, nausea, vomiting, constipation, diarrhea. No abdominal pain. No chest pain/pressure, palpitations, or shortness of breath.   No oral or nasal sores.  No rash. No sicca symptoms.      Tobacco: none  EtOH: 1 drink at [place] only  Drugs: none    ROS   GEN: No fevers, chills, night sweats.    SKIN: No itching, rashes, sores  HEENT: No oral or nasal ulcers.  CV: No chest pain, pressure, palpitations, or dyspnea on exertion.  PULM: No SOB, wheeze, cough.  GI: No " nausea, vomiting, constipation, diarrhea. No blood in stool. No abdominal pain.  : No blood in urine.  MSK: See HPI.  EXT: See history of present illness  PSYCH: Negative    Active Problem List     Patient Active Problem List   Diagnosis     Osteoporosis     Rheumatoid arthritis (H)     Parkinson disease (H)     Osteoarthritis of wrist     Osteoarthritis of shoulder     History of total knee arthroplasty     Osteoarthritis of left knee     Advanced directives, counseling/discussion     CARDIOVASCULAR SCREENING; LDL GOAL LESS THAN 160     High risk medication use     Underweight     Impingement syndrome, shoulder, left     Combined forms of age-related cataract of both eyes     Seborrheic dermatitis     Anemia in other chronic diseases classified elsewhere     Ankylosing spondylitis of cervical region (H)     Past Medical History     Past Medical History:   Diagnosis Date     Hyperlipidemia      Murmur, heart     hsm     Nonsenile cataract      Osteoarthrosis, unspecified whether generalized or localized, lower leg      Osteopenia      Rheumatoid arthritis(714.0)      Past Surgical History     Past Surgical History:   Procedure Laterality Date     GALLBLADDER SURGERY       HYSTERECTOMY       KNEE SURGERY      left      LAMINECTOMY LUMBAR ONE LEVEL       TONSILLECTOMY       Allergy     Allergies   Allergen Reactions     Decadrol [Dexamethasone Sodium Phosphate]      Shrimp      Current Medication List     Current Outpatient Medications   Medication Sig     ASPIRIN 81 MG PO TABS PATIENT REPORTS TAKING 4 TIMES PER WEEK AND NOT EVERYDAY     CALCIUM + D OR 600mg twice a day      carbidopa-levodopa (SINEMET)  MG tablet Take one at 6 am, noon, 5 pm, 9 pm and midnight     folic acid (FOLVITE) 1 MG tablet Take 1 tablet (1 mg) by mouth daily     hydroxychloroquine (PLAQUENIL) 200 MG tablet Take 1 tablet (200 mg) by mouth daily     ibuprofen (ADVIL/MOTRIN) 200 MG tablet Take 1 tablet (200 mg) by mouth every 8 hours as  "needed for moderate pain     meclizine (ANTIVERT) 25 MG tablet Take 1 tablet (25 mg) by mouth 3 times daily as needed for dizziness     methotrexate sodium 2.5 MG TABS Take 8 tablets (20 mg) by mouth once a week . Take all 8 tablets on the same day of each week.     SENNA-docusate sodium (SENNA S) 8.6-50 MG tablet Take 1 tablet by mouth At Bedtime     ACE NOT PRESCRIBED, INTENTIONAL, 1 each continuous prn. ACE Inhibitor not prescribed due to Refusal by patient           ammonium lactate (AMLACTIN) 12 % cream Apply once to twice a day to dry skin. Can burn temporarily if there is a cut on the skin. (Patient not taking: Reported on 7/30/2020)     order for DME Equipment being ordered: soft neck brace     No current facility-administered medications for this visit.          Social History   See HPI    Family History     Family History   Problem Relation Age of Onset     Cancer Sister         pancreatic       Physical Exam     Temp Readings from Last 3 Encounters:   01/17/20 97.5  F (36.4  C) (Oral)   09/12/19 98.2  F (36.8  C) (Oral)   07/25/19 97.7  F (36.5  C) (Oral)     BP Readings from Last 5 Encounters:   07/13/20 (!) 156/70   01/17/20 (!) 156/71   01/15/20 118/64   09/12/19 135/66   07/25/19 132/65     Pulse Readings from Last 1 Encounters:   07/13/20 62     Resp Readings from Last 1 Encounters:   09/12/19 16     Estimated body mass index is 20.23 kg/m  as calculated from the following:    Height as of 1/17/20: 1.613 m (5' 3.5\").    Weight as of 7/13/20: 52.6 kg (116 lb).    Telephone Visit     Labs / Imaging (select studies)     RF/CCP  Recent Labs   Lab Test 10/12/16  1142 07/30/13  1621 10/25/12  1127   CCPABY  --  <20 Interpretation:  Negative  --    CCPIGG 2  --   --    RHF <20  --  9     CBC  Recent Labs   Lab Test 07/27/20  1334 05/18/20  1325 01/15/20  1403 01/13/20  1332   WBC 6.4 6.2 5.4 5.8   RBC 3.86 3.88 4.08 4.02   HGB 11.2* 11.4* 12.1 12.0   HCT 34.6* 35.7 38.2 37.9   MCV 90 92 94 94   RDW 15.9* " 15.4* 16.5* 16.3*    233 227 211   MCH 29.0 29.4 29.7 29.9   MCHC 32.4 31.9 31.7 31.7   NEUTROPHIL 66.0 68.4  --  62.6   LYMPH 24.0 20.4  --  25.0   MONOCYTE 7.0 7.4  --  8.2   EOSINOPHIL 1.0 0.8  --  1.4   BASOPHIL 2.0 3.0  --  2.8   ANEU 4.3 4.3  --  3.6   ALYM 1.5 1.3  --  1.4   DORITA 0.4 0.5  --  0.5   AEOS 0.1 0.1  --  0.1   ABAS 0.1 0.2  --  0.2     CMP  Recent Labs   Lab Test 07/27/20  1334 05/18/20  1325 01/15/20  1403 01/25/18  1353 01/25/18  1353 05/27/16  1413 05/27/16  1413 05/26/15  1416   NA  --   --  137  --   --   --  136 137   POTASSIUM  --   --  4.2  --   --   --  3.7 4.0   CHLORIDE  --   --  102  --   --   --  102 103   CO2  --   --  30  --   --   --  25 28   ANIONGAP  --   --  5  --   --   --  9 6   GLC  --   --  97  --   --   --  94 100*   BUN  --   --  15  --   --   --  11 9   CR 0.48* 0.57 0.55   < >  --    < > 0.41* 0.47*   GFRESTIMATED >90 88 90   < >  --    < > >90  Non  GFR Calc   >90  Non  GFR Calc     GFRESTBLACK >90 >90 >90   < >  --    < > >90   GFR Calc   >90   GFR Calc     PAKO  --   --  9.5  10.2*  --  9.1  --  9.2 9.2   BILITOTAL 0.4 0.4 0.4   < >  --    < > 0.5  --    ALBUMIN 3.9 4.0 4.3   < >  --    < > 3.8  --    PROTTOTAL 7.7 7.5 7.8   < >  --    < > 8.8  --    ALKPHOS 111 113 125   < >  --    < > 108  --    AST 18 19 25   < >  --    < > 15  --    ALT 12 11 18   < >  --    < > 8  --     < > = values in this interval not displayed.     Calcium/VitaminD  Recent Labs   Lab Test 01/15/20  1403 01/25/18  1353 05/27/16  1413   PAKO 9.5  10.2* 9.1 9.2   VITDT 39 39 95*     ESR/CRP  Recent Labs   Lab Test 07/27/20  1334 01/13/20  1332 07/15/19  1341   SED 24 9 16   CRP 8.7* <2.9 <2.9     Lipid Panel  Recent Labs   Lab Test 05/26/15  1416 04/26/13  1128   CHOL 179 154   TRIG 50 44   HDL 80 72   LDL 89 73   VLDL 10 9   CHOLHDLRATIO 2.2 2.1     Hepatitis B  Recent Labs   Lab Test 01/18/17  1601   HBCAB Nonreactive    HEPBANG Nonreactive     Hepatitis C  Recent Labs   Lab Test 01/18/17  1601   HCVAB Nonreactive   Assay performance characteristics have not been established for newborns,   infants, and children       Tuberculosis Screening  Recent Labs   Lab Test 01/25/18  1352 01/18/17  1601   TBRSLT Negative Negative   TBAGN 0.00 0.00     HIV Screening  Recent Labs   Lab Test 01/18/17  1601   HIAGAB Nonreactive   HIV-1 p24 Ag & HIV-1/HIV-2 Ab Not Detected         Immunization History     Immunization History   Administered Date(s) Administered     Pneumo Conj 13-V (2010&after) 11/03/2016     Pneumococcal 23 valent 11/01/2007, 11/13/2007, 03/12/2018     TD (ADULT, 7+) 11/13/2007     Tdap (Adacel,Boostrix) 11/13/2007          Chart documentation done in part with Dragon Voice recognition Software. Although reviewed after completion, some word and grammatical error may remain.    Phone call start time: 3:25 PM  Phone call end time: 3:42 PM     This visit is equivalent to a 93249 visit    Location of patient: [place], [place]  Location of provider: [place]    Follow up:  follow up appointment scheduled to be in May    Merrick Del Cid MD

## 2020-12-07 DIAGNOSIS — Z79.899 HIGH RISK MEDICATION USE: ICD-10-CM

## 2020-12-07 DIAGNOSIS — M06.09 RHEUMATOID ARTHRITIS OF MULTIPLE SITES WITH NEGATIVE RHEUMATOID FACTOR (H): ICD-10-CM

## 2020-12-07 LAB
ALBUMIN SERPL-MCNC: 3.9 G/DL (ref 3.4–5)
ALP SERPL-CCNC: 104 U/L (ref 40–150)
ALT SERPL W P-5'-P-CCNC: 12 U/L (ref 0–50)
AST SERPL W P-5'-P-CCNC: 21 U/L (ref 0–45)
BILIRUB DIRECT SERPL-MCNC: 0.1 MG/DL (ref 0–0.2)
BILIRUB SERPL-MCNC: 0.4 MG/DL (ref 0.2–1.3)
CREAT SERPL-MCNC: 0.52 MG/DL (ref 0.52–1.04)
CRP SERPL-MCNC: 10.3 MG/L (ref 0–8)
ERYTHROCYTE [SEDIMENTATION RATE] IN BLOOD BY WESTERGREN METHOD: 32 MM/H (ref 0–30)
GFR SERPL CREATININE-BSD FRML MDRD: >90 ML/MIN/{1.73_M2}
PROT SERPL-MCNC: 8 G/DL (ref 6.8–8.8)

## 2020-12-07 PROCEDURE — 86140 C-REACTIVE PROTEIN: CPT | Performed by: INTERNAL MEDICINE

## 2020-12-07 PROCEDURE — 36415 COLL VENOUS BLD VENIPUNCTURE: CPT | Performed by: INTERNAL MEDICINE

## 2020-12-07 PROCEDURE — 80076 HEPATIC FUNCTION PANEL: CPT | Performed by: INTERNAL MEDICINE

## 2020-12-07 PROCEDURE — 85652 RBC SED RATE AUTOMATED: CPT | Performed by: INTERNAL MEDICINE

## 2020-12-07 PROCEDURE — 85025 COMPLETE CBC W/AUTO DIFF WBC: CPT | Performed by: INTERNAL MEDICINE

## 2020-12-07 PROCEDURE — 82565 ASSAY OF CREATININE: CPT | Performed by: INTERNAL MEDICINE

## 2020-12-08 LAB
BASOPHILS # BLD AUTO: 0.2 10E9/L (ref 0–0.2)
BASOPHILS NFR BLD AUTO: 2 %
DIFFERENTIAL METHOD BLD: ABNORMAL
EOSINOPHIL # BLD AUTO: 0.2 10E9/L (ref 0–0.7)
EOSINOPHIL NFR BLD AUTO: 2 %
ERYTHROCYTE [DISTWIDTH] IN BLOOD BY AUTOMATED COUNT: 17.3 % (ref 10–15)
HCT VFR BLD AUTO: 36.1 % (ref 35–47)
HGB BLD-MCNC: 11.4 G/DL (ref 11.7–15.7)
LYMPHOCYTES # BLD AUTO: 1.4 10E9/L (ref 0.8–5.3)
LYMPHOCYTES NFR BLD AUTO: 19 %
MCH RBC QN AUTO: 27.7 PG (ref 26.5–33)
MCHC RBC AUTO-ENTMCNC: 31.6 G/DL (ref 31.5–36.5)
MCV RBC AUTO: 88 FL (ref 78–100)
MONOCYTES # BLD AUTO: 0.4 10E9/L (ref 0–1.3)
MONOCYTES NFR BLD AUTO: 5 %
NEUTROPHILS # BLD AUTO: 5.3 10E9/L (ref 1.6–8.3)
NEUTROPHILS NFR BLD AUTO: 72 %
PLATELET # BLD AUTO: 246 10E9/L (ref 150–450)
PLATELET # BLD EST: ABNORMAL 10*3/UL
RBC # BLD AUTO: 4.11 10E12/L (ref 3.8–5.2)
WBC # BLD AUTO: 7.5 10E9/L (ref 4–11)

## 2021-02-03 RX ORDER — ZOLEDRONIC ACID 5 MG/100ML
5 INJECTION, SOLUTION INTRAVENOUS ONCE
Status: CANCELLED
Start: 2021-02-03 | End: 2021-02-03

## 2021-02-22 DIAGNOSIS — Z79.899 HIGH RISK MEDICATION USE: ICD-10-CM

## 2021-02-22 DIAGNOSIS — M06.09 RHEUMATOID ARTHRITIS OF MULTIPLE SITES WITH NEGATIVE RHEUMATOID FACTOR (H): ICD-10-CM

## 2021-02-22 LAB
ALBUMIN SERPL-MCNC: 4.1 G/DL (ref 3.4–5)
ALP SERPL-CCNC: 91 U/L (ref 40–150)
ALT SERPL W P-5'-P-CCNC: 10 U/L (ref 0–50)
AST SERPL W P-5'-P-CCNC: 18 U/L (ref 0–45)
BASOPHILS # BLD AUTO: 0.1 10E9/L (ref 0–0.2)
BASOPHILS NFR BLD AUTO: 1 %
BILIRUB DIRECT SERPL-MCNC: 0.1 MG/DL (ref 0–0.2)
BILIRUB SERPL-MCNC: 0.4 MG/DL (ref 0.2–1.3)
CREAT SERPL-MCNC: 0.58 MG/DL (ref 0.52–1.04)
DIFFERENTIAL METHOD BLD: ABNORMAL
EOSINOPHIL # BLD AUTO: 0.2 10E9/L (ref 0–0.7)
EOSINOPHIL NFR BLD AUTO: 2 %
ERYTHROCYTE [DISTWIDTH] IN BLOOD BY AUTOMATED COUNT: 17.6 % (ref 10–15)
GFR SERPL CREATININE-BSD FRML MDRD: 88 ML/MIN/{1.73_M2}
HCT VFR BLD AUTO: 36 % (ref 35–47)
HGB BLD-MCNC: 11.6 G/DL (ref 11.7–15.7)
LYMPHOCYTES # BLD AUTO: 1.4 10E9/L (ref 0.8–5.3)
LYMPHOCYTES NFR BLD AUTO: 17 %
MCH RBC QN AUTO: 28.9 PG (ref 26.5–33)
MCHC RBC AUTO-ENTMCNC: 32.2 G/DL (ref 31.5–36.5)
MCV RBC AUTO: 90 FL (ref 78–100)
MONOCYTES # BLD AUTO: 0.8 10E9/L (ref 0–1.3)
MONOCYTES NFR BLD AUTO: 10 %
NEUTROPHILS # BLD AUTO: 5.6 10E9/L (ref 1.6–8.3)
NEUTROPHILS NFR BLD AUTO: 70 %
PLATELET # BLD AUTO: 249 10E9/L (ref 150–450)
PROT SERPL-MCNC: 7.9 G/DL (ref 6.8–8.8)
RBC # BLD AUTO: 4.02 10E12/L (ref 3.8–5.2)
WBC # BLD AUTO: 8.1 10E9/L (ref 4–11)

## 2021-02-22 PROCEDURE — 80076 HEPATIC FUNCTION PANEL: CPT | Performed by: INTERNAL MEDICINE

## 2021-02-22 PROCEDURE — 82565 ASSAY OF CREATININE: CPT | Performed by: INTERNAL MEDICINE

## 2021-02-22 PROCEDURE — 36415 COLL VENOUS BLD VENIPUNCTURE: CPT | Performed by: INTERNAL MEDICINE

## 2021-02-22 PROCEDURE — 85025 COMPLETE CBC W/AUTO DIFF WBC: CPT | Performed by: INTERNAL MEDICINE

## 2021-03-11 ENCOUNTER — TELEPHONE (OUTPATIENT)
Dept: RHEUMATOLOGY | Facility: CLINIC | Age: 81
End: 2021-03-11

## 2021-03-11 NOTE — TELEPHONE ENCOUNTER
Daughter called stating patient is having more pain and problems. Sending to triage to call patient.    Joan Bucio CMA Rheumatology  3/11/2021 11:25 AM

## 2021-03-11 NOTE — PROGRESS NOTES
Alannah is a 80 year old who is being evaluated via a billable telephone visit.      What phone number would you like to be contacted at? [phone]  How would you like to obtain your AVS? Mail a copy    Rheumatology Telephone/Telehealth Visit      Alannah Leos MRN# [id number]   YOB: 1940 Age: 80 year old      Date of visit: 3/12/21   PCP: Dr. Leah Blanca    Chief Complaint   Patient presents with:  RECHECK: back pain    Assessment and Plan     1. 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 Previously on methotrexate when first diagnosed (unclear if ineffective or not, stopped by patient preference to not treat her RA at that time).  Synovitis, subluxation, and fixed flexion deformities on initial exam. Steroid responsive. 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 Currently on methotrexate 20 mg once weekly, and hydroxychloroquine 200 mg daily. Previously on SSZ (felt poorly on it).  At almost every visit I have encouraged her to escalate treatment for her active disease but she has been reluctant to do so because of possible risks from the medication and she says that she is tolerating her pain; she understands that the progression of her disease may not be reversible as seen by the changes of her spine.  Today she is in more pain and is willing to try a biologic DMARD.  Considering the spine changes will use Remicade.  She prefers having an infusion therapy.  Check chest x-ray and tuberculosis test prior to starting Remicade.  Hyperextends at the thumbs and would like to see a hand therapist; I agree with this..  Chronic illness, progressive, severe  - Continue methotrexate 20 mg once weekly    - Continue folic acid 1mg daily  - Continue hydroxychloroquine 200mg daily (normal eye exam on 1/27/2020)  - Start Remicade 5 mg/kg IV on week 0, 2, 6, and then every 8 weeks  thereafter, if TB screening is negative  - X-ray now: Chest  - Lab now: quantiferon TB gold plus  - Labs in May: CBC, Creatinine, Hepatic Panel, ESR, CRP   - Hand therapy referral    High risk medication requiring intensive toxicity monitoring at least quarterly: labs ordered include CBC, Creatinine, Hepatic panel to monitor for cytopenia and hepatotoxicity; checking creatinine as it affects clearance of medication.     # Infliximab (Remicade) Risks and Benefits: The risks and benefits of infliximab were discussed in detail and the patient verbalized understanding.  The risks discussed include, but are not limited to, the risk for hypersensitivity, anaphylaxis, anaphylactoid reactions, an increased risk for serious infections leading to hospitalization or death, a possible increased risk for lymphoma and other malignancies, a possible worsening of demyelinating diseases, a possible worsening of heart failure, cytopenias, hepatotoxicity, risk for drug induced lupus, possible reactivation of hepatitis B, and possible reactivation of latent tuberculosis.   The most common adverse reactions are infections, infusion-related reactions, and headache.  It was discussed that the medication would need to be discontinued if a serious infection develops.  It was discussed that live vaccinations should not be received while using infliximab or within 30 days prior to starting infliximab.  I encouraged reviewing the package insert and asking any questions about the medication.     2. Positive PIERO and dsDNA: These were noted on record review. She does not have symptoms to support an PIERO-associated rheumatologic disease at this time.     3.  Diffuse neck/back pain: No evidence of instability by x-rays on 10/12/2016.  Seen by neurosurgery in the past but per the patient the procedure would be very intense so she did not want to do anything for this.  Since seeing the spine surgeon in the past, I have advised that she see a spine  surgeon again because of continued pain but she has declined.  Today, she reports that her neck pain has progressed and she would like to see a surgeon now.  I explained to her that may still be an inflammatory component of her axial disease so we may see some improvement with addition of a biologic DMARD that is going to be started as noted in #1.  For the degenerative component I am referring her to a spine surgeon.  Chronic illness, progressive.    - Spine surgery referral     4. Parkinson's Disease: Followed by Dr. Ricardo Johnson at the [facility] previously, and now at [facility]    5. Osteoporosis: Based on 1/29/2018 DEXA.  Was on fosamax from [id number].  We discussed osteoporosis and the treatment of osteoporosis previously.  She has not yet started Reclast; the order is in for her to call and schedule the appointment.   - Start reclast 5mg IV yearly; advised her to call to schedule  - Continue vitamin D 1000 IU daily   - Continue calcium 1000mg daily  - Lab now: Calcium, vitamin D    # At this time the COVID-19 vaccine (currently available from Pfizer, Moderna, and [employer]) is recommended based on our knowledge of this vaccine and vaccines in the past.  Based on our current knowledge there is no preference for one COVID-19 vaccine over another. Note that there is no data currently available to specifically establish safety and efficacy for these vaccines in immunocompromised people.  She is planning to get her first dose of the vaccine today    Based on our current understanding (and this may change over time as we learn more), the following adjustments are recommended around the time of COVID-19 vaccination:  - Methotrexate should be held 1 week after each COVID19 vaccine dose    # This is a virtual visit to reduce the risk of COVID-19 exposure during this current pandemic.      Total minutes spent in evaluation with patient, documentation, , and review of  "pertinent studies and chart notes: 35     Ms. Leos verbalized agreement with and understanding of the rational for the diagnosis and treatment plan.  All questions were answered to best of my ability and the patient's satisfaction. Ms. Leos was advised to contact the clinic with any questions that may arise after the clinic visit.      Thank you for involving me in the care of the patient    Return to clinic: 6 months      HPI   Alannah Leos is a 80 year old female with a medical history significant for Parkinson's disease, osteoarthritis, status post TKA, osteoporosis, and rheumatoid arthritis who presents for follow-up of rheumatoid arthritis.    Previously, Ms. Leos reported that she was diagnosed with rheumatoid arthritis in the past but did not want to take toxic medications and did not want to keep taking prednisone because of potential toxicities. Therefore, she decided that she would \"tough out\" her symptoms and try to enjoy her life. Then, more recently she was diagnosed with Parkinson's disease that has been significantly affecting her quality of life. She is treated for the Parkinson's disease and she believes that the treatment is helping. However, she is also having worsening joint pain and stiffness. Morning stiffness lasts for all day. All of her joints hurt, including her bilateral shoulders, neck, spine, hands, wrists, elbows, hips, knees, ankles, and feet. She tells me that she is unable to raise her arms without assistance from the contralateral arm, if she is able to get that hand over to the other side. Mainly, her right shoulder is affected. She tells me that she has a little more mobility in her left shoulder. She tells me that her entire spine is fused, and that it \"occurred naturally.\"  Overall, she tells me that she feels miserable and is willing to start treatment, but is still scared of most of the medication side effects.    11/19/2020: she reports worsening joint pains. Fell " and since then she's had more neck pain, feeling like something is pushing on her neck again; she has already been evaluated for this and knows that if it worsens she should see her spine surgeon.  Note that she had an x-ray in June, after she had fallen, with another provider.  She notes that she has increased her Parkinson's medication and feels like she is taking medications all day long.  Overall not happy that she has so much arthritis but happy with her current treatment plan and does not want to change it at this time.  She notes that she is due for Reclast but does not want to go outside of her house right now except for labs, to reduce her risk for possibly getting COVID-19.     Today, 3/12/2021: Worsening diffuse back pain, worse at the next.  She would like to see a spine surgeon again.  She is open to starting new medications for her arthritis at this time.  Tolerating her current medications.  Hand pain and stiffness is worse in the morning and improves with time and activity.  Thumb hyperextends and she is wondering about seeing hand therapy or getting a splint and this could be made in hand therapy.    Denies fevers, chills, nausea, vomiting, constipation, diarrhea. No abdominal pain. No chest pain/pressure, palpitations, or shortness of breath.   No oral or nasal sores.  No rash. No sicca symptoms.      Tobacco: none  EtOH: 1 drink at [place] only  Drugs: none    ROS   12 point review of system was completed and negative except as noted in the HPI     Active Problem List     Patient Active Problem List   Diagnosis     Osteoporosis     Rheumatoid arthritis (H)     Parkinson disease (H)     Osteoarthritis of wrist     Osteoarthritis of shoulder     History of total knee arthroplasty     Osteoarthritis of left knee     Advanced directives, counseling/discussion     CARDIOVASCULAR SCREENING; LDL GOAL LESS THAN 160     High risk medication use     Underweight     Impingement syndrome, shoulder, left      Combined forms of age-related cataract of both eyes     Seborrheic dermatitis     Anemia in other chronic diseases classified elsewhere     Ankylosing spondylitis of cervical region (H)     Past Medical History     Past Medical History:   Diagnosis Date     Hyperlipidemia      Murmur, heart     hsm     Nonsenile cataract      Osteoarthrosis, unspecified whether generalized or localized, lower leg      Osteopenia      Rheumatoid arthritis(714.0)      Past Surgical History     Past Surgical History:   Procedure Laterality Date     GALLBLADDER SURGERY       HYSTERECTOMY       KNEE SURGERY      left      LAMINECTOMY LUMBAR ONE LEVEL       TONSILLECTOMY       Allergy     Allergies   Allergen Reactions     Decadrol [Dexamethasone Sodium Phosphate]      Shrimp      Current Medication List     Current Outpatient Medications   Medication Sig     ACE NOT PRESCRIBED, INTENTIONAL, 1 each continuous prn. ACE Inhibitor not prescribed due to Refusal by patient           ASPIRIN 81 MG PO TABS PATIENT REPORTS TAKING 4 TIMES PER WEEK AND NOT EVERYDAY     CALCIUM + D OR 600mg twice a day      carbidopa-levodopa (SINEMET)  MG tablet Take one at 6 am, noon, 5 pm, 9 pm and midnight     folic acid (FOLVITE) 1 MG tablet Take 1 tablet (1 mg) by mouth daily     hydroxychloroquine (PLAQUENIL) 200 MG tablet Take 1 tablet (200 mg) by mouth daily     ibuprofen (ADVIL/MOTRIN) 200 MG tablet Take 1 tablet (200 mg) by mouth every 8 hours as needed for moderate pain     meclizine (ANTIVERT) 25 MG tablet Take 1 tablet (25 mg) by mouth 3 times daily as needed for dizziness     methotrexate sodium 2.5 MG TABS Take 8 tablets (20 mg) by mouth once a week . Take all 8 tablets on the same day of each week.     order for DME Equipment being ordered: soft neck brace     SENNA-docusate sodium (SENNA S) 8.6-50 MG tablet Take 1 tablet by mouth At Bedtime     ammonium lactate (AMLACTIN) 12 % cream Apply once to twice a day to dry skin. Can burn temporarily  "if there is a cut on the skin. (Patient not taking: Reported on 7/30/2020)     No current facility-administered medications for this visit.          Social History   See HPI    Family History     Family History   Problem Relation Age of Onset     Cancer Sister         pancreatic       Physical Exam     Temp Readings from Last 3 Encounters:   01/17/20 97.5  F (36.4  C) (Oral)   09/12/19 98.2  F (36.8  C) (Oral)   07/25/19 97.7  F (36.5  C) (Oral)     BP Readings from Last 5 Encounters:   07/13/20 (!) 156/70   01/17/20 (!) 156/71   01/15/20 118/64   09/12/19 135/66   07/25/19 132/65     Pulse Readings from Last 1 Encounters:   07/13/20 62     Resp Readings from Last 1 Encounters:   09/12/19 16     Estimated body mass index is 20.23 kg/m  as calculated from the following:    Height as of 1/17/20: 1.613 m (5' 3.5\").    Weight as of 7/13/20: 52.6 kg (116 lb).    GEN: alert and no distress  PSYCH: Alert; coherent speech, normal rate and volume, able to articulate logical thoughts, able   to abstract reason, no tangential thoughts. Normal affect.   RESP: No cough, no audible wheezing, able to talk in full sentences  Remainder of exam unable to be completed due to telephone visits      Labs / Imaging (select studies)     RF/CCP  Recent Labs   Lab Test 10/12/16  1142 07/30/13  1621   CCPABY  --  <20 Interpretation:  Negative   CCPIGG 2  --    RHF <20  --      CBC  Recent Labs   Lab Test 02/22/21  1339 12/07/20  1353 07/27/20  1334   WBC 8.1 7.5 6.4   RBC 4.02 4.11 3.86   HGB 11.6* 11.4* 11.2*   HCT 36.0 36.1 34.6*   MCV 90 88 90   RDW 17.6* 17.3* 15.9*    246 257   MCH 28.9 27.7 29.0   MCHC 32.2 31.6 32.4   NEUTROPHIL 70.0 72.0 66.0   LYMPH 17.0 19.0 24.0   MONOCYTE 10.0 5.0 7.0   EOSINOPHIL 2.0 2.0 1.0   BASOPHIL 1.0 2.0 2.0   ANEU 5.6 5.3 4.3   ALYM 1.4 1.4 1.5   DORITA 0.8 0.4 0.4   AEOS 0.2 0.2 0.1   ABAS 0.1 0.2 0.1     CMP  Recent Labs   Lab Test 02/22/21  1339 12/07/20  1353 07/27/20  1334 01/15/20  1403 " 01/15/20  1403 01/25/18  1353 01/25/18  1353 05/27/16  1413 05/27/16  1413 05/26/15  1416   NA  --   --   --   --  137  --   --   --  136 137   POTASSIUM  --   --   --   --  4.2  --   --   --  3.7 4.0   CHLORIDE  --   --   --   --  102  --   --   --  102 103   CO2  --   --   --   --  30  --   --   --  25 28   ANIONGAP  --   --   --   --  5  --   --   --  9 6   GLC  --   --   --   --  97  --   --   --  94 100*   BUN  --   --   --   --  15  --   --   --  11 9   CR 0.58 0.52 0.48*   < > 0.55   < >  --    < > 0.41* 0.47*   GFRESTIMATED 88 >90 >90   < > 90   < >  --    < > >90  Non  GFR Calc   >90  Non  GFR Calc     GFRESTBLACK >90 >90 >90   < > >90   < >  --    < > >90   GFR Calc   >90   GFR Calc     PAKO  --   --   --   --  9.5  10.2*  --  9.1  --  9.2 9.2   BILITOTAL 0.4 0.4 0.4   < > 0.4   < >  --    < > 0.5  --    ALBUMIN 4.1 3.9 3.9   < > 4.3   < >  --    < > 3.8  --    PROTTOTAL 7.9 8.0 7.7   < > 7.8   < >  --    < > 8.8  --    ALKPHOS 91 104 111   < > 125   < >  --    < > 108  --    AST 18 21 18   < > 25   < >  --    < > 15  --    ALT 10 12 12   < > 18   < >  --    < > 8  --     < > = values in this interval not displayed.     Calcium/VitaminD  Recent Labs   Lab Test 01/15/20  1403 01/25/18  1353 05/27/16  1413   PAKO 9.5  10.2* 9.1 9.2   VITDT 39 39 95*     ESR/CRP  Recent Labs   Lab Test 12/07/20  1353 07/27/20  1334 01/13/20  1332   SED 32* 24 9   CRP 10.3* 8.7* <2.9     Lipid Panel  Recent Labs   Lab Test 05/26/15  1416 04/26/13  1128   CHOL 179 154   TRIG 50 44   HDL 80 72   LDL 89 73   VLDL 10 9   CHOLHDLRATIO 2.2 2.1     Hepatitis B  Recent Labs   Lab Test 01/18/17  1601   HBCAB Nonreactive   HEPBANG Nonreactive     Hepatitis C  Recent Labs   Lab Test 01/18/17  1601   HCVAB Nonreactive   Assay performance characteristics have not been established for newborns,   infants, and children       Tuberculosis Screening  Recent Labs   Lab Test  01/25/18  1352 01/18/17  1601   TBRSLT Negative Negative   TBAGN 0.00 0.00     HIV Screening  Recent Labs   Lab Test 01/18/17  1601   HIAGAB Nonreactive   HIV-1 p24 Ag & HIV-1/HIV-2 Ab Not Detected         Immunization History     Immunization History   Administered Date(s) Administered     Pneumo Conj 13-V (2010&after) 11/03/2016     Pneumococcal 23 valent 11/01/2007, 11/13/2007, 03/12/2018     TD (ADULT, 7+) 11/13/2007     Tdap (Adacel,Boostrix) 11/13/2007          Chart documentation done in part with Dragon Voice recognition Software. Although reviewed after completion, some word and grammatical error may remain.    Phone call duration with patient (in minutes): 24    Location of patient: [place], [place]   Location of provider: [place]

## 2021-03-11 NOTE — TELEPHONE ENCOUNTER
"Called and spoke with patient who said she developed sharp pain in the middle of the night a few days ago. When asked where, she reported \"the pain is in the my stomach but goes through to my back ... if I reach too far that hurts.\" She then stated \"but I have pain all over and my arthritis is bad.\" Denies nausea, vomiting, fever, or chills but is slightly dizzy. RN recommended that she have a visit tomorrow which she agreed to. A phone visit was scheduled at 10:20 AM.    Jason JESUS RN....3/11/2021 12:09 PM     "

## 2021-03-12 ENCOUNTER — VIRTUAL VISIT (OUTPATIENT)
Dept: RHEUMATOLOGY | Facility: CLINIC | Age: 81
End: 2021-03-12
Payer: MEDICARE

## 2021-03-12 ENCOUNTER — IMMUNIZATION (OUTPATIENT)
Dept: NURSING | Facility: CLINIC | Age: 81
End: 2021-03-12
Payer: MEDICARE

## 2021-03-12 ENCOUNTER — ANCILLARY PROCEDURE (OUTPATIENT)
Dept: GENERAL RADIOLOGY | Facility: CLINIC | Age: 81
End: 2021-03-12
Attending: INTERNAL MEDICINE
Payer: MEDICARE

## 2021-03-12 DIAGNOSIS — Z79.899 HIGH RISK MEDICATION USE: ICD-10-CM

## 2021-03-12 DIAGNOSIS — M06.09 RHEUMATOID ARTHRITIS OF MULTIPLE SITES WITH NEGATIVE RHEUMATOID FACTOR (H): ICD-10-CM

## 2021-03-12 DIAGNOSIS — M54.2 NECK PAIN: ICD-10-CM

## 2021-03-12 DIAGNOSIS — M81.0 AGE-RELATED OSTEOPOROSIS WITHOUT CURRENT PATHOLOGICAL FRACTURE: ICD-10-CM

## 2021-03-12 DIAGNOSIS — M06.09 RHEUMATOID ARTHRITIS OF MULTIPLE SITES WITH NEGATIVE RHEUMATOID FACTOR (H): Primary | ICD-10-CM

## 2021-03-12 LAB — CALCIUM SERPL-MCNC: 9.6 MG/DL (ref 8.5–10.1)

## 2021-03-12 PROCEDURE — 71046 X-RAY EXAM CHEST 2 VIEWS: CPT | Performed by: RADIOLOGY

## 2021-03-12 PROCEDURE — 91300 PR COVID VAC PFIZER DIL RECON 30 MCG/0.3 ML IM: CPT

## 2021-03-12 PROCEDURE — 82310 ASSAY OF CALCIUM: CPT | Performed by: INTERNAL MEDICINE

## 2021-03-12 PROCEDURE — 0001A PR COVID VAC PFIZER DIL RECON 30 MCG/0.3 ML IM: CPT

## 2021-03-12 PROCEDURE — 99443 PR PHYSICIAN TELEPHONE EVALUATION 21-30 MIN: CPT | Mod: 95 | Performed by: INTERNAL MEDICINE

## 2021-03-12 PROCEDURE — 86481 TB AG RESPONSE T-CELL SUSP: CPT | Performed by: INTERNAL MEDICINE

## 2021-03-12 PROCEDURE — 82306 VITAMIN D 25 HYDROXY: CPT | Performed by: INTERNAL MEDICINE

## 2021-03-12 PROCEDURE — 36415 COLL VENOUS BLD VENIPUNCTURE: CPT | Performed by: INTERNAL MEDICINE

## 2021-03-14 LAB
DEPRECATED CALCIDIOL+CALCIFEROL SERPL-MC: 37 UG/L (ref 20–75)
GAMMA INTERFERON BACKGROUND BLD IA-ACNC: 0 IU/ML
M TB IFN-G CD4+ BCKGRND COR BLD-ACNC: 4.4 IU/ML
M TB TUBERC IFN-G BLD QL: NEGATIVE
MITOGEN IGNF BCKGRD COR BLD-ACNC: 0.01 IU/ML
MITOGEN IGNF BCKGRD COR BLD-ACNC: 0.02 IU/ML

## 2021-03-15 ENCOUNTER — TELEPHONE (OUTPATIENT)
Dept: RHEUMATOLOGY | Facility: CLINIC | Age: 81
End: 2021-03-15

## 2021-03-15 DIAGNOSIS — Z11.59 ENCOUNTER FOR SCREENING FOR OTHER VIRAL DISEASES: Primary | ICD-10-CM

## 2021-03-15 DIAGNOSIS — Z11.59 SCREENING FOR VIRAL DISEASE: Primary | ICD-10-CM

## 2021-03-15 RX ORDER — HEPARIN SODIUM (PORCINE) LOCK FLUSH IV SOLN 100 UNIT/ML 100 UNIT/ML
5 SOLUTION INTRAVENOUS
Status: CANCELLED | OUTPATIENT
Start: 2021-03-15

## 2021-03-15 RX ORDER — HEPARIN SODIUM,PORCINE 10 UNIT/ML
5 VIAL (ML) INTRAVENOUS
Status: CANCELLED | OUTPATIENT
Start: 2021-03-15

## 2021-03-24 ENCOUNTER — THERAPY VISIT (OUTPATIENT)
Dept: OCCUPATIONAL THERAPY | Facility: CLINIC | Age: 81
End: 2021-03-24
Payer: MEDICARE

## 2021-03-24 DIAGNOSIS — M06.09 RHEUMATOID ARTHRITIS OF MULTIPLE SITES WITH NEGATIVE RHEUMATOID FACTOR (H): ICD-10-CM

## 2021-03-24 DIAGNOSIS — M79.641 BILATERAL HAND PAIN: Primary | ICD-10-CM

## 2021-03-24 DIAGNOSIS — M79.642 BILATERAL HAND PAIN: Primary | ICD-10-CM

## 2021-03-24 PROCEDURE — 97166 OT EVAL MOD COMPLEX 45 MIN: CPT | Mod: GO | Performed by: OCCUPATIONAL THERAPIST

## 2021-03-24 PROCEDURE — 97760 ORTHOTIC MGMT&TRAING 1ST ENC: CPT | Mod: GO | Performed by: OCCUPATIONAL THERAPIST

## 2021-03-24 PROCEDURE — 97110 THERAPEUTIC EXERCISES: CPT | Mod: GO | Performed by: OCCUPATIONAL THERAPIST

## 2021-03-24 NOTE — PROGRESS NOTES
"Hand Therapy Initial Evaluation    Current Date:  3/24/2021    Subjective:  Alannah Leos is a 80 year old right hand dominant female.    Diagnosis:   Bilateral hand deformities due to RA (left > right)  DOI:  3/12/2021 (therapy referral)    Patient reports symptoms of pain, stiffness/loss of motion, weakness/loss of strength and edema of bilateral hands, thumbs and wrists which occurred due to gradual onset due to RA diagnosis in the 1970's. Since onset symptoms are gradually getting worse over the past 1-2 years.  Special tests:  x-ray.  Previous treatment: therapy 8-9 years ago for Parkinsons.  General health as reported by patient is fair.  Pertinent medical history includes:Concussions/Dizziness, Rheumatoid Arthritis, Foot Drop, Pain at Night/Rest  Medical allergies:adhesives, see list in EMR.  Surgical history: orthopedic: back and TKA.  Medication history: Anti-inflammatory, see list in EMR.    Occupational Profile Information:  Current occupation is Retired  Prior functional level:  independent-have help in some areas  Barriers include:requires assistance with dressing, personal hygiene, transfers, mobility  Mobility: Uses wheelchair to clinic  Transportation: family provides transportation  Other: Daughter lives near by and provides assistance daily    Functional Outcome Measure:  Upper Extremity Functional Index  SCORE:   Column Totals: 20/80  (A lower score indicates greater disability.)    Objective:  Pain Level (Scale 0-10):   3/24/2021   At Rest 5-7   With Use 7-\"12\"     Pain Description:  Date 3/24/2021   Location wrists and hands   Pain Quality Aching and Shooting   Frequency constant     Pain is worst  daytime > nighttime   Exacerbated by  using hands   Relieved by Medication    Progression Gradually getting worse     Edema  Mild to moderate of hands, especially left thumb and index MCP    Sensation  WNL throughout all nerve distributions; per patient report    ROM  Pain Report:  - none    + mild  "   ++ moderate    +++ severe   AROM( PROM) 3/24/2021   Finger flexion from Distal Palmar Crease R:2-3 cm  L:3-5 cm     Observation  - none    + mild    ++ moderate    +++ severe   NOTE: Pt has significant instability of left thumb MP UCL; rests in 60 degrees of RD   3/24/2021   Dorsal wrist extensor synovitis R:+  L:+   MP joints swelling R:+  L:++   Volar subluxation of MP joints R:-  L:-   Ulnar drift of MP joints R:-  L:-   EDC subluxation at MP joints R:-  L:-   Boutonierre deformity R:-  L:-   [place] neck deformity R:-  L:+ L, S fingers   Zig zag collapse of thumb MP R:-  L:+++     Strength:   (Measured in pounds)  Pain Report:  - none    + mild    ++ moderate    +++ severe    3/24/2021 3/24/2021   Trials Right Left   1 26+ 12++     Lat Pinch 3/24/2021 3/24/2021   Trials Right Left   1 5+ 2+     3 Pt Pinch 3/24/2021 3/24/2021   Trials Right Left   1 5+ 3+     Assessment:  Patient presents with symptoms consistent with diagnosis of bilateral hand RA and left thumb instability, with conservative intervention.     Patient's limitations or Problem List includes:  Pain, Decreased ROM/motion, Increased edema, Weakness, Decreased stability, Decreased  and Decreased pinch of bilateral hands, thumbs and wrists which interferes with the patient's ability to perform Self Care Tasks (dressing, eating, bathing, hygiene/toileting), Sleep Patterns, Recreational Activities and Household Chores as compared to previous level of function.    Rehab Potential:  Fair to Good - Return to full activity, some limitations    Patient will benefit from skilled Occupational Therapy to increase ROM, overall strength and stability of thumb and decrease pain to return to previous activity level and resume normal daily tasks and to reach their rehab potential.    Barriers to Learning:  No barrier    Communication Issues:  Patient appears to be able to clearly communicate and understand verbal and written communication and follow  directions correctly.  Family member present for session to facilitate follow through of home program.    Chart Review: Chart Review and Simple history review with patient    Identified Performance Deficits: bathing/showering, toileting, dressing, hygiene and grooming, communication management, driving and community mobility, home establishment and management, meal preparation and cleanup, sleep and leisure activities    Assessment of Occupational Performance:  5 or more Performance Deficits    Clinical Decision Making (Complexity): Moderate complexity    Treatment Explanation:  The following has been discussed with the patient:  RX ordered/plan of care  Anticipated outcomes  Possible risks and side effects    Plan:  Frequency:  1 X a month, once daily  Duration:  for 3 months    Treatment Plan:    Modalities:    Paraffin   Therapeutic Exercise:    AROM, AAROM, Tendon Gliding, Isometrics and Stabilization  Orthotic Fabrication:    Static orthoses  Self Care:    Self Care Tasks, Ergonomic Considerations and Adaptive Equipment Education     Discharge Plan:  Achieve all LTG.  Independent in home treatment program.  Reach maximal therapeutic benefit.    Home Exercise Program:  Joint Protection Education:  Respect pain  Balance rest and activity  Reduce force/effort  Avoid positions of deformity  Use larger joints  Avoid firm grasp or pinch  Use adaptive equipment    Exercise:  Warmth for morning stiffness  Gentle isometric  strengthening, avoid pain    Orthosis  Night time wear of left forearm based resting hand orthosis    Next Visit:  Patient to return if in need of orthosis adjustment or if she feels a hand based thumb orthosis would help function of left thumb with ADL's  Hold chart open for two months, if no contact is received from patient, discharge will occur at that time with this note serving as the discharge summary.

## 2021-03-24 NOTE — LETTER
DEPARTMENT OF HEALTH AND HUMAN SERVICES  CENTERS FOR MEDICARE & MEDICAID SERVICES    PLAN/UPDATED PLAN OF PROGRESS FOR OUTPATIENT REHABILITATION    PATIENTS NAME:  Alannah Leos   : 1940  PROVIDER NUMBER:  [id number]  HICN: [id number]   [facility]  MEDICAL RECORD NUMBER: [id number]   START OF CARE DATE:    SOC Date: 21   TYPE:  OT  PRIMARY/TREATMENT DIAGNOSIS: (Pertinent Medical Diagnosis)   Rheumatoid arthritis of multiple sites with negative rheumatoid factor (H)  Bilateral hand pain  VISITS FROM START OF CARE:  Rxs Used: 1     Hand Therapy Initial Evaluation  Current Date:  3/24/2021    Subjective:  Alannah Leos is a 80 year old right hand dominant female.  Diagnosis:   Bilateral hand deformities due to RA (left > right)  DOI:  3/12/2021 (therapy referral)  Patient reports symptoms of pain, stiffness/loss of motion, weakness/loss of strength and edema of bilateral hands, thumbs and wrists which occurred due to gradual onset due to RA diagnosis in the 1970's. Since onset symptoms are gradually getting worse over the past 1-2 years.  Special tests:  x-ray.  Previous treatment: therapy 8-9 years ago for Parkinsons.  General health as reported by patient is fair.  Pertinent medical history includes:Concussions/Dizziness, Rheumatoid Arthritis, Foot Drop, Pain at Night/Rest  Medical allergies:adhesives, see list in EMR.  Surgical history: orthopedic: back and TKA.  Medication history: Anti-inflammatory, see list in EMR.    Occupational Profile Information:  Current occupation is Retired  Prior functional level:  independent-have help in some areas  Barriers include:requires assistance with dressing, personal hygiene, transfers, mobility  Mobility: Uses wheelchair to clinic  Transportation: family provides transportation  Other: Daughter lives near by and provides assistance daily    Functional Outcome Measure:  Upper Extremity  "Functional Index  SCORE:   Column Totals: 20/80  (A lower score indicates greater disability.)    Objective:  Pain Level (Scale 0-10):   3/24/2021   At Rest 5-7   With Use 7-\"12\"     PATIENTS NAME:  Alannah Leos   : 1940    Pain Description:  Date 3/24/2021   Location wrists and hands   Pain Quality Aching and Shooting   Frequency constant     Pain is worst  daytime > nighttime   Exacerbated by  using hands   Relieved by Medication    Progression Gradually getting worse     Edema  Mild to moderate of hands, especially left thumb and index MCP    Sensation  WNL throughout all nerve distributions; per patient report    ROM  Pain Report:  - none    + mild    ++ moderate    +++ severe   AROM( PROM) 3/24/2021   Finger flexion from Distal Palmar Crease R:2-3 cm  L:3-5 cm     Observation  - none    + mild    ++ moderate    +++ severe   NOTE: Pt has significant instability of left thumb MP UCL; rests in 60 degrees of RD   3/24/2021   Dorsal wrist extensor synovitis R:+  L:+   MP joints swelling R:+  L:++   Volar subluxation of MP joints R:-  L:-   Ulnar drift of MP joints R:-  L:-   EDC subluxation at MP joints R:-  L:-   Boutonierre deformity R:-  L:-   [place] neck deformity R:-  L:+ L, S fingers   Zig zag collapse of thumb MP R:-  L:+++     Strength:   (Measured in pounds)  Pain Report:  - none    + mild    ++ moderate    +++ severe    3/24/2021 3/24/2021   Trials Right Left   1 26+ 12++   PATIENTS NAME:  Alannah Leos   : 1940      Lat Pinch 3/24/2021 3/24/2021   Trials Right Left   1 5+ 2+     3 Pt Pinch 3/24/2021 3/24/2021   Trials Right Left   1 5+ 3+     Assessment:  Patient presents with symptoms consistent with diagnosis of bilateral hand RA and left thumb instability, with conservative intervention.     Patient's limitations or Problem List includes:  Pain, Decreased ROM/motion, Increased edema, Weakness, Decreased stability, Decreased  and Decreased pinch of bilateral hands, thumbs and " wrists which interferes with the patient's ability to perform Self Care Tasks (dressing, eating, bathing, hygiene/toileting), Sleep Patterns, Recreational Activities and Household Chores as compared to previous level of function.    Rehab Potential:  Fair to Good - Return to full activity, some limitations    Patient will benefit from skilled Occupational Therapy to increase ROM, overall strength and stability of thumb and decrease pain to return to previous activity level and resume normal daily tasks and to reach their rehab potential.    Barriers to Learning:  No barrier    Communication Issues:  Patient appears to be able to clearly communicate and understand verbal and written communication and follow directions correctly.  Family member present for session to facilitate follow through of home program.    Chart Review: Chart Review and Simple history review with patient    Identified Performance Deficits: bathing/showering, toileting, dressing, hygiene and grooming, communication management, driving and community mobility, home establishment and management, meal preparation and cleanup, sleep and leisure activities    Assessment of Occupational Performance:  5 or more Performance Deficits    Clinical Decision Making (Complexity): Moderate complexity    Treatment Explanation:  The following has been discussed with the patient:  RX ordered/plan of care  Anticipated outcomes  Possible risks and side effects    Plan:  Frequency:  1 X a month, once daily  Duration:  for 3 months    PATIENTS NAME:  Alannah Leos   : 1940    Treatment Plan:    Modalities:    Paraffin   Therapeutic Exercise:    AROM, AAROM, Tendon Gliding, Isometrics and Stabilization  Orthotic Fabrication:    Static orthoses  Self Care:    Self Care Tasks, Ergonomic Considerations and Adaptive Equipment Education   Discharge Plan:  Achieve all LTG.  Independent in home treatment program.  Reach maximal therapeutic benefit.    Home Exercise  "Program:  Joint Protection Education:  Respect pain  Balance rest and activity  Reduce force/effort  Avoid positions of deformity  Use larger joints  Avoid firm grasp or pinch  Use adaptive equipment    Exercise:  Warmth for morning stiffness  Gentle isometric  strengthening, avoid pain  Orthosis  Night time wear of left forearm based resting hand orthosis    Next Visit:  Patient to return if in need of orthosis adjustment or if she feels a hand based thumb orthosis would help function of left thumb with ADL's  Hold chart open for two months, if no contact is received from patient, discharge will occur at that time with this note serving as the discharge summary.    Caregiver Signature/Credentials ______________________________ Date ________       Treating Provider: VINCENT Lawrence/FARHAN, CHT    I have reviewed and certified the need for these services and plan of treatment while under my care.        PHYSICIAN'S SIGNATURE:   _________________________________________  Date___________    Merrick Del Cid MD    Certification period: Beginning of Cert date period: 03/24/21 End of Cert period date: 06/21/21   Functional Level Progress Report: Please see attached \"Goal Flow sheet for Functional level.\"    ___X_____ Continue Services or       ________ DC Services              Service dates: SOC Date: 03/24/21  to present                                                                     "

## 2021-03-25 ENCOUNTER — OFFICE VISIT (OUTPATIENT)
Dept: NEUROSURGERY | Facility: CLINIC | Age: 81
End: 2021-03-25
Payer: MEDICARE

## 2021-03-25 VITALS
WEIGHT: 115 LBS | HEART RATE: 116 BPM | BODY MASS INDEX: 19.63 KG/M2 | SYSTOLIC BLOOD PRESSURE: 157 MMHG | DIASTOLIC BLOOD PRESSURE: 64 MMHG | HEIGHT: 64 IN

## 2021-03-25 DIAGNOSIS — M54.2 NECK PAIN: ICD-10-CM

## 2021-03-25 DIAGNOSIS — M79.18 CERVICAL MYOFASCIAL PAIN SYNDROME: Primary | ICD-10-CM

## 2021-03-25 PROCEDURE — 99203 OFFICE O/P NEW LOW 30 MIN: CPT | Performed by: PHYSICIAN ASSISTANT

## 2021-03-25 ASSESSMENT — MIFFLIN-ST. JEOR: SCORE: 981.64

## 2021-03-25 ASSESSMENT — PAIN SCALES - GENERAL: PAINLEVEL: SEVERE PAIN (7)

## 2021-03-25 NOTE — LETTER
3/25/2021         RE: Alannah Leos  [address] [address] [place] [address]        Dear Colleague,    Thank you for referring your patient, Alannah Leos, to the [facility]. Please see a copy of my visit note below.    Dr. Osiel Parker  [facility]  Neurosurgery Clinic Visit      CC: neck pain, kyphosis    Primary care Provider: Leah Blanca    Referring Provider:  Merrick Del Cid MD      Reason For Visit:   I was asked to consult on the patient for neck pain, kyphosis.      HPI: Alannah Leos is a 80 year old female who presents for evaluation of her chief complaint of neck pain.  She has had gradually worsening neck pain for many years.  She has a longstanding history of rheumatoid arthritis, and has been treating this with medication for the last several years.  She has noticing an increase in the kyphotic deformity, progressively worse over the last 1-1/2 years.  She has very little motion remaining in her cervical spine.  She can rotate laterally a little bit.  She denies any radiating arm pain or paresthesias.  She does also suffer from Parkinson, and this has complicated her ability to move independently.  No bowel or bladder changes.  Her chief concern is ongoing spasms in her neck, which is likely related to the worsening kyphotic deformity.    Past Medical History:   Diagnosis Date     Hyperlipidemia      Murmur, heart     hsm     Nonsenile cataract      Osteoarthrosis, unspecified whether generalized or localized, lower leg      Osteopenia      Rheumatoid arthritis(714.0)        Past Medical History reviewed with patient during visit.    Past Surgical History:   Procedure Laterality Date     GALLBLADDER SURGERY       HYSTERECTOMY       KNEE SURGERY      left      LAMINECTOMY LUMBAR ONE LEVEL       TONSILLECTOMY       Past Surgical History reviewed with patient during visit.    Current Outpatient Medications   Medication     CALCIUM + D  OR     carbidopa-levodopa (SINEMET)  MG tablet     folic acid (FOLVITE) 1 MG tablet     hydroxychloroquine (PLAQUENIL) 200 MG tablet     ibuprofen (ADVIL/MOTRIN) 200 MG tablet     methotrexate sodium 2.5 MG TABS     ACE NOT PRESCRIBED, INTENTIONAL,     ammonium lactate (AMLACTIN) 12 % cream     ASPIRIN 81 MG PO TABS     meclizine (ANTIVERT) 25 MG tablet     order for DME     SENNA-docusate sodium (SENNA S) 8.6-50 MG tablet     No current facility-administered medications for this visit.        Allergies   Allergen Reactions     Decadrol [Dexamethasone Sodium Phosphate] Hives     Shrimp Itching       Social History     Socioeconomic History     Marital status:      Spouse name: Willis      Number of children: 2     Years of education: 12     Highest education level: None   Occupational History     Occupation:      Employer: RETIRED     Comment: [employer] in 12/2006   Social Needs     Financial resource strain: None     Food insecurity     Worry: None     Inability: None     Transportation needs     Medical: None     Non-medical: None   Tobacco Use     Smoking status: Never Smoker     Smokeless tobacco: Never Used   Substance and Sexual Activity     Alcohol use: No     Drug use: No     Sexual activity: Not Currently     Partners: Male     Birth control/protection: None   Lifestyle     Physical activity     Days per week: None     Minutes per session: None     Stress: None   Relationships     Social connections     Talks on phone: None     Gets together: None     Attends Yazdanism service: None     Active member of club or organization: None     Attends meetings of clubs or organizations: None     Relationship status: None     Intimate partner violence     Fear of current or ex partner: None     Emotionally abused: None     Physically abused: None     Forced sexual activity: None   Other Topics Concern     Parent/sibling w/ CABG, MI or angioplasty before 65F 55M? Not Asked      Service  "No     Blood Transfusions Yes     Caffeine Concern No     Occupational Exposure No     Hobby Hazards No     Sleep Concern No     Stress Concern No     Weight Concern No     Special Diet Yes     Comment: Diabetic      Back Care Yes     Comment: Back surgeries x2     Exercise Yes     Bike Helmet No     Seat Belt Yes     Self-Exams Yes   Social History Narrative     None       Family History   Problem Relation Age of Onset     Cancer Sister         pancreatic     Diabetes No family hx of      Hypertension No family hx of           ROS: 10 point ROS neg other than the symptoms noted above in the HPI.    Vital Signs: BP (!) 157/64   Pulse 116   Ht 1.626 m (5' 4\")   Wt 52.2 kg (115 lb)   BMI 19.74 kg/m      Examination:  Constitutional:  Alert, well nourished, NAD.  HEENT: Normocephalic, atraumatic.   Pulmonary:  Without shortness of breath, normal effort.   Lymph: no lymphadenopathy to low back or LE.   Integumentary: Skin is free of rashes or lesions.   Cardiovascular:  No pitting edema of BLE.    Psych: Normal affect, no apparent distress    Neurological:  Awake  Alert  Oriented x 3  Speech clear  Cranial nerves II - XII grossly intact  Motor exam   Sensation normal to bilateral upper and lower extremities.    Reflexes are 2+ in the patellar and Achilles. There is no clonus. Downgoing Babinski.    Reflexes are 2+ in the brachial radialis and triceps. Negative Aamir sign bilaterally.    Finger to Nose smooth  Pronator drift negative  Musculoskeletal:  Gait: Able to stand from a seated position. Normal non-antalgic, non-myelopathic gait.  Chin-on-chest deformity      Imaging:   X-rays of her cervical spine show her cervical spine to be nearly completely ankylosed.    Assessment/Plan:     Ankylosing spondylitis with chin on chest syndrome    Alannah Leos is a 80 year old female.  I did have a discussion with the patient and her daughter regarding her symptoms.  She understands the findings on her cervical spine " x-rays.  She is unfortunately suffering from severe left ear with general chest syndrome.  Most of her neck pain is related to spasms in her paraspinous musculature.  I think trigger point injections would be a reasonable attempt at relieving some of this.  She is also welcome to follow-up with Dr. Parker if she decides she wants to discuss whether she has any surgical options or not.          Farzad Rainey PA-C  [facility]  [address] [address]    Tel [phone]  Pager [phone]        Again, thank you for allowing me to participate in the care of your patient.        Sincerely,        Farzad Rainey PA-C

## 2021-03-25 NOTE — NURSING NOTE
"Alannah Leos is a 80 year old female who presents for:  Chief Complaint   Patient presents with     Consult     Neck pain/CT 1/2020. Prev PT (2018) No INJ, or prev surg        Initial Vitals:  BP (!) 157/64   Pulse 116   Ht 5' 4\" (1.626 m)   Wt 115 lb (52.2 kg)   BMI 19.74 kg/m   Estimated body mass index is 19.74 kg/m  as calculated from the following:    Height as of this encounter: 5' 4\" (1.626 m).    Weight as of this encounter: 115 lb (52.2 kg).. Body surface area is 1.54 meters squared. BP completed using cuff size: regular  Severe Pain (7)    Nursing Comments: Patient presents for Neck pain/CT 1/2020. Prev PT (2018) No INJ, or prev surgr     Wolfgang Rachel MA  "

## 2021-03-25 NOTE — PROGRESS NOTES
Dr. Osiel Parker  [facility]  Neurosurgery Clinic Visit      CC: neck pain, kyphosis    Primary care Provider: Leah Blanca    Referring Provider:  Merrick Del Cid MD      Reason For Visit:   I was asked to consult on the patient for neck pain, kyphosis.      HPI: Alannah Leos is a 80 year old female who presents for evaluation of her chief complaint of neck pain.  She has had gradually worsening neck pain for many years.  She has a longstanding history of rheumatoid arthritis, and has been treating this with medication for the last several years.  She has noticing an increase in the kyphotic deformity, progressively worse over the last 1-1/2 years.  She has very little motion remaining in her cervical spine.  She can rotate laterally a little bit.  She denies any radiating arm pain or paresthesias.  She does also suffer from Parkinson, and this has complicated her ability to move independently.  No bowel or bladder changes.  Her chief concern is ongoing spasms in her neck, which is likely related to the worsening kyphotic deformity.    Past Medical History:   Diagnosis Date     Hyperlipidemia      Murmur, heart     hsm     Nonsenile cataract      Osteoarthrosis, unspecified whether generalized or localized, lower leg      Osteopenia      Rheumatoid arthritis(714.0)        Past Medical History reviewed with patient during visit.    Past Surgical History:   Procedure Laterality Date     GALLBLADDER SURGERY       HYSTERECTOMY       KNEE SURGERY      left      LAMINECTOMY LUMBAR ONE LEVEL       TONSILLECTOMY       Past Surgical History reviewed with patient during visit.    Current Outpatient Medications   Medication     CALCIUM + D OR     carbidopa-levodopa (SINEMET)  MG tablet     folic acid (FOLVITE) 1 MG tablet     hydroxychloroquine (PLAQUENIL) 200 MG tablet     ibuprofen (ADVIL/MOTRIN) 200 MG tablet     methotrexate sodium 2.5 MG TABS     ACE NOT PRESCRIBED, INTENTIONAL,      ammonium lactate (AMLACTIN) 12 % cream     ASPIRIN 81 MG PO TABS     meclizine (ANTIVERT) 25 MG tablet     order for DME     SENNA-docusate sodium (SENNA S) 8.6-50 MG tablet     No current facility-administered medications for this visit.        Allergies   Allergen Reactions     Decadrol [Dexamethasone Sodium Phosphate] Hives     Shrimp Itching       Social History     Socioeconomic History     Marital status:      Spouse name: Willis      Number of children: 2     Years of education: 12     Highest education level: None   Occupational History     Occupation:      Employer: RETIRED     Comment: [employer] retired in 12/2006   Social Needs     Financial resource strain: None     Food insecurity     Worry: None     Inability: None     Transportation needs     Medical: None     Non-medical: None   Tobacco Use     Smoking status: Never Smoker     Smokeless tobacco: Never Used   Substance and Sexual Activity     Alcohol use: No     Drug use: No     Sexual activity: Not Currently     Partners: Male     Birth control/protection: None   Lifestyle     Physical activity     Days per week: None     Minutes per session: None     Stress: None   Relationships     Social connections     Talks on phone: None     Gets together: None     Attends Jewish service: None     Active member of club or organization: None     Attends meetings of clubs or organizations: None     Relationship status: None     Intimate partner violence     Fear of current or ex partner: None     Emotionally abused: None     Physically abused: None     Forced sexual activity: None   Other Topics Concern     Parent/sibling w/ CABG, MI or angioplasty before 65F 55M? Not Asked      Service No     Blood Transfusions Yes     Caffeine Concern No     Occupational Exposure No     Hobby Hazards No     Sleep Concern No     Stress Concern No     Weight Concern No     Special Diet Yes     Comment: Diabetic      Back Care Yes     Comment: Back surgeries  "x2     Exercise Yes     Bike Helmet No     Seat Belt Yes     Self-Exams Yes   Social History Narrative     None       Family History   Problem Relation Age of Onset     Cancer Sister         pancreatic     Diabetes No family hx of      Hypertension No family hx of           ROS: 10 point ROS neg other than the symptoms noted above in the HPI.    Vital Signs: BP (!) 157/64   Pulse 116   Ht 1.626 m (5' 4\")   Wt 52.2 kg (115 lb)   BMI 19.74 kg/m      Examination:  Constitutional:  Alert, well nourished, NAD.  HEENT: Normocephalic, atraumatic.   Pulmonary:  Without shortness of breath, normal effort.   Lymph: no lymphadenopathy to low back or LE.   Integumentary: Skin is free of rashes or lesions.   Cardiovascular:  No pitting edema of BLE.    Psych: Normal affect, no apparent distress    Neurological:  Awake  Alert  Oriented x 3  Speech clear  Cranial nerves II - XII grossly intact  Motor exam   Sensation normal to bilateral upper and lower extremities.    Reflexes are 2+ in the patellar and Achilles. There is no clonus. Downgoing Babinski.    Reflexes are 2+ in the brachial radialis and triceps. Negative Aamir sign bilaterally.    Finger to Nose smooth  Pronator drift negative  Musculoskeletal:  Gait: Able to stand from a seated position. Normal non-antalgic, non-myelopathic gait.  Chin-on-chest deformity      Imaging:   X-rays of her cervical spine show her cervical spine to be nearly completely ankylosed.    Assessment/Plan:     Ankylosing spondylitis with chin on chest syndrome    Alannah Leos is a 80 year old female.  I did have a discussion with the patient and her daughter regarding her symptoms.  She understands the findings on her cervical spine x-rays.  She is unfortunately suffering from severe left ear with general chest syndrome.  Most of her neck pain is related to spasms in her paraspinous musculature.  I think trigger point injections would be a reasonable attempt at relieving some of this.  " She is also welcome to follow-up with Dr. Parker if she decides she wants to discuss whether she has any surgical options or not.          Farzad Rainey PA-C  [facility]  [address] [address]    Tel [phone]  Pager [phone]

## 2021-04-02 ENCOUNTER — IMMUNIZATION (OUTPATIENT)
Dept: NURSING | Facility: CLINIC | Age: 81
End: 2021-04-02
Attending: FAMILY MEDICINE
Payer: MEDICARE

## 2021-04-02 PROCEDURE — 91300 PR COVID VAC PFIZER DIL RECON 30 MCG/0.3 ML IM: CPT

## 2021-04-02 PROCEDURE — 0002A PR COVID VAC PFIZER DIL RECON 30 MCG/0.3 ML IM: CPT

## 2021-04-07 ENCOUNTER — OFFICE VISIT (OUTPATIENT)
Dept: PALLIATIVE MEDICINE | Facility: CLINIC | Age: 81
End: 2021-04-07
Payer: MEDICARE

## 2021-04-07 VITALS — HEART RATE: 75 BPM | DIASTOLIC BLOOD PRESSURE: 79 MMHG | SYSTOLIC BLOOD PRESSURE: 180 MMHG

## 2021-04-07 DIAGNOSIS — M54.2 NECK PAIN: ICD-10-CM

## 2021-04-07 DIAGNOSIS — M79.18 MYOFASCIAL PAIN: Primary | ICD-10-CM

## 2021-04-07 DIAGNOSIS — M79.18 CERVICAL MYOFASCIAL PAIN SYNDROME: ICD-10-CM

## 2021-04-07 PROCEDURE — 20553 NJX 1/MLT TRIGGER POINTS 3/>: CPT | Performed by: PSYCHIATRY & NEUROLOGY

## 2021-04-07 RX ORDER — BUPIVACAINE HYDROCHLORIDE 5 MG/ML
5 INJECTION, SOLUTION EPIDURAL; INTRACAUDAL ONCE
Status: COMPLETED | OUTPATIENT
Start: 2021-04-07 | End: 2021-04-07

## 2021-04-07 RX ADMIN — BUPIVACAINE HYDROCHLORIDE 25 MG: 5 INJECTION, SOLUTION EPIDURAL; INTRACAUDAL at 15:49

## 2021-04-07 ASSESSMENT — PAIN SCALES - GENERAL: PAINLEVEL: SEVERE PAIN (7)

## 2021-04-07 NOTE — Clinical Note
To PCP- consider adding in voltaren gel. Advised daughter that lidocaine over the counter patches could also be considered.

## 2021-04-07 NOTE — PATIENT INSTRUCTIONS
voltaren gel  Lidocaine patches - - over the counter available in 4%    [facility]   Post Procedure Instructions    Today you had:  trigger point injections     Medications used:  lidocaine   bupivicaine             Go to the emergency room if you develop any shortness of breath    Monitor the injection sites for signs and symptoms of infection-fever, chills, redness, swelling, warmth, or drainage to areas.    You may have soreness at injection sites for up to 24 hours.    You may apply ice to the painful areas to help minimize the discomfort of the needle pokes.    Do not apply heat to sites for at least 12 hours.    You may use anti-inflammatory medications or Tylenol for pain control if necessary    Pain Clinic phone number during work hours Monday-Friday:  [phone]    After hours provider line: [phone]

## 2021-04-07 NOTE — PROGRESS NOTES
"Pre procedure Diagnosis: myofascial pain   Post procedure Diagnosis: Same  Procedure performed: trigger point injections  Anesthesia: none  Complications: none  Operators: Esther Valdovinos MD     Indications:   Alannah Leos is a 80 year old female with a history of severe shoulder arthritis, PD, and myofascial pain.  Exam shows myofascial pain of the muscle groups listed below and they have tried conservative treatment including medications, PT.    Options/alternatives, benefits and risks were discussed with the patient including bleeding, infection, tissue trauma and pnuemothorax.  Questions were answered to her satisfaction and she agrees to proceed. Voluntary informed consent was obtained and signed.     Vitals were reviewed: Yes  Allergies were reviewed:  Yes   Medications were reviewed:  Yes   Pre-procedure pain score: 7/10    Procedure:  After getting informed consent, a Pause for the Cause was performed.    Trigger points were identified by patient, and marked when appropriate.  The area was prepped with Chloroprep.    Using clean technique, injections were completed using a 25G, 1.5 inch needle.  After negative aspiration, injection was completed.  A total of 9 locations were injected.  When possible, tissue was retracted from the chest wall to avoid lung injury.    Muscle groups injected:  Bilateral trapezius  Bilateral cervical paraspinal   Bilateral levator scapulae     Injection solution contained:  5ml of 1% lidocaine and 5ml of 0.5% bupivacaine.    Hemostasis was achieved, the area was cleaned, and bandaids were placed when appropriate.  The patient tolerated the procedure well.       Post-procedure pain score: \"better\"  BP recheck better  Follow-up includes:   -repeat prn, ok to double book- ok to add on at 3:45pm on Wed.  To PCP- consider adding in voltaren gel. Advised daughter that lidocaine over the counter patches could also be considered.    Esther Valdovinos MD  [facility]

## 2021-04-08 ENCOUNTER — TELEPHONE (OUTPATIENT)
Dept: FAMILY MEDICINE | Facility: CLINIC | Age: 81
End: 2021-04-08

## 2021-04-08 DIAGNOSIS — M45.2 ANKYLOSING SPONDYLITIS OF CERVICAL REGION (H): Primary | ICD-10-CM

## 2021-04-08 NOTE — TELEPHONE ENCOUNTER
Patient returned call to clinic.  Relayed provider message and recommendations, as noted below.    Patient was understanding and agreed with plans.  No questions at this time.    Akila Mccullough RN  [facility]

## 2021-04-08 NOTE — TELEPHONE ENCOUNTER
Please call patient.   Dr. Valdovinos recommended starting Voltaren gel to back of neck 2-3 times a day to see if this would help with the pain. Also can use over the counter lidocaine patches for 12 hours out of the day to help relieve the neck pain. I sent in a prescription for the Voltaren gel. Check with the pharmacist about the lidocaine over the counter patches.   Leah Blanca MD

## 2021-04-08 NOTE — TELEPHONE ENCOUNTER
This writer attempted to contact Alannah on 04/08/21      Reason for call provider information and left message.      If patient calls back:   Registered Nurse called. Follow Triage Call workflow, Mayra Almanza RN

## 2021-04-09 DIAGNOSIS — Z11.59 ENCOUNTER FOR SCREENING FOR OTHER VIRAL DISEASES: ICD-10-CM

## 2021-04-09 DIAGNOSIS — Z11.59 SCREENING FOR VIRAL DISEASE: ICD-10-CM

## 2021-04-09 LAB
SARS-COV-2 RNA RESP QL NAA+PROBE: NORMAL
SPECIMEN SOURCE: NORMAL

## 2021-04-09 PROCEDURE — U0005 INFEC AGEN DETEC AMPLI PROBE: HCPCS | Performed by: INTERNAL MEDICINE

## 2021-04-09 PROCEDURE — U0003 INFECTIOUS AGENT DETECTION BY NUCLEIC ACID (DNA OR RNA); SEVERE ACUTE RESPIRATORY SYNDROME CORONAVIRUS 2 (SARS-COV-2) (CORONAVIRUS DISEASE [COVID-19]), AMPLIFIED PROBE TECHNIQUE, MAKING USE OF HIGH THROUGHPUT TECHNOLOGIES AS DESCRIBED BY CMS-2020-01-R: HCPCS | Performed by: INTERNAL MEDICINE

## 2021-04-10 LAB
LABORATORY COMMENT REPORT: NORMAL
SARS-COV-2 RNA RESP QL NAA+PROBE: NEGATIVE
SPECIMEN SOURCE: NORMAL

## 2021-04-12 ENCOUNTER — TRANSFERRED RECORDS (OUTPATIENT)
Dept: HEALTH INFORMATION MANAGEMENT | Facility: CLINIC | Age: 81
End: 2021-04-12

## 2021-04-13 ENCOUNTER — TELEPHONE (OUTPATIENT)
Dept: RHEUMATOLOGY | Facility: CLINIC | Age: 81
End: 2021-04-13

## 2021-04-13 NOTE — TELEPHONE ENCOUNTER
Reason for call:  Other   Patient called regarding (reason for call): call back  Additional comments: Patients daughter is calling wanting to discuss the infusion her mom is suppose to have and if her insurance will cover it or not, please call back     Phone number to reach patient:  Home number on file [phone] (home)    Best Time:  any    Can we leave a detailed message on this number?  YES    Travel screening: Not Applicable

## 2021-04-13 NOTE — TELEPHONE ENCOUNTER
Called patient who provided a verbal consent to communicate with her daughter Shiloh. Called patient's daughter Shiloh who is wondering how much the reclast and remicade infusions will cost. She said she called patient's insurance and they were unable to tell her. RN provided daughter with the [facility] cost of care estimate line to check costs.    Jason JESUS RN....4/13/2021 3:16 PM

## 2021-04-13 NOTE — TELEPHONE ENCOUNTER
Patient's Daughter Shiloh called to cancel the Remicade infusions scheduled for the patient in April and May 2021 due to insurance.    The patient/daughter were unable to verify if the patient's insurance is able cover the infusions and the patient has decided to cancel until she knows for sure. Shiloh did contact Medicare however wasn't able to verify that the insurance would cover the services.    If there are any questions, please contact the patient/daughter Shiloh at [phone].    Thank you.    Shayna Feldman  [facility]  Cancer/Infusion Center   [phone]

## 2021-04-14 NOTE — TELEPHONE ENCOUNTER
RN: please call the infusion center and ask for the contact of the person doing the PA for Remicade; this person should be able to help Alannah Leos understanding the costs associated with Remicade.     Merrick Del Cid MD  4/14/2021 4:06 PM

## 2021-04-15 NOTE — TELEPHONE ENCOUNTER
Spoke with Patric Wharton with the finance infusion team. She stated both reclast and remicade are covered. The max out of pocket cost is $1250. Informed patient's daughter Shiloh of this and she is wondering if patient would need to pay that amount for each infusion or if it for the whole year. RN directed her to contact Patric Wharton directly for more specific information. She will do this.    Jason JESUS RN....4/15/2021 11:04 AM

## 2021-04-15 NOTE — TELEPHONE ENCOUNTER
Contacted Patric Wharton and left a message for her to return my call.    Jason JESUS RN....4/15/2021 10:05 AM

## 2021-05-12 ENCOUNTER — INFUSION THERAPY VISIT (OUTPATIENT)
Dept: INFUSION THERAPY | Facility: CLINIC | Age: 81
End: 2021-05-12
Payer: MEDICARE

## 2021-05-12 VITALS
DIASTOLIC BLOOD PRESSURE: 52 MMHG | OXYGEN SATURATION: 96 % | SYSTOLIC BLOOD PRESSURE: 137 MMHG | RESPIRATION RATE: 18 BRPM | HEART RATE: 73 BPM

## 2021-05-12 DIAGNOSIS — M81.0 AGE RELATED OSTEOPOROSIS, UNSPECIFIED PATHOLOGICAL FRACTURE PRESENCE: Primary | ICD-10-CM

## 2021-05-12 LAB
CALCIUM SERPL-MCNC: 9.2 MG/DL (ref 8.5–10.1)
CREAT SERPL-MCNC: 0.65 MG/DL (ref 0.52–1.04)
GFR SERPL CREATININE-BSD FRML MDRD: 84 ML/MIN/{1.73_M2}

## 2021-05-12 PROCEDURE — 82565 ASSAY OF CREATININE: CPT | Performed by: INTERNAL MEDICINE

## 2021-05-12 PROCEDURE — 82310 ASSAY OF CALCIUM: CPT | Performed by: INTERNAL MEDICINE

## 2021-05-12 PROCEDURE — 96374 THER/PROPH/DIAG INJ IV PUSH: CPT | Performed by: NURSE PRACTITIONER

## 2021-05-12 PROCEDURE — 36415 COLL VENOUS BLD VENIPUNCTURE: CPT | Performed by: INTERNAL MEDICINE

## 2021-05-12 PROCEDURE — 99207 PR NO CHARGE LOS: CPT

## 2021-05-12 RX ORDER — HEPARIN SODIUM,PORCINE 10 UNIT/ML
5 VIAL (ML) INTRAVENOUS
Status: CANCELLED | OUTPATIENT
Start: 2021-05-12

## 2021-05-12 RX ORDER — ZOLEDRONIC ACID 5 MG/100ML
5 INJECTION, SOLUTION INTRAVENOUS ONCE
Status: COMPLETED | OUTPATIENT
Start: 2021-05-12 | End: 2021-05-12

## 2021-05-12 RX ORDER — HEPARIN SODIUM (PORCINE) LOCK FLUSH IV SOLN 100 UNIT/ML 100 UNIT/ML
5 SOLUTION INTRAVENOUS
Status: CANCELLED | OUTPATIENT
Start: 2021-05-12

## 2021-05-12 RX ORDER — ZOLEDRONIC ACID 5 MG/100ML
5 INJECTION, SOLUTION INTRAVENOUS ONCE
Status: CANCELLED
Start: 2021-05-12 | End: 2021-05-12

## 2021-05-12 RX ADMIN — ZOLEDRONIC ACID 5 MG: 0.05 INJECTION, SOLUTION INTRAVENOUS at 15:16

## 2021-05-12 RX ADMIN — Medication 250 ML: at 15:14

## 2021-05-12 NOTE — PROGRESS NOTES
Infusion Nursing Note:  Alannah SAEID Leos presents today for: Reclast.    Patient seen by provider today: No   present during visit today: Not Applicable.    Note: stop time. reclast & 0.9% NS:  5/12/21 @ 1535.    Intravenous Access:  Peripheral IV placed.    Treatment Conditions:  Results reviewed, labs MET treatment parameters, ok to proceed with treatment.      Post Infusion Assessment:  Patient tolerated infusion without incident.       Discharge Plan:   .    MARIYA HOFFMANN RN

## 2021-05-17 DIAGNOSIS — Z79.899 HIGH RISK MEDICATION USE: ICD-10-CM

## 2021-05-17 DIAGNOSIS — M06.09 RHEUMATOID ARTHRITIS OF MULTIPLE SITES WITH NEGATIVE RHEUMATOID FACTOR (H): ICD-10-CM

## 2021-05-17 LAB
ALBUMIN SERPL-MCNC: 3.7 G/DL (ref 3.4–5)
ALP SERPL-CCNC: 77 U/L (ref 40–150)
ALT SERPL W P-5'-P-CCNC: 12 U/L (ref 0–50)
AST SERPL W P-5'-P-CCNC: 16 U/L (ref 0–45)
BILIRUB DIRECT SERPL-MCNC: 0.1 MG/DL (ref 0–0.2)
BILIRUB SERPL-MCNC: 0.4 MG/DL (ref 0.2–1.3)
CREAT SERPL-MCNC: 0.63 MG/DL (ref 0.52–1.04)
CRP SERPL-MCNC: 6 MG/L (ref 0–8)
ERYTHROCYTE [SEDIMENTATION RATE] IN BLOOD BY WESTERGREN METHOD: 17 MM/H (ref 0–30)
GFR SERPL CREATININE-BSD FRML MDRD: 85 ML/MIN/{1.73_M2}
PROT SERPL-MCNC: 7.3 G/DL (ref 6.8–8.8)

## 2021-05-17 PROCEDURE — 36415 COLL VENOUS BLD VENIPUNCTURE: CPT | Performed by: INTERNAL MEDICINE

## 2021-05-17 PROCEDURE — 82565 ASSAY OF CREATININE: CPT | Performed by: INTERNAL MEDICINE

## 2021-05-17 PROCEDURE — 86140 C-REACTIVE PROTEIN: CPT | Performed by: INTERNAL MEDICINE

## 2021-05-17 PROCEDURE — 85025 COMPLETE CBC W/AUTO DIFF WBC: CPT | Performed by: INTERNAL MEDICINE

## 2021-05-17 PROCEDURE — 85652 RBC SED RATE AUTOMATED: CPT | Performed by: INTERNAL MEDICINE

## 2021-05-17 PROCEDURE — 80076 HEPATIC FUNCTION PANEL: CPT | Performed by: INTERNAL MEDICINE

## 2021-05-18 LAB
BASOPHILS # BLD AUTO: 0.1 10E9/L (ref 0–0.2)
BASOPHILS NFR BLD AUTO: 1 %
DIFFERENTIAL METHOD BLD: ABNORMAL
EOSINOPHIL # BLD AUTO: 0.2 10E9/L (ref 0–0.7)
EOSINOPHIL NFR BLD AUTO: 2 %
ERYTHROCYTE [DISTWIDTH] IN BLOOD BY AUTOMATED COUNT: 16.8 % (ref 10–15)
HCT VFR BLD AUTO: 33.6 % (ref 35–47)
HGB BLD-MCNC: 11 G/DL (ref 11.7–15.7)
LYMPHOCYTES # BLD AUTO: 1.5 10E9/L (ref 0.8–5.3)
LYMPHOCYTES NFR BLD AUTO: 20 %
MCH RBC QN AUTO: 29.7 PG (ref 26.5–33)
MCHC RBC AUTO-ENTMCNC: 32.7 G/DL (ref 31.5–36.5)
MCV RBC AUTO: 91 FL (ref 78–100)
MONOCYTES # BLD AUTO: 0.3 10E9/L (ref 0–1.3)
MONOCYTES NFR BLD AUTO: 4 %
NEUTROPHILS # BLD AUTO: 5.4 10E9/L (ref 1.6–8.3)
NEUTROPHILS NFR BLD AUTO: 73 %
OVALOCYTES BLD QL SMEAR: SLIGHT
PLATELET # BLD AUTO: 235 10E9/L (ref 150–450)
PLATELET # BLD EST: ABNORMAL 10*3/UL
RBC # BLD AUTO: 3.7 10E12/L (ref 3.8–5.2)
RBC INCLUSIONS BLD: SLIGHT
WBC # BLD AUTO: 7.5 10E9/L (ref 4–11)

## 2021-05-20 ENCOUNTER — OFFICE VISIT (OUTPATIENT)
Dept: RHEUMATOLOGY | Facility: CLINIC | Age: 81
End: 2021-05-20
Payer: MEDICARE

## 2021-05-20 VITALS
SYSTOLIC BLOOD PRESSURE: 146 MMHG | HEART RATE: 83 BPM | OXYGEN SATURATION: 99 % | DIASTOLIC BLOOD PRESSURE: 72 MMHG | HEIGHT: 64 IN | BODY MASS INDEX: 19.74 KG/M2

## 2021-05-20 DIAGNOSIS — M06.09 RHEUMATOID ARTHRITIS OF MULTIPLE SITES WITH NEGATIVE RHEUMATOID FACTOR (H): Primary | ICD-10-CM

## 2021-05-20 DIAGNOSIS — Z79.899 HIGH RISK MEDICATION USE: ICD-10-CM

## 2021-05-20 PROCEDURE — 99215 OFFICE O/P EST HI 40 MIN: CPT | Performed by: INTERNAL MEDICINE

## 2021-05-20 RX ORDER — FOLIC ACID 1 MG/1
1 TABLET ORAL DAILY
Qty: 90 TABLET | Refills: 2 | Status: SHIPPED | OUTPATIENT
Start: 2021-05-20 | End: 2021-12-15

## 2021-05-20 RX ORDER — METHOTREXATE 2.5 MG/1
20 TABLET ORAL WEEKLY
Qty: 32 TABLET | Refills: 7 | Status: SHIPPED | OUTPATIENT
Start: 2021-05-20 | End: 2021-12-15

## 2021-05-20 RX ORDER — HYDROXYCHLOROQUINE SULFATE 200 MG/1
200 TABLET, FILM COATED ORAL DAILY
Qty: 30 TABLET | Refills: 7 | Status: SHIPPED | OUTPATIENT
Start: 2021-05-20 | End: 2021-12-15

## 2021-05-20 NOTE — NURSING NOTE
RAPID3 (0-30) Cumulative Score  18.8          RAPID3 Weighted Score (divide #4 by 3 and that is the weighted score)  6.2

## 2021-05-20 NOTE — PROGRESS NOTES
Rheumatology Clinic Visit      Alannah Leos MRN# [id number]   YOB: 1940 Age: 80 year old      Date of visit: 5/20/21   PCP: Dr. Leah Blanca    Chief Complaint   Patient presents with:  Arthritis: RA    Assessment and Plan     1. 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 Previously on methotrexate when first diagnosed (unclear if ineffective or not, stopped by patient preference to not treat her RA at that time).  Synovitis, subluxation, and fixed flexion deformities on initial exam. Steroid responsive. 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 Currently on methotrexate 20 mg once weekly, and hydroxychloroquine 200 mg daily. Previously on SSZ (felt poorly on it).  At almost every visit I have encouraged her to escalate treatment for her active disease but she has been reluctant to do so because of possible risks from the medication and she says that she is tolerating her pain; she understands that the progression of her disease may not be reversible as seen by the changes of her spine.  She then presented with more pain and was willing to start a bDMARD.  Discussed infusions versus SQ and she was going to proceed with infliximab. Infliximab was then cancelled because she needed to look into the cost of her infusions.  She will figure out if infliximab IV will be use; if not then may consider Humira as she may qualify for free medication but she is hesitant to do this because she doesn't want to share her financial information.  Long, thorough discussion about her disease and tx's.   Chronic illness, progressive.    - Continue methotrexate 20 mg once weekly    - Continue folic acid 1mg daily  - Continue hydroxychloroquine 200mg daily (normal eye exam on 1/27/2020)  - Start Remicade 5 mg/kg IV on week 0, 2, 6, and then every 8 weeks thereafter, if TB screening is negative  -  Labs in 3 months: CBC, Creatinine, Hepatic Panel, ESR, CRP    High risk medication requiring intensive toxicity monitoring at least quarterly: labs ordered include CBC, Creatinine, Hepatic panel to monitor for cytopenia and hepatotoxicity; checking creatinine as it affects clearance of medication.     # Infliximab (Remicade) Risks and Benefits: The risks and benefits of infliximab were discussed in detail and the patient verbalized understanding.  The risks discussed include, but are not limited to, the risk for hypersensitivity, anaphylaxis, anaphylactoid reactions, an increased risk for serious infections leading to hospitalization or death, a possible increased risk for lymphoma and other malignancies, a possible worsening of demyelinating diseases, a possible worsening of heart failure, cytopenias, hepatotoxicity, risk for drug induced lupus, possible reactivation of hepatitis B, and possible reactivation of latent tuberculosis.   The most common adverse reactions are infections, infusion-related reactions, and headache.  It was discussed that the medication would need to be discontinued if a serious infection develops.  It was discussed that live vaccinations should not be received while using infliximab or within 30 days prior to starting infliximab.  I encouraged reviewing the package insert and asking any questions about the medication.     2. Positive PIERO and dsDNA: These were noted on record review. She does not have symptoms to support an PIERO-associated rheumatologic disease at this time.     3.  Diffuse neck/back pain: No evidence of instability by x-rays on 10/12/2016.  Seen by NSGY and now getting trigger point injections in the pain clinic with improvement for about 3 weeks after the first set of injections.     4. Parkinson's Disease: Followed by Dr. Ricardo Johnson at the [facility] previously, and now at [facility]    5. Osteoporosis: Based on 1/29/2018 DEXA.  Was on  fosamax from [id number]; reclast then started 2021.   - Continue reclast 5mg IV yearly  - Continue vitamin D 1000 IU daily   - Continue calcium 1000mg daily    # s/p 2 doses of the Pfizer COVID19 vaccine, last received 4/2/2021    Total minutes spent in evaluation with patient, documentation, , and review of pertinent studies and chart notes: 42     Ms. Leos verbalized agreement with and understanding of the rational for the diagnosis and treatment plan.  All questions were answered to best of my ability and the patient's satisfaction. Ms. Leos was advised to contact the clinic with any questions that may arise after the clinic visit.      Thank you for involving me in the care of the patient    Return to clinic: 3 months      HPI   Alannah Leos is a 80 year old female with a medical history significant for Parkinson's disease, osteoarthritis, status post TKA, osteoporosis, and rheumatoid arthritis who presents for follow-up of rheumatoid arthritis.    Today, 5/20/2021: trigger point injections from the pain clinic were helpful for 3 weeks.  Didn't start infliximab because the cost of the infusion wasn't clear to her.  Doesn't want to share financial information to possibly qualify for free medication.  Tolerating MTX and HCQ.  Neck pain. Left hand swelling.  Unable to keep head up.     Denies fevers, chills, nausea, vomiting, constipation, diarrhea. No abdominal pain. No chest pain/pressure, palpitations, or shortness of breath.   No oral or nasal sores.  No rash. No sicca symptoms.      Tobacco: none  EtOH: 1 drink at [place] only  Drugs: none    ROS   12 point review of system was completed and negative except as noted in the HPI     Active Problem List     Patient Active Problem List   Diagnosis     Osteoporosis     Rheumatoid arthritis (H)     Parkinson disease (H)     Osteoarthritis of wrist     Osteoarthritis of shoulder     History of total knee arthroplasty     Osteoarthritis of left  knee     Advanced directives, counseling/discussion     CARDIOVASCULAR SCREENING; LDL GOAL LESS THAN 160     High risk medication use     Underweight     Impingement syndrome, shoulder, left     Combined forms of age-related cataract of both eyes     Seborrheic dermatitis     Anemia in other chronic diseases classified elsewhere     Ankylosing spondylitis of cervical region (H)     Past Medical History     Past Medical History:   Diagnosis Date     Hyperlipidemia      Murmur, heart     hsm     Nonsenile cataract      Osteoarthrosis, unspecified whether generalized or localized, lower leg      Osteopenia      Rheumatoid arthritis(714.0)      Past Surgical History     Past Surgical History:   Procedure Laterality Date     GALLBLADDER SURGERY       HYSTERECTOMY       KNEE SURGERY      left      LAMINECTOMY LUMBAR ONE LEVEL       TONSILLECTOMY       Allergy     Allergies   Allergen Reactions     Decadrol [Dexamethasone Sodium Phosphate] Hives     Shrimp Itching     Current Medication List     Current Outpatient Medications   Medication Sig     carbidopa-levodopa (SINEMET)  MG tablet Take one at 6 am, noon, 5 pm, 9 pm and midnight     diclofenac (VOLTAREN) 1 % topical gel Apply 2 g topically 3 times daily as needed for moderate pain     folic acid (FOLVITE) 1 MG tablet Take 1 tablet (1 mg) by mouth daily     hydroxychloroquine (PLAQUENIL) 200 MG tablet Take 1 tablet (200 mg) by mouth daily     ibuprofen (ADVIL/MOTRIN) 200 MG tablet Take 1 tablet (200 mg) by mouth every 8 hours as needed for moderate pain     methotrexate sodium 2.5 MG TABS Take 8 tablets (20 mg) by mouth once a week . Take all 8 tablets on the same day of each week.     ACE NOT PRESCRIBED, INTENTIONAL, 1 each continuous prn. ACE Inhibitor not prescribed due to Refusal by patient       (Patient not taking: Reported on 3/25/2021)     ammonium lactate (AMLACTIN) 12 % cream Apply once to twice a day to dry skin. Can burn temporarily if there is a cut  "on the skin. (Patient not taking: Reported on 7/30/2020)     ASPIRIN 81 MG PO TABS PATIENT REPORTS TAKING 4 TIMES PER WEEK AND NOT EVERYDAY     CALCIUM + D OR 600mg twice a day      meclizine (ANTIVERT) 25 MG tablet Take 1 tablet (25 mg) by mouth 3 times daily as needed for dizziness (Patient not taking: Reported on 3/25/2021)     order for DME Equipment being ordered: soft neck brace (Patient not taking: Reported on 3/25/2021)     SENNA-docusate sodium (SENNA S) 8.6-50 MG tablet Take 1 tablet by mouth At Bedtime (Patient not taking: Reported on 3/25/2021)     No current facility-administered medications for this visit.          Social History   See HPI    Family History     Family History   Problem Relation Age of Onset     Cancer Sister         pancreatic     Diabetes No family hx of      Hypertension No family hx of        Physical Exam     Temp Readings from Last 3 Encounters:   01/17/20 97.5  F (36.4  C) (Oral)   09/12/19 98.2  F (36.8  C) (Oral)   07/25/19 97.7  F (36.5  C) (Oral)     BP Readings from Last 5 Encounters:   05/20/21 (!) 146/72   05/12/21 137/52   04/07/21 (!) 180/79   03/25/21 (!) 157/64   07/13/20 (!) 156/70     Pulse Readings from Last 1 Encounters:   05/20/21 83     Resp Readings from Last 1 Encounters:   05/12/21 18     Estimated body mass index is 19.74 kg/m  as calculated from the following:    Height as of this encounter: 1.626 m (5' 4\").    Weight as of 3/25/21: 52.2 kg (115 lb).    GEN: NAD, thin and frail appearing  HEENT: MMM. No oral lesions. Anicteric, noninjected sclera  PULM: no increased work of breathing  MSK: Left 2nd-3rd MCPs with synovial swelling and tenderness to palpation.  Other MCPs and PIPs tender to palpation but without swelling. Bilateral wrist swelling and tenderness to palpation.   Elbows without swelling or tenderness to palpation.  Knees, ankles, and MTPs without swelling or tenderness to palpation.  Holds neck in a flexed position.   NEURO: UE and LE strengths " 5/5 and equal bilaterally.   SKIN: No rash. Diffuse scalp hair thinning.  EXT: No lower extremity edema  PSYCH: Alert. Appropriate.      Labs / Imaging (select studies)     RF/CCP  Recent Labs   Lab Test 10/12/16  1142 07/30/13  1621   CCPABY  --  <20 Interpretation:  Negative   CCPIGG 2  --    RHF <20  --      CBC  Recent Labs   Lab Test 05/17/21  1342 02/22/21  1339 12/07/20  1353   WBC 7.5 8.1 7.5   RBC 3.70* 4.02 4.11   HGB 11.0* 11.6* 11.4*   HCT 33.6* 36.0 36.1   MCV 91 90 88   RDW 16.8* 17.6* 17.3*    249 246   MCH 29.7 28.9 27.7   MCHC 32.7 32.2 31.6   NEUTROPHIL 73.0 70.0 72.0   LYMPH 20.0 17.0 19.0   MONOCYTE 4.0 10.0 5.0   EOSINOPHIL 2.0 2.0 2.0   BASOPHIL 1.0 1.0 2.0   ANEU 5.4 5.6 5.3   ALYM 1.5 1.4 1.4   DORITA 0.3 0.8 0.4   AEOS 0.2 0.2 0.2   ABAS 0.1 0.1 0.2     CMP  Recent Labs   Lab Test 05/17/21  1342 05/12/21  1334 03/12/21  1328 02/22/21  1339 12/07/20  1353 01/15/20  1403 01/15/20  1403 05/27/16  1413 05/27/16  1413 05/26/15  1416   NA  --   --   --   --   --   --  137  --  136 137   POTASSIUM  --   --   --   --   --   --  4.2  --  3.7 4.0   CHLORIDE  --   --   --   --   --   --  102  --  102 103   CO2  --   --   --   --   --   --  30  --  25 28   ANIONGAP  --   --   --   --   --   --  5  --  9 6   GLC  --   --   --   --   --   --  97  --  94 100*   BUN  --   --   --   --   --   --  15  --  11 9   CR 0.63 0.65  --  0.58 0.52   < > 0.55   < > 0.41* 0.47*   GFRESTIMATED 85 84  --  88 >90   < > 90   < > >90  Non  GFR Calc   >90  Non  GFR Calc     GFRESTBLACK >90 >90  --  >90 >90   < > >90   < > >90   GFR Calc   >90   GFR Calc     PAKO  --  9.2 9.6  --   --   --  9.5  10.2*   < > 9.2 9.2   BILITOTAL 0.4  --   --  0.4 0.4   < > 0.4   < > 0.5  --    ALBUMIN 3.7  --   --  4.1 3.9   < > 4.3   < > 3.8  --    PROTTOTAL 7.3  --   --  7.9 8.0   < > 7.8   < > 8.8  --    ALKPHOS 77  --   --  91 104   < > 125   < > 108  --    AST 16  --   --   18 21   < > 25   < > 15  --    ALT 12  --   --  10 12   < > 18   < > 8  --     < > = values in this interval not displayed.     Calcium/VitaminD  Recent Labs   Lab Test 05/12/21  1334 03/12/21  1328 01/15/20  1403 01/25/18  1353   PAKO 9.2 9.6 9.5  10.2* 9.1   VITDT  --  37 39 39     ESR/CRP  Recent Labs   Lab Test 05/17/21  1342 12/07/20  1353 07/27/20  1334   SED 17 32* 24   CRP 6.0 10.3* 8.7*     Lipid Panel  Recent Labs   Lab Test 05/26/15  1416 04/26/13  1128   CHOL 179 154   TRIG 50 44   HDL 80 72   LDL 89 73   VLDL 10 9   CHOLHDLRATIO 2.2 2.1     Hepatitis B  Recent Labs   Lab Test 01/18/17  1601   HBCAB Nonreactive   HEPBANG Nonreactive     Hepatitis C  Recent Labs   Lab Test 01/18/17  1601   HCVAB Nonreactive   Assay performance characteristics have not been established for newborns,   infants, and children       Tuberculosis Screening  Recent Labs   Lab Test 03/12/21  1328 01/25/18  1352 01/18/17  1601   TBRSLT  --  Negative Negative   TBAGN  --  0.00 0.00   TBRST Negative  --   --      HIV Screening  Recent Labs   Lab Test 01/18/17  1601   HIAGAB Nonreactive   HIV-1 p24 Ag & HIV-1/HIV-2 Ab Not Detected         Immunization History     Immunization History   Administered Date(s) Administered     COVID-19,PF,Pfizer 03/12/2021, 04/02/2021     Pneumo Conj 13-V (2010&after) 11/03/2016     Pneumococcal 23 valent 11/01/2007, 11/13/2007, 03/12/2018     TD (ADULT, 7+) 11/13/2007     Tdap (Adacel,Boostrix) 11/13/2007          Chart documentation done in part with Dragon Voice recognition Software. Although reviewed after completion, some word and grammatical error may remain.      Merrick Del Cid MD

## 2021-05-20 NOTE — PATIENT INSTRUCTIONS
RHEUMATOLOGY    Dr. Merrick Del Cid    [facility]  [address] [place] [address]    Our new phone number for Rheumatology is [phone], this number will be able to help you schedule appointments for Dr. Del Cid or if you have any message you would like sent to us.    Thank you for choosing [facility]!    Joan Bucio [facility] Rheumatology

## 2021-05-25 NOTE — PROGRESS NOTES
Pre procedure Diagnosis: myofascial pain   Post procedure Diagnosis: Same  Procedure performed: trigger point injections  Anesthesia: none  Complications: none  Operators: Esther Valdovinos MD     Indications:   Alannah Leos is a 80 year old female with a history of severe shoulder arthritis, PD, and myofascial pain.  Exam shows myofascial pain of the muscle groups listed below and they have tried conservative treatment including medications, PT.  She got 3 weeks of improvement from last injection, including improvement in range of motion.    She also has shoulder and biceps pain. She feels grinding and doesn't move shoulders much at all. Hasn't had shoulder injection.  Exam consistent with frozen shoulder.    Options/alternatives, benefits and risks were discussed with the patient including bleeding, infection, tissue trauma and pnuemothorax.  Questions were answered to her satisfaction and she agrees to proceed. Voluntary informed consent was obtained and signed.     Vitals were reviewed: Yes  Allergies were reviewed:  Yes   Medications were reviewed:  Yes   Pre-procedure pain score: 7/10    Procedure:  After getting informed consent, a Pause for the Cause was performed.    Trigger points were identified by patient, and marked when appropriate.  The area was prepped with Chloroprep.    Using clean technique, injections were completed using a 25G, 1.5 inch needle.  After negative aspiration, injection was completed.  A total of 9 locations were injected.  When possible, tissue was retracted from the chest wall to avoid lung injury.    Muscle groups injected:  Bilateral trapezius  Bilateral cervical paraspinal   Bilateral levator scapulae     Injection solution contained:  5ml of 1% lidocaine and 5ml of 0.5% bupivacaine.    Hemostasis was achieved, the area was cleaned, and bandaids were placed when appropriate.  The patient tolerated the procedure well.       Post-procedure pain score: 7/10  BP recheck  better  Follow-up includes:   -repeat prn, ok to double book- ok to add on at 3:45pm on Wed.  - asked her to schedule with sports med.  She has likely frozen shoulder, and while significant improvement of function is limited, if pain control allowing her to use biceps/hands is possible, that would be very helpful.  Esther Valdovinos MD  [facility]

## 2021-05-26 ENCOUNTER — OFFICE VISIT (OUTPATIENT)
Dept: PALLIATIVE MEDICINE | Facility: CLINIC | Age: 81
End: 2021-05-26
Payer: MEDICARE

## 2021-05-26 VITALS — SYSTOLIC BLOOD PRESSURE: 167 MMHG | HEART RATE: 78 BPM | DIASTOLIC BLOOD PRESSURE: 75 MMHG

## 2021-05-26 DIAGNOSIS — G89.29 CHRONIC PAIN OF BOTH SHOULDERS: ICD-10-CM

## 2021-05-26 DIAGNOSIS — M25.511 CHRONIC PAIN OF BOTH SHOULDERS: ICD-10-CM

## 2021-05-26 DIAGNOSIS — M79.18 MYOFASCIAL PAIN: Primary | ICD-10-CM

## 2021-05-26 DIAGNOSIS — M25.512 CHRONIC PAIN OF BOTH SHOULDERS: ICD-10-CM

## 2021-05-26 PROCEDURE — 20553 NJX 1/MLT TRIGGER POINTS 3/>: CPT | Performed by: PSYCHIATRY & NEUROLOGY

## 2021-05-26 RX ORDER — BUPIVACAINE HYDROCHLORIDE 5 MG/ML
5 INJECTION, SOLUTION EPIDURAL; INTRACAUDAL ONCE
Status: COMPLETED | OUTPATIENT
Start: 2021-05-26 | End: 2021-05-26

## 2021-05-26 RX ORDER — TRIAMCINOLONE ACETONIDE 40 MG/ML
40 INJECTION, SUSPENSION INTRA-ARTICULAR; INTRAMUSCULAR ONCE
Status: DISCONTINUED | OUTPATIENT
Start: 2021-05-26 | End: 2021-05-26 | Stop reason: CLARIF

## 2021-05-26 RX ADMIN — BUPIVACAINE HYDROCHLORIDE 25 MG: 5 INJECTION, SOLUTION EPIDURAL; INTRACAUDAL at 16:36

## 2021-05-26 ASSESSMENT — PAIN SCALES - GENERAL: PAINLEVEL: SEVERE PAIN (7)

## 2021-05-26 NOTE — Clinical Note
- asked her to schedule with one of you.  Frozen shoulder. She has a lot of bicep pain, and is dropping things when doing limited work below the shoulders.  Not sure if it is frozen shoulder. Not sure if PT would be an easy option for her, but wondering if injection would help with pain management. She is getting 3 weeks of relief from trigger point injections without any steroid.- significantly helping her.

## 2021-05-26 NOTE — PATIENT INSTRUCTIONS
Sports medicine  To contact [facility] to schedule, call [phone].- Dr. Garrison or Dr. Vance      [facility]   Post Procedure Instructions    Today you had:  trigger point injections       Medications used:  lidocaine   bupivicaine             Go to the emergency room if you develop any shortness of breath    Monitor the injection sites for signs and symptoms of infection-fever, chills, redness, swelling, warmth, or drainage to areas.    You may have soreness at injection sites for up to 24 hours.    You may apply ice to the painful areas to help minimize the discomfort of the needle pokes.    Do not apply heat to sites for at least 12 hours.    You may use anti-inflammatory medications or Tylenol for pain control if necessary    Pain Clinic phone number during work hours Monday-Friday:  [phone]    After hours provider line: [phone]

## 2021-06-09 ENCOUNTER — OFFICE VISIT (OUTPATIENT)
Dept: ORTHOPEDICS | Facility: CLINIC | Age: 81
End: 2021-06-09
Attending: PSYCHIATRY & NEUROLOGY
Payer: MEDICARE

## 2021-06-09 ENCOUNTER — ANCILLARY PROCEDURE (OUTPATIENT)
Dept: GENERAL RADIOLOGY | Facility: CLINIC | Age: 81
End: 2021-06-09
Attending: FAMILY MEDICINE
Payer: MEDICARE

## 2021-06-09 VITALS — HEIGHT: 64 IN | SYSTOLIC BLOOD PRESSURE: 138 MMHG | DIASTOLIC BLOOD PRESSURE: 60 MMHG | BODY MASS INDEX: 19.74 KG/M2

## 2021-06-09 DIAGNOSIS — M19.012 OSTEOARTHRITIS OF BILATERAL GLENOHUMERAL JOINTS: Primary | ICD-10-CM

## 2021-06-09 DIAGNOSIS — M25.511 CHRONIC PAIN OF BOTH SHOULDERS: ICD-10-CM

## 2021-06-09 DIAGNOSIS — M25.512 CHRONIC PAIN OF BOTH SHOULDERS: ICD-10-CM

## 2021-06-09 DIAGNOSIS — G89.29 CHRONIC PAIN OF BOTH SHOULDERS: ICD-10-CM

## 2021-06-09 DIAGNOSIS — M19.011 OSTEOARTHRITIS OF BILATERAL GLENOHUMERAL JOINTS: Primary | ICD-10-CM

## 2021-06-09 PROCEDURE — 99204 OFFICE O/P NEW MOD 45 MIN: CPT | Mod: 25 | Performed by: FAMILY MEDICINE

## 2021-06-09 PROCEDURE — 73030 X-RAY EXAM OF SHOULDER: CPT | Mod: LT | Performed by: RADIOLOGY

## 2021-06-09 PROCEDURE — 20611 DRAIN/INJ JOINT/BURSA W/US: CPT | Mod: 50 | Performed by: FAMILY MEDICINE

## 2021-06-09 RX ORDER — ROPIVACAINE HYDROCHLORIDE 5 MG/ML
3 INJECTION, SOLUTION EPIDURAL; INFILTRATION; PERINEURAL
Status: DISCONTINUED | OUTPATIENT
Start: 2021-06-09 | End: 2024-02-12

## 2021-06-09 RX ORDER — TRIAMCINOLONE ACETONIDE 40 MG/ML
40 INJECTION, SUSPENSION INTRA-ARTICULAR; INTRAMUSCULAR
Status: DISCONTINUED | OUTPATIENT
Start: 2021-06-09 | End: 2024-02-12

## 2021-06-09 RX ADMIN — ROPIVACAINE HYDROCHLORIDE 3 ML: 5 INJECTION, SOLUTION EPIDURAL; INFILTRATION; PERINEURAL at 16:00

## 2021-06-09 RX ADMIN — TRIAMCINOLONE ACETONIDE 40 MG: 40 INJECTION, SUSPENSION INTRA-ARTICULAR; INTRAMUSCULAR at 16:00

## 2021-06-09 NOTE — LETTER
2021         RE: Alannah Leos  [address] [facility] [address]        Dear Colleague,    Thank you for referring your patient, Alannah Leos, to the [facility]. Please see a copy of my visit note below.    Alannah Leos  :  1940  DOS: 2021  MRN: [id number]    Sports Medicine Clinic Visit    PCP: Leah Blanca    Alannah Leos is a 80 year old Right hand dominant female who is seen in consultation at the request of  Judith Valdovinos M.D. presenting with chronic bilateral shoulder pain.    Injury: Gradual onset of chronic bilateral shoulder pain with decreasing shoulder AROM and function over the past 6+ years.  Pain located over bilateral deep anterior shoulder.  Additional Features:  Positive: popping, grinding, weakness and severely limited AROM.  Symptoms are better with Rest.  Symptoms are worse with: moving shoulders in any direction, lying on either shoulder.  Other evaluation and/or treatments so far consists of: Ice, Tylenol, Ibuprofen, Rest and Pain Mgmt, topical diclofenac.  Recent imaging completed: No recent imaging completed.  Prior History of related problems: history of chronic bilateral shoulder pain and generalized body pain.  She has previously received right shoulder steroid injections 5+ years ago.    Review of Systems  Musculoskeletal: as above  Remainder of review of systems is negative including constitutional, CV, pulmonary, GI, Skin and Neurologic except as noted in HPI or medical history.    Past Medical History:   Diagnosis Date     Hyperlipidemia      Murmur, heart     hsm     Nonsenile cataract      Osteoarthrosis, unspecified whether generalized or localized, lower leg      Osteopenia      Rheumatoid arthritis(714.0)      Past Surgical History:   Procedure Laterality Date     GALLBLADDER SURGERY       HYSTERECTOMY       KNEE SURGERY      left      LAMINECTOMY LUMBAR ONE LEVEL       TONSILLECTOMY    "    Family History   Problem Relation Age of Onset     Cancer Sister         pancreatic     Diabetes No family hx of      Hypertension No family hx of        Objective  /60   Ht 1.626 m (5' 4\")   BMI 19.74 kg/m        General: healthy, alert and in no distress      HEENT: no scleral icterus or conjunctival erythema     Skin: no suspicious lesions or rash. No jaundice.     CV: regular rhythm by palpation, 2+ distal pulses, no pedal edema      Resp: normal respiratory effort without conversational dyspnea     Psych: normal mood and affect      Gait: nonantalgic, appropriate coordination and balance     Neuro: normal light touch sensory exam of the extremities. Motor strength as noted below     Bilateral Shoulder exam    ROM:        Significantly limited active and passive ROM with flexion, extension, abduction, internal and external rotation.    Tender:        subacromial space       posterior shoulder       Anterior GH joint    Non Tender:       remainder of shoulder    Strength:        abduction 2/5       internal rotation 4/5       external rotation 2/5       adduction 4/5    Impingement testing:        positive (+) crank    Skin:       no visible deformities       well perfused       capillary refill brisk    Sensation:        normal sensation over shoulder and upper extremity       Radiology  XR images independently visualized and reviewed with patient today in clinic  Severe b/l GH joint OA with signs of chronic RTC tear  Significant remodeling of the humeral head R>>L, likely chronic changes  Proximal humerus fracture on the right has healed  Will follow final radiology read    Large Joint Injection/Arthocentesis: bilateral glenohumeral    Date/Time: 6/9/2021 4:00 PM  Performed by: Abilio Vance DO  Authorized by: Abilio Vance DO     Indications:  Pain and osteoarthritis  Needle Size:  21 G  Guidance: ultrasound    Approach:  Posterolateral  Location:  Shoulder  Laterality:  " Bilateral      Site:  Bilateral glenohumeral  Medications (Right):  40 mg triamcinolone 40 MG/ML; 3 mL ropivacaine 5 MG/ML  Medications (Left):  40 mg triamcinolone 40 MG/ML; 3 mL ropivacaine 5 MG/ML  Outcome:  Tolerated well, no immediate complications  Procedure discussed: discussed risks, benefits, and alternatives    Consent Given by:  Patient  Timeout: timeout called immediately prior to procedure    Prep: patient was prepped and draped in usual sterile fashion     Injection was completed with patient in seated position in chair.        Assessment:  1. Osteoarthritis of bilateral glenohumeral joints    2. Chronic pain of both shoulders        Plan:  Discussed the assessment with the patient.  Follow up: prn based on clinical progress  Severe, end-stage b/l GH joint arthrosis, right worse than left, with chronic RTC tears resulting in arthropathy  Reviewed limited options, not a candidate for TSA, and not interested  Gentle ROM HEP reviewed  US guided GH joint CSI today bilaterally  Reviewed the relative risks, limitations and goals of CSI relative to her pathology and her medical hx and relative frailty  Hope to use injection as little as possible, but very reasonable as a quality of life measure  XR images independently visualized and reviewed with patient today in clinic  Expectations and goals of CSI reviewed  Often 2-3 days for steroid effect, and can take up to two weeks for maximum effect  We discussed modified progressive pain-free activity as tolerated  Do not overuse in first two weeks if feeling better due to concern for vulnerability while steroid is working  Supportive care reviewed  All questions were answered today  Contact us with additional questions or concerns  Signs and sx of concern reviewed    Thanks very much for sending this nice lady to us, I will keep you updated with her progress      Abilio Vance DO, CAQ  Sports Medicine Physician  [facility]    Time spent in chart review, one-on-one evaluation, discussion with patient regarding nature of problem, course, prior treatments, and therapeutic options, share-decision making, procedures and referrals as appropriate/documented, and charting related to the visit: 34 minutes          Disclaimer: This note consists of symbols derived from keyboarding, dictation and/or voice recognition software. As a result, there may be errors in the script that have gone undetected. Please consider this when interpreting information found in this chart.      Again, thank you for allowing me to participate in the care of your patient.        Sincerely,        Abilio Vance, DO

## 2021-06-09 NOTE — PROGRESS NOTES
"Alannah Leos  :  1940  DOS: 2021  MRN: [id number]    Sports Medicine Clinic Visit    PCP: Leah Blanca    Alannah Leos is a 80 year old Right hand dominant female who is seen in consultation at the request of  Judith Valdovinos M.D. presenting with chronic bilateral shoulder pain.    Injury: Gradual onset of chronic bilateral shoulder pain with decreasing shoulder AROM and function over the past 6+ years.  Pain located over bilateral deep anterior shoulder.  Additional Features:  Positive: popping, grinding, weakness and severely limited AROM.  Symptoms are better with Rest.  Symptoms are worse with: moving shoulders in any direction, lying on either shoulder.  Other evaluation and/or treatments so far consists of: Ice, Tylenol, Ibuprofen, Rest and Pain Mgmt, topical diclofenac.  Recent imaging completed: No recent imaging completed.  Prior History of related problems: history of chronic bilateral shoulder pain and generalized body pain.  She has previously received right shoulder steroid injections 5+ years ago.    Review of Systems  Musculoskeletal: as above  Remainder of review of systems is negative including constitutional, CV, pulmonary, GI, Skin and Neurologic except as noted in HPI or medical history.    Past Medical History:   Diagnosis Date     Hyperlipidemia      Murmur, heart     hsm     Nonsenile cataract      Osteoarthrosis, unspecified whether generalized or localized, lower leg      Osteopenia      Rheumatoid arthritis(714.0)      Past Surgical History:   Procedure Laterality Date     GALLBLADDER SURGERY       HYSTERECTOMY       KNEE SURGERY      left      LAMINECTOMY LUMBAR ONE LEVEL       TONSILLECTOMY       Family History   Problem Relation Age of Onset     Cancer Sister         pancreatic     Diabetes No family hx of      Hypertension No family hx of        Objective  /60   Ht 1.626 m (5' 4\")   BMI 19.74 kg/m        General: healthy, alert and in no distress  "     HEENT: no scleral icterus or conjunctival erythema     Skin: no suspicious lesions or rash. No jaundice.     CV: regular rhythm by palpation, 2+ distal pulses, no pedal edema      Resp: normal respiratory effort without conversational dyspnea     Psych: normal mood and affect      Gait: nonantalgic, appropriate coordination and balance     Neuro: normal light touch sensory exam of the extremities. Motor strength as noted below     Bilateral Shoulder exam    ROM:        Significantly limited active and passive ROM with flexion, extension, abduction, internal and external rotation.    Tender:        subacromial space       posterior shoulder       Anterior GH joint    Non Tender:       remainder of shoulder    Strength:        abduction 2/5       internal rotation 4/5       external rotation 2/5       adduction 4/5    Impingement testing:        positive (+) crank    Skin:       no visible deformities       well perfused       capillary refill brisk    Sensation:        normal sensation over shoulder and upper extremity       Radiology  XR images independently visualized and reviewed with patient today in clinic  Severe b/l GH joint OA with signs of chronic RTC tear  Significant remodeling of the humeral head R>>L, likely chronic changes  Proximal humerus fracture on the right has healed  Will follow final radiology read    Large Joint Injection/Arthocentesis: bilateral glenohumeral    Date/Time: 6/9/2021 4:00 PM  Performed by: Abilio Vance DO  Authorized by: Abilio Vance DO     Indications:  Pain and osteoarthritis  Needle Size:  21 G  Guidance: ultrasound    Approach:  Posterolateral  Location:  Shoulder  Laterality:  Bilateral      Site:  Bilateral glenohumeral  Medications (Right):  40 mg triamcinolone 40 MG/ML; 3 mL ropivacaine 5 MG/ML  Medications (Left):  40 mg triamcinolone 40 MG/ML; 3 mL ropivacaine 5 MG/ML  Outcome:  Tolerated well, no immediate complications  Procedure discussed:  discussed risks, benefits, and alternatives    Consent Given by:  Patient  Timeout: timeout called immediately prior to procedure    Prep: patient was prepped and draped in usual sterile fashion     Injection was completed with patient in seated position in chair.        Assessment:  1. Osteoarthritis of bilateral glenohumeral joints    2. Chronic pain of both shoulders        Plan:  Discussed the assessment with the patient.  Follow up: prn based on clinical progress  Severe, end-stage b/l GH joint arthrosis, right worse than left, with chronic RTC tears resulting in arthropathy  Reviewed limited options, not a candidate for TSA, and not interested  Gentle ROM HEP reviewed  US guided GH joint CSI today bilaterally  Reviewed the relative risks, limitations and goals of CSI relative to her pathology and her medical hx and relative frailty  Hope to use injection as little as possible, but very reasonable as a quality of life measure  XR images independently visualized and reviewed with patient today in clinic  Expectations and goals of CSI reviewed  Often 2-3 days for steroid effect, and can take up to two weeks for maximum effect  We discussed modified progressive pain-free activity as tolerated  Do not overuse in first two weeks if feeling better due to concern for vulnerability while steroid is working  Supportive care reviewed  All questions were answered today  Contact us with additional questions or concerns  Signs and sx of concern reviewed    Thanks very much for sending this nice lady to us, I will keep you updated with her progress      Abilio Vance DO, CAQ  Sports Medicine Physician  [facility]    Time spent in chart review, one-on-one evaluation, discussion with patient regarding nature of problem, course, prior treatments, and therapeutic options, share-decision making, procedures and referrals as appropriate/documented, and charting related to the visit: 34  minutes          Disclaimer: This note consists of symbols derived from keyboarding, dictation and/or voice recognition software. As a result, there may be errors in the script that have gone undetected. Please consider this when interpreting information found in this chart.

## 2021-06-16 ENCOUNTER — OFFICE VISIT (OUTPATIENT)
Dept: PALLIATIVE MEDICINE | Facility: CLINIC | Age: 81
End: 2021-06-16
Payer: MEDICARE

## 2021-06-16 VITALS — SYSTOLIC BLOOD PRESSURE: 149 MMHG | HEART RATE: 80 BPM | DIASTOLIC BLOOD PRESSURE: 81 MMHG

## 2021-06-16 DIAGNOSIS — M79.18 MYOFASCIAL PAIN: Primary | ICD-10-CM

## 2021-06-16 PROCEDURE — 20553 NJX 1/MLT TRIGGER POINTS 3/>: CPT | Performed by: PSYCHIATRY & NEUROLOGY

## 2021-06-16 RX ORDER — BUPIVACAINE HYDROCHLORIDE 5 MG/ML
5 INJECTION, SOLUTION EPIDURAL; INTRACAUDAL ONCE
Status: COMPLETED | OUTPATIENT
Start: 2021-06-16 | End: 2021-06-16

## 2021-06-16 RX ADMIN — BUPIVACAINE HYDROCHLORIDE 25 MG: 5 INJECTION, SOLUTION EPIDURAL; INTRACAUDAL at 16:20

## 2021-06-16 ASSESSMENT — PAIN SCALES - GENERAL: PAINLEVEL: SEVERE PAIN (6)

## 2021-06-16 NOTE — PATIENT INSTRUCTIONS
[facility]   Post Procedure Instructions    Today you had:  trigger point injections      Medications used:  lidocaine   bupivicaine        Go to the emergency room if you develop any shortness of breath    Monitor the injection sites for signs and symptoms of infection-fever, chills, redness, swelling, warmth, or drainage to areas.    You may have soreness at injection sites for up to 24 hours.    You may apply ice to the painful areas to help minimize the discomfort of the needle pokes.    Do not apply heat to sites for at least 12 hours.    You may use anti-inflammatory medications or Tylenol for pain control if necessary    Pain Clinic phone number during work hours Monday-Friday:  [phone]    After hours provider line: [phone]

## 2021-06-16 NOTE — PROGRESS NOTES
Pre procedure Diagnosis: myofascial pain   Post procedure Diagnosis: Same  Procedure performed: trigger point injections  Anesthesia: none  Complications: none  Operators: Esthre Valdovinos MD     Indications:   Alannah Leos is a 80 year old female with a history of severe shoulder arthritis, PD, and myofascial pain.  Exam shows myofascial pain of the muscle groups listed below and they have tried conservative treatment including medications, PT.  She got 3 weeks of improvement from last injection, including improvement in range of motion.    She also has shoulder and biceps pain. She is feeling some improvement with steroids injections done by Dr. Vance.    Options/alternatives, benefits and risks were discussed with the patient including bleeding, infection, tissue trauma and pnuemothorax.  Questions were answered to her satisfaction and she agrees to proceed. Voluntary informed consent was obtained and signed.     Vitals were reviewed: Yes  Allergies were reviewed:  Yes   Medications were reviewed:  Yes   Pre-procedure pain score: 6/10    Procedure:  After getting informed consent, a Pause for the Cause was performed.    Trigger points were identified by patient, and marked when appropriate.  The area was prepped with Chloroprep.    Using clean technique, injections were completed using a 25G, 1.5 inch needle.  After negative aspiration, injection was completed.  A total of 9 locations were injected.  When possible, tissue was retracted from the chest wall to avoid lung injury.    Muscle groups injected:  Bilateral trapezius  Bilateral cervical paraspinal   Bilateral levator scapulae     Injection solution contained:  5ml of 1% lidocaine and 5ml of 0.5% bupivacaine.    Hemostasis was achieved, the area was cleaned, and bandaids were placed when appropriate.  The patient tolerated the procedure well.       Post-procedure pain score: improved  BP recheck better  Follow-up includes:   -repeat prn, ok to double book-  ok to add on at 3:45pm on Wed.    Esther Valdovinos MD  [facility]

## 2021-07-02 ENCOUNTER — OFFICE VISIT (OUTPATIENT)
Dept: PALLIATIVE MEDICINE | Facility: CLINIC | Age: 81
End: 2021-07-02
Payer: MEDICARE

## 2021-07-02 VITALS — SYSTOLIC BLOOD PRESSURE: 181 MMHG | HEART RATE: 80 BPM | DIASTOLIC BLOOD PRESSURE: 71 MMHG

## 2021-07-02 DIAGNOSIS — M79.18 MYOFASCIAL PAIN: Primary | ICD-10-CM

## 2021-07-02 PROCEDURE — 20553 NJX 1/MLT TRIGGER POINTS 3/>: CPT | Performed by: PSYCHIATRY & NEUROLOGY

## 2021-07-02 RX ORDER — BUPIVACAINE HYDROCHLORIDE 5 MG/ML
6.5 INJECTION, SOLUTION EPIDURAL; INTRACAUDAL ONCE
Status: COMPLETED | OUTPATIENT
Start: 2021-07-02 | End: 2021-07-02

## 2021-07-02 RX ADMIN — BUPIVACAINE HYDROCHLORIDE 32.5 MG: 5 INJECTION, SOLUTION EPIDURAL; INTRACAUDAL at 13:57

## 2021-07-02 ASSESSMENT — PAIN SCALES - GENERAL: PAINLEVEL: SEVERE PAIN (6)

## 2021-07-02 NOTE — PROGRESS NOTES
Pre procedure Diagnosis: myofascial pain   Post procedure Diagnosis: Same  Procedure performed: trigger point injections  Anesthesia: none  Complications: none  Operators: Esther Valdovinos MD     Indications:   Alannah Leos is a 80 year old female with a history of severe shoulder arthritis, PD, and myofascial pain.  Exam shows myofascial pain of the muscle groups listed below and they have tried conservative treatment including medications, PT.  She got 2.5 weeks of improvement from last injection, including improvement in range of motion. Here early as I am out of the office next week.      Options/alternatives, benefits and risks were discussed with the patient including bleeding, infection, tissue trauma and pnuemothorax.  Questions were answered to her satisfaction and she agrees to proceed. Voluntary informed consent was obtained and signed.     Vitals were reviewed: Yes  Allergies were reviewed:  Yes   Medications were reviewed:  Yes   Pre-procedure pain score: Severe Pain (6)     Procedure:  After getting informed consent, a Pause for the Cause was performed.    Trigger points were identified by patient, and marked when appropriate.  The area was prepped with Chloroprep.    Using clean technique, injections were completed using a 25G, 1.5 inch needle.  After negative aspiration, injection was completed.  A total of 12 locations were injected.  When possible, tissue was retracted from the chest wall to avoid lung injury.    Muscle groups injected:  Bilateral trapezius  Bilateral cervical paraspinal   Bilateral levator scapulae   Bilateral rhomboid    Injection solution contained:  6.5ml of 1% lidocaine and 6.5ml of 0.5% bupivacaine.    Hemostasis was achieved.  The patient tolerated the procedure well.       Post-procedure pain score: improved  BP recheck better  Follow-up includes:   -repeat prn, ok to double book- ok to add on at 3:45pm on Wed.    Esther Valdovinos MD  [facility]

## 2021-07-02 NOTE — PATIENT INSTRUCTIONS
[facility]   Post Procedure Instructions    Today you had:  trigger point injections      Medications used:  lidocaine   bupivicaine         Go to the emergency room if you develop any shortness of breath    Monitor the injection sites for signs and symptoms of infection-fever, chills, redness, swelling, warmth, or drainage to areas.    You may have soreness at injection sites for up to 24 hours.    You may apply ice to the painful areas to help minimize the discomfort of the needle pokes.    Do not apply heat to sites for at least 12 hours.    You may use anti-inflammatory medications or Tylenol for pain control if necessary    Pain Clinic phone number during work hours Monday-Friday:  [phone]    After hours provider line: [phone]

## 2021-07-21 ENCOUNTER — OFFICE VISIT (OUTPATIENT)
Dept: PALLIATIVE MEDICINE | Facility: CLINIC | Age: 81
End: 2021-07-21
Payer: MEDICARE

## 2021-07-21 VITALS — SYSTOLIC BLOOD PRESSURE: 163 MMHG | DIASTOLIC BLOOD PRESSURE: 71 MMHG | HEART RATE: 79 BPM

## 2021-07-21 DIAGNOSIS — M79.18 MYOFASCIAL PAIN: Primary | ICD-10-CM

## 2021-07-21 PROCEDURE — 20553 NJX 1/MLT TRIGGER POINTS 3/>: CPT | Performed by: PSYCHIATRY & NEUROLOGY

## 2021-07-21 RX ORDER — BUPIVACAINE HYDROCHLORIDE 5 MG/ML
5 INJECTION, SOLUTION EPIDURAL; INTRACAUDAL ONCE
Status: COMPLETED | OUTPATIENT
Start: 2021-07-21 | End: 2021-07-21

## 2021-07-21 RX ADMIN — BUPIVACAINE HYDROCHLORIDE 25 MG: 5 INJECTION, SOLUTION EPIDURAL; INTRACAUDAL at 16:15

## 2021-07-21 ASSESSMENT — PAIN SCALES - GENERAL: PAINLEVEL: MODERATE PAIN (5)

## 2021-07-21 NOTE — PROGRESS NOTES
Pre procedure Diagnosis: myofascial pain   Post procedure Diagnosis: Same  Procedure performed: trigger point injections  Anesthesia: none  Complications: none  Operators: Esther Valdovinos MD     Indications:   Alannah Leos is a 80 year old female with a history of severe shoulder arthritis, PD, and myofascial pain.  Exam shows myofascial pain of the muscle groups listed below and they have tried conservative treatment including medications, PT.  She got 3 weeks of improvement from last injection, including improvement in range of motion. Lower locations didn't have much improvement (rhomboids).      Options/alternatives, benefits and risks were discussed with the patient including bleeding, infection, tissue trauma and pnuemothorax.  Questions were answered to her satisfaction and she agrees to proceed. Voluntary informed consent was obtained and signed.     Vitals were reviewed: Yes  Allergies were reviewed:  Yes   Medications were reviewed:  Yes   Pre-procedure pain score: Moderate Pain (5)     Procedure:  After getting informed consent, a Pause for the Cause was performed.    Trigger points were identified by patient, and marked when appropriate.  The area was prepped with Chloroprep.    Using clean technique, injections were completed using a 25G, 1.5 inch needle.  After negative aspiration, injection was completed.  A total of 8 locations were injected.  When possible, tissue was retracted from the chest wall to avoid lung injury.    Muscle groups injected:  Bilateral trapezius  Bilateral cervical paraspinal   Bilateral levator scapulae       Injection solution contained:  5ml of 1% lidocaine and 5ml of 0.5% bupivacaine.    Hemostasis was achieved.  The patient tolerated the procedure well.       Post-procedure pain score: improved  BP recheck better  Follow-up includes:   -repeat prn, ok to double book- ok to add on at 3:45pm on Wed.    Esther Valdovinos MD  [facility]

## 2021-07-21 NOTE — PATIENT INSTRUCTIONS
[facility]   Post Procedure Instructions    Today you had:  trigger point injections      Medications used:  lidocaine   bupivicaine           Go to the emergency room if you develop any shortness of breath    Monitor the injection sites for signs and symptoms of infection-fever, chills, redness, swelling, warmth, or drainage to areas.    You may have soreness at injection sites for up to 24 hours.    You may apply ice to the painful areas to help minimize the discomfort of the needle pokes.    Do not apply heat to sites for at least 12 hours.    You may use anti-inflammatory medications or Tylenol for pain control if necessary    Pain Clinic phone number during work hours Monday-Friday:  [phone]    After hours provider line: [phone]

## 2021-07-27 NOTE — TELEPHONE ENCOUNTER
Notes Recorded by Dorita Mclean LPN on 7/13/2017 at 9:38 AM  Writer called and informed patient of results. Patient verbalized understanding and had no questions or concerns at this time.     Dorita Mcdowell LPN    ------    Notes Recorded by Josh Sotomayor MD on 7/13/2017 at 7:44 AM  Please let her know that the biopsy came back benign and it was a broad and flat mole. There is nothing to worry about.    Josh Sotomayor MD, MS    Department of Dermatology  [facility]: Phone: [phone], Fax:[phone]  [facility]: Phone: [phone], Fax: [phone]          10d ago       Copath Report Patient Name: URSULA MEDEIROS   MR#: [id number]   Specimen #: [id number]   Collected: 7/3/2017   Received: 7/5/2017   Reported: 7/12/2017 15:43   Ordering Phy(s): JOSH SOTOMAYOR     For improved result formatting, select 'View Enhanced Report Format'   under Linked Documents section.     SPECIMEN(S):   Skin, right upper back     FINAL DIAGNOSIS:   Skin, back, right upper:   - Lentiginous junctional melanocytic nevus - (see description)                  Dorita Mcdowell LPN     No

## 2021-08-11 ENCOUNTER — OFFICE VISIT (OUTPATIENT)
Dept: PALLIATIVE MEDICINE | Facility: CLINIC | Age: 81
End: 2021-08-11
Payer: MEDICARE

## 2021-08-11 VITALS — SYSTOLIC BLOOD PRESSURE: 174 MMHG | DIASTOLIC BLOOD PRESSURE: 78 MMHG | HEART RATE: 85 BPM

## 2021-08-11 DIAGNOSIS — M79.18 MYOFASCIAL PAIN: Primary | ICD-10-CM

## 2021-08-11 PROCEDURE — 20553 NJX 1/MLT TRIGGER POINTS 3/>: CPT | Performed by: PSYCHIATRY & NEUROLOGY

## 2021-08-11 RX ORDER — BUPIVACAINE HYDROCHLORIDE 5 MG/ML
4.5 INJECTION, SOLUTION EPIDURAL; INTRACAUDAL ONCE
Status: COMPLETED | OUTPATIENT
Start: 2021-08-11 | End: 2021-08-11

## 2021-08-11 RX ADMIN — BUPIVACAINE HYDROCHLORIDE 22.5 MG: 5 INJECTION, SOLUTION EPIDURAL; INTRACAUDAL at 16:53

## 2021-08-11 ASSESSMENT — PAIN SCALES - GENERAL: PAINLEVEL: SEVERE PAIN (6)

## 2021-08-11 NOTE — PATIENT INSTRUCTIONS
[facility]   Post Procedure Instructions    Today you had:  trigger point injections       Medications used:  lidocaine   bupivicaine        Go to the emergency room if you develop any shortness of breath    Monitor the injection sites for signs and symptoms of infection-fever, chills, redness, swelling, warmth, or drainage to areas.    You may have soreness at injection sites for up to 24 hours.    You may apply ice to the painful areas to help minimize the discomfort of the needle pokes.    Do not apply heat to sites for at least 12 hours.    You may use anti-inflammatory medications or Tylenol for pain control if necessary    Pain Clinic phone number during work hours Monday-Friday:  [phone]    After hours provider line: [phone]

## 2021-08-11 NOTE — PROGRESS NOTES
Pre procedure Diagnosis: myofascial pain   Post procedure Diagnosis: Same  Procedure performed: trigger point injections  Anesthesia: none  Complications: none  Operators: Esther Valdovinos MD     Indications:   Alannah Leos is a 80 year old female with a history of severe shoulder arthritis, PD, and myofascial pain.  Exam shows myofascial pain of the muscle groups listed below and they have tried conservative treatment including medications, PT.  She gets 3 weeks of improvement from last injection, including improvement in range of motion. Lower locations didn't have much improvement (rhomboids).    Options/alternatives, benefits and risks were discussed with the patient including bleeding, infection, tissue trauma and pnuemothorax.  Questions were answered to her satisfaction and she agrees to proceed. Voluntary informed consent was obtained and signed.     Vitals were reviewed: Yes  Allergies were reviewed:  Yes   Medications were reviewed:  Yes   Pre-procedure pain score: Severe Pain (6)     Procedure:  After getting informed consent, a Pause for the Cause was performed.    Trigger points were identified by patient, and marked when appropriate.  The area was prepped with Chloroprep.    Using clean technique, injections were completed using a 25G, 1.5 inch needle.  After negative aspiration, injection was completed.  A total of 8 locations were injected.  When possible, tissue was retracted from the chest wall to avoid lung injury.    Muscle groups injected:  Bilateral trapezius  Bilateral cervical paraspinal   Bilateral levator scapulae       Injection solution contained:   4.5ml of 1% lidocaine and 4.5ml of 0.5% bupivacaine.    Hemostasis was achieved.  The patient tolerated the procedure well.       Post-procedure pain score: improved  BP recheck better  Follow-up includes:   -repeat prn, ok to double book- ok to add on at 3:45pm on Wed.    Esther Valdovinos MD  [facility]

## 2021-08-17 NOTE — PATIENT INSTRUCTIONS
RHEUMATOLOGY    Dr. Merrick Del Cid    [facility]  [address]  [place] [address]  Phone number: [phone]  Fax number: [phone]      Thank you for choosing [facility]!    Joan Bucio [facility] Rheumatology      [facility]  [address] [place] [address]  [phone]

## 2021-08-18 ENCOUNTER — OFFICE VISIT (OUTPATIENT)
Dept: RHEUMATOLOGY | Facility: CLINIC | Age: 81
End: 2021-08-18
Payer: MEDICARE

## 2021-08-18 ENCOUNTER — LAB (OUTPATIENT)
Dept: LAB | Facility: CLINIC | Age: 81
End: 2021-08-18
Payer: MEDICARE

## 2021-08-18 VITALS
HEART RATE: 82 BPM | DIASTOLIC BLOOD PRESSURE: 69 MMHG | BODY MASS INDEX: 20.22 KG/M2 | SYSTOLIC BLOOD PRESSURE: 166 MMHG | WEIGHT: 117.8 LBS | OXYGEN SATURATION: 99 %

## 2021-08-18 DIAGNOSIS — Z79.899 HIGH RISK MEDICATION USE: ICD-10-CM

## 2021-08-18 DIAGNOSIS — M06.09 RHEUMATOID ARTHRITIS OF MULTIPLE SITES WITH NEGATIVE RHEUMATOID FACTOR (H): ICD-10-CM

## 2021-08-18 DIAGNOSIS — M81.0 AGE-RELATED OSTEOPOROSIS WITHOUT CURRENT PATHOLOGICAL FRACTURE: ICD-10-CM

## 2021-08-18 DIAGNOSIS — M06.09 RHEUMATOID ARTHRITIS OF MULTIPLE SITES WITH NEGATIVE RHEUMATOID FACTOR (H): Primary | ICD-10-CM

## 2021-08-18 LAB
ALBUMIN SERPL-MCNC: 4.1 G/DL (ref 3.4–5)
ALP SERPL-CCNC: 67 U/L (ref 40–150)
ALT SERPL W P-5'-P-CCNC: 13 U/L (ref 0–50)
AST SERPL W P-5'-P-CCNC: 13 U/L (ref 0–45)
BASOPHILS # BLD AUTO: 0.1 10E3/UL (ref 0–0.2)
BASOPHILS NFR BLD AUTO: 1 %
BILIRUB DIRECT SERPL-MCNC: 0.1 MG/DL (ref 0–0.2)
BILIRUB SERPL-MCNC: 0.4 MG/DL (ref 0.2–1.3)
CREAT SERPL-MCNC: 0.52 MG/DL (ref 0.52–1.04)
CRP SERPL-MCNC: <2.9 MG/L (ref 0–8)
EOSINOPHIL # BLD AUTO: 0.1 10E3/UL (ref 0–0.7)
EOSINOPHIL NFR BLD AUTO: 2 %
ERYTHROCYTE [DISTWIDTH] IN BLOOD BY AUTOMATED COUNT: 16.1 % (ref 10–15)
ERYTHROCYTE [SEDIMENTATION RATE] IN BLOOD BY WESTERGREN METHOD: 10 MM/HR (ref 0–30)
GFR SERPL CREATININE-BSD FRML MDRD: >90 ML/MIN/1.73M2
HCT VFR BLD AUTO: 35.9 % (ref 35–47)
HGB BLD-MCNC: 11.8 G/DL (ref 11.7–15.7)
IMM GRANULOCYTES # BLD: 0 10E3/UL
IMM GRANULOCYTES NFR BLD: 0 %
LYMPHOCYTES # BLD AUTO: 1.6 10E3/UL (ref 0.8–5.3)
LYMPHOCYTES NFR BLD AUTO: 24 %
MCH RBC QN AUTO: 29.6 PG (ref 26.5–33)
MCHC RBC AUTO-ENTMCNC: 32.9 G/DL (ref 31.5–36.5)
MCV RBC AUTO: 90 FL (ref 78–100)
MONOCYTES # BLD AUTO: 0.6 10E3/UL (ref 0–1.3)
MONOCYTES NFR BLD AUTO: 8 %
NEUTROPHILS # BLD AUTO: 4.4 10E3/UL (ref 1.6–8.3)
NEUTROPHILS NFR BLD AUTO: 65 %
NRBC # BLD AUTO: 0 10E3/UL
NRBC BLD AUTO-RTO: 0 /100
PLATELET # BLD AUTO: 251 10E3/UL (ref 150–450)
PROT SERPL-MCNC: 7 G/DL (ref 6.8–8.8)
RBC # BLD AUTO: 3.99 10E6/UL (ref 3.8–5.2)
WBC # BLD AUTO: 6.7 10E3/UL (ref 4–11)

## 2021-08-18 PROCEDURE — 82565 ASSAY OF CREATININE: CPT

## 2021-08-18 PROCEDURE — 85652 RBC SED RATE AUTOMATED: CPT

## 2021-08-18 PROCEDURE — 85025 COMPLETE CBC W/AUTO DIFF WBC: CPT

## 2021-08-18 PROCEDURE — 36415 COLL VENOUS BLD VENIPUNCTURE: CPT

## 2021-08-18 PROCEDURE — 86140 C-REACTIVE PROTEIN: CPT

## 2021-08-18 PROCEDURE — 80076 HEPATIC FUNCTION PANEL: CPT

## 2021-08-18 PROCEDURE — 99214 OFFICE O/P EST MOD 30 MIN: CPT | Performed by: INTERNAL MEDICINE

## 2021-08-18 NOTE — PROGRESS NOTES
Rheumatology Clinic Visit      Alannah Leos MRN# [id number]   YOB: 1940 Age: 80 year old      Date of visit: 8/18/21   PCP: Dr. Leah Blanca    Chief Complaint   Patient presents with:  Arthritis: RA    Assessment and Plan     1. 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 Previously on methotrexate when first diagnosed (unclear if ineffective or not, stopped by patient preference to not treat her RA at that time).  Synovitis, subluxation, and fixed flexion deformities on initial exam. Steroid responsive. 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 Currently on methotrexate 20 mg once weekly, and hydroxychloroquine 200 mg daily. Previously on SSZ (felt poorly on it).  At almost every visit I have encouraged her to escalate treatment for her active disease but she has been reluctant to do so because of possible risks from the medication and she says that she is tolerating her pain; she understands that the progression of her disease may not be reversible as seen by the changes of her spine.  She then presented with more pain and was willing to start a bDMARD.  Discussed infusions versus SQ and she was going to proceed with infliximab. Infliximab was then cancelled because she needed to look into the cost of her infusions.  She will figure out if infliximab IV will be use; if not then may consider Humira as she may qualify for free medication but she is hesitant to do this because she doesn't want to share her financial information.  Long, thorough discussion about her disease and treatment options again today.  Ultimately she says that she will start Remicade.   Chronic illness, progressive.    - Continue methotrexate 20 mg once weekly    - Continue folic acid 1mg daily  - Continue hydroxychloroquine 200mg daily (normal eye exam on 1/27/2020; advised that she update this eye  exam.)  - Start Remicade 5 mg/kg IV on week 0, 2, 6, and then every 8 weeks thereafter, if TB screening is negative  - Labs were done just before this clinic visit and she is going to also have it done in 3 months: CBC, Creatinine, Hepatic Panel, ESR, CRP    High risk medication requiring intensive toxicity monitoring at least quarterly: labs ordered include CBC, Creatinine, Hepatic panel to monitor for cytopenia and hepatotoxicity; checking creatinine as it affects clearance of medication.     2. Positive PIERO and dsDNA: These were noted on record review. She does not have symptoms to support an PIERO-associated rheumatologic disease at this time.     3.  Diffuse neck/back pain: No evidence of instability by x-rays on 10/12/2016.  Seen by NSGY and now getting trigger point injections in the pain clinic with improvement    4. Parkinson's Disease: Followed by Dr. Ricardo Johnson at the [facility] previously, and now at [facility]    5. Osteoporosis: Based on 1/29/2018 DEXA.  Was on fosamax from [id number]; reclast then started 2021 with the first dose received 5/12/2021.   - Continue reclast 5mg IV yearly  - Continue vitamin D 1000 IU daily   - Continue calcium 1000mg daily    # s/p 2 doses of the Pfizer COVID19 vaccine, last received 4/2/2021. ; advised receiving one booster dose with either the Pfizer or Moderna COVID-19 vaccine, preferably receiving a third dose of the same vaccine used previously if the previous vaccine was from [employer] or [employer]    Based on our current understanding (and this may change over time as we learn more), the following adjustments are recommended around the time of COVID-19 vaccination:  - Methotrexate should be held for 1 week after each of the 2 mRNA vaccine doses.  Methotrexate should be held for 2 weeks after the single-dose COVID-19 vaccination     Total minutes spent in evaluation with patient, documentation, , and review of pertinent studies  and chart notes: 27     Ms. Leos verbalized agreement with and understanding of the rational for the diagnosis and treatment plan.  All questions were answered to best of my ability and the patient's satisfaction. Ms. Leos was advised to contact the clinic with any questions that may arise after the clinic visit.      Thank you for involving me in the care of the patient    Return to clinic: 3 months      HPI   Alannah Leos is a 80 year old female with a medical history significant for Parkinson's disease, osteoarthritis, status post TKA, osteoporosis, and rheumatoid arthritis who presents for follow-up of rheumatoid arthritis.    Today, 8/18/2021: Trigger point injections from the pain clinic are helpful.  She has not started infliximab yet.  No change in symptoms.  Still with pain and swelling that is worse in the morning and improves with time and activity but then worsens with too much activity.  Limited activity because of deformities and pain.  Joints involved include her MCPs, PIPs, wrists, elbows, shoulders, knees, ankles, and MTPs.  Also with chronic neck pain.    Denies fevers, chills, nausea, vomiting, constipation, diarrhea. No abdominal pain. No chest pain/pressure, palpitations, or shortness of breath.   No oral or nasal sores.  No rash. No sicca symptoms.      Tobacco: none  EtOH: 1 drink at [place] only  Drugs: none    ROS   12 point review of system was completed and negative except as noted in the HPI     Active Problem List     Patient Active Problem List   Diagnosis     Osteoporosis     Rheumatoid arthritis (H)     Parkinson disease (H)     Osteoarthritis of wrist     Osteoarthritis of shoulder     History of total knee arthroplasty     Osteoarthritis of left knee     Advanced directives, counseling/discussion     CARDIOVASCULAR SCREENING; LDL GOAL LESS THAN 160     High risk medication use     Underweight     Impingement syndrome, shoulder, left     Combined forms of age-related  cataract of both eyes     Seborrheic dermatitis     Anemia in other chronic diseases classified elsewhere     Ankylosing spondylitis of cervical region (H)     Past Medical History     Past Medical History:   Diagnosis Date     Hyperlipidemia      Murmur, heart     hsm     Nonsenile cataract      Osteoarthrosis, unspecified whether generalized or localized, lower leg      Osteopenia      Rheumatoid arthritis(714.0)      Past Surgical History     Past Surgical History:   Procedure Laterality Date     GALLBLADDER SURGERY       HYSTERECTOMY       KNEE SURGERY      left      LAMINECTOMY LUMBAR ONE LEVEL       TONSILLECTOMY       Allergy     Allergies   Allergen Reactions     Decadrol [Dexamethasone Sodium Phosphate] Hives     Shrimp Itching     Current Medication List     Current Outpatient Medications   Medication Sig     ACE NOT PRESCRIBED, INTENTIONAL, 1 each continuous prn. ACE Inhibitor not prescribed due to Refusal by patient       (Patient not taking: Reported on 3/25/2021)     ammonium lactate (AMLACTIN) 12 % cream Apply once to twice a day to dry skin. Can burn temporarily if there is a cut on the skin.     CALCIUM + D OR 600mg twice a day      carbidopa-levodopa (SINEMET)  MG tablet Take one at 6 am, noon, 5 pm, 9 pm and midnight     diclofenac (VOLTAREN) 1 % topical gel Apply 2 g topically 3 times daily as needed for moderate pain     folic acid (FOLVITE) 1 MG tablet Take 1 tablet (1 mg) by mouth daily     hydroxychloroquine (PLAQUENIL) 200 MG tablet Take 1 tablet (200 mg) by mouth daily     ibuprofen (ADVIL/MOTRIN) 200 MG tablet Take 1 tablet (200 mg) by mouth every 8 hours as needed for moderate pain (Patient not taking: Reported on 8/11/2021)     meclizine (ANTIVERT) 25 MG tablet Take 1 tablet (25 mg) by mouth 3 times daily as needed for dizziness (Patient not taking: Reported on 3/25/2021)     methotrexate sodium 2.5 MG TABS Take 8 tablets (20 mg) by mouth once a week . Take all 8 tablets on the  "same day of each week.     order for DME Equipment being ordered: soft neck brace     Current Facility-Administered Medications   Medication     ropivacaine (NAROPIN) injection 3 mL     ropivacaine (NAROPIN) injection 3 mL     triamcinolone (KENALOG-40) injection 40 mg     triamcinolone (KENALOG-40) injection 40 mg         Social History   See HPI    Family History     Family History   Problem Relation Age of Onset     Cancer Sister         pancreatic     Diabetes No family hx of      Hypertension No family hx of        Physical Exam     Temp Readings from Last 3 Encounters:   01/17/20 97.5  F (36.4  C) (Oral)   09/12/19 98.2  F (36.8  C) (Oral)   07/25/19 97.7  F (36.5  C) (Oral)     BP Readings from Last 5 Encounters:   08/11/21 (!) 174/78   07/21/21 (!) 163/71   07/02/21 (!) 181/71   06/16/21 (!) 149/81   06/09/21 138/60     Pulse Readings from Last 1 Encounters:   08/11/21 85     Resp Readings from Last 1 Encounters:   05/12/21 18     Estimated body mass index is 19.74 kg/m  as calculated from the following:    Height as of 6/9/21: 1.626 m (5' 4\").    Weight as of 3/25/21: 52.2 kg (115 lb).    GEN: NAD, thin and frail appearing  HEENT: MMM. No oral lesions. Anicteric, noninjected sclera  CV: S1, S2.  RRR.  No murmurs or rubs.  PULM: no increased work of breathing.  Clear to auscultation bilaterally.  MSK: Left 2nd-3rd MCPs with synovial swelling and tenderness to palpation.  Other MCPs and PIPs tender to palpation but without swelling. Bilateral wrist with swelling and tenderness to palpation but no overlying erythema or increased warmth.   Elbows without swelling or tenderness to palpation.  Knees, ankles, and MTPs without swelling or tenderness to palpation.  Holds neck in a flexed position.   NEURO: UE and LE strengths 5/5 and equal bilaterally.   SKIN: No rash. Diffuse hair thinning.  EXT: No lower extremity edema  PSYCH: Alert. Appropriate.      Labs / Imaging (select studies)   RF/CCP  Recent Labs   Lab " Test 10/12/16  1142 07/30/13  1621   CCPABY  --  <20 Interpretation:  Negative   CCPIGG 2  --    RHF <20  --      CBC  Recent Labs   Lab Test 05/17/21  1342 02/22/21  1339 12/07/20  1353   WBC 7.5 8.1 7.5   RBC 3.70* 4.02 4.11   HGB 11.0* 11.6* 11.4*   HCT 33.6* 36.0 36.1   MCV 91 90 88   RDW 16.8* 17.6* 17.3*    249 246   MCH 29.7 28.9 27.7   MCHC 32.7 32.2 31.6   NEUTROPHIL 73.0 70.0 72.0   LYMPH 20.0 17.0 19.0   MONOCYTE 4.0 10.0 5.0   EOSINOPHIL 2.0 2.0 2.0   BASOPHIL 1.0 1.0 2.0   ANEU 5.4 5.6 5.3   ALYM 1.5 1.4 1.4   DORITA 0.3 0.8 0.4   AEOS 0.2 0.2 0.2   ABAS 0.1 0.1 0.2     CMP  Recent Labs   Lab Test 05/17/21  1342 05/12/21  1334 03/12/21  1328 02/22/21  1339 12/07/20  1353 01/15/20  1403 10/12/16  1142 05/27/16  1413 05/26/15  1416 04/28/14  1209   NA  --   --   --   --   --  137  --  136 137  --    POTASSIUM  --   --   --   --   --  4.2  --  3.7 4.0  --    CHLORIDE  --   --   --   --   --  102  --  102 103  --    CO2  --   --   --   --   --  30  --  25 28  --    ANIONGAP  --   --   --   --   --  5  --  9 6  --    GLC  --   --   --   --   --  97  --  94 100*  --    BUN  --   --   --   --   --  15  --  11 9  --    CR 0.63 0.65  --  0.58 0.52 0.55  --  0.41* 0.47*   < >   GFRESTIMATED 85 84  --  88 >90 90  --  >90  Non  GFR Calc   >90  Non  GFR Calc     < >   GFRESTBLACK >90 >90  --  >90 >90 >90  --  >90   GFR Calc   >90   GFR Calc     < >   PAKO  --  9.2 9.6  --   --  9.5  10.2*   < > 9.2 9.2   < >   BILITOTAL 0.4  --   --  0.4 0.4 0.4  --  0.5  --   --    ALBUMIN 3.7  --   --  4.1 3.9 4.3  --  3.8  --   --    PROTTOTAL 7.3  --   --  7.9 8.0 7.8  --  8.8  --   --    ALKPHOS 77  --   --  91 104 125  --  108  --   --    AST 16  --   --  18 21 25  --  15  --   --    ALT 12  --   --  10 12 18  --  8  --   --     < > = values in this interval not displayed.     Calcium/VitaminD  Recent Labs   Lab Test 05/12/21  1334 03/12/21  1328  01/15/20  1403 01/25/18  1353   PAKO 9.2 9.6 9.5  10.2* 9.1   VITDT  --  37 39 39     ESR/CRP  Recent Labs   Lab Test 05/17/21  1342 12/07/20  1353 07/27/20  1334   SED 17 32* 24   CRP 6.0 10.3* 8.7*     Lipid Panel  Recent Labs   Lab Test 05/26/15  1416   CHOL 179   TRIG 50   HDL 80   LDL 89   VLDL 10   CHOLHDLRATIO 2.2     Hepatitis B  Recent Labs   Lab Test 01/18/17  1601   HBCAB Nonreactive   HEPBANG Nonreactive     Hepatitis C  Recent Labs   Lab Test 01/18/17  1601   HCVAB Nonreactive   Assay performance characteristics have not been established for newborns,   infants, and children         Tuberculosis Screening  Recent Labs   Lab Test 03/12/21  1328 01/25/18  1352 01/18/17  1601   TBRSLT  --  Negative Negative   TBAGN  --  0.00 0.00   TBRST Negative  --   --      HIV Screening  Recent Labs   Lab Test 01/18/17  1601   HIAGAB Nonreactive   HIV-1 p24 Ag & HIV-1/HIV-2 Ab Not Detected           Immunization History     Immunization History   Administered Date(s) Administered     COVID-19,PF,Pfizer 03/12/2021, 04/02/2021     Pneumo Conj 13-V (2010&after) 11/03/2016     Pneumococcal 23 valent 11/01/2007, 11/13/2007, 03/12/2018     TD (ADULT, 7+) 11/13/2007     Tdap (Adacel,Boostrix) 11/13/2007          Chart documentation done in part with Dragon Voice recognition Software. Although reviewed after completion, some word and grammatical error may remain.      Merrick Del Cid MD

## 2021-08-18 NOTE — NURSING NOTE
RAPID3 (0-30) Cumulative Score  16.0          RAPID3 Weighted Score (divide #4 by 3 and that is the weighted score)  5.3

## 2021-08-26 ENCOUNTER — OFFICE VISIT (OUTPATIENT)
Dept: AUDIOLOGY | Facility: CLINIC | Age: 81
End: 2021-08-26
Payer: MEDICARE

## 2021-08-26 ENCOUNTER — OFFICE VISIT (OUTPATIENT)
Dept: OTOLARYNGOLOGY | Facility: CLINIC | Age: 81
End: 2021-08-26
Payer: MEDICARE

## 2021-08-26 VITALS
DIASTOLIC BLOOD PRESSURE: 75 MMHG | OXYGEN SATURATION: 98 % | HEART RATE: 82 BPM | RESPIRATION RATE: 18 BRPM | SYSTOLIC BLOOD PRESSURE: 148 MMHG

## 2021-08-26 DIAGNOSIS — H90.3 SENSORINEURAL HEARING LOSS, BILATERAL: Primary | ICD-10-CM

## 2021-08-26 DIAGNOSIS — R26.89 IMBALANCE: Primary | ICD-10-CM

## 2021-08-26 DIAGNOSIS — G25.3 MYOCLONUS: ICD-10-CM

## 2021-08-26 DIAGNOSIS — H90.3 SENSORINEURAL HEARING LOSS (SNHL) OF BOTH EARS: ICD-10-CM

## 2021-08-26 DIAGNOSIS — R42 DIZZINESSES: ICD-10-CM

## 2021-08-26 PROCEDURE — 92557 COMPREHENSIVE HEARING TEST: CPT | Performed by: AUDIOLOGIST

## 2021-08-26 PROCEDURE — 99207 PR NO CHARGE LOS: CPT | Performed by: AUDIOLOGIST

## 2021-08-26 PROCEDURE — 92550 TYMPANOMETRY & REFLEX THRESH: CPT | Performed by: AUDIOLOGIST

## 2021-08-26 PROCEDURE — 99203 OFFICE O/P NEW LOW 30 MIN: CPT | Performed by: OTOLARYNGOLOGY

## 2021-08-26 NOTE — LETTER
8/26/2021         RE: Alannah Leos  [address] [facility] [address]        Dear Colleague,    Thank you for referring your patient, Alannah Leos, to the [facility]. Please see a copy of my visit note below.    Chief Complaint - left ear concern    History of Present Illness - Alannah Leos is a 80 year old female who presents to me today with an intermittent noise or sensation in left ear. It is like a vibration. No pain. She also has dizziness.  She denies this as a movement but rather feels imbalance.  She has had some falls.  She has difficulty moving her head too quickly and therefore is not sure if this occurs with fast head movements but she thinks that is not likely.  She tried meclizine. She has imbalance. Stop moving helps. She has parkinson disease that is worsening, and she is weak. She tried PT last year, but stopped.      Past Medical History -   Patient Active Problem List   Diagnosis     Osteoporosis     Rheumatoid arthritis (H)     Parkinson disease (H)     Osteoarthritis of wrist     Osteoarthritis of shoulder     History of total knee arthroplasty     Osteoarthritis of left knee     Advanced directives, counseling/discussion     CARDIOVASCULAR SCREENING; LDL GOAL LESS THAN 160     High risk medication use     Underweight     Impingement syndrome, shoulder, left     Combined forms of age-related cataract of both eyes     Seborrheic dermatitis     Anemia in other chronic diseases classified elsewhere     Ankylosing spondylitis of cervical region (H)       Current Medications -   Current Outpatient Medications:      ACE NOT PRESCRIBED, INTENTIONAL,, 1 each continuous prn. ACE Inhibitor not prescribed due to Refusal by patient    (Patient not taking: Reported on 3/25/2021), Disp: 0 each, Rfl: 0     ammonium lactate (AMLACTIN) 12 % cream, Apply once to twice a day to dry skin. Can burn temporarily if there is a cut on the skin., Disp: 385 g, Rfl: 11     CALCIUM +  D OR, 600mg twice a day , Disp: , Rfl:      carbidopa-levodopa (SINEMET)  MG tablet, Take one at 6 am, noon, 5 pm, 9 pm and midnight, Disp: 150 tablet, Rfl: 11     diclofenac (VOLTAREN) 1 % topical gel, Apply 2 g topically 3 times daily as needed for moderate pain, Disp: 100 g, Rfl: 1     folic acid (FOLVITE) 1 MG tablet, Take 1 tablet (1 mg) by mouth daily, Disp: 90 tablet, Rfl: 2     hydroxychloroquine (PLAQUENIL) 200 MG tablet, Take 1 tablet (200 mg) by mouth daily, Disp: 30 tablet, Rfl: 7     ibuprofen (ADVIL/MOTRIN) 200 MG tablet, Take 1 tablet (200 mg) by mouth every 8 hours as needed for moderate pain (Patient not taking: Reported on 8/11/2021), Disp: , Rfl:      meclizine (ANTIVERT) 25 MG tablet, Take 1 tablet (25 mg) by mouth 3 times daily as needed for dizziness (Patient not taking: Reported on 3/25/2021), Disp: 60 tablet, Rfl: 3     methotrexate sodium 2.5 MG TABS, Take 8 tablets (20 mg) by mouth once a week . Take all 8 tablets on the same day of each week., Disp: 32 tablet, Rfl: 7     order for DME, Equipment being ordered: soft neck brace, Disp: 1 Device, Rfl: 0    Current Facility-Administered Medications:      ropivacaine (NAROPIN) injection 3 mL, 3 mL, , , Abilio Vance DO, 3 mL at 06/09/21 1600     ropivacaine (NAROPIN) injection 3 mL, 3 mL, , , Abilio Vnace DO, 3 mL at 06/09/21 1600     triamcinolone (KENALOG-40) injection 40 mg, 40 mg, , , Abilio Vance DO, 40 mg at 06/09/21 1600     triamcinolone (KENALOG-40) injection 40 mg, 40 mg, , , Abilio Vance DO, 40 mg at 06/09/21 1600    Allergies -   Allergies   Allergen Reactions     Decadrol [Dexamethasone Sodium Phosphate] Hives     Shrimp Itching       Social History -   Social History     Socioeconomic History     Marital status:      Spouse name: Willis      Number of children: 2     Years of education: 12     Highest education level: None   Occupational History     Occupation:       Employer: RETIRED     Comment: [employer], retired in 12/2006   Tobacco Use     Smoking status: Never Smoker     Smokeless tobacco: Never Used   Substance and Sexual Activity     Alcohol use: No     Drug use: No     Sexual activity: Not Currently     Partners: Male     Birth control/protection: None   Other Topics Concern     Parent/sibling w/ CABG, MI or angioplasty before 65F 55M? Not Asked      Service No     Blood Transfusions Yes     Caffeine Concern No     Occupational Exposure No     Hobby Hazards No     Sleep Concern No     Stress Concern No     Weight Concern No     Special Diet Yes     Comment: Diabetic      Back Care Yes     Comment: Back surgeries x2     Exercise Yes     Bike Helmet No     Seat Belt Yes     Self-Exams Yes   Social History Narrative     None     Social Determinants of Health     Financial Resource Strain:      Difficulty of Paying Living Expenses:    Food Insecurity:      Worried About Running Out of Food in the Last Year:      Ran Out of Food in the Last Year:    Transportation Needs:      Lack of Transportation (Medical):      Lack of Transportation (Non-Medical):    Physical Activity:      Days of Exercise per Week:      Minutes of Exercise per Session:    Stress:      Feeling of Stress :    Social Connections:      Frequency of Communication with Friends and Family:      Frequency of Social Gatherings with Friends and Family:      Attends Jainism Services:      Active Member of Clubs or Organizations:      Attends Club or Organization Meetings:      Marital Status:    Intimate Partner Violence:      Fear of Current or Ex-Partner:      Emotionally Abused:      Physically Abused:      Sexually Abused:        Family History -   Family History   Problem Relation Age of Onset     Cancer Sister         pancreatic     Diabetes No family hx of      Hypertension No family hx of        Review of Systems - As per HPI and PMHx, otherwise 7 system review of the head and neck  negative.    Physical Exam  BP (!) 148/75   Pulse 82   Resp 18   SpO2 98%   General - The patient is nontoxic, in no distress.  Alert and oriented to person and place, answers questions and cooperates with examination appropriately.   Voice and Breathing - The patient was breathing comfortably without the use of accessory muscles. There was no wheezing, stridor, or stertor.  The patients voice was clear and strong.  Ears - clean canals, small abrasion left lateral canal wall. The tympanic membranes are intact. No effusion or infection.    Neck - Soft, non-tender. Palpation of the occipital, submental, submandibular, internal jugular chain, and supraclavicular nodes did not demonstrate any abnormal lymph nodes or masses. No parotid masses. Palpation of the thyroid was soft and smooth, with no nodules or goiter appreciated.  The trachea was mobile and midline.  Neurological - Cranial nerves 2 through 12 were grossly intact. House-Brackmann grade 1 out of 6 bilaterally.    Audiologic Studies - An audiogram and tympanogram were performed today as part of the evaluation and personally reviewed. The tympanogram shows a type A, low volume on both sides. The audiogram showed down-sloping sensorineural hearing loss to the moderately severe range in the highest tones.       Assessment and Plan - Alannah Leos is a 80 year old female who presents to me today with an intermittent left-sided vibration in the ear.  This sounds like myoclonus of either the tensor tympani or stapedius muscle.  I provided reassurance.  She does have a downsloping sensorineural hearing loss and can consider hearing aids.  Muscle relaxants can sometimes help the myoclonus but in her case given her weakness and Parkinson's I recommend not using those medications.  She can try improving sleep and decreasing stress and anxiety and caffeine.  She also has significant imbalance and dizziness but upon close questioning this does not appear to be  vertigo.  I think is multifactorial including her weakness, spinal disease, and Parkinson's.  I recommend physical therapy in the form of vestibular exercises.  Referral given.    Michael Merino MD  Otolaryngology  [facility]        Again, thank you for allowing me to participate in the care of your patient.        Sincerely,        Michael Merino MD

## 2021-08-26 NOTE — PROGRESS NOTES
Chief Complaint - left ear concern    History of Present Illness - Alannah Leos is a 80 year old female who presents to me today with an intermittent noise or sensation in left ear. It is like a vibration. No pain. She also has dizziness.  She denies this as a movement but rather feels imbalance.  She has had some falls.  She has difficulty moving her head too quickly and therefore is not sure if this occurs with fast head movements but she thinks that is not likely.  She tried meclizine. She has imbalance. Stop moving helps. She has parkinson disease that is worsening, and she is weak. She tried PT last year, but stopped.      Past Medical History -   Patient Active Problem List   Diagnosis     Osteoporosis     Rheumatoid arthritis (H)     Parkinson disease (H)     Osteoarthritis of wrist     Osteoarthritis of shoulder     History of total knee arthroplasty     Osteoarthritis of left knee     Advanced directives, counseling/discussion     CARDIOVASCULAR SCREENING; LDL GOAL LESS THAN 160     High risk medication use     Underweight     Impingement syndrome, shoulder, left     Combined forms of age-related cataract of both eyes     Seborrheic dermatitis     Anemia in other chronic diseases classified elsewhere     Ankylosing spondylitis of cervical region (H)       Current Medications -   Current Outpatient Medications:      ACE NOT PRESCRIBED, INTENTIONAL,, 1 each continuous prn. ACE Inhibitor not prescribed due to Refusal by patient    (Patient not taking: Reported on 3/25/2021), Disp: 0 each, Rfl: 0     ammonium lactate (AMLACTIN) 12 % cream, Apply once to twice a day to dry skin. Can burn temporarily if there is a cut on the skin., Disp: 385 g, Rfl: 11     CALCIUM + D OR, 600mg twice a day , Disp: , Rfl:      carbidopa-levodopa (SINEMET)  MG tablet, Take one at 6 am, noon, 5 pm, 9 pm and midnight, Disp: 150 tablet, Rfl: 11     diclofenac (VOLTAREN) 1 % topical gel, Apply 2 g topically 3 times daily as  needed for moderate pain, Disp: 100 g, Rfl: 1     folic acid (FOLVITE) 1 MG tablet, Take 1 tablet (1 mg) by mouth daily, Disp: 90 tablet, Rfl: 2     hydroxychloroquine (PLAQUENIL) 200 MG tablet, Take 1 tablet (200 mg) by mouth daily, Disp: 30 tablet, Rfl: 7     ibuprofen (ADVIL/MOTRIN) 200 MG tablet, Take 1 tablet (200 mg) by mouth every 8 hours as needed for moderate pain (Patient not taking: Reported on 8/11/2021), Disp: , Rfl:      meclizine (ANTIVERT) 25 MG tablet, Take 1 tablet (25 mg) by mouth 3 times daily as needed for dizziness (Patient not taking: Reported on 3/25/2021), Disp: 60 tablet, Rfl: 3     methotrexate sodium 2.5 MG TABS, Take 8 tablets (20 mg) by mouth once a week . Take all 8 tablets on the same day of each week., Disp: 32 tablet, Rfl: 7     order for DME, Equipment being ordered: soft neck brace, Disp: 1 Device, Rfl: 0    Current Facility-Administered Medications:      ropivacaine (NAROPIN) injection 3 mL, 3 mL, , , Repa, Abilio Guardadoopher, DO, 3 mL at 06/09/21 1600     ropivacaine (NAROPIN) injection 3 mL, 3 mL, , , Repa, Abilio Guardadoopher, DO, 3 mL at 06/09/21 1600     triamcinolone (KENALOG-40) injection 40 mg, 40 mg, , , Repa, Abilio Guardadoopher, DO, 40 mg at 06/09/21 1600     triamcinolone (KENALOG-40) injection 40 mg, 40 mg, , , Repa, Abilio Guardadoopher, DO, 40 mg at 06/09/21 1600    Allergies -   Allergies   Allergen Reactions     Decadrol [Dexamethasone Sodium Phosphate] Hives     Shrimp Itching       Social History -   Social History     Socioeconomic History     Marital status:      Spouse name: Willis      Number of children: 2     Years of education: 12     Highest education level: None   Occupational History     Occupation:      Employer: RETIRED     Comment: [employer] retired in 12/2006   Tobacco Use     Smoking status: Never Smoker     Smokeless tobacco: Never Used   Substance and Sexual Activity     Alcohol use: No     Drug use: No     Sexual activity: Not Currently      Partners: Male     Birth control/protection: None   Other Topics Concern     Parent/sibling w/ CABG, MI or angioplasty before 65F 55M? Not Asked      Service No     Blood Transfusions Yes     Caffeine Concern No     Occupational Exposure No     Hobby Hazards No     Sleep Concern No     Stress Concern No     Weight Concern No     Special Diet Yes     Comment: Diabetic      Back Care Yes     Comment: Back surgeries x2     Exercise Yes     Bike Helmet No     Seat Belt Yes     Self-Exams Yes   Social History Narrative     None     Social Determinants of Health     Financial Resource Strain:      Difficulty of Paying Living Expenses:    Food Insecurity:      Worried About Running Out of Food in the Last Year:      Ran Out of Food in the Last Year:    Transportation Needs:      Lack of Transportation (Medical):      Lack of Transportation (Non-Medical):    Physical Activity:      Days of Exercise per Week:      Minutes of Exercise per Session:    Stress:      Feeling of Stress :    Social Connections:      Frequency of Communication with Friends and Family:      Frequency of Social Gatherings with Friends and Family:      Attends [employer] Services:      Active Member of Clubs or Organizations:      Attends Club or Organization Meetings:      Marital Status:    Intimate Partner Violence:      Fear of Current or Ex-Partner:      Emotionally Abused:      Physically Abused:      Sexually Abused:        Family History -   Family History   Problem Relation Age of Onset     Cancer Sister         pancreatic     Diabetes No family hx of      Hypertension No family hx of        Review of Systems - As per HPI and PMHx, otherwise 7 system review of the head and neck negative.    Physical Exam  BP (!) 148/75   Pulse 82   Resp 18   SpO2 98%   General - The patient is nontoxic, in no distress.  Alert and oriented to person and place, answers questions and cooperates with examination appropriately.   Voice and Breathing - The  patient was breathing comfortably without the use of accessory muscles. There was no wheezing, stridor, or stertor.  The patients voice was clear and strong.  Ears - clean canals, small abrasion left lateral canal wall. The tympanic membranes are intact. No effusion or infection.    Neck - Soft, non-tender. Palpation of the occipital, submental, submandibular, internal jugular chain, and supraclavicular nodes did not demonstrate any abnormal lymph nodes or masses. No parotid masses. Palpation of the thyroid was soft and smooth, with no nodules or goiter appreciated.  The trachea was mobile and midline.  Neurological - Cranial nerves 2 through 12 were grossly intact. House-Brackmann grade 1 out of 6 bilaterally.    Audiologic Studies - An audiogram and tympanogram were performed today as part of the evaluation and personally reviewed. The tympanogram shows a type A, low volume on both sides. The audiogram showed down-sloping sensorineural hearing loss to the moderately severe range in the highest tones.       Assessment and Plan - Alannah Leos is a 80 year old female who presents to me today with an intermittent left-sided vibration in the ear.  This sounds like myoclonus of either the tensor tympani or stapedius muscle.  I provided reassurance.  She does have a downsloping sensorineural hearing loss and can consider hearing aids.  Muscle relaxants can sometimes help the myoclonus but in her case given her weakness and Parkinson's I recommend not using those medications.  She can try improving sleep and decreasing stress and anxiety and caffeine.  She also has significant imbalance and dizziness but upon close questioning this does not appear to be vertigo.  I think is multifactorial including her weakness, spinal disease, and Parkinson's.  I recommend physical therapy in the form of vestibular exercises.  Referral given.    Michael Merino MD  Otolaryngology  [facility]

## 2021-08-26 NOTE — PROGRESS NOTES
"AUDIOLOGY REPORT:    Patient was referred from ENT by Dr. Merino for audiology evaluation. The patient reports a sensation of something fluttering or running in her left ear. She does not have ear pain or pressure. The patient also reports dizziness, which she describes as \"wooziness\" and imbalance, and she notes that it affects her eyes. Meclizine did not help. The patient reports that she had her ear cleaned with water around a year ago and it was uncomfortable, but she otherwise does not have a history of ear problems or ear surgery. The patient reports that she does not have tinnitus and has not noted any changes in hearing. The patient was accompanied to the appointment by her daughter.    Testing:    Otoscopy:   Otoscopic exam indicates ears are clear of cerumen bilaterally. A small scab is visible in the left canal.    Tympanograms:    RIGHT: normal eardrum mobility     LEFT:   normal eardrum mobility    Reflexes (reported by stimulus ear):  RIGHT: Ipsilateral is present at normal levels  RIGHT: Contralateral is present at normal levels  LEFT:   Ipsilateral is present at normal levels  LEFT:   Contralateral is present at normal levels    Thresholds:   Pure Tone Thresholds assessed using conventional audiometry with good reliability from 250-8000 Hz bilaterally using insert earphones and circumaural headphones     RIGHT:  normal hearing sensitivity through 1000 Hz sloping to mild to moderately severe sensorineural hearing loss    LEFT:    normal hearing sensitivity through 1000 Hz sloping to mild to moderately severe sensorineural hearing loss    Speech Reception Threshold:    RIGHT: 25 dB HL    LEFT:   30 dB HL  Results are in agreement with pure tone average.     Word Recognition Score:     RIGHT: 100% at 70 dB HL using NU-6 recorded word list.    LEFT:   100% at 70 dB HL using NU-6 recorded word list.    Discussed results with the patient.     Patient was returned to ENT for follow up.     Donna Pandya " [facility]  Licensed Audiologist #[id number]  8/26/2021

## 2021-09-01 ENCOUNTER — OFFICE VISIT (OUTPATIENT)
Dept: PALLIATIVE MEDICINE | Facility: CLINIC | Age: 81
End: 2021-09-01
Payer: MEDICARE

## 2021-09-01 VITALS — SYSTOLIC BLOOD PRESSURE: 149 MMHG | DIASTOLIC BLOOD PRESSURE: 74 MMHG | OXYGEN SATURATION: 98 % | HEART RATE: 80 BPM

## 2021-09-01 DIAGNOSIS — M79.18 MYOFASCIAL PAIN: Primary | ICD-10-CM

## 2021-09-01 PROCEDURE — 20553 NJX 1/MLT TRIGGER POINTS 3/>: CPT | Performed by: PSYCHIATRY & NEUROLOGY

## 2021-09-01 RX ORDER — BUPIVACAINE HYDROCHLORIDE 5 MG/ML
4.5 INJECTION, SOLUTION EPIDURAL; INTRACAUDAL ONCE
Status: COMPLETED | OUTPATIENT
Start: 2021-09-01 | End: 2021-09-01

## 2021-09-01 RX ADMIN — BUPIVACAINE HYDROCHLORIDE 22.5 MG: 5 INJECTION, SOLUTION EPIDURAL; INTRACAUDAL at 16:07

## 2021-09-01 ASSESSMENT — PAIN SCALES - GENERAL: PAINLEVEL: SEVERE PAIN (7)

## 2021-09-01 NOTE — PATIENT INSTRUCTIONS
[facility]   Post Procedure Instructions    Today you had:  trigger point injections         Medications used:  lidocaine   bupivicaine          Go to the emergency room if you develop any shortness of breath    Monitor the injection sites for signs and symptoms of infection-fever, chills, redness, swelling, warmth, or drainage to areas.    You may have soreness at injection sites for up to 24 hours.    You may apply ice to the painful areas to help minimize the discomfort of the needle pokes.    Do not apply heat to sites for at least 12 hours.    You may use anti-inflammatory medications or Tylenol for pain control if necessary    Pain Clinic phone number during work hours Monday-Friday:  [phone]    After hours provider line: [phone]

## 2021-09-01 NOTE — PROGRESS NOTES
"Pre procedure Diagnosis: myofascial pain   Post procedure Diagnosis: Same  Procedure performed: trigger point injections  Anesthesia: none  Complications: none  Operators: Esther Valdovinos MD     Indications:   Alannah Leos is a 80 year old female with a history of severe shoulder arthritis, PD, and myofascial pain.  Exam shows myofascial pain of the muscle groups listed below and they have tried conservative treatment including medications, PT.  She gets 3 weeks of improvement from last injection, including improvement in range of motion. She describes the help as being \"miraculous\"    Options/alternatives, benefits and risks were discussed with the patient including bleeding, infection, tissue trauma and pnuemothorax.  Questions were answered to her satisfaction and she agrees to proceed. Voluntary informed consent was obtained and signed.     Vitals were reviewed: Yes  Allergies were reviewed:  Yes   Medications were reviewed:  Yes   Pre-procedure pain score: Severe Pain (7)     Procedure:  After getting informed consent, a Pause for the Cause was performed.    Trigger points were identified by patient, and marked when appropriate.  The area was prepped with Chloroprep.    Using clean technique, injections were completed using a 25G, 1.5 inch needle.  After negative aspiration, injection was completed.  A total of 10 locations were injected.  When possible, tissue was retracted from the chest wall to avoid lung injury.    Muscle groups injected:  Bilateral trapezius  Bilateral cervical paraspinal   Bilateral levator scapulae       Injection solution contained:   4.5ml of 1% lidocaine and 4.5ml of 0.5% bupivacaine.    Hemostasis was achieved.  The patient tolerated the procedure well.       Post-procedure pain score:5/10  BP recheck better  Follow-up includes:   -repeat prn, ok to double book- ok to add on at 3:45pm on Wed.    Esther Valdovinos MD  [facility]       "

## 2021-09-10 ENCOUNTER — TRANSFERRED RECORDS (OUTPATIENT)
Dept: HEALTH INFORMATION MANAGEMENT | Facility: CLINIC | Age: 81
End: 2021-09-10

## 2021-09-22 ENCOUNTER — OFFICE VISIT (OUTPATIENT)
Dept: PALLIATIVE MEDICINE | Facility: CLINIC | Age: 81
End: 2021-09-22
Payer: MEDICARE

## 2021-09-22 VITALS — SYSTOLIC BLOOD PRESSURE: 169 MMHG | HEART RATE: 80 BPM | DIASTOLIC BLOOD PRESSURE: 70 MMHG

## 2021-09-22 DIAGNOSIS — M79.18 MYOFASCIAL PAIN: Primary | ICD-10-CM

## 2021-09-22 PROCEDURE — 20553 NJX 1/MLT TRIGGER POINTS 3/>: CPT | Performed by: PSYCHIATRY & NEUROLOGY

## 2021-09-22 RX ORDER — BUPIVACAINE HYDROCHLORIDE 5 MG/ML
4.5 INJECTION, SOLUTION EPIDURAL; INTRACAUDAL ONCE
Status: COMPLETED | OUTPATIENT
Start: 2021-09-22 | End: 2021-09-22

## 2021-09-22 RX ADMIN — BUPIVACAINE HYDROCHLORIDE 22.5 MG: 5 INJECTION, SOLUTION EPIDURAL; INTRACAUDAL at 16:55

## 2021-09-22 ASSESSMENT — PAIN SCALES - GENERAL: PAINLEVEL: MODERATE PAIN (5)

## 2021-09-22 NOTE — PROGRESS NOTES
"Pre procedure Diagnosis: myofascial pain   Post procedure Diagnosis: Same  Procedure performed: trigger point injections  Anesthesia: none  Complications: none  Operators: Esther Valdovinos MD     Indications:   Alannah Leos is a 80 year old female with a history of severe shoulder arthritis, PD, and myofascial pain.  Exam shows myofascial pain of the muscle groups listed below and they have tried conservative treatment including medications, PT.  She gets 3 weeks of improvement from last injection, including improvement in range of motion. She describes the help as being \"miraculous\"    Options/alternatives, benefits and risks were discussed with the patient including bleeding, infection, tissue trauma and pnuemothorax.  Questions were answered to her satisfaction and she agrees to proceed. Voluntary informed consent was obtained and signed.     Vitals were reviewed: Yes  Allergies were reviewed:  Yes   Medications were reviewed:  Yes   Pre-procedure pain score: Moderate Pain (5)     Procedure:  After getting informed consent, a Pause for the Cause was performed.    Trigger points were identified by patient, and marked when appropriate.  The area was prepped with Chloroprep.    Using clean technique, injections were completed using a 25G, 1.5 inch needle.  After negative aspiration, injection was completed.  A total of 10 locations were injected.  When possible, tissue was retracted from the chest wall to avoid lung injury.    Muscle groups injected:  Bilateral trapezius  Bilateral cervical paraspinal   Bilateral levator scapulae       Injection solution contained:   4.5ml of 1% lidocaine and 4.5ml of 0.5% bupivacaine.    Hemostasis was achieved.  The patient tolerated the procedure well.       Post-procedure pain score: 5/10  BP recheck better  Follow-up includes:   -repeat prn, ok to double book- ok to add on at 3:45pm on Wed.    Esther Valdovinos MD  [facility]       "

## 2021-09-22 NOTE — PATIENT INSTRUCTIONS
[facility]   Post Procedure Instructions    Today you had:  trigger point injections      Medications used:  lidocaine   bupivicaine          Go to the emergency room if you develop any shortness of breath    Monitor the injection sites for signs and symptoms of infection-fever, chills, redness, swelling, warmth, or drainage to areas.    You may have soreness at injection sites for up to 24 hours.    You may apply ice to the painful areas to help minimize the discomfort of the needle pokes.    Do not apply heat to sites for at least 12 hours.    You may use anti-inflammatory medications or Tylenol for pain control if necessary    Pain Clinic phone number during work hours Monday-Friday:  [phone]    After hours provider line: [phone]

## 2021-10-06 ENCOUNTER — HOSPITAL ENCOUNTER (OUTPATIENT)
Dept: PHYSICAL THERAPY | Facility: CLINIC | Age: 81
Setting detail: THERAPIES SERIES
End: 2021-10-06
Payer: MEDICARE

## 2021-10-06 DIAGNOSIS — R26.89 IMBALANCE: ICD-10-CM

## 2021-10-06 PROCEDURE — 97162 PT EVAL MOD COMPLEX 30 MIN: CPT | Mod: GP | Performed by: PHYSICAL THERAPIST

## 2021-10-06 NOTE — PROGRESS NOTES
10/06/21 1500   General Information   Start of Care Date 10/06/21   Referring Physician DR Merino   Orders Evaluate and Treat as Indicated   Order Date 08/26/21   Medical Diagnosis imbalance/falls   Special Instructions ankloysing spondlitis of spine. gets trigger point injections in her cervical paraspinals, bilateral levator scapula, traps. Through pain clinic.    Surgical/Medical history reviewed Yes  (PD, RA, left knee OA, ankylosing spondylitiis)   Pertinent history of current problem (include personal factors and/or comorbidities that impact the POC) dizziness is more out of sorts, just dont feel right, sick to stomach a little bit. Any sudden movements make her feel worse. Been there for 2 years but worse the past 2-3 months. It nevers goes away. Movement makes it worse. Really bad headache 2 days ago and yesterday. Woke up today doing pretty good. Didnt have much sleep the other nights. Takes about 10 minutes in restroom and about an hour to get ready to come to an appt.    Pertinent Visual History  she cannot see other people when seated because her neck is so flexed and stuck that way. Her head is probalby flexecd 45 degrees down and stuck there.    Prior level of function comment Shiloh does cleaning, Shiloh does driving. (9 yrs ago stopped). dtr has food prepared for breakfast and lunch, dinner with dtr/MAREN. dressing is really hard due to shdrs. needs helps. She only leaves [facility] house for medical appts, typicall y 2-3 times a month leaves the house.    Previous/Current Treatment Physical Therapy;Occupational Therapy;Injection;Other   Improvement after PT Moderate  ([facility], had to stop due to pandemic)   Improvement after OT Moderate  ([facility] prior to COVIc and had to stop pandemic)   Current Community Support Family/friend caregiver  (Shiloh her dtr and MAREN live across the street)   Patient role/Employment history Disabled;Other/comments   Living environment House/townhome   Home/Community Accessibility Comments  split level house with walk out basement. She does not want to move to [place] or [place]. WAnts to die at home.   Current Assistive Devices Front Wheeled Walker;Four Wheeled Walker   ADL Devices Shower/Tub Chair;Wall Grab Bar;Other  (transport w/c)   Assistive Devices Comments doesnt like her old walker without brakes.    General Information Comments phone on her at all times plus call button for falls, and a buzzer in bathroom rings to [facility] house across the street.    Fall Risk Screen   Fall screen completed by PT   Have you fallen 2 or more times in the past year? No   Have you fallen and had an injury in the past year? No   Is patient a fall risk? Yes   Fall screen comments she does very little and does it slowly to avoid falls.    Pain   Patient currently in pain Yes   Pain location everywhere, aches,    Pain description comment shoulders are the worst. right side is worse than left.    Pain comments has to lay down due to her neck pain throught the day.   Vitals Signs   Heart Rate 87   Blood Pressure 164/75  (left arm seated)   Vital Signs Comments she is having drooling when seated (wearing mask). Has it worse at night in bed. I explained that they can do botox for the drooling. Jose isnt interested. Also said they can try meds but she is already on a lot of meds per pt.    Cognitive Status Examination   Cognitive Comment shoves an airplane/travel pillow at times under her chin, she can make it fit under her chin and doesn have ot snap it or velcro (drs dotn reach). Uses in car 90% of time.    Integumentary   Integumentary Comments very thin, looks frail.   Posture   Posture Other   Posture Comments neck is flexed approx 45 degrees down and stuck like that. Back is rounded also. There is absolutely no motion of her neck for rotation or flex/ext, RIGID.    Range of Motion (ROM)   ROM Comment right TKA about 20 yrs ago. not full extension. approximately 30 degreees lacking.  left knee needs TKA but not surgical  "candidate. Also lacking approx 30 degrees of motion. She stands with knees flexed, crouched.    Bed Mobility   Bed Mobility Comments per pt lays on her back but propped on pillows. Cannot roll over in bed. Cannot sleep on her side. Bed rail helps on side of bed. Full railing at foot of bed so she can hang onto it to walk around the bed.    Gait Special Tests 25 Foot Timed Walk   Seconds 37.59  (she doesnt get anywhere quickly per her dtr.)   Steps 36 Steps   Comments 4WW, knees are flexed (a little crouched), Her head is completely flexed 90 degrees and looking at the floor.    Balance Special Tests Sit to Stand Reps in 30 Seconds   Comments needs to hands to get up from a chair.    Sensory Examination   Sensory Perception Comments left foot is numb \"dead\"   Muscle Tone   Muscle Tone Comments PD shaking per pt but I did not see tremor during the appt.    Clinical Impression   Criteria for Skilled Therapeutic Interventions Met yes, treatment indicated   PT Diagnosis significantly impaired posture and mobility due to ankylosing spondylitis and Parkinsons' Disease   Influenced by the following impairments pain everywhere. posture, high risk of falls, weakness everywhere,  rigid neck/back, limited knee AROM, and decreased activity tolerance   Functional limitations due to impairments affects ADLS/MRADLS and all mobility   Clinical Presentation Evolving/Changing   Clinical Presentation Rationale medical history (chronic and complex medical issues), impairments, gait speed/quality, transfers. posture and clinical judgement   Clinical Decision Making (Complexity) Moderate complexity   Therapy Frequency other (see comments)  (recommend at least 3 appts,hard for her to come to appts)   Predicted Duration of Therapy Intervention (days/wks) 3 months or more   Risk & Benefits of therapy have been explained Yes   Patient, Family & other staff in agreement with plan of care Yes   Clinical Impression Comments She moves " slowly/carefully and only in part of her house thus has not had falls in greater than a year.  She would benefit from a power w/c for more comfortable seating and safer INDEPEDENT mobility in her house. She may also benefit from use of a standing frame for her legs to improve ROM and strength. Need to be careful with her back. Neck/upper back is painful and rigid so I am not sure there is anything else that can be done. In the ideal world she would have on a collar of some sort to prevent her neck from getting worse but she has to be able to get it on/off herself.    Goal 1   Goal Identifier POWER W/C WITH TILT   Goal Description She will identify if a power w/c with tilt is comfortable for sitting/sleeping (to avoid a transfer multiple times a dAY).    Target Date 22   Goal 2   Goal Identifier LEGS   Goal Description She will have an improvement in her leg extension when standing and walking so she doesnt look crouched, less pain and more strength for transfers. (more details after legs are measured in standing)   Target Date 22   Total Evaluation Time   PT Eval, Moderate Complexity Minutes (23828) 55  (they used restroom for about 5 minutes))   Dacia Deshpande DPT, MPT, NCS  Physical Therapist   Board Certified Neurologic Clinical Specialist     [facility]   [address] [place] [address]   [email or web address]  [email or web address]   [phone]   Clinic: [phone] //   Fax: [phone]

## 2021-10-06 NOTE — PROGRESS NOTES
Rehabilitation Services        OUTPATIENT PHYSICAL THERAPY FUNCTIONAL EVALUATION  PLAN OF TREATMENT FOR OUTPATIENT REHABILITATION  (COMPLETE FOR INITIAL CLAIMS ONLY)  Patient's Last Name, First Name, M.I.  YOB: 1940  Alannah Leos     Provider's Name   Liana Deshpande PT   Medical Record No.  [id number]     Start of Care Date:  10/06/21   Onset Date:   Order date 8/26/2021, chronic medical issues   Type:     _X__PT   ____OT  ____SLP Medical Diagnosis:   Imbalance/falls     PT Diagnosis:  significantly impaired posture and mobility due to ankylosing spondylitis and Parkinsons' Disease Visits from SOC:  1                              __________________________________________________________________________________  Plan of Treatment/Functional Goals:              GOALS  POWER W/C WITH TILT  She will identify if a power w/c with tilt is comfortable for sitting/sleeping (to avoid a transfer multiple times a dAY).   01/06/22    LEGS  She will have an improvement in her leg extension when standing and walking so she doesnt look crouched, less pain and more strength for transfers. (more details after legs are measured in standing)  01/06/22           Therapy Frequency:  other (see comments) (recommend at least 3 appts,hard for her to come to appts)   Predicted Duration of Therapy Intervention:  3 months or more    Liana Deshpande PT                                    I CERTIFY THE NEED FOR THESE SERVICES FURNISHED UNDER        THIS PLAN OF TREATMENT AND WHILE UNDER MY CARE     (Physician co-signature of this document indicates review and certification of the therapy plan).                Certification Date From:    10/6/2021  Certification Date To:   1/3/2022    Referring Provider:  DR Merino    Initial Assessment  See Epic Evaluation- Start of Care Date: 10/06/21

## 2021-10-13 ENCOUNTER — OFFICE VISIT (OUTPATIENT)
Dept: PALLIATIVE MEDICINE | Facility: CLINIC | Age: 81
End: 2021-10-13
Payer: MEDICARE

## 2021-10-13 VITALS — DIASTOLIC BLOOD PRESSURE: 73 MMHG | SYSTOLIC BLOOD PRESSURE: 164 MMHG | HEART RATE: 78 BPM

## 2021-10-13 DIAGNOSIS — M79.18 MYOFASCIAL PAIN: Primary | ICD-10-CM

## 2021-10-13 PROCEDURE — 20553 NJX 1/MLT TRIGGER POINTS 3/>: CPT | Performed by: PSYCHIATRY & NEUROLOGY

## 2021-10-13 RX ORDER — BUPIVACAINE HYDROCHLORIDE 5 MG/ML
5.5 INJECTION, SOLUTION EPIDURAL; INTRACAUDAL ONCE
Status: COMPLETED | OUTPATIENT
Start: 2021-10-13 | End: 2021-10-13

## 2021-10-13 RX ADMIN — BUPIVACAINE HYDROCHLORIDE 27.5 MG: 5 INJECTION, SOLUTION EPIDURAL; INTRACAUDAL at 17:19

## 2021-10-13 ASSESSMENT — PAIN SCALES - GENERAL: PAINLEVEL: SEVERE PAIN (7)

## 2021-10-13 NOTE — PATIENT INSTRUCTIONS
[facility]   Post Procedure Instructions    Today you had:  trigger point injections       Medications used:  lidocaine   bupivicaine         Go to the emergency room if you develop any shortness of breath    Monitor the injection sites for signs and symptoms of infection-fever, chills, redness, swelling, warmth, or drainage to areas.    You may have soreness at injection sites for up to 24 hours.    You may apply ice to the painful areas to help minimize the discomfort of the needle pokes.    Do not apply heat to sites for at least 12 hours.    You may use anti-inflammatory medications or Tylenol for pain control if necessary    Pain Clinic phone number during work hours Monday-Friday:  [phone]    After hours provider line: [phone]

## 2021-10-13 NOTE — PROGRESS NOTES
"Pre procedure Diagnosis: myofascial pain   Post procedure Diagnosis: Same  Procedure performed: trigger point injections  Anesthesia: none  Complications: none  Operators: Esther Valdovinos MD     Indications:   Alannah Leos is a 81 year old female with a history of severe shoulder arthritis, PD, and myofascial pain.  Exam shows myofascial pain of the muscle groups listed below and they have tried conservative treatment including medications, PT.  She gets 3 weeks of improvement from last injection, including improvement in range of motion. She describes the help as being \"miraculous\".  Flared today due to weather.    Options/alternatives, benefits and risks were discussed with the patient including bleeding, infection, tissue trauma and pnuemothorax.  Questions were answered to her satisfaction and she agrees to proceed. Voluntary informed consent was previously obtained and reviewed again today.    Vitals were reviewed: Yes  Allergies were reviewed:  Yes   Medications were reviewed:  Yes   Pre-procedure pain score: Severe Pain (7)     Procedure:  After getting informed consent, a Pause for the Cause was performed.    Trigger points were identified by patient, and marked when appropriate.  The area was prepped with Chloroprep.    Using clean technique, injections were completed using a 25G, 1.5 inch needle.  After negative aspiration, injection was completed.  A total of 10 locations were injected.  When possible, tissue was retracted from the chest wall to avoid lung injury.    Muscle groups injected:  Bilateral trapezius  Bilateral cervical paraspinal   Bilateral levator scapulae       Injection solution contained:   5.5ml of 1% lidocaine and 5.5ml of 0.5% bupivacaine.    Hemostasis was achieved.  The patient tolerated the procedure well.       Post-procedure pain score: NR  BP recheck better  Follow-up includes:   -repeat prn, ok to double book- ok to add on at 3:45pm on Wed.  -we discussed botox today. She " would like to think about it.    Esther Valdovinos MD  [facility]

## 2021-11-03 ENCOUNTER — OFFICE VISIT (OUTPATIENT)
Dept: PALLIATIVE MEDICINE | Facility: CLINIC | Age: 81
End: 2021-11-03
Payer: MEDICARE

## 2021-11-03 VITALS — DIASTOLIC BLOOD PRESSURE: 65 MMHG | HEART RATE: 80 BPM | SYSTOLIC BLOOD PRESSURE: 160 MMHG

## 2021-11-03 DIAGNOSIS — M79.18 MYOFASCIAL PAIN: Primary | ICD-10-CM

## 2021-11-03 PROCEDURE — 20553 NJX 1/MLT TRIGGER POINTS 3/>: CPT | Performed by: PSYCHIATRY & NEUROLOGY

## 2021-11-03 RX ORDER — BUPIVACAINE HYDROCHLORIDE 5 MG/ML
4 INJECTION, SOLUTION EPIDURAL; INTRACAUDAL ONCE
Status: COMPLETED | OUTPATIENT
Start: 2021-11-03 | End: 2021-11-03

## 2021-11-03 RX ADMIN — BUPIVACAINE HYDROCHLORIDE 20 MG: 5 INJECTION, SOLUTION EPIDURAL; INTRACAUDAL at 16:20

## 2021-11-03 ASSESSMENT — PAIN SCALES - GENERAL: PAINLEVEL: SEVERE PAIN (6)

## 2021-11-03 NOTE — PROGRESS NOTES
"Pre procedure Diagnosis: myofascial pain   Post procedure Diagnosis: Same  Procedure performed: trigger point injections  Anesthesia: none  Complications: none  Operators: Esther Valdovinos MD     Indications:   Alannah Leos is a 81 year old female with a history of severe shoulder arthritis, PD, and myofascial pain.  Exam shows myofascial pain of the muscle groups listed below and they have tried conservative treatment including medications, PT.  She gets 2-3 weeks of improvement from last injection, including improvement in range of motion. She describes the help as being \"miraculous\" and gives functional improvement. Given risks with other treatments given age and condition, this seems to be a good treatment with limited side effects and functional improvement.    With holiday coming up in 3 weeks, needs to make alternative plan for getting scheduled.    Options/alternatives, benefits and risks were discussed with the patient including bleeding, infection, tissue trauma and pnuemothorax.  Questions were answered to her satisfaction and she agrees to proceed. Voluntary informed consent was previously obtained and reviewed again today.    Vitals were reviewed: Yes  Allergies were reviewed:  Yes   Medications were reviewed:  Yes   Pre-procedure pain score: Severe Pain (6)     Procedure:  After getting informed consent, a Pause for the Cause was performed.    Trigger points were identified by patient, and marked when appropriate.  The area was prepped with Chloroprep.    Using clean technique, injections were completed using a 25G, 1.5 inch needle.  After negative aspiration, injection was completed.  A total of 6 locations were injected.  When possible, tissue was retracted from the chest wall to avoid lung injury.    Muscle groups injected:  Bilateral trapezius  Left cervical paraspinal   Left levator scapulae   Left rhomboid      Injection solution contained:   4ml of 1% lidocaine and 4ml of 0.5% " bupivacaine.    Hemostasis was achieved.  The patient tolerated the procedure well.       Post-procedure pain score: NR  BP recheck better  Follow-up includes:   -repeat prn, ok to double book- ok to add on at 3:45pm on Wed. Discussed dates to get through the 2 holiday months.      Esther Valdovinos MD  [facility]

## 2021-11-10 ENCOUNTER — HOSPITAL ENCOUNTER (OUTPATIENT)
Dept: PHYSICAL THERAPY | Facility: CLINIC | Age: 81
Setting detail: THERAPIES SERIES
End: 2021-11-10
Attending: OTOLARYNGOLOGY
Payer: MEDICARE

## 2021-11-10 PROCEDURE — 97110 THERAPEUTIC EXERCISES: CPT | Mod: GP | Performed by: PHYSICAL THERAPIST

## 2021-11-17 ENCOUNTER — HOSPITAL ENCOUNTER (OUTPATIENT)
Dept: PHYSICAL THERAPY | Facility: CLINIC | Age: 81
Setting detail: THERAPIES SERIES
End: 2021-11-17
Attending: OTOLARYNGOLOGY
Payer: MEDICARE

## 2021-11-17 PROCEDURE — 97535 SELF CARE MNGMENT TRAINING: CPT | Mod: GP | Performed by: PHYSICAL THERAPIST

## 2021-11-17 PROCEDURE — 97110 THERAPEUTIC EXERCISES: CPT | Mod: GP | Performed by: PHYSICAL THERAPIST

## 2021-11-18 ENCOUNTER — PATIENT OUTREACH (OUTPATIENT)
Dept: CARE COORDINATION | Facility: CLINIC | Age: 81
End: 2021-11-18

## 2021-11-18 NOTE — PROGRESS NOTES
Social Work Telephone Message Note  [facility]     Patient Name:  Alannah Leos  /Age:  1940 (81 year old)    Referral Source: Dacia Deshpande DPT  Reason for Referral:  Community resources     attempted to contact Patient via telephone on 21.  Social work received a consult following a PT appointment regarding resources in the home.  Patients daughter, Shiloh is providing significant cares at this time and possibly interested in hired in services.  Left message providing writer contact information requesting a return call.   will await Patient's return phone call and will provide assistance at that time.            JEET Gregory, [facility]    [facility]  [phone]  [email or web address]

## 2021-11-22 ENCOUNTER — HOSPITAL ENCOUNTER (OUTPATIENT)
Dept: PHYSICAL THERAPY | Facility: CLINIC | Age: 81
Setting detail: THERAPIES SERIES
End: 2021-11-22
Attending: OTOLARYNGOLOGY
Payer: MEDICARE

## 2021-11-22 PROCEDURE — 97110 THERAPEUTIC EXERCISES: CPT | Mod: GP | Performed by: PHYSICAL THERAPIST

## 2021-11-22 PROCEDURE — 97116 GAIT TRAINING THERAPY: CPT | Mod: GP | Performed by: PHYSICAL THERAPIST

## 2021-11-24 ENCOUNTER — TELEPHONE (OUTPATIENT)
Dept: FAMILY MEDICINE | Facility: CLINIC | Age: 81
End: 2021-11-24
Payer: MEDICARE

## 2021-11-24 DIAGNOSIS — G20.A1 PARKINSON DISEASE (H): Primary | ICD-10-CM

## 2021-11-24 DIAGNOSIS — M06.09 RHEUMATOID ARTHRITIS OF MULTIPLE SITES WITH NEGATIVE RHEUMATOID FACTOR (H): ICD-10-CM

## 2021-11-24 DIAGNOSIS — R62.7 FAILURE TO THRIVE IN ADULT: ICD-10-CM

## 2021-11-24 NOTE — TELEPHONE ENCOUNTER
----- Message from Liana Deshpande, PT sent at 2021  8:23 AM CST -----  Regarding: home health care needed  HI! I have been working with Raman in out pt PT for 3 visits in the past month. She would be better served to have home care PT/OT, nurse and . Her dtr, Shiloh, takes care of her for about 18/24 hours a day. (raman refuses to go to NH for 24 hour care). She does some short distance amb with [facility] by herself in the home but she is at high risk of falls due to her ankylosing spine and Parkinson's disease.  They report having had some home health PT in 2020 for maybe 6 visits. They were not happy with the home care since they felt the person asked too many questions, spent too much time on computer and did not do many exercises with her. I think they need to do home care again (hopefully it is a better experience). It takes them over 30 minutes to get  from upper level of house out to seated safely in car for appts and then 30 minutes again to get back upstairs after our appts. Plus she is really affected/stiffness by the cold weather.    They are sure if she had some consistent therapy for leg strengthening she will get better. She needs to do exs 7 days a week with someone to get better as she is very weak and has horrible posture. I think home care team (including ) could possible help her to get more services/assistance and maintain her limited mobility. They are willing to try home care again and really want it to be for more then 6 visits. I tried to explain that we will try again.     Please order home care PT/OT/nurse and . Thanks so much! Daica Deshpande DPT, MPT, NCS  Physical Therapist   Board Certified Neurologic Clinical Specialist     [facility]   [address] [place] [address]   [email or web address]  [email or web address]   [phone]   Clinic: [phone] //    Fax: [phone]

## 2021-11-26 NOTE — PROGRESS NOTES
[facility] Rehabilitation Service    Outpatient Physical Therapy Discharge Note  Patient: Alannah Leos  : 1940    Beginning/End Dates of Reporting Period:  10/6/71317 to 2021. Seh was sen for 3 visits. We did leg strengthening exs and trialed standing frame for 1 visit.     Referring Provider: Dr Merino    Therapy Diagnosis: imbalance/falls     Client Self Report: She is really stiff today (cold weather) and sitting in lobby > 15 minutes waiting. Per dtr Shiloh it takes 25-30 minutes after PT to get from car up to bedroom. She wants to have a lot of leg therapy from home care.     Objective Measurements:  Objective Measure: wearing small soft cervical collar throughout the appt today. She cannot wear this at home as she cannot get it on/off herself. She does occasionally use a travel pillow for support under chin as she can manipulate it herself.  In the ideal world she would have a collar on always in my opinion but this is not possible.     Goals:  Goal Identifier POWER W/C WITH TILT   Goal Description She will identify if a power w/c with tilt is comfortable for sitting/sleeping (to avoid a transfer multiple times a dAY).    Target Date 22   Date Met      Progress (detail required for progress note): Not interested in power w/c. Split level house is a barrier, dtr is worried about her getting in/out of the power w/c,      Goal Identifier LEGS   Goal Description She will have an improvement in her leg extension when standing and walking so she doesnt look crouched, less pain and more strength for transfers. (more details after legs are measured in standing)   Target Date 22   Date Met      Progress (detail required for progress note): she needs  alot of therapy to change her leg ROm/strength. Only 3 visits and switching to home care.      Goal Identifier     Goal Description     Target Date     Date Met       Progress (detail required for progress note):       Assessment: She has had Parkinson's disease and ankylosis spondiyitis that have significantly impacted her mobility, posture and have created pain. She needs 24 hour care. Currently her dtr provides care about 18 hours a day. She is adamant that she wont go to a NH (she wants to die at home). They need more assistance at home. She needs a ton of rehab to make changes in her strength/mobility and possible the environment. They are motivated and she does have potential but it will take months of home care.     Plan: Discharge from out pt therapy. Switch to home care.    Reason for Discharge: I have recommended she have home care. It takes them 30 minutes to get from upper level of house out to the vehicle and buckled in seat. Then 30 minutes again to get from car up to her bedroom after appts. She is clearly home bound and I think working in her home environment will be better for her to carry over info.    Equipment Issued: yellow theraband    Discharge Plan: Other services: Home care PT, OT, nursing and  recommended. See if she would qualify for any services at home to assist her and have less work for dtr Shiloh.    They are VERY clear that they want PT to work on strengthening her legs. She wants to do exercises to try to maintain her ability to do transfers alone and go to bathroom alone. They did not care for previous home care () as they felt they person asked a lot of questions, typed a lot on computer and did not do much exercise. Only came for 3-5 visits.     Dacia Deshpande DPT, MPT, NCS  Physical Therapist   Board Certified Neurologic Clinical Specialist     [facility]   [address] [place] [address]   [email or web address]  [email or web address]   [phone]   Clinic: [phone] //   Fax: [phone]

## 2021-11-29 ENCOUNTER — NURSE TRIAGE (OUTPATIENT)
Dept: NURSING | Facility: CLINIC | Age: 81
End: 2021-11-29
Payer: MEDICARE

## 2021-11-29 ENCOUNTER — DOCUMENTATION ONLY (OUTPATIENT)
Dept: CARE COORDINATION | Facility: CLINIC | Age: 81
End: 2021-11-29
Payer: MEDICARE

## 2021-11-29 NOTE — TELEPHONE ENCOUNTER
"Patient and her daughter called back from a message they received from the clinic.  They were on speaker phone together.  I gave them the information on the note from the clinic and this is what they stated.    Shiloh and the patient let therapy know the patient was going to be gone all weekend for Thanksgiving.      They are frustrated and do not want any more therapy orders.  They stated that home care has been out to \"evaluate \" the patient 3 different times, but the patient has \"never received any actual therapy\".  Each time home care has contacted or spoke to the patient they tell her it is for after hospital care.  This patient wasn't in the hospital.  Home care was started due to the patient having parkinson's and needing some movement exercises.  The patient states sometimes she feels a \"little off\" when she is moving around.  The daughter wants her to have a few exercises to do to keep moving.    The daughter and patient would not like to continue with home care.  The daughter states she will come up with a few exercises for her mom to do on her own.    They state home care doesn't even know why they are coming to see the patient and that is concerning.    If the clinic would like to call back and leave a message they can leave a detailed message on the patients phone.  Or they can call the daughter Shiloh at .  They can speak to Lam Matamoros or leave a detailed message.    The patient has a appointment on 12/1 and can discuss further also if needed.    Payton Huber RN   11/29/21 6:01 PM  [facility] Nurse Advisor    Reason for Disposition    [1] Follow-up call to recent contact AND [2] information only call, no triage required    Additional Information    Negative: [1] Caller is not with the adult (patient) AND [2] reporting urgent symptoms    Negative: Lab result questions    Negative: Medication questions    Negative: Caller can't be reached by phone    Negative: Caller has already " spoken to PCP or another triager    Negative: RN needs further essential information from caller in order to complete triage    Negative: Requesting regular office appointment    Negative: [1] Caller requesting NON-URGENT health information AND [2] PCP's office is the best resource    Negative: [1] Caller is not with the adult (patient) AND [2] probable NON-URGENT symptoms    Negative: Question about upcoming scheduled test, no triage required and triager able to answer question    Negative: General information question, no triage required and triager able to answer question    Negative: Health Information question, no triage required and triager able to answer question    Protocols used: INFORMATION ONLY CALL - NO TRIAGE-A-

## 2021-11-29 NOTE — PROGRESS NOTES
Writer attempted to contact patient. Patient was unavailable and voicemail message was left to call the [facility] back at [phone] and ask to speak with a nurse.     If patient calls back please follow up with provider and RN message below.      Mary Grace Kendrick RN, BSN  [facility]

## 2021-11-29 NOTE — PROGRESS NOTES
[facility] now requests orders and shares plan of care/discharge summaries for some patients through [facility].  Please REPLY TO THIS MESSAGE OR ROUTE BACK TO THE AUTHOR in order to give authorization for orders when needed.  This is considered a verbal order, you will still receive a faxed copy of orders for signature.  Thank you for your assistance in improving collaboration for our patients.    Patient Update:    Patient was not admitted to services as unable to reach patient's daughter.  Home care attempted Thursday, Friday, Saturday and Sunday.  If patient still needs home care, a new referral/order will need to be generated.     Thank you    Milly Ho RN  [phone]

## 2021-11-29 NOTE — PROGRESS NOTES
Please let patient know and inform her daughter. Not sure if this was an issue due to the holiday weekend.  Leah Blanca MD

## 2021-12-01 ENCOUNTER — OFFICE VISIT (OUTPATIENT)
Dept: PALLIATIVE MEDICINE | Facility: CLINIC | Age: 81
End: 2021-12-01
Payer: MEDICARE

## 2021-12-01 VITALS — HEART RATE: 76 BPM | SYSTOLIC BLOOD PRESSURE: 167 MMHG | DIASTOLIC BLOOD PRESSURE: 73 MMHG

## 2021-12-01 DIAGNOSIS — M79.18 MYOFASCIAL PAIN: Primary | ICD-10-CM

## 2021-12-01 PROCEDURE — 20553 NJX 1/MLT TRIGGER POINTS 3/>: CPT | Performed by: PSYCHIATRY & NEUROLOGY

## 2021-12-01 RX ORDER — BUPIVACAINE HYDROCHLORIDE 5 MG/ML
5 INJECTION, SOLUTION EPIDURAL; INTRACAUDAL ONCE
Status: COMPLETED | OUTPATIENT
Start: 2021-12-01 | End: 2021-12-01

## 2021-12-01 RX ADMIN — BUPIVACAINE HYDROCHLORIDE 25 MG: 5 INJECTION, SOLUTION EPIDURAL; INTRACAUDAL at 16:21

## 2021-12-01 ASSESSMENT — PAIN SCALES - GENERAL: PAINLEVEL: SEVERE PAIN (6)

## 2021-12-01 NOTE — PATIENT INSTRUCTIONS
[facility]  Post Procedure Instructions    Today you had:  trigger point injections   occipital nerve block   bursa injection      Medications used:  lidocaine   bupivicaine   kenolog   dexamethasone          Go to the emergency room if you develop any shortness of breath    Monitor the injection sites for signs and symptoms of infection-fever, chills, redness, swelling, warmth, or drainage to areas.    You may have soreness at injection sites for up to 24 hours.    You may apply ice to the painful areas to help minimize the discomfort of the needle pokes.    Do not apply heat to sites for at least 12 hours.    You may use anti-inflammatory medications or Tylenol for pain control if necessary    Pain Clinic phone number during work hours (Monday through Friday 8 am-4:30 pm) at [phone] or the Provider Line after hours at [phone]:

## 2021-12-01 NOTE — PROGRESS NOTES
Pre procedure Diagnosis: myofascial pain   Post procedure Diagnosis: Same  Procedure performed: trigger point injections  Anesthesia: none  Complications: none  Operators: Esther Valdovinos MD     Indications:   Alannah Leos is a 81 year old female with a history of severe shoulder arthritis, PD, and myofascial pain.  Exam shows myofascial pain of the muscle groups listed below and they have tried conservative treatment including medications, PT.  She gets a few weeks of improvement from last injection, including improvement in range of motion.  Given risks with other treatments given age and condition, this seems to be a good treatment with limited side effects and functional improvement.      Options/alternatives, benefits and risks were discussed with the patient including bleeding, infection, tissue trauma and pnuemothorax.  Questions were answered to her satisfaction and she agrees to proceed. Voluntary informed consent was previously obtained and reviewed again today.    Vitals were reviewed: Yes  Allergies were reviewed:  Yes   Medications were reviewed:  Yes   Pre-procedure pain score: Severe Pain (6)     Procedure:  After getting informed consent, a Pause for the Cause was performed.    Trigger points were identified by patient, and marked when appropriate.  The area was prepped with Chloroprep.    Using clean technique, injections were completed using a 25G, 1.5 inch needle.  After negative aspiration, injection was completed.  A total of 6 locations were injected.  When possible, tissue was retracted from the chest wall to avoid lung injury.    Muscle groups injected:  Bilateral trapezius  Bilateral cervical paraspinal   Bilateral levator scapulae       Injection solution contained:   5ml of 1% lidocaine and 5ml of 0.5% bupivacaine.    Hemostasis was achieved.  The patient tolerated the procedure well.       Post-procedure pain score: NR  BP recheck better  Follow-up includes:   -repeat prn, ok to double  book- ok to add on at 3:45pm on Wed.       Esther Valdovinos MD  [facility]

## 2021-12-01 NOTE — PROGRESS NOTES
This writer attempted to contact Alannah on 12/01/21      Reason for call enrollment to home care  and left message to give a return phone call to the nurses at [phone]      If patient calls back:   Relay message from provider below, (read verbatim), document that pt called and close encounter        Amarilys Ding RN BSN

## 2021-12-02 ENCOUNTER — NURSE TRIAGE (OUTPATIENT)
Dept: NURSING | Facility: CLINIC | Age: 81
End: 2021-12-02
Payer: MEDICARE

## 2021-12-02 NOTE — TELEPHONE ENCOUNTER
"Pt's daughter Shiloh calling, states she is upset because they keep getting voice messages to call back to Dr. Blanca's office and then she has to call back and sit on hold and there is no note in chart and nobody knows what the call was about. Shiloh states she does not want pt to be getting up to answer the phone because it is difficult with her Parkinsons. Shiloh states \"we are just not going to answer anymore and hopefully they will stop\". Shiloh requesting message be sent to clinic regarding her frustration.     Writer unable to ascertain reason for voice messages, reviewed last several notes in chart and per Shiloh, not applicable at this time \"already spoke to clinic about that\".         Reason for Disposition    [1] Follow-up call to recent contact AND [2] information only call, no triage required    Protocols used: INFORMATION ONLY CALL - NO TRIAGE-A-AH      "

## 2021-12-02 NOTE — PROGRESS NOTES
Left message on answering machine for patient to call back to [phone]    RN please give patient provider message as written.  Melanie MASONN, RN

## 2021-12-03 NOTE — PROGRESS NOTES
"Spoke to patients daughter and she states that they are tired of us calling in regards to getting her set up for home care. They no longer want to be set up for home care. She states \"Please stop calling us\". I let her know that we are only trying to help if she still needs homecare assistance. She then hung up on me.    Masha Allen RN [facility]    "

## 2021-12-03 NOTE — TELEPHONE ENCOUNTER
I have not been calling patient, so I don't know who is putting in these calls. There is documentation that the home care was discontinued and that they were not very good with what the patient was expecting. Not really sure where to go from here, except we can stop calling patient now.  Leah Blanca MD

## 2021-12-15 ENCOUNTER — LAB (OUTPATIENT)
Dept: LAB | Facility: CLINIC | Age: 81
End: 2021-12-15

## 2021-12-15 ENCOUNTER — OFFICE VISIT (OUTPATIENT)
Dept: RHEUMATOLOGY | Facility: CLINIC | Age: 81
End: 2021-12-15
Payer: MEDICARE

## 2021-12-15 VITALS
WEIGHT: 122 LBS | BODY MASS INDEX: 20.94 KG/M2 | DIASTOLIC BLOOD PRESSURE: 76 MMHG | SYSTOLIC BLOOD PRESSURE: 164 MMHG | HEART RATE: 77 BPM

## 2021-12-15 DIAGNOSIS — M81.0 AGE-RELATED OSTEOPOROSIS WITHOUT CURRENT PATHOLOGICAL FRACTURE: ICD-10-CM

## 2021-12-15 DIAGNOSIS — Z11.59 SCREENING FOR VIRAL DISEASE: ICD-10-CM

## 2021-12-15 DIAGNOSIS — Z79.899 HIGH RISK MEDICATION USE: ICD-10-CM

## 2021-12-15 DIAGNOSIS — M06.09 RHEUMATOID ARTHRITIS OF MULTIPLE SITES WITH NEGATIVE RHEUMATOID FACTOR (H): ICD-10-CM

## 2021-12-15 DIAGNOSIS — M81.0 AGE RELATED OSTEOPOROSIS, UNSPECIFIED PATHOLOGICAL FRACTURE PRESENCE: ICD-10-CM

## 2021-12-15 DIAGNOSIS — M06.09 RHEUMATOID ARTHRITIS OF MULTIPLE SITES WITH NEGATIVE RHEUMATOID FACTOR (H): Primary | ICD-10-CM

## 2021-12-15 LAB
ALBUMIN SERPL-MCNC: 4.1 G/DL (ref 3.4–5)
ALP SERPL-CCNC: 93 U/L (ref 40–150)
ALT SERPL W P-5'-P-CCNC: 14 U/L (ref 0–50)
AST SERPL W P-5'-P-CCNC: 17 U/L (ref 0–45)
BILIRUB DIRECT SERPL-MCNC: 0.1 MG/DL (ref 0–0.2)
BILIRUB SERPL-MCNC: 0.4 MG/DL (ref 0.2–1.3)
CALCIUM SERPL-MCNC: 9.6 MG/DL (ref 8.5–10.1)
CREAT SERPL-MCNC: 0.45 MG/DL (ref 0.52–1.04)
CRP SERPL-MCNC: 5.8 MG/L (ref 0–8)
ERYTHROCYTE [SEDIMENTATION RATE] IN BLOOD BY WESTERGREN METHOD: 17 MM/HR (ref 0–30)
GFR SERPL CREATININE-BSD FRML MDRD: >90 ML/MIN/1.73M2
PROT SERPL-MCNC: 7.5 G/DL (ref 6.8–8.8)

## 2021-12-15 PROCEDURE — 99215 OFFICE O/P EST HI 40 MIN: CPT | Performed by: INTERNAL MEDICINE

## 2021-12-15 PROCEDURE — 36415 COLL VENOUS BLD VENIPUNCTURE: CPT

## 2021-12-15 PROCEDURE — 80076 HEPATIC FUNCTION PANEL: CPT

## 2021-12-15 PROCEDURE — 85652 RBC SED RATE AUTOMATED: CPT

## 2021-12-15 PROCEDURE — 82306 VITAMIN D 25 HYDROXY: CPT

## 2021-12-15 PROCEDURE — 82310 ASSAY OF CALCIUM: CPT

## 2021-12-15 PROCEDURE — 82565 ASSAY OF CREATININE: CPT

## 2021-12-15 PROCEDURE — 86140 C-REACTIVE PROTEIN: CPT

## 2021-12-15 PROCEDURE — 85025 COMPLETE CBC W/AUTO DIFF WBC: CPT

## 2021-12-15 RX ORDER — ZOLEDRONIC ACID 5 MG/100ML
5 INJECTION, SOLUTION INTRAVENOUS ONCE
Status: CANCELLED
Start: 2022-05-12 | End: 2022-05-12

## 2021-12-15 RX ORDER — EPINEPHRINE 1 MG/ML
0.3 INJECTION, SOLUTION, CONCENTRATE INTRAVENOUS EVERY 5 MIN PRN
Status: CANCELLED | OUTPATIENT
Start: 2022-05-12

## 2021-12-15 RX ORDER — NALOXONE HYDROCHLORIDE 0.4 MG/ML
0.2 INJECTION, SOLUTION INTRAMUSCULAR; INTRAVENOUS; SUBCUTANEOUS
Status: CANCELLED | OUTPATIENT
Start: 2022-05-12

## 2021-12-15 RX ORDER — HEPARIN SODIUM (PORCINE) LOCK FLUSH IV SOLN 100 UNIT/ML 100 UNIT/ML
5 SOLUTION INTRAVENOUS
Status: CANCELLED | OUTPATIENT
Start: 2022-05-12

## 2021-12-15 RX ORDER — FOLIC ACID 1 MG/1
1 TABLET ORAL DAILY
Qty: 90 TABLET | Refills: 2 | Status: SHIPPED | OUTPATIENT
Start: 2021-12-15 | End: 2022-11-07

## 2021-12-15 RX ORDER — MEPERIDINE HYDROCHLORIDE 25 MG/ML
25 INJECTION INTRAMUSCULAR; INTRAVENOUS; SUBCUTANEOUS EVERY 30 MIN PRN
Status: CANCELLED | OUTPATIENT
Start: 2022-05-12

## 2021-12-15 RX ORDER — ALBUTEROL SULFATE 90 UG/1
1-2 AEROSOL, METERED RESPIRATORY (INHALATION)
Status: CANCELLED
Start: 2022-05-12

## 2021-12-15 RX ORDER — METHOTREXATE 2.5 MG/1
20 TABLET ORAL WEEKLY
Qty: 32 TABLET | Refills: 3 | Status: SHIPPED | OUTPATIENT
Start: 2021-12-15 | End: 2022-04-21

## 2021-12-15 RX ORDER — HYDROXYCHLOROQUINE SULFATE 200 MG/1
200 TABLET, FILM COATED ORAL DAILY
Qty: 30 TABLET | Refills: 3 | Status: SHIPPED | OUTPATIENT
Start: 2021-12-15 | End: 2022-10-03

## 2021-12-15 RX ORDER — METHYLPREDNISOLONE SODIUM SUCCINATE 125 MG/2ML
125 INJECTION, POWDER, LYOPHILIZED, FOR SOLUTION INTRAMUSCULAR; INTRAVENOUS
Status: CANCELLED
Start: 2022-05-12

## 2021-12-15 RX ORDER — ALBUTEROL SULFATE 0.83 MG/ML
2.5 SOLUTION RESPIRATORY (INHALATION)
Status: CANCELLED | OUTPATIENT
Start: 2022-05-12

## 2021-12-15 RX ORDER — HEPARIN SODIUM,PORCINE 10 UNIT/ML
5 VIAL (ML) INTRAVENOUS
Status: CANCELLED | OUTPATIENT
Start: 2022-05-12

## 2021-12-15 RX ORDER — DIPHENHYDRAMINE HYDROCHLORIDE 50 MG/ML
50 INJECTION INTRAMUSCULAR; INTRAVENOUS
Status: CANCELLED
Start: 2022-05-12

## 2021-12-15 NOTE — PATIENT INSTRUCTIONS
RHEUMATOLOGY    Dr. Merrick Del Cid    [facility]  [address]  [place] [address]  Phone number: [phone]  Fax number: [phone]    Covid 19 vaccine scheduling phone number is [phone]    Thank you for choosing [facility]!    Joan Bucio [facility] Rheumatology    [facility]  [id number] [place] [address]  [phone]

## 2021-12-15 NOTE — PROGRESS NOTES
Rheumatology Clinic Visit      Alannah Leos MRN# [id number]   YOB: 1940 Age: 81 year old      Date of visit: 12/15/21   PCP: Dr. Leah Blanca    Chief Complaint   Patient presents with:  Rheumatoid Arthritis    Assessment and Plan     1. 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 Previously on methotrexate when first diagnosed (unclear if ineffective or not, stopped by patient preference to not treat her RA at that time).  Synovitis, subluxation, and fixed flexion deformities on initial exam. Steroid responsive. 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 Currently on methotrexate 20 mg once weekly, and hydroxychloroquine 200 mg daily. Previously on SSZ (felt poorly on it).  At almost every visit I have encouraged her to escalate treatment for her active disease but she has been reluctant to do so because of possible risks from the medication; she understands that the progression of her disease is not likely to be reversible as seen by the changes of her spine.  At one point she was going to proceed with SQ bDMARD but she never started, then she was going to proceed with an infusion bDMARD but she didn't go through with it.  She seems hesitant to start a treatment in the fear that it may not work, and she is hesitant to use medications because of possible side effects and cost.  She likely would qualify for financial assistance but she does not want to share her financial information that is needed to qualify.  At this point she says that she will continue to consider the infusion and the order for infliximab is still in the system. She understands that her disease will progress without additional treatment but she does not want to change anything right now.  She says that she appreciates the effort to treat her disease and that many of her other providers have told her not to  come back because she is resistant to treatments; she says that she would like to continue follow-up in the rheumatology clinic. She has benefited from MTX and HCQ so will continue.   Chronic illness, progressive, severe.    - Continue methotrexate 20 mg once weekly    - Continue folic acid 1mg daily  - Continue hydroxychloroquine 200mg daily (normal eye exam on 1/27/2020; advised that she update this eye exam.)  - Ophthalmology referral for hydroxychloroquine toxicity monitoring  - if she is willing to do so: Start Remicade 5 mg/kg IV on week 0, 2, 6, and then every 8 weeks thereafter  - Labs today and in 3 months:  CBC, Creatinine, Hepatic Panel, ESR, CRP    High risk medication requiring intensive toxicity monitoring at least quarterly: labs ordered include CBC, Creatinine, Hepatic panel to monitor for cytopenia and hepatotoxicity; checking creatinine as it affects clearance of medication.     # Infliximab (Remicade and biosimilars) Risks and Benefits: The risks and benefits of infliximab were discussed in detail and the patient verbalized understanding.  The risks discussed include, but are not limited to, the risk for hypersensitivity, anaphylaxis, anaphylactoid reactions, an increased risk for serious infections leading to hospitalization or death, a possible increased risk for lymphoma and other malignancies, a possible worsening of demyelinating diseases, a possible worsening of heart failure, cytopenias, hepatotoxicity, risk for drug induced lupus, possible reactivation of hepatitis B, and possible reactivation of latent tuberculosis.   The most common adverse reactions are infections, infusion-related reactions, and headache.  It was discussed that the medication would need to be discontinued if a serious infection develops.  It was discussed that live vaccinations should not be received while using infliximab or within 30 days prior to starting infliximab.  I encouraged reviewing the package insert and  asking any questions about the medication.      2. Positive PIERO and dsDNA: These were noted on record review. She does not have symptoms to support an PIERO-associated rheumatologic disease at this time.     3.  Diffuse neck/back pain: No evidence of instability by x-rays on 10/12/2016.  Seen by NSGY and now getting trigger point injections in the pain clinic with improvement    4. Parkinson's Disease: Followed by Dr. Ricardo Johnson at the [facility] previously, and now at [facility]    5. Osteoporosis: Based on 1/29/2018 DEXA.  Was on fosamax from 1446-6205; reclast then started 2021 with the first dose received 5/12/2021.   - Continue reclast 5mg IV yearly, next due to 5/12/2022  - Continue vitamin D 1000 IU daily   - Continue calcium 1000mg daily  - Labs: Ca, Vitamin D    6.  Vaccinations: Vaccinations reviewed with Ms. Leos.  Risks and benefits of vaccinations were discussed.    - Influenza: encouraged yearly vaccination  - Ioxewix21: up to date  - [id number]: up to date  - COVID-19: has received the pfizer vaccine on 3/12/2021, 4/2/2021, 10/13/2021     7. Elevated blood pressure:  Alannah to follow up with Primary Care provider regarding elevated blood pressure.     Total minutes spent in evaluation with patient, documentation, , and review of pertinent studies and chart notes: 28     Ms. Leos verbalized agreement with and understanding of the rational for the diagnosis and treatment plan.  All questions were answered to best of my ability and the patient's satisfaction. Ms. Leos was advised to contact the clinic with any questions that may arise after the clinic visit.      Thank you for involving me in the care of the patient    Return to clinic: 3 months      HPI   Alannah Leos is a 81 year old female with a medical history significant for Parkinson's disease, osteoarthritis, status post TKA, osteoporosis, and rheumatoid arthritis who presents for follow-up  of rheumatoid arthritis.    Today, Trigger point injections from the pain clinic are helpful.  She has not started infliximab and at this time doesn't plan to, but will still consider it. No change in symptoms.  Still with pain and swelling that is worse in the morning and improves with time and activity but then worsens with too much activity.  Limited activity because of deformities and pain.  Joints involved include her MCPs, PIPs, wrists, elbows, shoulders, knees, ankles, MTPs, neck.    Denies fevers, chills, nausea, vomiting, constipation, diarrhea. No abdominal pain. No chest pain/pressure, palpitations, or shortness of breath.   No oral or nasal sores.  No rash. No sicca symptoms.      Daughter is present with her today.    Tobacco: none  EtOH: 1 drink at [place] only  Drugs: none    ROS   12 point review of system was completed and negative except as noted in the HPI     Active Problem List     Patient Active Problem List   Diagnosis     Osteoporosis     Rheumatoid arthritis (H)     Parkinson disease (H)     Osteoarthritis of wrist     Osteoarthritis of shoulder     History of total knee arthroplasty     Osteoarthritis of left knee     Advanced directives, counseling/discussion     CARDIOVASCULAR SCREENING; LDL GOAL LESS THAN 160     High risk medication use     Underweight     Impingement syndrome, shoulder, left     Combined forms of age-related cataract of both eyes     Seborrheic dermatitis     Anemia in other chronic diseases classified elsewhere     Ankylosing spondylitis of cervical region (H)     Failure to thrive in adult     Past Medical History     Past Medical History:   Diagnosis Date     Hyperlipidemia      Murmur, heart     hsm     Nonsenile cataract      Osteoarthrosis, unspecified whether generalized or localized, lower leg      Osteopenia      Rheumatoid arthritis(714.0)      Past Surgical History     Past Surgical History:   Procedure Laterality Date     GALLBLADDER SURGERY        HYSTERECTOMY       KNEE SURGERY      left      LAMINECTOMY LUMBAR ONE LEVEL       TONSILLECTOMY       Allergy     Allergies   Allergen Reactions     Decadrol [Dexamethasone Sodium Phosphate] Hives     Shrimp Itching     Current Medication List     Current Outpatient Medications   Medication Sig     ammonium lactate (AMLACTIN) 12 % cream Apply once to twice a day to dry skin. Can burn temporarily if there is a cut on the skin.     CALCIUM + D OR 600mg twice a day      carbidopa-levodopa (SINEMET)  MG tablet Take one at 6 am, noon, 5 pm, 9 pm and midnight     diclofenac (VOLTAREN) 1 % topical gel Apply 2 g topically 3 times daily as needed for moderate pain     folic acid (FOLVITE) 1 MG tablet Take 1 tablet (1 mg) by mouth daily     hydroxychloroquine (PLAQUENIL) 200 MG tablet Take 1 tablet (200 mg) by mouth daily     ibuprofen (ADVIL/MOTRIN) 200 MG tablet Take 1 tablet (200 mg) by mouth every 8 hours as needed for moderate pain     meclizine (ANTIVERT) 25 MG tablet Take 1 tablet (25 mg) by mouth 3 times daily as needed for dizziness     methotrexate sodium 2.5 MG TABS Take 8 tablets (20 mg) by mouth once a week . Take all 8 tablets on the same day of each week.     order for DME Equipment being ordered: soft neck brace     ACE NOT PRESCRIBED, INTENTIONAL, 1 each continuous prn. ACE Inhibitor not prescribed due to Refusal by patient           Current Facility-Administered Medications   Medication     ropivacaine (NAROPIN) injection 3 mL     ropivacaine (NAROPIN) injection 3 mL     triamcinolone (KENALOG-40) injection 40 mg     triamcinolone (KENALOG-40) injection 40 mg         Social History   See HPI    Family History     Family History   Problem Relation Age of Onset     Cancer Sister         pancreatic     Diabetes No family hx of      Hypertension No family hx of        Physical Exam     Temp Readings from Last 3 Encounters:   01/17/20 97.5  F (36.4  C) (Oral)   09/12/19 98.2  F (36.8  C) (Oral)   07/25/19  "97.7  F (36.5  C) (Oral)     BP Readings from Last 5 Encounters:   12/15/21 (!) 164/76   12/01/21 (!) 167/73   11/03/21 (!) 160/65   10/13/21 (!) 164/73   09/22/21 (!) 169/70     Pulse Readings from Last 1 Encounters:   12/15/21 77     Resp Readings from Last 1 Encounters:   08/26/21 18     Estimated body mass index is 20.94 kg/m  as calculated from the following:    Height as of 6/9/21: 1.626 m (5' 4\").    Weight as of this encounter: 55.3 kg (122 lb).    GEN: NAD, thin and frail appearing  HEENT: MMM. No oral lesions. Anicteric, noninjected sclera  CV: S1, S2.  RRR.  No murmurs or rubs.  PULM: no increased work of breathing.  Clear to auscultation bilaterally.  MSK: Left 2nd-3rd MCPs with synovial swelling and tenderness to palpation.  Other MCPs and PIPs tender to palpation but without swelling. Bilateral wrist with swelling and tenderness to palpation but no overlying erythema or increased warmth.   Elbows without swelling or tenderness to palpation.  Shoulders diffusely tender to palpation and minimal ROM.  Knees, ankles, and MTPs without swelling or tenderness to palpation.  Holds neck in a flexed position.   NEURO: UE and LE strengths 5/5 and equal bilaterally.   SKIN: No rash. Diffuse hair thinning.  EXT: No lower extremity edema  PSYCH: Alert. Appropriate.      Labs / Imaging (select studies)     RF/CCP  Recent Labs   Lab Test 10/12/16  1142   CCPIGG 2   RHF <20     CBC  Recent Labs   Lab Test 08/18/21  1432 05/17/21  1342 02/22/21  1339 12/07/20  1353   WBC 6.7 7.5 8.1 7.5   RBC 3.99 3.70* 4.02 4.11   HGB 11.8 11.0* 11.6* 11.4*   HCT 35.9 33.6* 36.0 36.1   MCV 90 91 90 88   RDW 16.1* 16.8* 17.6* 17.3*    235 249 246   MCH 29.6 29.7 28.9 27.7   MCHC 32.9 32.7 32.2 31.6   NEUTROPHIL 65 73.0 70.0 72.0   LYMPH 24 20.0 17.0 19.0   MONOCYTE 8 4.0 10.0 5.0   EOSINOPHIL 2 2.0 2.0 2.0   BASOPHIL 1 1.0 1.0 2.0   ANEU  --  5.4 5.6 5.3   ALYM  --  1.5 1.4 1.4   DORITA  --  0.3 0.8 0.4   AEOS  --  0.2 0.2 0.2 "   ABAS  --  0.1 0.1 0.2   ANEUTAUTO 4.4  --   --   --    ALYMPAUTO 1.6  --   --   --    AMONOAUTO 0.6  --   --   --    AEOSAUTO 0.1  --   --   --    ABSBASO 0.1  --   --   --      CMP  Recent Labs   Lab Test 08/18/21  1432 05/17/21  1342 05/12/21  1334 03/12/21  1328 02/22/21  1339 05/18/20  1325 01/15/20  1403 10/12/16  1142 05/27/16  1413 05/26/15  1416   NA  --   --   --   --   --   --  137  --  136 137   POTASSIUM  --   --   --   --   --   --  4.2  --  3.7 4.0   CHLORIDE  --   --   --   --   --   --  102  --  102 103   CO2  --   --   --   --   --   --  30  --  25 28   ANIONGAP  --   --   --   --   --   --  5  --  9 6   GLC  --   --   --   --   --   --  97  --  94 100*   BUN  --   --   --   --   --   --  15  --  11 9   CR 0.52 0.63 0.65  --  0.58   < > 0.55   < > 0.41* 0.47*   GFRESTIMATED >90 85 84  --  88   < > 90   < > >90  Non  GFR Calc   >90  Non  GFR Calc     GFRESTBLACK  --  >90 >90  --  >90   < > >90   < > >90   GFR Calc   >90   GFR Calc     PAKO  --   --  9.2 9.6  --   --  9.5  10.2*   < > 9.2 9.2   BILITOTAL 0.4 0.4  --   --  0.4   < > 0.4   < > 0.5  --    ALBUMIN 4.1 3.7  --   --  4.1   < > 4.3   < > 3.8  --    PROTTOTAL 7.0 7.3  --   --  7.9   < > 7.8   < > 8.8  --    ALKPHOS 67 77  --   --  91   < > 125   < > 108  --    AST 13 16  --   --  18   < > 25   < > 15  --    ALT 13 12  --   --  10   < > 18   < > 8  --     < > = values in this interval not displayed.     Calcium/VitaminD  Recent Labs   Lab Test 05/12/21  1334 03/12/21  1328 01/15/20  1403 01/25/18  1353   PAKO 9.2 9.6 9.5  10.2* 9.1   VITDT  --  37 39 39     ESR/CRP  Recent Labs   Lab Test 08/18/21  1432 05/17/21  1342 12/07/20  1353   SED 10 17 32*   CRP <2.9 6.0 10.3*     Lipid Panel  Recent Labs   Lab Test 05/26/15  1416   CHOL 179   TRIG 50   HDL 80   LDL 89   VLDL 10   CHOLHDLRATIO 2.2     Hepatitis B  Recent Labs   Lab Test 01/18/17  1601   HBCAB Nonreactive   HEPBANG  Nonreactive     Hepatitis C  Recent Labs   Lab Test 01/18/17  1601   HCVAB Nonreactive   Assay performance characteristics have not been established for newborns,   infants, and children         Tuberculosis Screening  Recent Labs   Lab Test 03/12/21  1328 01/25/18  1352 01/18/17  1601   TBRSLT  --  Negative Negative   TBAGN  --  0.00 0.00   TBRST Negative  --   --      HIV Screening  Recent Labs   Lab Test 01/18/17  1601   HIAGAB Nonreactive   HIV-1 p24 Ag & HIV-1/HIV-2 Ab Not Detected             Immunization History     Immunization History   Administered Date(s) Administered     COVID-19,PF,Pfizer (12+ Yrs) 03/12/2021, 04/02/2021, 10/13/2021     Pneumo Conj 13-V (2010&after) 11/03/2016     Pneumococcal 23 valent 11/01/2007, 11/13/2007, 03/12/2018     TD (ADULT, 7+) 11/13/2007     Tdap (Adacel,Boostrix) 11/13/2007          Chart documentation done in part with Dragon Voice recognition Software. Although reviewed after completion, some word and grammatical error may remain.      Merrick Del Cid MD

## 2021-12-16 LAB
BASOPHILS # BLD AUTO: 0.1 10E3/UL (ref 0–0.2)
BASOPHILS NFR BLD AUTO: 1 %
DEPRECATED CALCIDIOL+CALCIFEROL SERPL-MC: 40 UG/L (ref 20–75)
EOSINOPHIL # BLD AUTO: 0.1 10E3/UL (ref 0–0.7)
EOSINOPHIL NFR BLD AUTO: 1 %
ERYTHROCYTE [DISTWIDTH] IN BLOOD BY AUTOMATED COUNT: 16.4 % (ref 10–15)
HCT VFR BLD AUTO: 35.9 % (ref 35–47)
HGB BLD-MCNC: 11.7 G/DL (ref 11.7–15.7)
IMM GRANULOCYTES # BLD: 0.1 10E3/UL
IMM GRANULOCYTES NFR BLD: 1 %
LYMPHOCYTES # BLD AUTO: 1.3 10E3/UL (ref 0.8–5.3)
LYMPHOCYTES NFR BLD AUTO: 15 %
MCH RBC QN AUTO: 28.8 PG (ref 26.5–33)
MCHC RBC AUTO-ENTMCNC: 32.6 G/DL (ref 31.5–36.5)
MCV RBC AUTO: 88 FL (ref 78–100)
MONOCYTES # BLD AUTO: 0.7 10E3/UL (ref 0–1.3)
MONOCYTES NFR BLD AUTO: 8 %
NEUTROPHILS # BLD AUTO: 6.3 10E3/UL (ref 1.6–8.3)
NEUTROPHILS NFR BLD AUTO: 74 %
NRBC # BLD AUTO: 0 10E3/UL
NRBC BLD AUTO-RTO: 0 /100
PLATELET # BLD AUTO: 284 10E3/UL (ref 150–450)
RBC # BLD AUTO: 4.06 10E6/UL (ref 3.8–5.2)
WBC # BLD AUTO: 8.5 10E3/UL (ref 4–11)

## 2021-12-22 ENCOUNTER — OFFICE VISIT (OUTPATIENT)
Dept: PALLIATIVE MEDICINE | Facility: CLINIC | Age: 81
End: 2021-12-22
Payer: MEDICARE

## 2021-12-22 VITALS — SYSTOLIC BLOOD PRESSURE: 152 MMHG | DIASTOLIC BLOOD PRESSURE: 77 MMHG | HEART RATE: 75 BPM

## 2021-12-22 DIAGNOSIS — M79.18 MYOFASCIAL PAIN: Primary | ICD-10-CM

## 2021-12-22 PROCEDURE — 20553 NJX 1/MLT TRIGGER POINTS 3/>: CPT | Performed by: PSYCHIATRY & NEUROLOGY

## 2021-12-22 RX ADMIN — BUPIVACAINE HYDROCHLORIDE 20 MG: 5 INJECTION, SOLUTION EPIDURAL; INTRACAUDAL at 11:34

## 2021-12-22 ASSESSMENT — PAIN SCALES - GENERAL: PAINLEVEL: SEVERE PAIN (6)

## 2021-12-22 NOTE — PROGRESS NOTES
Pre procedure Diagnosis: myofascial pain   Post procedure Diagnosis: Same  Procedure performed: trigger point injections  Anesthesia: none  Complications: none  Operators: Esther Valdovinos MD     Indications:   Alannah Leos is a 81 year old female with a history of severe shoulder arthritis, PD, and myofascial pain.  Exam shows myofascial pain of the muscle groups listed below and they have tried conservative treatment including medications, PT.  Feels they remain quite helpful, and allow improvement in function, eating, pain without side effects.  Includes improvement in range of motion.  Given risks with other treatments given age and condition, this seems to be a good treatment with limited side effects and functional improvement.      Options/alternatives, benefits and risks were discussed with the patient including bleeding, infection, tissue trauma and pnuemothorax.  Questions were answered to her satisfaction and she agrees to proceed. Voluntary informed consent was previously obtained and reviewed again today.    Vitals were reviewed: Yes  Allergies were reviewed:  Yes   Medications were reviewed:  Yes   Pre-procedure pain score: Severe Pain (6)     Procedure:  After getting informed consent, a Pause for the Cause was performed.    Trigger points were identified by patient, and marked when appropriate.  The area was prepped with Chloroprep.    Using clean technique, injections were completed using a 25G, 1.5 inch needle.  After negative aspiration, injection was completed.  A total of 6 locations were injected.  When possible, tissue was retracted from the chest wall to avoid lung injury.    Muscle groups injected:  Bilateral trapezius  Bilateral cervical paraspinal   Bilateral levator scapulae       Injection solution contained:   4ml of 1% lidocaine and 4ml of 0.5% bupivacaine.    Hemostasis was achieved.  The patient tolerated the procedure well.       Post-procedure pain score: NR  BP recheck  better  Follow-up includes:   -repeat prn, ok to double book- ok to add on at 3:45pm on Wed.       Esther Valdovinos MD  [facility]

## 2021-12-22 NOTE — PATIENT INSTRUCTIONS
[facility]  Post Procedure Instructions    Today you had:  trigger point injections   occipital nerve block   bursa injection      Medications used:  lidocaine   bupivicaine   kenolog   dexamethasone          Go to the emergency room if you develop any shortness of breath    Monitor the injection sites for signs and symptoms of infection-fever, chills, redness, swelling, warmth, or drainage to areas.    You may have soreness at injection sites for up to 24 hours.    You may apply ice to the painful areas to help minimize the discomfort of the needle pokes.    Do not apply heat to sites for at least 12 hours.    You may use anti-inflammatory medications or Tylenol for pain control if necessary    Pain Clinic phone number during work hours (Monday through Friday 8 am-4:30 pm) at [phone] or the Provider Line after hours at [phone]:

## 2021-12-23 NOTE — RESULT ENCOUNTER NOTE
"Please mail Ms. Leos her results with the following message.    \"Ms. Dafne,    Rheumatology labs are okay.    Please let me know if you have any questions.    Sincerely,  Merrick Del Cid MD  12/23/2021 6:18 AM\"  "

## 2021-12-30 ENCOUNTER — TELEPHONE (OUTPATIENT)
Dept: PALLIATIVE MEDICINE | Facility: CLINIC | Age: 81
End: 2021-12-30
Payer: MEDICARE

## 2021-12-30 NOTE — TELEPHONE ENCOUNTER
Voice message left 12/28/2021 at 5:08 PM    Reason for Call:  Other appointment    Detailed comments: Per voice message - In regards to a call about us having an appt tomorrow, we will not be there tomorrow (29th) we were just there last week. Not sure why, so we will not be there. If you need to call us you can otherwise just cancel the appointment. Thank you.      Phone Number Patient can be reached at: Home number on file [phone] (home)    Best Time: not indicated    Can we leave a detailed message on this number? not indicated    Voice message retrieved on 12/30/2021 at 9:15 AM by Mary Grace Melara

## 2021-12-30 NOTE — TELEPHONE ENCOUNTER
Appointment's already been cancelled.          Mary Grace Shoreham  Patient Representative  [facility] [place]

## 2022-01-10 RX ORDER — BUPIVACAINE HYDROCHLORIDE 5 MG/ML
4 INJECTION, SOLUTION EPIDURAL; INTRACAUDAL ONCE
Status: COMPLETED | OUTPATIENT
Start: 2022-01-10 | End: 2021-12-22

## 2022-01-19 ENCOUNTER — OFFICE VISIT (OUTPATIENT)
Dept: PALLIATIVE MEDICINE | Facility: CLINIC | Age: 82
End: 2022-01-19
Payer: MEDICARE

## 2022-01-19 VITALS — SYSTOLIC BLOOD PRESSURE: 167 MMHG | HEART RATE: 74 BPM | DIASTOLIC BLOOD PRESSURE: 72 MMHG

## 2022-01-19 DIAGNOSIS — M79.18 MYOFASCIAL PAIN: Primary | ICD-10-CM

## 2022-01-19 PROCEDURE — 20553 NJX 1/MLT TRIGGER POINTS 3/>: CPT | Performed by: PSYCHIATRY & NEUROLOGY

## 2022-01-19 RX ORDER — BUPIVACAINE HYDROCHLORIDE 5 MG/ML
5.5 INJECTION, SOLUTION EPIDURAL; INTRACAUDAL ONCE
Status: COMPLETED | OUTPATIENT
Start: 2022-01-19 | End: 2022-01-19

## 2022-01-19 RX ADMIN — BUPIVACAINE HYDROCHLORIDE 27.5 MG: 5 INJECTION, SOLUTION EPIDURAL; INTRACAUDAL at 16:17

## 2022-01-19 ASSESSMENT — PAIN SCALES - GENERAL: PAINLEVEL: SEVERE PAIN (7)

## 2022-01-19 NOTE — PATIENT INSTRUCTIONS
[facility]  Post Procedure Instructions    Today you had:  trigger point injections       Medications used:  lidocaine   bupivicaine         Go to the emergency room if you develop any shortness of breath    Monitor the injection sites for signs and symptoms of infection-fever, chills, redness, swelling, warmth, or drainage to areas.    You may have soreness at injection sites for up to 24 hours.    You may apply ice to the painful areas to help minimize the discomfort of the needle pokes.    Do not apply heat to sites for at least 12 hours.    You may use anti-inflammatory medications or Tylenol for pain control if necessary    Pain Clinic phone number during work hours (Monday through Friday 8 am-4:30 pm) at [phone] or the Provider Line after hours at [phone]:

## 2022-01-19 NOTE — PROGRESS NOTES
Pre procedure Diagnosis: myofascial pain   Post procedure Diagnosis: Same  Procedure performed: trigger point injections  Anesthesia: none  Complications: none  Operators: Esther Valdovinos MD     Indications:   Alannah Leos is a 81 year old female with a history of severe shoulder arthritis, PD, and myofascial pain.  Exam shows myofascial pain of the muscle groups listed below and they have tried conservative treatment including medications, PT.  Feels they remain quite helpful, and allow improvement in function, eating, standing up, and decreasing pain without side effects.  Includes improvement in range of motion. Last a couple of weeks. Given risks with other treatments given age and condition, this seems to be a good treatment with limited side effects and functional improvement.      Options/alternatives, benefits and risks were discussed with the patient including bleeding, infection, tissue trauma and pnuemothorax.  Questions were answered to her satisfaction and she agrees to proceed. Voluntary informed consent was previously obtained and reviewed again today.    Vitals were reviewed: Yes  Allergies were reviewed:  Yes   Medications were reviewed:  Yes   Pre-procedure pain score: Severe Pain (7)     Procedure:  After getting informed consent, a Pause for the Cause was performed.    Trigger points were identified by patient, and marked when appropriate.  The area was prepped with Chloroprep.    Using clean technique, injections were completed using a 25G, 1.5 inch needle.  After negative aspiration, injection was completed.  A total of 8 locations were injected.  When possible, tissue was retracted from the chest wall to avoid lung injury.    Muscle groups injected:  Bilateral trapezius  Bilateral cervical paraspinal   Bilateral levator scapulae       Injection solution contained:   5.5ml of 1% lidocaine and 5.5ml of 0.5% bupivacaine.    Hemostasis was achieved.  The patient tolerated the procedure well.        Post-procedure pain score: improved  BP recheck better  Follow-up includes:   -repeat prn, ok to double book- ok to add on at 3:45pm on Wed.       Esther Valdovinos MD  [facility]

## 2022-02-09 ENCOUNTER — OFFICE VISIT (OUTPATIENT)
Dept: PALLIATIVE MEDICINE | Facility: CLINIC | Age: 82
End: 2022-02-09
Payer: MEDICARE

## 2022-02-09 VITALS — DIASTOLIC BLOOD PRESSURE: 70 MMHG | HEART RATE: 78 BPM | SYSTOLIC BLOOD PRESSURE: 172 MMHG

## 2022-02-09 DIAGNOSIS — M79.18 MYOFASCIAL PAIN: Primary | ICD-10-CM

## 2022-02-09 PROCEDURE — 20553 NJX 1/MLT TRIGGER POINTS 3/>: CPT | Performed by: PSYCHIATRY & NEUROLOGY

## 2022-02-09 RX ORDER — BUPIVACAINE HYDROCHLORIDE 5 MG/ML
5 INJECTION, SOLUTION EPIDURAL; INTRACAUDAL ONCE
Status: COMPLETED | OUTPATIENT
Start: 2022-02-09 | End: 2022-02-09

## 2022-02-09 RX ADMIN — BUPIVACAINE HYDROCHLORIDE 25 MG: 5 INJECTION, SOLUTION EPIDURAL; INTRACAUDAL at 16:16

## 2022-02-09 ASSESSMENT — PAIN SCALES - GENERAL: PAINLEVEL: SEVERE PAIN (6)

## 2022-02-09 NOTE — PATIENT INSTRUCTIONS
[facility]  Post Procedure Instructions    Today you had:  trigger point injections      Medications used:  lidocaine   bupivicaine          Go to the emergency room if you develop any shortness of breath    Monitor the injection sites for signs and symptoms of infection-fever, chills, redness, swelling, warmth, or drainage to areas.    You may have soreness at injection sites for up to 24 hours.    You may apply ice to the painful areas to help minimize the discomfort of the needle pokes.    Do not apply heat to sites for at least 12 hours.    You may use anti-inflammatory medications or Tylenol for pain control if necessary    Pain Clinic phone number during work hours (Monday through Friday 8 am-4:30 pm) at [phone] or the Provider Line after hours at [phone]:

## 2022-02-09 NOTE — PROGRESS NOTES
Pre procedure Diagnosis: myofascial pain   Post procedure Diagnosis: Same  Procedure performed: trigger point injections  Anesthesia: none  Complications: none  Operators: Esther Valdovinos MD     Indications:   Alannah Leos is a 81 year old female with a history of severe shoulder arthritis, PD, and myofascial pain.  Exam shows myofascial pain of the muscle groups listed below and they have tried conservative treatment including medications, PT.  Feels they remain quite helpful, and allow improvement in function, eating, standing up, and decreasing pain without side effects.  Includes improvement in range of motion.Given risks with other treatments given age and condition, this seems to be a good treatment with limited side effects and functional improvement.    She is not interested in pursuing botox.    Options/alternatives, benefits and risks were discussed with the patient including bleeding, infection, tissue trauma and pnuemothorax.  Questions were answered to her satisfaction and she agrees to proceed. Voluntary informed consent was previously obtained and reviewed again today.    Vitals were reviewed: Yes  Allergies were reviewed:  Yes   Medications were reviewed:  Yes   Pre-procedure pain score: Severe Pain (6)     Procedure:  After getting informed consent, a Pause for the Cause was performed.    Trigger points were identified by patient, and marked when appropriate.  The area was prepped with Chloroprep.    Using clean technique, injections were completed using a 25G, 1.5 inch needle.  After negative aspiration, injection was completed.  A total of 8 locations were injected.  When possible, tissue was retracted from the chest wall to avoid lung injury.    Muscle groups injected:  Bilateral trapezius  Bilateral cervical paraspinal   Bilateral levator scapulae       Injection solution contained:   5.5ml of 1% lidocaine and 5.5ml of 0.5% bupivacaine.    Hemostasis was achieved.  The patient tolerated the  procedure well.       Post-procedure pain score: not improved- states this one doesn't feel immediate relief as others.  More painful injections as well  Follow-up includes:   -repeat prn, ok to double book- ok to add on at 3:45pm on Wed.       Esther Valdovinos MD  [facility]

## 2022-03-02 ENCOUNTER — OFFICE VISIT (OUTPATIENT)
Dept: PALLIATIVE MEDICINE | Facility: CLINIC | Age: 82
End: 2022-03-02
Payer: MEDICARE

## 2022-03-02 VITALS — HEART RATE: 74 BPM | SYSTOLIC BLOOD PRESSURE: 178 MMHG | DIASTOLIC BLOOD PRESSURE: 78 MMHG

## 2022-03-02 DIAGNOSIS — M79.18 MYOFASCIAL PAIN: Primary | ICD-10-CM

## 2022-03-02 PROCEDURE — 20553 NJX 1/MLT TRIGGER POINTS 3/>: CPT | Performed by: PSYCHIATRY & NEUROLOGY

## 2022-03-02 RX ORDER — BUPIVACAINE HYDROCHLORIDE 5 MG/ML
5 INJECTION, SOLUTION EPIDURAL; INTRACAUDAL ONCE
Status: COMPLETED | OUTPATIENT
Start: 2022-03-02 | End: 2022-03-02

## 2022-03-02 RX ADMIN — BUPIVACAINE HYDROCHLORIDE 25 MG: 5 INJECTION, SOLUTION EPIDURAL; INTRACAUDAL at 15:54

## 2022-03-02 ASSESSMENT — PAIN SCALES - GENERAL: PAINLEVEL: SEVERE PAIN (6)

## 2022-03-02 NOTE — PROGRESS NOTES
Pre procedure Diagnosis: myofascial pain   Post procedure Diagnosis: Same  Procedure performed: trigger point injections  Anesthesia: none  Complications: none  Operators: Esther Valdovinos MD     Indications:   Alannah Leos is a 81 year old female with a history of severe shoulder arthritis, PD, and myofascial pain.  Exam shows myofascial pain of the muscle groups listed below and they have tried conservative treatment including medications, PT.  Feels they remain quite helpful, and allow improvement in function, eating, standing up, and decreasing pain without side effects.  Includes improvement in range of motion.Given risks with other treatments given age and condition, this seems to be a good treatment with limited side effects and functional improvement. Last TPI were painful but did help.    She is not interested in pursuing botox.    Options/alternatives, benefits and risks were discussed with the patient including bleeding, infection, tissue trauma and pnuemothorax.  Questions were answered to her satisfaction and she agrees to proceed. Voluntary informed consent was previously obtained and reviewed again today.    Vitals were reviewed: Yes  Allergies were reviewed:  Yes   Medications were reviewed:  Yes   Pre-procedure pain score: Severe Pain (6)     Procedure:  After getting informed consent, a Pause for the Cause was performed.    Trigger points were identified by patient, and marked when appropriate.  The area was prepped with Chloroprep.    Using clean technique, injections were completed using a 25G, 1.5 inch needle.  After negative aspiration, injection was completed.  A total of 10 locations were injected.  When possible, tissue was retracted from the chest wall to avoid lung injury.    Muscle groups injected:  Bilateral trapezius  Bilateral cervical paraspinal   Bilateral levator scapulae       Injection solution contained:   5ml of 1% lidocaine and 5ml of 0.5% bupivacaine.    Hemostasis was  achieved.  The patient tolerated the procedure well.       Post-procedure pain score: 5/10  Follow-up includes:   -repeat prn, ok to double book- ok to add on at 3:45pm on Wed.       Esther Valdovinos MD  [facility]

## 2022-03-02 NOTE — PATIENT INSTRUCTIONS
[facility]  Post Procedure Instructions    Today you had:  trigger point injections         Medications used:  lidocaine   bupivicaine          Go to the emergency room if you develop any shortness of breath    Monitor the injection sites for signs and symptoms of infection-fever, chills, redness, swelling, warmth, or drainage to areas.    You may have soreness at injection sites for up to 24 hours.    You may apply ice to the painful areas to help minimize the discomfort of the needle pokes.    Do not apply heat to sites for at least 12 hours.    You may use anti-inflammatory medications or Tylenol for pain control if necessary    Pain Clinic phone number during work hours (Monday through Friday 8 am-4:30 pm) at [phone] or the Provider Line after hours at [phone]:

## 2022-03-14 ENCOUNTER — TELEPHONE (OUTPATIENT)
Dept: RHEUMATOLOGY | Facility: CLINIC | Age: 82
End: 2022-03-14
Payer: MEDICARE

## 2022-03-14 NOTE — TELEPHONE ENCOUNTER
Writer attempted to reach the patient and schedule labs/Reclast mid May 2022(last one 5/12/2021).    Writer malena.    [facility]  Cancer/Infusion Center   [phone]

## 2022-03-24 ENCOUNTER — OFFICE VISIT (OUTPATIENT)
Dept: PALLIATIVE MEDICINE | Facility: CLINIC | Age: 82
End: 2022-03-24
Payer: MEDICARE

## 2022-03-24 VITALS — DIASTOLIC BLOOD PRESSURE: 65 MMHG | SYSTOLIC BLOOD PRESSURE: 168 MMHG | HEART RATE: 80 BPM

## 2022-03-24 DIAGNOSIS — M79.18 MYOFASCIAL PAIN: Primary | ICD-10-CM

## 2022-03-24 PROCEDURE — 20553 NJX 1/MLT TRIGGER POINTS 3/>: CPT | Performed by: PAIN MEDICINE

## 2022-03-24 ASSESSMENT — PAIN SCALES - GENERAL: PAINLEVEL: MODERATE PAIN (5)

## 2022-03-24 NOTE — PATIENT INSTRUCTIONS
[facility]  Post Procedure Instructions    Today you had:  trigger point injections   occipital nerve block   bursa injection      Medications used:  lidocaine   bupivicaine   kenolog   dexamethasone          Go to the emergency room if you develop any shortness of breath    Monitor the injection sites for signs and symptoms of infection-fever, chills, redness, swelling, warmth, or drainage to areas.    You may have soreness at injection sites for up to 24 hours.    You may apply ice to the painful areas to help minimize the discomfort of the needle pokes.    Do not apply heat to sites for at least 12 hours.    You may use anti-inflammatory medications or Tylenol for pain control if necessary    Pain Clinic phone number during work hours (Monday through Friday 8 am-4:30 pm) at [phone] or the Provider Line after hours at [phone]:

## 2022-03-28 RX ORDER — BUPIVACAINE HYDROCHLORIDE 5 MG/ML
10 INJECTION, SOLUTION EPIDURAL; INTRACAUDAL ONCE
Status: COMPLETED | OUTPATIENT
Start: 2022-03-28 | End: 2022-03-28

## 2022-03-28 RX ADMIN — BUPIVACAINE HYDROCHLORIDE 50 MG: 5 INJECTION, SOLUTION EPIDURAL; INTRACAUDAL at 08:58

## 2022-03-28 NOTE — PROGRESS NOTES
Alannah was seen today for pain.    Diagnoses and all orders for this visit:    Myofascial pain  -     NO CHARGE LOS  -     bupivacaine (MARCAINE) 0.5% preservative free injection        Trigger points were identified by patient, and marked when appropriate.  The area was prepped with Chloroprep.    Using clean technique, injections were completed using a 25G, 3.5 inch needle.  After negative aspiration, injection was completed.  A total of 5 locations were injected.  When possible, tissue was retracted from the chest wall to avoid lung injury.    Muscle groups injected:  Bilateral trapezius  Bilateral cervical paraspinal   Bilateral levator scapulae     Injection solution contained:  10ml of 0.5% bupivacaine        Martínez Cao MD  [facility] [place]

## 2022-04-21 ENCOUNTER — OFFICE VISIT (OUTPATIENT)
Dept: RHEUMATOLOGY | Facility: CLINIC | Age: 82
End: 2022-04-21
Payer: MEDICARE

## 2022-04-21 ENCOUNTER — LAB (OUTPATIENT)
Dept: LAB | Facility: CLINIC | Age: 82
End: 2022-04-21
Payer: MEDICARE

## 2022-04-21 VITALS
OXYGEN SATURATION: 95 % | HEART RATE: 83 BPM | WEIGHT: 122.6 LBS | SYSTOLIC BLOOD PRESSURE: 163 MMHG | DIASTOLIC BLOOD PRESSURE: 69 MMHG | BODY MASS INDEX: 21.04 KG/M2

## 2022-04-21 DIAGNOSIS — M06.09 RHEUMATOID ARTHRITIS OF MULTIPLE SITES WITH NEGATIVE RHEUMATOID FACTOR (H): Primary | ICD-10-CM

## 2022-04-21 DIAGNOSIS — Z79.899 HIGH RISK MEDICATION USE: ICD-10-CM

## 2022-04-21 DIAGNOSIS — M06.09 RHEUMATOID ARTHRITIS OF MULTIPLE SITES WITH NEGATIVE RHEUMATOID FACTOR (H): ICD-10-CM

## 2022-04-21 DIAGNOSIS — M15.9 OSTEOARTHRITIS OF MULTIPLE JOINTS, UNSPECIFIED OSTEOARTHRITIS TYPE: ICD-10-CM

## 2022-04-21 DIAGNOSIS — M81.0 AGE-RELATED OSTEOPOROSIS WITHOUT CURRENT PATHOLOGICAL FRACTURE: ICD-10-CM

## 2022-04-21 LAB
ALBUMIN SERPL-MCNC: 3.5 G/DL (ref 3.4–5)
ALP SERPL-CCNC: 80 U/L (ref 40–150)
ALT SERPL W P-5'-P-CCNC: 11 U/L (ref 0–50)
AST SERPL W P-5'-P-CCNC: 20 U/L (ref 0–45)
BASOPHILS # BLD AUTO: 0.1 10E3/UL (ref 0–0.2)
BASOPHILS NFR BLD AUTO: 1 %
BILIRUB DIRECT SERPL-MCNC: <0.1 MG/DL (ref 0–0.2)
BILIRUB SERPL-MCNC: 0.3 MG/DL (ref 0.2–1.3)
CREAT SERPL-MCNC: 0.61 MG/DL (ref 0.52–1.04)
CRP SERPL-MCNC: 11.7 MG/L (ref 0–8)
EOSINOPHIL # BLD AUTO: 0.1 10E3/UL (ref 0–0.7)
EOSINOPHIL NFR BLD AUTO: 2 %
ERYTHROCYTE [DISTWIDTH] IN BLOOD BY AUTOMATED COUNT: 16.7 % (ref 10–15)
ERYTHROCYTE [SEDIMENTATION RATE] IN BLOOD BY WESTERGREN METHOD: 44 MM/HR (ref 0–30)
GFR SERPL CREATININE-BSD FRML MDRD: 90 ML/MIN/1.73M2
HCT VFR BLD AUTO: 32.2 % (ref 35–47)
HGB BLD-MCNC: 10.3 G/DL (ref 11.7–15.7)
IMM GRANULOCYTES # BLD: 0 10E3/UL
IMM GRANULOCYTES NFR BLD: 0 %
LYMPHOCYTES # BLD AUTO: 1.4 10E3/UL (ref 0.8–5.3)
LYMPHOCYTES NFR BLD AUTO: 21 %
MCH RBC QN AUTO: 27.2 PG (ref 26.5–33)
MCHC RBC AUTO-ENTMCNC: 32 G/DL (ref 31.5–36.5)
MCV RBC AUTO: 85 FL (ref 78–100)
MONOCYTES # BLD AUTO: 0.5 10E3/UL (ref 0–1.3)
MONOCYTES NFR BLD AUTO: 7 %
NEUTROPHILS # BLD AUTO: 4.5 10E3/UL (ref 1.6–8.3)
NEUTROPHILS NFR BLD AUTO: 69 %
NRBC # BLD AUTO: 0 10E3/UL
NRBC BLD AUTO-RTO: 0 /100
PLATELET # BLD AUTO: 341 10E3/UL (ref 150–450)
PROT SERPL-MCNC: 7.2 G/DL (ref 6.8–8.8)
RBC # BLD AUTO: 3.78 10E6/UL (ref 3.8–5.2)
WBC # BLD AUTO: 6.6 10E3/UL (ref 4–11)

## 2022-04-21 PROCEDURE — 86140 C-REACTIVE PROTEIN: CPT

## 2022-04-21 PROCEDURE — 99215 OFFICE O/P EST HI 40 MIN: CPT | Performed by: INTERNAL MEDICINE

## 2022-04-21 PROCEDURE — 85025 COMPLETE CBC W/AUTO DIFF WBC: CPT

## 2022-04-21 PROCEDURE — 36415 COLL VENOUS BLD VENIPUNCTURE: CPT

## 2022-04-21 PROCEDURE — 85652 RBC SED RATE AUTOMATED: CPT

## 2022-04-21 PROCEDURE — 80076 HEPATIC FUNCTION PANEL: CPT

## 2022-04-21 PROCEDURE — 82565 ASSAY OF CREATININE: CPT

## 2022-04-21 RX ORDER — METHOTREXATE 2.5 MG/1
20 TABLET ORAL WEEKLY
Qty: 32 TABLET | Refills: 6 | Status: SHIPPED | OUTPATIENT
Start: 2022-04-21 | End: 2022-10-27

## 2022-04-21 NOTE — LETTER
May 2, 2022      Alannah Leos  [address] [address] [address]        Dear ,    We are writing to inform you of your test results.    Alannah Leos that labs showed a slightly reduced hemoglobin and this will be reevaluated with future labs.  Inflammatory markers ESR and CRP are both elevated, correlating with active inflammatory arthritis.  Other labs are okay.     Merrick Del Cid MD  4/22/2022     Resulted Orders   Hepatic function panel   Result Value Ref Range    Bilirubin Total 0.3 0.2 - 1.3 mg/dL    Bilirubin Direct <0.1 0.0 - 0.2 mg/dL    Protein Total 7.2 6.8 - 8.8 g/dL    Albumin 3.5 3.4 - 5.0 g/dL    Alkaline Phosphatase 80 40 - 150 U/L    AST 20 0 - 45 U/L    ALT 11 0 - 50 U/L   CRP inflammation   Result Value Ref Range    CRP Inflammation 11.7 (H) 0.0 - 8.0 mg/L   Erythrocyte sedimentation rate auto   Result Value Ref Range    Erythrocyte Sedimentation Rate 44 (H) 0 - 30 mm/hr   Creatinine   Result Value Ref Range    Creatinine 0.61 0.52 - 1.04 mg/dL    GFR Estimate 90 >60 mL/min/1.73m2      Comment:      Effective December 21, 2021 eGFRcr in adults is calculated using the 2021 CKD-EPI creatinine equation which includes age and gender (Talat et al., NE, DOI: 10.1056/IWQRiu8319964)   CBC with platelets and differential   Result Value Ref Range    WBC Count 6.6 4.0 - 11.0 10e3/uL    RBC Count 3.78 (L) 3.80 - 5.20 10e6/uL    Hemoglobin 10.3 (L) 11.7 - 15.7 g/dL    Hematocrit 32.2 (L) 35.0 - 47.0 %    MCV 85 78 - 100 fL    MCH 27.2 26.5 - 33.0 pg    MCHC 32.0 31.5 - 36.5 g/dL    RDW 16.7 (H) 10.0 - 15.0 %    Platelet Count 341 150 - 450 10e3/uL    % Neutrophils 69 %    % Lymphocytes 21 %    % Monocytes 7 %    % Eosinophils 2 %    % Basophils 1 %    % Immature Granulocytes 0 %    NRBCs per 100 WBC 0 <1 /100    Absolute Neutrophils 4.5 1.6 - 8.3 10e3/uL    Absolute Lymphocytes 1.4 0.8 - 5.3 10e3/uL    Absolute Monocytes 0.5 0.0 - 1.3 10e3/uL    Absolute Eosinophils 0.1 0.0 - 0.7 10e3/uL     Absolute Basophils 0.1 0.0 - 0.2 10e3/uL    Absolute Immature Granulocytes 0.0 <=0.4 10e3/uL    Absolute NRBCs 0.0 10e3/uL       If you have any questions or concerns, please call the clinic at the number listed above.       Sincerely,      Merrick Del Cid MD/Christina AZUL RN  5/2/2022 10:21 AM

## 2022-04-21 NOTE — PATIENT INSTRUCTIONS
RHEUMATOLOGY    Dr. Merrick Del Cid    [address]  [place] [address]  Phone number: [phone]  Fax number: [phone]      Thank you for choosing [facility]!    Joan Bucio CMA Rheumatology    --------------------    Please consider seeing Dr. Robins or one of his colleagues at [email or web address]  [email or web address]  [phone]

## 2022-04-21 NOTE — PROGRESS NOTES
RAPID3 (0-30) Cumulative Score  16          RAPID3 Weighted Score (divide #4 by 3 and that is the weighted score)  5.3

## 2022-04-21 NOTE — PROGRESS NOTES
Rheumatology Clinic Visit      Alannah Leos MRN# [id number]   YOB: 1940 Age: 81 year old      Date of visit: 4/21/22   PCP: Dr. Leah Blanca    Chief Complaint   Patient presents with:  Rheumatoid Arthritis: Neck pain when bending down, thumbs getting worse, and knee pain    Assessment and Plan     1. 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 Previously on methotrexate when first diagnosed (unclear if ineffective or not, stopped by patient preference to not treat her RA at that time).  Synovitis, subluxation, and fixed flexion deformities on initial exam on 10/12/2016. Steroid responsive. 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 Currently on methotrexate 20 mg once weekly, and hydroxychloroquine 200 mg daily. Previously on sulfasalazine (felt poorly on it).  At almost every visit I have encouraged her to escalate treatment for her active disease but she has been reluctant to do so because of possible risks from the medication; she understands that the progression of her disease is not likely to be reversible as seen by the changes of her spine.  At one point she was going to proceed with SQ bDMARD but she never started, then she was going to proceed with an infusion bDMARD but she didn't go through with it.  She seems hesitant to start a treatment in the fear that it may not work, and she is hesitant to use medications because of possible side effects and cost; but the most recent concern has been about the potential that the medication may not work for her arthritis.  She verbalized understanding that she has severe degenerative arthritis in addition to active severe inflammatory arthritis.  We again discussed the importance of treating her inflammatory arthritis with regard to articular and extra-articular manifestations.  She has benefited from methotrexate and  hydroxychloroquine so plan to continue these, but will need a hydroxychloroquine toxicity monitoring eye exam completed to safely continue hydroxychloroquine.  Again discussed using Remicade.  If she decides to use Remicade, or a biosimilar if preferred by patient or required by her insurance, then will need to recheck TB test prior to starting.  She says that she will consider this.   Chronic illness, progressive, severe.    - Continue methotrexate 20 mg once weekly    - Continue folic acid 1mg daily  - Continue hydroxychloroquine 200mg daily (normal eye exam on 1/27/2020; updated eye exam required for future refills, refill by MD only)  - Ophthalmology referral for hydroxychloroquine toxicity monitoring given previously and she and her daughter say they will call again to schedule  - if she is willing to do so: Start Remicade (or biosimilar if required by insurance)  - Labs today: pending results  - Labs in 3 months: CBC, Creatinine, Hepatic Panel  - Labs in 6 months: CBC, Creatinine, Hepatic Panel, ESR, CRP     High risk medication requiring intensive toxicity monitoring at least quarterly: labs ordered include CBC, Creatinine, Hepatic panel to monitor for cytopenia and hepatotoxicity; checking creatinine as it affects clearance of medication.     # Infliximab (Remicade and biosimilars) Risks and Benefits: The risks and benefits of infliximab were discussed in detail and the patient verbalized understanding.  The risks discussed include, but are not limited to, the risk for hypersensitivity, anaphylaxis, anaphylactoid reactions, an increased risk for serious infections leading to hospitalization or death, a possible increased risk for lymphoma and other malignancies, a possible worsening of demyelinating diseases, a possible worsening of heart failure, cytopenias, hepatotoxicity, risk for drug induced lupus, possible reactivation of hepatitis B, and possible reactivation of latent tuberculosis.   The most common  adverse reactions are infections, infusion-related reactions, and headache.  It was discussed that the medication would need to be discontinued if a serious infection develops.  It was discussed that live vaccinations should not be received while using infliximab or within 30 days prior to starting infliximab.  I encouraged reviewing the package insert and asking any questions about the medication.      2. Positive PIERO and dsDNA: These were noted on record review. She does not have symptoms to support an PIERO-associated rheumatologic disease at this time.     3. Diffuse neck/back pain: No evidence of instability by x-rays on 10/12/2016.  Seen by NSGY and now getting trigger point injections in the pain clinic with improvement    4. Parkinson's Disease: Followed by Dr. Ricardo Johnson at the [facility] previously, and now at [facility]    5. Osteoporosis: Based on 1/29/2018 DEXA.  Was on fosamax from [id number]; reclast then started 2021 with the first dose received 5/12/2021.   - Continue reclast 5mg IV yearly, next due to 5/12/2022  - Continue vitamin D 1000 IU daily   - Continue calcium 1000mg daily    6.  Severe osteoarthritis of multiple joints: Has been evaluated by surgeons and is now followed in the pain clinic.  Severe osteoarthritis that is limiting daily activities.    7.  Severe osteoarthritis and severe rheumatoid arthritis: Significantly impacting her quality of life.  Function limited due to degenerative and inflammatory changes.  She is resistant to escalating treatment for inflammatory arthritis.  Discussed option for palliative care evaluation and she would like to consider this.  She is in agreement with having the referral placed.  - Palliative care referral    8.  Vaccinations: Vaccinations reviewed with Ms. Leos.  Risks and benefits of vaccinations were discussed.    - Influenza: encouraged yearly vaccination  - [id number]: up to date  - [id number]: up to date  -  COVID-19: has received the pfizer vaccine on 3/12/2021, 4/2/2021, 10/13/2021. A 4th dose (1st booster) of the mRNA COVID-19 vaccination is recommended to be received 3 months after the third mRNA COVID-19 vaccination was received.  A 5th mRNA vaccine (2nd booster) may be received at least 4 months after the 4th dose.   Based on our current understanding (and this may change over time as we learn more), medications should be adjusted as noted below, if disease activity allows:  - NSAIDs and Acetaminophen: hold for 24 hours prior to vaccination if able to do so  - Methotrexate should be held for 1-2 weeks after each COVID-19 vaccine (as disease activity allows)    9. Elevated blood pressure:  Alannah to follow up with Primary Care provider regarding elevated blood pressure.     Total minutes spent in evaluation with patient, documentation, , and review of pertinent studies and chart notes: 21     Ms. Leos verbalized agreement with and understanding of the rational for the diagnosis and treatment plan.  All questions were answered to best of my ability and the patient's satisfaction. Ms. Leos was advised to contact the clinic with any questions that may arise after the clinic visit.      Thank you for involving me in the care of the patient    Return to clinic: 3 months      HPI   Alannah Leos is a 81 year old female with a medical history significant for Parkinson's disease, osteoarthritis, status post TKA, osteoporosis, and rheumatoid arthritis who presents for follow-up of rheumatoid arthritis.    Today, 4/21/2022: Trigger point injections from the pain clinic are helpful but she is worried that her new pain clinic provider will not schedule appointments every 3 weeks that have been helpful for her, but rather she is being scheduled every 5 weeks and she has too much pain to go every 5 weeks between injections.  She is wondering if there is another provider that has more availability to allow her  to come in every 3 weeks for these injections; she says that she will also speak with her current pain management provider who she has only seen once so far.  Severe pain in the right shoulder and hardly able to use it now.  Pain in her wrists, MCPs, and PIPs where she also has swelling.  Stiffness lasts for all day, worse in the morning when she first wakes.  Wrists, elbows, shoulders, knees, ankles, MTPs, neck, and fingers all hurt.  She is okay with continuing Reclast for osteoporosis management but is hesitant to use biologic DMARD or escalate treatment for RA in any way at this time because she is concerned that it may not work.  She verbalized understanding that untreated inflammatory arthritis will potentially cause worsening irreversible damage.    Denies fevers, chills, nausea, vomiting, constipation, diarrhea. No abdominal pain. No chest pain/pressure, palpitations, or shortness of breath.   No oral or nasal sores.  No rash. No sicca symptoms.      Daughter is present with her today.    Tobacco: none  EtOH: 1 drink at [place] only  Drugs: none    ROS   12 point review of system was completed and negative except as noted in the HPI     Active Problem List     Patient Active Problem List   Diagnosis     Osteoporosis     Rheumatoid arthritis (H)     Parkinson disease (H)     Osteoarthritis of wrist     Osteoarthritis of shoulder     History of total knee arthroplasty     Osteoarthritis of left knee     Advanced directives, counseling/discussion     CARDIOVASCULAR SCREENING; LDL GOAL LESS THAN 160     High risk medication use     Underweight     Impingement syndrome, shoulder, left     Combined forms of age-related cataract of both eyes     Seborrheic dermatitis     Anemia in other chronic diseases classified elsewhere     Ankylosing spondylitis of cervical region (H)     Failure to thrive in adult     Past Medical History     Past Medical History:   Diagnosis Date     Hyperlipidemia      Murmur, heart      hsm     Nonsenile cataract      Osteoarthrosis, unspecified whether generalized or localized, lower leg      Osteopenia      Rheumatoid arthritis(714.0)      Past Surgical History     Past Surgical History:   Procedure Laterality Date     GALLBLADDER SURGERY       HYSTERECTOMY       KNEE SURGERY      left      LAMINECTOMY LUMBAR ONE LEVEL       TONSILLECTOMY       Allergy     Allergies   Allergen Reactions     Decadrol [Dexamethasone Sodium Phosphate] Hives     Shrimp Itching     Current Medication List     Current Outpatient Medications   Medication Sig     ACE NOT PRESCRIBED, INTENTIONAL, 1 each continuous prn. ACE Inhibitor not prescribed due to Refusal by patient           CALCIUM + D OR 600mg twice a day      carbidopa-levodopa (SINEMET)  MG tablet Take one at 6 am, noon, 5 pm, 9 pm and midnight     diclofenac (VOLTAREN) 1 % topical gel Apply 2 g topically 3 times daily as needed for moderate pain     folic acid (FOLVITE) 1 MG tablet Take 1 tablet (1 mg) by mouth daily     hydroxychloroquine (PLAQUENIL) 200 MG tablet Take 1 tablet (200 mg) by mouth daily     ibuprofen (ADVIL/MOTRIN) 200 MG tablet Take 1 tablet (200 mg) by mouth every 8 hours as needed for moderate pain     meclizine (ANTIVERT) 25 MG tablet Take 1 tablet (25 mg) by mouth 3 times daily as needed for dizziness     methotrexate sodium 2.5 MG TABS Take 8 tablets (20 mg) by mouth once a week . Take all 8 tablets on the same day of each week.     ammonium lactate (AMLACTIN) 12 % cream Apply once to twice a day to dry skin. Can burn temporarily if there is a cut on the skin. (Patient not taking: Reported on 4/21/2022)     order for DME Equipment being ordered: soft neck brace (Patient not taking: Reported on 4/21/2022)     Current Facility-Administered Medications   Medication     ropivacaine (NAROPIN) injection 3 mL     ropivacaine (NAROPIN) injection 3 mL     triamcinolone (KENALOG-40) injection 40 mg     triamcinolone (KENALOG-40) injection 40  "mg     Social History   See HPI    Family History     Family History   Problem Relation Age of Onset     Cancer Sister         pancreatic     Diabetes No family hx of      Hypertension No family hx of        Physical Exam     Temp Readings from Last 3 Encounters:   01/17/20 97.5  F (36.4  C) (Oral)   09/12/19 98.2  F (36.8  C) (Oral)   07/25/19 97.7  F (36.5  C) (Oral)     BP Readings from Last 5 Encounters:   04/21/22 (!) 163/69   03/24/22 (!) 168/65   03/02/22 (!) 178/78   02/09/22 (!) 172/70   01/19/22 (!) 167/72     Pulse Readings from Last 1 Encounters:   04/21/22 83     Resp Readings from Last 1 Encounters:   08/26/21 18     Estimated body mass index is 21.04 kg/m  as calculated from the following:    Height as of 6/9/21: 1.626 m (5' 4\").    Weight as of this encounter: 55.6 kg (122 lb 9.6 oz).    GEN: NAD, thin and frail appearing  HEENT: MMM. No oral lesions. Anicteric, noninjected sclera  CV: S1, S2.  RRR.  No murmurs or rubs.  PULM: no increased work of breathing.  Clear to auscultation bilaterally.  MSK: Bilateral second-fourth MCPs and second-third PIPs with synovial swelling and tenderness to palpation.  Left wrist with synovial swelling and tenderness palpation.  Right wrist without synovial swelling but was tender to palpation.   Elbows without swelling or tenderness to palpation.  Shoulders diffusely tender to palpation and minimal ROM; no swelling or increased warmth.  Knees with medial joint line tenderness but no effusion or increased warmth.  Ankles and MTPs without swelling or tenderness to palpation.  Holds neck in a flexed position.   SKIN: No rash. Diffuse hair thinning.  EXT: No lower extremity edema  PSYCH: Alert. Appropriate.      Labs / Imaging (select studies)     RF/CCP  Recent Labs   Lab Test 10/12/16  1142   CCPIGG 2   RHF <20     CBC  Recent Labs   Lab Test 12/15/21  1553 08/18/21  1432 05/17/21  1342 02/22/21  1339 12/07/20  1353   WBC 8.5 6.7 7.5 8.1 7.5   RBC 4.06 3.99 3.70* 4.02 " 4.11   HGB 11.7 11.8 11.0* 11.6* 11.4*   HCT 35.9 35.9 33.6* 36.0 36.1   MCV 88 90 91 90 88   RDW 16.4* 16.1* 16.8* 17.6* 17.3*    251 235 249 246   MCH 28.8 29.6 29.7 28.9 27.7   MCHC 32.6 32.9 32.7 32.2 31.6   NEUTROPHIL 74 65 73.0 70.0 72.0   LYMPH 15 24 20.0 17.0 19.0   MONOCYTE 8 8 4.0 10.0 5.0   EOSINOPHIL 1 2 2.0 2.0 2.0   BASOPHIL 1 1 1.0 1.0 2.0   ANEU  --   --  5.4 5.6 5.3   ALYM  --   --  1.5 1.4 1.4   DORITA  --   --  0.3 0.8 0.4   AEOS  --   --  0.2 0.2 0.2   ABAS  --   --  0.1 0.1 0.2   ANEUTAUTO 6.3 4.4  --   --   --    ALYMPAUTO 1.3 1.6  --   --   --    AMONOAUTO 0.7 0.6  --   --   --    AEOSAUTO 0.1 0.1  --   --   --    ABSBASO 0.1 0.1  --   --   --      CMP  Recent Labs   Lab Test 12/15/21  1553 08/18/21  1432 05/17/21  1342 05/12/21  1334 03/12/21  1328 02/22/21  1339 05/18/20  1325 01/15/20  1403 10/12/16  1142 05/27/16  1413 05/26/15  1416   NA  --   --   --   --   --   --   --  137  --  136 137   POTASSIUM  --   --   --   --   --   --   --  4.2  --  3.7 4.0   CHLORIDE  --   --   --   --   --   --   --  102  --  102 103   CO2  --   --   --   --   --   --   --  30  --  25 28   ANIONGAP  --   --   --   --   --   --   --  5  --  9 6   GLC  --   --   --   --   --   --   --  97  --  94 100*   BUN  --   --   --   --   --   --   --  15  --  11 9   CR 0.45* 0.52 0.63 0.65  --  0.58   < > 0.55   < > 0.41* 0.47*   GFRESTIMATED >90 >90 85 84  --  88   < > 90   < > >90  Non  GFR Calc   >90  Non  GFR Calc     GFRESTBLACK  --   --  >90 >90  --  >90   < > >90   < > >90   GFR Calc   >90   GFR Calc     PAKO 9.6  --   --  9.2 9.6  --   --  9.5  10.2*   < > 9.2 9.2   BILITOTAL 0.4 0.4 0.4  --   --  0.4   < > 0.4   < > 0.5  --    ALBUMIN 4.1 4.1 3.7  --   --  4.1   < > 4.3   < > 3.8  --    PROTTOTAL 7.5 7.0 7.3  --   --  7.9   < > 7.8   < > 8.8  --    ALKPHOS 93 67 77  --   --  91   < > 125   < > 108  --    AST 17 13 16  --   --  18   < > 25   < >  15  --    ALT 14 13 12  --   --  10   < > 18   < > 8  --     < > = values in this interval not displayed.     Calcium/VitaminD  Recent Labs   Lab Test 12/15/21  1553 05/12/21  1334 03/12/21  1328 01/15/20  1403   PAKO 9.6 9.2 9.6 9.5  10.2*   VITDT 40  --  37 39     ESR/CRP  Recent Labs   Lab Test 12/15/21  1553 08/18/21  1432 05/17/21  1342   SED 17 10 17   CRP 5.8 <2.9 6.0     Lipid Panel  Recent Labs   Lab Test 05/26/15  1416   CHOL 179   TRIG 50   HDL 80   LDL 89   VLDL 10   CHOLHDLRATIO 2.2     Hepatitis B  Recent Labs   Lab Test 01/18/17  1601   HBCAB Nonreactive   HEPBANG Nonreactive     Hepatitis C  Recent Labs   Lab Test 01/18/17  1601   HCVAB Nonreactive   Assay performance characteristics have not been established for newborns,   infants, and children         Tuberculosis Screening  Recent Labs   Lab Test 03/12/21  1328 01/25/18  1352 01/18/17  1601   TBRSLT  --  Negative Negative   TBAGN  --  0.00 0.00   TBRST Negative  --   --      HIV Screening  Recent Labs   Lab Test 01/18/17  1601   HIAGAB Nonreactive   HIV-1 p24 Ag & HIV-1/HIV-2 Ab Not Detected             Immunization History     Immunization History   Administered Date(s) Administered     COVID-19,PF,Pfizer (12+ Yrs) 03/12/2021, 04/02/2021, 10/13/2021     Pneumo Conj 13-V (2010&after) 11/03/2016     Pneumococcal 23 valent 11/01/2007, 11/13/2007, 03/12/2018     TD (ADULT, 7+) 11/13/2007     Tdap (Adacel,Boostrix) 11/13/2007          Chart documentation done in part with Dragon Voice recognition Software. Although reviewed after completion, some word and grammatical error may remain.      Merrick Del Cid MD

## 2022-04-22 NOTE — RESULT ENCOUNTER NOTE
RN: Please call to notify Alannah Leos that labs showed a slightly reduced hemoglobin and this will be reevaluated with future labs.  Inflammatory markers ESR and CRP are both elevated, correlating with active inflammatory arthritis.  Other labs are okay.    Merrick Del Cid MD  4/22/2022

## 2022-04-28 ENCOUNTER — LAB (OUTPATIENT)
Dept: LAB | Facility: CLINIC | Age: 82
End: 2022-04-28

## 2022-04-28 ENCOUNTER — OFFICE VISIT (OUTPATIENT)
Dept: PALLIATIVE MEDICINE | Facility: CLINIC | Age: 82
End: 2022-04-28
Payer: MEDICARE

## 2022-04-28 VITALS — DIASTOLIC BLOOD PRESSURE: 73 MMHG | SYSTOLIC BLOOD PRESSURE: 181 MMHG

## 2022-04-28 DIAGNOSIS — M79.18 MYOFASCIAL MUSCLE PAIN: Primary | ICD-10-CM

## 2022-04-28 DIAGNOSIS — Z11.59 SCREENING FOR VIRAL DISEASE: ICD-10-CM

## 2022-04-28 PROCEDURE — 20553 NJX 1/MLT TRIGGER POINTS 3/>: CPT | Performed by: PAIN MEDICINE

## 2022-04-28 ASSESSMENT — PAIN SCALES - GENERAL: PAINLEVEL: SEVERE PAIN (7)

## 2022-04-28 NOTE — PATIENT INSTRUCTIONS
[facility]  Post Procedure Instructions    Today you had:  trigger point injections   occipital nerve block   bursa injection  Joint Injection    Medications used:  lidocaine   bupivicaine   kenolog   dexamethasone        Go to the emergency room if you develop any shortness of breath  Monitor the injection sites for signs and symptoms of infection-fever, chills, redness, swelling, warmth, or drainage to areas.  You may have soreness at injection sites for up to 24 hours.  It may take up to 14 days for the steroid medication to start working although you may feel the effect as early as a few days after the procedure.     You may apply ice to the painful areas to help minimize the discomfort of the needle pokes.  Do not apply heat to sites for at least 12 hours.  You may use anti-inflammatory medications or Tylenol for pain control if necessary    Pain Clinic phone number during work hours (Monday through Friday 8 am-4:30 pm) at [phone] or the Provider Line after hours at [phone]:

## 2022-05-02 ENCOUNTER — TELEPHONE (OUTPATIENT)
Dept: RHEUMATOLOGY | Facility: CLINIC | Age: 82
End: 2022-05-02
Payer: MEDICARE

## 2022-05-02 NOTE — TELEPHONE ENCOUNTER
Daughter and pt called back. They were notified of the labs. Daughter will sign a C2C next time she is in a [facility].    Christina AZUL RN Specialty Triage 5/2/2022 1:20 PM

## 2022-05-10 ENCOUNTER — TRANSFERRED RECORDS (OUTPATIENT)
Dept: HEALTH INFORMATION MANAGEMENT | Facility: CLINIC | Age: 82
End: 2022-05-10

## 2022-05-11 RX ORDER — BUPIVACAINE HYDROCHLORIDE 5 MG/ML
10 INJECTION, SOLUTION EPIDURAL; INTRACAUDAL ONCE
Status: COMPLETED | OUTPATIENT
Start: 2022-05-11 | End: 2022-05-11

## 2022-05-11 RX ADMIN — BUPIVACAINE HYDROCHLORIDE 50 MG: 5 INJECTION, SOLUTION EPIDURAL; INTRACAUDAL at 21:01

## 2022-05-12 ENCOUNTER — LAB (OUTPATIENT)
Dept: LAB | Facility: CLINIC | Age: 82
End: 2022-05-12
Payer: MEDICARE

## 2022-05-12 ENCOUNTER — TELEPHONE (OUTPATIENT)
Dept: RHEUMATOLOGY | Facility: CLINIC | Age: 82
End: 2022-05-12
Payer: MEDICARE

## 2022-05-12 ENCOUNTER — OFFICE VISIT (OUTPATIENT)
Dept: PALLIATIVE CARE | Facility: CLINIC | Age: 82
End: 2022-05-12
Attending: PEDIATRICS
Payer: MEDICARE

## 2022-05-12 VITALS — DIASTOLIC BLOOD PRESSURE: 78 MMHG | SYSTOLIC BLOOD PRESSURE: 173 MMHG | OXYGEN SATURATION: 98 % | HEART RATE: 77 BPM

## 2022-05-12 DIAGNOSIS — M81.0 AGE RELATED OSTEOPOROSIS, UNSPECIFIED PATHOLOGICAL FRACTURE PRESENCE: ICD-10-CM

## 2022-05-12 DIAGNOSIS — M06.09 RHEUMATOID ARTHRITIS OF MULTIPLE SITES WITH NEGATIVE RHEUMATOID FACTOR (H): ICD-10-CM

## 2022-05-12 DIAGNOSIS — M81.0 AGE RELATED OSTEOPOROSIS, UNSPECIFIED PATHOLOGICAL FRACTURE PRESENCE: Primary | ICD-10-CM

## 2022-05-12 DIAGNOSIS — M15.9 OSTEOARTHRITIS OF MULTIPLE JOINTS, UNSPECIFIED OSTEOARTHRITIS TYPE: ICD-10-CM

## 2022-05-12 LAB
CALCIUM SERPL-MCNC: 10 MG/DL (ref 8.5–10.1)
CREAT SERPL-MCNC: 0.47 MG/DL (ref 0.52–1.04)
GFR SERPL CREATININE-BSD FRML MDRD: >90 ML/MIN/1.73M2

## 2022-05-12 PROCEDURE — 99204 OFFICE O/P NEW MOD 45 MIN: CPT | Mod: 95 | Performed by: STUDENT IN AN ORGANIZED HEALTH CARE EDUCATION/TRAINING PROGRAM

## 2022-05-12 PROCEDURE — G0463 HOSPITAL OUTPT CLINIC VISIT: HCPCS

## 2022-05-12 PROCEDURE — 36415 COLL VENOUS BLD VENIPUNCTURE: CPT

## 2022-05-12 PROCEDURE — 82565 ASSAY OF CREATININE: CPT

## 2022-05-12 PROCEDURE — 82310 ASSAY OF CALCIUM: CPT

## 2022-05-12 RX ORDER — BUPRENORPHINE 5 UG/H
1 PATCH TRANSDERMAL
Qty: 4 PATCH | Refills: 0 | Status: SHIPPED | OUTPATIENT
Start: 2022-05-12 | End: 2022-08-31

## 2022-05-12 ASSESSMENT — PAIN SCALES - GENERAL: PAINLEVEL: SEVERE PAIN (7)

## 2022-05-12 NOTE — PROGRESS NOTES
Alannah was seen today for pain.    Diagnoses and all orders for this visit:    Myofascial muscle pain  -     bupivacaine (MARCAINE) 0.5% preservative free injection  -     NO CHARGE LOS        Trigger points were identified by patient, and marked when appropriate.  The area was prepped with Chloroprep.    Using clean technique, injections were completed using a 25G, 3.5 inch needle.  After negative aspiration, injection was completed.  A total of 5 locations were injected.  When possible, tissue was retracted from the chest wall to avoid lung injury.    Muscle groups injected:  Bilateral trapezius  Bilateral cervical paraspinal   Bilateral levator scapulae     Injection solution contained:  10ml of 0.5% bupivacaine        Martínez Cao MD  [facility] [place]

## 2022-05-12 NOTE — PATIENT INSTRUCTIONS
Thank you for seeing the Palliative Care Clinic today.      1.  Start using the Butrans (buprenorphine) patch 5 mcg/h.  You will put this patch on and leave it on for 1 week, then replace it with a new patch.   2.  This medication is usually very well-tolerated.  However I would like for you to be alert for any signs of sleepiness or lethargy, lightheadedness or dizziness, confusion or hallucinations, or constipation.  If you have any of these side effects, please call the clinic for further directions.  3.  Please bring a copy of your healthcare directive into the clinic the next time you come in.    Return to clinic in about 1 month for a follow-up.      You can reach the Palliative Care Team during business hours at the following numbers:   -For the [facility], call [phone]  -To reach the palliative RN for questions or refills, call [phone]       To reach the Palliative Care Provider on-call After-hours or on holidays and weekends, call: [phone].  Please note that we are not able to provide pain medication refills on evenings or weekends.

## 2022-05-12 NOTE — PROGRESS NOTES
"Alannah is a 81 year old who is being evaluated via a billable video visit.      How would you like to obtain your AVS? Mail a copy        Video Start Time: 1445  Video-Visit Details    Type of service:  Video Visit    Video End Time:1600    Originating Location (pt. Location): Other in clinic    Distant Location (provider location):  [facility]     Platform used for Video Visit: [facility]     Palliative Care Outpatient Clinic Consultation Note    Patient:  Alannah Leos    Chief Complaint:   Alannah Leos 81 year old female who is presenting to the palliative medicine clinic today at the request of rheumatology service for a palliative care consultation secondary to ongoing generalized musculoskeletal pain related to rheumatoid arthritis and osteoarthritis.   The patient's primary care provider is:  No Ref-Primary, Physician.     History of Present Illness:  Alannah Leos is a 81 year old woman with history of rheumatologic disease (seronegative rheumatoid arthritis versus spondyloarthropathy) and osteoarthritis who presents to our clinic for help with symptom management in the setting of these diseases.  The patient has been reluctant to escalate her DM I do not know the month, but I have just sent KARL regimen for rheumatoid arthritis, related mainly to concerned about side effects, cost of medication, and effectiveness.  Her rheumatologist is spoken with her repeatedly about escalating these medications, and her daughter is also trying to convince her to start them.  She tells me that she has pain \"all over\" but that it is most acute in her neck.  She states that the pain is achy and sharp, worsened by movement or sitting in 1 position for a long period of time.  It does improve somewhat with Tylenol or ibuprofen, Voltaren gel, and use of a neck pillow.  She notes that the pain is making many things significantly more difficult for her, and she specifically notes that it is harder " to eat because her jaw is painful and clicks when she tries to open her mouth or chew.  She lives in her own split-level home, and is still able to navigate it though she has significant difficulty getting up the steps.  When she does that she lives on the top level, and is able to make it to the bathroom and otherwise manage her ADLs.  Her daughter lives across the street and is available to help her mother; she does note that her mom has needed significantly more assistance.  The patient very much wants to stay in her own home, but states that if she absolutely needs to enter an assisted living or skilled nursing facility she would be willing to do so.  She denies any other significant symptoms, and specifically denies any nausea, difficulty with her bowels, difficulty sleeping, or issues with depressed mood or anxiety.    Patient's Disease Understanding: The patient understands that her disease will continue to worsen, and that it may eventually result in some level of disability for her.    Coping: She feels that she is coping well, and her daughter who is present with her today agrees that her mood generally seems fairly good.    Social History  Living Situation: Lives in own split-level home  Children: 2 daughters, 1 of whom lives across the street and 1 of whom lives in [place]  Actual/Potential Caregiver(s): Local daughter  Support System: Daughters, sons in law, 3 siblings, friends  Occupation: Did not  work prior to [employer]  Hobbies: Previously enjoyed dancing, walking, crafting but these activities have been curtailed by her advancing rheumatoid arthritis.  She does still enjoy reading, doing crossword's, and watching TV.  Spiritual Background: Her L[employer] john is important to her.    Social History     Tobacco Use     Smoking status: Never Smoker     Smokeless tobacco: Never Used   Substance Use Topics     Alcohol use: No     Drug use: No       Family History  Family History   Problem  Relation Age of Onset     Cancer Sister         pancreatic     Diabetes No family hx of      Hypertension No family hx of        Advance Care Planning:  Advance Directive:    Patient has completed but is not on file with us; I asked daughter to bring to next visit  Health Care Agent Contact Information: Daughter Shiloh Mccoy, [phone]  POLST:   None.  Patient indicates to me that she would like resuscitative efforts, but not would not want a prolonged period on life support.    Allergies   Allergen Reactions     Decadrol [Dexamethasone Sodium Phosphate] Hives     Shrimp Itching     Current Outpatient Medications   Medication Sig Dispense Refill     buprenorphine (BUTRANS) 5 MCG/HR WK patch Place 1 patch onto the skin every 7 days 4 patch 0     ACE NOT PRESCRIBED, INTENTIONAL, 1 each continuous prn. ACE Inhibitor not prescribed due to Refusal by patient       0 each 0     CALCIUM + D OR 600mg twice a day        carbidopa-levodopa (SINEMET)  MG tablet Take one at 6 am, noon, 5 pm, 9 pm and midnight 150 tablet 11     diclofenac (VOLTAREN) 1 % topical gel Apply 2 g topically 3 times daily as needed for moderate pain 100 g 1     folic acid (FOLVITE) 1 MG tablet Take 1 tablet (1 mg) by mouth daily 90 tablet 2     hydroxychloroquine (PLAQUENIL) 200 MG tablet Take 1 tablet (200 mg) by mouth daily 30 tablet 3     ibuprofen (ADVIL/MOTRIN) 200 MG tablet Take 1 tablet (200 mg) by mouth every 8 hours as needed for moderate pain       methotrexate sodium 2.5 MG TABS Take 8 tablets (20 mg) by mouth once a week . Take all 8 tablets on the same day of each week. 32 tablet 6     order for DME Equipment being ordered: soft neck brace (Patient not taking: Reported on 4/21/2022) 1 Device 0     Past Medical History:   Diagnosis Date     Hyperlipidemia      Murmur, heart     hsm     Nonsenile cataract      Osteoarthrosis, unspecified whether generalized or localized, lower leg      Osteopenia      Rheumatoid arthritis(714.0)   "    Past Surgical History:   Procedure Laterality Date     GALLBLADDER SURGERY       HYSTERECTOMY       KNEE SURGERY      left      LAMINECTOMY LUMBAR ONE LEVEL       TONSILLECTOMY         Palliative Symptom Review (0=no symptom/no concern, 1=mild, 2=moderate, 3=severe):  Generalized musculoskeletal pain as noted above  Some \"freezing\" symptoms related to her Parkinson's disease; she is actively working with neurologist to address this.  Denies other complaints today.    BP (!) 173/78   Pulse 77   SpO2 98%       GENERAL: Frail, cachectic, significant kyphosis.  EYES: Eyes grossly normal to inspection, conjunctivae and sclerae normal  Hearing intact.   RESP: no audible wheeze, cough, or visible cyanosis.  No increased work of breathing on RA.  Able to speak easily in complete sentences.  SKIN: no suspicious lesions or rashes on exposed skin  NEURO: Cranial nerves grossly intact, mentation intact and speech normal.  No tremor.   PSYCH: mentation appears normal, affect normal/bright and mood-congruent, judgement and insight intact, normal speech and appearance     Wt Readings from Last 10 Encounters:   04/21/22 55.6 kg (122 lb 9.6 oz)   12/15/21 55.3 kg (122 lb)   08/18/21 53.4 kg (117 lb 12.8 oz)   03/25/21 52.2 kg (115 lb)   07/13/20 52.6 kg (116 lb)   06/22/20 49.9 kg (110 lb)   01/17/20 50.8 kg (112 lb)   09/12/19 50.3 kg (111 lb)   07/25/19 49.9 kg (110 lb)   07/17/19 50.3 kg (110 lb 12.8 oz)       Data Reviewed:  LABS:   Recent Labs   Lab Test 04/21/22  1508 12/15/21  1553 05/17/21  1342 05/12/21  1334 05/18/20  1325 01/15/20  1403 10/12/16  1142 05/27/16  1413   NA  --   --   --   --   --  137  --  136   POTASSIUM  --   --   --   --   --  4.2  --  3.7   CHLORIDE  --   --   --   --   --  102  --  102   CO2  --   --   --   --   --  30  --  25   ANIONGAP  --   --   --   --   --  5  --  9   GLC  --   --   --   --   --  97  --  94   BUN  --   --   --   --   --  15  --  11   CR 0.61 0.45*   < > 0.65   < > 0.55   < > " 0.41*   PAKO  --  9.6  --  9.2   < > 9.5  10.2*   < > 9.2    < > = values in this interval not displayed.       Impressions, Recommendations & Counseling:  Palliative Performance Score: 60%      Decision Making Capacity: Full    Alannah Leos is a 81 year old woman with history of rheumatoid arthritis and osteoarthritis who presents to our clinic for help with managing pain.  She had been followed by the pain service at [place] but her doctor recently  and she has been having difficulty getting appointments with them.  She has been following with her rheumatologist for some time now and he has been trying to persuade her to change her DMARD regimen; she is reluctant to do this because of concerns about side effects, cost, and potential ineffectiveness.  I did discuss this with her today and asked her to reconsider the medications that her rheumatologist was recommending.  In this frail, cachectic patient with Parkinson's, we need to be careful about use of pain medications, especially opioid medications, while also providing the best pain control possible for her.  She has had negative side effects from oxycodone and hydrocodone in the past, including hallucinations and lightheadedness.  In this setting I feel that a buprenorphine patch is most likely to provide some pain relief for her while minimizing the chance of unwanted side effects.  We will start with a low-dose at 5 mcg/h.  We may try to transition this patient back into seeing the pain clinic again depending on what the further course of her disease is and what her goals of care are.    1.  Start Butrans 5 mcg/h patch.  2.  I have asked patient and her daughter to monitor closely for any side effects and to call the clinic if she has any.  3.  We will have her follow-up in about 1 month to discuss further changes to her regimen.  4.  I have asked her daughter to bring in a copy of her healthcare directive the next time she is in clinic.    Maikel  Frnak,   Palliative Medicine Fellow    Patient seen and evaluated with Dr. Marie.    Agree with assessment and recommendations as documented in this note.     Jes Archer MD  Palliative Medicine  Pager [phone]

## 2022-05-12 NOTE — TELEPHONE ENCOUNTER
Prepping patient's chart for 5/16/22.    New guidelines say patients do not need Covid test prior to getting infusions.  Thus does not need Covid test today.    However, patient needs Calcium and Creatinine drawn within 2 months prior to getting Reclast.  Has not had Calcium drawn in past 2 months.    Attempted to call patient.  No answer.    Attempted to call daughter. No answer.    Called, Ivan, at [facility] lab. Informed to cancel today's Covid and draw calcium/creat instead.  Ivan said he shared the message with staff at [facility] regarding this.    If patient does not go to [facility] appointment today.  Will have patient's labs drawn in [place] just prior to infusion on 5/16/22.    Paige Peraza RN on 5/12/2022 at 4:51 PM

## 2022-05-12 NOTE — NURSING NOTE
"Oncology Rooming Note    May 12, 2022 2:43 PM   Alannah Leos is a 81 year old female who presents for:    Chief Complaint   Patient presents with     Oncology Clinic Visit     Palliative care     Initial Vitals: BP (!) 173/78   Pulse 77   SpO2 98%  Estimated body mass index is 21.04 kg/m  as calculated from the following:    Height as of 6/9/21: 1.626 m (5' 4\").    Weight as of 4/21/22: 55.6 kg (122 lb 9.6 oz). There is no height or weight on file to calculate BSA.  Severe Pain (7) Comment: Data Unavailable   No LMP recorded. Patient has had a hysterectomy.  Allergies reviewed: Yes  Medications reviewed: Yes    Medications: Medication refills not needed today.  Pharmacy name entered into EPIC:    [id number] - [place], [place] - [address]  AT [facility] - [place], [place] - [address]   [id number] - [place], [place] - [address] AT [facility]    Clinical concerns: BP off lately, not sure why       Suzy Silva CMA            "

## 2022-05-16 ENCOUNTER — TELEPHONE (OUTPATIENT)
Dept: PALLIATIVE CARE | Facility: CLINIC | Age: 82
End: 2022-05-16

## 2022-05-16 ENCOUNTER — INFUSION THERAPY VISIT (OUTPATIENT)
Dept: INFUSION THERAPY | Facility: CLINIC | Age: 82
End: 2022-05-16
Attending: INTERNAL MEDICINE
Payer: MEDICARE

## 2022-05-16 VITALS
HEART RATE: 72 BPM | OXYGEN SATURATION: 99 % | DIASTOLIC BLOOD PRESSURE: 76 MMHG | SYSTOLIC BLOOD PRESSURE: 145 MMHG | TEMPERATURE: 97.6 F | RESPIRATION RATE: 18 BRPM

## 2022-05-16 DIAGNOSIS — M81.0 AGE RELATED OSTEOPOROSIS, UNSPECIFIED PATHOLOGICAL FRACTURE PRESENCE: Primary | ICD-10-CM

## 2022-05-16 PROCEDURE — 96365 THER/PROPH/DIAG IV INF INIT: CPT | Performed by: NURSE PRACTITIONER

## 2022-05-16 PROCEDURE — 99207 PR NO CHARGE LOS: CPT

## 2022-05-16 RX ORDER — ALBUTEROL SULFATE 0.83 MG/ML
2.5 SOLUTION RESPIRATORY (INHALATION)
Status: CANCELLED | OUTPATIENT
Start: 2022-05-16

## 2022-05-16 RX ORDER — HEPARIN SODIUM,PORCINE 10 UNIT/ML
5 VIAL (ML) INTRAVENOUS
Status: CANCELLED | OUTPATIENT
Start: 2022-05-16

## 2022-05-16 RX ORDER — NALOXONE HYDROCHLORIDE 0.4 MG/ML
0.2 INJECTION, SOLUTION INTRAMUSCULAR; INTRAVENOUS; SUBCUTANEOUS
Status: CANCELLED | OUTPATIENT
Start: 2022-05-16

## 2022-05-16 RX ORDER — ALBUTEROL SULFATE 90 UG/1
1-2 AEROSOL, METERED RESPIRATORY (INHALATION)
Status: CANCELLED
Start: 2022-05-16

## 2022-05-16 RX ORDER — EPINEPHRINE 1 MG/ML
0.3 INJECTION, SOLUTION INTRAMUSCULAR; SUBCUTANEOUS EVERY 5 MIN PRN
Status: CANCELLED | OUTPATIENT
Start: 2022-05-16

## 2022-05-16 RX ORDER — METHYLPREDNISOLONE SODIUM SUCCINATE 125 MG/2ML
125 INJECTION, POWDER, LYOPHILIZED, FOR SOLUTION INTRAMUSCULAR; INTRAVENOUS
Status: CANCELLED
Start: 2022-05-16

## 2022-05-16 RX ORDER — DIPHENHYDRAMINE HYDROCHLORIDE 50 MG/ML
50 INJECTION INTRAMUSCULAR; INTRAVENOUS
Status: CANCELLED
Start: 2022-05-16

## 2022-05-16 RX ORDER — ZOLEDRONIC ACID 5 MG/100ML
5 INJECTION, SOLUTION INTRAVENOUS ONCE
Status: CANCELLED
Start: 2022-05-16 | End: 2022-05-16

## 2022-05-16 RX ORDER — HEPARIN SODIUM (PORCINE) LOCK FLUSH IV SOLN 100 UNIT/ML 100 UNIT/ML
5 SOLUTION INTRAVENOUS
Status: CANCELLED | OUTPATIENT
Start: 2022-05-16

## 2022-05-16 RX ORDER — MEPERIDINE HYDROCHLORIDE 25 MG/ML
25 INJECTION INTRAMUSCULAR; INTRAVENOUS; SUBCUTANEOUS EVERY 30 MIN PRN
Status: CANCELLED | OUTPATIENT
Start: 2022-05-16

## 2022-05-16 RX ORDER — ZOLEDRONIC ACID 5 MG/100ML
5 INJECTION, SOLUTION INTRAVENOUS ONCE
Status: COMPLETED | OUTPATIENT
Start: 2022-05-16 | End: 2022-05-16

## 2022-05-16 RX ADMIN — Medication 250 ML: at 14:25

## 2022-05-16 RX ADMIN — ZOLEDRONIC ACID 5 MG: 0.05 INJECTION, SOLUTION INTRAVENOUS at 14:25

## 2022-05-16 NOTE — PROGRESS NOTES
Infusion Nursing Note:  Alannah Leos presents today for Reclast.    Patient seen by provider today: No   present during visit today: Not Applicable.    Note: Pt expressed no new medical concerns.      Intravenous Access:  Peripheral IV placed.    Treatment Conditions:  Lab Results   Component Value Date     01/15/2020    POTASSIUM 4.2 01/15/2020    CR 0.47 (L) 05/12/2022    PAKO 10.0 05/12/2022    BILITOTAL 0.3 04/21/2022    ALBUMIN 3.5 04/21/2022    ALT 11 04/21/2022    AST 20 04/21/2022     Results reviewed, labs MET treatment parameters, ok to proceed with treatment.      Post Infusion Assessment:  Patient tolerated infusion without incident.  Blood return noted pre and post infusion.  Site patent and intact, free from redness, edema or discomfort.  No evidence of extravasations.  Access discontinued per protocol.       Discharge Plan:   AVS to patient via MYCHART.  Patient will return next year for next appointment.       Piedad Kent RN

## 2022-05-16 NOTE — TELEPHONE ENCOUNTER
Prior Authorization Retail Medication Request    Medication/Dose: BUPRENORPHINE 5MCG/HR  ICD code (if different than what is on RX):    Previously Tried and Failed:    Rationale:  Pain    Insurance Name:  Medicare   Insurance ID: [id number]      Pharmacy Information (if different than what is on RX)  [facility]   Phone:  [phone]

## 2022-05-17 NOTE — TELEPHONE ENCOUNTER
Central Prior Authorization Team   Phone: [phone]      PA Initiation    Medication: BUPRENORPHINE 5MCG/HR-PA initiated  Insurance Company: [email or web address] Part D - Phone [phone] Fax [phone]  Pharmacy Filling the Rx: [id number] - [place], [place] - [address] AT [facility] [address] [facility]  Filling Pharmacy Phone: [phone]  Filling Pharmacy Fax:    Start Date: 5/17/2022

## 2022-05-18 ENCOUNTER — PATIENT OUTREACH (OUTPATIENT)
Dept: PALLIATIVE CARE | Facility: CLINIC | Age: 82
End: 2022-05-18
Payer: MEDICARE

## 2022-05-18 NOTE — PROGRESS NOTES
Received call from patient's daughter questioning timing for follow up apt. She notes that patient's neurologist has added 2 more medications. Patient wants to try those before adding in new pain medication so she knows if something causes a side effect and doesn't make too many changes at once. Would like to follow up after that - thinking mid July.     Advised I thought that was fine. Plan for her to follow up with Dr. Archer as Dr. Marie, who she saw last will have graduated from fellowship. This was explained to patient's daughter. Patient prefers in person apts, doesn't find video visits effective. Verbally gave apt for mid July, message sent to schedulers to get this officially scheduled and spot placed on hold. They will call with any other questions or concerns before follow up apt.

## 2022-05-19 NOTE — TELEPHONE ENCOUNTER
PRIOR AUTHORIZATION DENIED    Medication: BUPRENORPHINE 5MCG/DEBRA-PA denied    Denial Date: 5/17/2022    Denial Rational: Excluded        Appeal Information:

## 2022-05-20 ENCOUNTER — TELEPHONE (OUTPATIENT)
Dept: ONCOLOGY | Facility: CLINIC | Age: 82
End: 2022-05-20
Payer: MEDICARE

## 2022-05-20 NOTE — TELEPHONE ENCOUNTER
Central Prior Authorization Team   Phone: [phone]      Duplicate encounter, Please see the encounter dated 05/16/22.  Medication is excluded.

## 2022-05-20 NOTE — TELEPHONE ENCOUNTER
Prior Authorization Retail Medication Request    Medication/Dose: BUPRENORPHINE 5MCG TRANSDERMAL PATCH  ICD code (if different than what is on RX):    Previously Tried and Failed:    Rationale:  Pain    Insurance Name:  Medicare  Insurance ID:  [id number]  Insurance #: [id number]    Pharmacy Information (if different than what is on RX)  Name:  [facility]  Phone:  [phone]

## 2022-06-15 ENCOUNTER — TRANSFERRED RECORDS (OUTPATIENT)
Dept: RHEUMATOLOGY | Facility: CLINIC | Age: 82
End: 2022-06-15

## 2022-06-22 ENCOUNTER — OFFICE VISIT (OUTPATIENT)
Dept: FAMILY MEDICINE | Facility: CLINIC | Age: 82
End: 2022-06-22
Payer: MEDICARE

## 2022-06-22 VITALS
DIASTOLIC BLOOD PRESSURE: 64 MMHG | TEMPERATURE: 97.8 F | RESPIRATION RATE: 18 BRPM | SYSTOLIC BLOOD PRESSURE: 139 MMHG | HEART RATE: 79 BPM | OXYGEN SATURATION: 97 %

## 2022-06-22 DIAGNOSIS — Z86.2 HISTORY OF IRON DEFICIENCY ANEMIA: ICD-10-CM

## 2022-06-22 DIAGNOSIS — R53.81 PHYSICAL DECONDITIONING: ICD-10-CM

## 2022-06-22 DIAGNOSIS — R68.83 CHILLS (WITHOUT FEVER): ICD-10-CM

## 2022-06-22 DIAGNOSIS — G20.A1 PARKINSON DISEASE (H): Primary | ICD-10-CM

## 2022-06-22 DIAGNOSIS — R42 DIZZINESS: ICD-10-CM

## 2022-06-22 DIAGNOSIS — M45.2 ANKYLOSING SPONDYLITIS OF CERVICAL REGION (H): ICD-10-CM

## 2022-06-22 LAB
ALBUMIN SERPL-MCNC: 3.7 G/DL (ref 3.4–5)
ALP SERPL-CCNC: 82 U/L (ref 40–150)
ALT SERPL W P-5'-P-CCNC: 10 U/L (ref 0–50)
ANION GAP SERPL CALCULATED.3IONS-SCNC: 7 MMOL/L (ref 3–14)
AST SERPL W P-5'-P-CCNC: 15 U/L (ref 0–45)
BILIRUB SERPL-MCNC: 0.5 MG/DL (ref 0.2–1.3)
BUN SERPL-MCNC: 11 MG/DL (ref 7–30)
CALCIUM SERPL-MCNC: 9.1 MG/DL (ref 8.5–10.1)
CHLORIDE BLD-SCNC: 103 MMOL/L (ref 94–109)
CO2 SERPL-SCNC: 27 MMOL/L (ref 20–32)
CREAT SERPL-MCNC: 0.43 MG/DL (ref 0.52–1.04)
ERYTHROCYTE [DISTWIDTH] IN BLOOD BY AUTOMATED COUNT: 17.9 % (ref 10–15)
GFR SERPL CREATININE-BSD FRML MDRD: >90 ML/MIN/1.73M2
GLUCOSE BLD-MCNC: 103 MG/DL (ref 70–99)
HCT VFR BLD AUTO: 34.5 % (ref 35–47)
HGB BLD-MCNC: 11.1 G/DL (ref 11.7–15.7)
IRON SATN MFR SERPL: 12 % (ref 15–46)
IRON SERPL-MCNC: 35 UG/DL (ref 35–180)
MCH RBC QN AUTO: 27.1 PG (ref 26.5–33)
MCHC RBC AUTO-ENTMCNC: 32.2 G/DL (ref 31.5–36.5)
MCV RBC AUTO: 84 FL (ref 78–100)
PLATELET # BLD AUTO: 327 10E3/UL (ref 150–450)
POTASSIUM BLD-SCNC: 4 MMOL/L (ref 3.4–5.3)
PROT SERPL-MCNC: 7.7 G/DL (ref 6.8–8.8)
RBC # BLD AUTO: 4.1 10E6/UL (ref 3.8–5.2)
SODIUM SERPL-SCNC: 137 MMOL/L (ref 133–144)
TIBC SERPL-MCNC: 281 UG/DL (ref 240–430)
TSH SERPL DL<=0.005 MIU/L-ACNC: 3.3 MU/L (ref 0.4–4)
VIT B12 SERPL-MCNC: 392 PG/ML (ref 232–1245)
WBC # BLD AUTO: 6.1 10E3/UL (ref 4–11)

## 2022-06-22 PROCEDURE — 85027 COMPLETE CBC AUTOMATED: CPT | Performed by: PHYSICIAN ASSISTANT

## 2022-06-22 PROCEDURE — 99214 OFFICE O/P EST MOD 30 MIN: CPT | Performed by: PHYSICIAN ASSISTANT

## 2022-06-22 PROCEDURE — 36415 COLL VENOUS BLD VENIPUNCTURE: CPT | Performed by: PHYSICIAN ASSISTANT

## 2022-06-22 PROCEDURE — 80053 COMPREHEN METABOLIC PANEL: CPT | Performed by: PHYSICIAN ASSISTANT

## 2022-06-22 PROCEDURE — 83550 IRON BINDING TEST: CPT | Performed by: PHYSICIAN ASSISTANT

## 2022-06-22 PROCEDURE — 84443 ASSAY THYROID STIM HORMONE: CPT | Performed by: PHYSICIAN ASSISTANT

## 2022-06-22 PROCEDURE — 82607 VITAMIN B-12: CPT | Performed by: PHYSICIAN ASSISTANT

## 2022-06-22 NOTE — PROGRESS NOTES
Assessment & Plan     Parkinson disease (H)  Managed by neurologist.   - CBC with platelets; Future  - TSH with free T4 reflex; Future  - Internal Medicine Referral; Future  - CBC with platelets  - TSH with free T4 reflex    Physical deconditioning  Discussed in detail the importance of physical therapy to help with gait and strength training.   - Internal Medicine Referral; Future    Dizziness  Your labs overall look good.  The iron and hemoglobin levels are slightly low.  You could try taking an iron supplement every other day and see if you tolerate this better.  Your orthostatic blood pressures were normal in clinic, which is good to see.       I suggest you consider the physical therapy again and try a heating pad to the neck.      I suspect the majority of her symptoms are due to deconditioning.   - CBC with platelets; Future  - Internal Medicine Referral; Future  - Vitamin B12; Future  - Comprehensive metabolic panel (BMP + Alb, Alk Phos, ALT, AST, Total. Bili, TP); Future  - CBC with platelets  - Vitamin B12  - Comprehensive metabolic panel (BMP + Alb, Alk Phos, ALT, AST, Total. Bili, TP)    Ankylosing spondylitis of cervical region (H)  Managed by specialist.   - CBC with platelets; Future  - Internal Medicine Referral; Future  - Vitamin B12; Future  - CBC with platelets  - Vitamin B12    History of iron deficiency anemia  She has tried iron in the past but this has caused Gi symptoms.  I suggest every other day dosing.   - Iron and iron binding capacity; Future  - Iron and iron binding capacity    Chills (without fever)   Thyroid levels normal.   - TSH with free T4 reflex; Future  - TSH with free T4 reflex      30 minutes spent on the date of the encounter doing chart review, history and exam, documentation and further activities per the note           Return in about 4 weeks (around 7/20/2022) for recheck with an internist to establish care.    JORDAN [facility]  RIMMA Jaffe is a 81 year old accompanied by her daughter., presenting for the following health issues:  Dizziness      History of Present Illness       Reason for visit:  Head dizzy    She eats 2-3 servings of fruits and vegetables daily.She consumes 0 sweetened beverage(s) daily.She exercises with enough effort to increase her heart rate 9 or less minutes per day.  She exercises with enough effort to increase her heart rate 3 or less days per week. She is missing 2 dose(s) of medications per week.       Concern - Dizziness   Onset: Couple of years, worsening over months.   Description: Patient presents to discuss ongoing symptoms of dizziness. Pt is having trouble explaining her symptoms but believes they occur every other day or so. Just feeling off, has not been feeling good for the last couple years. Had been seen last year for her ear, was told there were no problems with her inner ear but symptoms started a few months before that visit, pt wondering if related to her equilibrium. Denies having headaches.  Progression of Symptoms:  same  Previous history of similar problem: Yes  Therapies tried and outcome: None    Was walking to the bathroom today and felt dizzy/unsteady.  No falls in the last year.  Last Fall was Oct 2019 (broken shoulder).  Uses a 4 point roller walker with bench (uses this always).   She has a h/o Parkinsons and has found little activities/walking are very energy draining.   C/o headaches, which seem to stem from tension in her neck.  The tension in her neck is a big problem for her.  She also is concerned these symptoms may be a sign there is something else going on and that we may be missing something.  Her neurologist is aware of symptoms.   She has a sister who had a brain tumor.       Sometimes position changes.    Has generalized pain.     Has seen neurologist for Parkinsons (and has brought up these concerns).   Has seen ENT (no issues with balance).   Has tried  meclizine, didn't help.      Lives at home by herself and daughter lives across the street.    Alannah doesn't feel like her symptoms have changed.      Has been on iron in the past.         Review of Systems   Constitutional, HEENT, cardiovascular, pulmonary, GI, , musculoskeletal, neuro, skin, endocrine and psych systems are negative, except as otherwise noted.      Objective    /64 (BP Location: Left arm, Patient Position: Chair, Cuff Size: Adult Large)   Pulse 79   Temp 97.8  F (36.6  C) (Tympanic)   Resp 18   LMP  (LMP Unknown)   SpO2 97%   Breastfeeding No   There is no height or weight on file to calculate BMI.  Physical Exam   GENERAL: frail and in wheelchair during visit.   HENT: ear canals and TM's normal, nose and mouth without ulcers or lesions  NECK: no adenopathy, no asymmetry, masses, or scars and thyroid normal to palpation  RESP: lungs clear to auscultation - no rales, rhonchi or wheezes  CV: regular rate and rhythm, normal S1 S2, no S3 or S4,  systolic murmur noted (not new) no click or rub, no peripheral edema and peripheral pulses strong  ABDOMEN: soft, nontender, no hepatosplenomegaly, no masses and bowel sounds normal  MS: no gross musculoskeletal defects noted, no edema    Results for orders placed or performed in visit on 06/22/22   CBC with platelets     Status: Abnormal   Result Value Ref Range    WBC Count 6.1 4.0 - 11.0 10e3/uL    RBC Count 4.10 3.80 - 5.20 10e6/uL    Hemoglobin 11.1 (L) 11.7 - 15.7 g/dL    Hematocrit 34.5 (L) 35.0 - 47.0 %    MCV 84 78 - 100 fL    MCH 27.1 26.5 - 33.0 pg    MCHC 32.2 31.5 - 36.5 g/dL    RDW 17.9 (H) 10.0 - 15.0 %    Platelet Count 327 150 - 450 10e3/uL   TSH with free T4 reflex     Status: Normal   Result Value Ref Range    TSH 3.30 0.40 - 4.00 mU/L   Iron and iron binding capacity     Status: Abnormal   Result Value Ref Range    Iron 35 35 - 180 ug/dL    Iron Binding Capacity 281 240 - 430 ug/dL    Iron Sat Index 12 (L) 15 - 46 %   Vitamin  B12     Status: Normal   Result Value Ref Range    Vitamin B12 392 232 - 1,245 pg/mL   Comprehensive metabolic panel (BMP + Alb, Alk Phos, ALT, AST, Total. Bili, TP)     Status: Abnormal   Result Value Ref Range    Sodium 137 133 - 144 mmol/L    Potassium 4.0 3.4 - 5.3 mmol/L    Chloride 103 94 - 109 mmol/L    Carbon Dioxide (CO2) 27 20 - 32 mmol/L    Anion Gap 7 3 - 14 mmol/L    Urea Nitrogen 11 7 - 30 mg/dL    Creatinine 0.43 (L) 0.52 - 1.04 mg/dL    Calcium 9.1 8.5 - 10.1 mg/dL    Glucose 103 (H) 70 - 99 mg/dL    Alkaline Phosphatase 82 40 - 150 U/L    AST 15 0 - 45 U/L    ALT 10 0 - 50 U/L    Protein Total 7.7 6.8 - 8.8 g/dL    Albumin 3.7 3.4 - 5.0 g/dL    Bilirubin Total 0.5 0.2 - 1.3 mg/dL    GFR Estimate >90 >60 mL/min/1.73m2                   .  ..

## 2022-06-22 NOTE — PATIENT INSTRUCTIONS
I suggest you establish care with an internal medicine doctor.  Dr. Gardner is over at the [place] location or you could check with the schedulers to see if there is someone closer to you in [place].     I suggest a heating pad for your neck stiffness.  Gentle range of motion exercises.     I suggest you consider physical therapy again for balance and increasing your strength.

## 2022-06-22 NOTE — LETTER
June 24, 2022      Alannah Leos  [address] [facility] [place] [address]        Dear ,    We are writing to inform you of your test results.    Your labs overall look good.  The iron and hemoglobin levels are slightly low.  You could try taking an iron supplement every other day and see if you tolerate this better.  Your orthostatic blood pressures were normal in clinic, which is good to see.       I suggest you consider the physical therapy again and try a heating pad to the neck.       Please follow-up if you have any questions or concerns.    Resulted Orders   CBC with platelets   Result Value Ref Range    WBC Count 6.1 4.0 - 11.0 10e3/uL    RBC Count 4.10 3.80 - 5.20 10e6/uL    Hemoglobin 11.1 (L) 11.7 - 15.7 g/dL    Hematocrit 34.5 (L) 35.0 - 47.0 %    MCV 84 78 - 100 fL    MCH 27.1 26.5 - 33.0 pg    MCHC 32.2 31.5 - 36.5 g/dL    RDW 17.9 (H) 10.0 - 15.0 %    Platelet Count 327 150 - 450 10e3/uL   TSH with free T4 reflex   Result Value Ref Range    TSH 3.30 0.40 - 4.00 mU/L   Iron and iron binding capacity   Result Value Ref Range    Iron 35 35 - 180 ug/dL    Iron Binding Capacity 281 240 - 430 ug/dL    Iron Sat Index 12 (L) 15 - 46 %   Vitamin B12   Result Value Ref Range    Vitamin B12 392 232 - 1,245 pg/mL   Comprehensive metabolic panel (BMP + Alb, Alk Phos, ALT, AST, Total. Bili, TP)   Result Value Ref Range    Sodium 137 133 - 144 mmol/L    Potassium 4.0 3.4 - 5.3 mmol/L    Chloride 103 94 - 109 mmol/L    Carbon Dioxide (CO2) 27 20 - 32 mmol/L    Anion Gap 7 3 - 14 mmol/L    Urea Nitrogen 11 7 - 30 mg/dL    Creatinine 0.43 (L) 0.52 - 1.04 mg/dL    Calcium 9.1 8.5 - 10.1 mg/dL    Glucose 103 (H) 70 - 99 mg/dL    Alkaline Phosphatase 82 40 - 150 U/L    AST 15 0 - 45 U/L    ALT 10 0 - 50 U/L    Protein Total 7.7 6.8 - 8.8 g/dL    Albumin 3.7 3.4 - 5.0 g/dL    Bilirubin Total 0.5 0.2 - 1.3 mg/dL    GFR Estimate >90 >60 mL/min/1.73m2      Comment:      Effective December 21, 2021 eGFRcr in adults  is calculated using the 2021 CKD-EPI creatinine equation which includes age and gender (Talat et al., NEJM, DOI: 10.1056/HUGUzk6216704)       If you have any questions or concerns, please call the clinic at the number listed above.       Sincerely,      Chinyere Montiel PA-C/alexander

## 2022-06-23 PROBLEM — R53.81 PHYSICAL DECONDITIONING: Status: ACTIVE | Noted: 2022-06-23

## 2022-06-23 PROBLEM — Z86.2 HISTORY OF IRON DEFICIENCY ANEMIA: Status: ACTIVE | Noted: 2022-06-23

## 2022-07-15 ENCOUNTER — TRANSFERRED RECORDS (OUTPATIENT)
Dept: RHEUMATOLOGY | Facility: CLINIC | Age: 82
End: 2022-07-15

## 2022-07-20 ENCOUNTER — LAB (OUTPATIENT)
Dept: LAB | Facility: CLINIC | Age: 82
End: 2022-07-20
Payer: MEDICARE

## 2022-07-20 DIAGNOSIS — M06.09 RHEUMATOID ARTHRITIS OF MULTIPLE SITES WITH NEGATIVE RHEUMATOID FACTOR (H): ICD-10-CM

## 2022-07-20 DIAGNOSIS — Z79.899 HIGH RISK MEDICATION USE: ICD-10-CM

## 2022-07-20 LAB
ALBUMIN SERPL-MCNC: 3.3 G/DL (ref 3.4–5)
ALP SERPL-CCNC: 73 U/L (ref 40–150)
ALT SERPL W P-5'-P-CCNC: 8 U/L (ref 0–50)
AST SERPL W P-5'-P-CCNC: 16 U/L (ref 0–45)
BASOPHILS # BLD AUTO: 0 10E3/UL (ref 0–0.2)
BASOPHILS NFR BLD AUTO: 1 %
BILIRUB DIRECT SERPL-MCNC: <0.1 MG/DL (ref 0–0.2)
BILIRUB SERPL-MCNC: 0.3 MG/DL (ref 0.2–1.3)
CREAT SERPL-MCNC: 0.47 MG/DL (ref 0.52–1.04)
EOSINOPHIL # BLD AUTO: 0.1 10E3/UL (ref 0–0.7)
EOSINOPHIL NFR BLD AUTO: 2 %
ERYTHROCYTE [DISTWIDTH] IN BLOOD BY AUTOMATED COUNT: 17.6 % (ref 10–15)
GFR SERPL CREATININE-BSD FRML MDRD: >90 ML/MIN/1.73M2
HCT VFR BLD AUTO: 30.5 % (ref 35–47)
HGB BLD-MCNC: 10 G/DL (ref 11.7–15.7)
IMM GRANULOCYTES # BLD: 0 10E3/UL
IMM GRANULOCYTES NFR BLD: 0 %
LYMPHOCYTES # BLD AUTO: 1.1 10E3/UL (ref 0.8–5.3)
LYMPHOCYTES NFR BLD AUTO: 18 %
MCH RBC QN AUTO: 27 PG (ref 26.5–33)
MCHC RBC AUTO-ENTMCNC: 32.8 G/DL (ref 31.5–36.5)
MCV RBC AUTO: 82 FL (ref 78–100)
MONOCYTES # BLD AUTO: 0.5 10E3/UL (ref 0–1.3)
MONOCYTES NFR BLD AUTO: 9 %
NEUTROPHILS # BLD AUTO: 4.3 10E3/UL (ref 1.6–8.3)
NEUTROPHILS NFR BLD AUTO: 70 %
NRBC # BLD AUTO: 0 10E3/UL
NRBC BLD AUTO-RTO: 0 /100
PLATELET # BLD AUTO: 286 10E3/UL (ref 150–450)
PROT SERPL-MCNC: 7.3 G/DL (ref 6.8–8.8)
RBC # BLD AUTO: 3.7 10E6/UL (ref 3.8–5.2)
WBC # BLD AUTO: 6.1 10E3/UL (ref 4–11)

## 2022-07-20 PROCEDURE — 36415 COLL VENOUS BLD VENIPUNCTURE: CPT

## 2022-07-20 PROCEDURE — 80076 HEPATIC FUNCTION PANEL: CPT

## 2022-07-20 PROCEDURE — 85025 COMPLETE CBC W/AUTO DIFF WBC: CPT

## 2022-07-20 PROCEDURE — 82565 ASSAY OF CREATININE: CPT

## 2022-07-20 RX ORDER — HYDROXYCHLOROQUINE SULFATE 200 MG/1
200 TABLET, FILM COATED ORAL DAILY
Qty: 30 TABLET | Refills: 3 | OUTPATIENT
Start: 2022-07-20

## 2022-07-20 NOTE — TELEPHONE ENCOUNTER
Pending Prescriptions:                       Disp   Refills    hydroxychloroquine (PLAQUENIL) 200 MG tab*30 tab*3            Sig: Take 1 tablet (200 mg) by mouth daily

## 2022-07-20 NOTE — TELEPHONE ENCOUNTER
RN: please notify Alannah Leos that hydroxychloroquine refill request was refused because the eye exam for hydroxychloroquine toxicity monitoring, including 10-2 VF and SD-OCT, is required prior to refill.  Last eye exam was in January 2020; this eye exam is required yearly.   Merrick Del Cid MD  7/20/2022

## 2022-07-20 NOTE — TELEPHONE ENCOUNTER
Pending Prescriptions:                       Disp   Refills    hydroxychloroquine (PLAQUENIL) 200 MG tab*30 tab*3            Sig: Take 1 tablet (200 mg) by mouth daily      No eye exam on file since 2020 and last progress note said refill by MD only. Patient has lab appt.today 7/20    Penny FOURNIER RN, [facility] 07/20/22 11:00 AM

## 2022-07-25 RX ORDER — HYDROXYCHLOROQUINE SULFATE 200 MG/1
200 TABLET, FILM COATED ORAL DAILY
Qty: 60 TABLET | Refills: 0 | Status: SHIPPED | OUTPATIENT
Start: 2022-07-25 | End: 2022-10-03

## 2022-07-28 ENCOUNTER — OFFICE VISIT (OUTPATIENT)
Dept: SURGERY | Facility: CLINIC | Age: 82
End: 2022-07-28
Payer: MEDICARE

## 2022-07-28 VITALS — DIASTOLIC BLOOD PRESSURE: 66 MMHG | SYSTOLIC BLOOD PRESSURE: 176 MMHG

## 2022-07-28 DIAGNOSIS — R15.9 INCONTINENCE OF FECES, UNSPECIFIED FECAL INCONTINENCE TYPE: Primary | ICD-10-CM

## 2022-07-28 PROCEDURE — 99203 OFFICE O/P NEW LOW 30 MIN: CPT | Performed by: SURGERY

## 2022-07-28 NOTE — PROGRESS NOTES
Assessment: Stool incontinence, diarrhea and constipation    Plan:      We have discussed observation, reduction techniques and importance, incarceration and strangulation signs, symptoms and importance as well as need to seek emergency treatment.      We have discussed hernia repair with and withoutmesh in detail, including benefits, alternatives, complications, incision, scar, mesh, infection, anesthesia, bleeding, blood transfusion, DVT, PE, hernia recurrence, lifting and activity limits after surgery.  All questions have been answered to the best of my ability.    Increase fiber  GI doctor for alternating diarrhea and constipation  PT for incontinence    Alannah to follow up with Primary Care provider regarding elevated blood pressure.    HPI:  Alannah is a 81 year old female who presents for several issues.     She is concerned about her umbilical hernia,     She is also concerned about constipation. + flatus, no feeling of bloating, no nausea or vomiting. Has tried flavio-S and another medication she doesn't remember and it worsened the incontinence of stool.     So has been having problems with bowel movements and diarrhea    Past Medical History:   has a past medical history of Hyperlipidemia, Murmur, heart, Nonsenile cataract, Osteoarthrosis, unspecified whether generalized or localized, lower leg, Osteopenia, and Rheumatoid arthritis(714.0).    Past Surgical History:  Past Surgical History:   Procedure Laterality Date     GALLBLADDER SURGERY       HYSTERECTOMY       KNEE SURGERY      left      LAMINECTOMY LUMBAR ONE LEVEL       TONSILLECTOMY          Review of Systems  10 point ROS neg other than the symptoms noted above in the HPI.    PE:    BP (!) 176/66   LMP  (LMP Unknown)   Constitutional: healthy, alert and no distress  Eyes: Pupils round and equal, no icterus   ENT: Mucous membranes moist  Respiratory:  Non-labored respiration  Gastrointestinal: Abdomen soft, non-tender. No masses,  organomegaly  Musculoskeletal: No gross deformity  Neurologic: No gross focal deficits  Psychiatric: mentation appears normal and affect normal/bright  Hematologic/Lymphatic/Immunologic: No bruising noted  Skin: No lesions, rashes, erythema or jaundice noted    Martínez Murphy DO

## 2022-07-28 NOTE — LETTER
7/28/2022         RE: Alannah Leos  [address] [facility] [address]        Dear Colleague,    Thank you for referring your patient, Alannah Leos, to the [facility]. Please see a copy of my visit note below.    Assessment: Stool incontinence, diarrhea and constipation    Plan:      We have discussed observation, reduction techniques and importance, incarceration and strangulation signs, symptoms and importance as well as need to seek emergency treatment.      We have discussed hernia repair with and withoutmesh in detail, including benefits, alternatives, complications, incision, scar, mesh, infection, anesthesia, bleeding, blood transfusion, DVT, PE, hernia recurrence, lifting and activity limits after surgery.  All questions have been answered to the best of my ability.    Increase fiber  GI doctor for alternating diarrhea and constipation  PT for incontinence    Alannah to follow up with Primary Care provider regarding elevated blood pressure.    HPI:  Alannah is a 81 year old female who presents for several issues.     She is concerned about her umbilical hernia,     She is also concerned about constipation. + flatus, no feeling of bloating, no nausea or vomiting. Has tried flavio-S and another medication she doesn't remember and it worsened the incontinence of stool.     So has been having problems with bowel movements and diarrhea    Past Medical History:   has a past medical history of Hyperlipidemia, Murmur, heart, Nonsenile cataract, Osteoarthrosis, unspecified whether generalized or localized, lower leg, Osteopenia, and Rheumatoid arthritis(714.0).    Past Surgical History:  Past Surgical History:   Procedure Laterality Date     GALLBLADDER SURGERY       HYSTERECTOMY       KNEE SURGERY      left      LAMINECTOMY LUMBAR ONE LEVEL       TONSILLECTOMY          Review of Systems  10 point ROS neg other than the symptoms noted above in the HPI.    PE:    BP (!) 176/66   LMP   (LMP Unknown)   Constitutional: healthy, alert and no distress  Eyes: Pupils round and equal, no icterus   ENT: Mucous membranes moist  Respiratory:  Non-labored respiration  Gastrointestinal: Abdomen soft, non-tender. No masses, organomegaly  Musculoskeletal: No gross deformity  Neurologic: No gross focal deficits  Psychiatric: mentation appears normal and affect normal/bright  Hematologic/Lymphatic/Immunologic: No bruising noted  Skin: No lesions, rashes, erythema or jaundice noted    Martínez Murphy DO      Again, thank you for allowing me to participate in the care of your patient.        Sincerely,        Martínez Murphy DO

## 2022-08-31 ENCOUNTER — OFFICE VISIT (OUTPATIENT)
Dept: OPHTHALMOLOGY | Facility: CLINIC | Age: 82
End: 2022-08-31
Payer: MEDICARE

## 2022-08-31 DIAGNOSIS — H25.813 COMBINED FORMS OF AGE-RELATED CATARACT OF BOTH EYES: Primary | ICD-10-CM

## 2022-08-31 DIAGNOSIS — Z79.899 HIGH RISK MEDICATION USE: ICD-10-CM

## 2022-08-31 DIAGNOSIS — M06.09 RHEUMATOID ARTHRITIS OF MULTIPLE SITES WITH NEGATIVE RHEUMATOID FACTOR (H): ICD-10-CM

## 2022-08-31 DIAGNOSIS — G20.A1 PARKINSON DISEASE (H): ICD-10-CM

## 2022-08-31 PROCEDURE — 92014 COMPRE OPH EXAM EST PT 1/>: CPT | Performed by: STUDENT IN AN ORGANIZED HEALTH CARE EDUCATION/TRAINING PROGRAM

## 2022-08-31 ASSESSMENT — CONF VISUAL FIELD
OD_NORMAL: 1
OS_NORMAL: 1

## 2022-08-31 ASSESSMENT — VISUAL ACUITY
METHOD: SNELLEN - LINEAR
OS_SC: 20/70
OS_SC+: +1
OD_SC: 20/80

## 2022-08-31 ASSESSMENT — TONOMETRY
IOP_METHOD: TONOPEN
OD_IOP_MMHG: 15
OS_IOP_MMHG: 14

## 2022-08-31 ASSESSMENT — SLIT LAMP EXAM - LIDS
COMMENTS: NORMAL
COMMENTS: NORMAL

## 2022-08-31 ASSESSMENT — CUP TO DISC RATIO
OS_RATIO: 0.4
OD_RATIO: 0.5

## 2022-08-31 ASSESSMENT — EXTERNAL EXAM - LEFT EYE: OS_EXAM: NORMAL

## 2022-08-31 ASSESSMENT — EXTERNAL EXAM - RIGHT EYE: OD_EXAM: NORMAL

## 2022-08-31 NOTE — PROGRESS NOTES
Current Eye Medications:  none     Subjective:  Here for complete eye exam but has united, medical only. Has history of RA and plaq use. Is taking 200 mg once a day. Having those tests in Sept, but cannot do the HVF due to neck position. Her neck has gotten even worse since last visit which was in 2020.  She cannot hardly move it back or forward at all. Forgot the glasses today, will bring next time     Objective:  See Ophthalmology Exam.      Assessment:  Alannah Leos is a 81 year old female who presents with:   Encounter Diagnoses   Name Primary?     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

## 2022-08-31 NOTE — LETTER
8/31/2022         RE: Alannah Leos  [address] [facility] [address]        Dear Colleague,    Thank you for referring your patient, Alannah Leos, to the [facility]. Please see a copy of my visit note below.     Current Eye Medications:  none     Subjective:  Here for complete eye exam but has united, medical only. Has history of RA and plaq use. Is taking 200 mg once a day. Having those tests in Sept, but cannot do the HVF due to neck position. Her neck has gotten even worse since last visit which was in 2020.  She cannot hardly move it back or forward at all. Forgot the glasses today, will bring next time     Objective:  See Ophthalmology Exam.      Assessment:  Alannah Leos is a 81 year old female who presents with:   Encounter Diagnoses   Name Primary?     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        Sincerely,        Marcial Daigle MD

## 2022-09-28 ENCOUNTER — OFFICE VISIT (OUTPATIENT)
Dept: OPHTHALMOLOGY | Facility: CLINIC | Age: 82
End: 2022-09-28
Payer: MEDICARE

## 2022-09-28 DIAGNOSIS — G20.A1 PARKINSON DISEASE (H): ICD-10-CM

## 2022-09-28 DIAGNOSIS — M06.09 RHEUMATOID ARTHRITIS OF MULTIPLE SITES WITH NEGATIVE RHEUMATOID FACTOR (H): ICD-10-CM

## 2022-09-28 DIAGNOSIS — Z79.899 HIGH RISK MEDICATION USE: ICD-10-CM

## 2022-09-28 DIAGNOSIS — H25.813 COMBINED FORMS OF AGE-RELATED CATARACT OF BOTH EYES: Primary | ICD-10-CM

## 2022-09-28 PROCEDURE — 92012 INTRM OPH EXAM EST PATIENT: CPT | Performed by: STUDENT IN AN ORGANIZED HEALTH CARE EDUCATION/TRAINING PROGRAM

## 2022-09-28 ASSESSMENT — VISUAL ACUITY
OS_CC: J2
OD_CC: J3
OD_SC: 20/60
METHOD: SNELLEN - LINEAR
OS_SC: 20/80
OS_PH_SC+: +2
OS_PH_SC: 20/30

## 2022-09-28 ASSESSMENT — REFRACTION_WEARINGRX
OS_SPHERE: +4.25
OD_AXIS: 136
OS_SPHERE: +4.25
OD_SPHERE: +4.00
OD_SPHERE: +4.00
OD_AXIS: 136
OD_CYLINDER: +1.00
OS_CYLINDER: +0.50
OD_CYLINDER: +1.25
OS_AXIS: 164
OS_CYLINDER: SPHERE

## 2022-09-28 ASSESSMENT — REFRACTION_MANIFEST
OD_AXIS: 088
OS_CYLINDER: +0.50
OD_SPHERE: +1.00
OD_CYLINDER: +1.00
OS_SPHERE: +1.00
OS_AXIS: 155

## 2022-09-28 ASSESSMENT — SLIT LAMP EXAM - LIDS
COMMENTS: NORMAL
COMMENTS: NORMAL

## 2022-09-28 ASSESSMENT — EXTERNAL EXAM - LEFT EYE: OS_EXAM: NORMAL

## 2022-09-28 ASSESSMENT — EXTERNAL EXAM - RIGHT EYE: OD_EXAM: NORMAL

## 2022-09-28 NOTE — Clinical Note
Diaz Herring  Alannah is a tough one. 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 I can't even get her into our slit lamp microscope for a proper exam anymore either, so it's going to be near impossilbe to monitor her for retinal toxicity for Plaquenil. I told her this and that you'd likely continue her off the medication (sounds like she hasn't had refills for a little while), but I let her know you'd make the final decision.   Thanks! Tia

## 2022-09-28 NOTE — PATIENT INSTRUCTIONS
Will discuss that we are unable to obtain the tests needed to follow potential Plaquenil eye side effects    Glasses prescription given - optional to update    Marcial Daigle MD  [phone]

## 2022-09-28 NOTE — LETTER
9/28/2022         RE: Alannah Leos  [address] [facility] [address]        Dear Colleague,    Thank you for referring your patient, Alannah Leos, to the [facility]. Please see a copy of my visit note below.     Current Eye Medications:  none     Subjective:  Here for plaquenil testing - unable to complete due to limited head movement and stooped posture. Has multiple pair of single vision distance and near gls - pt does not wear distance glasses often, finds that gls sometimes will work but not always.     Objective:  See Ophthalmology Exam.       Assessment:  Alannah Leos is a 82 year old female who presents with:   Encounter Diagnoses   Name Primary?     Combined forms of age-related cataract of both eyes Yes     High risk medication use Unable to assess potential retinal changes due to Plaquenil due to kyphosis and inability to get positioned correctly in our OCT and visual field machines. Will let Dr. Del Cid know.      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        Sincerely,        Marcial Daigle MD

## 2022-09-28 NOTE — PROGRESS NOTES
Current Eye Medications:  none     Subjective:  Here for plaquenil testing - unable to complete due to limited head movement and stooped posture. Has multiple pair of single vision distance and near gls - pt does not wear distance glasses often, finds that gls sometimes will work but not always.     Objective:  See Ophthalmology Exam.       Assessment:  Alannah Leos is a 82 year old female who presents with:   Encounter Diagnoses   Name Primary?     Combined forms of age-related cataract of both eyes Yes     High risk medication use Unable to assess potential retinal changes due to Plaquenil due to kyphosis and inability to get positioned correctly in our OCT and visual field machines. Will let Dr. Del Cid know.      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

## 2022-10-03 DIAGNOSIS — M06.09 RHEUMATOID ARTHRITIS OF MULTIPLE SITES WITH NEGATIVE RHEUMATOID FACTOR (H): ICD-10-CM

## 2022-10-03 RX ORDER — HYDROXYCHLOROQUINE SULFATE 200 MG/1
200 TABLET, FILM COATED ORAL DAILY
Qty: 60 TABLET | Refills: 0 | OUTPATIENT
Start: 2022-10-03

## 2022-10-03 NOTE — TELEPHONE ENCOUNTER
Medication:   Hydroxychloroquine 200 mg  Last written on:   7/25/2022  Quantity:   60    Refills:   0    Last office visit:   4/21/2022  Next office visit:   10/27/2022  Last labs:   7/20/2022

## 2022-10-03 NOTE — LETTER
October 26, 2022      Alannah Leos  [address] [facility] [address]        Dear Alannah,       We have been unsuccessful in reaching you by phone. Please note the following,  because the hydroxychloroquine toxicity monitoring eye exam is unable to be completed due to neck positioning, hydroxychloroquine cannot be continued due to safety concerns.  Please advise Alannah that hydroxychloroquine has been discontinued.     Sincerely,      Christina AZUL RN 10/26/2022 12:01 PM on behalf of     Merrick Del Cid MD

## 2022-10-04 NOTE — TELEPHONE ENCOUNTER
RN: Please call to notify Alannah Leos that because the hydroxychloroquine toxicity monitoring eye exam is unable to be completed due to neck positioning, hydroxychloroquine cannot be continued due to safety concerns.  Please advise Alannah that hydroxychloroquine has been discontinued.     Merrick Del Cid MD  10/3/2022

## 2022-10-05 NOTE — TELEPHONE ENCOUNTER
called pt and left vm to return call in regards to r/f request.    Christina AZUL RN Specialty Triage 10/5/2022 2:44 PM

## 2022-10-10 NOTE — TELEPHONE ENCOUNTER
Call #2 to patient regarding refill request. Voicemail left for patient requesting call back    Radha BELCHER RN Specialty Triage 10/10/2022 3:19 PM

## 2022-10-12 ENCOUNTER — LAB REQUISITION (OUTPATIENT)
Dept: LAB | Facility: CLINIC | Age: 82
End: 2022-10-12
Payer: MEDICARE

## 2022-10-12 DIAGNOSIS — I25.10 ATHEROSCLEROTIC HEART DISEASE OF NATIVE CORONARY ARTERY WITHOUT ANGINA PECTORIS: ICD-10-CM

## 2022-10-12 DIAGNOSIS — R42 DIZZINESS AND GIDDINESS: ICD-10-CM

## 2022-10-12 DIAGNOSIS — E11.9 TYPE 2 DIABETES MELLITUS WITHOUT COMPLICATIONS (H): ICD-10-CM

## 2022-10-12 DIAGNOSIS — E61.1 IRON DEFICIENCY: ICD-10-CM

## 2022-10-13 LAB
ALBUMIN SERPL BCG-MCNC: 3.6 G/DL (ref 3.5–5.2)
ALP SERPL-CCNC: 78 U/L (ref 35–104)
ALT SERPL W P-5'-P-CCNC: <5 U/L (ref 10–35)
ANION GAP SERPL CALCULATED.3IONS-SCNC: 15 MMOL/L (ref 7–15)
AST SERPL W P-5'-P-CCNC: 25 U/L (ref 10–35)
BASOPHILS # BLD AUTO: 0.1 10E3/UL (ref 0–0.2)
BASOPHILS NFR BLD AUTO: 1 %
BILIRUB SERPL-MCNC: 0.3 MG/DL
BUN SERPL-MCNC: 14 MG/DL (ref 8–23)
CALCIUM SERPL-MCNC: 9 MG/DL (ref 8.8–10.2)
CHLORIDE SERPL-SCNC: 95 MMOL/L (ref 98–107)
CREAT SERPL-MCNC: 0.42 MG/DL (ref 0.51–0.95)
DEPRECATED HCO3 PLAS-SCNC: 23 MMOL/L (ref 22–29)
EOSINOPHIL # BLD AUTO: 0.1 10E3/UL (ref 0–0.7)
EOSINOPHIL NFR BLD AUTO: 1 %
ERYTHROCYTE [DISTWIDTH] IN BLOOD BY AUTOMATED COUNT: 18.5 % (ref 10–15)
FOLATE SERPL-MCNC: >40 NG/ML (ref 4.6–34.8)
GFR SERPL CREATININE-BSD FRML MDRD: >90 ML/MIN/1.73M2
GLUCOSE SERPL-MCNC: 133 MG/DL (ref 70–99)
HBA1C MFR BLD: 6 %
HCT VFR BLD AUTO: 32.8 % (ref 35–47)
HGB BLD-MCNC: 10.3 G/DL (ref 11.7–15.7)
IMM GRANULOCYTES # BLD: 0 10E3/UL
IMM GRANULOCYTES NFR BLD: 0 %
LYMPHOCYTES # BLD AUTO: 0.9 10E3/UL (ref 0.8–5.3)
LYMPHOCYTES NFR BLD AUTO: 10 %
MCH RBC QN AUTO: 25.8 PG (ref 26.5–33)
MCHC RBC AUTO-ENTMCNC: 31.4 G/DL (ref 31.5–36.5)
MCV RBC AUTO: 82 FL (ref 78–100)
MONOCYTES # BLD AUTO: 0.6 10E3/UL (ref 0–1.3)
MONOCYTES NFR BLD AUTO: 7 %
NEUTROPHILS # BLD AUTO: 7 10E3/UL (ref 1.6–8.3)
NEUTROPHILS NFR BLD AUTO: 81 %
NRBC # BLD AUTO: 0 10E3/UL
NRBC BLD AUTO-RTO: 0 /100
PLATELET # BLD AUTO: 338 10E3/UL (ref 150–450)
POTASSIUM SERPL-SCNC: 4.2 MMOL/L (ref 3.4–5.3)
PROT SERPL-MCNC: 6.9 G/DL (ref 6.4–8.3)
RBC # BLD AUTO: 4 10E6/UL (ref 3.8–5.2)
RETICS # AUTO: 0.03 10E6/UL (ref 0.03–0.1)
RETICS/RBC NFR AUTO: 0.8 % (ref 0.5–2)
SODIUM SERPL-SCNC: 133 MMOL/L (ref 136–145)
WBC # BLD AUTO: 8.7 10E3/UL (ref 4–11)

## 2022-10-13 PROCEDURE — 80053 COMPREHEN METABOLIC PANEL: CPT | Performed by: FAMILY MEDICINE

## 2022-10-13 PROCEDURE — 85045 AUTOMATED RETICULOCYTE COUNT: CPT | Performed by: FAMILY MEDICINE

## 2022-10-13 PROCEDURE — 82746 ASSAY OF FOLIC ACID SERUM: CPT | Performed by: FAMILY MEDICINE

## 2022-10-13 PROCEDURE — 36415 COLL VENOUS BLD VENIPUNCTURE: CPT | Performed by: FAMILY MEDICINE

## 2022-10-13 PROCEDURE — 82040 ASSAY OF SERUM ALBUMIN: CPT | Performed by: FAMILY MEDICINE

## 2022-10-13 PROCEDURE — 83036 HEMOGLOBIN GLYCOSYLATED A1C: CPT | Performed by: FAMILY MEDICINE

## 2022-10-13 PROCEDURE — 85025 COMPLETE CBC W/AUTO DIFF WBC: CPT | Performed by: FAMILY MEDICINE

## 2022-10-13 PROCEDURE — P9604 ONE-WAY ALLOW PRORATED TRIP: HCPCS | Performed by: FAMILY MEDICINE

## 2022-10-17 NOTE — TELEPHONE ENCOUNTER
Call #3 to patient to inform her of MD message. No answer, voicemail left    Radha BELCHER RN Specialty Triage 10/17/2022 1:19 PM

## 2022-10-27 ENCOUNTER — OFFICE VISIT (OUTPATIENT)
Dept: RHEUMATOLOGY | Facility: CLINIC | Age: 82
End: 2022-10-27
Payer: MEDICARE

## 2022-10-27 ENCOUNTER — LAB (OUTPATIENT)
Dept: LAB | Facility: CLINIC | Age: 82
End: 2022-10-27
Payer: MEDICARE

## 2022-10-27 VITALS — DIASTOLIC BLOOD PRESSURE: 71 MMHG | HEART RATE: 69 BPM | OXYGEN SATURATION: 100 % | SYSTOLIC BLOOD PRESSURE: 138 MMHG

## 2022-10-27 DIAGNOSIS — M06.09 RHEUMATOID ARTHRITIS OF MULTIPLE SITES WITH NEGATIVE RHEUMATOID FACTOR (H): ICD-10-CM

## 2022-10-27 DIAGNOSIS — Z79.899 HIGH RISK MEDICATION USE: ICD-10-CM

## 2022-10-27 DIAGNOSIS — M81.0 AGE RELATED OSTEOPOROSIS, UNSPECIFIED PATHOLOGICAL FRACTURE PRESENCE: ICD-10-CM

## 2022-10-27 DIAGNOSIS — M06.09 RHEUMATOID ARTHRITIS OF MULTIPLE SITES WITH NEGATIVE RHEUMATOID FACTOR (H): Primary | ICD-10-CM

## 2022-10-27 DIAGNOSIS — M15.9 OSTEOARTHRITIS OF MULTIPLE JOINTS, UNSPECIFIED OSTEOARTHRITIS TYPE: ICD-10-CM

## 2022-10-27 LAB
ALBUMIN SERPL-MCNC: 3.1 G/DL (ref 3.4–5)
ALP SERPL-CCNC: 77 U/L (ref 40–150)
ALT SERPL W P-5'-P-CCNC: 13 U/L (ref 0–50)
AST SERPL W P-5'-P-CCNC: 17 U/L (ref 0–45)
BILIRUB DIRECT SERPL-MCNC: <0.1 MG/DL (ref 0–0.2)
BILIRUB SERPL-MCNC: 0.3 MG/DL (ref 0.2–1.3)
CREAT SERPL-MCNC: 0.4 MG/DL (ref 0.52–1.04)
CRP SERPL-MCNC: 43.3 MG/L (ref 0–8)
EOSINOPHIL # BLD AUTO: 0.1 10E3/UL (ref 0–0.7)
EOSINOPHIL NFR BLD AUTO: 2 %
ERYTHROCYTE [DISTWIDTH] IN BLOOD BY AUTOMATED COUNT: 19.1 % (ref 10–15)
ERYTHROCYTE [SEDIMENTATION RATE] IN BLOOD BY WESTERGREN METHOD: 66 MM/HR (ref 0–30)
GFR SERPL CREATININE-BSD FRML MDRD: >90 ML/MIN/1.73M2
HCT VFR BLD AUTO: 30.8 % (ref 35–47)
HGB BLD-MCNC: 9.8 G/DL (ref 11.7–15.7)
LYMPHOCYTES # BLD AUTO: 1.2 10E3/UL (ref 0.8–5.3)
LYMPHOCYTES NFR BLD AUTO: 16 %
MCH RBC QN AUTO: 26 PG (ref 26.5–33)
MCHC RBC AUTO-ENTMCNC: 31.8 G/DL (ref 31.5–36.5)
MCV RBC AUTO: 82 FL (ref 78–100)
MONOCYTES # BLD AUTO: 0.5 10E3/UL (ref 0–1.3)
MONOCYTES NFR BLD AUTO: 7 %
PLATELET # BLD AUTO: 361 10E3/UL (ref 150–450)
PROT SERPL-MCNC: 7 G/DL (ref 6.8–8.8)
RBC # BLD AUTO: 3.77 10E6/UL (ref 3.8–5.2)
WBC # BLD AUTO: 7.4 10E3/UL (ref 4–11)

## 2022-10-27 PROCEDURE — 80076 HEPATIC FUNCTION PANEL: CPT

## 2022-10-27 PROCEDURE — 85652 RBC SED RATE AUTOMATED: CPT

## 2022-10-27 PROCEDURE — 86140 C-REACTIVE PROTEIN: CPT

## 2022-10-27 PROCEDURE — 36415 COLL VENOUS BLD VENIPUNCTURE: CPT

## 2022-10-27 PROCEDURE — 82565 ASSAY OF CREATININE: CPT

## 2022-10-27 PROCEDURE — 85027 COMPLETE CBC AUTOMATED: CPT

## 2022-10-27 PROCEDURE — 99215 OFFICE O/P EST HI 40 MIN: CPT | Performed by: INTERNAL MEDICINE

## 2022-10-27 RX ORDER — METHOTREXATE 2.5 MG/1
25 TABLET ORAL WEEKLY
Qty: 40 TABLET | Refills: 3 | Status: SHIPPED | OUTPATIENT
Start: 2022-10-27 | End: 2023-02-23

## 2022-10-27 NOTE — PATIENT INSTRUCTIONS
Vaccines to consider getting:  - the updated COVID-19 vaccination   - tetanus vaccination   - shingrix (shingles vaccination)  - influenza

## 2022-10-27 NOTE — PROGRESS NOTES
Rheumatology Clinic Visit      Alannah Leos MRN# [id number]   YOB: 1940 Age: 82 year old      Date of visit: 10/27/22   PCP: Dr. Leah Blanca    Chief Complaint   Patient presents with:  RECHECK: Follow up RA    Assessment and Plan     1. 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 Previously on methotrexate when first diagnosed (unclear if ineffective or not, stopped by patient preference to not treat her RA at that time).  Synovitis, subluxation, and fixed flexion deformities on initial exam on 10/12/2016. Steroid responsive. 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 Currently on methotrexate 20 mg once weekly, and hydroxychloroquine 200 mg daily. Previously on sulfasalazine (felt poorly on it).  At almost every visit I have encouraged her to escalate treatment for her active disease but she has been reluctant to do so because of possible risks from the medication; she understands that the progression of her disease is not likely to be reversible as seen by the changes of her spine.  At one point she was going to proceed with SQ bDMARD but she never started, then she was going to proceed with an infusion bDMARD but she didn't go through with it.  She seems hesitant to start a treatment in the fear that it may not work, and she is hesitant to use medications because of possible side effects and cost; but the most recent concern has been about the potential that the medication may not work for her arthritis.  She has verbalized understanding that she has severe degenerative arthritis in addition to active severe inflammatory arthritis.  We have discussed the importance of treating her inflammatory arthritis with regard to articular and potential future extra-articular manifestations.  She has benefited from methotrexate and hydroxychloroquine but due to inability to adequately  monitor hydroxychloroquine, hydroxychloroquine was discontinued after discussion with her ophthalmologist.  Compared to last visit, she has more active synovitis of the hands.  Increase methotrexate as noted below.  If she decides to proceed with  Remicade, or a biosimilar if preferred by patient or required by her insurance, then will need to recheck TB test prior to starting. SDAI 52   Chronic illness, progressive, severe.    - Increase methotrexate from 20 mg once weekly, to 25 mg once weekly (split dosing within the same 24-hours of each week)  - Continue folic acid 1mg daily  - Labs monthly q8fogfoc: CBC, Cr, Hepatic Panel  - Labs in 3 months: CBC, Creatinine, Hepatic Panel, ESR, CRP    High risk medication requiring intensive toxicity monitoring at least quarterly: labs ordered include CBC, Creatinine, Hepatic panel to monitor for cytopenia and hepatotoxicity; checking creatinine as it affects clearance of medication.     2. Positive PIERO and dsDNA: These were noted on record review. She does not have symptoms to support an PIERO-associated rheumatologic disease at this time.     3. Diffuse neck/back pain: No evidence of instability by x-rays on 10/12/2016.  Seen by NSGY and now getting trigger point injections in the pain clinic with improvement    4. Parkinson's Disease: Followed by Dr. Ricardo [facility] at the [facility] previously, and now at [facility].  Worsened recently and she plans to follow-up with neurology    5. Osteoporosis: Based on 1/29/2018 DEXA.  Was on fosamax from [id number]; reclast then started 2021 with the first dose received 5/12/2021, followed by the second dose on 5/16/2022.   - Continue reclast 5mg IV yearly, next due to 5/16/2023  - Continue vitamin D 1000 IU daily   - Continue calcium 1000mg daily  - Lab in 3 months: Vitamin D    6.  Severe osteoarthritis of multiple joints: Has been evaluated by surgeons and is now followed in the pain clinic.  Severe  osteoarthritis that is limiting daily activities.    7.  Severe osteoarthritis and severe rheumatoid arthritis: Significantly impacting her quality of life.  Function limited due to degenerative and inflammatory changes.  She is resistant to escalating treatment for inflammatory arthritis with biologic DMARDs.  Previously I discussed palliative care option and she was in agreement; she has established with palliative care but no recent follow-up appointment.    8.  Vaccinations: Vaccinations reviewed with Ms. Leos.  Risks and benefits of vaccinations were discussed.    - Influenza: encouraged yearly vaccination  - [id number]: up to date  - [id number]: up to date  - COVID-19: Advised updating    Total minutes spent in evaluation with patient, documentation, , and review of pertinent studies and chart notes: 18     Ms. Leos verbalized agreement with and understanding of the rational for the diagnosis and treatment plan.  All questions were answered to best of my ability and the patient's satisfaction. Ms. Leos was advised to contact the clinic with any questions that may arise after the clinic visit.      Thank you for involving me in the care of the patient    Return to clinic: 3-4 months      HPI   Alannah Leos is a 82 year old female with a medical history significant for Parkinson's disease, osteoarthritis, status post TKA, osteoporosis, and rheumatoid arthritis who presents for follow-up of rheumatoid arthritis.    4/21/2022: Trigger point injections from the pain clinic are helpful but she is worried that her new pain clinic provider will not schedule appointments every 3 weeks that have been helpful for her, but rather she is being scheduled every 5 weeks and she has too much pain to go every 5 weeks between injections.  She is wondering if there is another provider that has more availability to allow her to come in every 3 weeks for these injections; she says that she will also speak  with her current pain management provider who she has only seen once so far.  Severe pain in the right shoulder and hardly able to use it now.  Pain in her wrists, MCPs, and PIPs where she also has swelling.  Stiffness lasts for all day, worse in the morning when she first wakes.  Wrists, elbows, shoulders, knees, ankles, MTPs, neck, and fingers all hurt.  She is okay with continuing Reclast for osteoporosis management but is hesitant to use biologic DMARD or escalate treatment for RA in any way at this time because she is concerned that it may not work.  She verbalized understanding that untreated inflammatory arthritis will potentially cause worsening irreversible damage.    Today: She feels that her arthritis is fairly unchanged.  She says that she does not feel well because she cannot raise her head due to degenerative changes in her neck.  She says that her hands have worsened slightly.  She is only taking methotrexate now for inflammatory arthritis.  She was hospitalized recently for worsening Parkinson's disease and plans to follow-up with neurology as an outpatient.  She and her daughter voiced frustration with recent hospitalization, where they did not feel that much was done.    Denies fevers, chills, nausea, vomiting, constipation, diarrhea. No abdominal pain. No chest pain/pressure, palpitations, or shortness of breath.   No oral or nasal sores.  No rash. No sicca symptoms.      Daughter is present with her today.    Tobacco: none  EtOH: 1 drink at [place] only  Drugs: none    ROS   12 point review of system was completed and negative except as noted in the HPI     Active Problem List     Patient Active Problem List   Diagnosis     Osteoporosis     Rheumatoid arthritis (H)     Parkinson disease (H)     Osteoarthritis of wrist     Osteoarthritis of shoulder     History of total knee arthroplasty     Osteoarthritis of left knee     Advanced directives, counseling/discussion     CARDIOVASCULAR SCREENING;  LDL GOAL LESS THAN 160     High risk medication use     Underweight     Impingement syndrome, shoulder, left     Combined forms of age-related cataract of both eyes     Seborrheic dermatitis     Anemia in other chronic diseases classified elsewhere     Ankylosing spondylitis of cervical region (H)     Failure to thrive in adult     Physical deconditioning     History of iron deficiency anemia     Past Medical History     Past Medical History:   Diagnosis Date     Hyperlipidemia      Murmur, heart     hsm     Nonsenile cataract      Osteoarthrosis, unspecified whether generalized or localized, lower leg      Osteopenia      Rheumatoid arthritis(714.0)      Past Surgical History     Past Surgical History:   Procedure Laterality Date     GALLBLADDER SURGERY       HYSTERECTOMY       KNEE SURGERY      left      LAMINECTOMY LUMBAR ONE LEVEL       TONSILLECTOMY       Allergy     Allergies   Allergen Reactions     Decadrol [Dexamethasone Sodium Phosphate] Hives     Shrimp Itching     Current Medication List     Current Outpatient Medications   Medication Sig     ACE NOT PRESCRIBED, INTENTIONAL, 1 each continuous prn. ACE Inhibitor not prescribed due to Refusal by patient           CALCIUM + D OR 600mg twice a day      carbidopa-levodopa (SINEMET)  MG tablet Take one at 6 am, noon, 5 pm, 9 pm and midnight     diclofenac (VOLTAREN) 1 % topical gel Apply 2 g topically 3 times daily as needed for moderate pain     folic acid (FOLVITE) 1 MG tablet Take 1 tablet (1 mg) by mouth daily     ibuprofen (ADVIL/MOTRIN) 200 MG tablet Take 1 tablet (200 mg) by mouth every 8 hours as needed for moderate pain     methotrexate sodium 2.5 MG TABS Take 10 tablets (25 mg) by mouth once a week . Take 5 tablets in the morning (okay to instead take at lunch) and 5 tablets in the evening on the same day of each week.     order for DME Equipment being ordered: soft neck brace     Current Facility-Administered Medications   Medication      "ropivacaine (NAROPIN) injection 3 mL     ropivacaine (NAROPIN) injection 3 mL     triamcinolone (KENALOG-40) injection 40 mg     triamcinolone (KENALOG-40) injection 40 mg     Social History   See HPI    Family History     Family History   Problem Relation Age of Onset     Cancer Sister         pancreatic     Diabetes No family hx of      Hypertension No family hx of        Physical Exam     Temp Readings from Last 3 Encounters:   06/22/22 97.8  F (36.6  C) (Tympanic)   05/16/22 97.6  F (36.4  C)   01/17/20 97.5  F (36.4  C) (Oral)     BP Readings from Last 5 Encounters:   10/27/22 138/71   07/28/22 (!) 176/66   06/22/22 139/64   05/16/22 (!) 145/76   05/12/22 (!) 173/78     Pulse Readings from Last 1 Encounters:   10/27/22 69     Resp Readings from Last 1 Encounters:   06/22/22 18     Estimated body mass index is 21.04 kg/m  as calculated from the following:    Height as of 6/9/21: 1.626 m (5' 4\").    Weight as of 4/21/22: 55.6 kg (122 lb 9.6 oz).    GEN: NAD, thin and frail appearing  HEENT: MMM. No oral lesions. Anicteric, noninjected sclera  CV: S1, S2.  RRR.  No murmurs or rubs.  PULM: no increased work of breathing.  Clear to auscultation bilaterally.  MSK: Bilateral second-fourth MCPs and second-third PIPs with synovial swelling and tenderness to palpation.  Left wrist with synovial swelling and tenderness palpation.  Right wrist without synovial swelling but was tender to palpation.   Elbows without swelling or tenderness to palpation.  Shoulders diffusely tender to palpation and minimal ROM; no swelling or increased warmth.  Knees with medial joint line tenderness but no effusion or increased warmth.  Ankles and MTPs without swelling or tenderness to palpation.  Holds neck in a flexed position.   SKIN: No rash. Diffuse hair thinning.  EXT: No lower extremity edema  PSYCH: Alert. Appropriate.      Labs / Imaging (select studies)     RF/CCP  Recent Labs   Lab Test 10/12/16  1142   CCPIGG 2   RHF <20 "     CBC  Recent Labs   Lab Test 10/13/22  0950 07/20/22  1524 06/22/22  1112 04/21/22  1508 08/18/21  1432 05/17/21  1342 02/22/21  1339 12/07/20  1353   WBC 8.7 6.1 6.1 6.6   < > 7.5 8.1 7.5   RBC 4.00 3.70* 4.10 3.78*   < > 3.70* 4.02 4.11   HGB 10.3* 10.0* 11.1* 10.3*   < > 11.0* 11.6* 11.4*   HCT 32.8* 30.5* 34.5* 32.2*   < > 33.6* 36.0 36.1   MCV 82 82 84 85   < > 91 90 88   RDW 18.5* 17.6* 17.9* 16.7*   < > 16.8* 17.6* 17.3*    286 327 341   < > 235 249 246   MCH 25.8* 27.0 27.1 27.2   < > 29.7 28.9 27.7   MCHC 31.4* 32.8 32.2 32.0   < > 32.7 32.2 31.6   NEUTROPHIL 81 70  --  69   < > 73.0 70.0 72.0   LYMPH 10 18  --  21   < > 20.0 17.0 19.0   MONOCYTE 7 9  --  7   < > 4.0 10.0 5.0   EOSINOPHIL 1 2  --  2   < > 2.0 2.0 2.0   BASOPHIL 1 1  --  1   < > 1.0 1.0 2.0   ANEU  --   --   --   --   --  5.4 5.6 5.3   ALYM  --   --   --   --   --  1.5 1.4 1.4   DORITA  --   --   --   --   --  0.3 0.8 0.4   AEOS  --   --   --   --   --  0.2 0.2 0.2   ABAS  --   --   --   --   --  0.1 0.1 0.2   ANEUTAUTO 7.0 4.3  --  4.5   < >  --   --   --    ALYMPAUTO 0.9 1.1  --  1.4   < >  --   --   --    AMONOAUTO 0.6 0.5  --  0.5   < >  --   --   --    AEOSAUTO 0.1 0.1  --  0.1   < >  --   --   --    ABSBASO 0.1 0.0  --  0.1   < >  --   --   --     < > = values in this interval not displayed.     CMP  Recent Labs   Lab Test 10/13/22  0950 07/20/22  1524 06/22/22  1112 05/12/22  1709 08/18/21  1432 05/17/21  1342 05/12/21  1334 03/12/21  1328 02/22/21  1339 05/18/20  1325 01/15/20  1403   *  --  137  --   --   --   --   --   --   --  137   POTASSIUM 4.2  --  4.0  --   --   --   --   --   --   --  4.2   CHLORIDE 95*  --  103  --   --   --   --   --   --   --  102   CO2 23  --  27  --   --   --   --   --   --   --  30   ANIONGAP 15  --  7  --   --   --   --   --   --   --  5   *  --  103*  --   --   --   --   --   --   --  97   BUN 14.0  --  11  --   --   --   --   --   --   --  15   CR 0.42* 0.47* 0.43* 0.47*   < >  0.63 0.65  --  0.58   < > 0.55   GFRESTIMATED >90 >90 >90 >90   < > 85 84  --  88   < > 90   GFRESTBLACK  --   --   --   --   --  >90 >90  --  >90   < > >90   PAKO 9.0  --  9.1 10.0   < >  --  9.2   < >  --   --  9.5  10.2*   BILITOTAL 0.3 0.3 0.5  --    < > 0.4  --   --  0.4   < > 0.4   ALBUMIN 3.6 3.3* 3.7  --    < > 3.7  --   --  4.1   < > 4.3   PROTTOTAL 6.9 7.3 7.7  --    < > 7.3  --   --  7.9   < > 7.8   ALKPHOS 78 73 82  --    < > 77  --   --  91   < > 125   AST 25 16 15  --    < > 16  --   --  18   < > 25   ALT <5* 8 10  --    < > 12  --   --  10   < > 18    < > = values in this interval not displayed.     Calcium/VitaminD  Recent Labs   Lab Test 10/13/22  0950 06/22/22  1112 05/12/22  1709 12/15/21  1553 05/12/21  1334 03/12/21  1328 01/15/20  1403   PAKO 9.0 9.1 10.0 9.6   < > 9.6 9.5  10.2*   VITDT  --   --   --  40  --  37 39    < > = values in this interval not displayed.     ESR/CRP  Recent Labs   Lab Test 04/21/22  1508 12/15/21  1553 08/18/21  1432   SED 44* 17 10   CRP 11.7* 5.8 <2.9     Lipid Panel  Recent Labs   Lab Test 05/26/15  1416   CHOL 179   TRIG 50   HDL 80   LDL 89   VLDL 10   CHOLHDLRATIO 2.2     Hepatitis B  Recent Labs   Lab Test 01/18/17  1601   HBCAB Nonreactive   HEPBANG Nonreactive     Hepatitis C  Recent Labs   Lab Test 01/18/17  1601   HCVAB Nonreactive   Assay performance characteristics have not been established for newborns,   infants, and children       Tuberculosis Screening  Recent Labs   Lab Test 03/12/21  1328 01/25/18  1352 01/18/17  1601   TBRSLT  --  Negative Negative   TBAGN  --  0.00 0.00   TBRST Negative  --   --      HIV Screening  Recent Labs   Lab Test 01/18/17  1601   HIAGAB Nonreactive   HIV-1 p24 Ag & HIV-1/HIV-2 Ab Not Detected       Immunization History     Immunization History   Administered Date(s) Administered     COVID-19,PF,Pfizer (12+ Yrs) 03/12/2021, 04/02/2021, 10/13/2021     Pneumo Conj 13-V (2010&after) 11/03/2016     Pneumococcal 23 valent  11/01/2007, 11/13/2007, 03/12/2018     TD (ADULT, 7+) 11/13/2007     Tdap (Adacel,Boostrix) 11/13/2007          Chart documentation done in part with Dragon Voice recognition Software. Although reviewed after completion, some word and grammatical error may remain.    Merrick Del Cid MD

## 2022-11-07 DIAGNOSIS — M06.09 RHEUMATOID ARTHRITIS OF MULTIPLE SITES WITH NEGATIVE RHEUMATOID FACTOR (H): ICD-10-CM

## 2022-11-07 RX ORDER — FOLIC ACID 1 MG/1
1 TABLET ORAL DAILY
Qty: 90 TABLET | Refills: 2 | Status: SHIPPED | OUTPATIENT
Start: 2022-11-07 | End: 2023-06-14

## 2022-11-07 NOTE — TELEPHONE ENCOUNTER
"RN refilled medication per [facility] Refill Protocol.     Faith BELCHER RN      Requested Prescriptions   Pending Prescriptions Disp Refills     folic acid (FOLVITE) 1 MG tablet 90 tablet 2     Sig: Take 1 tablet (1 mg) by mouth daily       Vitamin Supplements (Adult) Protocol Passed - 11/7/2022  8:25 AM        Passed - High dose Vitamin D not ordered        Passed - Recent (12 mo) or future (30 days) visit within the authorizing provider's specialty     Patient has had an office visit with the authorizing provider or a provider within the authorizing providers department within the previous 12 mos or has a future within next 30 days. See \"Patient Info\" tab in inbasket, or \"Choose Columns\" in Meds & Orders section of the refill encounter.              Passed - Medication is active on med list             "

## 2023-02-22 RX ORDER — ASCORBIC ACID
CRYSTALS ORAL
COMMUNITY

## 2023-02-22 RX ORDER — MULTIVIT WITH MINERALS/LUTEIN
1000 TABLET ORAL 2 TIMES DAILY
COMMUNITY

## 2023-02-23 ENCOUNTER — VIRTUAL VISIT (OUTPATIENT)
Dept: RHEUMATOLOGY | Facility: CLINIC | Age: 83
End: 2023-02-23
Payer: MEDICARE

## 2023-02-23 DIAGNOSIS — Z79.899 HIGH RISK MEDICATION USE: ICD-10-CM

## 2023-02-23 DIAGNOSIS — M06.09 RHEUMATOID ARTHRITIS OF MULTIPLE SITES WITH NEGATIVE RHEUMATOID FACTOR (H): Primary | ICD-10-CM

## 2023-02-23 PROCEDURE — 99442 PR PHYSICIAN TELEPHONE EVALUATION 11-20 MIN: CPT | Performed by: INTERNAL MEDICINE

## 2023-02-23 RX ORDER — METHOTREXATE 2.5 MG/1
20 TABLET ORAL WEEKLY
Qty: 104 TABLET | Refills: 1 | Status: SHIPPED | OUTPATIENT
Start: 2023-02-23 | End: 2023-06-14

## 2023-02-23 NOTE — PROGRESS NOTES
Alannah Leos is a 82 year old year old who is being evaluated via a billable telephone visit.      What phone number would you like to be contacted at? [phone]   How would you like to obtain your AVS? Mail a copy       Rheumatology Telephone/Telehealth Visit      Alannah Leos MRN# [id number]   YOB: 1940 Age: 82 year old      Date of visit: 2/23/23   PCP: Dr. Leah Blanca    Chief Complaint   Patient presents with:  RECHECK    Assessment and Plan     1. 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 Previously on methotrexate when first diagnosed (unclear if ineffective or not, stopped by patient preference to not treat her RA at that time).  Synovitis, subluxation, and fixed flexion deformities on initial exam on 10/12/2016. Steroid responsive. 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 Currently on methotrexate 20 mg once weekly, and hydroxychloroquine 200 mg daily. Previously on sulfasalazine (felt poorly on it).  At almost every visit I have encouraged her to escalate treatment for her active disease but she has been reluctant to do so because of possible risks from the medication; she understands that the progression of her disease is not likely to be reversible as seen by the changes of her spine.  At one point she was going to proceed with SQ bDMARD but she never started, then she was going to proceed with an infusion bDMARD but she didn't go through with it.  She seems hesitant to start a treatment in the fear that it may not work, and she is hesitant to use medications because of possible side effects and cost; but the most recent concern has been about the potential that the medication may not work for her arthritis.  She has verbalized understanding that she has severe degenerative arthritis in addition to active severe inflammatory arthritis.  We have discussed the  importance of treating her inflammatory arthritis with regard to articular and potential future extra-articular manifestations.  She has benefited from methotrexate and hydroxychloroquine but due to inability to adequately monitor hydroxychloroquine, hydroxychloroquine was discontinued after discussion with her ophthalmologist.  Active inflammatory joint symptoms; and previous in-office exam showed synovitis.  Discussed treatment options and she would like to move forward with remicade infusions; will order if screening is okay.   No improvement of inflammatory joints symptoms with MTX dose increase.  Chronic illness, progressive, severe.    - Reduce methotrexate to previous dose of 20 mg once weekly (split dosing within the same 24-hours of each week)  - Continue folic acid 1mg daily  - Start infliximab 3mg/kg IV on week 0, 2, 6, and then every 8 weeks thereafter if TB screening is okay  - Labs now: CBC, Creatinine, Hepatic Panel, ESR, CRP, quantiferon TB gold plus  - Labs in 3 months: CBC, Creatinine, Hepatic Panel, ESR, CRP      High risk medication requiring intensive toxicity monitoring at least quarterly: labs ordered include CBC, Creatinine, Hepatic panel to monitor for cytopenia and hepatotoxicity; checking creatinine as it affects clearance of medication.     # Infliximab (Remicade) Risks and Benefits: The risks and benefits of infliximab were discussed in detail and the patient verbalized understanding.  The risks discussed include, but are not limited to, the risk for hypersensitivity, anaphylaxis, anaphylactoid reactions, an increased risk for serious infections leading to hospitalization or death, a possible increased risk for lymphoma and other malignancies, a possible worsening of demyelinating diseases, a possible worsening of heart failure, cytopenias, hepatotoxicity, risk for drug induced lupus, possible reactivation of hepatitis B, and possible reactivation of latent tuberculosis.   The most  common adverse reactions are infections, infusion-related reactions, and headache.  It was discussed that the medication would need to be discontinued if a serious infection develops.  It was discussed that live vaccinations should not be received while using infliximab or within 30 days prior to starting infliximab.  I encouraged reviewing the package insert and asking any questions about the medication.      2. Positive PIERO and dsDNA: These were noted on record review. She does not have symptoms to support an PIERO-associated rheumatologic disease at this time.     3. Diffuse neck/back pain: No evidence of instability by x-rays on 10/12/2016.  Seen by NSGY and now getting trigger point injections in the pain clinic with improvement    4. Parkinson's Disease: Followed by Dr. Ricardo Johnson at the [facility] previously, and now at [facility].  Worsened recently and she plans to follow-up with neurology    5. Osteoporosis: Based on 1/29/2018 DEXA.  Was on fosamax from 7224-6022; reclast then started 2021 with the first dose received 5/12/2021, followed by the second dose on 5/16/2022.   - Continue reclast 5mg IV yearly, next due to 5/16/2023  - Continue vitamin D 1000 IU daily   - Continue calcium 1000mg daily  - Lab in 3 months: Vitamin D    6.  Severe osteoarthritis of multiple joints: Has been evaluated by surgeons and is now followed in the pain clinic.  Severe osteoarthritis that is limiting daily activities.    7.  Severe osteoarthritis and severe rheumatoid arthritis: Significantly impacting her quality of life.  Function limited due to degenerative and inflammatory changes.  She is resistant to escalating treatment for inflammatory arthritis with biologic DMARDs.  Previously I discussed palliative care option and she was in agreement; she has established with palliative care but no recent follow-up appointment.    8.  Vaccinations: Vaccinations reviewed with Ms. Leos.  Risks and  benefits of vaccinations were discussed.    - Influenza: encouraged yearly vaccination  - [id number]: up to date  - [id number]: up to date  - COVID-19: Advised updating    Total minutes spent in evaluation with patient, documentation, , and review of pertinent studies and chart notes: 20     Ms. Leos verbalized agreement with and understanding of the rational for the diagnosis and treatment plan.  All questions were answered to best of my ability and the patient's satisfaction. Ms. Leos was advised to contact the clinic with any questions that may arise after the clinic visit.      Thank you for involving me in the care of the patient    Return to clinic: 3-4 months      HPI   Alannah Leos is a 82 year old female with a medical history significant for Parkinson's disease, osteoarthritis, status post TKA, osteoporosis, and rheumatoid arthritis who presents for follow-up of rheumatoid arthritis.    4/21/2022: Trigger point injections from the pain clinic are helpful but she is worried that her new pain clinic provider will not schedule appointments every 3 weeks that have been helpful for her, but rather she is being scheduled every 5 weeks and she has too much pain to go every 5 weeks between injections.  She is wondering if there is another provider that has more availability to allow her to come in every 3 weeks for these injections; she says that she will also speak with her current pain management provider who she has only seen once so far.  Severe pain in the right shoulder and hardly able to use it now.  Pain in her wrists, MCPs, and PIPs where she also has swelling.  Stiffness lasts for all day, worse in the morning when she first wakes.  Wrists, elbows, shoulders, knees, ankles, MTPs, neck, and fingers all hurt.  She is okay with continuing Reclast for osteoporosis management but is hesitant to use biologic DMARD or escalate treatment for RA in any way at this time because she is concerned  that it may not work.  She verbalized understanding that untreated inflammatory arthritis will potentially cause worsening irreversible damage.    She feels that her arthritis is fairly unchanged.  She says that she does not feel well because she cannot raise her head due to degenerative changes in her neck.  She says that her hands have worsened slightly.  She is only taking methotrexate now for inflammatory arthritis.  She was hospitalized recently for worsening Parkinson's disease and plans to follow-up with neurology as an outpatient.  She and her daughter voiced frustration with recent hospitalization, where they did not feel that much was done.    Today: no change in arthritis symptoms per patient. States that she feels like she waited too long to treat the RA. Now with degenerative symptoms and pain as she has noted in the past.  Also with pain and swelling and stiffness in the MCPs and PIPs that is worse in the AM. Morning stiffness for most of the day.  She is willing to consider infusions again.     Denies fevers, chills, nausea, vomiting, constipation, diarrhea. No abdominal pain. No chest pain/pressure, palpitations, or shortness of breath.   No oral or nasal sores.  No rash. No sicca symptoms.      Daughter is present with her today.    Tobacco: none  EtOH: 1 drink at [place] only  Drugs: none    ROS   12 point review of system was completed and negative except as noted in the HPI     Active Problem List     Patient Active Problem List   Diagnosis     Osteoporosis     Rheumatoid arthritis (H)     Parkinson disease (H)     Osteoarthritis of wrist     Osteoarthritis of shoulder     History of total knee arthroplasty     Osteoarthritis of left knee     Advanced directives, counseling/discussion     CARDIOVASCULAR SCREENING; LDL GOAL LESS THAN 160     High risk medication use     Underweight     Impingement syndrome, shoulder, left     Combined forms of age-related cataract of both eyes     Seborrheic  dermatitis     Anemia in other chronic diseases classified elsewhere     Ankylosing spondylitis of cervical region (H)     Failure to thrive in adult     Physical deconditioning     History of iron deficiency anemia     Past Medical History     Past Medical History:   Diagnosis Date     Hyperlipidemia      Murmur, heart     hsm     Nonsenile cataract      Osteoarthrosis, unspecified whether generalized or localized, lower leg      Osteopenia      Rheumatoid arthritis(714.0)      Past Surgical History     Past Surgical History:   Procedure Laterality Date     GALLBLADDER SURGERY       HYSTERECTOMY       KNEE SURGERY      left      LAMINECTOMY LUMBAR ONE LEVEL       TONSILLECTOMY       Allergy     Allergies   Allergen Reactions     Decadrol [Dexamethasone Sodium Phosphate] Hives     Shrimp Itching     Current Medication List     Current Outpatient Medications   Medication Sig     ACE NOT PRESCRIBED, INTENTIONAL, 1 each continuous prn. ACE Inhibitor not prescribed due to Refusal by patient           CALCIUM + D OR 600mg twice a day      carbidopa-levodopa (SINEMET)  MG tablet Take one at 6 am, noon, 5 pm, 9 pm and midnight     Cyanocobalamin (VITAMIN B 12) 250 MCG LOZG      diclofenac (VOLTAREN) 1 % topical gel Apply 2 g topically 3 times daily as needed for moderate pain     folic acid (FOLVITE) 1 MG tablet Take 1 tablet (1 mg) by mouth daily     ibuprofen (ADVIL/MOTRIN) 200 MG tablet Take 1 tablet (200 mg) by mouth every 8 hours as needed for moderate pain     methotrexate sodium 2.5 MG TABS Take 10 tablets (25 mg) by mouth once a week . Take 5 tablets in the morning (okay to instead take at lunch) and 5 tablets in the evening on the same day of each week.     order for DME Equipment being ordered: soft neck brace     vitamin C (ASCORBIC ACID) 1000 MG TABS Take 1,000 mg by mouth 2 times daily     Current Facility-Administered Medications   Medication     ropivacaine (NAROPIN) injection 3 mL     ropivacaine  "(NAROPIN) injection 3 mL     triamcinolone (KENALOG-40) injection 40 mg     triamcinolone (KENALOG-40) injection 40 mg     Social History   See HPI    Family History     Family History   Problem Relation Age of Onset     Cancer Sister         pancreatic     Diabetes No family hx of      Hypertension No family hx of        Physical Exam     Temp Readings from Last 3 Encounters:   06/22/22 97.8  F (36.6  C) (Tympanic)   05/16/22 97.6  F (36.4  C)   01/17/20 97.5  F (36.4  C) (Oral)     BP Readings from Last 5 Encounters:   10/27/22 138/71   07/28/22 (!) 176/66   06/22/22 139/64   05/16/22 (!) 145/76   05/12/22 (!) 173/78     Pulse Readings from Last 1 Encounters:   10/27/22 69     Resp Readings from Last 1 Encounters:   06/22/22 18     Estimated body mass index is 21.04 kg/m  as calculated from the following:    Height as of 6/9/21: 1.626 m (5' 4\").    Weight as of 4/21/22: 55.6 kg (122 lb 9.6 oz).    GEN: alert and no distress  PSYCH: Alert; coherent speech, normal rate and volume, able to articulate logical thoughts, able   to abstract reason, no tangential thoughts. Normal affect.   RESP: No cough, no audible wheezing, able to talk in full sentences  Remainder of exam unable to be completed due to telephone visits      Labs / Imaging (select studies)   RF/CCP  Recent Labs   Lab Test 10/12/16  1142   CCPIGG 2   RHF <20     CBC  Recent Labs   Lab Test 10/27/22  1406 10/13/22  0950 07/20/22  1524 06/22/22  1112 04/21/22  1508 08/18/21  1432 05/17/21  1342 02/22/21  1339 12/07/20  1353   WBC 7.4 8.7 6.1   < > 6.6   < > 7.5 8.1 7.5   RBC 3.77* 4.00 3.70*   < > 3.78*   < > 3.70* 4.02 4.11   HGB 9.8* 10.3* 10.0*   < > 10.3*   < > 11.0* 11.6* 11.4*   HCT 30.8* 32.8* 30.5*   < > 32.2*   < > 33.6* 36.0 36.1   MCV 82 82 82   < > 85   < > 91 90 88   RDW 19.1* 18.5* 17.6*   < > 16.7*   < > 16.8* 17.6* 17.3*    338 286   < > 341   < > 235 249 246   MCH 26.0* 25.8* 27.0   < > 27.2   < > 29.7 28.9 27.7   MCHC 31.8 31.4* " 32.8   < > 32.0   < > 32.7 32.2 31.6   NEUTROPHIL  --  81 70  --  69   < > 73.0 70.0 72.0   LYMPH 16 10 18  --  21   < > 20.0 17.0 19.0   MONOCYTE 7 7 9  --  7   < > 4.0 10.0 5.0   EOSINOPHIL 2 1 2  --  2   < > 2.0 2.0 2.0   BASOPHIL  --  1 1  --  1   < > 1.0 1.0 2.0   ANEU  --   --   --   --   --   --  5.4 5.6 5.3   ALYM  --   --   --   --   --   --  1.5 1.4 1.4   DORITA  --   --   --   --   --   --  0.3 0.8 0.4   AEOS  --   --   --   --   --   --  0.2 0.2 0.2   ABAS  --   --   --   --   --   --  0.1 0.1 0.2   ANEUTAUTO  --  7.0 4.3  --  4.5   < >  --   --   --    ALYMPAUTO 1.2 0.9 1.1  --  1.4   < >  --   --   --    AMONOAUTO 0.5 0.6 0.5  --  0.5   < >  --   --   --    AEOSAUTO 0.1 0.1 0.1  --  0.1   < >  --   --   --    ABSBASO  --  0.1 0.0  --  0.1   < >  --   --   --     < > = values in this interval not displayed.     CMP  Recent Labs   Lab Test 10/27/22  1406 10/13/22  0950 07/20/22  1524 06/22/22  1112 05/12/22  1709 08/18/21  1432 05/17/21  1342 05/12/21  1334 03/12/21  1328 02/22/21  1339 05/18/20  1325 01/15/20  1403   NA  --  133*  --  137  --   --   --   --   --   --   --  137   POTASSIUM  --  4.2  --  4.0  --   --   --   --   --   --   --  4.2   CHLORIDE  --  95*  --  103  --   --   --   --   --   --   --  102   CO2  --  23  --  27  --   --   --   --   --   --   --  30   ANIONGAP  --  15  --  7  --   --   --   --   --   --   --  5   GLC  --  133*  --  103*  --   --   --   --   --   --   --  97   BUN  --  14.0  --  11  --   --   --   --   --   --   --  15   CR 0.40* 0.42* 0.47* 0.43* 0.47*   < > 0.63 0.65  --  0.58   < > 0.55   GFRESTIMATED >90 >90 >90 >90 >90   < > 85 84  --  88   < > 90   GFRESTBLACK  --   --   --   --   --   --  >90 >90  --  >90   < > >90   PAKO  --  9.0  --  9.1 10.0   < >  --  9.2   < >  --   --  9.5  10.2*   BILITOTAL 0.3 0.3 0.3 0.5  --    < > 0.4  --   --  0.4   < > 0.4   ALBUMIN 3.1* 3.6 3.3* 3.7  --    < > 3.7  --   --  4.1   < > 4.3   PROTTOTAL 7.0 6.9 7.3 7.7  --    < > 7.3   --   --  7.9   < > 7.8   ALKPHOS 77 78 73 82  --    < > 77  --   --  91   < > 125   AST 17 25 16 15  --    < > 16  --   --  18   < > 25   ALT 13 <5* 8 10  --    < > 12  --   --  10   < > 18    < > = values in this interval not displayed.     Calcium/VitaminD  Recent Labs   Lab Test 10/13/22  0950 06/22/22  1112 05/12/22  1709 12/15/21  1553 05/12/21  1334 03/12/21  1328 01/15/20  1403   PAKO 9.0 9.1 10.0 9.6   < > 9.6 9.5  10.2*   VITDT  --   --   --  40  --  37 39    < > = values in this interval not displayed.     ESR/CRP  Recent Labs   Lab Test 10/27/22  1406 04/21/22  1508 12/15/21  1553   SED 66* 44* 17   CRP 43.3* 11.7* 5.8     Lipid Panel  Recent Labs   Lab Test 05/26/15  1416   CHOL 179   TRIG 50   HDL 80   LDL 89   VLDL 10   CHOLHDLRATIO 2.2     Hepatitis B  Recent Labs   Lab Test 01/18/17  1601   HBCAB Nonreactive   HEPBANG Nonreactive     Hepatitis C  Recent Labs   Lab Test 01/18/17  1601   HCVAB Nonreactive   Assay performance characteristics have not been established for newborns,   infants, and children       Tuberculosis Screening  Recent Labs   Lab Test 03/12/21  1328 01/25/18  1352 01/18/17  1601   TBRSLT  --  Negative Negative   TBAGN  --  0.00 0.00   TBRST Negative  --   --      HIV Screening  Recent Labs   Lab Test 01/18/17  1601   HIAGAB Nonreactive   HIV-1 p24 Ag & HIV-1/HIV-2 Ab Not Detected         Immunization History     Immunization History   Administered Date(s) Administered     COVID-19 Vaccine 12+ (Pfizer) 03/12/2021, 04/02/2021, 10/13/2021     Pneumo Conj 13-V (2010&after) 11/03/2016     Pneumococcal 23 valent 11/01/2007, 11/13/2007, 03/12/2018     TD (ADULT, 7+) 11/13/2007     Tdap (Adacel,Boostrix) 11/13/2007          Chart documentation done in part with Dragon Voice recognition Software. Although reviewed after completion, some word and grammatical error may remain.    Phone call duration with patient (in minutes): 18    Location of patient: [place], [place]   Location of provider:  ROMIE Del Cid MD

## 2023-03-07 ENCOUNTER — LAB (OUTPATIENT)
Dept: LAB | Facility: CLINIC | Age: 83
End: 2023-03-07
Payer: MEDICARE

## 2023-03-07 DIAGNOSIS — Z79.899 HIGH RISK MEDICATION USE: ICD-10-CM

## 2023-03-07 DIAGNOSIS — M06.09 RHEUMATOID ARTHRITIS OF MULTIPLE SITES WITH NEGATIVE RHEUMATOID FACTOR (H): ICD-10-CM

## 2023-03-07 LAB
ALBUMIN SERPL-MCNC: 3.6 G/DL (ref 3.4–5)
ALP SERPL-CCNC: 88 U/L (ref 40–150)
ALT SERPL W P-5'-P-CCNC: 13 U/L (ref 0–50)
AST SERPL W P-5'-P-CCNC: 13 U/L (ref 0–45)
BASOPHILS # BLD AUTO: 0.1 10E3/UL (ref 0–0.2)
BASOPHILS NFR BLD AUTO: 1 %
BILIRUB DIRECT SERPL-MCNC: 0.1 MG/DL (ref 0–0.2)
BILIRUB SERPL-MCNC: 0.6 MG/DL (ref 0.2–1.3)
CREAT SERPL-MCNC: 0.45 MG/DL (ref 0.52–1.04)
CRP SERPL-MCNC: 18.6 MG/L (ref 0–8)
EOSINOPHIL # BLD AUTO: 0.2 10E3/UL (ref 0–0.7)
EOSINOPHIL NFR BLD AUTO: 2 %
ERYTHROCYTE [DISTWIDTH] IN BLOOD BY AUTOMATED COUNT: 18.9 % (ref 10–15)
ERYTHROCYTE [SEDIMENTATION RATE] IN BLOOD BY WESTERGREN METHOD: 20 MM/HR (ref 0–30)
GFR SERPL CREATININE-BSD FRML MDRD: >90 ML/MIN/1.73M2
HCT VFR BLD AUTO: 34.5 % (ref 35–47)
HGB BLD-MCNC: 11 G/DL (ref 11.7–15.7)
IMM GRANULOCYTES # BLD: 0 10E3/UL
IMM GRANULOCYTES NFR BLD: 0 %
LYMPHOCYTES # BLD AUTO: 1 10E3/UL (ref 0.8–5.3)
LYMPHOCYTES NFR BLD AUTO: 16 %
MCH RBC QN AUTO: 27.7 PG (ref 26.5–33)
MCHC RBC AUTO-ENTMCNC: 31.9 G/DL (ref 31.5–36.5)
MCV RBC AUTO: 87 FL (ref 78–100)
MONOCYTES # BLD AUTO: 0.5 10E3/UL (ref 0–1.3)
MONOCYTES NFR BLD AUTO: 8 %
NEUTROPHILS # BLD AUTO: 4.6 10E3/UL (ref 1.6–8.3)
NEUTROPHILS NFR BLD AUTO: 73 %
NRBC # BLD AUTO: 0 10E3/UL
NRBC BLD AUTO-RTO: 0 /100
PLATELET # BLD AUTO: 235 10E3/UL (ref 150–450)
PROT SERPL-MCNC: 7.1 G/DL (ref 6.8–8.8)
RBC # BLD AUTO: 3.97 10E6/UL (ref 3.8–5.2)
WBC # BLD AUTO: 6.4 10E3/UL (ref 4–11)

## 2023-03-07 PROCEDURE — 80076 HEPATIC FUNCTION PANEL: CPT

## 2023-03-07 PROCEDURE — 85025 COMPLETE CBC W/AUTO DIFF WBC: CPT

## 2023-03-07 PROCEDURE — 85652 RBC SED RATE AUTOMATED: CPT

## 2023-03-07 PROCEDURE — 36415 COLL VENOUS BLD VENIPUNCTURE: CPT

## 2023-03-07 PROCEDURE — 82565 ASSAY OF CREATININE: CPT

## 2023-03-07 PROCEDURE — 86140 C-REACTIVE PROTEIN: CPT

## 2023-03-07 PROCEDURE — 86481 TB AG RESPONSE T-CELL SUSP: CPT

## 2023-03-09 LAB
GAMMA INTERFERON BACKGROUND BLD IA-ACNC: 0.03 IU/ML
M TB IFN-G BLD-IMP: NEGATIVE
M TB IFN-G CD4+ BCKGRND COR BLD-ACNC: 1.38 IU/ML
MITOGEN IGNF BCKGRD COR BLD-ACNC: 0 IU/ML
MITOGEN IGNF BCKGRD COR BLD-ACNC: 0.01 IU/ML
QUANTIFERON MITOGEN: 1.41 IU/ML
QUANTIFERON NIL TUBE: 0.03 IU/ML
QUANTIFERON TB1 TUBE: 0.03 IU/ML
QUANTIFERON TB2 TUBE: 0.04

## 2023-03-16 DIAGNOSIS — M06.09 RHEUMATOID ARTHRITIS OF MULTIPLE SITES WITH NEGATIVE RHEUMATOID FACTOR (H): Primary | ICD-10-CM

## 2023-03-16 RX ORDER — ALBUTEROL SULFATE 90 UG/1
1-2 AEROSOL, METERED RESPIRATORY (INHALATION)
Status: CANCELLED
Start: 2023-03-20

## 2023-03-16 RX ORDER — METHYLPREDNISOLONE SODIUM SUCCINATE 125 MG/2ML
125 INJECTION, POWDER, LYOPHILIZED, FOR SOLUTION INTRAMUSCULAR; INTRAVENOUS
Status: CANCELLED
Start: 2023-03-20

## 2023-03-16 RX ORDER — MEPERIDINE HYDROCHLORIDE 25 MG/ML
25 INJECTION INTRAMUSCULAR; INTRAVENOUS; SUBCUTANEOUS EVERY 30 MIN PRN
Status: CANCELLED | OUTPATIENT
Start: 2023-03-20

## 2023-03-16 RX ORDER — HEPARIN SODIUM (PORCINE) LOCK FLUSH IV SOLN 100 UNIT/ML 100 UNIT/ML
5 SOLUTION INTRAVENOUS
Status: CANCELLED | OUTPATIENT
Start: 2023-03-20

## 2023-03-16 RX ORDER — DIPHENHYDRAMINE HYDROCHLORIDE 50 MG/ML
50 INJECTION INTRAMUSCULAR; INTRAVENOUS
Status: CANCELLED
Start: 2023-03-20

## 2023-03-16 RX ORDER — HEPARIN SODIUM,PORCINE 10 UNIT/ML
5 VIAL (ML) INTRAVENOUS
Status: CANCELLED | OUTPATIENT
Start: 2023-03-20

## 2023-03-16 RX ORDER — ALBUTEROL SULFATE 0.83 MG/ML
2.5 SOLUTION RESPIRATORY (INHALATION)
Status: CANCELLED | OUTPATIENT
Start: 2023-03-20

## 2023-03-16 RX ORDER — EPINEPHRINE 1 MG/ML
0.3 INJECTION, SOLUTION, CONCENTRATE INTRAVENOUS EVERY 5 MIN PRN
Status: CANCELLED | OUTPATIENT
Start: 2023-03-20

## 2023-03-16 NOTE — RESULT ENCOUNTER NOTE
RN: Please call to notify Alannah Leos that tuberculosis screening was negative.  No evidence of medication toxicity based on labs.  The order for infliximab infusions has been placed; please provide her with the infusion center phone number for the [facility] location in [place] so that she may call to schedule the infusion appointment.     Merrick Del Cid MD  3/16/2023

## 2023-05-05 NOTE — TELEPHONE ENCOUNTER
SPINE AND BRAIN CLINIC PRE-VISIT PLANNING:  Date of visit:  5/8/18  Dr. Schulte        REFERRING PROVIDER:    Yes.  Name of provider Mk Reid PA-C, Clinic/Facility [facility].     REASON FOR REFERRAL:  Evaluation    720.81 (ICD-9-CM) - M49.82 (ICD-10-CM) - Spondylopathy in diseases classified elsewhere, cervical region (H)   714.0 (ICD-9-CM) - M06.9 (ICD-10-CM) - Rheumatoid arthritis, involving unspecified site, unspecified rheumatoid factor presence (H)         2ND OPINION:  No    PREVIOUS SURGERY:  Yes -   2 low back surgeries at [facility] many years ago, no neck surgery    IMAGING:   No    Cervical MRI ordered to be completed prior to visit.  Open sided, BISHOP Carson  (scheduled 5/4/18)      RECENT PT:  Yes  -  3 sessions recently at [facility]    CHIROPRACTIC CARE:  No     INJ:   No  - only in shoulder      EMG:  No       
Controlled with current regime

## 2023-06-12 ENCOUNTER — INFUSION THERAPY VISIT (OUTPATIENT)
Dept: INFUSION THERAPY | Facility: CLINIC | Age: 83
End: 2023-06-12
Attending: INTERNAL MEDICINE
Payer: MEDICARE

## 2023-06-12 VITALS
DIASTOLIC BLOOD PRESSURE: 57 MMHG | RESPIRATION RATE: 16 BRPM | HEART RATE: 72 BPM | TEMPERATURE: 97.6 F | SYSTOLIC BLOOD PRESSURE: 137 MMHG | OXYGEN SATURATION: 96 %

## 2023-06-12 DIAGNOSIS — M06.09 RHEUMATOID ARTHRITIS OF MULTIPLE SITES WITH NEGATIVE RHEUMATOID FACTOR (H): ICD-10-CM

## 2023-06-12 PROCEDURE — 99207 PR NO CHARGE LOS: CPT

## 2023-06-12 NOTE — PROGRESS NOTES
Infusion Nursing Note:  Alannah Leos presents today for Infliximab.    Patient seen by provider today: No   present during visit today: Not Applicable.    Note: Patient is new to  Infusion Center, accompanied by her daughter, oriented to facility. Daughter reports patient has a wound on her bottom that is healing but is still open. Message sent to Dr. Del Cid and he states to wait 2 weeks for infusion since an open wound is present.    Intravenous Access:  No Intravenous access/labs at this visit.    Treatment Conditions:  Biological Infusion Checklist:  ~~~ NOTE: If the patient answers yes to any of the questions below, hold the infusion and contact ordering provider or on-call provider.    1. Have you recently had an elevated temperature, fever, chills, productive cough, coughing for 3 weeks or longer or hemoptysis,  abnormal vital signs, night sweats,  chest pain or have you noticed a decrease in your appetite, unexplained weight loss or fatigue? No  2. Do you have any open wounds or new incisions? Yes, sore on bottom, healing but not completely scabbed over  3. Do you have any upcoming hospitalizations or surgeries? Does not include esophagogastroduodenoscopy, colonoscopy, endoscopic retrograde cholangiopancreatography (ERCP), endoscopic ultrasound (EUS), dental procedures or joint aspiration/steroid injections No  4. Do you currently have any signs of illness or infection or are you on any antibiotics? Yes, topical antibiotic cream for wound on buttocks  5. Have you had any new, sudden or worsening abdominal pain? No  6. Have you or anyone in your household received a live vaccination in the past 4 weeks? Please note: No live vaccines while on biologic/chemotherapy until 6 months after the last treatment. Patient can receive the flu vaccine (shot only), pneumovax and the Covid vaccine. It is optimal for the patient to get these vaccines mid cycle, but they can be given at any time as long as it  is not on the day of the infusion. No  7. Have you recently been diagnosed with any new nervous system diseases (ie. Multiple sclerosis, Guillain [place], seizures, neurological changes) or cancer diagnosis? Are you on any form of radiation or chemotherapy? No  8. Are you pregnant or breast feeding or do you have plans of pregnancy in the future? No  9. Have you been having any signs of worsening depression or suicidal ideations?  (benlysta only) No  10. Have there been any other new onset medical symptoms? No  11. Have you had any new blood clots? (IVIG only) No      Post Infusion Assessment:  N/A.     Discharge Plan:   Future appts have been reviewed and crosschecked with appt note and plan.  AVS to patient via [place].  Patient will return in 2 weeks for next appointment.   Patient discharged in stable condition accompanied by: daughter.  Departure Mode: Wheelchair.      JOSE CARLOS Cisneros RN

## 2023-06-14 ENCOUNTER — OFFICE VISIT (OUTPATIENT)
Dept: RHEUMATOLOGY | Facility: CLINIC | Age: 83
End: 2023-06-14
Payer: MEDICARE

## 2023-06-14 VITALS — DIASTOLIC BLOOD PRESSURE: 56 MMHG | HEART RATE: 72 BPM | OXYGEN SATURATION: 97 % | SYSTOLIC BLOOD PRESSURE: 126 MMHG

## 2023-06-14 DIAGNOSIS — M15.9 OSTEOARTHRITIS OF MULTIPLE JOINTS, UNSPECIFIED OSTEOARTHRITIS TYPE: ICD-10-CM

## 2023-06-14 DIAGNOSIS — M06.09 RHEUMATOID ARTHRITIS OF MULTIPLE SITES WITH NEGATIVE RHEUMATOID FACTOR (H): Primary | ICD-10-CM

## 2023-06-14 DIAGNOSIS — Z79.899 HIGH RISK MEDICATION USE: ICD-10-CM

## 2023-06-14 DIAGNOSIS — M81.0 AGE-RELATED OSTEOPOROSIS WITHOUT CURRENT PATHOLOGICAL FRACTURE: ICD-10-CM

## 2023-06-14 PROCEDURE — 99214 OFFICE O/P EST MOD 30 MIN: CPT | Performed by: INTERNAL MEDICINE

## 2023-06-14 RX ORDER — FOLIC ACID 1 MG/1
1 TABLET ORAL DAILY
Qty: 90 TABLET | Refills: 2 | Status: SHIPPED | OUTPATIENT
Start: 2023-06-14 | End: 2024-02-29

## 2023-06-14 RX ORDER — METHOTREXATE 2.5 MG/1
20 TABLET ORAL WEEKLY
Qty: 104 TABLET | Refills: 2 | Status: SHIPPED | OUTPATIENT
Start: 2023-06-14 | End: 2024-02-29

## 2023-06-14 NOTE — PROGRESS NOTES
RAPID 3    Cumulative Score  declined to fill out       Weighted Score   declined to fill out

## 2023-06-14 NOTE — PROGRESS NOTES
Rheumatology Clinic Visit      Alannah Leos MRN# [id number]   YOB: 1940 Age: 82 year old      Date of visit: 6/14/23   PCP: Dr. Leah Blanca    Chief Complaint   Patient presents with:  Ankylosing spondylitis   Arthritis    Assessment and Plan     1. 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 Previously on methotrexate when first diagnosed (unclear if ineffective or not, stopped by patient preference to not treat her RA at that time).  Synovitis, subluxation, and fixed flexion deformities on initial exam on 10/12/2016. Steroid responsive. 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 Currently on methotrexate 20 mg once weekly, and hydroxychloroquine 200 mg daily. Previously on sulfasalazine (felt poorly on it).  At almost every visit I have encouraged her to escalate treatment for her active disease but she has been reluctant to do so because of possible risks from the medication; she understands that the progression of her disease is not likely to be reversible as seen by the changes of her spine.  At one point she was going to proceed with SQ bDMARD but she never started, then she was going to proceed with an infusion bDMARD but she didn't go through with it.  She seems hesitant to start a treatment in the fear that it may not work, and she is hesitant to use medications because of possible side effects and cost; but the most recent concern has been about the potential that the medication may not work for her arthritis.  She has verbalized understanding that she has severe degenerative arthritis in addition to active severe inflammatory arthritis.  We have discussed the importance of treating her inflammatory arthritis with regard to articular and potential future extra-articular manifestations.  She has benefited from methotrexate and hydroxychloroquine but due to inability to  adequately monitor hydroxychloroquine due to the quality of the eye exam, hydroxychloroquine was discontinued after discussion with her ophthalmologist.  Active inflammatory joint symptoms; and previous in-office exam showed synovitis.  Discussed treatment options and she would like to move forward with infliximab infusions; most recent infliximab delayed due to open wound so planning to have the infusion in July.    Chronic illness, progressive.    - Continue methotrexate 20 mg once weekly (split dosing within the same 24-hours of each week)  - Continue folic acid 1mg daily  - Start infliximab 3mg/kg IV on week 0, 2, 6, and then every 8 weeks thereafter if TB screening is okay  - Labs today and in 3 months: CBC, Creatinine, Hepatic Panel, ESR, CRP    High risk medication requiring intensive toxicity monitoring at least quarterly.    2. Positive PIERO and dsDNA: These were noted on record review. She does not have symptoms to support an PIERO-associated rheumatologic disease at this time.     3. Diffuse neck/back pain: No evidence of instability by x-rays on 10/12/2016.  Seen by NSGY and now getting trigger point injections in the pain clinic with improvement    4. Parkinson's Disease: Followed by Dr. Ricardo Johnson at the [facility] previously, and now at [facility].  Worsened recently and she plans to follow-up with neurology    5. Osteoporosis: Based on 1/29/2018 DEXA.  Was on fosamax from 5020-7714; reclast then started 2021 with the first dose received 5/12/2021, followed by the second dose on 5/16/2022.   - Continue reclast 5mg IV yearly, due now  - Continue vitamin D 1000 IU daily   - Continue calcium 1000mg daily  - Lab in 3 months: Vitamin D    6.  Severe osteoarthritis of multiple joints: Has been evaluated by surgeons and is now followed in the pain clinic.  Severe osteoarthritis that is limiting daily activities.    7.  Severe osteoarthritis and severe rheumatoid arthritis:  Significantly impacting her quality of life.  Function limited due to degenerative and inflammatory changes.  Has seen palliative care in the past    8.  Vaccinations: Vaccinations reviewed with Ms. Leos.  Risks and benefits of vaccinations were discussed.    - Influenza: encouraged yearly vaccination  - [id number]: up to date  - [id number]: up to date  - COVID-19: Advised updating  - TDAP: Advised vaccination    Total minutes spent in evaluation with patient, documentation, , and review of pertinent studies and chart notes: 16     Ms. Leos verbalized agreement with and understanding of the rational for the diagnosis and treatment plan.  All questions were answered to best of my ability and the patient's satisfaction. Ms. Leos was advised to contact the clinic with any questions that may arise after the clinic visit.      Thank you for involving me in the care of the patient    Return to clinic: 3-4 months      HPI   Alannah Leos is a 82 year old female with a medical history significant for Parkinson's disease, osteoarthritis, status post TKA, osteoporosis, and rheumatoid arthritis who presents for follow-up of rheumatoid arthritis.    4/21/2022: Trigger point injections from the pain clinic are helpful but she is worried that her new pain clinic provider will not schedule appointments every 3 weeks that have been helpful for her, but rather she is being scheduled every 5 weeks and she has too much pain to go every 5 weeks between injections.  She is wondering if there is another provider that has more availability to allow her to come in every 3 weeks for these injections; she says that she will also speak with her current pain management provider who she has only seen once so far.  Severe pain in the right shoulder and hardly able to use it now.  Pain in her wrists, MCPs, and PIPs where she also has swelling.  Stiffness lasts for all day, worse in the morning when she first wakes.  Wrists,  elbows, shoulders, knees, ankles, MTPs, neck, and fingers all hurt.  She is okay with continuing Reclast for osteoporosis management but is hesitant to use biologic DMARD or escalate treatment for RA in any way at this time because she is concerned that it may not work.  She verbalized understanding that untreated inflammatory arthritis will potentially cause worsening irreversible damage.    She feels that her arthritis is fairly unchanged.  She says that she does not feel well because she cannot raise her head due to degenerative changes in her neck.  She says that her hands have worsened slightly.  She is only taking methotrexate now for inflammatory arthritis.  She was hospitalized recently for worsening Parkinson's disease and plans to follow-up with neurology as an outpatient.  She and her daughter voiced frustration with recent hospitalization, where they did not feel that much was done.    Today, 6/14/2023:  no change in arthritis symptoms per patient.  Pain at the MCPs, PIPs, wrists, shoulders, knees.  Occasional lower extremity edema but none now.  Was going to have her infliximab infusion but had a open wound on her bottom that is nearly resolved at this point so has rescheduled the infliximab infusion.    Denies fevers, chills, nausea, vomiting, constipation, diarrhea. No abdominal pain. No chest pain/pressure, palpitations, or shortness of breath.   No oral or nasal sores.  No rash. No sicca symptoms.      Daughter is present with her today.    Tobacco: none  EtOH: 1 drink at [place] only  Drugs: none    ROS   12 point review of system was completed and negative except as noted in the HPI     Active Problem List     Patient Active Problem List   Diagnosis     Osteoporosis     Rheumatoid arthritis (H)     Parkinson disease (H)     Osteoarthritis of wrist     Osteoarthritis of shoulder     History of total knee arthroplasty     Osteoarthritis of left knee     Advanced directives, counseling/discussion      CARDIOVASCULAR SCREENING; LDL GOAL LESS THAN 160     High risk medication use     Underweight     Impingement syndrome, shoulder, left     Combined forms of age-related cataract of both eyes     Seborrheic dermatitis     Anemia in other chronic diseases classified elsewhere     Ankylosing spondylitis of cervical region (H)     Failure to thrive in adult     Physical deconditioning     History of iron deficiency anemia     Past Medical History     Past Medical History:   Diagnosis Date     Hyperlipidemia      Murmur, heart     hsm     Nonsenile cataract      Osteoarthrosis, unspecified whether generalized or localized, lower leg      Osteopenia      Rheumatoid arthritis(714.0)      Past Surgical History     Past Surgical History:   Procedure Laterality Date     GALLBLADDER SURGERY       HYSTERECTOMY       KNEE SURGERY      left      LAMINECTOMY LUMBAR ONE LEVEL       TONSILLECTOMY       Allergy     Allergies   Allergen Reactions     Decadrol [Dexamethasone Sodium Phosphate] Hives     Shrimp Itching     Current Medication List     Current Outpatient Medications   Medication Sig     ACE NOT PRESCRIBED, INTENTIONAL, 1 each continuous prn. ACE Inhibitor not prescribed due to Refusal by patient           CALCIUM + D OR 600mg twice a day      carbidopa-levodopa (SINEMET)  MG tablet Take one at 6 am, noon, 5 pm, 9 pm and midnight     Cyanocobalamin (VITAMIN B 12) 250 MCG LOZG      diclofenac (VOLTAREN) 1 % topical gel Apply 2 g topically 3 times daily as needed for moderate pain     folic acid (FOLVITE) 1 MG tablet Take 1 tablet (1 mg) by mouth daily     ibuprofen (ADVIL/MOTRIN) 200 MG tablet Take 1 tablet (200 mg) by mouth every 8 hours as needed for moderate pain     methotrexate sodium 2.5 MG TABS Take 8 tablets (20 mg) by mouth once a week .  This is a once a week medication, taken on the same day of each week.     order for DME Equipment being ordered: soft neck brace     vitamin C (ASCORBIC ACID) 1000 MG  "TABS Take 1,000 mg by mouth 2 times daily     Current Facility-Administered Medications   Medication     ropivacaine (NAROPIN) injection 3 mL     ropivacaine (NAROPIN) injection 3 mL     triamcinolone (KENALOG-40) injection 40 mg     triamcinolone (KENALOG-40) injection 40 mg     Social History   See HPI    Family History     Family History   Problem Relation Age of Onset     Cancer Sister         pancreatic     Diabetes No family hx of      Hypertension No family hx of        Physical Exam     Temp Readings from Last 3 Encounters:   06/12/23 97.6  F (36.4  C) (Oral)   06/22/22 97.8  F (36.6  C) (Tympanic)   05/16/22 97.6  F (36.4  C)     BP Readings from Last 5 Encounters:   06/14/23 126/56   06/12/23 137/57   10/27/22 138/71   07/28/22 (!) 176/66   06/22/22 139/64     Pulse Readings from Last 1 Encounters:   06/14/23 72     Resp Readings from Last 1 Encounters:   06/12/23 16     Estimated body mass index is 21.04 kg/m  as calculated from the following:    Height as of 6/9/21: 1.626 m (5' 4\").    Weight as of 4/21/22: 55.6 kg (122 lb 9.6 oz).    GEN: NAD, thin and frail appearing  HEENT: MMM. No oral lesions. Anicteric, noninjected sclera  PULM: No increased work of breathing.    MSK: Bilateral second-fourth MCPs and second-third PIPs with synovial swelling and tenderness to palpation.  Left wrist with synovial swelling and tenderness palpation.  Right wrist without synovial swelling but was tender to palpation.   Elbows without swelling or tenderness to palpation.  Shoulders diffusely tender to palpation and minimal ROM; no swelling or increased warmth.  Knees with medial joint line tenderness.  Ankles and MTPs without swelling or tenderness to palpation.  Holds neck in a flexed position.   SKIN: No rash. Diffuse hair thinning.  EXT: No lower extremity edema  PSYCH: Alert. Appropriate.      Labs / Imaging (select studies)     RF/CCP  Recent Labs   Lab Test 10/12/16  1142   CCPIGG 2   RHF <20     CBC  Recent Labs "   Lab Test 03/07/23  1429 10/27/22  1406 10/13/22  0950 07/20/22  1524 08/18/21  1432 05/17/21  1342 02/22/21  1339 12/07/20  1353   WBC 6.4 7.4 8.7 6.1   < > 7.5 8.1 7.5   RBC 3.97 3.77* 4.00 3.70*   < > 3.70* 4.02 4.11   HGB 11.0* 9.8* 10.3* 10.0*   < > 11.0* 11.6* 11.4*   HCT 34.5* 30.8* 32.8* 30.5*   < > 33.6* 36.0 36.1   MCV 87 82 82 82   < > 91 90 88   RDW 18.9* 19.1* 18.5* 17.6*   < > 16.8* 17.6* 17.3*    361 338 286   < > 235 249 246   MCH 27.7 26.0* 25.8* 27.0   < > 29.7 28.9 27.7   MCHC 31.9 31.8 31.4* 32.8   < > 32.7 32.2 31.6   NEUTROPHIL 73  --  81 70   < > 73.0 70.0 72.0   LYMPH 16 16 10 18   < > 20.0 17.0 19.0   MONOCYTE 8 7 7 9   < > 4.0 10.0 5.0   EOSINOPHIL 2 2 1 2   < > 2.0 2.0 2.0   BASOPHIL 1  --  1 1   < > 1.0 1.0 2.0   ANEU  --   --   --   --   --  5.4 5.6 5.3   ALYM  --   --   --   --   --  1.5 1.4 1.4   DORITA  --   --   --   --   --  0.3 0.8 0.4   AEOS  --   --   --   --   --  0.2 0.2 0.2   ABAS  --   --   --   --   --  0.1 0.1 0.2   ANEUTAUTO 4.6  --  7.0 4.3   < >  --   --   --    ALYMPAUTO 1.0 1.2 0.9 1.1   < >  --   --   --    AMONOAUTO 0.5 0.5 0.6 0.5   < >  --   --   --    AEOSAUTO 0.2 0.1 0.1 0.1   < >  --   --   --    ABSBASO 0.1  --  0.1 0.0   < >  --   --   --     < > = values in this interval not displayed.     CMP  Recent Labs   Lab Test 03/07/23  1429 10/27/22  1406 10/13/22  0950 07/20/22  1524 06/22/22  1112 05/12/22  1709 08/18/21  1432 05/17/21  1342 05/12/21  1334 03/12/21  1328 02/22/21  1339 05/18/20  1325 01/15/20  1403   NA  --   --  133*  --  137  --   --   --   --   --   --   --  137   POTASSIUM  --   --  4.2  --  4.0  --   --   --   --   --   --   --  4.2   CHLORIDE  --   --  95*  --  103  --   --   --   --   --   --   --  102   CO2  --   --  23  --  27  --   --   --   --   --   --   --  30   ANIONGAP  --   --  15  --  7  --   --   --   --   --   --   --  5   GLC  --   --  133*  --  103*  --   --   --   --   --   --   --  97   BUN  --   --  14.0  --  11  --    --   --   --   --   --   --  15   CR 0.45* 0.40* 0.42*   < > 0.43* 0.47*   < > 0.63 0.65  --  0.58   < > 0.55   GFRESTIMATED >90 >90 >90   < > >90 >90   < > 85 84  --  88   < > 90   GFRESTBLACK  --   --   --   --   --   --   --  >90 >90  --  >90   < > >90   PAKO  --   --  9.0  --  9.1 10.0   < >  --  9.2   < >  --   --  9.5  10.2*   BILITOTAL 0.6 0.3 0.3   < > 0.5  --    < > 0.4  --   --  0.4   < > 0.4   ALBUMIN 3.6 3.1* 3.6   < > 3.7  --    < > 3.7  --   --  4.1   < > 4.3   PROTTOTAL 7.1 7.0 6.9   < > 7.7  --    < > 7.3  --   --  7.9   < > 7.8   ALKPHOS 88 77 78   < > 82  --    < > 77  --   --  91   < > 125   AST 13 17 25   < > 15  --    < > 16  --   --  18   < > 25   ALT 13 13 <5*   < > 10  --    < > 12  --   --  10   < > 18    < > = values in this interval not displayed.     Calcium/VitaminD  Recent Labs   Lab Test 10/13/22  0950 06/22/22  1112 05/12/22  1709 12/15/21  1553 05/12/21  1334 03/12/21  1328 01/15/20  1403   PAKO 9.0 9.1 10.0 9.6   < > 9.6 9.5  10.2*   VITDT  --   --   --  40  --  37 39    < > = values in this interval not displayed.     ESR/CRP  Recent Labs   Lab Test 03/07/23  1429 10/27/22  1406 04/21/22  1508   SED 20 66* 44*   CRP 18.6* 43.3* 11.7*     Hepatitis B  Recent Labs   Lab Test 01/18/17  1601   HBCAB Nonreactive   HEPBANG Nonreactive     Hepatitis C  Recent Labs   Lab Test 01/18/17  1601   HCVAB Nonreactive   Assay performance characteristics have not been established for newborns,   infants, and children       Tuberculosis Screening  Recent Labs   Lab Test 03/07/23  1429 03/12/21  1328 01/25/18  1352 01/18/17  1601   TBRES Negative  --   --   --    TBRSLT  --   --  Negative Negative   TBAGN  --   --  0.00 0.00   TBRST  --  Negative  --   --      HIV Screening  Recent Labs   Lab Test 01/18/17  1601   HIAGAB Nonreactive   HIV-1 p24 Ag & HIV-1/HIV-2 Ab Not Detected       Immunization History     Immunization History   Administered Date(s) Administered     COVID-19 MONOVALENT 12+ (Pfizer)  03/12/2021, 04/02/2021, 10/13/2021     Pneumo Conj 13-V (2010&after) 11/03/2016     Pneumococcal 23 valent 11/01/2007, 11/13/2007, 03/12/2018     TD,PF 7+ (Tenivac) 11/13/2007     TDAP (Adacel,Boostrix) 11/13/2007          Chart documentation done in part with Dragon Voice recognition Software. Although reviewed after completion, some word and grammatical error may remain.    Merrick Del Cid MD

## 2023-07-17 ENCOUNTER — LAB (OUTPATIENT)
Dept: LAB | Facility: CLINIC | Age: 83
End: 2023-07-17
Payer: MEDICARE

## 2023-07-17 ENCOUNTER — INFUSION THERAPY VISIT (OUTPATIENT)
Dept: INFUSION THERAPY | Facility: CLINIC | Age: 83
End: 2023-07-17
Payer: MEDICARE

## 2023-07-17 VITALS
BODY MASS INDEX: 18.86 KG/M2 | TEMPERATURE: 97.5 F | HEART RATE: 70 BPM | RESPIRATION RATE: 18 BRPM | OXYGEN SATURATION: 99 % | DIASTOLIC BLOOD PRESSURE: 69 MMHG | WEIGHT: 109.9 LBS | SYSTOLIC BLOOD PRESSURE: 154 MMHG

## 2023-07-17 DIAGNOSIS — M06.09 RHEUMATOID ARTHRITIS OF MULTIPLE SITES WITH NEGATIVE RHEUMATOID FACTOR (H): ICD-10-CM

## 2023-07-17 DIAGNOSIS — Z79.899 HIGH RISK MEDICATION USE: ICD-10-CM

## 2023-07-17 DIAGNOSIS — M06.09 RHEUMATOID ARTHRITIS OF MULTIPLE SITES WITH NEGATIVE RHEUMATOID FACTOR (H): Primary | ICD-10-CM

## 2023-07-17 LAB
ALBUMIN SERPL BCG-MCNC: 4.1 G/DL (ref 3.5–5.2)
ALP SERPL-CCNC: 129 U/L (ref 35–104)
ALT SERPL W P-5'-P-CCNC: <5 U/L (ref 0–50)
AST SERPL W P-5'-P-CCNC: 17 U/L (ref 0–45)
BASOPHILS # BLD AUTO: 0.1 10E3/UL (ref 0–0.2)
BASOPHILS NFR BLD AUTO: 1 %
BILIRUB DIRECT SERPL-MCNC: <0.2 MG/DL (ref 0–0.3)
BILIRUB SERPL-MCNC: 0.3 MG/DL
CREAT SERPL-MCNC: 0.38 MG/DL (ref 0.51–0.95)
CRP SERPL-MCNC: 7.86 MG/L
EOSINOPHIL # BLD AUTO: 0.1 10E3/UL (ref 0–0.7)
EOSINOPHIL NFR BLD AUTO: 3 %
ERYTHROCYTE [DISTWIDTH] IN BLOOD BY AUTOMATED COUNT: 19.5 % (ref 10–15)
ERYTHROCYTE [SEDIMENTATION RATE] IN BLOOD BY WESTERGREN METHOD: 42 MM/HR (ref 0–30)
GFR SERPL CREATININE-BSD FRML MDRD: >90 ML/MIN/1.73M2
HCT VFR BLD AUTO: 32.8 % (ref 35–47)
HGB BLD-MCNC: 10.3 G/DL (ref 11.7–15.7)
IMM GRANULOCYTES # BLD: 0 10E3/UL
IMM GRANULOCYTES NFR BLD: 0 %
LYMPHOCYTES # BLD AUTO: 1.4 10E3/UL (ref 0.8–5.3)
LYMPHOCYTES NFR BLD AUTO: 25 %
MCH RBC QN AUTO: 25 PG (ref 26.5–33)
MCHC RBC AUTO-ENTMCNC: 31.4 G/DL (ref 31.5–36.5)
MCV RBC AUTO: 80 FL (ref 78–100)
MONOCYTES # BLD AUTO: 0.4 10E3/UL (ref 0–1.3)
MONOCYTES NFR BLD AUTO: 8 %
NEUTROPHILS # BLD AUTO: 3.6 10E3/UL (ref 1.6–8.3)
NEUTROPHILS NFR BLD AUTO: 63 %
NRBC # BLD AUTO: 0 10E3/UL
NRBC BLD AUTO-RTO: 0 /100
PLATELET # BLD AUTO: 317 10E3/UL (ref 150–450)
PROT SERPL-MCNC: 7.5 G/DL (ref 6.4–8.3)
RBC # BLD AUTO: 4.12 10E6/UL (ref 3.8–5.2)
WBC # BLD AUTO: 5.7 10E3/UL (ref 4–11)

## 2023-07-17 PROCEDURE — 99207 PR NO CHARGE LOS: CPT

## 2023-07-17 PROCEDURE — 36415 COLL VENOUS BLD VENIPUNCTURE: CPT

## 2023-07-17 PROCEDURE — 82565 ASSAY OF CREATININE: CPT

## 2023-07-17 PROCEDURE — 86140 C-REACTIVE PROTEIN: CPT

## 2023-07-17 PROCEDURE — 85652 RBC SED RATE AUTOMATED: CPT

## 2023-07-17 PROCEDURE — 96415 CHEMO IV INFUSION ADDL HR: CPT | Performed by: NURSE PRACTITIONER

## 2023-07-17 PROCEDURE — 96413 CHEMO IV INFUSION 1 HR: CPT | Performed by: NURSE PRACTITIONER

## 2023-07-17 PROCEDURE — 80076 HEPATIC FUNCTION PANEL: CPT

## 2023-07-17 PROCEDURE — 85025 COMPLETE CBC W/AUTO DIFF WBC: CPT

## 2023-07-17 RX ORDER — ALBUTEROL SULFATE 0.83 MG/ML
2.5 SOLUTION RESPIRATORY (INHALATION)
Status: CANCELLED | OUTPATIENT
Start: 2023-07-24

## 2023-07-17 RX ORDER — DIPHENHYDRAMINE HYDROCHLORIDE 50 MG/ML
50 INJECTION INTRAMUSCULAR; INTRAVENOUS
Status: CANCELLED
Start: 2023-07-24

## 2023-07-17 RX ORDER — METHYLPREDNISOLONE SODIUM SUCCINATE 125 MG/2ML
125 INJECTION, POWDER, LYOPHILIZED, FOR SOLUTION INTRAMUSCULAR; INTRAVENOUS
Status: CANCELLED
Start: 2023-07-24

## 2023-07-17 RX ORDER — HEPARIN SODIUM (PORCINE) LOCK FLUSH IV SOLN 100 UNIT/ML 100 UNIT/ML
5 SOLUTION INTRAVENOUS
Status: CANCELLED | OUTPATIENT
Start: 2023-07-24

## 2023-07-17 RX ORDER — ALBUTEROL SULFATE 90 UG/1
1-2 AEROSOL, METERED RESPIRATORY (INHALATION)
Status: CANCELLED
Start: 2023-07-24

## 2023-07-17 RX ORDER — MEPERIDINE HYDROCHLORIDE 25 MG/ML
25 INJECTION INTRAMUSCULAR; INTRAVENOUS; SUBCUTANEOUS EVERY 30 MIN PRN
Status: CANCELLED | OUTPATIENT
Start: 2023-07-24

## 2023-07-17 RX ORDER — EPINEPHRINE 1 MG/ML
0.3 INJECTION, SOLUTION INTRAMUSCULAR; SUBCUTANEOUS EVERY 5 MIN PRN
Status: CANCELLED | OUTPATIENT
Start: 2023-07-24

## 2023-07-17 RX ORDER — HEPARIN SODIUM,PORCINE 10 UNIT/ML
5 VIAL (ML) INTRAVENOUS
Status: CANCELLED | OUTPATIENT
Start: 2023-07-24

## 2023-07-17 RX ADMIN — Medication 250 ML: at 15:00

## 2023-07-17 NOTE — PROGRESS NOTES
Infusion Nursing Note:  Alannah SAEID Leos presents today for New infliximab.    Patient seen by provider today: No   present during visit today: Not Applicable.    Note: Patient welcomed back to the infusion center. Patient Information on Infliximab given to patient and daughter, questions answered.     Intravenous Access:  Peripheral IV placed.    Treatment Conditions:  Biological Infusion Checklist:  ~~~ NOTE: If the patient answers yes to any of the questions below, hold the infusion and contact ordering provider or on-call provider.    1. Have you recently had an elevated temperature, fever, chills, productive cough, coughing for 3 weeks or longer or hemoptysis,  abnormal vital signs, night sweats,  chestf pain or have you noticed a decrease in your appetite, unexplained weight loss or fatigue? No  2. Do you have any open wounds or new incisions? No  3. Do you have any upcoming hospitalizations or surgeries? Does not include esophagogastroduodenoscopy, colonoscopy, endoscopic retrograde cholangiopancreatography (ERCP), endoscopic ultrasound (EUS), dental procedures or joint aspiration/steroid injections No  4. Do you currently have any signs of illness or infection or are you on any antibiotics? No  5. Have you had any new, sudden or worsening abdominal pain? No  6. Have you or anyone in your household received a live vaccination in the past 4 weeks? Please note: No live vaccines while on biologic/chemotherapy until 6 months after the last treatment. Patient can receive the flu vaccine (shot only), pneumovax and the Covid vaccine. It is optimal for the patient to get these vaccines mid cycle, but they can be given at any time as long as it is not on the day of the infusion. No  7. Have you recently been diagnosed with any new nervous system diseases (ie. Multiple sclerosis, Guillain [place], seizures, neurological changes) or cancer diagnosis? Are you on any form of radiation or chemotherapy? No  8. Have  there been any other new onset medical symptoms? No    Post Infusion Assessment:  Patient tolerated infusion without incident.  Site patent and intact, free from redness, edema or discomfort.  No evidence of extravasations.  Access discontinued per protocol.  Biologic Infusion Post Education: Call the triage nurse at your clinic or seek medical attention if you have chills and/or temperature greater than or equal to 100.5, uncontrolled nausea/vomiting, diarrhea, constipation, dizziness, shortness of breath, chest pain, heart palpitations, weakness or any other new or concerning symptoms, questions or concerns.  You cannot have any live virus vaccines prior to or during treatment or up to 6 months post infusion.  If you have an upcoming surgery, medical procedure or dental procedure during treatment, this should be discussed with your ordering physician and your surgeon/dentist.  If you are having any concerning symptom, if you are unsure if you should get your next infusion or wish to speak to a provider before your next infusion, please call your care coordinator or triage nurse at your clinic to notify them so we can adequately serve you.       Discharge Plan:   Discharge instructions reviewed with: Patient and daughter.  Patient and/or family verbalized understanding of discharge instructions and all questions answered.  Patient discharged in stable condition accompanied by: self and daughter.  Departure Mode: Wheelchair.      Jasmyne Burks RN

## 2023-07-31 ENCOUNTER — LAB (OUTPATIENT)
Dept: LAB | Facility: CLINIC | Age: 83
End: 2023-07-31
Payer: MEDICARE

## 2023-07-31 ENCOUNTER — INFUSION THERAPY VISIT (OUTPATIENT)
Dept: INFUSION THERAPY | Facility: CLINIC | Age: 83
End: 2023-07-31
Attending: INTERNAL MEDICINE
Payer: MEDICARE

## 2023-07-31 DIAGNOSIS — M06.09 RHEUMATOID ARTHRITIS OF MULTIPLE SITES WITH NEGATIVE RHEUMATOID FACTOR (H): ICD-10-CM

## 2023-07-31 DIAGNOSIS — M81.0 AGE RELATED OSTEOPOROSIS, UNSPECIFIED PATHOLOGICAL FRACTURE PRESENCE: Primary | ICD-10-CM

## 2023-07-31 LAB
CALCIUM SERPL-MCNC: 9.8 MG/DL (ref 8.8–10.2)
CREAT SERPL-MCNC: 0.49 MG/DL (ref 0.51–0.95)
GFR SERPL CREATININE-BSD FRML MDRD: >90 ML/MIN/1.73M2
HOLD SPECIMEN: NORMAL

## 2023-07-31 PROCEDURE — 96413 CHEMO IV INFUSION 1 HR: CPT | Performed by: NURSE PRACTITIONER

## 2023-07-31 PROCEDURE — 82310 ASSAY OF CALCIUM: CPT | Performed by: NURSE PRACTITIONER

## 2023-07-31 PROCEDURE — 36415 COLL VENOUS BLD VENIPUNCTURE: CPT | Performed by: NURSE PRACTITIONER

## 2023-07-31 PROCEDURE — 96415 CHEMO IV INFUSION ADDL HR: CPT | Performed by: NURSE PRACTITIONER

## 2023-07-31 PROCEDURE — 82565 ASSAY OF CREATININE: CPT | Performed by: NURSE PRACTITIONER

## 2023-07-31 RX ORDER — ZOLEDRONIC ACID 5 MG/100ML
5 INJECTION, SOLUTION INTRAVENOUS ONCE
Status: CANCELLED
Start: 2023-07-31

## 2023-07-31 RX ORDER — MEPERIDINE HYDROCHLORIDE 25 MG/ML
25 INJECTION INTRAMUSCULAR; INTRAVENOUS; SUBCUTANEOUS EVERY 30 MIN PRN
Status: CANCELLED | OUTPATIENT
Start: 2023-07-31

## 2023-07-31 RX ORDER — DIPHENHYDRAMINE HYDROCHLORIDE 50 MG/ML
50 INJECTION INTRAMUSCULAR; INTRAVENOUS
Status: CANCELLED
Start: 2023-07-31

## 2023-07-31 RX ORDER — METHYLPREDNISOLONE SODIUM SUCCINATE 125 MG/2ML
125 INJECTION, POWDER, LYOPHILIZED, FOR SOLUTION INTRAMUSCULAR; INTRAVENOUS
Status: CANCELLED
Start: 2023-07-31

## 2023-07-31 RX ORDER — HEPARIN SODIUM (PORCINE) LOCK FLUSH IV SOLN 100 UNIT/ML 100 UNIT/ML
5 SOLUTION INTRAVENOUS
Status: CANCELLED | OUTPATIENT
Start: 2023-08-07

## 2023-07-31 RX ORDER — ALBUTEROL SULFATE 90 UG/1
1-2 AEROSOL, METERED RESPIRATORY (INHALATION)
Status: CANCELLED
Start: 2023-07-31

## 2023-07-31 RX ORDER — METHYLPREDNISOLONE SODIUM SUCCINATE 125 MG/2ML
125 INJECTION, POWDER, LYOPHILIZED, FOR SOLUTION INTRAMUSCULAR; INTRAVENOUS
Status: CANCELLED
Start: 2023-08-07

## 2023-07-31 RX ORDER — ACETAMINOPHEN 325 MG/1
650 TABLET ORAL ONCE
Status: COMPLETED | OUTPATIENT
Start: 2023-07-31 | End: 2023-07-31

## 2023-07-31 RX ORDER — HEPARIN SODIUM (PORCINE) LOCK FLUSH IV SOLN 100 UNIT/ML 100 UNIT/ML
5 SOLUTION INTRAVENOUS
Status: CANCELLED | OUTPATIENT
Start: 2023-07-31

## 2023-07-31 RX ORDER — HEPARIN SODIUM,PORCINE 10 UNIT/ML
5-20 VIAL (ML) INTRAVENOUS DAILY PRN
Status: CANCELLED | OUTPATIENT
Start: 2023-07-31

## 2023-07-31 RX ORDER — ALBUTEROL SULFATE 90 UG/1
1-2 AEROSOL, METERED RESPIRATORY (INHALATION)
Status: CANCELLED
Start: 2023-08-07

## 2023-07-31 RX ORDER — ALBUTEROL SULFATE 0.83 MG/ML
2.5 SOLUTION RESPIRATORY (INHALATION)
Status: CANCELLED | OUTPATIENT
Start: 2023-07-31

## 2023-07-31 RX ORDER — EPINEPHRINE 1 MG/ML
0.3 INJECTION, SOLUTION INTRAMUSCULAR; SUBCUTANEOUS EVERY 5 MIN PRN
Status: CANCELLED | OUTPATIENT
Start: 2023-08-07

## 2023-07-31 RX ORDER — EPINEPHRINE 1 MG/ML
0.3 INJECTION, SOLUTION, CONCENTRATE INTRAVENOUS EVERY 5 MIN PRN
Status: CANCELLED | OUTPATIENT
Start: 2023-07-31

## 2023-07-31 RX ORDER — ALBUTEROL SULFATE 0.83 MG/ML
2.5 SOLUTION RESPIRATORY (INHALATION)
Status: CANCELLED | OUTPATIENT
Start: 2023-08-07

## 2023-07-31 RX ORDER — EPINEPHRINE 1 MG/ML
0.3 INJECTION, SOLUTION INTRAMUSCULAR; SUBCUTANEOUS EVERY 5 MIN PRN
Status: CANCELLED | OUTPATIENT
Start: 2023-07-31

## 2023-07-31 RX ORDER — HEPARIN SODIUM,PORCINE 10 UNIT/ML
5 VIAL (ML) INTRAVENOUS
Status: CANCELLED | OUTPATIENT
Start: 2023-08-07

## 2023-07-31 RX ORDER — MEPERIDINE HYDROCHLORIDE 25 MG/ML
25 INJECTION INTRAMUSCULAR; INTRAVENOUS; SUBCUTANEOUS EVERY 30 MIN PRN
Status: CANCELLED | OUTPATIENT
Start: 2023-08-07

## 2023-07-31 RX ORDER — DIPHENHYDRAMINE HYDROCHLORIDE 50 MG/ML
50 INJECTION INTRAMUSCULAR; INTRAVENOUS
Status: CANCELLED
Start: 2023-08-07

## 2023-07-31 RX ADMIN — ACETAMINOPHEN 650 MG: 325 TABLET ORAL at 14:36

## 2023-07-31 RX ADMIN — Medication 250 ML: at 15:00

## 2023-07-31 NOTE — PROGRESS NOTES
Infusion Nursing Note:  Alannah Leos presents today for Infliximab-GIVEN and Reclast-NOT GIVEN    Patient seen by provider today: No   present during visit today: Not Applicable.    Note: Per daughter pt had nausea without vomiting along with abdominal pain with eating that started 4 days after first Infliximab infusion-she states the pt couldn't eat for about 3 days but then was able to eat with mild symptoms still noted for an additional 6 days. Pt is finally feeling better and eating better. Daughter denies anyone else in the family being sick and is concerned it was related to the infusion. Dr. Nehemias izquierdo who ordered for pt to do Tylenol as a premed today and monitor for symptoms again after today's infusion. Initially pt was going to do Reclast today as well but d/t symptoms it was decided to do Reclast with next infusion on 8/28/23. Appt time/note updated. Pt and daughter provided written and verbal education on Reclast and pt verified she is already taking Calcium/Vitamin D and just saw her dentist recently.     Intravenous Access:  Peripheral IV placed.    Treatment Conditions:  Biological Infusion Checklist:  ~~~ NOTE: If the patient answers yes to any of the questions below, hold the infusion and contact ordering provider or on-call provider.    Have you recently had an elevated temperature, fever, chills, productive cough, coughing for 3 weeks or longer or hemoptysis,  abnormal vital signs, night sweats,  chest pain or have you noticed a decrease in your appetite, unexplained weight loss or fatigue? No  Do you have any open wounds or new incisions? No  Do you have any upcoming hospitalizations or surgeries? Does not include esophagogastroduodenoscopy, colonoscopy, endoscopic retrograde cholangiopancreatography (ERCP), endoscopic ultrasound (EUS), dental procedures or joint aspiration/steroid injections No  Do you currently have any signs of illness or infection or are you on any  antibiotics? No  Have you had any new, sudden or worsening abdominal pain? Yes, abdominal pain after first infusion-MD aware.  Have you or anyone in your household received a live vaccination in the past 4 weeks? Please note: No live vaccines while on biologic/chemotherapy until 6 months after the last treatment. Patient can receive the flu vaccine (shot only), pneumovax and the Covid vaccine. It is optimal for the patient to get these vaccines mid cycle, but they can be given at any time as long as it is not on the day of the infusion. No  Have you recently been diagnosed with any new nervous system diseases (ie. Multiple sclerosis, Guillain [place], seizures, neurological changes) or cancer diagnosis? Are you on any form of radiation or chemotherapy? No  Are you pregnant or breast feeding or do you have plans of pregnancy in the future? No  Have there been any other new onset medical symptoms? Yes, pt had nausea/abd pain with eating after first infusion-Dr. Del Cid aware.      Post Infusion Assessment:  Patient tolerated infusion without incident.  Site patent and intact, free from redness, edema or discomfort.  No evidence of extravasations.  Access discontinued per protocol.  Biologic Infusion Post Education: Call the triage nurse at your clinic or seek medical attention if you have chills and/or temperature greater than or equal to 100.5, uncontrolled nausea/vomiting, diarrhea, constipation, dizziness, shortness of breath, chest pain, heart palpitations, weakness or any other new or concerning symptoms, questions or concerns.  You cannot have any live virus vaccines prior to or during treatment or up to 6 months post infusion.  If you have an upcoming surgery, medical procedure or dental procedure during treatment, this should be discussed with your ordering physician and your surgeon/dentist.  If you are having any concerning symptom, if you are unsure if you should get your next infusion or wish to speak to a  provider before your next infusion, please call your care coordinator or triage nurse at your clinic to notify them so we can adequately serve you.       Discharge Plan:   AVS to patient via [employer].  Patient will return 8/28/23 for next appointment. Future appts have been reviewed and crosschecked with appt note and plan.   Patient discharged in stable condition accompanied by: daughter.  Departure Mode: Wheelchair.      Aliya Le RN

## 2023-07-31 NOTE — PROGRESS NOTES
Infusion Nursing Note:  Alannah Leos presents today for Infliximab and Reclast.    Patient seen by provider today: {YES (EXPLAIN)/NO:[id number]}   present during visit today: {UMHLANGUAGE:158613}    Note: {Not Applicable or free text:886333:s}.      Intravenous Access:  {UMHIVACCESS:142539}    Treatment Conditions:  {UMHTXCONDITIONS:027726}      Post Infusion Assessment:  {UMHPOSTINFUSION:000514}       Discharge Plan:   {UMHDISCHARGE:103734}      Aliya Le RN

## 2023-08-01 VITALS
SYSTOLIC BLOOD PRESSURE: 150 MMHG | DIASTOLIC BLOOD PRESSURE: 77 MMHG | HEART RATE: 83 BPM | RESPIRATION RATE: 16 BRPM | OXYGEN SATURATION: 99 %

## 2023-08-22 ENCOUNTER — DOCUMENTATION ONLY (OUTPATIENT)
Dept: RHEUMATOLOGY | Facility: CLINIC | Age: 83
End: 2023-08-22
Payer: MEDICARE

## 2023-08-22 NOTE — PROGRESS NOTES
"Alannah Leos has an upcoming lab appointment:    Please place orders in epic, it does look like she had \"bailey labs\" drawn on 7/31/23 when she went into infusion, are more labs needed?     Thank you    Future Appointments   Date Time Provider Department Center   8/30/2023  3:00 PM [facility]   8/30/2023  3:20 PM Merrick Bailey MD [facility]     "

## 2023-08-23 NOTE — TELEPHONE ENCOUNTER
Rheumatology team: please notify Alannah Fitzgerald that July labs are sufficient, and repeat labs on 8/30/2023 are not needed.  Please cancel the 8/30/2023 lab appointment if for rheumatology only.    Merrick Del Cid MD  8/22/2023

## 2023-08-29 ENCOUNTER — INFUSION THERAPY VISIT (OUTPATIENT)
Dept: INFUSION THERAPY | Facility: CLINIC | Age: 83
End: 2023-08-29
Attending: INTERNAL MEDICINE
Payer: MEDICARE

## 2023-08-29 VITALS
BODY MASS INDEX: 21.4 KG/M2 | RESPIRATION RATE: 16 BRPM | SYSTOLIC BLOOD PRESSURE: 135 MMHG | DIASTOLIC BLOOD PRESSURE: 61 MMHG | TEMPERATURE: 98.3 F | HEART RATE: 75 BPM | OXYGEN SATURATION: 97 % | HEIGHT: 60 IN | WEIGHT: 109 LBS

## 2023-08-29 DIAGNOSIS — M81.0 AGE RELATED OSTEOPOROSIS, UNSPECIFIED PATHOLOGICAL FRACTURE PRESENCE: Primary | ICD-10-CM

## 2023-08-29 DIAGNOSIS — M06.09 RHEUMATOID ARTHRITIS OF MULTIPLE SITES WITH NEGATIVE RHEUMATOID FACTOR (H): ICD-10-CM

## 2023-08-29 PROCEDURE — 96415 CHEMO IV INFUSION ADDL HR: CPT | Performed by: INTERNAL MEDICINE

## 2023-08-29 PROCEDURE — 96367 TX/PROPH/DG ADDL SEQ IV INF: CPT | Performed by: INTERNAL MEDICINE

## 2023-08-29 PROCEDURE — 96413 CHEMO IV INFUSION 1 HR: CPT | Performed by: INTERNAL MEDICINE

## 2023-08-29 RX ORDER — ALBUTEROL SULFATE 0.83 MG/ML
2.5 SOLUTION RESPIRATORY (INHALATION)
Status: CANCELLED | OUTPATIENT
Start: 2023-08-29

## 2023-08-29 RX ORDER — HEPARIN SODIUM (PORCINE) LOCK FLUSH IV SOLN 100 UNIT/ML 100 UNIT/ML
5 SOLUTION INTRAVENOUS
Status: CANCELLED | OUTPATIENT
Start: 2023-10-16

## 2023-08-29 RX ORDER — HEPARIN SODIUM (PORCINE) LOCK FLUSH IV SOLN 100 UNIT/ML 100 UNIT/ML
5 SOLUTION INTRAVENOUS
Status: CANCELLED | OUTPATIENT
Start: 2023-08-29

## 2023-08-29 RX ORDER — MEPERIDINE HYDROCHLORIDE 25 MG/ML
25 INJECTION INTRAMUSCULAR; INTRAVENOUS; SUBCUTANEOUS EVERY 30 MIN PRN
Status: CANCELLED | OUTPATIENT
Start: 2023-08-29

## 2023-08-29 RX ORDER — METHYLPREDNISOLONE SODIUM SUCCINATE 125 MG/2ML
125 INJECTION, POWDER, LYOPHILIZED, FOR SOLUTION INTRAMUSCULAR; INTRAVENOUS
Status: CANCELLED
Start: 2023-10-16

## 2023-08-29 RX ORDER — METHYLPREDNISOLONE SODIUM SUCCINATE 125 MG/2ML
125 INJECTION, POWDER, LYOPHILIZED, FOR SOLUTION INTRAMUSCULAR; INTRAVENOUS
Status: CANCELLED
Start: 2023-08-29

## 2023-08-29 RX ORDER — MEPERIDINE HYDROCHLORIDE 25 MG/ML
25 INJECTION INTRAMUSCULAR; INTRAVENOUS; SUBCUTANEOUS EVERY 30 MIN PRN
Status: CANCELLED | OUTPATIENT
Start: 2023-10-16

## 2023-08-29 RX ORDER — ZOLEDRONIC ACID 5 MG/100ML
5 INJECTION, SOLUTION INTRAVENOUS ONCE
Status: COMPLETED | OUTPATIENT
Start: 2023-08-29 | End: 2023-08-29

## 2023-08-29 RX ORDER — ALBUTEROL SULFATE 0.83 MG/ML
2.5 SOLUTION RESPIRATORY (INHALATION)
Status: CANCELLED | OUTPATIENT
Start: 2023-10-16

## 2023-08-29 RX ORDER — HEPARIN SODIUM,PORCINE 10 UNIT/ML
5 VIAL (ML) INTRAVENOUS
Status: CANCELLED | OUTPATIENT
Start: 2023-10-16

## 2023-08-29 RX ORDER — HEPARIN SODIUM,PORCINE 10 UNIT/ML
5-20 VIAL (ML) INTRAVENOUS DAILY PRN
Status: CANCELLED | OUTPATIENT
Start: 2023-08-29

## 2023-08-29 RX ORDER — ALBUTEROL SULFATE 90 UG/1
1-2 AEROSOL, METERED RESPIRATORY (INHALATION)
Status: CANCELLED
Start: 2023-10-16

## 2023-08-29 RX ORDER — DIPHENHYDRAMINE HYDROCHLORIDE 50 MG/ML
50 INJECTION INTRAMUSCULAR; INTRAVENOUS
Status: CANCELLED
Start: 2023-10-16

## 2023-08-29 RX ORDER — ZOLEDRONIC ACID 5 MG/100ML
5 INJECTION, SOLUTION INTRAVENOUS ONCE
Status: CANCELLED
Start: 2023-08-29

## 2023-08-29 RX ORDER — DIPHENHYDRAMINE HYDROCHLORIDE 50 MG/ML
50 INJECTION INTRAMUSCULAR; INTRAVENOUS
Status: CANCELLED
Start: 2023-08-29

## 2023-08-29 RX ORDER — EPINEPHRINE 1 MG/ML
0.3 INJECTION, SOLUTION INTRAMUSCULAR; SUBCUTANEOUS EVERY 5 MIN PRN
Status: CANCELLED | OUTPATIENT
Start: 2023-10-16

## 2023-08-29 RX ORDER — EPINEPHRINE 1 MG/ML
0.3 INJECTION, SOLUTION INTRAMUSCULAR; SUBCUTANEOUS EVERY 5 MIN PRN
Status: CANCELLED | OUTPATIENT
Start: 2023-08-29

## 2023-08-29 RX ORDER — ALBUTEROL SULFATE 90 UG/1
1-2 AEROSOL, METERED RESPIRATORY (INHALATION)
Status: CANCELLED
Start: 2023-08-29

## 2023-08-29 RX ADMIN — ZOLEDRONIC ACID 5 MG: 0.05 INJECTION, SOLUTION INTRAVENOUS at 14:40

## 2023-08-29 RX ADMIN — Medication 250 ML: at 14:38

## 2023-08-29 NOTE — PROGRESS NOTES
Post Infusion Assessment:  Patient tolerated infusion without incident.  Blood return noted pre and post infusion.  Site patent and intact, free from redness, edema or discomfort.  No evidence of extravasations.  Access discontinued per protocol.  Biologic Infusion Post Education: Call the triage nurse at your clinic or seek medical attention if you have chills and/or temperature greater than or equal to 100.5, uncontrolled nausea/vomiting, diarrhea, constipation, dizziness, shortness of breath, chest pain, heart palpitations, weakness or any other new or concerning symptoms, questions or concerns.  You cannot have any live virus vaccines prior to or during treatment or up to 6 months post infusion.  If you have an upcoming surgery, medical procedure or dental procedure during treatment, this should be discussed with your ordering physician and your surgeon/dentist.  If you are having any concerning symptom, if you are unsure if you should get your next infusion or wish to speak to a provider before your next infusion, please call your care coordinator or triage nurse at your clinic to notify them so we can adequately serve you.       Discharge Plan:   AVS to patient via [employer].  Patient will return 10/23/23 for next appointment.   Patient discharged in stable condition accompanied by: self and family  Departure Mode: Wheelchair.      Piedad Kent RN

## 2023-08-29 NOTE — PROGRESS NOTES
"Infusion Nursing Note:  Alannah Leos presents today for Infliximab and Reclast.    Patient seen by provider today: No   present during visit today: Not Applicable.    Note: Patient and daughter noted starting about two weeks post second infliximab infusion, she had nausea but no vomiting, stated appetite wasn't as interrupted as after the first infusion. Daughter stated she noted the \"worst days\" lasted 3-4 days, but patient stated she still has continued nausea.     Patient verified taking calcium and Vitamin D supplements.     Intravenous Access:  Peripheral IV placed.    Treatment Conditions:  Biological Infusion Checklist:  ~~~ NOTE: If the patient answers yes to any of the questions below, hold the infusion and contact ordering provider or on-call provider.    Have you recently had an elevated temperature, fever, chills, productive cough, coughing for 3 weeks or longer or hemoptysis,  abnormal vital signs, night sweats,  chest pain or have you noticed a decrease in your appetite, unexplained weight loss or fatigue? No  Do you have any open wounds or new incisions? No  Do you have any upcoming hospitalizations or surgeries? Does not include esophagogastroduodenoscopy, colonoscopy, endoscopic retrograde cholangiopancreatography (ERCP), endoscopic ultrasound (EUS), dental procedures or joint aspiration/steroid injections No  Do you currently have any signs of illness or infection or are you on any antibiotics? No  Have you had any new, sudden or worsening abdominal pain? No  Have you or anyone in your household received a live vaccination in the past 4 weeks? Please note: No live vaccines while on biologic/chemotherapy until 6 months after the last treatment. Patient can receive the flu vaccine (shot only), pneumovax and the Covid vaccine. It is optimal for the patient to get these vaccines mid cycle, but they can be given at any time as long as it is not on the day of the infusion. No  Have you " recently been diagnosed with any new nervous system diseases (ie. Multiple sclerosis, Guillain Nisswa, seizures, neurological changes) or cancer diagnosis? Are you on any form of radiation or chemotherapy? No  Have there been any other new onset medical symptoms? No  Labs for Reclast:  Calcium 9.8  Creatinine 0.49    Post Infusion Assessment:  Report given to Infusion, QUIQUE Burks, RN   Yes

## 2023-08-30 ENCOUNTER — OFFICE VISIT (OUTPATIENT)
Dept: RHEUMATOLOGY | Facility: CLINIC | Age: 83
End: 2023-08-30
Payer: MEDICARE

## 2023-08-30 VITALS — HEART RATE: 80 BPM | SYSTOLIC BLOOD PRESSURE: 112 MMHG | DIASTOLIC BLOOD PRESSURE: 57 MMHG | OXYGEN SATURATION: 96 %

## 2023-08-30 DIAGNOSIS — Z79.899 HIGH RISK MEDICATION USE: ICD-10-CM

## 2023-08-30 DIAGNOSIS — M17.12 PRIMARY OSTEOARTHRITIS OF LEFT KNEE: ICD-10-CM

## 2023-08-30 DIAGNOSIS — M81.0 AGE RELATED OSTEOPOROSIS, UNSPECIFIED PATHOLOGICAL FRACTURE PRESENCE: ICD-10-CM

## 2023-08-30 DIAGNOSIS — M06.09 RHEUMATOID ARTHRITIS OF MULTIPLE SITES WITH NEGATIVE RHEUMATOID FACTOR (H): Primary | ICD-10-CM

## 2023-08-30 PROCEDURE — 20610 DRAIN/INJ JOINT/BURSA W/O US: CPT | Mod: LT | Performed by: INTERNAL MEDICINE

## 2023-08-30 PROCEDURE — 99214 OFFICE O/P EST MOD 30 MIN: CPT | Mod: 25 | Performed by: INTERNAL MEDICINE

## 2023-08-30 RX ORDER — TRIAMCINOLONE ACETONIDE 40 MG/ML
40 INJECTION, SUSPENSION INTRA-ARTICULAR; INTRAMUSCULAR ONCE
Status: COMPLETED | OUTPATIENT
Start: 2023-08-30 | End: 2023-08-30

## 2023-08-30 RX ADMIN — TRIAMCINOLONE ACETONIDE 40 MG: 40 INJECTION, SUSPENSION INTRA-ARTICULAR; INTRAMUSCULAR at 16:04

## 2023-08-30 NOTE — PROGRESS NOTES
Rheumatology Clinic Visit      Alannah Leos MRN# [id number]   YOB: 1940 Age: 82 year old      Date of visit: 8/30/23   PCP: Dr. Leah Blanca    Chief Complaint   Patient presents with:  Rheumatoid arthritis    Assessment and Plan     1. 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 Previously on methotrexate when first diagnosed (unclear if ineffective or not, stopped by patient preference to not treat her RA at that time).  Synovitis, subluxation, and fixed flexion deformities on initial exam on 10/12/2016. Steroid responsive. 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 Currently on methotrexate 20 mg once weekly, and hydroxychloroquine 200 mg daily. Previously on sulfasalazine (felt poorly on it).  At almost every visit I have encouraged her to escalate treatment for her active disease but she has been reluctant to do so because of possible risks from the medication; she understands that the progression of her disease is not likely to be reversible as seen by the changes of her spine.  At one point she was going to proceed with SQ bDMARD but she never started, then she was going to proceed with an infusion bDMARD but she didn't go through with it.  Eventually, she did start Remicade 3 mg/kg IV, with the first dose on 7/17/2023.  Already she has improvement of her arthritis with resolution of synovitis at the hands and improvement of general pain.  Her daughter who is with her today states that this is the first time in years that the patient has not gotten up to open the refrigerator herself and she has gotten up to wash her hands herself at a sink instead of using a wet wipe, and the patient reports having improvement.  The patient did verbalize disappointment with needing to have the infusion every 8 weeks where she thought that the infusion was going to only be once; her  daughter who was with her today states that I did explained the frequency of the infusion, that she understood the frequency of the infusion, and that she has also told the patient and the patient had verbalized understanding about the frequency of the infusions.  After thorough discussion about continuing or stopping the Remicade treatment, the patient has chosen to continue Remicade.  Note that she had a general ill feeling and GI upset for about 4 days after the first infusion, then for a period of 4 days that started 2 weeks after the second infusion, and nothing yet after the third infusion.  If she reports that she has side effects from the infusion then will add Tylenol and steroid premedication.  Anticipate more improvement with a longer duration of treatment.   Chronic illness    - Continue methotrexate 20 mg once weekly (split dosing within the same 24-hours of each week)  - Continue folic acid 1mg daily  - Continue Remicade 3mg/kg IV every 8 weeks   - Labs with Remicade infusions every 8 weeks:  CBC, Creatinine, Hepatic Panel, ESR, CRP    High risk medication requiring intensive toxicity monitoring at least quarterly.    2. Positive PIERO and dsDNA: These were noted on record review. She does not have symptoms to support an PIERO-associated rheumatologic disease at this time.     3. Diffuse neck/back pain: No evidence of instability by x-rays on 10/12/2016.  Seen by NSGY and now getting trigger point injections in the pain clinic with improvement    4. Parkinson's Disease: Followed by Dr. Ricardo Johnson at the [facility] previously, and now at [facility].  Worsened recently and she plans to follow-up with neurology    5. Osteoporosis: Based on 1/29/2018 DEXA.  Was on fosamax from 4467-3453; reclast then started 2021 with the first dose received 5/12/2021, followed by the second dose on 5/16/2022, and third dose received on 8/29/2023.   - Continue reclast 5mg IV yearly  - Continue  vitamin D 1000 IU daily   - Continue calcium 1000mg daily    6.  Severe osteoarthritis of multiple joints: Has been evaluated by surgeons and is now followed in the pain clinic.  Severe osteoarthritis that is limiting daily activities.    7.  Left knee osteoarthritis: Reviewed the diagnosis and treatment options.  Patient would like a steroid injection today and this is reasonable.  Steroid injection today as documented in the procedure section.    8.  Severe osteoarthritis and severe rheumatoid arthritis: Significantly impacting her quality of life.  Function limited due to degenerative and inflammatory changes.  Has seen palliative care in the past    9.  Vaccinations: Vaccinations reviewed with Ms. Leos.  Risks and benefits of vaccinations were discussed.    - Influenza: encouraged yearly vaccination  - [id number]: up to date  - [id number]: up to date  - COVID-19: Advised updating    Total minutes spent in evaluation with patient, documentation, , and review of pertinent studies and chart notes: 32     Ms. Leos verbalized agreement with and understanding of the rational for the diagnosis and treatment plan.  All questions were answered to best of my ability and the patient's satisfaction. Ms. Leos was advised to contact the clinic with any questions that may arise after the clinic visit.      Thank you for involving me in the care of the patient    Return to clinic: 3-4 months      HPI   Alannah Leos is a 82 year old female with a medical history significant for Parkinson's disease, osteoarthritis, status post TKA, osteoporosis, and rheumatoid arthritis who presents for follow-up of rheumatoid arthritis.    4/21/2022: Trigger point injections from the pain clinic are helpful but she is worried that her new pain clinic provider will not schedule appointments every 3 weeks that have been helpful for her, but rather she is being scheduled every 5 weeks and she has too much pain to go every 5  weeks between injections.  She is wondering if there is another provider that has more availability to allow her to come in every 3 weeks for these injections; she says that she will also speak with her current pain management provider who she has only seen once so far.  Severe pain in the right shoulder and hardly able to use it now.  Pain in her wrists, MCPs, and PIPs where she also has swelling.  Stiffness lasts for all day, worse in the morning when she first wakes.  Wrists, elbows, shoulders, knees, ankles, MTPs, neck, and fingers all hurt.  She is okay with continuing Reclast for osteoporosis management but is hesitant to use biologic DMARD or escalate treatment for RA in any way at this time because she is concerned that it may not work.  She verbalized understanding that untreated inflammatory arthritis will potentially cause worsening irreversible damage.    She feels that her arthritis is fairly unchanged.  She says that she does not feel well because she cannot raise her head due to degenerative changes in her neck.  She says that her hands have worsened slightly.  She is only taking methotrexate now for inflammatory arthritis.  She was hospitalized recently for worsening Parkinson's disease and plans to follow-up with neurology as an outpatient.  She and her daughter voiced frustration with recent hospitalization, where they did not feel that much was done.    6/14/2023:  no change in arthritis symptoms per patient.  Pain at the MCPs, PIPs, wrists, shoulders, knees.  Occasional lower extremity edema but none now.  Was going to have her infliximab infusion but had a open wound on her bottom that is nearly resolved at this point so has rescheduled the infliximab infusion.    Today, 8/30/2023: Has received 3 doses of Remicade.  After the first dose of Remicade she had 4 days of GI upset.  2 weeks after the second dose of Remicade she had 4 days of GI upset.  She does have the third dose of Remicade  yesterday.  She had Reclast yesterday.  Overall feeling better with less joint pain.  Her daughter who is present with her today states that this is the first time in years that she has gotten up to wash her hands at the sink and open the refrigerator.  The patient voiced some dissatisfaction with the fact that she will have to continue these infusions every 8 weeks, but also verbalized understanding that she has the choice to not continue the infusions; ultimately she says she would like to continue the Remicade infusions.  She also reports having left knee pain that is bothersome all the time but is not swollen and was without increased warmth or overlying erythema; she would like a steroid injection in the left knee because they have helped in the past, with the last steroid injections being many years ago.    Denies fevers, chills, nausea, vomiting, constipation, diarrhea. No abdominal pain. No chest pain/pressure, palpitations, or shortness of breath.   No oral or nasal sores.  No rash. No sicca symptoms.      Daughter is present with her today.    Tobacco: none  EtOH: 1 drink at [place] only  Drugs: none    ROS   12 point review of system was completed and negative except as noted in the HPI     Active Problem List     Patient Active Problem List   Diagnosis    Osteoporosis    Rheumatoid arthritis (H)    Parkinson disease (H)    Osteoarthritis of wrist    Osteoarthritis of shoulder    History of total knee arthroplasty    Osteoarthritis of left knee    Advanced directives, counseling/discussion    CARDIOVASCULAR SCREENING; LDL GOAL LESS THAN 160    High risk medication use    Underweight    Impingement syndrome, shoulder, left    Combined forms of age-related cataract of both eyes    Seborrheic dermatitis    Anemia in other chronic diseases classified elsewhere    Ankylosing spondylitis of cervical region (H)    Failure to thrive in adult    Physical deconditioning    History of iron deficiency anemia      Past Medical History     Past Medical History:   Diagnosis Date    Hyperlipidemia     Murmur, heart     hsm    Nonsenile cataract     Osteoarthrosis, unspecified whether generalized or localized, lower leg     Osteopenia     Rheumatoid arthritis(714.0)      Past Surgical History     Past Surgical History:   Procedure Laterality Date    GALLBLADDER SURGERY      HYSTERECTOMY      KNEE SURGERY      left     LAMINECTOMY LUMBAR ONE LEVEL      TONSILLECTOMY       Allergy     Allergies   Allergen Reactions    Decadrol [Dexamethasone Sodium Phosphate] Hives    Shrimp Itching     Current Medication List     Current Outpatient Medications   Medication Sig    ACE NOT PRESCRIBED, INTENTIONAL, 1 each continuous prn. ACE Inhibitor not prescribed due to Refusal by patient          CALCIUM + D OR 600mg twice a day     carbidopa-levodopa (SINEMET)  MG tablet Take one at 6 am, noon, 5 pm, 9 pm and midnight    Cyanocobalamin (VITAMIN B 12) 250 MCG LOZG     diclofenac (VOLTAREN) 1 % topical gel Apply 2 g topically 3 times daily as needed for moderate pain    folic acid (FOLVITE) 1 MG tablet Take 1 tablet (1 mg) by mouth daily    ibuprofen (ADVIL/MOTRIN) 200 MG tablet Take 1 tablet (200 mg) by mouth every 8 hours as needed for moderate pain    methotrexate sodium 2.5 MG TABS Take 8 tablets (20 mg) by mouth once a week .  This is a once a week medication, taken on the same day of each week.    order for DME Equipment being ordered: soft neck brace    vitamin C (ASCORBIC ACID) 1000 MG TABS Take 1,000 mg by mouth 2 times daily     Current Facility-Administered Medications   Medication    ropivacaine (NAROPIN) injection 3 mL    ropivacaine (NAROPIN) injection 3 mL    triamcinolone (KENALOG-40) injection 40 mg    triamcinolone (KENALOG-40) injection 40 mg     Social History   See HPI    Family History     Family History   Problem Relation Age of Onset    Cancer Sister         pancreatic    Diabetes No family hx of     Hypertension  No family hx of        Physical Exam     Temp Readings from Last 3 Encounters:   08/29/23 98.3  F (36.8  C)   07/17/23 97.5  F (36.4  C)   06/12/23 97.6  F (36.4  C) (Oral)     BP Readings from Last 5 Encounters:   08/30/23 112/57   08/29/23 135/61   07/31/23 (!) 150/77   07/17/23 (!) 154/69   06/14/23 126/56     Pulse Readings from Last 1 Encounters:   08/30/23 80     Resp Readings from Last 1 Encounters:   08/29/23 16     Estimated body mass index is 21.29 kg/m  as calculated from the following:    Height as of 8/29/23: 1.524 m (5').    Weight as of 8/29/23: 49.4 kg (109 lb).    GEN: NAD, thin and frail appearing  HEENT: MMM. No oral lesions. Anicteric, noninjected sclera  PULM: No increased work of breathing.    MSK: MCPs, PIPs, DIPs, and wrists without synovial swelling or tenderness to palpation.   Elbows without swelling or tenderness to palpation.  Shoulders diffusely tender to palpation and minimal ROM; no swelling or increased warmth.  Knees with medial joint line tenderness; left knee with crepitation.  Knees without effusion, increased warmth, or overlying erythema.  Ankles and MTPs without swelling or tenderness to palpation.  Holds neck in a flexed position.   SKIN: No rash. Diffuse hair thinning.  EXT: No lower extremity edema  PSYCH: Alert. Appropriate.      Labs / Imaging (select studies)     RF/CCP  Recent Labs   Lab Test 10/12/16  1142   CCPIGG 2   RHF <20     CBC  Recent Labs   Lab Test 07/17/23  1422 03/07/23  1429 10/27/22  1406 10/13/22  0950 08/18/21  1432 05/17/21  1342 02/22/21  1339 12/07/20  1353   WBC 5.7 6.4 7.4 8.7   < > 7.5 8.1 7.5   RBC 4.12 3.97 3.77* 4.00   < > 3.70* 4.02 4.11   HGB 10.3* 11.0* 9.8* 10.3*   < > 11.0* 11.6* 11.4*   HCT 32.8* 34.5* 30.8* 32.8*   < > 33.6* 36.0 36.1   MCV 80 87 82 82   < > 91 90 88   RDW 19.5* 18.9* 19.1* 18.5*   < > 16.8* 17.6* 17.3*    235 361 338   < > 235 249 246   MCH 25.0* 27.7 26.0* 25.8*   < > 29.7 28.9 27.7   MCHC 31.4* 31.9 31.8 31.4*    < > 32.7 32.2 31.6   NEUTROPHIL 63 73  --  81   < > 73.0 70.0 72.0   LYMPH 25 16 16 10   < > 20.0 17.0 19.0   MONOCYTE 8 8 7 7   < > 4.0 10.0 5.0   EOSINOPHIL 3 2 2 1   < > 2.0 2.0 2.0   BASOPHIL 1 1  --  1   < > 1.0 1.0 2.0   ANEU  --   --   --   --   --  5.4 5.6 5.3   ALYM  --   --   --   --   --  1.5 1.4 1.4   DORITA  --   --   --   --   --  0.3 0.8 0.4   AEOS  --   --   --   --   --  0.2 0.2 0.2   ABAS  --   --   --   --   --  0.1 0.1 0.2   ANEUTAUTO 3.6 4.6  --  7.0   < >  --   --   --    ALYMPAUTO 1.4 1.0 1.2 0.9   < >  --   --   --    AMONOAUTO 0.4 0.5 0.5 0.6   < >  --   --   --    AEOSAUTO 0.1 0.2 0.1 0.1   < >  --   --   --    ABSBASO 0.1 0.1  --  0.1   < >  --   --   --     < > = values in this interval not displayed.     CMP  Recent Labs   Lab Test 07/31/23  1415 07/17/23  1422 03/07/23  1429 10/27/22  1406 10/13/22  0950 07/20/22  1524 06/22/22  1112 08/18/21  1432 05/17/21  1342 05/12/21  1334 03/12/21  1328 02/22/21  1339 05/18/20  1325 01/15/20  1403   NA  --   --   --   --  133*  --  137  --   --   --   --   --   --  137   POTASSIUM  --   --   --   --  4.2  --  4.0  --   --   --   --   --   --  4.2   CHLORIDE  --   --   --   --  95*  --  103  --   --   --   --   --   --  102   CO2  --   --   --   --  23  --  27  --   --   --   --   --   --  30   ANIONGAP  --   --   --   --  15  --  7  --   --   --   --   --   --  5   GLC  --   --   --   --  133*  --  103*  --   --   --   --   --   --  97   BUN  --   --   --   --  14.0  --  11  --   --   --   --   --   --  15   CR 0.49* 0.38* 0.45* 0.40* 0.42*   < > 0.43*   < > 0.63 0.65  --  0.58   < > 0.55   GFRESTIMATED >90 >90 >90 >90 >90   < > >90   < > 85 84  --  88   < > 90   GFRESTBLACK  --   --   --   --   --   --   --   --  >90 >90  --  >90   < > >90   PAKO 9.8  --   --   --  9.0  --  9.1   < >  --  9.2   < >  --   --  9.5  10.2*   BILITOTAL  --  0.3 0.6 0.3 0.3   < > 0.5   < > 0.4  --   --  0.4   < > 0.4   ALBUMIN  --  4.1 3.6 3.1* 3.6   < > 3.7   < > 3.7   --   --  4.1   < > 4.3   PROTTOTAL  --  7.5 7.1 7.0 6.9   < > 7.7   < > 7.3  --   --  7.9   < > 7.8   ALKPHOS  --  129* 88 77 78   < > 82   < > 77  --   --  91   < > 125   AST  --  17 13 17 25   < > 15   < > 16  --   --  18   < > 25   ALT  --  <5 13 13 <5*   < > 10   < > 12  --   --  10   < > 18    < > = values in this interval not displayed.     Calcium/VitaminD  Recent Labs   Lab Test 07/31/23  1415 10/13/22  0950 06/22/22  1112 05/12/22  1709 12/15/21  1553 05/12/21  1334 03/12/21  1328 01/15/20  1403   PAKO 9.8 9.0 9.1   < > 9.6   < > 9.6 9.5  10.2*   VITDT  --   --   --   --  40  --  37 39    < > = values in this interval not displayed.     ESR/CRP  Recent Labs   Lab Test 07/17/23  1422 03/07/23  1429 10/27/22  1406 04/21/22  1508   SED 42* 20 66* 44*   CRP  --  18.6* 43.3* 11.7*   CRPI 7.86*  --   --   --        Hepatitis B  Recent Labs   Lab Test 01/18/17  1601   HBCAB Nonreactive   HEPBANG Nonreactive     Hepatitis C  Recent Labs   Lab Test 01/18/17  1601   HCVAB Nonreactive   Assay performance characteristics have not been established for newborns,   infants, and children       Tuberculosis Screening  Recent Labs   Lab Test 03/07/23  1429 03/12/21  1328 01/25/18  1352 01/18/17  1601   TBRES Negative  --   --   --    TBRSLT  --   --  Negative Negative   TBAGN  --   --  0.00 0.00   TBRST  --  Negative  --   --      HIV Screening  Recent Labs   Lab Test 01/18/17  1601   HIAGAB Nonreactive   HIV-1 p24 Ag & HIV-1/HIV-2 Ab Not Detected       Immunization History     Immunization History   Administered Date(s) Administered    COVID-19 MONOVALENT 12+ (Pfizer) 03/12/2021, 04/02/2021, 10/13/2021    Pneumo Conj 13-V (2010&after) 11/03/2016    Pneumococcal 23 valent 11/01/2007, 11/13/2007, 03/12/2018    TD,PF 7+ (Tenivac) 11/13/2007    TDAP (Adacel,Boostrix) 11/13/2007       Procedure     Procedure: Steroid injection of the left knee  Indication: Pain, osteoarthritis of the left knee    The procedure was explained in  detail. Risks including infection, pain, structural damage such as cartilage damage and tendon rupture, fat atrophy, skin hyper-/hypo-pigmentation, and medication reaction was explained. The need for rest of the affected joint for one week after the procedure was explained.  The option of not doing the procedure was also provided. All questions were answered and the patient consented to the procedure.     A time-out was performed and the correct patient, procedure, and laterality were verified.    The left knee was examined and location for injection was identified - anterior medial. The area was cleaned with chlorhexidine, twice.  Ethyl chloride was then used for topical anaesthetic.  Then a mixture of lidocaine 1% 2 mL and Kenalog 40mg was injected into the intra-articular space.     The patient tolerated the procedure well. No complications.    1% Lidocaine  : Hospira  Lot #: [id number]  EXPIRATION DATE: 01 FEB 2025  NDC: 5113-0711-99    MEDICATION: Triamcinolone 40 mg  LOT #: [id number]   EXPIRATION DATE:  01/2025  NDC#: [address]-6         Chart documentation done in part with Dragon Voice recognition Software. Although reviewed after completion, some word and grammatical error may remain.    Merrick Del Cid MD

## 2023-08-30 NOTE — PATIENT INSTRUCTIONS
RHEUMATOLOGY    [facility]  [address]  [place] [address]    Phone number for Rheumatology: [phone]  Fax number: [phone]    If you need a medication refill, please contact us as you may need lab work and/or a follow up visit prior to your refill.      Thank you for choosing [facility]!    JOSE CARLOS Barrett RN 8/30/2023 3:28 PM

## 2023-10-23 ENCOUNTER — INFUSION THERAPY VISIT (OUTPATIENT)
Dept: INFUSION THERAPY | Facility: CLINIC | Age: 83
End: 2023-10-23
Attending: INTERNAL MEDICINE
Payer: MEDICARE

## 2023-10-23 ENCOUNTER — LAB (OUTPATIENT)
Dept: LAB | Facility: CLINIC | Age: 83
End: 2023-10-23
Payer: MEDICARE

## 2023-10-23 VITALS
BODY MASS INDEX: 21.5 KG/M2 | DIASTOLIC BLOOD PRESSURE: 71 MMHG | RESPIRATION RATE: 18 BRPM | HEART RATE: 74 BPM | SYSTOLIC BLOOD PRESSURE: 116 MMHG | TEMPERATURE: 98.1 F | WEIGHT: 110.1 LBS | OXYGEN SATURATION: 96 %

## 2023-10-23 DIAGNOSIS — Z79.899 HIGH RISK MEDICATION USE: ICD-10-CM

## 2023-10-23 DIAGNOSIS — M06.09 RHEUMATOID ARTHRITIS OF MULTIPLE SITES WITH NEGATIVE RHEUMATOID FACTOR (H): Primary | ICD-10-CM

## 2023-10-23 DIAGNOSIS — M06.09 RHEUMATOID ARTHRITIS OF MULTIPLE SITES WITH NEGATIVE RHEUMATOID FACTOR (H): ICD-10-CM

## 2023-10-23 LAB
ALBUMIN SERPL BCG-MCNC: 4.3 G/DL (ref 3.5–5.2)
ALP SERPL-CCNC: 75 U/L (ref 35–104)
ALT SERPL W P-5'-P-CCNC: <5 U/L (ref 0–50)
AST SERPL W P-5'-P-CCNC: 21 U/L (ref 0–45)
BASOPHILS # BLD AUTO: 0.1 10E3/UL (ref 0–0.2)
BASOPHILS NFR BLD AUTO: 1 %
BILIRUB DIRECT SERPL-MCNC: <0.2 MG/DL (ref 0–0.3)
BILIRUB SERPL-MCNC: 0.3 MG/DL
CREAT SERPL-MCNC: 0.41 MG/DL (ref 0.51–0.95)
CRP SERPL-MCNC: <3 MG/L
EGFRCR SERPLBLD CKD-EPI 2021: >90 ML/MIN/1.73M2
EOSINOPHIL # BLD AUTO: 0.1 10E3/UL (ref 0–0.7)
EOSINOPHIL NFR BLD AUTO: 2 %
ERYTHROCYTE [DISTWIDTH] IN BLOOD BY AUTOMATED COUNT: 20.1 % (ref 10–15)
ERYTHROCYTE [SEDIMENTATION RATE] IN BLOOD BY WESTERGREN METHOD: 5 MM/HR (ref 0–30)
HCT VFR BLD AUTO: 36.8 % (ref 35–47)
HGB BLD-MCNC: 11.8 G/DL (ref 11.7–15.7)
HOLD SPECIMEN: NORMAL
IMM GRANULOCYTES # BLD: 0 10E3/UL
IMM GRANULOCYTES NFR BLD: 0 %
LYMPHOCYTES # BLD AUTO: 1.3 10E3/UL (ref 0.8–5.3)
LYMPHOCYTES NFR BLD AUTO: 23 %
MCH RBC QN AUTO: 27.4 PG (ref 26.5–33)
MCHC RBC AUTO-ENTMCNC: 32.1 G/DL (ref 31.5–36.5)
MCV RBC AUTO: 86 FL (ref 78–100)
MONOCYTES # BLD AUTO: 0.4 10E3/UL (ref 0–1.3)
MONOCYTES NFR BLD AUTO: 8 %
NEUTROPHILS # BLD AUTO: 3.7 10E3/UL (ref 1.6–8.3)
NEUTROPHILS NFR BLD AUTO: 66 %
NRBC # BLD AUTO: 0 10E3/UL
NRBC BLD AUTO-RTO: 0 /100
PLATELET # BLD AUTO: 200 10E3/UL (ref 150–450)
PROT SERPL-MCNC: 6.8 G/DL (ref 6.4–8.3)
RBC # BLD AUTO: 4.3 10E6/UL (ref 3.8–5.2)
WBC # BLD AUTO: 5.6 10E3/UL (ref 4–11)

## 2023-10-23 PROCEDURE — 85652 RBC SED RATE AUTOMATED: CPT

## 2023-10-23 PROCEDURE — 86140 C-REACTIVE PROTEIN: CPT

## 2023-10-23 PROCEDURE — 36415 COLL VENOUS BLD VENIPUNCTURE: CPT

## 2023-10-23 PROCEDURE — 96413 CHEMO IV INFUSION 1 HR: CPT | Performed by: INTERNAL MEDICINE

## 2023-10-23 PROCEDURE — 85025 COMPLETE CBC W/AUTO DIFF WBC: CPT

## 2023-10-23 PROCEDURE — 96415 CHEMO IV INFUSION ADDL HR: CPT | Performed by: INTERNAL MEDICINE

## 2023-10-23 PROCEDURE — 82565 ASSAY OF CREATININE: CPT

## 2023-10-23 PROCEDURE — 80076 HEPATIC FUNCTION PANEL: CPT

## 2023-10-23 RX ORDER — DIPHENHYDRAMINE HCL 25 MG
25 CAPSULE ORAL ONCE
Status: CANCELLED
Start: 2023-12-19 | End: 2023-12-19

## 2023-10-23 RX ORDER — HEPARIN SODIUM,PORCINE 10 UNIT/ML
5 VIAL (ML) INTRAVENOUS
Status: CANCELLED | OUTPATIENT
Start: 2023-10-24

## 2023-10-23 RX ORDER — HEPARIN SODIUM (PORCINE) LOCK FLUSH IV SOLN 100 UNIT/ML 100 UNIT/ML
5 SOLUTION INTRAVENOUS
Status: CANCELLED | OUTPATIENT
Start: 2023-10-24

## 2023-10-23 RX ORDER — ACETAMINOPHEN 325 MG/1
650 TABLET ORAL ONCE
Status: COMPLETED | OUTPATIENT
Start: 2023-10-23 | End: 2023-10-23

## 2023-10-23 RX ORDER — MEPERIDINE HYDROCHLORIDE 25 MG/ML
25 INJECTION INTRAMUSCULAR; INTRAVENOUS; SUBCUTANEOUS EVERY 30 MIN PRN
Status: CANCELLED | OUTPATIENT
Start: 2023-10-24

## 2023-10-23 RX ORDER — ALBUTEROL SULFATE 90 UG/1
1-2 AEROSOL, METERED RESPIRATORY (INHALATION)
Status: CANCELLED
Start: 2023-10-24

## 2023-10-23 RX ORDER — ALBUTEROL SULFATE 0.83 MG/ML
2.5 SOLUTION RESPIRATORY (INHALATION)
Status: CANCELLED | OUTPATIENT
Start: 2023-10-24

## 2023-10-23 RX ORDER — ACETAMINOPHEN 325 MG/1
650 TABLET ORAL ONCE
Status: CANCELLED
Start: 2023-11-06 | End: 2023-11-06

## 2023-10-23 RX ORDER — DIPHENHYDRAMINE HCL 25 MG
25 CAPSULE ORAL ONCE
Status: CANCELLED
Start: 2023-11-06 | End: 2023-11-06

## 2023-10-23 RX ORDER — ACETAMINOPHEN 325 MG/1
650 TABLET ORAL ONCE
Status: CANCELLED
Start: 2023-10-23 | End: 2023-10-23

## 2023-10-23 RX ORDER — DIPHENHYDRAMINE HCL 25 MG
25 CAPSULE ORAL ONCE
Status: COMPLETED | OUTPATIENT
Start: 2023-10-23 | End: 2023-10-23

## 2023-10-23 RX ORDER — METHYLPREDNISOLONE SODIUM SUCCINATE 125 MG/2ML
125 INJECTION, POWDER, LYOPHILIZED, FOR SOLUTION INTRAMUSCULAR; INTRAVENOUS
Status: CANCELLED
Start: 2023-10-24

## 2023-10-23 RX ORDER — EPINEPHRINE 1 MG/ML
0.3 INJECTION, SOLUTION INTRAMUSCULAR; SUBCUTANEOUS EVERY 5 MIN PRN
Status: CANCELLED | OUTPATIENT
Start: 2023-10-24

## 2023-10-23 RX ORDER — DIPHENHYDRAMINE HCL 25 MG
25 CAPSULE ORAL ONCE
Status: CANCELLED
Start: 2023-10-23 | End: 2023-10-23

## 2023-10-23 RX ORDER — DIPHENHYDRAMINE HYDROCHLORIDE 50 MG/ML
50 INJECTION INTRAMUSCULAR; INTRAVENOUS
Status: CANCELLED
Start: 2023-10-24

## 2023-10-23 RX ADMIN — Medication 25 MG: at 15:43

## 2023-10-23 RX ADMIN — Medication 250 ML: at 15:43

## 2023-10-23 RX ADMIN — ACETAMINOPHEN 650 MG: 325 TABLET ORAL at 15:43

## 2023-10-23 NOTE — PROGRESS NOTES
Infusion center called, patient request Tylenol and Benadryl premedication for the infliximab infusion.  This is reasonable.  Tylenol 650 mg and diphenhydramine 25 mg oral as infusion premedication.  This was added to the infusion order set.    Merrick Del Cid MD  10/23/2023

## 2023-10-23 NOTE — PROGRESS NOTES
Infusion Nursing Note:  Alannah Leos presents today for Infliximab.    Patient seen by provider today: No   present during visit today: No    Note: Patient expressed no new medical concerns. Patient still had ill feeling after her last infusion, spoke with Dr. Del Cid and Tylenol and Benadryl pre medications added. Patient declined rapid infusion today as she was going to try the pre medications today. If they are helpful- may try the rapid infusion at next infusion date.      Intravenous Access:  Peripheral IV placed.    Treatment Conditions:  Biological Infusion Checklist:  ~~~ NOTE: If the patient answers yes to any of the questions below, hold the infusion and contact ordering provider or on-call provider.    Have you recently had an elevated temperature, fever, chills, productive cough, coughing for 3 weeks or longer or hemoptysis,  abnormal vital signs, night sweats,  chest pain or have you noticed a decrease in your appetite, unexplained weight loss or fatigue? No  Do you have any open wounds or new incisions? No  Do you have any upcoming hospitalizations or surgeries? Does not include esophagogastroduodenoscopy, colonoscopy, endoscopic retrograde cholangiopancreatography (ERCP), endoscopic ultrasound (EUS), dental procedures or joint aspiration/steroid injections No  Do you currently have any signs of illness or infection or are you on any antibiotics? No  Have you had any new, sudden or worsening abdominal pain? No  Have you or anyone in your household received a live vaccination in the past 4 weeks? Please note: No live vaccines while on biologic/chemotherapy until 6 months after the last treatment. Patient can receive the flu vaccine (shot only), pneumovax and the Covid vaccine. It is optimal for the patient to get these vaccines mid cycle, but they can be given at any time as long as it is not on the day of the infusion. No  Have you recently been diagnosed with any new nervous system  diseases (ie. Multiple sclerosis, Guillain [place], seizures, neurological changes) or cancer diagnosis? Are you on any form of radiation or chemotherapy? No  Have there been any other new onset medical symptoms? No        Post Infusion Assessment:  Patient tolerated infusion without incident.  Blood return noted pre and post infusion.  Site patent and intact, free from redness, edema or discomfort.  No evidence of extravasations.  Access discontinued per protocol.  Biologic Infusion Post Education: Call the triage nurse at your clinic or seek medical attention if you have chills and/or temperature greater than or equal to 100.5, uncontrolled nausea/vomiting, diarrhea, constipation, dizziness, shortness of breath, chest pain, heart palpitations, weakness or any other new or concerning symptoms, questions or concerns.  You cannot have any live virus vaccines prior to or during treatment or up to 6 months post infusion.  If you have an upcoming surgery, medical procedure or dental procedure during treatment, this should be discussed with your ordering physician and your surgeon/dentist.  If you are having any concerning symptom, if you are unsure if you should get your next infusion or wish to speak to a provider before your next infusion, please call your care coordinator or triage nurse at your clinic to notify them so we can adequately serve you.       Discharge Plan:   AVS to patient via [employer].  Patient will return 12/18/23 for next appointment.   Patient discharged in stable condition accompanied by: self and daughter.  Departure Mode: Wheelchair.      Piedad Kent RN

## 2023-12-18 ENCOUNTER — LAB (OUTPATIENT)
Dept: LAB | Facility: CLINIC | Age: 83
End: 2023-12-18
Payer: MEDICARE

## 2023-12-18 ENCOUNTER — INFUSION THERAPY VISIT (OUTPATIENT)
Dept: INFUSION THERAPY | Facility: CLINIC | Age: 83
End: 2023-12-18
Payer: MEDICARE

## 2023-12-18 VITALS
WEIGHT: 110.5 LBS | SYSTOLIC BLOOD PRESSURE: 161 MMHG | OXYGEN SATURATION: 98 % | RESPIRATION RATE: 16 BRPM | DIASTOLIC BLOOD PRESSURE: 94 MMHG | TEMPERATURE: 97.4 F | BODY MASS INDEX: 21.58 KG/M2 | HEART RATE: 76 BPM

## 2023-12-18 DIAGNOSIS — M06.09 RHEUMATOID ARTHRITIS OF MULTIPLE SITES WITH NEGATIVE RHEUMATOID FACTOR (H): ICD-10-CM

## 2023-12-18 DIAGNOSIS — M06.09 RHEUMATOID ARTHRITIS OF MULTIPLE SITES WITH NEGATIVE RHEUMATOID FACTOR (H): Primary | ICD-10-CM

## 2023-12-18 DIAGNOSIS — Z79.899 HIGH RISK MEDICATION USE: ICD-10-CM

## 2023-12-18 LAB
ALBUMIN SERPL BCG-MCNC: 4.3 G/DL (ref 3.5–5.2)
ALP SERPL-CCNC: 61 U/L (ref 40–150)
ALT SERPL W P-5'-P-CCNC: <5 U/L (ref 0–50)
AST SERPL W P-5'-P-CCNC: 19 U/L (ref 0–45)
BASOPHILS # BLD AUTO: 0.1 10E3/UL (ref 0–0.2)
BASOPHILS NFR BLD AUTO: 1 %
BILIRUB DIRECT SERPL-MCNC: <0.2 MG/DL (ref 0–0.3)
BILIRUB SERPL-MCNC: 0.3 MG/DL
CREAT SERPL-MCNC: 0.43 MG/DL (ref 0.51–0.95)
CRP SERPL-MCNC: <3 MG/L
EGFRCR SERPLBLD CKD-EPI 2021: >90 ML/MIN/1.73M2
EOSINOPHIL # BLD AUTO: 0.2 10E3/UL (ref 0–0.7)
EOSINOPHIL NFR BLD AUTO: 3 %
ERYTHROCYTE [DISTWIDTH] IN BLOOD BY AUTOMATED COUNT: 17.5 % (ref 10–15)
ERYTHROCYTE [SEDIMENTATION RATE] IN BLOOD BY WESTERGREN METHOD: 1 MM/HR (ref 0–30)
HCT VFR BLD AUTO: 35.7 % (ref 35–47)
HGB BLD-MCNC: 11.5 G/DL (ref 11.7–15.7)
IMM GRANULOCYTES # BLD: 0 10E3/UL
IMM GRANULOCYTES NFR BLD: 0 %
LYMPHOCYTES # BLD AUTO: 1.8 10E3/UL (ref 0.8–5.3)
LYMPHOCYTES NFR BLD AUTO: 28 %
MCH RBC QN AUTO: 28.2 PG (ref 26.5–33)
MCHC RBC AUTO-ENTMCNC: 32.2 G/DL (ref 31.5–36.5)
MCV RBC AUTO: 88 FL (ref 78–100)
MONOCYTES # BLD AUTO: 0.5 10E3/UL (ref 0–1.3)
MONOCYTES NFR BLD AUTO: 8 %
NEUTROPHILS # BLD AUTO: 3.7 10E3/UL (ref 1.6–8.3)
NEUTROPHILS NFR BLD AUTO: 60 %
NRBC # BLD AUTO: 0 10E3/UL
NRBC BLD AUTO-RTO: 0 /100
PLATELET # BLD AUTO: 237 10E3/UL (ref 150–450)
PROT SERPL-MCNC: 6.8 G/DL (ref 6.4–8.3)
RBC # BLD AUTO: 4.08 10E6/UL (ref 3.8–5.2)
WBC # BLD AUTO: 6.2 10E3/UL (ref 4–11)

## 2023-12-18 PROCEDURE — 86140 C-REACTIVE PROTEIN: CPT

## 2023-12-18 PROCEDURE — 96415 CHEMO IV INFUSION ADDL HR: CPT | Performed by: INTERNAL MEDICINE

## 2023-12-18 PROCEDURE — 96413 CHEMO IV INFUSION 1 HR: CPT | Performed by: INTERNAL MEDICINE

## 2023-12-18 PROCEDURE — 85025 COMPLETE CBC W/AUTO DIFF WBC: CPT

## 2023-12-18 PROCEDURE — 85652 RBC SED RATE AUTOMATED: CPT

## 2023-12-18 PROCEDURE — 82565 ASSAY OF CREATININE: CPT

## 2023-12-18 PROCEDURE — 36415 COLL VENOUS BLD VENIPUNCTURE: CPT

## 2023-12-18 PROCEDURE — 80076 HEPATIC FUNCTION PANEL: CPT

## 2023-12-18 RX ORDER — HEPARIN SODIUM,PORCINE 10 UNIT/ML
5 VIAL (ML) INTRAVENOUS
Status: CANCELLED | OUTPATIENT
Start: 2023-12-19

## 2023-12-18 RX ORDER — MEPERIDINE HYDROCHLORIDE 25 MG/ML
25 INJECTION INTRAMUSCULAR; INTRAVENOUS; SUBCUTANEOUS EVERY 30 MIN PRN
Status: CANCELLED | OUTPATIENT
Start: 2023-12-19

## 2023-12-18 RX ORDER — METHYLPREDNISOLONE SODIUM SUCCINATE 125 MG/2ML
125 INJECTION, POWDER, LYOPHILIZED, FOR SOLUTION INTRAMUSCULAR; INTRAVENOUS
Status: CANCELLED
Start: 2023-12-19

## 2023-12-18 RX ORDER — ACETAMINOPHEN 325 MG/1
650 TABLET ORAL ONCE
Status: COMPLETED | OUTPATIENT
Start: 2023-12-18 | End: 2023-12-18

## 2023-12-18 RX ORDER — ALBUTEROL SULFATE 0.83 MG/ML
2.5 SOLUTION RESPIRATORY (INHALATION)
Status: CANCELLED | OUTPATIENT
Start: 2023-12-19

## 2023-12-18 RX ORDER — DIPHENHYDRAMINE HYDROCHLORIDE 50 MG/ML
50 INJECTION INTRAMUSCULAR; INTRAVENOUS
Status: CANCELLED
Start: 2023-12-19

## 2023-12-18 RX ORDER — ALBUTEROL SULFATE 90 UG/1
1-2 AEROSOL, METERED RESPIRATORY (INHALATION)
Status: CANCELLED
Start: 2023-12-19

## 2023-12-18 RX ORDER — EPINEPHRINE 1 MG/ML
0.3 INJECTION, SOLUTION INTRAMUSCULAR; SUBCUTANEOUS EVERY 5 MIN PRN
Status: CANCELLED | OUTPATIENT
Start: 2023-12-19

## 2023-12-18 RX ORDER — ACETAMINOPHEN 325 MG/1
650 TABLET ORAL ONCE
Status: CANCELLED
Start: 2023-12-19 | End: 2023-12-19

## 2023-12-18 RX ORDER — HEPARIN SODIUM (PORCINE) LOCK FLUSH IV SOLN 100 UNIT/ML 100 UNIT/ML
5 SOLUTION INTRAVENOUS
Status: CANCELLED | OUTPATIENT
Start: 2023-12-19

## 2023-12-18 RX ORDER — DIPHENHYDRAMINE HCL 25 MG
25 CAPSULE ORAL ONCE
Status: CANCELLED
Start: 2023-12-19 | End: 2023-12-19

## 2023-12-18 RX ADMIN — Medication 250 ML: at 15:08

## 2023-12-18 RX ADMIN — ACETAMINOPHEN 650 MG: 325 TABLET ORAL at 15:11

## 2023-12-18 ASSESSMENT — PAIN SCALES - GENERAL: PAINLEVEL: SEVERE PAIN (6)

## 2023-12-18 NOTE — PROGRESS NOTES
Infusion Nursing Note:  Alannah SAEID Leos presents today for Infliximab.    Patient seen by provider today: No   present during visit today: Not Applicable.    Note: Pt did not tolerate Benadryl with last infusion, but would like to continue tylenol and a pre-med, and has decided not to do rapid infliximab today.      Intravenous Access:  Peripheral IV placed.    Treatment Conditions:  Biological Infusion Checklist:  ~~~ NOTE: If the patient answers yes to any of the questions below, hold the infusion and contact ordering provider or on-call provider.    Have you recently had an elevated temperature, fever, chills, productive cough, coughing for 3 weeks or longer or hemoptysis,  abnormal vital signs, night sweats,  chest pain or have you noticed a decrease in your appetite, unexplained weight loss or fatigue? No  Do you have any open wounds or new incisions? No  Do you have any upcoming hospitalizations or surgeries? Does not include esophagogastroduodenoscopy, colonoscopy, endoscopic retrograde cholangiopancreatography (ERCP), endoscopic ultrasound (EUS), dental procedures or joint aspiration/steroid injections No  Do you currently have any signs of illness or infection or are you on any antibiotics? No  Have you had any new, sudden or worsening abdominal pain? No  Have you or anyone in your household received a live vaccination in the past 4 weeks? Please note: No live vaccines while on biologic/chemotherapy until 6 months after the last treatment. Patient can receive the flu vaccine (shot only), pneumovax and the Covid vaccine. It is optimal for the patient to get these vaccines mid cycle, but they can be given at any time as long as it is not on the day of the infusion. No  Have you recently been diagnosed with any new nervous system diseases (ie. Multiple sclerosis, Guillain [place], seizures, neurological changes) or cancer diagnosis? Are you on any form of radiation or chemotherapy? No  Are you  pregnant or breast feeding or do you have plans of pregnancy in the future? No  Have there been any other new onset medical symptoms? No        Aliya Le RN

## 2023-12-18 NOTE — PROGRESS NOTES
Post Infusion Assessment:  Patient tolerated infusion without incident.  Blood return noted pre and post infusion.  Site patent and intact, free from redness, edema or discomfort.  No evidence of extravasations.  Access discontinued per protocol.  Biologic Infusion Post Education: Call the triage nurse at your clinic or seek medical attention if you have chills and/or temperature greater than or equal to 100.5, uncontrolled nausea/vomiting, diarrhea, constipation, dizziness, shortness of breath, chest pain, heart palpitations, weakness or any other new or concerning symptoms, questions or concerns.  You cannot have any live virus vaccines prior to or during treatment or up to 6 months post infusion.  If you have an upcoming surgery, medical procedure or dental procedure during treatment, this should be discussed with your ordering physician and your surgeon/dentist.  If you are having any concerning symptom, if you are unsure if you should get your next infusion or wish to speak to a provider before your next infusion, please call your care coordinator or triage nurse at your clinic to notify them so we can adequately serve you.       Discharge Plan:   Patient and/or family verbalized understanding of discharge instructions and all questions answered.  AVS to patient via [employer].  Patient will return 2/12 for next appointment. Future appts have been reviewed and crosschecked with appt note and plan.  Patient discharged in stable condition accompanied by: daughter.  Departure Mode: Wheelchair.      Malia Young RN

## 2024-01-31 ENCOUNTER — NURSE TRIAGE (OUTPATIENT)
Dept: NURSING | Facility: CLINIC | Age: 84
End: 2024-01-31
Payer: MEDICARE

## 2024-01-31 ENCOUNTER — TELEPHONE (OUTPATIENT)
Dept: FAMILY MEDICINE | Facility: CLINIC | Age: 84
End: 2024-01-31
Payer: MEDICARE

## 2024-02-01 ENCOUNTER — OFFICE VISIT (OUTPATIENT)
Dept: FAMILY MEDICINE | Facility: CLINIC | Age: 84
End: 2024-02-01
Payer: MEDICARE

## 2024-02-01 VITALS
RESPIRATION RATE: 16 BRPM | HEIGHT: 61 IN | DIASTOLIC BLOOD PRESSURE: 66 MMHG | OXYGEN SATURATION: 97 % | HEART RATE: 78 BPM | BODY MASS INDEX: 19.33 KG/M2 | SYSTOLIC BLOOD PRESSURE: 123 MMHG | WEIGHT: 102.4 LBS | TEMPERATURE: 97.6 F

## 2024-02-01 DIAGNOSIS — R19.7 DIARRHEA, UNSPECIFIED TYPE: Primary | ICD-10-CM

## 2024-02-01 DIAGNOSIS — R42 DIZZINESS: ICD-10-CM

## 2024-02-01 DIAGNOSIS — R11.0 NAUSEA: ICD-10-CM

## 2024-02-01 DIAGNOSIS — R10.9 INTERMITTENT ABDOMINAL PAIN: ICD-10-CM

## 2024-02-01 DIAGNOSIS — R09.81 NASAL CONGESTION: ICD-10-CM

## 2024-02-01 LAB
ALBUMIN SERPL BCG-MCNC: 4.3 G/DL (ref 3.5–5.2)
ALP SERPL-CCNC: 84 U/L (ref 40–150)
ALT SERPL W P-5'-P-CCNC: <5 U/L (ref 0–50)
ANION GAP SERPL CALCULATED.3IONS-SCNC: 10 MMOL/L (ref 7–15)
AST SERPL W P-5'-P-CCNC: 24 U/L (ref 0–45)
BILIRUB SERPL-MCNC: 0.4 MG/DL
BUN SERPL-MCNC: 24 MG/DL (ref 8–23)
CALCIUM SERPL-MCNC: 9.5 MG/DL (ref 8.8–10.2)
CHLORIDE SERPL-SCNC: 98 MMOL/L (ref 98–107)
CREAT SERPL-MCNC: 0.52 MG/DL (ref 0.51–0.95)
DEPRECATED HCO3 PLAS-SCNC: 26 MMOL/L (ref 22–29)
EGFRCR SERPLBLD CKD-EPI 2021: >90 ML/MIN/1.73M2
FLUAV AG SPEC QL IA: NEGATIVE
FLUBV AG SPEC QL IA: NEGATIVE
GLUCOSE SERPL-MCNC: 109 MG/DL (ref 70–99)
POTASSIUM SERPL-SCNC: 4 MMOL/L (ref 3.4–5.3)
PROT SERPL-MCNC: 6.8 G/DL (ref 6.4–8.3)
SARS-COV-2 RNA RESP QL NAA+PROBE: NEGATIVE
SODIUM SERPL-SCNC: 134 MMOL/L (ref 135–145)

## 2024-02-01 PROCEDURE — 87804 INFLUENZA ASSAY W/OPTIC: CPT | Performed by: PHYSICIAN ASSISTANT

## 2024-02-01 PROCEDURE — 36415 COLL VENOUS BLD VENIPUNCTURE: CPT | Performed by: PHYSICIAN ASSISTANT

## 2024-02-01 PROCEDURE — 85027 COMPLETE CBC AUTOMATED: CPT | Performed by: PHYSICIAN ASSISTANT

## 2024-02-01 PROCEDURE — 80053 COMPREHEN METABOLIC PANEL: CPT | Performed by: PHYSICIAN ASSISTANT

## 2024-02-01 PROCEDURE — 99213 OFFICE O/P EST LOW 20 MIN: CPT | Performed by: PHYSICIAN ASSISTANT

## 2024-02-01 PROCEDURE — 85007 BL SMEAR W/DIFF WBC COUNT: CPT | Performed by: PHYSICIAN ASSISTANT

## 2024-02-01 PROCEDURE — 87635 SARS-COV-2 COVID-19 AMP PRB: CPT | Performed by: PHYSICIAN ASSISTANT

## 2024-02-01 RX ORDER — RESPIRATORY SYNCYTIAL VIRUS VACCINE 120MCG/0.5
0.5 KIT INTRAMUSCULAR ONCE
Qty: 1 EACH | Refills: 0 | Status: CANCELLED | OUTPATIENT
Start: 2024-02-01 | End: 2024-02-01

## 2024-02-01 ASSESSMENT — ENCOUNTER SYMPTOMS
NAUSEA: 1
DIARRHEA: 1

## 2024-02-01 ASSESSMENT — PAIN SCALES - GENERAL: PAINLEVEL: NO PAIN (0)

## 2024-02-01 NOTE — TELEPHONE ENCOUNTER
Pt's daughter calling, Consent to communicate on file  Pt complained 3 days ago about not feeling well, unable to have BM  Pt took ducolax chew x2, and now having non stop diarrhea. Starting since 3pm today.  Abdominal Hernia looks grey,   Denies fever, abdominal pain    Triage to ED, care advice given. Pt's daughter wants office appointment. Does not want to take pt to ED for long wait. Encourage ED, daughter wants to check with scheduling. Advise UC/ED if no appointments available.    Won Brower, RN, BSN  1/31/2024 at 9:30 PM  [facility]        Reason for Disposition   [1] SEVERE diarrhea (e.g., 7 or more times / day more than normal) AND [2] age > 60 years    Additional Information   Negative: Shock suspected (e.g., cold/pale/clammy skin, too weak to stand, low BP, rapid pulse)   Negative: Difficult to awaken or acting confused (e.g., disoriented, slurred speech)   Negative: Sounds like a life-threatening emergency to the triager   Negative: [1] SEVERE abdominal pain (e.g., excruciating) AND [2] present > 1 hour   Negative: [1] SEVERE abdominal pain AND [2] age > 60 years   Negative: [1] Blood in the stool AND [2] moderate or large amount of blood   Negative: Black or tarry bowel movements  (Exception: Chronic-unchanged black-grey BMs AND is taking iron pills or Pepto-Bismol.)   Negative: [1] Drinking very little AND [2] dehydration suspected (e.g., no urine > 12 hours, very dry mouth, very lightheaded)   Negative: Patient sounds very sick or weak to the triager    Protocols used: Diarrhea-A-AH

## 2024-02-01 NOTE — PROGRESS NOTES
Assessment & Plan     (R19.7) Diarrhea, unspecified type  (primary encounter diagnosis)  Comment: Patient with diarrhea.  Diarrhea has since subsided.  No bloody stools.  No recent antibiotics or travel.  Patient had taken stool softeners around onset of symptoms.  Discussed with patient and daughter limiting any further stool softeners.  She has had waxing and waning stool symptoms for multiple months.  Left first episode correlated to injections.  No abdominal pain at rest.  But has intermittent cramping abdominal pain.  Discussed with patient daughter laboratory evaluation as well as stool studies.  She is also had URI symptoms.  Over the same timeframe we will check COVID and influenza.  This may be contributing to symptoms.  Plan: Comprehensive metabolic panel (BMP + Alb, Alk         Phos, ALT, AST, Total. Bili, TP), CBC with         platelets and differential, Enteric Bacteria         and Virus Panel by [facility] Stool          (R11.0) Nausea  Comment: Nausea.  Able to tolerate oral intake.  Plan: Influenza A & B Antigen - Clinic Collect,         Symptomatic COVID-19 Virus (Coronavirus) by PCR        Nose          (R10.9) Intermittent abdominal pain  Comment: Intermittent abdominal pain.  Discussed with patient and daughter potential of diverticulitis amongst other infectious etiologies of abdominal pain and diarrhea.  Asymptomatic with soft abdomen on examination today.  Umbilical hernia that is easily reducible.  Continue to monitor symptoms and seek emergent evaluation should develop any concerning symptoms were discussed.  Plan: Comprehensive metabolic panel (BMP + Alb, Alk         Phos, ALT, AST, Total. Bili, TP), CBC with         platelets and differential, Enteric Bacteria         and Virus Panel by [facility] Stool, CT Abdomen Pelvis        w Contrast          (R09.81) Nasal congestion  Comment: Nasal congestion.  Discussed influenza as well as COVID testing.  Plan: Influenza A & B Antigen - Clinic Collect,        "  Symptomatic COVID-19 Virus (Coronavirus) by PCR        Nose           (R42) Dizziness  Comment: Patient with years of dizziness.  Previously had multiple evaluations for this continues to be problematic.  Discussed possibility of physical therapy.  Suspect benign positional vertigo.  Plan: Physical Therapy Referral                         Subjective   Alannah is a 83 year old, presenting for the following health issues:  Diarrhea (Diarrhea for 5-6 days. ), Anorexia, Nausea, and Dizziness    Diarrhea  Associated symptoms include nausea.   Nausea  Associated symptoms include nausea.   History of Present Illness       Reason for visit:  Stomach  Symptom onset:  1-3 days ago  Symptom intensity:  Moderate  Symptom progression:  Staying the same  Had these symptoms before:  No  What makes it worse:  Eating    She eats 2-3 servings of fruits and vegetables daily.She consumes 0 sweetened beverage(s) daily.She exercises with enough effort to increase her heart rate 9 or less minutes per day.  She exercises with enough effort to increase her heart rate 3 or less days per week.   She is taking medications regularly.     Patient with years of waxing and waning stool symptoms.  Was having more constipation.  Took stool softener was having loose stools for several hours yesterday.  No blood in the stool.  No recent antibiotic use.  No recent travel.  She has been nauseated at times no vomiting.  She notes she has had some illness around infusions for arthritis.  Intermittent abdominal pain has been crampy in nature.  No pain today.  Further, patient has been having dizziness episodes that have been ongoing for more than 2 years.  No new head injuries.             Review of Systems  Constitutional, HEENT, cardiovascular, pulmonary, gi and gu systems are negative, except as otherwise noted.      Objective    /66   Pulse 78   Temp 97.6  F (36.4  C) (Tympanic)   Resp 16   Ht 1.549 m (5' 1\")   Wt 46.4 kg (102 lb 6.4 oz)   " LMP  (LMP Unknown)   SpO2 97%   BMI 19.35 kg/m    Body mass index is 19.35 kg/m .  Physical Exam   GENERAL: alert and no distress  Eye: Horizontal nystagmus to the left.   HENT: No sinus pressure or tenderness.  RESP: lungs clear to auscultation - no rales, rhonchi or wheezes  CV: regular rate and rhythm, normal S1 S2, no S3 or S4, no murmur, click or rub, no peripheral edema  ABDOMEN: soft, nontender, without hepatosplenomegaly or masses, bowel sounds normal, and reducible umbilical hernia no overlying erythema or  MS: no gross musculoskeletal defects noted, no edema    Results for orders placed or performed in visit on 02/01/24 (from the past 24 hour(s))   Influenza A & B Antigen - Clinic Collect    Specimen: Nose; Swab   Result Value Ref Range    Influenza A antigen Negative Negative    Influenza B antigen Negative Negative    Narrative    Test results must be correlated with clinical data. If necessary, results should be confirmed by a molecular assay or viral culture.   CBC with platelets and differential    Narrative    The following orders were created for panel order CBC with platelets and differential.  Procedure                               Abnormality         Status                     ---------                               -----------         ------                     CBC with platelets and d...[[id number]]                      In process                   Please view results for these tests on the individual orders.           Signed Electronically by: Ricardo Marino PA-C

## 2024-02-01 NOTE — TELEPHONE ENCOUNTER
General Call    Contacts         Type Contact Phone/Fax    01/31/2024 09:32 PM CST Phone (Incoming) ZAIDASHILOH VELAZQUEZ (Emergency Contact) [phone]     Yang daughter, Shiloh          Reason for Call:  Pt has severe diarrhea and FNA advised pt's daughter, Shiloh, to take her to the ER but daughter said she can't wait for hours with severe diarrhea. She no longer has a [place] primary but first available at the [facility] is weeks away. Can you call pt's daughter, Shiloh, if she can be seen sooner? Thank you!    Okay to leave a detailed message?: Yes at Shiloh's phone number: [phone]

## 2024-02-01 NOTE — TELEPHONE ENCOUNTER
Called and spoke with patient's daughter. Appointment made with Alphonso Marino today, in an acute spot.Giovanna Olivas [facility]

## 2024-02-02 LAB
BASOPHILS # BLD AUTO: ABNORMAL 10*3/UL
BASOPHILS # BLD MANUAL: 0.1 10E3/UL (ref 0–0.2)
BASOPHILS NFR BLD AUTO: ABNORMAL %
BASOPHILS NFR BLD MANUAL: 1 %
ELLIPTOCYTES BLD QL SMEAR: SLIGHT
EOSINOPHIL # BLD AUTO: ABNORMAL 10*3/UL
EOSINOPHIL # BLD MANUAL: 0 10E3/UL (ref 0–0.7)
EOSINOPHIL NFR BLD AUTO: ABNORMAL %
EOSINOPHIL NFR BLD MANUAL: 0 %
ERYTHROCYTE [DISTWIDTH] IN BLOOD BY AUTOMATED COUNT: 17.2 % (ref 10–15)
HCT VFR BLD AUTO: 36.1 % (ref 35–47)
HGB BLD-MCNC: 11.7 G/DL (ref 11.7–15.7)
IMM GRANULOCYTES # BLD: ABNORMAL 10*3/UL
IMM GRANULOCYTES NFR BLD: ABNORMAL %
LYMPHOCYTES # BLD AUTO: ABNORMAL 10*3/UL
LYMPHOCYTES # BLD MANUAL: 1.9 10E3/UL (ref 0.8–5.3)
LYMPHOCYTES NFR BLD AUTO: ABNORMAL %
LYMPHOCYTES NFR BLD MANUAL: 17 %
MCH RBC QN AUTO: 28.1 PG (ref 26.5–33)
MCHC RBC AUTO-ENTMCNC: 32.4 G/DL (ref 31.5–36.5)
MCV RBC AUTO: 87 FL (ref 78–100)
MONOCYTES # BLD AUTO: ABNORMAL 10*3/UL
MONOCYTES # BLD MANUAL: 0.1 10E3/UL (ref 0–1.3)
MONOCYTES NFR BLD AUTO: ABNORMAL %
MONOCYTES NFR BLD MANUAL: 1 %
NEUTROPHILS # BLD AUTO: ABNORMAL 10*3/UL
NEUTROPHILS # BLD MANUAL: 9.2 10E3/UL (ref 1.6–8.3)
NEUTROPHILS NFR BLD AUTO: ABNORMAL %
NEUTROPHILS NFR BLD MANUAL: 81 %
NRBC # BLD AUTO: 0 10E3/UL
NRBC BLD AUTO-RTO: 0 /100
PLAT MORPH BLD: ABNORMAL
PLATELET # BLD AUTO: 236 10E3/UL (ref 150–450)
RBC # BLD AUTO: 4.16 10E6/UL (ref 3.8–5.2)
RBC MORPH BLD: ABNORMAL
WBC # BLD AUTO: 11.4 10E3/UL (ref 4–11)

## 2024-02-05 ENCOUNTER — TELEPHONE (OUTPATIENT)
Dept: FAMILY MEDICINE | Facility: CLINIC | Age: 84
End: 2024-02-05
Payer: MEDICARE

## 2024-02-05 NOTE — TELEPHONE ENCOUNTER
Patient's daughter called back regarding the patient's result call from earlier.  CtC on file for daughter.  Reported patient's symptoms as they were reported to triage nurse today.  Discussed with daughter about the recommendation to take the patient to the Emergency room.  Daughter stated the patient has chronic dizziness and that is not new.  Daughter also stated that they were going to take the patient to the Emergency room later today if she didn't have a BM by then.  Instructed her to take the patient now to the Emergency room given these symptoms.  Daughter also asked about the test results.  Gave that information to her.      Kristina Kjellberg, MSN, RN

## 2024-02-12 ENCOUNTER — INFUSION THERAPY VISIT (OUTPATIENT)
Dept: INFUSION THERAPY | Facility: CLINIC | Age: 84
End: 2024-02-12
Attending: INTERNAL MEDICINE
Payer: MEDICARE

## 2024-02-12 ENCOUNTER — LAB (OUTPATIENT)
Dept: INFUSION THERAPY | Facility: CLINIC | Age: 84
End: 2024-02-12
Attending: NURSE PRACTITIONER
Payer: MEDICARE

## 2024-02-12 VITALS
TEMPERATURE: 97.7 F | BODY MASS INDEX: 19.35 KG/M2 | HEART RATE: 75 BPM | OXYGEN SATURATION: 97 % | WEIGHT: 102.4 LBS | DIASTOLIC BLOOD PRESSURE: 69 MMHG | SYSTOLIC BLOOD PRESSURE: 153 MMHG | RESPIRATION RATE: 18 BRPM

## 2024-02-12 DIAGNOSIS — R19.7 DIARRHEA, UNSPECIFIED TYPE: ICD-10-CM

## 2024-02-12 DIAGNOSIS — R11.0 NAUSEA: ICD-10-CM

## 2024-02-12 DIAGNOSIS — M06.09 RHEUMATOID ARTHRITIS OF MULTIPLE SITES WITH NEGATIVE RHEUMATOID FACTOR (H): ICD-10-CM

## 2024-02-12 DIAGNOSIS — M06.09 RHEUMATOID ARTHRITIS OF MULTIPLE SITES WITH NEGATIVE RHEUMATOID FACTOR (H): Primary | ICD-10-CM

## 2024-02-12 DIAGNOSIS — Z79.899 HIGH RISK MEDICATION USE: ICD-10-CM

## 2024-02-12 LAB
ALBUMIN SERPL BCG-MCNC: 4.3 G/DL (ref 3.5–5.2)
ALP SERPL-CCNC: 76 U/L (ref 40–150)
ALT SERPL W P-5'-P-CCNC: <5 U/L (ref 0–50)
ANION GAP SERPL CALCULATED.3IONS-SCNC: 9 MMOL/L (ref 7–15)
AST SERPL W P-5'-P-CCNC: 21 U/L (ref 0–45)
BASOPHILS # BLD AUTO: 0.1 10E3/UL (ref 0–0.2)
BASOPHILS NFR BLD AUTO: 1 %
BILIRUB DIRECT SERPL-MCNC: <0.2 MG/DL (ref 0–0.3)
BILIRUB SERPL-MCNC: 0.5 MG/DL
BUN SERPL-MCNC: 13.7 MG/DL (ref 8–23)
CALCIUM SERPL-MCNC: 9.6 MG/DL (ref 8.8–10.2)
CHLORIDE SERPL-SCNC: 102 MMOL/L (ref 98–107)
CREAT SERPL-MCNC: 0.43 MG/DL (ref 0.51–0.95)
CRP SERPL-MCNC: <3 MG/L
DEPRECATED HCO3 PLAS-SCNC: 26 MMOL/L (ref 22–29)
EGFRCR SERPLBLD CKD-EPI 2021: >90 ML/MIN/1.73M2
EOSINOPHIL # BLD AUTO: 0.1 10E3/UL (ref 0–0.7)
EOSINOPHIL NFR BLD AUTO: 2 %
ERYTHROCYTE [DISTWIDTH] IN BLOOD BY AUTOMATED COUNT: 17.2 % (ref 10–15)
ERYTHROCYTE [SEDIMENTATION RATE] IN BLOOD BY WESTERGREN METHOD: 1 MM/HR (ref 0–30)
GLUCOSE SERPL-MCNC: 105 MG/DL (ref 70–99)
HCT VFR BLD AUTO: 36.7 % (ref 35–47)
HGB BLD-MCNC: 12 G/DL (ref 11.7–15.7)
IMM GRANULOCYTES # BLD: 0 10E3/UL
IMM GRANULOCYTES NFR BLD: 0 %
LYMPHOCYTES # BLD AUTO: 1.3 10E3/UL (ref 0.8–5.3)
LYMPHOCYTES NFR BLD AUTO: 24 %
MCH RBC QN AUTO: 28.2 PG (ref 26.5–33)
MCHC RBC AUTO-ENTMCNC: 32.7 G/DL (ref 31.5–36.5)
MCV RBC AUTO: 86 FL (ref 78–100)
MONOCYTES # BLD AUTO: 0.4 10E3/UL (ref 0–1.3)
MONOCYTES NFR BLD AUTO: 7 %
NEUTROPHILS # BLD AUTO: 3.7 10E3/UL (ref 1.6–8.3)
NEUTROPHILS NFR BLD AUTO: 66 %
NRBC # BLD AUTO: 0 10E3/UL
NRBC BLD AUTO-RTO: 0 /100
PLATELET # BLD AUTO: 224 10E3/UL (ref 150–450)
POTASSIUM SERPL-SCNC: 3.9 MMOL/L (ref 3.4–5.3)
PROT SERPL-MCNC: 7 G/DL (ref 6.4–8.3)
RBC # BLD AUTO: 4.25 10E6/UL (ref 3.8–5.2)
SODIUM SERPL-SCNC: 137 MMOL/L (ref 135–145)
WBC # BLD AUTO: 5.6 10E3/UL (ref 4–11)

## 2024-02-12 PROCEDURE — 85652 RBC SED RATE AUTOMATED: CPT

## 2024-02-12 PROCEDURE — 99207 PR NO CHARGE LOS: CPT

## 2024-02-12 PROCEDURE — 250N000011 HC RX IP 250 OP 636: Mod: JZ | Performed by: INTERNAL MEDICINE

## 2024-02-12 PROCEDURE — 96413 CHEMO IV INFUSION 1 HR: CPT

## 2024-02-12 PROCEDURE — 258N000003 HC RX IP 258 OP 636: Performed by: INTERNAL MEDICINE

## 2024-02-12 PROCEDURE — 250N000013 HC RX MED GY IP 250 OP 250 PS 637: Performed by: INTERNAL MEDICINE

## 2024-02-12 PROCEDURE — 36415 COLL VENOUS BLD VENIPUNCTURE: CPT | Performed by: PHYSICIAN ASSISTANT

## 2024-02-12 PROCEDURE — 86140 C-REACTIVE PROTEIN: CPT

## 2024-02-12 PROCEDURE — 80053 COMPREHEN METABOLIC PANEL: CPT

## 2024-02-12 PROCEDURE — 85041 AUTOMATED RBC COUNT: CPT

## 2024-02-12 RX ORDER — METHYLPREDNISOLONE SODIUM SUCCINATE 125 MG/2ML
125 INJECTION, POWDER, LYOPHILIZED, FOR SOLUTION INTRAMUSCULAR; INTRAVENOUS
Status: CANCELLED
Start: 2024-02-13

## 2024-02-12 RX ORDER — ACETAMINOPHEN 325 MG/1
650 TABLET ORAL ONCE
Status: CANCELLED
Start: 2024-02-13 | End: 2024-02-13

## 2024-02-12 RX ORDER — ACETAMINOPHEN 325 MG/1
650 TABLET ORAL ONCE
Status: COMPLETED | OUTPATIENT
Start: 2024-02-12 | End: 2024-02-12

## 2024-02-12 RX ORDER — HEPARIN SODIUM,PORCINE 10 UNIT/ML
5 VIAL (ML) INTRAVENOUS
Status: CANCELLED | OUTPATIENT
Start: 2024-02-13

## 2024-02-12 RX ORDER — ALBUTEROL SULFATE 0.83 MG/ML
2.5 SOLUTION RESPIRATORY (INHALATION)
Status: CANCELLED | OUTPATIENT
Start: 2024-02-13

## 2024-02-12 RX ORDER — ALBUTEROL SULFATE 90 UG/1
1-2 AEROSOL, METERED RESPIRATORY (INHALATION)
Status: CANCELLED
Start: 2024-02-13

## 2024-02-12 RX ORDER — DIPHENHYDRAMINE HYDROCHLORIDE 50 MG/ML
50 INJECTION INTRAMUSCULAR; INTRAVENOUS
Status: CANCELLED
Start: 2024-02-13

## 2024-02-12 RX ORDER — EPINEPHRINE 1 MG/ML
0.3 INJECTION, SOLUTION INTRAMUSCULAR; SUBCUTANEOUS EVERY 5 MIN PRN
Status: CANCELLED | OUTPATIENT
Start: 2024-02-13

## 2024-02-12 RX ORDER — HEPARIN SODIUM (PORCINE) LOCK FLUSH IV SOLN 100 UNIT/ML 100 UNIT/ML
5 SOLUTION INTRAVENOUS
Status: CANCELLED | OUTPATIENT
Start: 2024-02-13

## 2024-02-12 RX ORDER — MEPERIDINE HYDROCHLORIDE 25 MG/ML
25 INJECTION INTRAMUSCULAR; INTRAVENOUS; SUBCUTANEOUS EVERY 30 MIN PRN
Status: CANCELLED | OUTPATIENT
Start: 2024-02-13

## 2024-02-12 RX ORDER — DIPHENHYDRAMINE HCL 25 MG
25 CAPSULE ORAL ONCE
Status: CANCELLED
Start: 2024-02-13 | End: 2024-02-13

## 2024-02-12 RX ADMIN — ACETAMINOPHEN 650 MG: 325 TABLET ORAL at 14:16

## 2024-02-12 RX ADMIN — INFLIXIMAB 200 MG: 100 INJECTION, POWDER, LYOPHILIZED, FOR SOLUTION INTRAVENOUS at 14:23

## 2024-02-12 RX ADMIN — SODIUM CHLORIDE 250 ML: 9 INJECTION, SOLUTION INTRAVENOUS at 14:24

## 2024-02-12 NOTE — PROGRESS NOTES
Infusion Nursing Note:  Alannah Leos presents today for Rapid Infliximab.    Patient seen by provider today: No   present during visit today: Not Applicable.    Note: Patient recovering from a bout of constipation after stopping her miralax, had abdominal pain along with this. She was given meds to clear the constipation in the ED per the dtr and is back on her daily miralax now. Abdominal pain is gone and bowels almost back to normal per the dtr. Also per dtr having some hallucinations recently w/ sinemet, plans to discuss with Parkinson's doctor about adjustment in meds.     Intravenous Access:  Peripheral IV placed.    Treatment Conditions:  Biological Infusion Checklist:  ~~~ NOTE: If the patient answers yes to any of the questions below, hold the infusion and contact ordering provider or on-call provider.    Have you recently had an elevated temperature, fever, chills, productive cough, coughing for 3 weeks or longer or hemoptysis,  abnormal vital signs, night sweats,  chest pain or have you noticed a decrease in your appetite, unexplained weight loss or fatigue? No  Do you have any open wounds or new incisions? No  Do you have any upcoming hospitalizations or surgeries? Does not include esophagogastroduodenoscopy, colonoscopy, endoscopic retrograde cholangiopancreatography (ERCP), endoscopic ultrasound (EUS), dental procedures or joint aspiration/steroid injections No  Do you currently have any signs of illness or infection or are you on any antibiotics? No  Have you had any new, sudden or worsening abdominal pain? No  Have you or anyone in your household received a live vaccination in the past 4 weeks? Please note: No live vaccines while on biologic/chemotherapy until 6 months after the last treatment. Patient can receive the flu vaccine (shot only), pneumovax and the Covid vaccine. It is optimal for the patient to get these vaccines mid cycle, but they can be given at any time as long as it is  not on the day of the infusion. No  Have you recently been diagnosed with any new nervous system diseases (ie. Multiple sclerosis, Guillain [place], seizures, neurological changes) or cancer diagnosis? Are you on any form of radiation or chemotherapy? No  Have there been any other new onset medical symptoms? No    Post Infusion Assessment:  Patient tolerated infusion without incident.  Blood return noted pre and post infusion.  Site patent and intact, free from redness, edema or discomfort.  No evidence of extravasations.  Access discontinued per protocol.  Biologic Infusion Post Education: Call the triage nurse at your clinic or seek medical attention if you have chills and/or temperature greater than or equal to 100.5, uncontrolled nausea/vomiting, diarrhea, constipation, dizziness, shortness of breath, chest pain, heart palpitations, weakness or any other new or concerning symptoms, questions or concerns.  You cannot have any live virus vaccines prior to or during treatment or up to 6 months post infusion.  If you have an upcoming surgery, medical procedure or dental procedure during treatment, this should be discussed with your ordering physician and your surgeon/dentist.  If you are having any concerning symptom, if you are unsure if you should get your next infusion or wish to speak to a provider before your next infusion, please call your care coordinator or triage nurse at your clinic to notify them so we can adequately serve you.       Discharge Plan:   Patient and/or family verbalized understanding of discharge instructions and all questions answered.  AVS to patient via [employer].  Patient will return 4/8 for next appointment. Future appts have been reviewed and crosschecked with appt note and plan.  Patient discharged in stable condition accompanied by: daughter.  Departure Mode: Ambulatory.      Malia Young RN

## 2024-02-12 NOTE — PROGRESS NOTES
Infusion Nursing Note:  Alannah Leos presents today for ***.    Patient seen by provider today: {YES (EXPLAIN)/NO:793952}   present during visit today: {UMMATGE:295985}    Note: {Not Applicable or free text:739993:s}.      Intravenous Access:  {UMHIVACCESS:984823}    Treatment Conditions:  Biological Infusion Checklist:  ~~~ NOTE: If the patient answers yes to any of the questions below, hold the infusion and contact ordering provider or on-call provider.    Have you recently had an elevated temperature, fever, chills, productive cough, coughing for 3 weeks or longer or hemoptysis,  abnormal vital signs, night sweats,  chest pain or have you noticed a decrease in your appetite, unexplained weight loss or fatigue? No  Do you have any open wounds or new incisions? No  Do you have any upcoming hospitalizations or surgeries? Does not include esophagogastroduodenoscopy, colonoscopy, endoscopic retrograde cholangiopancreatography (ERCP), endoscopic ultrasound (EUS), dental procedures or joint aspiration/steroid injections No  Do you currently have any signs of illness or infection or are you on any antibiotics? No  Have you had any new, sudden or worsening abdominal pain? No W/ constipation recently, abdominal pain gone now that no longer constipated.   Have you or anyone in your household received a live vaccination in the past 4 weeks? Please note: No live vaccines while on biologic/chemotherapy until 6 months after the last treatment. Patient can receive the flu vaccine (shot only), pneumovax and the Covid vaccine. It is optimal for the patient to get these vaccines mid cycle, but they can be given at any time as long as it is not on the day of the infusion. No  Have you recently been diagnosed with any new nervous system diseases (ie. Multiple sclerosis, Guillain [place], seizures, neurological changes) or cancer diagnosis? Are you on any form of radiation or chemotherapy? No  Have there been any other  new onset medical symptoms? No      Post Infusion Assessment:  Patient tolerated infusion {tolerate:091986}  Blood return noted pre and post infusion.  Site patent and intact, free from redness, edema or discomfort.  No evidence of extravasations.  Access discontinued per protocol.       Discharge Plan:   Patient and/or family verbalized understanding of discharge instructions and all questions answered.  AVS to patient via [employer].  Patient will return *** for next appointment.   Patient discharged in stable condition accompanied by: daughter.  Departure Mode: Ambulatory.      Malia Young RN

## 2024-02-29 ENCOUNTER — OFFICE VISIT (OUTPATIENT)
Dept: RHEUMATOLOGY | Facility: CLINIC | Age: 84
End: 2024-02-29
Payer: COMMERCIAL

## 2024-02-29 VITALS
HEIGHT: 61 IN | BODY MASS INDEX: 19.35 KG/M2 | RESPIRATION RATE: 16 BRPM | HEART RATE: 79 BPM | DIASTOLIC BLOOD PRESSURE: 65 MMHG | SYSTOLIC BLOOD PRESSURE: 125 MMHG | OXYGEN SATURATION: 99 %

## 2024-02-29 DIAGNOSIS — M81.0 AGE RELATED OSTEOPOROSIS, UNSPECIFIED PATHOLOGICAL FRACTURE PRESENCE: ICD-10-CM

## 2024-02-29 DIAGNOSIS — M17.12 PRIMARY OSTEOARTHRITIS OF LEFT KNEE: ICD-10-CM

## 2024-02-29 DIAGNOSIS — M06.09 RHEUMATOID ARTHRITIS OF MULTIPLE SITES WITH NEGATIVE RHEUMATOID FACTOR (H): Primary | ICD-10-CM

## 2024-02-29 DIAGNOSIS — Z79.899 HIGH RISK MEDICATION USE: ICD-10-CM

## 2024-02-29 DIAGNOSIS — Z00.00 HEALTHCARE MAINTENANCE: ICD-10-CM

## 2024-02-29 PROCEDURE — 20610 DRAIN/INJ JOINT/BURSA W/O US: CPT | Mod: LT | Performed by: INTERNAL MEDICINE

## 2024-02-29 PROCEDURE — 99214 OFFICE O/P EST MOD 30 MIN: CPT | Mod: 25 | Performed by: INTERNAL MEDICINE

## 2024-02-29 RX ORDER — METHYLPREDNISOLONE SODIUM SUCCINATE 125 MG/2ML
125 INJECTION, POWDER, LYOPHILIZED, FOR SOLUTION INTRAMUSCULAR; INTRAVENOUS
Start: 2024-08-29

## 2024-02-29 RX ORDER — ALBUTEROL SULFATE 0.83 MG/ML
2.5 SOLUTION RESPIRATORY (INHALATION)
OUTPATIENT
Start: 2024-08-29

## 2024-02-29 RX ORDER — ZOLEDRONIC ACID 5 MG/100ML
5 INJECTION, SOLUTION INTRAVENOUS ONCE
Start: 2024-08-29

## 2024-02-29 RX ORDER — TRIAMCINOLONE ACETONIDE 40 MG/ML
40 INJECTION, SUSPENSION INTRA-ARTICULAR; INTRAMUSCULAR ONCE
Status: COMPLETED | OUTPATIENT
Start: 2024-02-29 | End: 2024-02-29

## 2024-02-29 RX ORDER — EPINEPHRINE 1 MG/ML
0.3 INJECTION, SOLUTION, CONCENTRATE INTRAVENOUS EVERY 5 MIN PRN
OUTPATIENT
Start: 2024-08-29

## 2024-02-29 RX ORDER — FOLIC ACID 1 MG/1
1 TABLET ORAL DAILY
Qty: 90 TABLET | Refills: 2 | Status: SHIPPED | OUTPATIENT
Start: 2024-02-29 | End: 2024-08-29

## 2024-02-29 RX ORDER — DIPHENHYDRAMINE HYDROCHLORIDE 50 MG/ML
50 INJECTION INTRAMUSCULAR; INTRAVENOUS
Start: 2024-08-29

## 2024-02-29 RX ORDER — MEPERIDINE HYDROCHLORIDE 25 MG/ML
25 INJECTION INTRAMUSCULAR; INTRAVENOUS; SUBCUTANEOUS EVERY 30 MIN PRN
OUTPATIENT
Start: 2024-08-29

## 2024-02-29 RX ORDER — HEPARIN SODIUM (PORCINE) LOCK FLUSH IV SOLN 100 UNIT/ML 100 UNIT/ML
5 SOLUTION INTRAVENOUS
OUTPATIENT
Start: 2024-08-29

## 2024-02-29 RX ORDER — HEPARIN SODIUM,PORCINE 10 UNIT/ML
5-20 VIAL (ML) INTRAVENOUS DAILY PRN
OUTPATIENT
Start: 2024-08-29

## 2024-02-29 RX ORDER — METHOTREXATE 2.5 MG/1
20 TABLET ORAL WEEKLY
Qty: 104 TABLET | Refills: 2 | Status: SHIPPED | OUTPATIENT
Start: 2024-02-29 | End: 2024-08-29

## 2024-02-29 RX ORDER — ALBUTEROL SULFATE 90 UG/1
1-2 AEROSOL, METERED RESPIRATORY (INHALATION)
Start: 2024-08-29

## 2024-02-29 RX ADMIN — TRIAMCINOLONE ACETONIDE 40 MG: 40 INJECTION, SUSPENSION INTRA-ARTICULAR; INTRAMUSCULAR at 15:51

## 2024-02-29 NOTE — PROGRESS NOTES
Rheumatology Clinic Visit      Alannah eLos MRN# [id number]   YOB: 1940 Age: 83 year old      Date of visit: 2/29/24   PCP: Dr. Leah Blanca    Chief Complaint   Patient presents with:  RECHECK: follow up rheumatoid arthritis of multiple sites    Assessment and Plan     1. 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 Previously on methotrexate when first diagnosed (unclear if ineffective or not, stopped by patient preference to not treat her RA at that time).  Synovitis, subluxation, and fixed flexion deformities on initial exam on 10/12/2016. Steroid responsive. 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 Currently on methotrexate 20 mg once weekly, and hydroxychloroquine 200 mg daily. Previously on sulfasalazine (felt poorly on it).  At almost every visit I have encouraged her to escalate treatment for her active disease but she has been reluctant to do so because of possible risks from the medication; she understands that the progression of her disease is not likely to be reversible as seen by the changes of her spine.  At one point she was going to proceed with SQ bDMARD but she never started, then she was going to proceed with an infusion bDMARD but she didn't go through with it.  Eventually, she did start Remicade 3 mg/kg IV, with the first dose on 7/17/2023.  Already she has improvement of her arthritis with resolution of synovitis at the hands and improvement of general pain.  Her daughter who is with her today states that this is the first time in years that the patient has not gotten up to open the refrigerator herself and she has gotten up to wash her hands herself at a sink instead of using a wet wipe, and the patient reports having improvement.  The patient did verbalize disappointment with needing to have the infusion every 8 weeks where she thought that the infusion  was going to only be once; her daughter who was with her today states that I did explained the frequency of the infusion, that she understood the frequency of the infusion, and that she has also told the patient and the patient had verbalized understanding about the frequency of the infusions.  After thorough discussion about continuing or stopping the Remicade treatment, the patient has chosen to continue Remicade.  Note that she had a general ill feeling and GI upset for about 4 days after the first infusion, then for a period of 4 days that started 2 weeks after the second infusion, and then no reaction after the third infusion.  No joint swelling and inflammatory markers are now normal.  She would like to continue Remicade.   Chronic illness    - Continue methotrexate 20 mg once weekly (split dosing within the same 24-hours of each week)  - Continue folic acid 1mg daily  - Continue Remicade 3mg/kg IV every 8 weeks   - Labs with Remicade infusions every 8 weeks:  CBC, Creatinine, Hepatic Panel, ESR, CRP    High risk medication requiring intensive toxicity monitoring at least quarterly.    2. Positive PIERO and dsDNA: These were noted on record review. She does not have symptoms to support an PIERO-associated rheumatologic disease at this time.     3. Diffuse neck/back pain: No evidence of instability by x-rays on 10/12/2016.  Patient to continue following with the pain clinic for chronic neck/back pain management.  She has seen neurosurgery in the past.    4. Parkinson's Disease: Followed by Dr. Ricardo Johnson at the [facility] previously, and now at [facility].  Worsened recently and she plans to follow-up with neurology    5. Osteoporosis: Based on 1/29/2018 DEXA.  Was on fosamax from [id number]; reclast then started 2021 with the first dose received 5/12/2021, followed by the second dose on 5/16/2022, and third dose received on 8/29/2023.   - Continue reclast 5mg IV yearly, next due  8/29/2024 or shortly thereafter  - Continue vitamin D 1000 IU daily   - Continue calcium 1000mg daily    6.  Severe osteoarthritis of multiple joints: Has been evaluated by surgeons and is now followed in the pain clinic.  Severe osteoarthritis that is limiting daily activities.    7.  Left knee osteoarthritis: 8/30/2023 steroid injection was effective and she would like to have it repeated today, 2/29/2024.    8.  Severe primary and secondary osteoarthritis (secondary to rheumatoid arthritis): Significantly impacting her quality of life.  Function limited due to degenerative changes.  Has seen palliative care in the past.  Note that her inflammatory arthritis appears controlled but the degenerative changes from the inflammatory arthritis remain.    9.  Vaccinations: Vaccinations reviewed with Ms. Leos.  Risks and benefits of vaccinations were discussed.    - Influenza: encouraged yearly vaccination  - [id number]: up to date  - [id number]: up to date  - COVID-19: Advised keeping updated    10.  Healthcare maintenance: Patient does not have a regular primary care provider and needs a primary care provider.  Discussed option for geriatrician.  Geriatrics referral given.  Advised that she is call to schedule call to schedule with geriatrics.    Total minutes spent in evaluation with patient, documentation, , and review of pertinent studies and chart notes: 30     Ms. Leos verbalized agreement with and understanding of the rational for the diagnosis and treatment plan.  All questions were answered to best of my ability and the patient's satisfaction. Ms. Leos was advised to contact the clinic with any questions that may arise after the clinic visit.      Thank you for involving me in the care of the patient    Return to clinic: 6 months      HPI   Alannah Leos is a 83 year old female with a medical history significant for Parkinson's disease, osteoarthritis, status post TKA, osteoporosis, and  rheumatoid arthritis who presents for follow-up of rheumatoid arthritis.    4/21/2022: Trigger point injections from the pain clinic are helpful but she is worried that her new pain clinic provider will not schedule appointments every 3 weeks that have been helpful for her, but rather she is being scheduled every 5 weeks and she has too much pain to go every 5 weeks between injections.  She is wondering if there is another provider that has more availability to allow her to come in every 3 weeks for these injections; she says that she will also speak with her current pain management provider who she has only seen once so far.  Severe pain in the right shoulder and hardly able to use it now.  Pain in her wrists, MCPs, and PIPs where she also has swelling.  Stiffness lasts for all day, worse in the morning when she first wakes.  Wrists, elbows, shoulders, knees, ankles, MTPs, neck, and fingers all hurt.  She is okay with continuing Reclast for osteoporosis management but is hesitant to use biologic DMARD or escalate treatment for RA in any way at this time because she is concerned that it may not work.  She verbalized understanding that untreated inflammatory arthritis will potentially cause worsening irreversible damage.    She feels that her arthritis is fairly unchanged.  She says that she does not feel well because she cannot raise her head due to degenerative changes in her neck.  She says that her hands have worsened slightly.  She is only taking methotrexate now for inflammatory arthritis.  She was hospitalized recently for worsening Parkinson's disease and plans to follow-up with neurology as an outpatient.  She and her daughter voiced frustration with recent hospitalization, where they did not feel that much was done.    6/14/2023:  no change in arthritis symptoms per patient.  Pain at the MCPs, PIPs, wrists, shoulders, knees.  Occasional lower extremity edema but none now.  Was going to have her infliximab  infusion but had a open wound on her bottom that is nearly resolved at this point so has rescheduled the infliximab infusion.    8/30/2023: Has received 3 doses of Remicade.  After the first dose of Remicade she had 4 days of GI upset.  2 weeks after the second dose of Remicade she had 4 days of GI upset.  She does have the third dose of Remicade yesterday.  She had Reclast yesterday.  Overall feeling better with less joint pain.  Her daughter who is present with her today states that this is the first time in years that she has gotten up to wash her hands at the sink and open the refrigerator.  The patient voiced some dissatisfaction with the fact that she will have to continue these infusions every 8 weeks, but also verbalized understanding that she has the choice to not continue the infusions; ultimately she says she would like to continue the Remicade infusions.  She also reports having left knee pain that is bothersome all the time but is not swollen and was without increased warmth or overlying erythema; she would like a steroid injection in the left knee because they have helped in the past, with the last steroid injections being many years ago.    Today, 2/29/2024: Inflammatory arthritis is better controlled since being on Remicade.  No longer with joint swelling.  Still with diffuse body pain and limited range of motion of the neck.  Dizziness has been a chronic issue despite evaluation with ENT and neurology.  Sometimes too much of the Parkinson's medication results and hallucinations.  She says that she is a burden on her family.  Does not feel like many of the primary care providers actually listen to her and she does not have an assigned primary care provider at this time.  She would like to continue Remicade infusions.  She would like to continue methotrexate.  She would like a steroid injection of the left knee because it was very helpful for pain management when previously given in 2023.    Denies  fevers, chills, nausea, vomiting, constipation, diarrhea. No abdominal pain. No chest pain/pressure, palpitations, or shortness of breath.   No oral or nasal sores.  No rash. No sicca symptoms.      Daughter is present with her today.    Tobacco: none  EtOH: 1 drink at [place] only  Drugs: none    ROS   12 point review of system was completed and negative except as noted in the HPI     Active Problem List     Patient Active Problem List   Diagnosis    Osteoporosis    Rheumatoid arthritis (H)    Parkinson disease    Osteoarthritis of wrist    Osteoarthritis of shoulder    History of total knee arthroplasty    Osteoarthritis of left knee    Advanced directives, counseling/discussion    CARDIOVASCULAR SCREENING; LDL GOAL LESS THAN 160    High risk medication use    Underweight    Impingement syndrome, shoulder, left    Combined forms of age-related cataract of both eyes    Seborrheic dermatitis    Anemia in other chronic diseases classified elsewhere    Ankylosing spondylitis of cervical region (H)    Failure to thrive in adult    Physical deconditioning    History of iron deficiency anemia     Past Medical History     Past Medical History:   Diagnosis Date    Hyperlipidemia     Murmur, heart     hsm    Nonsenile cataract     Osteoarthrosis, unspecified whether generalized or localized, lower leg     Osteopenia     Rheumatoid arthritis(714.0)      Past Surgical History     Past Surgical History:   Procedure Laterality Date    GALLBLADDER SURGERY      HYSTERECTOMY      KNEE SURGERY      left     LAMINECTOMY LUMBAR ONE LEVEL      TONSILLECTOMY       Allergy     Allergies   Allergen Reactions    Decadrol [Dexamethasone Sodium Phosphate] Hives    Shrimp Itching     Current Medication List     Current Outpatient Medications   Medication Sig    CALCIUM + D OR 600mg twice a day     carbidopa-levodopa (SINEMET)  MG tablet Take one at 6 am, noon, 5 pm, 9 pm and midnight    Cyanocobalamin (VITAMIN B 12) 250 MCG LOG      "diclofenac (VOLTAREN) 1 % topical gel Apply 2 g topically 3 times daily as needed for moderate pain    folic acid (FOLVITE) 1 MG tablet Take 1 tablet (1 mg) by mouth daily    ibuprofen (ADVIL/MOTRIN) 200 MG tablet Take 1 tablet (200 mg) by mouth every 8 hours as needed for moderate pain    methotrexate sodium 2.5 MG TABS Take 8 tablets (20 mg) by mouth once a week .  This is a once a week medication, taken on the same day of each week.    vitamin C (ASCORBIC ACID) 1000 MG TABS Take 1,000 mg by mouth 2 times daily    ACE NOT PRESCRIBED, INTENTIONAL, 1 each continuous prn. ACE Inhibitor not prescribed due to Refusal by patient       (Patient not taking: Reported on 12/18/2023)    order for DME Equipment being ordered: soft neck brace (Patient not taking: Reported on 12/18/2023)     No current facility-administered medications for this visit.     Social History   See HPI    Family History     Family History   Problem Relation Age of Onset    Cancer Sister         pancreatic    Diabetes No family hx of     Hypertension No family hx of        Physical Exam     Temp Readings from Last 3 Encounters:   02/12/24 97.7  F (36.5  C) (Oral)   02/01/24 97.6  F (36.4  C) (Tympanic)   12/18/23 97.4  F (36.3  C) (Oral)     BP Readings from Last 5 Encounters:   02/29/24 125/65   02/12/24 (!) 153/69   02/01/24 123/66   12/18/23 (!) 161/94   10/23/23 116/71     Pulse Readings from Last 1 Encounters:   02/29/24 79     Resp Readings from Last 1 Encounters:   02/29/24 16     Estimated body mass index is 19.35 kg/m  as calculated from the following:    Height as of this encounter: 1.549 m (5' 1\").    Weight as of 2/12/24: 46.4 kg (102 lb 6.4 oz).    GEN: NAD, thin and frail appearing  HEENT: MMM. No oral lesions. Anicteric, noninjected sclera  PULM: No increased work of breathing.    MSK: MCPs, PIPs, DIPs, and wrists were diffusely tender to palpation but no swelling, increased warmth, or overlying erythema.   Elbows without swelling or " tenderness to palpation.  Shoulders diffusely tender to palpation and minimal ROM; no swelling or increased warmth.  Knees with medial joint line tenderness; left knee with crepitation.  Knees without effusion, increased warmth, or overlying erythema.  Ankles and MTPs without swelling or tenderness to palpation.  Holds neck in a flexed position.   SKIN: No rash. Diffuse hair thinning.  EXT: No lower extremity edema  PSYCH: Alert. Appropriate.      Labs / Imaging (select studies)     RF/CCP  Recent Labs   Lab Test 10/12/16  1142   CCPIGG 2   RHF <20     CBC  Recent Labs   Lab Test 02/12/24  1338 02/01/24  1033 12/18/23  1425 10/23/23  1424 08/18/21  1432 05/17/21  1342 02/22/21  1339   WBC 5.6 11.4* 6.2 5.6   < > 7.5 8.1   RBC 4.25 4.16 4.08 4.30   < > 3.70* 4.02   HGB 12.0 11.7 11.5* 11.8   < > 11.0* 11.6*   HCT 36.7 36.1 35.7 36.8   < > 33.6* 36.0   MCV 86 87 88 86   < > 91 90   RDW 17.2* 17.2* 17.5* 20.1*   < > 16.8* 17.6*    236 237 200   < > 235 249   MCH 28.2 28.1 28.2 27.4   < > 29.7 28.9   MCHC 32.7 32.4 32.2 32.1   < > 32.7 32.2   NEUTROPHIL 66 81 60 66   < > 73.0 70.0   LYMPH 24 17 28 23   < > 20.0 17.0   MONOCYTE 7 1 8 8   < > 4.0 10.0   EOSINOPHIL 2 0 3 2   < > 2.0 2.0   BASOPHIL 1 1 1 1   < > 1.0 1.0   ANEU  --  9.2*  --   --   --  5.4 5.6   ALYM  --  1.9  --   --   --  1.5 1.4   DORITA  --  0.1  --   --   --  0.3 0.8   AEOS  --  0.0  --   --   --  0.2 0.2   ABAS  --  0.1  --   --   --  0.1 0.1   ANEUTAUTO 3.7  --  3.7 3.7   < >  --   --    ALYMPAUTO 1.3  --  1.8 1.3   < >  --   --    AMONOAUTO 0.4  --  0.5 0.4   < >  --   --    AEOSAUTO 0.1  --  0.2 0.1   < >  --   --    ABSBASO 0.1  --  0.1 0.1   < >  --   --     < > = values in this interval not displayed.     CMP  Recent Labs   Lab Test 02/12/24  1338 02/01/24  1033 12/18/23  1425 10/23/23  1424 07/31/23  1415 10/27/22  1406 10/13/22  0950 08/18/21  1432 05/17/21  1342 05/12/21  1334 03/12/21  1328 02/22/21  1339    134*  --   --   --   --   133*   < >  --   --   --   --    POTASSIUM 3.9 4.0  --   --   --   --  4.2   < >  --   --   --   --    CHLORIDE 102 98  --   --   --   --  95*   < >  --   --   --   --    CO2 26 26  --   --   --   --  23   < >  --   --   --   --    ANIONGAP 9 10  --   --   --   --  15   < >  --   --   --   --    * 109*  --   --   --   --  133*   < >  --   --   --   --    BUN 13.7 24.0*  --   --   --   --  14.0   < >  --   --   --   --    CR 0.43* 0.52 0.43*   < > 0.49*   < > 0.42*   < > 0.63 0.65  --  0.58   GFRESTIMATED >90 >90 >90   < > >90   < > >90   < > 85 84  --  88   GFRESTBLACK  --   --   --   --   --   --   --   --  >90 >90  --  >90   PAKO 9.6 9.5  --   --  9.8  --  9.0   < >  --  9.2   < >  --    BILITOTAL 0.5 0.4 0.3   < >  --    < > 0.3   < > 0.4  --   --  0.4   ALBUMIN 4.3 4.3 4.3   < >  --    < > 3.6   < > 3.7  --   --  4.1   PROTTOTAL 7.0 6.8 6.8   < >  --    < > 6.9   < > 7.3  --   --  7.9   ALKPHOS 76 84 61   < >  --    < > 78   < > 77  --   --  91   AST 21 24 19   < >  --    < > 25   < > 16  --   --  18   ALT <5 <5 <5   < >  --    < > <5*   < > 12  --   --  10    < > = values in this interval not displayed.     Calcium/VitaminD  Recent Labs   Lab Test 02/12/24  1338 02/01/24  1033 07/31/23  1415 05/12/22  1709 12/15/21  1553 05/12/21  1334 03/12/21  1328 01/15/20  1403   PAKO 9.6 9.5 9.8   < > 9.6   < > 9.6 9.5  10.2*   VITDT  --   --   --   --  40  --  37 39    < > = values in this interval not displayed.     ESR/CRP  Recent Labs   Lab Test 02/12/24  1338 12/18/23  1425 10/23/23  1424 07/17/23  1422 03/07/23  1429 10/27/22  1406 04/21/22  1508   SED 1 1 5   < > 20 66* 44*   CRP  --   --   --   --  18.6* 43.3* 11.7*   CRPI <3.00 <3.00 <3.00   < >  --   --   --     < > = values in this interval not displayed.     Hepatitis B  Recent Labs   Lab Test 01/18/17  1601   HBCAB Nonreactive   HEPBANG Nonreactive     Hepatitis C  Recent Labs   Lab Test 01/18/17  1601   HCVAB Nonreactive   Assay performance  characteristics have not been established for newborns,   infants, and children       Tuberculosis Screening  Recent Labs   Lab Test 03/07/23  1429 03/12/21  1328 01/25/18  1352 01/18/17  1601   TBRES Negative  --   --   --    TBRSLT  --   --  Negative Negative   TBAGN  --   --  0.00 0.00   TBRST  --  Negative  --   --      HIV Screening  Recent Labs   Lab Test 01/18/17  1601   HIAGAB Nonreactive   HIV-1 p24 Ag & HIV-1/HIV-2 Ab Not Detected       Immunization History     Immunization History   Administered Date(s) Administered    COVID-19 MONOVALENT 12+ (Pfizer) 03/12/2021, 04/02/2021, 10/13/2021    Pneumo Conj 13-V (2010&after) 11/03/2016    Pneumococcal 23 valent 11/01/2007, 11/13/2007, 03/12/2018    TD,PF 7+ (Tenivac) 11/13/2007    TDAP (Adacel,Boostrix) 11/13/2007       Procedure     Procedure: Steroid injection of the left knee  Indication: Pain, osteoarthritis of the left knee    The procedure was explained in detail. Risks including infection, pain, structural damage such as cartilage damage and tendon rupture, fat atrophy, skin hyper-/hypo-pigmentation, and medication reaction was explained. The need for rest of the affected joint for one week after the procedure was explained.  The option of not doing the procedure was also provided. All questions were answered and the patient consented to the procedure.     A time-out was performed and the correct patient, procedure, and laterality were verified.    The left knee was examined and location for injection was identified - anterior medial. The area was cleaned with chlorhexidine, twice.  Ethyl chloride was then used for topical anaesthetic.  Then a mixture of lidocaine 1% 2 mL and Kenalog 40mg was injected into the intra-articular space.     The patient tolerated the procedure well. No complications.    1% Lidocaine  : Hospira  Lot #: [id number]  EXPIRATION DATE: 01 MAR 2025  NDC: 5997-3819-84    MEDICATION: Triamcinolone 40 mg  LOT #:  [id number]  EXPIRATION DATE:  2025-07  NDC#: [address]-1         Chart documentation done in part with Dragon Voice recognition Software. Although reviewed after completion, some word and grammatical error may remain.    Merrick Del Cid MD

## 2024-04-08 ENCOUNTER — LAB (OUTPATIENT)
Dept: INFUSION THERAPY | Facility: CLINIC | Age: 84
End: 2024-04-08
Attending: INTERNAL MEDICINE
Payer: MEDICARE

## 2024-04-08 ENCOUNTER — INFUSION THERAPY VISIT (OUTPATIENT)
Dept: INFUSION THERAPY | Facility: CLINIC | Age: 84
End: 2024-04-08
Attending: NURSE PRACTITIONER
Payer: MEDICARE

## 2024-04-08 VITALS
WEIGHT: 103.7 LBS | OXYGEN SATURATION: 98 % | SYSTOLIC BLOOD PRESSURE: 125 MMHG | RESPIRATION RATE: 18 BRPM | DIASTOLIC BLOOD PRESSURE: 76 MMHG | HEART RATE: 74 BPM | BODY MASS INDEX: 19.59 KG/M2 | TEMPERATURE: 98.2 F

## 2024-04-08 DIAGNOSIS — M06.09 RHEUMATOID ARTHRITIS OF MULTIPLE SITES WITH NEGATIVE RHEUMATOID FACTOR (H): ICD-10-CM

## 2024-04-08 DIAGNOSIS — M06.09 RHEUMATOID ARTHRITIS OF MULTIPLE SITES WITH NEGATIVE RHEUMATOID FACTOR (H): Primary | ICD-10-CM

## 2024-04-08 DIAGNOSIS — Z79.899 HIGH RISK MEDICATION USE: ICD-10-CM

## 2024-04-08 LAB
ALBUMIN SERPL BCG-MCNC: 4.6 G/DL (ref 3.5–5.2)
ALP SERPL-CCNC: 65 U/L (ref 40–150)
ALT SERPL W P-5'-P-CCNC: <5 U/L (ref 0–50)
AST SERPL W P-5'-P-CCNC: 18 U/L (ref 0–45)
BASOPHILS # BLD AUTO: 0.1 10E3/UL (ref 0–0.2)
BASOPHILS NFR BLD AUTO: 2 %
BILIRUB DIRECT SERPL-MCNC: <0.2 MG/DL (ref 0–0.3)
BILIRUB SERPL-MCNC: 0.4 MG/DL
CREAT SERPL-MCNC: 0.43 MG/DL (ref 0.51–0.95)
CRP SERPL-MCNC: <3 MG/L
EGFRCR SERPLBLD CKD-EPI 2021: >90 ML/MIN/1.73M2
EOSINOPHIL # BLD AUTO: 0.2 10E3/UL (ref 0–0.7)
EOSINOPHIL NFR BLD AUTO: 3 %
ERYTHROCYTE [DISTWIDTH] IN BLOOD BY AUTOMATED COUNT: 17.4 % (ref 10–15)
ERYTHROCYTE [SEDIMENTATION RATE] IN BLOOD BY WESTERGREN METHOD: 2 MM/HR (ref 0–30)
HCT VFR BLD AUTO: 36.3 % (ref 35–47)
HGB BLD-MCNC: 11.7 G/DL (ref 11.7–15.7)
HOLD SPECIMEN: NORMAL
IMM GRANULOCYTES # BLD: 0 10E3/UL
IMM GRANULOCYTES NFR BLD: 0 %
LYMPHOCYTES # BLD AUTO: 1.4 10E3/UL (ref 0.8–5.3)
LYMPHOCYTES NFR BLD AUTO: 25 %
MCH RBC QN AUTO: 28 PG (ref 26.5–33)
MCHC RBC AUTO-ENTMCNC: 32.2 G/DL (ref 31.5–36.5)
MCV RBC AUTO: 87 FL (ref 78–100)
MONOCYTES # BLD AUTO: 0.4 10E3/UL (ref 0–1.3)
MONOCYTES NFR BLD AUTO: 7 %
NEUTROPHILS # BLD AUTO: 3.4 10E3/UL (ref 1.6–8.3)
NEUTROPHILS NFR BLD AUTO: 63 %
NRBC # BLD AUTO: 0 10E3/UL
NRBC BLD AUTO-RTO: 0 /100
PLATELET # BLD AUTO: 222 10E3/UL (ref 150–450)
PROT SERPL-MCNC: 7.1 G/DL (ref 6.4–8.3)
RBC # BLD AUTO: 4.18 10E6/UL (ref 3.8–5.2)
WBC # BLD AUTO: 5.4 10E3/UL (ref 4–11)

## 2024-04-08 PROCEDURE — 99207 PR NO CHARGE LOS: CPT

## 2024-04-08 PROCEDURE — 250N000013 HC RX MED GY IP 250 OP 250 PS 637: Performed by: INTERNAL MEDICINE

## 2024-04-08 PROCEDURE — 258N000003 HC RX IP 258 OP 636: Performed by: INTERNAL MEDICINE

## 2024-04-08 PROCEDURE — 82565 ASSAY OF CREATININE: CPT

## 2024-04-08 PROCEDURE — 36415 COLL VENOUS BLD VENIPUNCTURE: CPT

## 2024-04-08 PROCEDURE — 85652 RBC SED RATE AUTOMATED: CPT

## 2024-04-08 PROCEDURE — 96413 CHEMO IV INFUSION 1 HR: CPT

## 2024-04-08 PROCEDURE — 250N000011 HC RX IP 250 OP 636: Mod: JZ | Performed by: INTERNAL MEDICINE

## 2024-04-08 PROCEDURE — 80076 HEPATIC FUNCTION PANEL: CPT

## 2024-04-08 PROCEDURE — 85048 AUTOMATED LEUKOCYTE COUNT: CPT

## 2024-04-08 PROCEDURE — 86140 C-REACTIVE PROTEIN: CPT

## 2024-04-08 RX ORDER — METHYLPREDNISOLONE SODIUM SUCCINATE 125 MG/2ML
125 INJECTION, POWDER, LYOPHILIZED, FOR SOLUTION INTRAMUSCULAR; INTRAVENOUS
Status: CANCELLED
Start: 2024-04-09

## 2024-04-08 RX ORDER — ACETAMINOPHEN 325 MG/1
650 TABLET ORAL ONCE
Status: CANCELLED
Start: 2024-04-09 | End: 2024-04-09

## 2024-04-08 RX ORDER — HEPARIN SODIUM,PORCINE 10 UNIT/ML
5 VIAL (ML) INTRAVENOUS
Status: CANCELLED | OUTPATIENT
Start: 2024-04-09

## 2024-04-08 RX ORDER — ALBUTEROL SULFATE 0.83 MG/ML
2.5 SOLUTION RESPIRATORY (INHALATION)
Status: CANCELLED | OUTPATIENT
Start: 2024-04-09

## 2024-04-08 RX ORDER — EPINEPHRINE 1 MG/ML
0.3 INJECTION, SOLUTION INTRAMUSCULAR; SUBCUTANEOUS EVERY 5 MIN PRN
Status: CANCELLED | OUTPATIENT
Start: 2024-04-09

## 2024-04-08 RX ORDER — HEPARIN SODIUM (PORCINE) LOCK FLUSH IV SOLN 100 UNIT/ML 100 UNIT/ML
5 SOLUTION INTRAVENOUS
Status: CANCELLED | OUTPATIENT
Start: 2024-04-09

## 2024-04-08 RX ORDER — DIPHENHYDRAMINE HCL 25 MG
25 CAPSULE ORAL ONCE
Status: CANCELLED
Start: 2024-04-09 | End: 2024-04-09

## 2024-04-08 RX ORDER — DIPHENHYDRAMINE HYDROCHLORIDE 50 MG/ML
50 INJECTION INTRAMUSCULAR; INTRAVENOUS
Status: CANCELLED
Start: 2024-04-09

## 2024-04-08 RX ORDER — ACETAMINOPHEN 325 MG/1
650 TABLET ORAL ONCE
Status: COMPLETED | OUTPATIENT
Start: 2024-04-08 | End: 2024-04-08

## 2024-04-08 RX ORDER — MEPERIDINE HYDROCHLORIDE 25 MG/ML
25 INJECTION INTRAMUSCULAR; INTRAVENOUS; SUBCUTANEOUS EVERY 30 MIN PRN
Status: CANCELLED | OUTPATIENT
Start: 2024-04-09

## 2024-04-08 RX ORDER — ALBUTEROL SULFATE 90 UG/1
1-2 AEROSOL, METERED RESPIRATORY (INHALATION)
Status: CANCELLED
Start: 2024-04-09

## 2024-04-08 RX ADMIN — INFLIXIMAB 200 MG: 100 INJECTION, POWDER, LYOPHILIZED, FOR SOLUTION INTRAVENOUS at 15:59

## 2024-04-08 RX ADMIN — SODIUM CHLORIDE 250 ML: 9 INJECTION, SOLUTION INTRAVENOUS at 15:59

## 2024-04-08 RX ADMIN — ACETAMINOPHEN 650 MG: 325 TABLET ORAL at 15:56

## 2024-04-08 NOTE — RESULT ENCOUNTER NOTE
RN: Please call to notify Alannah Leos that rheumatology labs are stable.  Inflammatory markers ESR and CRP are normal.    Merrick Del Cid MD  4/8/2024

## 2024-04-08 NOTE — PROGRESS NOTES
Infusion Nursing Note:  Alannah Leos presents today for Rapid Infliximab.    Patient seen by provider today: No   present during visit today: Not Applicable.    Note: Patient expressed a scab on her head that she has had for months. Patient's daughter stated that she had scratched it last week and it was open and oozing blood. Today it shows no signs of infection and is mostly scabbed over. Dr. Del Cid notified and ok to proceed with infusion. Advised patient and daughter if any symptoms of infection they should alert MD. Patient and daughter verbalized understanding.      Intravenous Access:  Peripheral IV placed.    Treatment Conditions:  Biological Infusion Checklist:  ~~~ NOTE: If the patient answers yes to any of the questions below, hold the infusion and contact ordering provider or on-call provider.    Have you recently had an elevated temperature, fever, chills, productive cough, coughing for 3 weeks or longer or hemoptysis,  abnormal vital signs, night sweats,  chest pain or have you noticed a decrease in your appetite, unexplained weight loss or fatigue? No  Do you have any open wounds or new incisions? Yes, head scab-- open and now scabbed over again.   Do you have any upcoming hospitalizations or surgeries? Does not include esophagogastroduodenoscopy, colonoscopy, endoscopic retrograde cholangiopancreatography (ERCP), endoscopic ultrasound (EUS), dental procedures or joint aspiration/steroid injections No  Do you currently have any signs of illness or infection or are you on any antibiotics? No  Have you had any new, sudden or worsening abdominal pain? No  Have you or anyone in your household received a live vaccination in the past 4 weeks? Please note: No live vaccines while on biologic/chemotherapy until 6 months after the last treatment. Patient can receive the flu vaccine (shot only), pneumovax and the Covid vaccine. It is optimal for the patient to get these vaccines mid cycle, but  they can be given at any time as long as it is not on the day of the infusion. No  Have you recently been diagnosed with any new nervous system diseases (ie. Multiple sclerosis, Guillain Ogden, seizures, neurological changes) or cancer diagnosis? Are you on any form of radiation or chemotherapy? No  Have there been any other new onset medical symptoms? No      Post Infusion Assessment:  Patient tolerated infusion without incident.  Blood return noted pre and post infusion.  Site patent and intact, free from redness, edema or discomfort.  No evidence of extravasations.  Access discontinued per protocol.  Biologic Infusion Post Education: Call the triage nurse at your clinic or seek medical attention if you have chills and/or temperature greater than or equal to 100.5, uncontrolled nausea/vomiting, diarrhea, constipation, dizziness, shortness of breath, chest pain, heart palpitations, weakness or any other new or concerning symptoms, questions or concerns.  You cannot have any live virus vaccines prior to or during treatment or up to 6 months post infusion.  If you have an upcoming surgery, medical procedure or dental procedure during treatment, this should be discussed with your ordering physician and your surgeon/dentist.  If you are having any concerning symptom, if you are unsure if you should get your next infusion or wish to speak to a provider before your next infusion, please call your care coordinator or triage nurse at your clinic to notify them so we can adequately serve you.       Discharge Plan:   AVS to patient via [employer].  Patient will return 6/3/24 for next appointment.   Patient discharged in stable condition accompanied by: self and daughter.  Departure Mode: Wheelchair.      Piedad Kent RN

## 2024-05-07 DIAGNOSIS — M81.0 AGE RELATED OSTEOPOROSIS, UNSPECIFIED PATHOLOGICAL FRACTURE PRESENCE: Primary | ICD-10-CM

## 2024-06-18 ENCOUNTER — TRANSFERRED RECORDS (OUTPATIENT)
Dept: HEALTH INFORMATION MANAGEMENT | Facility: CLINIC | Age: 84
End: 2024-06-18

## 2024-06-21 ENCOUNTER — TELEPHONE (OUTPATIENT)
Dept: RHEUMATOLOGY | Facility: CLINIC | Age: 84
End: 2024-06-21
Payer: MEDICARE

## 2024-06-21 NOTE — TELEPHONE ENCOUNTER
Called them back and told them they would have to go through urgent care or PCP.     Penny NAVARRO RN, [facility] 06/21/24 3:41 PM

## 2024-07-02 ENCOUNTER — TELEPHONE (OUTPATIENT)
Dept: FAMILY MEDICINE | Facility: CLINIC | Age: 84
End: 2024-07-02
Payer: MEDICARE

## 2024-07-02 NOTE — TELEPHONE ENCOUNTER
Patient Quality Outreach    Patient is due for the following:   Diabetes -  A1C, Eye Exam, Microalbumin, and Foot Exam  Physical Annual Wellness Visit      Topic Date Due    Zoster (Shingles) Vaccine (1 of 2) Never done    COVID-19 Vaccine (4 - 2023-24 season) 09/01/2023       Next Steps:   Schedule a Annual Wellness Visit    Type of outreach:    Sent letter.      Questions for provider review:               Doug Shannon MA

## 2024-07-02 NOTE — LETTER
July 2, 2024    To  Alannah Leos  [address] [facility] [address]    Your team at [facility] cares about your health. We have reviewed your chart and based on our findings; we are making the following recommendations to better manage your health.     You are in particular need of attention regarding the following:     Schedule a DIABETIC FOLLOW UP appointment for Office Visit. Patients with diabetes should see their provider regularly.  Schedule a DIABETIC EYE EXAM.  This exam is done with an optometrist, annually. You can schedule this appointment with your eye doctor.  If you need a referral, please let us know.  PREVENTATIVE VISIT: Annual Medicare Wellness:Schedule an Annual Medicare Wellness Exam. Please call your [facility] clinic to set up your appointment.  Please schedule a Nurse Only Appointment with your primary care clinic to update your immunizations that are due.    If you have already completed these items, please contact the clinic via phone or   [facility] so your care team can review and update your records. Thank you for   choosing [facility] for your healthcare needs. For any questions,   concerns, or to schedule an appointment please contact our clinic.    Healthy Regards,      Your [facility] Care Team

## 2024-07-29 ENCOUNTER — LAB (OUTPATIENT)
Dept: INFUSION THERAPY | Facility: CLINIC | Age: 84
End: 2024-07-29
Attending: INTERNAL MEDICINE
Payer: MEDICARE

## 2024-07-29 VITALS
WEIGHT: 104 LBS | DIASTOLIC BLOOD PRESSURE: 61 MMHG | TEMPERATURE: 98.1 F | SYSTOLIC BLOOD PRESSURE: 143 MMHG | HEART RATE: 70 BPM | BODY MASS INDEX: 19.65 KG/M2

## 2024-07-29 DIAGNOSIS — Z79.899 HIGH RISK MEDICATION USE: ICD-10-CM

## 2024-07-29 DIAGNOSIS — M06.09 RHEUMATOID ARTHRITIS OF MULTIPLE SITES WITH NEGATIVE RHEUMATOID FACTOR (H): Primary | ICD-10-CM

## 2024-07-29 DIAGNOSIS — M06.09 RHEUMATOID ARTHRITIS OF MULTIPLE SITES WITH NEGATIVE RHEUMATOID FACTOR (H): ICD-10-CM

## 2024-07-29 LAB
ACANTHOCYTES BLD QL SMEAR: SLIGHT
ALBUMIN SERPL BCG-MCNC: 4.1 G/DL (ref 3.5–5.2)
ALP SERPL-CCNC: 99 U/L (ref 40–150)
ALT SERPL W P-5'-P-CCNC: <5 U/L (ref 0–50)
AST SERPL W P-5'-P-CCNC: 21 U/L (ref 0–45)
BASOPHILS # BLD MANUAL: 0 10E3/UL (ref 0–0.2)
BASOPHILS NFR BLD MANUAL: 0 %
BILIRUB DIRECT SERPL-MCNC: <0.2 MG/DL (ref 0–0.3)
BILIRUB SERPL-MCNC: 0.4 MG/DL
BURR CELLS BLD QL SMEAR: SLIGHT
CREAT SERPL-MCNC: 0.34 MG/DL (ref 0.51–0.95)
CRP SERPL-MCNC: 36.6 MG/L
EGFRCR SERPLBLD CKD-EPI 2021: >90 ML/MIN/1.73M2
EOSINOPHIL # BLD MANUAL: 0.1 10E3/UL (ref 0–0.7)
EOSINOPHIL NFR BLD MANUAL: 1 %
ERYTHROCYTE [DISTWIDTH] IN BLOOD BY AUTOMATED COUNT: 16.7 % (ref 10–15)
ERYTHROCYTE [SEDIMENTATION RATE] IN BLOOD BY WESTERGREN METHOD: 31 MM/HR (ref 0–30)
GIANT PLATELETS BLD QL SMEAR: SLIGHT
HCT VFR BLD AUTO: 31.6 % (ref 35–47)
HGB BLD-MCNC: 10.2 G/DL (ref 11.7–15.7)
HOLD SPECIMEN: NORMAL
LYMPHOCYTES # BLD MANUAL: 1 10E3/UL (ref 0.8–5.3)
LYMPHOCYTES NFR BLD MANUAL: 17 %
MCH RBC QN AUTO: 27.2 PG (ref 26.5–33)
MCHC RBC AUTO-ENTMCNC: 32.3 G/DL (ref 31.5–36.5)
MCV RBC AUTO: 84 FL (ref 78–100)
METAMYELOCYTES # BLD MANUAL: 0.1 10E3/UL
METAMYELOCYTES NFR BLD MANUAL: 1 %
MONOCYTES # BLD MANUAL: 0.4 10E3/UL (ref 0–1.3)
MONOCYTES NFR BLD MANUAL: 6 %
NEUTROPHILS # BLD MANUAL: 4.6 10E3/UL (ref 1.6–8.3)
NEUTROPHILS NFR BLD MANUAL: 75 %
NRBC # BLD AUTO: 0 10E3/UL
NRBC BLD AUTO-RTO: 0 /100
PLAT MORPH BLD: ABNORMAL
PLATELET # BLD AUTO: 291 10E3/UL (ref 150–450)
PROT SERPL-MCNC: 7.1 G/DL (ref 6.4–8.3)
RBC # BLD AUTO: 3.75 10E6/UL (ref 3.8–5.2)
RBC MORPH BLD: ABNORMAL
TARGETS BLD QL SMEAR: SLIGHT
WBC # BLD AUTO: 6.1 10E3/UL (ref 4–11)

## 2024-07-29 PROCEDURE — 85652 RBC SED RATE AUTOMATED: CPT

## 2024-07-29 PROCEDURE — 96413 CHEMO IV INFUSION 1 HR: CPT

## 2024-07-29 PROCEDURE — 36415 COLL VENOUS BLD VENIPUNCTURE: CPT

## 2024-07-29 PROCEDURE — 85007 BL SMEAR W/DIFF WBC COUNT: CPT

## 2024-07-29 PROCEDURE — 85027 COMPLETE CBC AUTOMATED: CPT

## 2024-07-29 PROCEDURE — 258N000003 HC RX IP 258 OP 636: Performed by: INTERNAL MEDICINE

## 2024-07-29 PROCEDURE — 250N000011 HC RX IP 250 OP 636: Performed by: INTERNAL MEDICINE

## 2024-07-29 PROCEDURE — 80076 HEPATIC FUNCTION PANEL: CPT

## 2024-07-29 PROCEDURE — 250N000013 HC RX MED GY IP 250 OP 250 PS 637: Performed by: INTERNAL MEDICINE

## 2024-07-29 PROCEDURE — 86140 C-REACTIVE PROTEIN: CPT

## 2024-07-29 PROCEDURE — 99207 PR NO CHARGE LOS: CPT

## 2024-07-29 PROCEDURE — 82565 ASSAY OF CREATININE: CPT

## 2024-07-29 RX ORDER — ACETAMINOPHEN 325 MG/1
650 TABLET ORAL ONCE
Status: CANCELLED
Start: 2024-07-30 | End: 2024-07-30

## 2024-07-29 RX ORDER — DIPHENHYDRAMINE HYDROCHLORIDE 50 MG/ML
50 INJECTION INTRAMUSCULAR; INTRAVENOUS
Status: CANCELLED
Start: 2024-07-30

## 2024-07-29 RX ORDER — EPINEPHRINE 1 MG/ML
0.3 INJECTION, SOLUTION INTRAMUSCULAR; SUBCUTANEOUS EVERY 5 MIN PRN
Status: CANCELLED | OUTPATIENT
Start: 2024-07-30

## 2024-07-29 RX ORDER — ACETAMINOPHEN 325 MG/1
650 TABLET ORAL ONCE
Status: COMPLETED | OUTPATIENT
Start: 2024-07-29 | End: 2024-07-29

## 2024-07-29 RX ORDER — HEPARIN SODIUM (PORCINE) LOCK FLUSH IV SOLN 100 UNIT/ML 100 UNIT/ML
5 SOLUTION INTRAVENOUS
Status: CANCELLED | OUTPATIENT
Start: 2024-07-30

## 2024-07-29 RX ORDER — METHYLPREDNISOLONE SODIUM SUCCINATE 125 MG/2ML
125 INJECTION, POWDER, LYOPHILIZED, FOR SOLUTION INTRAMUSCULAR; INTRAVENOUS
Status: CANCELLED
Start: 2024-07-30

## 2024-07-29 RX ORDER — HEPARIN SODIUM,PORCINE 10 UNIT/ML
5 VIAL (ML) INTRAVENOUS
Status: CANCELLED | OUTPATIENT
Start: 2024-07-30

## 2024-07-29 RX ORDER — MEPERIDINE HYDROCHLORIDE 25 MG/ML
25 INJECTION INTRAMUSCULAR; INTRAVENOUS; SUBCUTANEOUS EVERY 30 MIN PRN
Status: CANCELLED | OUTPATIENT
Start: 2024-07-30

## 2024-07-29 RX ORDER — DIPHENHYDRAMINE HCL 25 MG
25 CAPSULE ORAL ONCE
Status: CANCELLED
Start: 2024-07-30 | End: 2024-07-30

## 2024-07-29 RX ORDER — ALBUTEROL SULFATE 90 UG/1
1-2 AEROSOL, METERED RESPIRATORY (INHALATION)
Status: CANCELLED
Start: 2024-07-30

## 2024-07-29 RX ORDER — ALBUTEROL SULFATE 0.83 MG/ML
2.5 SOLUTION RESPIRATORY (INHALATION)
Status: CANCELLED | OUTPATIENT
Start: 2024-07-30

## 2024-07-29 RX ADMIN — SODIUM CHLORIDE 250 ML: 9 INJECTION, SOLUTION INTRAVENOUS at 15:14

## 2024-07-29 RX ADMIN — INFLIXIMAB 200 MG: 100 INJECTION, POWDER, LYOPHILIZED, FOR SOLUTION INTRAVENOUS at 15:19

## 2024-07-29 RX ADMIN — ACETAMINOPHEN 650 MG: 325 TABLET ORAL at 15:09

## 2024-07-29 NOTE — PROGRESS NOTES
Infusion Nursing Note:  Alannah Leos presents today for Rapid Infliximab.    Patient seen by provider today: No   present during visit today: Not Applicable.    Note: Daughter present with patient today and states that it has been almost 4 months since patient got her last dose. Delayed due to patient having a UTI and on antibiotics. Daughter feels this issue has resolved but wonders if she needs to be on an antibiotic long term given underlying conditions. Encouraged her to discuss this with her primary MD, but daughter stated they do not have one currently and are in the process of finding one.     Today,  daughter also stated that patient has been verbalizing more pain with her shoulders, arms and wrists which indicates she went too long without infliximab treatment.       Intravenous Access:  Peripheral IV placed.    Treatment Conditions:  Biological Infusion Checklist:  ~~~ NOTE: If the patient answers yes to any of the questions below, hold the infusion and contact ordering provider or on-call provider.    Have you recently had an elevated temperature, fever, chills, productive cough, coughing for 3 weeks or longer or hemoptysis,  abnormal vital signs, night sweats,  chest pain or have you noticed a decrease in your appetite, unexplained weight loss or fatigue? No  Do you have any open wounds or new incisions? No  Do you have any upcoming hospitalizations or surgeries? Does not include esophagogastroduodenoscopy, colonoscopy, endoscopic retrograde cholangiopancreatography (ERCP), endoscopic ultrasound (EUS), dental procedures or joint aspiration/steroid injections No  Do you currently have any signs of illness or infection or are you on any antibiotics? No  Have you had any new, sudden or worsening abdominal pain? No  Have you or anyone in your household received a live vaccination in the past 4 weeks? Please note: No live vaccines while on biologic/chemotherapy until 6 months after the last  treatment. Patient can receive the flu vaccine (shot only), pneumovax and the Covid vaccine. It is optimal for the patient to get these vaccines mid cycle, but they can be given at any time as long as it is not on the day of the infusion. No  Have you recently been diagnosed with any new nervous system diseases (ie. Multiple sclerosis, Guillain Pacifica, seizures, neurological changes) or cancer diagnosis? Are you on any form of radiation or chemotherapy? No  Are you pregnant or breast feeding or do you have plans of pregnancy in the future? No  Have you been having any signs of worsening depression or suicidal ideations?  (benlysta only) No  Have there been any other new onset medical symptoms? No  Have you had any new blood clots? (IVIG only) No      Post Infusion Assessment:  Patient tolerated infusion without incident.  Blood return noted pre and post infusion.  No evidence of extravasations.  Access discontinued per protocol.       Discharge Plan:   Discharge instructions reviewed with: Family.  Patient and/or family verbalized understanding of discharge instructions and all questions answered.  Patient discharged in stable condition accompanied by: daughter.  Departure Mode: Ambulatory.      Eliza Lewis RN

## 2024-08-01 ENCOUNTER — TELEPHONE (OUTPATIENT)
Dept: FAMILY MEDICINE | Facility: CLINIC | Age: 84
End: 2024-08-01
Payer: MEDICARE

## 2024-08-01 ENCOUNTER — OFFICE VISIT (OUTPATIENT)
Dept: URGENT CARE | Facility: URGENT CARE | Age: 84
End: 2024-08-01
Payer: COMMERCIAL

## 2024-08-01 ENCOUNTER — ANCILLARY PROCEDURE (OUTPATIENT)
Dept: GENERAL RADIOLOGY | Facility: CLINIC | Age: 84
End: 2024-08-01
Attending: NURSE PRACTITIONER
Payer: COMMERCIAL

## 2024-08-01 VITALS
DIASTOLIC BLOOD PRESSURE: 75 MMHG | OXYGEN SATURATION: 100 % | HEART RATE: 75 BPM | TEMPERATURE: 97.7 F | WEIGHT: 104 LBS | BODY MASS INDEX: 19.65 KG/M2 | SYSTOLIC BLOOD PRESSURE: 146 MMHG

## 2024-08-01 DIAGNOSIS — N39.44 NOCTURNAL ENURESIS: ICD-10-CM

## 2024-08-01 DIAGNOSIS — M25.512 ACUTE PAIN OF LEFT SHOULDER: ICD-10-CM

## 2024-08-01 DIAGNOSIS — M19.012 OSTEOARTHRITIS OF LEFT SHOULDER, UNSPECIFIED OSTEOARTHRITIS TYPE: ICD-10-CM

## 2024-08-01 DIAGNOSIS — R35.0 URINARY FREQUENCY: ICD-10-CM

## 2024-08-01 DIAGNOSIS — M25.512 ACUTE PAIN OF LEFT SHOULDER: Primary | ICD-10-CM

## 2024-08-01 LAB
ALBUMIN UR-MCNC: ABNORMAL MG/DL
APPEARANCE UR: CLEAR
BACTERIA #/AREA URNS HPF: ABNORMAL /HPF
BILIRUB UR QL STRIP: NEGATIVE
COLOR UR AUTO: YELLOW
GLUCOSE UR STRIP-MCNC: NEGATIVE MG/DL
GRAN CASTS #/AREA URNS LPF: ABNORMAL /LPF
HGB UR QL STRIP: NEGATIVE
HYALINE CASTS #/AREA URNS LPF: ABNORMAL /LPF
KETONES UR STRIP-MCNC: 15 MG/DL
LEUKOCYTE ESTERASE UR QL STRIP: NEGATIVE
MUCOUS THREADS #/AREA URNS LPF: PRESENT /LPF
NITRATE UR QL: NEGATIVE
PH UR STRIP: 6 [PH] (ref 5–7)
RBC #/AREA URNS AUTO: ABNORMAL /HPF
SP GR UR STRIP: 1.02 (ref 1–1.03)
SQUAMOUS #/AREA URNS AUTO: ABNORMAL /LPF
UROBILINOGEN UR STRIP-ACNC: 1 E.U./DL
WBC #/AREA URNS AUTO: ABNORMAL /HPF

## 2024-08-01 PROCEDURE — 73030 X-RAY EXAM OF SHOULDER: CPT | Mod: TC | Performed by: RADIOLOGY

## 2024-08-01 PROCEDURE — 99214 OFFICE O/P EST MOD 30 MIN: CPT | Performed by: NURSE PRACTITIONER

## 2024-08-01 PROCEDURE — 81001 URINALYSIS AUTO W/SCOPE: CPT | Performed by: NURSE PRACTITIONER

## 2024-08-01 ASSESSMENT — PAIN SCALES - GENERAL: PAINLEVEL: NO PAIN (0)

## 2024-08-01 NOTE — PATIENT INSTRUCTIONS
"Go to emergency room for further evaluation of severe left shoulder pain with decreased range of motion    Left shoulder xray, \"IMPRESSION: Advanced degenerative change at the left glenohumeral joint with bone-on-bone contact and bony remodeling. Additional complete effacement of the subacromial space worrisome for high-grade rotator cuff tearing but this is likely chronic. No evidence for acute fracture or dislocation.\"  "

## 2024-08-01 NOTE — PROGRESS NOTES
"Assessment & Plan     Acute pain of left shoulder    - XR Shoulder Left G/E 3 Views  - Orthopedic  Referral    Urinary frequency    - UA Macroscopic with reflex to Microscopic and Culture - Clinic Collect  - UA Microscopic with Reflex to Culture    Nocturnal enuresis    Osteoarthritis of left shoulder, unspecified osteoarthritis type    - Orthopedic  Referral     Patient and daughter refuse further evaluation in emergency room for severe pain and decreased ROM sudden onset in left shoulder and insist on xray of shoulder.  Reviewed xray images and results during visit showing, \"IMPRESSION: Advanced degenerative change at the left glenohumeral joint with bone-on-bone contact and bony remodeling. Additional complete effacement of the subacromial space worrisome for high-grade rotator cuff tearing but this is likely chronic. No   evidence for acute fracture or dislocation.\" Patient declines sling. Recommend rest, ice, immobilization, ibuprofen 400 mg every 6-8 hours with food as needed. Referral placed for ortho for further evaluation, but if established may call and get scheduled.     Reviewed UA showing no obvious UTI. Make appt with PCP to discuss bedwetting.    Follow-up with PCP if symptoms persist for 7 days, and sooner if symptoms worsen or new symptoms develop.     Discussed red flag symptoms which warrant immediate visit in emergency room    All questions were answered and patient verbalized understanding. AVS reviewed with patient.     33 minutes spent during visit, chart review, and charting on day of encounter.    Ofelia Browne, NABIL, APRN, CNP 8/1/2024 5:51 PM  [facility] [place]    Subjective     Alannah is a 83 year old female who presents to clinic today for the following health issues:  Chief Complaint   Patient presents with    Urgent Care    Frequency     Wet the beds few nights in the row    Shoulder Pain     Left pain no injury      UTI    Onset of symptoms was " 1week(s).  Course of illness is same  Current and associated symptoms urgency, frequency, bed wetting  Denies dysuria, fever, emesis  Treatment and measures tried None  Had a UTI in June and was treated with abx and daughter states since she is immunosuppressed a nurse said 5 days of treatment is not long enough    MS Injury/Pain    Onset of symptoms was this morning  Location: left shoulder  Context: No known injury  Course of symptoms is worsening.    Severity severe  Current and Associated symptoms: Pain and Decreased range of motion  Denies  Swelling, Bruising, Warmth, and Redness  Aggravating Factors: movement, was not able to dress and is wearing button up shift  Therapies to improve symptoms include: takes motrin daily which hasn't helped   She has a history of arthritis in right shoulder and ankylosing spondylitis  No history of kidney disease.     Daughter would like xray of shoulder.     Problem list, Medication list, Allergies, and Medical history reviewed in EPIC.    ROS:  Review of systems negative except for noted above        Objective    BP (!) 146/75   Pulse 75   Temp 97.7  F (36.5  C) (Tympanic)   Wt 47.2 kg (104 lb)   LMP  (LMP Unknown)   SpO2 100%   BMI 19.65 kg/m    Physical Exam  Musculoskeletal:      Comments: Unable to move bilateral shoulders. Tenderness in left shoulder throughout, unable to verbalize or point to area of pain. Unable to move left shoulder   Skin:     General: Skin is warm and dry.      Coloration: Skin is pale.      Findings: No bruising or erythema.   Neurological:      Motor: Weakness present.      Gait: Gait abnormal.      Comments: Seated in wheelchair occasionally moaning in pain            X-ray left shoulder was performed and reviewed independently by myself showing no obvious fracture or dislocation  Labs and Radiologist impression:     Results for orders placed or performed in visit on 08/01/24   XR Shoulder Left G/E 3 Views     Status: None    Narrative     EXAM: XR SHOULDER LEFT G/E 3 VIEWS  LOCATION: [facility] [place]  DATE: 8/1/2024    INDICATION: Left shoulder pain, no known injury.  COMPARISON: None.      Impression    IMPRESSION: Advanced degenerative change at the left glenohumeral joint with bone-on-bone contact and bony remodeling. Additional complete effacement of the subacromial space worrisome for high-grade rotator cuff tearing but this is likely chronic. No   evidence for acute fracture or dislocation.   Results for orders placed or performed in visit on 08/01/24   UA Macroscopic with reflex to Microscopic and Culture - Clinic Collect     Status: Abnormal    Specimen: Urine, Clean Catch   Result Value Ref Range    Color Urine Yellow Colorless, Straw, Light Yellow, Yellow    Appearance Urine Clear Clear    Glucose Urine Negative Negative mg/dL    Bilirubin Urine Negative Negative    Ketones Urine 15 (A) Negative mg/dL    Specific Gravity Urine 1.025 1.003 - 1.035    Blood Urine Negative Negative    pH Urine 6.0 5.0 - 7.0    Protein Albumin Urine Trace (A) Negative mg/dL    Urobilinogen Urine 1.0 0.2, 1.0 E.U./dL    Nitrite Urine Negative Negative    Leukocyte Esterase Urine Negative Negative   UA Microscopic with Reflex to Culture     Status: Abnormal   Result Value Ref Range    Bacteria Urine Few (A) None Seen /HPF    RBC Urine 0-2 0-2 /HPF /HPF    WBC Urine 0-5 0-5 /HPF /HPF    Squamous Epithelials Urine Moderate (A) None Seen /LPF    Mucus Urine Present (A) None Seen /LPF    Hyaline Casts Urine 10-25 (A) None Seen /LPF    Granular Casts Urine 2-5 (A) None Seen /LPF    Narrative    Urine Culture not indicated

## 2024-08-01 NOTE — TELEPHONE ENCOUNTER
"Pt's daughter Shiloh calling (CTC on file) asking if pt should be seen. Pt is not with Shiloh at this time. Unable to triage. Per daughter pt has bad shoulder pain at baseline due to arthritis. Shiloh went over to check on pt today and says her mom says the pain is worse that it usually is and that she \"heard a pop\" last night. Unsure of what time pt heard the sound. Shiloh asking if UC would be appropriate for pt to be evaluated. RN stated that she may bring pt into UC if she feels it's necessary. She may also call back when she is with the pt and we can triage to see if UC would be best for pt. Shiloh verbalized understanding. No additional questions at this time.     Thank you - Radha Calvin, MARLONN, RN  "

## 2024-08-03 ENCOUNTER — OFFICE VISIT (OUTPATIENT)
Dept: ORTHOPEDICS | Facility: CLINIC | Age: 84
End: 2024-08-03
Payer: COMMERCIAL

## 2024-08-03 VITALS
RESPIRATION RATE: 16 BRPM | HEART RATE: 87 BPM | DIASTOLIC BLOOD PRESSURE: 64 MMHG | SYSTOLIC BLOOD PRESSURE: 134 MMHG | HEIGHT: 61 IN | BODY MASS INDEX: 19.63 KG/M2 | WEIGHT: 104 LBS

## 2024-08-03 DIAGNOSIS — M19.012 ARTHRITIS OF LEFT SHOULDER REGION: Primary | ICD-10-CM

## 2024-08-03 DIAGNOSIS — M06.09 RHEUMATOID ARTHRITIS OF MULTIPLE SITES WITH NEGATIVE RHEUMATOID FACTOR (H): ICD-10-CM

## 2024-08-03 PROCEDURE — 20611 DRAIN/INJ JOINT/BURSA W/US: CPT | Mod: LT | Performed by: FAMILY MEDICINE

## 2024-08-03 PROCEDURE — 99203 OFFICE O/P NEW LOW 30 MIN: CPT | Mod: 25 | Performed by: FAMILY MEDICINE

## 2024-08-03 RX ORDER — BETAMETHASONE SODIUM PHOSPHATE AND BETAMETHASONE ACETATE 3; 3 MG/ML; MG/ML
6 INJECTION, SUSPENSION INTRA-ARTICULAR; INTRALESIONAL; INTRAMUSCULAR; SOFT TISSUE
Status: SHIPPED | OUTPATIENT
Start: 2024-08-03

## 2024-08-03 RX ORDER — ROPIVACAINE HYDROCHLORIDE 5 MG/ML
4 INJECTION, SOLUTION EPIDURAL; INFILTRATION; PERINEURAL
Status: SHIPPED | OUTPATIENT
Start: 2024-08-03

## 2024-08-03 RX ADMIN — ROPIVACAINE HYDROCHLORIDE 4 ML: 5 INJECTION, SOLUTION EPIDURAL; INFILTRATION; PERINEURAL at 09:49

## 2024-08-03 RX ADMIN — BETAMETHASONE SODIUM PHOSPHATE AND BETAMETHASONE ACETATE 6 MG: 3; 3 INJECTION, SUSPENSION INTRA-ARTICULAR; INTRALESIONAL; INTRAMUSCULAR; SOFT TISSUE at 09:49

## 2024-08-03 ASSESSMENT — PAIN SCALES - GENERAL: PAINLEVEL: EXTREME PAIN (8)

## 2024-08-03 NOTE — PATIENT INSTRUCTIONS
# Left Shoulder Arthritis Flare: Alannah Leos  was seen today for left shoulder pain. Symptoms had been going on for 2 days worsening with pain debilitating. On examination there are positive findings of significant tenderness to palpation and limited shoulder range of motion due to pain/stiffness. Imaging findings showed severe left shoulder arthritis. Patient has RA likely contributing to advanced arthritis in her shoulder. Likely cause of patient's condition due to flare of shoulder arthritis. Counseled patient on nature of condition and treatment options.  Given this plan as below, follow-up 3-4 weeks as needed.     Image Findings: severe left shoulder arthritis  Treatment: Activities as tolerated, light shoulder range of motion  Medications/Injections: Limited tylenol/ibuprofen for pain for 1-2 weeks, Topical Voltaren gel, left shoulder joint steroid injection  Follow-up: In 3+ mon if symptoms do not improve, sooner if worsening  Can consider repeat steroid injections    Please call [phone]   Ask for my team if you have any questions or concerns    If you have not yet received the influenza vaccine but would like to get one, please call  [phone] or you can schedule via [facility]    It was great seeing you today!    Flavio Garrison MD, CAQSM     FS Injection Discharge Instructions    Procedure: left shoulder joint steroid injection    You may shower, however avoid swimming, tub baths or hot tubs for 24 hours following your procedure  You may have a mild to moderate increase in pain for several days following the injection.  It may take up to 14 days for the steroid medication to start working although you may feel the effect as early as a few days after the procedure.  You may use ice packs for 10-15 minutes, 3 to 4 times a day at the injection site for comfort  You may use anti-inflammatory medications (such as Ibuprofen or Aleve or Advil) or Tylenol for pain control if necessary  If you were  fasting, you may resume your normal diet and medications after the procedure  If you have diabetes, check your blood sugar more frequently than usual as your blood sugar may be higher than normal for 10-14 days following a steroid injection. Contact your doctor who manages your diabetes if your blood sugar is higher than usual    If you experience any of the following, call [facility] @ [phone] or [phone]  -Fever over 100 degree F  -Swelling, bleeding, redness, drainage, warmth at the injection site  - New or worsening pain

## 2024-08-03 NOTE — LETTER
8/3/2024      Alannah Leos  [address] [address] [facility] [address]      Dear Colleague,    Thank you for referring your patient, Alannah Leos, to the [facility]. Please see a copy of my visit note below.    ASSESSMENT & PLAN    Alannah was seen today for pain.    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    # Left Shoulder Arthritis Flare: Alannah Leos  was seen today for left shoulder pain. Symptoms had been going on for 2 days worsening with pain debilitating. On examination there are positive findings of significant tenderness to palpation and limited shoulder range of motion due to pain/stiffness. Imaging findings showed severe left shoulder arthritis. Patient has RA likely contributing to advanced arthritis in her shoulder. Likely cause of patient's condition due to flare of shoulder arthritis. Counseled patient on nature of condition and treatment options.  Given this plan as below, follow-up 3-4 weeks as needed.     Image Findings: severe left shoulder arthritis  Treatment: Activities as tolerated, light shoulder range of motion  Medications/Injections: Limited tylenol/ibuprofen for pain for 1-2 weeks, Topical Voltaren gel, left shoulder joint steroid injection  Follow-up: In 3+ mon if symptoms do not improve, sooner if worsening  Can consider repeat steroid injections       Flavio Garrison MD  [facility]    -----  Chief Complaint   Patient presents with     Left Shoulder - Pain       SUBJECTIVE  Alannah Leos is a/an 83 year old female who is seen in consultation at the request of  Ofelia Browne N.P., [facility] patient, for evaluation of acute left  "shoulder pain.     The patient is seen with their daughter.  The patient is Right handed    Onset: 2 day(s) ago. Reports insidious onset without acute precipitating event.  Location of Pain: left shoulder  Worsened by: movement, pushing, pulling  Better with: Motrin  Treatments tried: rest/activity avoidance and motrin  Associated symptoms: no distal numbness or tingling; denies swelling or warmth  Orthopedic/Surgical history: Chinic shoulder pain.   Social History/Occupation: Retired. Patient takes motrin daily which hasn't helped   She has a history of arthritis in right shoulder and ankylosing spondylitis  No history of kidney disease.     No family history pertinent to patient's problem today.     REVIEW OF SYSTEMS:  Review of Systems  Constitutional, HEENT, cardiovascular, pulmonary, gi and gu systems are negative, except as otherwise noted.    OBJECTIVE:  /64   Pulse 87   Resp 16   Ht 1.549 m (5' 1\")   Wt 47.2 kg (104 lb)   LMP  (LMP Unknown)   BMI 19.65 kg/m     General: healthy, alert and in no distress  HEENT: no scleral icterus or conjunctival erythema  Skin: no suspicious lesions or rash. No jaundice.  CV: distal perfusion intact    Resp: normal respiratory effort without conversational dyspnea   Psych: normal mood and affect  Gait: normal steady gait with appropriate coordination and balance    Neuro: Normal light sensory exam of left upper extremity     Ortho Exam   LEFT SHOULDER  Inspection:    no swelling, bruising, discoloration, or obvious deformity or asymmetry  Palpation:    Tender about the posterior/anterior shoulder. Remainder of bony and tendinous landmarks are nontender.  Active Range of Motion:   ROM severely limited 2/2 pain/tightness  Strength:    Scapular plane abduction painful, weakness,  ER painful, weakness, IR painful, weakness, biceps painful, weakness, triceps painful, weakness       CERVICAL SPINE  Inspection:    normal cervical lordosis present, rounded shoulders, " forward head posture  Palpation:    Nontender.  Range of Motion:     Flexion full    Extension full    Right side bend full    Left side bend full    Right rotation full    Left rotation full  Strength:    Full strength throughout all neck muscles  Special Tests:    Positive: None    Negative: Spurling's (bilateral)    RADIOLOGY:  I independently, visualized and reviewed these images with the patient     Notable severe left shoulder arthritis on previous x-rays    Review of external notes as documented elsewhere in note  Review of the result(s) of each unique test - left shoulder x-rays     Large Joint Injection/Arthocentesis: L subacromial bursa    Date/Time: 8/3/2024 9:49 AM    Performed by: Flavio Garrison MD  Authorized by: Flavio Garrison MD    Indications:  Pain and osteoarthritis  Needle Size:  25 G  Guidance: ultrasound    Approach:  Posterolateral  Location:  Shoulder      Site:  L subacromial bursa  Medications:  6 mg betamethasone acet & sod phos 6 (3-3) MG/ML; 4 mL ROPivacaine 5 MG/ML  Outcome:  Tolerated well, no immediate complications  Procedure discussed: discussed risks, benefits, and alternatives    Consent Given by:  Patient  Timeout: timeout called immediately prior to procedure    Prep: patient was prepped and draped in usual sterile fashion     Ultrasound images of procedure were permanently stored.           Disclaimer: This note consists of symbols derived from keyboarding, dictation and/or voice recognition software. As a result, there may be errors in the script that have gone undetected. Please consider this when interpreting information found in this chart.      Again, thank you for allowing me to participate in the care of your patient.        Sincerely,        Flavio Garrison MD

## 2024-08-03 NOTE — PROGRESS NOTES
ASSESSMENT & PLAN    Alannah was seen today for pain.    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    # Left Shoulder Arthritis Flare: Alannah Leos  was seen today for left shoulder pain. Symptoms had been going on for 2 days worsening with pain debilitating. On examination there are positive findings of significant tenderness to palpation and limited shoulder range of motion due to pain/stiffness. Imaging findings showed severe left shoulder arthritis. Patient has RA likely contributing to advanced arthritis in her shoulder. Likely cause of patient's condition due to flare of shoulder arthritis. Counseled patient on nature of condition and treatment options.  Given this plan as below, follow-up 3-4 weeks as needed.     Image Findings: severe left shoulder arthritis  Treatment: Activities as tolerated, light shoulder range of motion  Medications/Injections: Limited tylenol/ibuprofen for pain for 1-2 weeks, Topical Voltaren gel, left shoulder joint steroid injection  Follow-up: In 3+ mon if symptoms do not improve, sooner if worsening  Can consider repeat steroid injections       Flavio Garrison MD  [facility] [place]    -----  Chief Complaint   Patient presents with    Left Shoulder - Pain       SUBJECTIVE  Alannah Leos is a/an 83 year old female who is seen in consultation at the request of  Ofelia Browne N.P., [facility] patient, for evaluation of acute left shoulder pain.     The patient is seen with their daughter.  The patient is Right handed    Onset: 2 day(s) ago. Reports insidious onset without acute precipitating event.  Location of Pain: left shoulder  Worsened by: movement, pushing,  "pulling  Better with: Motrin  Treatments tried: rest/activity avoidance and motrin  Associated symptoms: no distal numbness or tingling; denies swelling or warmth  Orthopedic/Surgical history: Chinic shoulder pain.   Social History/Occupation: Retired. Patient takes motrin daily which hasn't helped   She has a history of arthritis in right shoulder and ankylosing spondylitis  No history of kidney disease.     No family history pertinent to patient's problem today.     REVIEW OF SYSTEMS:  Review of Systems  Constitutional, HEENT, cardiovascular, pulmonary, gi and gu systems are negative, except as otherwise noted.    OBJECTIVE:  /64   Pulse 87   Resp 16   Ht 1.549 m (5' 1\")   Wt 47.2 kg (104 lb)   LMP  (LMP Unknown)   BMI 19.65 kg/m     General: healthy, alert and in no distress  HEENT: no scleral icterus or conjunctival erythema  Skin: no suspicious lesions or rash. No jaundice.  CV: distal perfusion intact    Resp: normal respiratory effort without conversational dyspnea   Psych: normal mood and affect  Gait: normal steady gait with appropriate coordination and balance    Neuro: Normal light sensory exam of left upper extremity     Ortho Exam   LEFT SHOULDER  Inspection:    no swelling, bruising, discoloration, or obvious deformity or asymmetry  Palpation:    Tender about the posterior/anterior shoulder. Remainder of bony and tendinous landmarks are nontender.  Active Range of Motion:   ROM severely limited 2/2 pain/tightness  Strength:    Scapular plane abduction painful, weakness,  ER painful, weakness, IR painful, weakness, biceps painful, weakness, triceps painful, weakness       CERVICAL SPINE  Inspection:    normal cervical lordosis present, rounded shoulders, forward head posture  Palpation:    Nontender.  Range of Motion:     Flexion full    Extension full    Right side bend full    Left side bend full    Right rotation full    Left rotation full  Strength:    Full strength throughout all neck " muscles  Special Tests:    Positive: None    Negative: Spurling's (bilateral)    RADIOLOGY:  I independently, visualized and reviewed these images with the patient     Notable severe left shoulder arthritis on previous x-rays    Review of external notes as documented elsewhere in note  Review of the result(s) of each unique test - left shoulder x-rays     Large Joint Injection/Arthocentesis: L subacromial bursa    Date/Time: 8/3/2024 9:49 AM    Performed by: Flavio Garrison MD  Authorized by: Flavio Garrison MD    Indications:  Pain and osteoarthritis  Needle Size:  25 G  Guidance: ultrasound    Approach:  Posterolateral  Location:  Shoulder      Site:  L subacromial bursa  Medications:  6 mg betamethasone acet & sod phos 6 (3-3) MG/ML; 4 mL ROPivacaine 5 MG/ML  Outcome:  Tolerated well, no immediate complications  Procedure discussed: discussed risks, benefits, and alternatives    Consent Given by:  Patient  Timeout: timeout called immediately prior to procedure    Prep: patient was prepped and draped in usual sterile fashion     Ultrasound images of procedure were permanently stored.           Disclaimer: This note consists of symbols derived from keyboarding, dictation and/or voice recognition software. As a result, there may be errors in the script that have gone undetected. Please consider this when interpreting information found in this chart.

## 2024-08-29 ENCOUNTER — OFFICE VISIT (OUTPATIENT)
Dept: RHEUMATOLOGY | Facility: CLINIC | Age: 84
End: 2024-08-29
Payer: COMMERCIAL

## 2024-08-29 VITALS — HEART RATE: 79 BPM | DIASTOLIC BLOOD PRESSURE: 70 MMHG | SYSTOLIC BLOOD PRESSURE: 145 MMHG

## 2024-08-29 DIAGNOSIS — S63.102A SUBLUXATION OF LEFT THUMB, INITIAL ENCOUNTER: ICD-10-CM

## 2024-08-29 DIAGNOSIS — M06.09 RHEUMATOID ARTHRITIS OF MULTIPLE SITES WITH NEGATIVE RHEUMATOID FACTOR (H): ICD-10-CM

## 2024-08-29 DIAGNOSIS — M17.12 PRIMARY OSTEOARTHRITIS OF LEFT KNEE: ICD-10-CM

## 2024-08-29 DIAGNOSIS — M81.0 AGE RELATED OSTEOPOROSIS, UNSPECIFIED PATHOLOGICAL FRACTURE PRESENCE: ICD-10-CM

## 2024-08-29 DIAGNOSIS — L98.9 SKIN LESION: Primary | ICD-10-CM

## 2024-08-29 PROCEDURE — 99214 OFFICE O/P EST MOD 30 MIN: CPT | Mod: 25 | Performed by: INTERNAL MEDICINE

## 2024-08-29 PROCEDURE — 20610 DRAIN/INJ JOINT/BURSA W/O US: CPT | Mod: LT | Performed by: INTERNAL MEDICINE

## 2024-08-29 RX ORDER — TRIAMCINOLONE ACETONIDE 40 MG/ML
40 INJECTION, SUSPENSION INTRA-ARTICULAR; INTRAMUSCULAR ONCE
Status: COMPLETED | OUTPATIENT
Start: 2024-08-29 | End: 2024-08-29

## 2024-08-29 RX ORDER — FOLIC ACID 1 MG/1
1 TABLET ORAL DAILY
Qty: 90 TABLET | Refills: 2 | Status: SHIPPED | OUTPATIENT
Start: 2024-08-29

## 2024-08-29 RX ORDER — METHOTREXATE 2.5 MG/1
20 TABLET ORAL WEEKLY
Qty: 104 TABLET | Refills: 2 | Status: SHIPPED | OUTPATIENT
Start: 2024-08-29

## 2024-08-29 RX ADMIN — TRIAMCINOLONE ACETONIDE 40 MG: 40 INJECTION, SUSPENSION INTRA-ARTICULAR; INTRAMUSCULAR at 15:29

## 2024-08-29 NOTE — PATIENT INSTRUCTIONS
RHEUMATOLOGY    [facility] [place]  [address] [place] [address]  [place] [address]    Phone number: [phone]  Fax number: [phone]    If you need a medication refill, please contact us as you may need lab work and/or a follow up visit prior to your refill.      Thank you for choosing [facility]!    Joan Bucio CMA Rheumatology

## 2024-08-29 NOTE — PROGRESS NOTES
Rheumatology Clinic Visit      Alannah Leos MRN# [id number]   YOB: 1940 Age: 83 year old      Date of visit: 8/29/24   PCP: Dr. Leah Blanca    Chief Complaint   Patient presents with:  Rheumatoid Arthritis    Assessment and Plan     1. 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 Previously on methotrexate when first diagnosed (unclear if ineffective or not, stopped by patient preference to not treat her RA at that time).  Synovitis, subluxation, and fixed flexion deformities on initial exam on 10/12/2016. Steroid responsive. 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 Currently on methotrexate 20 mg once weekly, and hydroxychloroquine 200 mg daily. Previously on sulfasalazine (felt poorly on it).  At almost every visit I have encouraged her to escalate treatment for her active disease but she has been reluctant to do so because of possible risks from the medication; she understands that the progression of her disease is not likely to be reversible as seen by the changes of her spine.  At one point she was going to proceed with SQ bDMARD but she never started, then she was going to proceed with an infusion bDMARD but she didn't go through with it.  Eventually, she did start Remicade 3 mg/kg IV, with the first dose on 7/17/2023.  Already she has improvement of her arthritis with resolution of synovitis at the hands and improvement of general pain.  Her daughter who is with her today states that this is the first time in years that the patient has not gotten up to open the refrigerator herself and she has gotten up to wash her hands herself at a sink instead of using a wet wipe, and the patient reports having improvement.  The patient did verbalize disappointment with needing to have the infusion every 8 weeks where she thought that the infusion was going to only be once; her  daughter who was with her today states that I did explained the frequency of the infusion, that she understood the frequency of the infusion, and that she has also told the patient and the patient had verbalized understanding about the frequency of the infusions.  After thorough discussion about continuing or stopping the Remicade treatment, the patient has chosen to continue Remicade.  Note that she had a general ill feeling and GI upset for about 4 days after the first infusion, then for a period of 4 days that started 2 weeks after the second infusion, and then no reaction after the third infusion.  Then missed Heike infusion and then received another infusion in late July.  Mildly active arthritis possibly due to missing Remicade infusion and therefore will reevaluate at follow-up.  No change to Remicade dose at this time.  Continue methotrexate.  Continue labs every 8 weeks at the time of Remicade infusions.    Chronic illness    - Continue methotrexate 20 mg once weekly (split dosing within the same 24-hours of each week)  - Continue folic acid 1mg daily  - Continue Remicade 3mg/kg IV every 8 weeks (Tylenol 650 mg and Benadryl 25 mg premedication)  - Labs with Remicade infusions every 8 weeks:  CBC, Creatinine, Hepatic Panel    High risk medication requiring intensive toxicity monitoring at least quarterly.    2. Positive PIERO and dsDNA: These were noted on record review. She does not have symptoms to support an PIERO-associated rheumatologic disease at this time.     3. Diffuse neck/back pain: No evidence of instability by x-rays on 10/12/2016.  Patient to continue following with the pain clinic for chronic neck/back pain management.  She has seen neurosurgery in the past.    4. Parkinson's Disease: Followed by Dr. Ricardo Johnson at the [facility] previously, and now at [facility].  Worsened recently and she plans to follow-up with neurology    5. Osteoporosis: Based on 1/29/2018  DEXA.  Was on fosamax from [id number]; reclast then started 2021 with the first dose received 5/12/2021, followed by the second dose on 5/16/2022, and third dose received on 8/29/2023.   - Continue reclast 5mg IV yearly, next due 8/29/2024 or shortly thereafter  - Continue vitamin D 1000 IU daily   - Continue calcium 1000mg daily    6.  Severe osteoarthritis of multiple joints: Has been evaluated by surgeons and is now followed in the pain clinic.  Severe osteoarthritis that is limiting daily activities.    7.  Left knee osteoarthritis: 8/30/2023 steroid injection was effective; repeated on 2/29/2024 and patient request repeat today on 8/29/2024.  Steroid injection as documented in the procedure section.     8.  Severe primary and secondary osteoarthritis (secondary to rheumatoid arthritis): Significantly impacting her quality of life.  Function limited due to degenerative changes.  Has seen palliative care in the past.  Note that her inflammatory arthritis appears controlled but the degenerative changes from the inflammatory arthritis remain.    9.  Vaccinations: Vaccinations reviewed with Ms. Leos.  Risks and benefits of vaccinations were discussed.    - Influenza: encouraged yearly vaccination  - [id number]: up to date  - [id number]: up to date  - COVID-19: Advised keeping updated    10.  Healthcare maintenance: Patient does not have a regular primary care provider and needs a primary care provider.  Discussed option for geriatrician previously and referral was given.  I advised that she call to schedule a primary care appointment.    11.  Skin lesion on her scalp: Also with nonhealing lesion next to it.  Advised seeing dermatology  - Dermatology referral    12.  Left thumb subluxated at the MCP: Cannot use during the day for tasks such as putting on her clothes.  May benefit from fusion but may not to be a good surgical candidate.  Advised consultation with a hand surgeon  - Hand surgery referral    13.   Anemia: Hemoglobin similar to previous labs a year ago.  Monitor for now.  Reportedly patient has required iron supplementation in the past.    Total minutes spent in evaluation with patient, documentation, , and review of pertinent studies and chart notes: 20     Ms. Leos verbalized agreement with and understanding of the rational for the diagnosis and treatment plan.  All questions were answered to best of my ability and the patient's satisfaction. Ms. Leos was advised to contact the clinic with any questions that may arise after the clinic visit.      Thank you for involving me in the care of the patient    Return to clinic: 6 months      HPI   Alannah Leos is a 83 year old female with a medical history significant for Parkinson's disease, osteoarthritis, status post TKA, osteoporosis, and rheumatoid arthritis who presents for follow-up of rheumatoid arthritis.    4/21/2022: Trigger point injections from the pain clinic are helpful but she is worried that her new pain clinic provider will not schedule appointments every 3 weeks that have been helpful for her, but rather she is being scheduled every 5 weeks and she has too much pain to go every 5 weeks between injections.  She is wondering if there is another provider that has more availability to allow her to come in every 3 weeks for these injections; she says that she will also speak with her current pain management provider who she has only seen once so far.  Severe pain in the right shoulder and hardly able to use it now.  Pain in her wrists, MCPs, and PIPs where she also has swelling.  Stiffness lasts for all day, worse in the morning when she first wakes.  Wrists, elbows, shoulders, knees, ankles, MTPs, neck, and fingers all hurt.  She is okay with continuing Reclast for osteoporosis management but is hesitant to use biologic DMARD or escalate treatment for RA in any way at this time because she is concerned that it may not work.  She  verbalized understanding that untreated inflammatory arthritis will potentially cause worsening irreversible damage.    She feels that her arthritis is fairly unchanged.  She says that she does not feel well because she cannot raise her head due to degenerative changes in her neck.  She says that her hands have worsened slightly.  She is only taking methotrexate now for inflammatory arthritis.  She was hospitalized recently for worsening Parkinson's disease and plans to follow-up with neurology as an outpatient.  She and her daughter voiced frustration with recent hospitalization, where they did not feel that much was done.    6/14/2023:  no change in arthritis symptoms per patient.  Pain at the MCPs, PIPs, wrists, shoulders, knees.  Occasional lower extremity edema but none now.  Was going to have her infliximab infusion but had a open wound on her bottom that is nearly resolved at this point so has rescheduled the infliximab infusion.    8/30/2023: Has received 3 doses of Remicade.  After the first dose of Remicade she had 4 days of GI upset.  2 weeks after the second dose of Remicade she had 4 days of GI upset.  She does have the third dose of Remicade yesterday.  She had Reclast yesterday.  Overall feeling better with less joint pain.  Her daughter who is present with her today states that this is the first time in years that she has gotten up to wash her hands at the sink and open the refrigerator.  The patient voiced some dissatisfaction with the fact that she will have to continue these infusions every 8 weeks, but also verbalized understanding that she has the choice to not continue the infusions; ultimately she says she would like to continue the Remicade infusions.  She also reports having left knee pain that is bothersome all the time but is not swollen and was without increased warmth or overlying erythema; she would like a steroid injection in the left knee because they have helped in the past, with the  last steroid injections being many years ago.    2/29/2024: Inflammatory arthritis is better controlled since being on Remicade.  No longer with joint swelling.  Still with diffuse body pain and limited range of motion of the neck.  Dizziness has been a chronic issue despite evaluation with ENT and neurology.  Sometimes too much of the Parkinson's medication results and hallucinations.  She says that she is a burden on her family.  Does not feel like many of the primary care providers actually listen to her and she does not have an assigned primary care provider at this time.  She would like to continue Remicade infusions.  She would like to continue methotrexate.  She would like a steroid injection of the left knee because it was very helpful for pain management when previously given in 2023.    Today, 8/29/2024: Subluxation of the left thumb makes it difficult to pull up her underwear.  Mild pain and swelling at the left second MCP.  Missed the June Remicade infusion.  Morning stiffness for most of the day.  Limited range of motion of the neck.  Felt better at her last visit prior to missing a Remicade infusion.  Working with sports medicine for shoulder osteoarthritis.  Steroid injection of the shoulder with mild benefit.  Previous steroid injection of the knee was very helpful and she would like this repeated today, on the left.    Denies fevers, chills, nausea, vomiting, constipation, diarrhea. No abdominal pain. No chest pain/pressure, palpitations, or shortness of breath.   No oral or nasal sores.  No rash. No sicca symptoms.      Daughter is present with her today.    Tobacco: none  EtOH: 1 drink at [place] only  Drugs: none    ROS   12 point review of system was completed and negative except as noted in the HPI     Active Problem List     Patient Active Problem List   Diagnosis    Osteoporosis    Rheumatoid arthritis (H)    Parkinson disease (H)    Osteoarthritis of wrist    Osteoarthritis of shoulder     History of total knee arthroplasty    Osteoarthritis of left knee    CARDIOVASCULAR SCREENING; LDL GOAL LESS THAN 160    High risk medication use    Underweight    Impingement syndrome, shoulder, left    Combined forms of age-related cataract of both eyes    Seborrheic dermatitis    Anemia in other chronic diseases classified elsewhere    Ankylosing spondylitis of cervical region (H)    Failure to thrive in adult    Physical deconditioning    History of iron deficiency anemia     Past Medical History     Past Medical History:   Diagnosis Date    Hyperlipidemia     Murmur, heart     hsm    Nonsenile cataract     Osteoarthrosis, unspecified whether generalized or localized, lower leg     Osteopenia     Rheumatoid arthritis(714.0)      Past Surgical History     Past Surgical History:   Procedure Laterality Date    GALLBLADDER SURGERY      HYSTERECTOMY      KNEE SURGERY      left     LAMINECTOMY LUMBAR ONE LEVEL      TONSILLECTOMY       Allergy     Allergies   Allergen Reactions    Decadrol [Dexamethasone Sodium Phosphate] Hives    Shrimp Itching     Current Medication List     Current Outpatient Medications   Medication Sig Dispense Refill    carbidopa-levodopa (SINEMET)  MG tablet Take one at 6 am, noon, 5 pm, 9 pm and midnight 150 tablet 11    Cyanocobalamin (VITAMIN B 12) 250 MCG LOZG       diclofenac (VOLTAREN) 1 % topical gel Apply 2 g topically 3 times daily as needed for moderate pain 100 g 1    folic acid (FOLVITE) 1 MG tablet Take 1 tablet (1 mg) by mouth daily 90 tablet 2    ibuprofen (ADVIL/MOTRIN) 200 MG tablet Take 1 tablet (200 mg) by mouth every 8 hours as needed for moderate pain      methotrexate 2.5 MG tablet Take 8 tablets (20 mg) by mouth once a week .  This is a once a week medication, taken on the same day of each week. 104 tablet 2    vitamin C (ASCORBIC ACID) 1000 MG TABS Take 1,000 mg by mouth 2 times daily      ACE NOT PRESCRIBED, INTENTIONAL, 1 each continuous prn. ACE Inhibitor not  "prescribed due to Refusal by patient       (Patient not taking: Reported on 12/18/2023) 0 each 0    CALCIUM + D OR 600mg twice a day  (Patient not taking: Reported on 8/1/2024)      order for DME Equipment being ordered: soft neck brace (Patient not taking: Reported on 8/1/2024) 1 Device 0     Current Facility-Administered Medications   Medication Dose Route Frequency Provider Last Rate Last Admin    4 mL ropivacaine (NAROPIN) injection 5 mg/mL  4 mL      4 mL at 08/03/24 0949    betamethasone acet & sod phos (CELESTONE) injection 6 mg  6 mg      6 mg at 08/03/24 0949     Social History   See HPI    Family History     Family History   Problem Relation Age of Onset    Cancer Sister         pancreatic    Diabetes No family hx of     Hypertension No family hx of        Physical Exam     Temp Readings from Last 3 Encounters:   08/01/24 97.7  F (36.5  C) (Tympanic)   07/29/24 98.1  F (36.7  C) (Oral)   04/08/24 98.2  F (36.8  C)     BP Readings from Last 5 Encounters:   08/29/24 (!) 145/70   08/03/24 134/64   08/01/24 (!) 146/75   07/29/24 (!) 143/61   04/08/24 125/76     Pulse Readings from Last 1 Encounters:   08/29/24 79     Resp Readings from Last 1 Encounters:   08/03/24 16     Estimated body mass index is 19.65 kg/m  as calculated from the following:    Height as of 8/3/24: 1.549 m (5' 1\").    Weight as of 8/3/24: 47.2 kg (104 lb).    GEN: NAD, thin and frail appearing  HEENT: MMM. No oral lesions. Anicteric, noninjected sclera  PULM: No increased work of breathing.    MSK: Synovial swelling and tenderness to palpation of the left second MCP.  Other MCPs diffusely tender to palpation but no swelling.  PIPs, DIPs, and wrists were diffusely tender to palpation but no swelling, increased warmth, or overlying erythema.  Subluxation of the left thumb at the MCP.  Elbows without swelling or tenderness to palpation.  Shoulders diffusely tender to palpation and minimal ROM; no swelling or increased warmth.  Knees with " medial joint line tenderness; left knee with crepitation.  Knees without effusion, increased warmth, or overlying erythema.  Ankles and MTPs without swelling or tenderness to palpation.  Holds neck in a flexed position.   SKIN: Diffuse hair thinning.  Skin lesion on the scalp, top of her head, just left of midline   EXT: No lower extremity edema  PSYCH: Alert. Appropriate.      Labs / Imaging (select studies)   RF/CCP  Recent Labs   Lab Test 10/12/16  1142   CCPIGG 2   RHF <20     CBC  Recent Labs   Lab Test 07/29/24  1408 04/08/24  1442 02/12/24  1338 02/01/24  1033 12/18/23  1425 08/18/21  1432 05/17/21  1342   WBC 6.1 5.4 5.6 11.4* 6.2   < > 7.5   RBC 3.75* 4.18 4.25 4.16 4.08   < > 3.70*   HGB 10.2* 11.7 12.0 11.7 11.5*   < > 11.0*   HCT 31.6* 36.3 36.7 36.1 35.7   < > 33.6*   MCV 84 87 86 87 88   < > 91   RDW 16.7* 17.4* 17.2* 17.2* 17.5*   < > 16.8*    222 224 236 237   < > 235   MCH 27.2 28.0 28.2 28.1 28.2   < > 29.7   MCHC 32.3 32.2 32.7 32.4 32.2   < > 32.7   NEUTROPHIL 75 63 66 81 60   < > 73.0   LYMPH 17 25 24 17 28   < > 20.0   MONOCYTE 6 7 7 1 8   < > 4.0   EOSINOPHIL 1 3 2 0 3   < > 2.0   BASOPHIL 0 2 1 1 1   < > 1.0   ANEU 4.6  --   --  9.2*  --   --  5.4   ALYM 1.0  --   --  1.9  --   --  1.5   DORITA 0.4  --   --  0.1  --   --  0.3   AEOS 0.1  --   --  0.0  --   --  0.2   ABAS 0.0  --   --  0.1  --   --  0.1   ANEUTAUTO  --  3.4 3.7  --  3.7   < >  --    ALYMPAUTO  --  1.4 1.3  --  1.8   < >  --    AMONOAUTO  --  0.4 0.4  --  0.5   < >  --    AEOSAUTO  --  0.2 0.1  --  0.2   < >  --    ABSBASO  --  0.1 0.1  --  0.1   < >  --     < > = values in this interval not displayed.     CMP  Recent Labs   Lab Test 07/29/24  1408 04/08/24  1442 02/12/24  1338 02/01/24  1033 10/23/23  1424 07/31/23  1415 10/27/22  1406 10/13/22  0950 08/18/21  1432 05/17/21  1342 05/12/21  1334 03/12/21  1328 02/22/21  1339   NA  --   --  137 134*  --   --   --  133*   < >  --   --   --   --    POTASSIUM  --   --  3.9 4.0  " --   --   --  4.2   < >  --   --   --   --    CHLORIDE  --   --  102 98  --   --   --  95*   < >  --   --   --   --    CO2  --   --  26 26  --   --   --  23   < >  --   --   --   --    ANIONGAP  --   --  9 10  --   --   --  15   < >  --   --   --   --    GLC  --   --  105* 109*  --   --   --  133*   < >  --   --   --   --    BUN  --   --  13.7 24.0*  --   --   --  14.0   < >  --   --   --   --    CR 0.34* 0.43* 0.43* 0.52   < > 0.49*   < > 0.42*   < > 0.63 0.65  --  0.58   GFRESTIMATED >90 >90 >90 >90   < > >90   < > >90   < > 85 84  --  88   GFRESTBLACK  --   --   --   --   --   --   --   --   --  >90 >90  --  >90   PAKO  --   --  9.6 9.5  --  9.8  --  9.0   < >  --  9.2   < >  --    BILITOTAL 0.4 0.4 0.5 0.4   < >  --    < > 0.3   < > 0.4  --   --  0.4   ALBUMIN 4.1 4.6 4.3 4.3   < >  --    < > 3.6   < > 3.7  --   --  4.1   PROTTOTAL 7.1 7.1 7.0 6.8   < >  --    < > 6.9   < > 7.3  --   --  7.9   ALKPHOS 99 65 76 84   < >  --    < > 78   < > 77  --   --  91   AST 21 18 21 24   < >  --    < > 25   < > 16  --   --  18   ALT <5 <5 <5 <5   < >  --    < > <5*   < > 12  --   --  10    < > = values in this interval not displayed.     Calcium/VitaminD  Recent Labs   Lab Test 02/12/24  1338 02/01/24  1033 07/31/23  1415 05/12/22  1709 12/15/21  1553 05/12/21  1334 03/12/21  1328 01/15/20  1403   PAKO 9.6 9.5 9.8   < > 9.6   < > 9.6 9.5  10.2*   VITDT  --   --   --   --  40  --  37 39    < > = values in this interval not displayed.     ESR/CRP  Recent Labs   Lab Test 07/29/24  1408 04/08/24  1442 02/12/24  1338 07/17/23  1422 03/07/23  1429 10/27/22  1406 04/21/22  1508   SED 31* 2 1   < > 20 66* 44*   CRP  --   --   --   --  18.6* 43.3* 11.7*   CRPI 36.60* <3.00 <3.00   < >  --   --   --     < > = values in this interval not displayed.     Lipid Panel  No results for input(s): \"CHOL\", \"TRIG\", \"HDL\", \"LDL\", \"VLDL\", \"CHOLHDLRATIO\", \"NHDL\" in the last 50971 hours.  Hepatitis B  Recent Labs   Lab Test 01/18/17  1601   HBCAB " Nonreactive   HEPBANG Nonreactive     Hepatitis C  Recent Labs   Lab Test 01/18/17  1601   HCVAB Nonreactive   Assay performance characteristics have not been established for newborns,   infants, and children       Tuberculosis Screening  Recent Labs   Lab Test 03/07/23  1429 03/12/21  1328 01/25/18  1352 01/18/17  1601   TBRES Negative  --   --   --    TBRSLT  --   --  Negative Negative   TBAGN  --   --  0.00 0.00   TBRST  --  Negative  --   --      HIV Screening  Recent Labs   Lab Test 01/18/17  1601   HIAGAB Nonreactive   HIV-1 p24 Ag & HIV-1/HIV-2 Ab Not Detected       Immunization History     Immunization History   Administered Date(s) Administered    COVID-19 MONOVALENT 12+ (Pfizer) 03/12/2021, 04/02/2021, 10/13/2021    Pneumo Conj 13-V (2010&after) 11/03/2016    Pneumococcal 23 valent 11/01/2007, 11/13/2007, 03/12/2018    TD,PF 7+ (Tenivac) 11/13/2007    TDAP (Adacel,Boostrix) 11/13/2007       Procedure     Procedure: Steroid injection of the left knee  Indication: Pain, osteoarthritis of the left knee    The procedure was explained in detail. Risks including infection, pain, structural damage such as cartilage damage and tendon rupture, fat atrophy, skin hyper-/hypo-pigmentation, and medication reaction was explained. The need for rest of the affected joint for one week after the procedure was explained.  The option of not doing the procedure was also provided. All questions were answered and the patient consented to the procedure.     A time-out was performed and the correct patient, procedure, and laterality were verified.    The left knee was examined and location for injection was identified - anterior medial. The area was cleaned with chlorhexidine, twice.  Ethyl chloride was then used for topical anaesthetic.  Then a mixture of lidocaine 1% 2 mL and Kenalog 40mg was injected into the intra-articular space.     The patient tolerated the procedure well. No complications.    1% Lidocaine  :  [employer]  Lot #: [id number]  EXPIRATION DATE: 2025-SEP  ND: 9195-3376-59    MEDICATION: Triamcinolone 40 mg  : [employer]  LOT #: [id number]  EXPIRATION DATE:  2026-03-31  NDC#: [address]-6                Chart documentation done in part with Dragon Voice recognition Software. Although reviewed after completion, some word and grammatical error may remain.    Merrick Del Cid MD

## 2024-09-03 ENCOUNTER — TELEPHONE (OUTPATIENT)
Dept: RHEUMATOLOGY | Facility: CLINIC | Age: 84
End: 2024-09-03
Payer: MEDICARE

## 2024-09-03 NOTE — TELEPHONE ENCOUNTER
[facility] Call Center    Phone Message    May a detailed message be left on voicemail: yes     Reason for Call: Other: Shiloh- pt's daughter called, she states pt was referred by Dr. Del Cid to Dermatology but their first available appt is 04/2025. Shiloh is requesting Dr. Del Cid send a priority referral indicating a sooner timeline so that pt can be seen within the next 1-2 months. If not, she is requesting for alternative referral options. Please advise and call Shiloh back.      Action Taken: Other: RHEUM    Travel Screening: Not Applicable     Date of Service:

## 2024-09-04 NOTE — TELEPHONE ENCOUNTER
REASON FOR VISIT: Subluxation of left thumb, initial encounter    DATE OF APPT: 9/24/2024   NOTES (FOR ALL VISITS) STATUS DETAILS   OFFICE NOTE from referring provider Internal [facility] Merrick Ortiz MD 8/29/2024   MEDICATION LIST Internal    IMAGING  (FOR ALL VISITS)     XR N/A    MRI (HEAD, NECK, SPINE) N/A    CT (HEAD, NECK, SPINE) N/A

## 2024-09-18 ENCOUNTER — OFFICE VISIT (OUTPATIENT)
Dept: DERMATOLOGY | Facility: CLINIC | Age: 84
End: 2024-09-18
Payer: COMMERCIAL

## 2024-09-18 DIAGNOSIS — D48.5 NEOPLASM OF UNCERTAIN BEHAVIOR OF SKIN: ICD-10-CM

## 2024-09-18 DIAGNOSIS — L21.9 DERMATITIS, SEBORRHEIC: Primary | ICD-10-CM

## 2024-09-18 PROCEDURE — 11102 TANGNTL BX SKIN SINGLE LES: CPT | Performed by: PHYSICIAN ASSISTANT

## 2024-09-18 PROCEDURE — 88305 TISSUE EXAM BY PATHOLOGIST: CPT | Performed by: DERMATOLOGY

## 2024-09-18 PROCEDURE — 99202 OFFICE O/P NEW SF 15 MIN: CPT | Mod: 25 | Performed by: PHYSICIAN ASSISTANT

## 2024-09-18 PROCEDURE — 11103 TANGNTL BX SKIN EA SEP/ADDL: CPT | Performed by: PHYSICIAN ASSISTANT

## 2024-09-18 NOTE — LETTER
9/18/2024      Alannah Leos  [address] [facility] [id number]      Dear Colleague,    Thank you for referring your patient, Alannah Leos, to the [facility]. Please see a copy of my visit note below.    Alannah Leos is a pleasant 83 year old year old female patient here today for sores on scalp. Present since January. Will scab but not healing. She notes her scalp is itchy. Has been using head and shoulders.  Patient has no other skin complaints today.  Remainder of the HPI, Meds, PMH, Allergies, FH, and SH was reviewed in chart.    Pertinent Hx:   No personal history of skin cancer.   Past Medical History:   Diagnosis Date     Hyperlipidemia      Murmur, heart     hsm     Nonsenile cataract      Osteoarthrosis, unspecified whether generalized or localized, lower leg      Osteopenia      Rheumatoid arthritis(714.0)        Past Surgical History:   Procedure Laterality Date     GALLBLADDER SURGERY       HYSTERECTOMY       KNEE SURGERY      left      LAMINECTOMY LUMBAR ONE LEVEL       TONSILLECTOMY          Family History   Problem Relation Age of Onset     Cancer Sister         pancreatic     Diabetes No family hx of      Hypertension No family hx of        Social History     Socioeconomic History     Marital status:      Spouse name: Willis      Number of children: 2     Years of education: 12     Highest education level: Not on file   Occupational History     Occupation:      Employer: RETIRED     Comment: [employer] in 12/2006   Tobacco Use     Smoking status: Never     Smokeless tobacco: Never   Vaping Use     Vaping status: Never Used   Substance and Sexual Activity     Alcohol use: No     Drug use: No     Sexual activity: Not Currently     Partners: Male     Birth control/protection: None   Other Topics Concern     Parent/sibling w/ CABG, MI or angioplasty before 65F 55M? Not Asked      Service No     Blood Transfusions Yes     Caffeine Concern No      Occupational Exposure No     Hobby Hazards No     Sleep Concern No     Stress Concern No     Weight Concern No     Special Diet Yes     Comment: Diabetic      Back Care Yes     Comment: Back surgeries x2     Exercise Yes     Bike Helmet No     Seat Belt Yes     Self-Exams Yes   Social History Narrative     Not on file     Social Determinants of Health     Financial Resource Strain: Low Risk  (2/1/2024)    Financial Resource Strain      Within the past 12 months, have you or your family members you live with been unable to get utilities (heat, electricity) when it was really needed?: No   Food Insecurity: Low Risk  (2/1/2024)    Food Insecurity      Within the past 12 months, did you worry that your food would run out before you got money to buy more?: No      Within the past 12 months, did the food you bought just not last and you didn t have money to get more?: No   Transportation Needs: Low Risk  (2/1/2024)    Transportation Needs      Within the past 12 months, has lack of transportation kept you from medical appointments, getting your medicines, non-medical meetings or appointments, work, or from getting things that you need?: No   Physical Activity: Not on file   Stress: Not on file   Social Connections: Unknown (4/10/2023)    [facility]    Social Connections      Frequency of Communication with Friends and Family: Not on file   Interpersonal Safety: Not on file   Housing Stability: Low Risk  (2/1/2024)    Housing Stability      Do you have housing? : Yes      Are you worried about losing your housing?: No       Outpatient Encounter Medications as of 9/18/2024   Medication Sig Dispense Refill     ACE NOT PRESCRIBED, INTENTIONAL, 1 each continuous prn. ACE Inhibitor not prescribed due to Refusal by patient       (Patient not taking: Reported on 12/18/2023) 0 each 0     CALCIUM + D OR 600mg twice a day  (Patient not  taking: Reported on 8/1/2024)       carbidopa-levodopa (SINEMET)  MG tablet Take one at 6 am, noon, 5 pm, 9 pm and midnight 150 tablet 11     Cyanocobalamin (VITAMIN B 12) 250 MCG LOZG        diclofenac (VOLTAREN) 1 % topical gel Apply 2 g topically 3 times daily as needed for moderate pain 100 g 1     folic acid (FOLVITE) 1 MG tablet Take 1 tablet (1 mg) by mouth daily. 90 tablet 2     ibuprofen (ADVIL/MOTRIN) 200 MG tablet Take 1 tablet (200 mg) by mouth every 8 hours as needed for moderate pain       methotrexate 2.5 MG tablet Take 8 tablets (20 mg) by mouth once a week. .  This is a once a week medication, taken on the same day of each week. 104 tablet 2     order for DME Equipment being ordered: soft neck brace (Patient not taking: Reported on 8/1/2024) 1 Device 0     vitamin C (ASCORBIC ACID) 1000 MG TABS Take 1,000 mg by mouth 2 times daily       Facility-Administered Encounter Medications as of 9/18/2024   Medication Dose Route Frequency Provider Last Rate Last Admin     4 mL ropivacaine (NAROPIN) injection 5 mg/mL  4 mL      4 mL at 08/03/24 0949     betamethasone acet & sod phos (CELESTONE) injection 6 mg  6 mg      6 mg at 08/03/24 0949             O:   NAD, WDWN, Alert & Oriented, Mood & Affect wnl, Vitals stable   Here today alone   LMP  (LMP Unknown)    General appearance normal   Vitals stable   Alert, oriented and in no acute distress     1.0 cm pink eroded plaque on left parietal scalp medial   1.0 cm pink eroded plaque on left parietal scalp lateral   Yellow scaling scalp     Eyes: Conjunctivae/lids:Normal     ENT: Lips: normal    MSK:Normal    Pulm: Breathing Normal    Neuro/Psych: Orientation:Alert and Orientedx3 ; Mood/Affect:normal   A/P:  R/O prurigo vs nmsc on left parietal scalp medial/lateral   TANGENTIAL BIOPSY SENT OUT:  After consent, anesthesia with LEC and prep, tangential excision performed and specimen sent out for permanent section histology.  No complications and routine  wound care. Patient told to call our office in 1-2 weeks for result.      2. Seborrheic dermatitis   Recommend Nizoral shampoo 1-2 time weekly.       Again, thank you for allowing me to participate in the care of your patient.        Sincerely,        Nakia Burgos PA-C

## 2024-09-18 NOTE — PROGRESS NOTES
Alannah Leos is a pleasant 83 year old year old female patient here today for sores on scalp. Present since January. Will scab but not healing. She notes her scalp is itchy. Has been using head and shoulders.  Patient has no other skin complaints today.  Remainder of the HPI, Meds, PMH, Allergies, FH, and SH was reviewed in chart.    Pertinent Hx:   No personal history of skin cancer.   Past Medical History:   Diagnosis Date    Hyperlipidemia     Murmur, heart     hsm    Nonsenile cataract     Osteoarthrosis, unspecified whether generalized or localized, lower leg     Osteopenia     Rheumatoid arthritis(714.0)        Past Surgical History:   Procedure Laterality Date    GALLBLADDER SURGERY      HYSTERECTOMY      KNEE SURGERY      left     LAMINECTOMY LUMBAR ONE LEVEL      TONSILLECTOMY          Family History   Problem Relation Age of Onset    Cancer Sister         pancreatic    Diabetes No family hx of     Hypertension No family hx of        Social History     Socioeconomic History    Marital status:      Spouse name: Willis     Number of children: 2    Years of education: 12    Highest education level: Not on file   Occupational History    Occupation:      Employer: RETIRED     Comment: [employer] in 12/2006   Tobacco Use    Smoking status: Never    Smokeless tobacco: Never   Vaping Use    Vaping status: Never Used   Substance and Sexual Activity    Alcohol use: No    Drug use: No    Sexual activity: Not Currently     Partners: Male     Birth control/protection: None   Other Topics Concern    Parent/sibling w/ CABG, MI or angioplasty before 65F 55M? Not Asked     Service No    Blood Transfusions Yes    Caffeine Concern No    Occupational Exposure No    Hobby Hazards No    Sleep Concern No    Stress Concern No    Weight Concern No    Special Diet Yes     Comment: Diabetic     Back Care Yes     Comment: Back surgeries x2    Exercise Yes    Bike Helmet No    Seat Belt Yes    Self-Exams  Yes   Social History Narrative    Not on file     Social Determinants of Health     Financial Resource Strain: Low Risk  (2/1/2024)    Financial Resource Strain     Within the past 12 months, have you or your family members you live with been unable to get utilities (heat, electricity) when it was really needed?: No   Food Insecurity: Low Risk  (2/1/2024)    Food Insecurity     Within the past 12 months, did you worry that your food would run out before you got money to buy more?: No     Within the past 12 months, did the food you bought just not last and you didn t have money to get more?: No   Transportation Needs: Low Risk  (2/1/2024)    Transportation Needs     Within the past 12 months, has lack of transportation kept you from medical appointments, getting your medicines, non-medical meetings or appointments, work, or from getting things that you need?: No   Physical Activity: Not on file   Stress: Not on file   Social Connections: Unknown (4/10/2023)    [facility]    Social Connections     Frequency of Communication with Friends and Family: Not on file   Interpersonal Safety: Not on file   Housing Stability: Low Risk  (2/1/2024)    Housing Stability     Do you have housing? : Yes     Are you worried about losing your housing?: No       Outpatient Encounter Medications as of 9/18/2024   Medication Sig Dispense Refill    ACE NOT PRESCRIBED, INTENTIONAL, 1 each continuous prn. ACE Inhibitor not prescribed due to Refusal by patient       (Patient not taking: Reported on 12/18/2023) 0 each 0    CALCIUM + D OR 600mg twice a day  (Patient not taking: Reported on 8/1/2024)      carbidopa-levodopa (SINEMET)  MG tablet Take one at 6 am, noon, 5 pm, 9 pm and midnight 150 tablet 11    Cyanocobalamin (VITAMIN B 12) 250 MCG LOZG       diclofenac (VOLTAREN) 1 % topical gel Apply 2 g topically 3 times daily as needed for  moderate pain 100 g 1    folic acid (FOLVITE) 1 MG tablet Take 1 tablet (1 mg) by mouth daily. 90 tablet 2    ibuprofen (ADVIL/MOTRIN) 200 MG tablet Take 1 tablet (200 mg) by mouth every 8 hours as needed for moderate pain      methotrexate 2.5 MG tablet Take 8 tablets (20 mg) by mouth once a week. .  This is a once a week medication, taken on the same day of each week. 104 tablet 2    order for DME Equipment being ordered: soft neck brace (Patient not taking: Reported on 8/1/2024) 1 Device 0    vitamin C (ASCORBIC ACID) 1000 MG TABS Take 1,000 mg by mouth 2 times daily       Facility-Administered Encounter Medications as of 9/18/2024   Medication Dose Route Frequency Provider Last Rate Last Admin    4 mL ropivacaine (NAROPIN) injection 5 mg/mL  4 mL      4 mL at 08/03/24 0949    betamethasone acet & sod phos (CELESTONE) injection 6 mg  6 mg      6 mg at 08/03/24 0949             O:   NAD, WDWN, Alert & Oriented, Mood & Affect wnl, Vitals stable   Here today alone   LMP  (LMP Unknown)    General appearance normal   Vitals stable   Alert, oriented and in no acute distress     1.0 cm pink eroded plaque on left parietal scalp medial   1.0 cm pink eroded plaque on left parietal scalp lateral   Yellow scaling scalp     Eyes: Conjunctivae/lids:Normal     ENT: Lips: normal    MSK:Normal    Pulm: Breathing Normal    Neuro/Psych: Orientation:Alert and Orientedx3 ; Mood/Affect:normal   A/P:  R/O prurigo vs nmsc on left parietal scalp medial/lateral   TANGENTIAL BIOPSY SENT OUT:  After consent, anesthesia with LEC and prep, tangential excision performed and specimen sent out for permanent section histology.  No complications and routine wound care. Patient told to call our office in 1-2 weeks for result.      2. Seborrheic dermatitis   Recommend Nizoral shampoo 1-2 time weekly.

## 2024-09-18 NOTE — PATIENT INSTRUCTIONS
Recommend Nizoral shampoo, leave on for 5 minutes the rinse. Use 1-2 times weekly.   Wound Care Instructions     FOR SUPERFICIAL WOUNDS      [phone]        No strenuous activity for 48 hours    Take Tylenol as needed for discomfort.    Do not drink alcoholic beverages for 48 hours.    Avoid lakes, rivers, pools, hot tubs, and oceans until the site is healed.      AFTER 24 HOURS YOU SHOULD REMOVE THE BANDAGE AND BEGIN DAILY DRESSING CHANGES AS FOLLOWS:    Remove Dressing.    2) Clean and dry the area with tap water using a Q-tip or sterile gauze pad.    Apply Vaseline, Aquaphor, Polysporin ointment or Bacitracin ointment over entire wound.  Do NOT use Neosporin ointment.    Cover the wound with a band-aid, or a sterile non-stick gauze pad and micropore paper tape.      REPEAT THESE INSTRUCTIONS AT LEAST ONCE A DAY UNTIL THE WOUND HAS COMPLETELY HEALED.    It is an old wives tale that a wound heals better when it is exposed to air and allowed to dry out. The wound will heal faster with a better cosmetic result if it is kept moist with ointment and covered with a bandage.    ** Do not let the wound dry out **      Supplies Needed:    Cotton tipped applicators (Q-tips)  Vaseline, Aquaphor, Polysporin Ointment or Bacitracin Ointment        (NOT NEOSPORIN)  Band-aids or non-stick gauze pads and micropore paper tape.    WOUND HEALING:    One week after surgery a pink / red halo will form around the outside of the wound.   This is new skin.  The center of the wound will appear yellowish white and produce some drainage.  The pink halo will slowly migrate in toward the center of the wound until the wound is covered with new shiny pink skin.  There will be no more drainage when the wound is completely healed.    It will take six months to one year for the redness to fade.  The scar may be itchy, tight, and sensitive to extreme temperatures for a year after the surgery.  Massaging the area several times a day for several  minutes after the wound is completely healed will help the scar soften and normalize faster. Begin massage only after healing is complete.      PATIENT INFORMATION:    During the healing process you will notice a number of changes. All wounds develop a small halo of redness surrounding the wound.  This means healing is occurring. Severe itching with extensive redness usually indicates sensitivity to the ointment or bandage tape used to dress the wound.  You should call our office if this develops.      Swelling and/or discoloration around your surgical site is common, particularly when performed around the eye.    All wounds normally drain.  The larger the wound the more drainage there will be.  After 7-10 days, you will notice the wound beginning to shrink and new skin will begin to grow.  The wound is healed when you can see skin has formed over the entire area.  A healed wound has a healthy, shiny look to the surface and is red to dark pink in color to normalize.  Wounds may take approximately 4-6 weeks to heal.  Larger wounds may take 6-8 weeks.  After the wound is healed you may discontinue dressing changes.    You may experience a sensation of tightness as your wound heals. This is normal and will gradually subside.    Your healed wound may be sensitive to temperature changes. This sensitivity improves with time, but if you're having a lot of discomfort, try to avoid temperature extremes.    Patients frequently experience itching after their wound appears to have healed because of the continue healing under the skin.  Plain Vaseline will help relieve the itching.      POSSIBLE COMPLICATIONS    BLEEDING:  Use tightly rolled up gauze or cloth to apply direct pressure over the bandage for 20 minutes.  Reapply pressure for an additional 20 minutes if necessary.  Call the office or go to the nearest emergency room if pressure fails to stop the bleeding.  Use additional gauze and tape to maintain pressure once the  bleeding has stopped.  Begin wound care 24 hours after surgery as directed.    INFECTION:  Increasing:   Pain or swelling  Pus or drainage (clear or slightly yellow drainage is ok)  Temperature over 100F  Spreading redness or warmth around wound  Call the clinic as soon as possible if experiencing any of the symptoms listed.     Please call or send a Mychart message for questions or concerns.  If sending a Mychart message, please attach a photo of the wound to your message, if possible. This will help us better assist you.     [facility]: [phone]            Proper skin care from [facility]:    -Eliminate harsh soaps as they strip the natural oils from the skin, often resulting in dry itchy skin ( i.e. Dial, Zest, Sharee Spring)  -Use mild soaps such as Cetaphil or Dove Sensitive Skin in the shower. You do not need to use soap on arms, legs, and trunk every time you shower unless visibly soiled.   -Avoid hot or cold showers.  -After showering, lightly dry off and apply moisturizing within 2-3 minutes. This will help trap moisture in the skin.   -Aggressive use of a moisturizer at least 1-2 times a day to the entire body (including -Vanicream, Cetaphil, Aquaphor or Cerave) and moisturize hands after every washing.  -We recommend using moisturizers that come in a tub that needs to be scooped out, not a pump. This has more of an oil base. It will hold moisture in your skin much better than a water base moisturizer. The above recommended are non-pore clogging.      Wear a sunscreen with at least SPF 30 on your face, ears, neck and V of the chest daily. Wear sunscreen on other areas of the body if those areas are exposed to the sun throughout the day. Sunscreens can contain physical and/or chemical blockers. Physical blockers are less likely to clog pores, these include zinc oxide and titanium dioxide. Reapply every two hour and after swimming.     Sunscreen examples:  https://www.ewg.org/sunscreen/    UV radiation  UVA radiation remains constant throughout the day and throughout the year. It is a longer wavelength than UVB and therefore penetrates deeper into the skin leading to immediate and delayed tanning, photoaging, and skin cancer. 70-80% of UVA and UVB radiation occurs between the hours of 10am-2pm.  UVB radiation  UVB radiation causes the most harmful effects and is more significant during the summer months. However, snow and ice can reflect UVB radiation leading to skin damage during the winter months as well. UVB radiation is responsible for tanning, burning, inflammation, delayed erythema (pinkness), pigmentation (brown spots), and skin cancer.     I recommend self monthly full body exams and yearly full body exams with a dermatology provider. If you develop a new or changing lesion please follow up for examination. Most skin cancers are pink and scaly or pink and pearly. However, we do see blue/brown/black skin cancers.  Consider the ABCDEs of melanoma when giving yourself your monthly full body exam ( don't forget the groin, buttocks, feet, toes, etc). A-asymmetry, B-borders, C-color, D-diameter, E-elevation or evolving. If you see any of these changes please follow up in clinic. If you cannot see your back I recommend purchasing a hand held mirror to use with a larger wall mirror.       Checking for Skin Cancer  You can find cancer early by checking your skin each month. There are 3 kinds of skin cancer. They are melanoma, basal cell carcinoma, and squamous cell carcinoma. Doing monthly skin checks is the best way to find new marks or skin changes. Follow the instructions below for checking your skin.   The ABCDEs of checking moles for melanoma   Check your moles or growths for signs of melanoma using ABCDE:   Asymmetry: the sides of the mole or growth don t match  Border: the edges are ragged, notched, or blurred  Color: the color within the mole or growth  varies  Diameter: the mole or growth is larger than 6 mm (size of a pencil eraser)  Evolving: the size, shape, or color of the mole or growth is changing (evolving is not shown in the images below)    Checking for other types of skin cancer  Basal cell carcinoma or squamous cell carcinoma have symptoms such as:     A spot or mole that looks different from all other marks on your skin  Changes in how an area feels, such as itching, tenderness, or pain  Changes in the skin's surface, such as oozing, bleeding, or scaliness  A sore that does not heal  New swelling or redness beyond the border of a mole    Who s at risk?  Anyone can get skin cancer. But you are at greater risk if you have:   Fair skin, light-colored hair, or light-colored eyes  Many moles or abnormal moles on your skin  A history of sunburns from sunlight or tanning beds  A family history of skin cancer  A history of exposure to radiation or chemicals  A weakened immune system  If you have had skin cancer in the past, you are at risk for recurring skin cancer.   How to check your skin  Do your monthly skin checkups in front of a full-length mirror. Check all parts of your body, including your:   Head (ears, face, neck, and scalp)  Torso (front, back, and sides)  Arms (tops, undersides, upper, and lower armpits)  Hands (palms, backs, and fingers, including under the nails)  Buttocks and genitals  Legs (front, back, and sides)  Feet (tops, soles, toes, including under the nails, and between toes)  If you have a lot of moles, take digital photos of them each month. Make sure to take photos both up close and from a distance. These can help you see if any moles change over time.   Most skin changes are not cancer. But if you see any changes in your skin, call your doctor right away. Only he or she can diagnose a problem. If you have skin cancer, seeing your doctor can be the first step toward getting the treatment that could save your life.   [employer] last  reviewed this educational content on 4/1/2019 2000-2020 [employer]. [address] [place] [address], [place] [address]. All rights reserved. This information is not intended as a substitute for professional medical care. Always follow your healthcare professional's instructions.       When should I call my doctor?  If you are worsening or not improving, please, contact us or seek urgent care as noted below.     Who should I call with questions (adults)?    [facility] [phone]  For urgent needs outside of business hours call the [facility] at [phone] and ask for the dermatology resident on call to be paged  If this is a medical emergency and you are unable to reach an ER, Call [phone]      If you need a prescription refill, please contact your pharmacy. Refills are approved or denied by our Physicians during normal business hours, Monday through Fridays  Per office policy, refills will not be granted if you have not been seen within the past year (or sooner depending on your child's condition)

## 2024-09-20 LAB
PATH REPORT.COMMENTS IMP SPEC: NORMAL
PATH REPORT.FINAL DX SPEC: NORMAL
PATH REPORT.GROSS SPEC: NORMAL
PATH REPORT.MICROSCOPIC SPEC OTHER STN: NORMAL
PATH REPORT.RELEVANT HX SPEC: NORMAL

## 2024-09-23 ENCOUNTER — TELEPHONE (OUTPATIENT)
Dept: DERMATOLOGY | Facility: CLINIC | Age: 84
End: 2024-09-23

## 2024-09-23 ENCOUNTER — LAB (OUTPATIENT)
Dept: INFUSION THERAPY | Facility: CLINIC | Age: 84
End: 2024-09-23
Attending: INTERNAL MEDICINE
Payer: MEDICARE

## 2024-09-23 VITALS
RESPIRATION RATE: 18 BRPM | BODY MASS INDEX: 20.14 KG/M2 | HEART RATE: 67 BPM | TEMPERATURE: 97.9 F | OXYGEN SATURATION: 98 % | WEIGHT: 106.6 LBS | SYSTOLIC BLOOD PRESSURE: 154 MMHG | DIASTOLIC BLOOD PRESSURE: 70 MMHG

## 2024-09-23 DIAGNOSIS — Z79.899 HIGH RISK MEDICATION USE: ICD-10-CM

## 2024-09-23 DIAGNOSIS — M06.09 RHEUMATOID ARTHRITIS OF MULTIPLE SITES WITH NEGATIVE RHEUMATOID FACTOR (H): Primary | ICD-10-CM

## 2024-09-23 DIAGNOSIS — C44.92 SCC (SQUAMOUS CELL CARCINOMA): Primary | ICD-10-CM

## 2024-09-23 DIAGNOSIS — M06.09 RHEUMATOID ARTHRITIS OF MULTIPLE SITES WITH NEGATIVE RHEUMATOID FACTOR (H): ICD-10-CM

## 2024-09-23 LAB
ALBUMIN SERPL BCG-MCNC: 4.4 G/DL (ref 3.5–5.2)
ALP SERPL-CCNC: 78 U/L (ref 40–150)
ALT SERPL W P-5'-P-CCNC: <5 U/L (ref 0–50)
AST SERPL W P-5'-P-CCNC: 24 U/L (ref 0–45)
BASOPHILS # BLD AUTO: 0.1 10E3/UL (ref 0–0.2)
BASOPHILS NFR BLD AUTO: 1 %
BILIRUB DIRECT SERPL-MCNC: <0.2 MG/DL (ref 0–0.3)
BILIRUB SERPL-MCNC: 0.4 MG/DL
CREAT SERPL-MCNC: 0.37 MG/DL (ref 0.51–0.95)
EGFRCR SERPLBLD CKD-EPI 2021: >90 ML/MIN/1.73M2
EOSINOPHIL # BLD AUTO: 0.2 10E3/UL (ref 0–0.7)
EOSINOPHIL NFR BLD AUTO: 3 %
ERYTHROCYTE [DISTWIDTH] IN BLOOD BY AUTOMATED COUNT: 18.2 % (ref 10–15)
HCT VFR BLD AUTO: 34.9 % (ref 35–47)
HGB BLD-MCNC: 11.4 G/DL (ref 11.7–15.7)
HOLD SPECIMEN: NORMAL
IMM GRANULOCYTES # BLD: 0 10E3/UL
IMM GRANULOCYTES NFR BLD: 0 %
LYMPHOCYTES # BLD AUTO: 1.4 10E3/UL (ref 0.8–5.3)
LYMPHOCYTES NFR BLD AUTO: 22 %
MCH RBC QN AUTO: 27.7 PG (ref 26.5–33)
MCHC RBC AUTO-ENTMCNC: 32.7 G/DL (ref 31.5–36.5)
MCV RBC AUTO: 85 FL (ref 78–100)
MONOCYTES # BLD AUTO: 0.4 10E3/UL (ref 0–1.3)
MONOCYTES NFR BLD AUTO: 6 %
NEUTROPHILS # BLD AUTO: 4.3 10E3/UL (ref 1.6–8.3)
NEUTROPHILS NFR BLD AUTO: 68 %
NRBC # BLD AUTO: 0 10E3/UL
NRBC BLD AUTO-RTO: 0 /100
PLATELET # BLD AUTO: 219 10E3/UL (ref 150–450)
PROT SERPL-MCNC: 7.2 G/DL (ref 6.4–8.3)
RBC # BLD AUTO: 4.11 10E6/UL (ref 3.8–5.2)
WBC # BLD AUTO: 6.3 10E3/UL (ref 4–11)

## 2024-09-23 PROCEDURE — 82565 ASSAY OF CREATININE: CPT

## 2024-09-23 PROCEDURE — 85048 AUTOMATED LEUKOCYTE COUNT: CPT

## 2024-09-23 PROCEDURE — 250N000011 HC RX IP 250 OP 636: Performed by: INTERNAL MEDICINE

## 2024-09-23 PROCEDURE — 99207 PR NO CHARGE LOS: CPT

## 2024-09-23 PROCEDURE — 250N000013 HC RX MED GY IP 250 OP 250 PS 637: Performed by: INTERNAL MEDICINE

## 2024-09-23 PROCEDURE — 96413 CHEMO IV INFUSION 1 HR: CPT

## 2024-09-23 PROCEDURE — 80076 HEPATIC FUNCTION PANEL: CPT

## 2024-09-23 PROCEDURE — 258N000003 HC RX IP 258 OP 636: Performed by: INTERNAL MEDICINE

## 2024-09-23 PROCEDURE — 36415 COLL VENOUS BLD VENIPUNCTURE: CPT

## 2024-09-23 RX ORDER — METHYLPREDNISOLONE SODIUM SUCCINATE 125 MG/2ML
125 INJECTION, POWDER, LYOPHILIZED, FOR SOLUTION INTRAMUSCULAR; INTRAVENOUS
Start: 2024-11-18

## 2024-09-23 RX ORDER — HEPARIN SODIUM (PORCINE) LOCK FLUSH IV SOLN 100 UNIT/ML 100 UNIT/ML
5 SOLUTION INTRAVENOUS
OUTPATIENT
Start: 2024-11-18

## 2024-09-23 RX ORDER — DIPHENHYDRAMINE HCL 25 MG
25 CAPSULE ORAL ONCE
Start: 2024-11-18 | End: 2024-11-18

## 2024-09-23 RX ORDER — MEPERIDINE HYDROCHLORIDE 25 MG/ML
25 INJECTION INTRAMUSCULAR; INTRAVENOUS; SUBCUTANEOUS EVERY 30 MIN PRN
OUTPATIENT
Start: 2024-11-18

## 2024-09-23 RX ORDER — ACETAMINOPHEN 325 MG/1
650 TABLET ORAL ONCE
Status: COMPLETED | OUTPATIENT
Start: 2024-09-23 | End: 2024-09-23

## 2024-09-23 RX ORDER — HEPARIN SODIUM,PORCINE 10 UNIT/ML
5 VIAL (ML) INTRAVENOUS
OUTPATIENT
Start: 2024-11-18

## 2024-09-23 RX ORDER — ACETAMINOPHEN 325 MG/1
650 TABLET ORAL ONCE
Start: 2024-11-18 | End: 2024-11-18

## 2024-09-23 RX ORDER — DIPHENHYDRAMINE HYDROCHLORIDE 50 MG/ML
50 INJECTION INTRAMUSCULAR; INTRAVENOUS
Start: 2024-11-18

## 2024-09-23 RX ORDER — ALBUTEROL SULFATE 0.83 MG/ML
2.5 SOLUTION RESPIRATORY (INHALATION)
OUTPATIENT
Start: 2024-11-18

## 2024-09-23 RX ORDER — EPINEPHRINE 1 MG/ML
0.3 INJECTION, SOLUTION INTRAMUSCULAR; SUBCUTANEOUS EVERY 5 MIN PRN
OUTPATIENT
Start: 2024-11-18

## 2024-09-23 RX ORDER — ALBUTEROL SULFATE 90 UG/1
1-2 AEROSOL, METERED RESPIRATORY (INHALATION)
Start: 2024-11-18

## 2024-09-23 RX ADMIN — SODIUM CHLORIDE 250 ML: 9 INJECTION, SOLUTION INTRAVENOUS at 14:53

## 2024-09-23 RX ADMIN — INFLIXIMAB 200 MG: 100 INJECTION, POWDER, LYOPHILIZED, FOR SOLUTION INTRAVENOUS at 15:00

## 2024-09-23 RX ADMIN — ACETAMINOPHEN 650 MG: 325 TABLET ORAL at 14:52

## 2024-09-23 ASSESSMENT — PAIN SCALES - GENERAL: PAINLEVEL: SEVERE PAIN (6)

## 2024-09-23 NOTE — PROGRESS NOTES
Infusion Nursing Note:  Alannah Leos presents today for Rapid Infliximab .    Patient seen by provider today: No   present during visit today: Not Applicable.    Note: After infliximab infusion, patient's IV noted to be infiltrated. Patient stated with about 10 minutes left of her infusion her IV started to hurt, patient didn't inform nurse. Patient's daughter noted that the patient was fidgeting during infusion due to her Parkinson's disease. Pharmacy stated infliximab is not a vesicant- to apply heat or ice whichever feels better. Patient informed to monitor the site and that the body will absorb the fluid. Informed patient if site becomes red, hot, increases in size or skin breakdown occurs to call triage line.     Intravenous Access:  Peripheral IV placed.    Treatment Conditions:  Biological Infusion Checklist:  ~~~ NOTE: If the patient answers yes to any of the questions below, hold the infusion and contact ordering provider or on-call provider.    Have you recently had an elevated temperature, fever, chills, productive cough, coughing for 3 weeks or longer or hemoptysis,  abnormal vital signs, night sweats,  chest pain or have you noticed a decrease in your appetite, unexplained weight loss or fatigue? No  Do you have any open wounds or new incisions? No  Do you have any upcoming hospitalizations or surgeries? Does not include esophagogastroduodenoscopy, colonoscopy, endoscopic retrograde cholangiopancreatography (ERCP), endoscopic ultrasound (EUS), dental procedures or joint aspiration/steroid injections No  Do you currently have any signs of illness or infection or are you on any antibiotics? No  Have you had any new, sudden or worsening abdominal pain? No  Have you or anyone in your household received a live vaccination in the past 4 weeks? Please note: No live vaccines while on biologic/chemotherapy until 6 months after the last treatment. Patient can receive the flu vaccine (shot only),  pneumovax and the Covid vaccine. It is optimal for the patient to get these vaccines mid cycle, but they can be given at any time as long as it is not on the day of the infusion. No  Have you recently been diagnosed with any new nervous system diseases (ie. Multiple sclerosis, Guillain Mouth Of [place], seizures, neurological changes) or cancer diagnosis? Are you on any form of radiation or chemotherapy? No  Have there been any other new onset medical symptoms? No    Post Infusion Assessment:  Patient tolerated infusion without incident.  Access discontinued per protocol.  Biologic Infusion Post Education: Call the triage nurse at your clinic or seek medical attention if you have chills and/or temperature greater than or equal to 100.5, uncontrolled nausea/vomiting, diarrhea, constipation, dizziness, shortness of breath, chest pain, heart palpitations, weakness or any other new or concerning symptoms, questions or concerns.  You cannot have any live virus vaccines prior to or during treatment or up to 6 months post infusion.  If you have an upcoming surgery, medical procedure or dental procedure during treatment, this should be discussed with your ordering physician and your surgeon/dentist.  If you are having any concerning symptom, if you are unsure if you should get your next infusion or wish to speak to a provider before your next infusion, please call your care coordinator or triage nurse at your clinic to notify them so we can adequately serve you.     Discharge Plan:   Discharge instructions reviewed with: Patient.  Patient and/or family verbalized understanding of discharge instructions and all questions answered.  Patient discharged in stable condition accompanied by: daughter.  Departure Mode: Wheelchair.  Future appts have been reviewed and crosschecked with appt note and plan.      Jasmyne Burks RN

## 2024-09-23 NOTE — TELEPHONE ENCOUNTER
Patient Contact    Attempt # 1    Was call answered?  No    Left message on answering machine for patient to call back.    Pita LOUISE RN  Dermatology   [phone]

## 2024-09-23 NOTE — LETTER
"September 25, 2024      Alannah Leos  [address] [facility] [address]        Dear ,    Below are your test results as discussed on the phone with your daughter Shiloh.     \"Hi Alannah,  Your left parietal scalp medial returned as a squamous cell carcinoma, please schedule mohs with Dr. Banks,    Your left parietal scalp lateral returned most consistent with scar.\"    Sincerely,  Nakia Trevizo PA-C    Resulted Orders   Dermatological Path Order and Indications   Result Value Ref Range    Case Report       Surgical Pathology Report                         Case: [id number]                                  Authorizing Provider:  Nakia Trevizo,    Collected:           09/18/2024 11:17 AM                                 J[place]                                                                         Ordering Location:     [facility]   Received:            09/18/2024 11:17 AM                                 [place]                                                                      Pathologist:           Garrick Woods MD                                                         Specimens:   A) - Skin, left parietal scalp medial                                                               B) - Skin, left parietal scalp lateral                                                     Final Diagnosis       A. Left parietal scalp medial:  - Squamous cell carcinoma, well-differentiated - (see description)    B. Left parietal scalp lateral:  - Mild irregular epidermal hyperplasia and upper dermal fibrosis consistent with scar - (see comment)      Comment       B. A clinical photo was reviewed with this case.  These microscopic changes are not entirely diagnostic, but given the clinical findings, they are most consistent with a reaction to repetitive prior trauma.  There is no evidence of malignancy on level sections.      Clinical Information       The patient is an 83 year old female.    " "   Gross Description       A(1). Skin, left parietal scalp medial:  The specimen is received in formalin with proper patient identification, labeled \"left parietal scalp medial\".  The specimen consists of a 0.6 x 0.5 cm skin shave specimen containing a 0.3 x 0.3 cm tan, crusted lesion.  The resection margin is inked and it is bisected and submitted in A1.   B(2). Skin, left parietal scalp lateral:  The specimen is received in formalin with proper patient identification, labeled \"left parietal scalp lateral\".  The specimen consists of a 0.6 x 0.5 cm skin shave specimen containing a tan skin surface.  The resection margin is inked and it is bisected and submitted in B1.       Microscopic Description       A. The specimen exhibits scale crust and an endophytic, undermining proliferation of atypical squamous epithelium with foci of keratinization and dermal islands of atypical keratinocytes.    B.  The specimen exhibits mild epidermal hyperplasia with hypergranulosis, patchy upper dermal fibrosis consistent with scar, telangiectasia and a mild superficial perivascular lymphocytic infiltrate.      Performing Labs       The technical component of this testing was completed at [facility].    Stain controls for all stains resulted within this report have been reviewed and show appropriate reactivity.          If you have any questions or concerns, please call the clinic at the number listed above.       Sincerely,    [facility]  [phone]                  "

## 2024-09-23 NOTE — TELEPHONE ENCOUNTER
----- Message from Nakia Trevizo sent at 9/23/2024  7:12 AM CDT -----  Hi Alannah,  Your left parietal scalp medial returned as a squamous cell carcinoma, please schedule mohs with Dr. Banks,  Your left parietal scalp lateral returned most consistent with scar.   Sincerely,  Nakia Trevizo PA-C

## 2024-09-24 ENCOUNTER — PRE VISIT (OUTPATIENT)
Dept: ORTHOPEDICS | Facility: CLINIC | Age: 84
End: 2024-09-24

## 2024-09-24 ENCOUNTER — OFFICE VISIT (OUTPATIENT)
Dept: ORTHOPEDICS | Facility: CLINIC | Age: 84
End: 2024-09-24
Payer: COMMERCIAL

## 2024-09-24 DIAGNOSIS — M06.09 RHEUMATOID ARTHRITIS OF MULTIPLE SITES WITH NEGATIVE RHEUMATOID FACTOR (H): Primary | ICD-10-CM

## 2024-09-24 DIAGNOSIS — S63.105S: ICD-10-CM

## 2024-09-24 PROCEDURE — 99205 OFFICE O/P NEW HI 60 MIN: CPT | Performed by: PLASTIC SURGERY

## 2024-09-24 NOTE — PROGRESS NOTES
Referring Provider:  Referred Self, MD  No address on file     Primary Care Provider:  No Ref-Primary, Physician      RE: Alannah Leos.  : 1940.  BRIAN: 2024.    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  She has had longstanding rheumatoid arthritis on methotrexate and Remicade.  She is wheelchair-bound.  She has severe systemic rheumatoid arthritis.  Her left thumb has been dislocated for multiple years.  She is unable to use that thumb properly.  Here to see can be done.  She has pain in that thumb when it moves.  She is unable to pull up her pants which is the most obvious issue she has.  She has attempted occupational therapy and splint use but has been reluctant to use the splints because they are so difficult and cumbersome.    Medical history:  Past Medical History:   Diagnosis Date    Hyperlipidemia     Murmur, heart     hsm    Nonsenile cataract     Osteoarthrosis, unspecified whether generalized or localized, lower leg     Osteopenia     Rheumatoid arthritis(714.0)        Surgical history:  Past Surgical History:   Procedure Laterality Date    GALLBLADDER SURGERY      HYSTERECTOMY      KNEE SURGERY      left     LAMINECTOMY LUMBAR ONE LEVEL      TONSILLECTOMY         Family history:  Family History   Problem Relation Age of Onset    Cancer Sister         pancreatic    Diabetes No family hx of     Hypertension No family hx of        Medications:  Current Outpatient Medications   Medication Sig Dispense Refill    ACE NOT PRESCRIBED, INTENTIONAL, 1 each continuous prn. ACE Inhibitor not prescribed due to Refusal by patient       (Patient not taking: Reported on 2023) 0 each 0    CALCIUM + D OR 600mg twice a day  (Patient not taking: Reported on 2024)      carbidopa-levodopa (SINEMET)  MG tablet Take one at 6 am, noon, 5 pm, 9 pm and midnight 150 tablet 11    Cyanocobalamin (VITAMIN B 12) 250 MCG LOZG        diclofenac (VOLTAREN) 1 % topical gel Apply 2 g topically 3 times daily as needed for moderate pain 100 g 1    folic acid (FOLVITE) 1 MG tablet Take 1 tablet (1 mg) by mouth daily. 90 tablet 2    ibuprofen (ADVIL/MOTRIN) 200 MG tablet Take 1 tablet (200 mg) by mouth every 8 hours as needed for moderate pain      methotrexate 2.5 MG tablet Take 8 tablets (20 mg) by mouth once a week. .  This is a once a week medication, taken on the same day of each week. 104 tablet 2    order for DME Equipment being ordered: soft neck brace (Patient not taking: Reported on 8/1/2024) 1 Device 0    vitamin C (ASCORBIC ACID) 1000 MG TABS Take 1,000 mg by mouth 2 times daily         Allergies:  Allergies   Allergen Reactions    Decadrol [Dexamethasone Sodium Phosphate] Hives    Shrimp Itching       Social history:   Social History     Tobacco Use    Smoking status: Never    Smokeless tobacco: Never   Substance Use Topics    Alcohol use: No         Physical Examination:  LMP  (LMP Unknown)   There is no height or weight on file to calculate BMI.    General: No acute distress.    Left thumb: The thumb is totally subluxated off the metacarpal at the MCP joint.  The ulnar collateral ligament is completely gone.  I am unable to centralize the thumb due to pain.  Neurovascularly relatively intact.        ASSESMENT and PLAN:     Based upon the above findings, a diagnosis of chronic subluxation/dislocation of left thumb at the MP joint from rheumatoid arthritis was made.  I had a brook[place], detailed discussion with the patient, in the presence of my nurse, who was present from beginning to end.  I discussed with the patient the pathophysiology behind the problem, the concept behind the procedure/treatment proposed, expectations of the planned procedure(s), and all olivia-operative steps.     Discussed with the patient and her daughter options of doing nothing versus occupational therapy/splinting versus fusion of the MP joint.  Pros and cons of  each were explained.  They are hesitant to proceed with surgery because of the downtime.  We talked about the surgery and its potential risks especially given her longstanding rheumatoid arthritis and potential poor bone stock.  Discussed infections, nonunion, malunion, fractures.  They want to think about it and let me know how they want to proceed.  They will also discuss with their rheumatologist medication stoppage at the time of surgery if indicated.    All risks, benefits and alternatives, including but not limited to (what applies), pain, infection, bleeding, scarring, asymmetry, seromas, hematomas, wound breakdown, wound dehiscence, loss of the implants/flaps, abdominal wall-healing issues, abdominal wall weakness, bulges, hernias, sensation loss, requirement of further staged procedures, Implant specific issues and complications as discussed above, removal of infected or exposed implants, pneumothoraces, contour abnormalities, cannula injuries to deeper structures, hernias, fat necrosis, lumps and bumps, loss of grafted material, DVT, PE, MI, CVA, pneumonia, renal failure and death, were explained. They were understood and agreed upon by the patient, they were acknowledged by the patient, all the patient's questions were answered in detail to the patient's fullest understanding that they acknowledged, the team approach for treatment in the operating room was agreed upon by the patient, and proceeding with surgery was agreed upon by the patient.    They will think about and let me know how they want to proceed.    All questions were answered. The patient was happy with the visit. I look forward to helping the patient out in the near future as indicated.       Total time spent in the encounter today including chart review, visit itself, and post-visit paperwork was 60 minutes.       Rodney Hughes MD    Chief, Division of Plastic Surgery  Department of Surgery  [facility]       CC: Referred Self, MD  No address on file  CC: No Ref-Primary, Physician

## 2024-09-24 NOTE — TELEPHONE ENCOUNTER
Patient and daughter Shiloh returning call for path results. Call Shiloh back anytime tomorrow and Alannah will be unable to get to the phone fast enough. There is a consent to communicate on file.

## 2024-09-24 NOTE — LETTER
2024      Alannah Leos  [address] [facility] [address]      Dear Colleague,    Thank you for referring your patient, Alannah Leos, to the [facility]. Please see a copy of my visit note below.    Referring Provider:  Referred Self, MD  No address on file     Primary Care Provider:  No Ref-Primary, Physician      RE: Alannah Leos.  : 1940.  BRIAN: 2024.    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  She has had longstanding rheumatoid arthritis on methotrexate and Remicade.  She is wheelchair-bound.  She has severe systemic rheumatoid arthritis.  Her left thumb has been dislocated for multiple years.  She is unable to use that thumb properly.  Here to see can be done.  She has pain in that thumb when it moves.  She is unable to pull up her pants which is the most obvious issue she has.  She has attempted occupational therapy and splint use but has been reluctant to use the splints because they are so difficult and cumbersome.    Medical history:  Past Medical History:   Diagnosis Date    Hyperlipidemia     Murmur, heart     hsm    Nonsenile cataract     Osteoarthrosis, unspecified whether generalized or localized, lower leg     Osteopenia     Rheumatoid arthritis(714.0)        Surgical history:  Past Surgical History:   Procedure Laterality Date    GALLBLADDER SURGERY      HYSTERECTOMY      KNEE SURGERY      left     LAMINECTOMY LUMBAR ONE LEVEL      TONSILLECTOMY       Family history:  Family History   Problem Relation Age of Onset    Cancer Sister         pancreatic    Diabetes No family hx of     Hypertension No family hx of      Medications:  Current Outpatient Medications   Medication Sig Dispense Refill    ACE NOT PRESCRIBED, INTENTIONAL, 1 each continuous prn. ACE Inhibitor not prescribed due to Refusal by patient       (Patient not taking: Reported on 2023) 0 each 0     CALCIUM + D OR 600mg twice a day  (Patient not taking: Reported on 8/1/2024)      carbidopa-levodopa (SINEMET)  MG tablet Take one at 6 am, noon, 5 pm, 9 pm and midnight 150 tablet 11    Cyanocobalamin (VITAMIN B 12) 250 MCG LOZG       diclofenac (VOLTAREN) 1 % topical gel Apply 2 g topically 3 times daily as needed for moderate pain 100 g 1    folic acid (FOLVITE) 1 MG tablet Take 1 tablet (1 mg) by mouth daily. 90 tablet 2    ibuprofen (ADVIL/MOTRIN) 200 MG tablet Take 1 tablet (200 mg) by mouth every 8 hours as needed for moderate pain      methotrexate 2.5 MG tablet Take 8 tablets (20 mg) by mouth once a week. .  This is a once a week medication, taken on the same day of each week. 104 tablet 2    order for DME Equipment being ordered: soft neck brace (Patient not taking: Reported on 8/1/2024) 1 Device 0    vitamin C (ASCORBIC ACID) 1000 MG TABS Take 1,000 mg by mouth 2 times daily       Allergies:  Allergies   Allergen Reactions    Decadrol [Dexamethasone Sodium Phosphate] Hives    Shrimp Itching     Social history:   Social History     Tobacco Use    Smoking status: Never    Smokeless tobacco: Never   Substance Use Topics    Alcohol use: No     Physical Examination:  LMP  (LMP Unknown)   There is no height or weight on file to calculate BMI.    General: No acute distress.    Left thumb: The thumb is totally subluxated off the metacarpal at the MCP joint.  The ulnar collateral ligament is completely gone.  I am unable to centralize the thumb due to pain.  Neurovascularly relatively intact.      ASSESMENT and PLAN:     Based upon the above findings, a diagnosis of chronic subluxation/dislocation of left thumb at the MP joint from rheumatoid arthritis was made.  I had a brook[place], detailed discussion with the patient, in the presence of my nurse, who was present from beginning to end.  I discussed with the patient the pathophysiology behind the problem, the concept behind the procedure/treatment proposed,  expectations of the planned procedure(s), and all olivia-operative steps.     Discussed with the patient and her daughter options of doing nothing versus occupational therapy/splinting versus fusion of the MP joint.  Pros and cons of each were explained.  They are hesitant to proceed with surgery because of the downtime.  We talked about the surgery and its potential risks especially given her longstanding rheumatoid arthritis and potential poor bone stock.  Discussed infections, nonunion, malunion, fractures.  They want to think about it and let me know how they want to proceed.  They will also discuss with their rheumatologist medication stoppage at the time of surgery if indicated.    All risks, benefits and alternatives, including but not limited to (what applies), pain, infection, bleeding, scarring, asymmetry, seromas, hematomas, wound breakdown, wound dehiscence, loss of the implants/flaps, abdominal wall-healing issues, abdominal wall weakness, bulges, hernias, sensation loss, requirement of further staged procedures, Implant specific issues and complications as discussed above, removal of infected or exposed implants, pneumothoraces, contour abnormalities, cannula injuries to deeper structures, hernias, fat necrosis, lumps and bumps, loss of grafted material, DVT, PE, MI, CVA, pneumonia, renal failure and death, were explained. They were understood and agreed upon by the patient, they were acknowledged by the patient, all the patient's questions were answered in detail to the patient's fullest understanding that they acknowledged, the team approach for treatment in the operating room was agreed upon by the patient, and proceeding with surgery was agreed upon by the patient.    They will think about and let me know how they want to proceed.    All questions were answered. The patient was happy with the visit. I look forward to helping the patient out in the near future as indicated.       Total time spent in  the encounter today including chart review, visit itself, and post-visit paperwork was 60 minutes.       Rodney Hughes MD    Chief, Division of Plastic Surgery  Department of Surgery  [facility]      CC: Referred Self, MD  No address on file  CC: No Ref-Primary, Physician      Again, thank you for allowing me to participate in the care of your patient.        Sincerely,      DULCE Hughes MD

## 2024-09-25 NOTE — TELEPHONE ENCOUNTER
Patient Contact    Attempt #2    Was call answered? No    Left a voicemail asking patient to give us a call back at our main dermatology line at [phone]    Called daughter Shiloh, someone else answered and said she is not available at the moment, & to call back around 2:30 PM     Asia KATE RN BSN  [facility]  [phone]

## 2024-09-25 NOTE — TELEPHONE ENCOUNTER
Patient's daughter Shiloh called back. She states her mom is confused and was not currently available for results. Consent to communicate on file.     Spoke with daughter: notified and educated on test results.   Mohs procedure explained and all questions answered.    Daughter expressed concern regarding the results, how long the lesion had been present, how much skin they would cut, & how to know if it's spread elsewhere in the body.      I explained we can do full body skin cancer screenings to see if there are other areas that could be concerning for skin cancer. We would not know how much would need to be cut out until the surgeon is doing the procedure. Daughter states she will explain the results to her mom tomorrow as today is her birthday and she does not want to give that news on her birthday.     I mailed home information regarding the Mohs procedure, SCC, and her results.   I scheduled for Alannah to have a FSE on Oct 2nd at  [facility] with Nakia.     Routing to provider to call and speak to patient's daughter for more information regarding the results as Shiloh expressed concern about the result.      Patient's daughter would like her to be seen at [facility] for treatment as it is the closest location. I explained  someone from their team will reach out for scheduling.     Referral placed.   Daughter expressed understanding.     Thank you  Asia KATE RN, BSN  [facility]  [phone]

## 2024-09-26 NOTE — TELEPHONE ENCOUNTER
Called patient.   Discussed squamous cell carcinoma,   With mohs surgery will be able to see how deep squamous cell carcinoma goes and if anything else is needed after surgery.   She is coming in for full body skin check in October.

## 2024-09-27 ENCOUNTER — TELEPHONE (OUTPATIENT)
Dept: DERMATOLOGY | Facility: CLINIC | Age: 84
End: 2024-09-27
Payer: MEDICARE

## 2024-09-27 NOTE — TELEPHONE ENCOUNTER
Left patient a voicemail to schedule a consult & mohs for   SCC: Site A. Left parietal scalp medial        with Dr. Guaman in . Provided my direct phone number.    Jennifer Esquivel on 9/27/2024 at 3:29 PM

## 2024-09-30 NOTE — TELEPHONE ENCOUNTER
Called patient to schedule surgery with Dr. Guaman    Date of Surgery: 11/14    Surgery type: Mohs    Consult scheduled: Yes    Has patient had mohs with us before? No    Additional comments: nayely Esquivel on 9/30/2024 at 3:57 PM

## 2024-10-02 ENCOUNTER — OFFICE VISIT (OUTPATIENT)
Dept: DERMATOLOGY | Facility: CLINIC | Age: 84
End: 2024-10-02
Payer: COMMERCIAL

## 2024-10-02 DIAGNOSIS — C44.92 SCC (SQUAMOUS CELL CARCINOMA): Primary | ICD-10-CM

## 2024-10-02 DIAGNOSIS — D23.9 DERMAL NEVUS: ICD-10-CM

## 2024-10-02 DIAGNOSIS — L81.4 LENTIGO: ICD-10-CM

## 2024-10-02 DIAGNOSIS — L82.1 SEBORRHEIC KERATOSIS: ICD-10-CM

## 2024-10-02 DIAGNOSIS — D18.01 CHERRY ANGIOMA: ICD-10-CM

## 2024-10-02 DIAGNOSIS — D48.5 NEOPLASM OF UNCERTAIN BEHAVIOR OF SKIN: ICD-10-CM

## 2024-10-02 PROCEDURE — 11102 TANGNTL BX SKIN SINGLE LES: CPT | Performed by: PHYSICIAN ASSISTANT

## 2024-10-02 PROCEDURE — 88305 TISSUE EXAM BY PATHOLOGIST: CPT | Performed by: DERMATOLOGY

## 2024-10-02 PROCEDURE — 99213 OFFICE O/P EST LOW 20 MIN: CPT | Mod: 25 | Performed by: PHYSICIAN ASSISTANT

## 2024-10-02 NOTE — PROGRESS NOTES
Alannah Leos is a pleasant 84 year old year old female patient here today for skin check. 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 She would like FBSE. She notes sensitive area on left upper back. Daughter notes spot is growing.  Patient has no other skin complaints today.  Remainder of the HPI, Meds, PMH, Allergies, FH, and SH was reviewed in chart.    Pertinent Hx:  SCC scalp   Past Medical History:   Diagnosis Date    Hyperlipidemia     Murmur, heart     hsm    Nonsenile cataract     Osteoarthrosis, unspecified whether generalized or localized, lower leg     Osteopenia     Rheumatoid arthritis(714.0)        Past Surgical History:   Procedure Laterality Date    GALLBLADDER SURGERY      HYSTERECTOMY      KNEE SURGERY      left     LAMINECTOMY LUMBAR ONE LEVEL      TONSILLECTOMY          Family History   Problem Relation Age of Onset    Cancer Sister         pancreatic    Diabetes No family hx of     Hypertension No family hx of        Social History     Socioeconomic History    Marital status:      Spouse name: Willis     Number of children: 2    Years of education: 12    Highest education level: Not on file   Occupational History    Occupation:      Employer: RETIRED     Comment: [employer], retired in 12/2006   Tobacco Use    Smoking status: Never    Smokeless tobacco: Never   Vaping Use    Vaping status: Never Used   Substance and Sexual Activity    Alcohol use: No    Drug use: No    Sexual activity: Not Currently     Partners: Male     Birth control/protection: None   Other Topics Concern    Parent/sibling w/ CABG, MI or angioplasty before 65F 55M? Not Asked     Service No    Blood Transfusions Yes    Caffeine Concern No    Occupational Exposure No    Hobby Hazards No    Sleep Concern No    Stress Concern No    Weight Concern No    Special Diet Yes     Comment: Diabetic     Back Care Yes     Comment: Back surgeries x2    Exercise Yes    Bike Helmet No    Seat Belt Yes     Self-Exams Yes   Social History Narrative    Not on file     Social Determinants of Health     Financial Resource Strain: Low Risk  (2/1/2024)    Financial Resource Strain     Within the past 12 months, have you or your family members you live with been unable to get utilities (heat, electricity) when it was really needed?: No   Food Insecurity: Low Risk  (2/1/2024)    Food Insecurity     Within the past 12 months, did you worry that your food would run out before you got money to buy more?: No     Within the past 12 months, did the food you bought just not last and you didn t have money to get more?: No   Transportation Needs: Low Risk  (2/1/2024)    Transportation Needs     Within the past 12 months, has lack of transportation kept you from medical appointments, getting your medicines, non-medical meetings or appointments, work, or from getting things that you need?: No   Physical Activity: Not on file   Stress: Not on file   Social Connections: Unknown (4/10/2023)    Received from [facility], [facility]    Social Connections     Frequency of Communication with Friends and Family: Not on file   Interpersonal Safety: Not on file   Housing Stability: Low Risk  (2/1/2024)    Housing Stability     Do you have housing? : Yes     Are you worried about losing your housing?: No       Outpatient Encounter Medications as of 10/2/2024   Medication Sig Dispense Refill    ACE NOT PRESCRIBED, INTENTIONAL, 1 each continuous prn. ACE Inhibitor not prescribed due to Refusal by patient       (Patient not taking: Reported on 12/18/2023) 0 each 0    CALCIUM + D OR 600mg twice a day  (Patient not taking: Reported on 8/1/2024)      carbidopa-levodopa (SINEMET)  MG tablet Take one at 6 am, noon, 5 pm, 9 pm and midnight 150 tablet 11    Cyanocobalamin (VITAMIN B 12) 250 MCG LOZG       diclofenac (VOLTAREN) 1 % topical gel Apply 2 g topically 3 times daily as needed  for moderate pain 100 g 1    folic acid (FOLVITE) 1 MG tablet Take 1 tablet (1 mg) by mouth daily. 90 tablet 2    ibuprofen (ADVIL/MOTRIN) 200 MG tablet Take 1 tablet (200 mg) by mouth every 8 hours as needed for moderate pain      methotrexate 2.5 MG tablet Take 8 tablets (20 mg) by mouth once a week. .  This is a once a week medication, taken on the same day of each week. 104 tablet 2    order for DME Equipment being ordered: soft neck brace (Patient not taking: Reported on 8/1/2024) 1 Device 0    vitamin C (ASCORBIC ACID) 1000 MG TABS Take 1,000 mg by mouth 2 times daily       Facility-Administered Encounter Medications as of 10/2/2024   Medication Dose Route Frequency Provider Last Rate Last Admin    4 mL ropivacaine (NAROPIN) injection 5 mg/mL  4 mL      4 mL at 08/03/24 0949    betamethasone acet & sod phos (CELESTONE) injection 6 mg  6 mg      6 mg at 08/03/24 0949             O:   NAD, WDWN, Alert & Oriented, Mood & Affect wnl, Vitals stable   Here today with her daughter    LMP  (LMP Unknown)    General appearance normal   Vitals stable   Alert, oriented and in no acute distress      1.5 cm pink brown plaque on left upper back   Stuck on papules and brown macules on trunk and ext   Red papules on trunk  Flesh colored papules on upper lip     The remainder of skin exam is normal       Eyes: Conjunctivae/lids:Normal     ENT: Lips: normal    MSK:Normal    Cardiovascular: peripheral edema none    Pulm: Breathing Normal    Neuro/Psych: Orientation:Alert and Orientedx3 ; Mood/Affect:normal     A/P:  1. R/O DF vs NMSC on left upper back   TANGENTIAL BIOPSY SENT OUT:  After consent, anesthesia with LEC and prep, tangential excision performed and specimen sent out for permanent section histology.  No complications and routine wound care. Patient told to call our office in 1-2 weeks for result.      2. SCC on scalp   Mohs scheduled later this month.   3,  Seborrheic keratosis, lentigo, angioma, benign nevi   It was a  pleasure speaking to Alannah Leos today.  BENIGN LESIONS DISCUSSED WITH PATIENT:  I discussed the specifics of tumor, prognosis, and genetics of benign lesions.  I explained that treatment of these lesions would be purely cosmetic and not medically neccessary.  I discussed with patient different removal options including excision, cautery and /or laser.      Nature and genetics of benign skin lesions dicussed with patient.  Signs and Symptoms of skin cancer discussed with patient.  ABCDEs of melanoma reviewed with patient.  Patient encouraged to perform monthly skin exams.  UV precautions reviewed with patient.  Risks of non-melanoma skin cancer discussed with patient   Return to clinic pending biopsy results.

## 2024-10-02 NOTE — LETTER
10/2/2024      Alannah Leos  [address] [address] [facility] [address]      Dear Colleague,    Thank you for referring your patient, Alannah Leos, to the [facility]. Please see a copy of my visit note below.    Alannah Leos is a pleasant 84 year old year old female patient here today for skin check. 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 She would like FBSE. She notes sensitive area on left upper back. Daughter notes spot is growing.  Patient has no other skin complaints today.  Remainder of the HPI, Meds, PMH, Allergies, FH, and SH was reviewed in chart.    Pertinent Hx:  SCC scalp   Past Medical History:   Diagnosis Date     Hyperlipidemia      Murmur, heart     hsm     Nonsenile cataract      Osteoarthrosis, unspecified whether generalized or localized, lower leg      Osteopenia      Rheumatoid arthritis(714.0)        Past Surgical History:   Procedure Laterality Date     GALLBLADDER SURGERY       HYSTERECTOMY       KNEE SURGERY      left      LAMINECTOMY LUMBAR ONE LEVEL       TONSILLECTOMY          Family History   Problem Relation Age of Onset     Cancer Sister         pancreatic     Diabetes No family hx of      Hypertension No family hx of        Social History     Socioeconomic History     Marital status:      Spouse name: Willis      Number of children: 2     Years of education: 12     Highest education level: Not on file   Occupational History     Occupation:      Employer: RETIRED     Comment: [employer] in 12/2006   Tobacco Use     Smoking status: Never     Smokeless tobacco: Never   Vaping Use     Vaping status: Never Used   Substance and Sexual Activity     Alcohol use: No     Drug use: No     Sexual activity: Not Currently     Partners: Male     Birth control/protection: None   Other Topics Concern     Parent/sibling w/ CABG, MI or angioplasty before 65F 55M? Not Asked      Service No     Blood Transfusions Yes     Caffeine  Concern No     Occupational Exposure No     Hobby Hazards No     Sleep Concern No     Stress Concern No     Weight Concern No     Special Diet Yes     Comment: Diabetic      Back Care Yes     Comment: Back surgeries x2     Exercise Yes     Bike Helmet No     Seat Belt Yes     Self-Exams Yes   Social History Narrative     Not on file     Social Determinants of Health     Financial Resource Strain: Low Risk  (2/1/2024)    Financial Resource Strain      Within the past 12 months, have you or your family members you live with been unable to get utilities (heat, electricity) when it was really needed?: No   Food Insecurity: Low Risk  (2/1/2024)    Food Insecurity      Within the past 12 months, did you worry that your food would run out before you got money to buy more?: No      Within the past 12 months, did the food you bought just not last and you didn t have money to get more?: No   Transportation Needs: Low Risk  (2/1/2024)    Transportation Needs      Within the past 12 months, has lack of transportation kept you from medical appointments, getting your medicines, non-medical meetings or appointments, work, or from getting things that you need?: No   Physical Activity: Not on file   Stress: Not on file   Social Connections: Unknown (4/10/2023)    [facility], [facility]    Social Connections      Frequency of Communication with Friends and Family: Not on file   Interpersonal Safety: Not on file   Housing Stability: Low Risk  (2/1/2024)    Housing Stability      Do you have housing? : Yes      Are you worried about losing your housing?: No       Outpatient Encounter Medications as of 10/2/2024   Medication Sig Dispense Refill     ACE NOT PRESCRIBED, INTENTIONAL, 1 each continuous prn. ACE Inhibitor not prescribed due to Refusal by patient       (Patient not taking: Reported on 12/18/2023) 0 each 0     CALCIUM + D OR 600mg twice a day   (Patient not taking: Reported on 8/1/2024)       carbidopa-levodopa (SINEMET)  MG tablet Take one at 6 am, noon, 5 pm, 9 pm and midnight 150 tablet 11     Cyanocobalamin (VITAMIN B 12) 250 MCG LOZG        diclofenac (VOLTAREN) 1 % topical gel Apply 2 g topically 3 times daily as needed for moderate pain 100 g 1     folic acid (FOLVITE) 1 MG tablet Take 1 tablet (1 mg) by mouth daily. 90 tablet 2     ibuprofen (ADVIL/MOTRIN) 200 MG tablet Take 1 tablet (200 mg) by mouth every 8 hours as needed for moderate pain       methotrexate 2.5 MG tablet Take 8 tablets (20 mg) by mouth once a week. .  This is a once a week medication, taken on the same day of each week. 104 tablet 2     order for DME Equipment being ordered: soft neck brace (Patient not taking: Reported on 8/1/2024) 1 Device 0     vitamin C (ASCORBIC ACID) 1000 MG TABS Take 1,000 mg by mouth 2 times daily       Facility-Administered Encounter Medications as of 10/2/2024   Medication Dose Route Frequency Provider Last Rate Last Admin     4 mL ropivacaine (NAROPIN) injection 5 mg/mL  4 mL      4 mL at 08/03/24 0949     betamethasone acet & sod phos (CELESTONE) injection 6 mg  6 mg      6 mg at 08/03/24 0949             O:   NAD, WDWN, Alert & Oriented, Mood & Affect wnl, Vitals stable   Here today with her daughter    LMP  (LMP Unknown)    General appearance normal   Vitals stable   Alert, oriented and in no acute distress      1.5 cm pink brown plaque on left upper back   Stuck on papules and brown macules on trunk and ext   Red papules on trunk  Flesh colored papules on upper lip     The remainder of skin exam is normal       Eyes: Conjunctivae/lids:Normal     ENT: Lips: normal    MSK:Normal    Cardiovascular: peripheral edema none    Pulm: Breathing Normal    Neuro/Psych: Orientation:Alert and Orientedx3 ; Mood/Affect:normal     A/P:  1. R/O DF vs NMSC on left upper back   TANGENTIAL BIOPSY SENT OUT:  After consent, anesthesia with LEC and prep,  tangential excision performed and specimen sent out for permanent section histology.  No complications and routine wound care. Patient told to call our office in 1-2 weeks for result.      2. SCC on scalp   Mohs scheduled later this month.   3,  Seborrheic keratosis, lentigo, angioma, benign nevi   It was a pleasure speaking to Alannah Leos today.  BENIGN LESIONS DISCUSSED WITH PATIENT:  I discussed the specifics of tumor, prognosis, and genetics of benign lesions.  I explained that treatment of these lesions would be purely cosmetic and not medically neccessary.  I discussed with patient different removal options including excision, cautery and /or laser.      Nature and genetics of benign skin lesions dicussed with patient.  Signs and Symptoms of skin cancer discussed with patient.  ABCDEs of melanoma reviewed with patient.  Patient encouraged to perform monthly skin exams.  UV precautions reviewed with patient.  Risks of non-melanoma skin cancer discussed with patient   Return to clinic pending biopsy results.       Again, thank you for allowing me to participate in the care of your patient.        Sincerely,        Nakia Burgos PA-C

## 2024-10-04 ENCOUNTER — TELEPHONE (OUTPATIENT)
Dept: DERMATOLOGY | Facility: CLINIC | Age: 84
End: 2024-10-04
Payer: COMMERCIAL

## 2024-10-04 LAB
PATH REPORT.COMMENTS IMP SPEC: ABNORMAL
PATH REPORT.COMMENTS IMP SPEC: ABNORMAL
PATH REPORT.COMMENTS IMP SPEC: YES
PATH REPORT.FINAL DX SPEC: ABNORMAL
PATH REPORT.GROSS SPEC: ABNORMAL
PATH REPORT.MICROSCOPIC SPEC OTHER STN: ABNORMAL
PATH REPORT.RELEVANT HX SPEC: ABNORMAL

## 2024-10-04 NOTE — TELEPHONE ENCOUNTER
Patient Contact    Attempt #1    Was call answered? No    Left a voicemail asking patient to give us a call back at our main dermatology line at [phone]    Asia NAVARRO RN BSN  [facility]  [phone]

## 2024-10-04 NOTE — TELEPHONE ENCOUNTER
[facility] Call Center    Phone Message    May a detailed message be left on voicemail: can leave a message with her  she says, but she'll be heading out    Reason for Call: Other: Daughter Shiloh returning call about mom's path results, can try reaching back to Alannah but she's not likley to answer     Action Taken: Other: [facility] derm    Travel Screening: Not Applicable     Date of Service:

## 2024-10-04 NOTE — TELEPHONE ENCOUNTER
----- Message from Nakia Trevizo sent at 10/4/2024  3:16 PM CDT -----  Hi Alannah  Your left upper back returned as a basal cell carcinoma, please schedule excision with one of our surgeons.   Sincerely,  Nakia Trevizo PA-C

## 2024-10-04 NOTE — TELEPHONE ENCOUNTER
Called Shiloh back, No consent to communicate with (, patient's son in law)   He states to call raman, called raman again & got voicemail- did not leave a voicemail as I had left one recently.     Asia NAVARRO RN BSN  [facility]ermat[facility]  [phone]

## 2024-10-07 NOTE — TELEPHONE ENCOUNTER
There is Consent to communicate with Shiloh Mccoy and Yenni Fraga dated 5/12/2022    Unable to leave message on answering machine for patients daughter Shiloh -    Called patient home number- daughter answered and stated that patient is having MOHS with Dr. Guaman at  and would like them both done that day. Advised I would need to check with the surgeon and we would get back to her.  Routing to surgery scheduler to see if this is possible.      Thank you,    Lyric MENDOZARN BSN  [facility]- [phone]

## 2024-10-30 ENCOUNTER — TELEPHONE (OUTPATIENT)
Dept: FAMILY MEDICINE | Facility: OTHER | Age: 84
End: 2024-10-30

## 2024-10-30 ENCOUNTER — ANCILLARY PROCEDURE (OUTPATIENT)
Dept: GENERAL RADIOLOGY | Facility: OTHER | Age: 84
End: 2024-10-30
Attending: NURSE PRACTITIONER
Payer: COMMERCIAL

## 2024-10-30 ENCOUNTER — OFFICE VISIT (OUTPATIENT)
Dept: FAMILY MEDICINE | Facility: OTHER | Age: 84
End: 2024-10-30
Payer: COMMERCIAL

## 2024-10-30 VITALS
SYSTOLIC BLOOD PRESSURE: 130 MMHG | TEMPERATURE: 97.4 F | HEART RATE: 80 BPM | DIASTOLIC BLOOD PRESSURE: 70 MMHG | RESPIRATION RATE: 18 BRPM | WEIGHT: 100 LBS | BODY MASS INDEX: 18.89 KG/M2 | OXYGEN SATURATION: 99 %

## 2024-10-30 DIAGNOSIS — Z91.89 AT RISK FOR ASPIRATION: ICD-10-CM

## 2024-10-30 DIAGNOSIS — R05.8 SPUTUM PRODUCTION: ICD-10-CM

## 2024-10-30 DIAGNOSIS — G20.A1 PARKINSON'S DISEASE, UNSPECIFIED WHETHER DYSKINESIA PRESENT, UNSPECIFIED WHETHER MANIFESTATIONS FLUCTUATE (H): ICD-10-CM

## 2024-10-30 DIAGNOSIS — R05.8 SPUTUM PRODUCTION: Primary | ICD-10-CM

## 2024-10-30 PROBLEM — S12.500A CLOSED FRACTURE OF SIXTH CERVICAL VERTEBRA (H): Status: ACTIVE | Noted: 2023-06-22

## 2024-10-30 PROBLEM — M79.606 LOWER EXTREMITY PAIN: Status: ACTIVE | Noted: 2022-10-01

## 2024-10-30 PROBLEM — S42.002A CLOSED FRACTURE OF LEFT CLAVICLE: Status: ACTIVE | Noted: 2023-06-22

## 2024-10-30 PROBLEM — D50.9 IRON DEFICIENCY ANEMIA: Status: ACTIVE | Noted: 2022-10-01

## 2024-10-30 PROCEDURE — 99214 OFFICE O/P EST MOD 30 MIN: CPT | Performed by: NURSE PRACTITIONER

## 2024-10-30 PROCEDURE — 71045 X-RAY EXAM CHEST 1 VIEW: CPT | Mod: TC | Performed by: RADIOLOGY

## 2024-10-30 RX ORDER — QUETIAPINE FUMARATE 25 MG/1
25 TABLET, FILM COATED ORAL SEE ADMIN INSTRUCTIONS
COMMUNITY
Start: 2024-06-18

## 2024-10-30 RX ORDER — ACETAMINOPHEN 500 MG
1000 TABLET ORAL EVERY 8 HOURS
COMMUNITY
Start: 2023-06-23

## 2024-10-30 RX ORDER — LIDOCAINE 4 G/G
1 PATCH TOPICAL EVERY 24 HOURS
COMMUNITY
Start: 2023-06-24 | End: 2024-11-14

## 2024-10-30 ASSESSMENT — ENCOUNTER SYMPTOMS: COUGH: 1

## 2024-10-30 NOTE — TELEPHONE ENCOUNTER
----- Message from Kaur Grimm sent at 10/30/2024  1:47 PM CDT -----  Please call daughter let her know that the lungs were clear. There was some hyperinflation which is consistent with COPD so I do recommend that they follow up with a medical provider to discuss possible inhaler treatment long term.       Kaur Grimm, GILES CNP  Questions or concerns please feel free to send me a [employer] message or call me  Phone : [phone]

## 2024-10-30 NOTE — TELEPHONE ENCOUNTER
RN called daughter and relayed message below. She is going to talk with sister and patient to determine follow up with the provider.     MARLON WyattN, RN

## 2024-10-30 NOTE — PROGRESS NOTES
{PROVIDER CHARTING PREFERENCE:510712}    Subjective   Alannah is a 84 year old, presenting for the following health issues:  Cough        10/30/2024    10:59 AM   Additional Questions   Roomed by rosario   Accompanied by daughter     History of Present Illness       Reason for visit:  Chest  Symptom onset:  3-7 days ago  Symptom intensity:  Mild  Symptom progression:  Worsening  Had these symptoms before:  Yes  Has tried/received treatment for these symptoms:  No      Per daughter, she has noticed that she has spit into a napkin and it was neon green, daughter also wonders if she may have aspirated during the night . Also she will have some wheezing when she returns from the bathroom.         Review of Systems  {ROS (Optional):740547}      Objective    /70 (BP Location: Left arm, Patient Position: Sitting, Cuff Size: Adult Regular)   Pulse 80   Temp 97.4  F (36.3  C) (Temporal)   Resp 18   Wt 45.4 kg (100 lb)   LMP  (LMP Unknown)   SpO2 99%   BMI 18.89 kg/m    Body mass index is 18.89 kg/m .  Physical Exam   {Exam List (Optional):791792}    {Diagnostic Test Results (Optional):578093}        Signed Electronically by: GILES Cox CNP  {Email feedback regarding this note to [email or web address]   :[id number]}   normal/bright    Results for orders placed or performed in visit on 10/30/24   XR Chest 1 View     Status: None    Narrative    XR CHEST 1 VIEW   10/30/2024 11:56 AM     HISTORY: Sputum production    COMPARISON: 3/12/2021.      Impression    IMPRESSION: Hyperinflation consistent with COPD. Left upper lung  obscured by patient's chin. Visualized lungs clear. Normal heart size  and pulmonary vascularity. Advanced degenerative changes left  glenohumeral joint.    ELIANE SMITH MD         SYSTEM ID:  [id number]           Signed Electronically by: GILES Cox CNP

## 2024-10-30 NOTE — TELEPHONE ENCOUNTER
"Daughter calling back. She did not understand why we were calling when patient is going to be seen in office today.   RN explained that we needed clarification on what \"cold symptoms\" she is having to make sure clinic is most appropriate. Daughter says patient has Parkinson's and chronically has extra saliva that she will spit onto Kleenex. Daughter noticed the other day that this saliva was neon green in color. Patient says this was from something she ate, but daughter states she has not had anything neon green to eat or drink.    She states patient does not have a cough or \"anything else that she would need to sit in the ER for 5 hours for\". Daughter stated she needed to get ready to bring patient to clinic and ended call.     Radha MASONN, RN   "

## 2024-10-30 NOTE — TELEPHONE ENCOUNTER
Per provider would like patient triaged prior to appointment today.     Appt notes: cold symptoms      Attempt #1 to call.     RN has attempted to contact this patient and daughter by phone to return their call, but there is no response. RN left voicemail and requested return call to [facility] at [phone]    MARLON WyattN, RN

## 2024-11-05 RX ORDER — DIPHENHYDRAMINE HYDROCHLORIDE 50 MG/ML
50 INJECTION INTRAMUSCULAR; INTRAVENOUS
Start: 2024-11-05

## 2024-11-05 RX ORDER — METHYLPREDNISOLONE SODIUM SUCCINATE 40 MG/ML
40 INJECTION INTRAMUSCULAR; INTRAVENOUS
Start: 2024-11-05

## 2024-11-05 RX ORDER — ALBUTEROL SULFATE 0.83 MG/ML
2.5 SOLUTION RESPIRATORY (INHALATION)
OUTPATIENT
Start: 2024-11-05

## 2024-11-05 RX ORDER — DIPHENHYDRAMINE HYDROCHLORIDE 50 MG/ML
25 INJECTION INTRAMUSCULAR; INTRAVENOUS
Start: 2024-11-05

## 2024-11-05 RX ORDER — MEPERIDINE HYDROCHLORIDE 25 MG/ML
25 INJECTION INTRAMUSCULAR; INTRAVENOUS; SUBCUTANEOUS
OUTPATIENT
Start: 2024-11-05

## 2024-11-05 RX ORDER — EPINEPHRINE 1 MG/ML
0.3 INJECTION, SOLUTION INTRAMUSCULAR; SUBCUTANEOUS EVERY 5 MIN PRN
OUTPATIENT
Start: 2024-11-05

## 2024-11-05 RX ORDER — ALBUTEROL SULFATE 90 UG/1
1-2 INHALANT RESPIRATORY (INHALATION)
Start: 2024-11-05

## 2024-11-06 ENCOUNTER — VIRTUAL VISIT (OUTPATIENT)
Dept: DERMATOLOGY | Facility: CLINIC | Age: 84
End: 2024-11-06
Payer: COMMERCIAL

## 2024-11-06 DIAGNOSIS — C44.519 BASAL CELL CARCINOMA (BCC) OF UPPER BACK: ICD-10-CM

## 2024-11-06 DIAGNOSIS — C44.42 SCC (SQUAMOUS CELL CARCINOMA), SCALP/NECK: Primary | ICD-10-CM

## 2024-11-06 PROCEDURE — 99441 PR PHYSICIAN TELEPHONE EVALUATION 5-10 MIN: CPT | Mod: 93 | Performed by: DERMATOLOGY

## 2024-11-06 NOTE — NURSING NOTE
Alannah Leos's chief complaint for this visit includes:  Chief Complaint   Patient presents with    Consult     Mohs and Excision consult- SCC Left parietal scalp medial (MOHS) BCC L upper back excision (Excision)     PCP: System, Provider Not In    Referring Provider:  Tc Guaman MD  [id number] [place] [address]    LMP  (LMP Unknown)   Data Unavailable        Allergies   Allergen Reactions    Decadrol [Dexamethasone Sodium Phosphate] Hives    Shrimp Itching         Do you need any medication refills at today's visit?   No.      Teledermatology Nurse Call Patients:     Are you in the [facility] at the time of the encounter? yes    Today's visit will be billed to you and your insurance.    A teledermatology visit is not as thorough as an in-person visit and the quality of the photograph sent may not be of the same quality as that taken by the dermatology clinic.       Berkley Aquino RN on 11/6/2024 at 10:52 AM

## 2024-11-06 NOTE — LETTER
"11/6/2024      Alannah Leos  [id number] [facility] [address]      Dear Colleague,    Thank you for referring your patient, Alannah Leos, to the [facility]. Please see a copy of my visit note below.    [facility] Dermatology Note  Encounter Date: Nov 6, 2024  Store-and-Forward and Telephone. Location of teledermatologist: [facility].  Start time: 11:37 am. End time: 11:44 am.    Dermatologic Surgery Telemedicine Consult Note  Dermatology Problem List:    1. Hx of NMSC  - SCC, L parietal scalp medial, well diff, s/p bx 9/18/24, pending Mohs 11/14/24  - nBCC, L upper back, infiltrating, s/p bx 10/2/24, pending excision 11/14/24  2. Hx of Parkinson's Disease  3. 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 AK, cryo  5. Seborrheic dermatitis: scalp.  - Ketoconazole 2% shampoo 2x week, betamethasone valerate 0.1% lotion     CC: Consult (Mohs and Excision consult- SCC Left parietal scalp medial (MOHS) BCC L upper back excision (Excision))      Subjective: Alannah Leos is a 84 year old female who presents today for Mohs micrographic surgery consultation for a recent diagnosis of skin cancer.  - Skin cancer(s): SCC  - Location(s): left parietal scalp medial  - Skin cancer(s): BCC  - Location(s): left upper back  - daughter present on phone call as well  - no other concerns today         Objective:   Skin: Focused examination of the left parietal scalp medial and left upper back within the teledermatology photograph(s) on 9/18/24 and 10/2/24 was performed.   - left parietal scalp medial, there is an erythematous plaque with overlying crust  - left upper back, there is pearly pink papule        [id number] Path report from 9/18/24:   A(1). Skin, left parietal scalp medial: Squamous cell carcinoma, well-differentiated   The specimen is received in formalin with proper patient identification, labeled \"left parietal scalp medial\". " " The specimen consists of a 0.6 x 0.5 cm skin shave specimen containing a 0.3 x 0.3 cm tan, crusted lesion.  The resection margin is inked and it is bisected and submitted in A1.      [id number] Path report from 10/2/24:   A(1). Skin, left upper back: Basal cell carcinoma, nodular and infiltrating types   The specimen is received in formalin with proper patient identification, labeled \"left upper back\".  The specimen consists of a 0.6 x 0.5 x 0.2 cm skin shave.  The white skin surface contains a 0.4 x 0.2 cm white-pink plaque.  The resection margin is inked blue.  The specimen is bisected and entirely submitted in cassette A1.               Assessment and Plan:     1. SCC, left parietal scalp medial   - Plan for Mohs micrographic surgery for skin cancer(s) above:  *Review lab result(s): Dermpath report   - We discussed the nature of the diagnosis/condition above. We discussed the treatment options, including the risks benefits and expectations of these options. We recommend micrographic surgery as the most effective and most tissue sparing option for treatment, and the patient agrees to proceed with this.  The patient is aware of the risks, benefits and expectations of this procedure. The patient will be scheduled for this procedure, if not already done so.  - We anticipate the following closure type: Linear closure, such as complex linear closure (CLC) vs. Secondary intent     2. BCC, left upper back  - Plan for excision for skin cancer(s) above:  *Review lab result(s): Dermpath report   - We discussed the nature of the diagnosis/condition above. We discussed the treatment options, including the risks benefits and expectations of these options. We recommend excision as the most effective and the patient agrees to proceed with this.  The patient is aware of the risks, benefits and expectations of this procedure. The patient will be scheduled for this procedure, if not already done so.  - If the lesion is greater " than 2cm on the day of procedure, we will consider converting the case to Mohs instead of excision.   - We anticipate the following closure type: Linear closure, such as complex linear closure (CLC)    The patient was discussed with and evaluated by attending physician, Tc Guaman DO.    Marilee Villarreal PA-C  [facility]   Dermatology       Staff Physician Comments:   I saw the photos and evaluated the patient with the physician assistant (Marilee Diaz PA-C) and I agree with the assessment and plan. I was present for the key portions of the telephone call.    Tc Guaman DO    Department of Dermatology  [facility]: Phone: [phone], Fax:[phone]  [facility] [place]: Phone: [phone], Fax: [phone]        Again, thank you for allowing me to participate in the care of your patient.        Sincerely,        Tc Guaman MD

## 2024-11-06 NOTE — PROGRESS NOTES
"[facility] Dermatology Note  Encounter Date: Nov 6, 2024  Store-and-Forward and Telephone. Location of teledermatologist: [facility].  Start time: 11:37 am. End time: 11:44 am.    Dermatologic Surgery Telemedicine Consult Note  Dermatology Problem List:    1. Hx of NMSC  - SCC, L parietal scalp medial, well diff, s/p bx 9/18/24, pending Mohs 11/14/24  - nBCC, L upper back, infiltrating, s/p bx 10/2/24, pending excision 11/14/24  2. Hx of Parkinson's Disease  3. 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 AK, cryo  5. Seborrheic dermatitis: scalp.  - Ketoconazole 2% shampoo 2x week, betamethasone valerate 0.1% lotion     CC: Consult (Mohs and Excision consult- SCC Left parietal scalp medial (MOHS) BCC L upper back excision (Excision))      Subjective: Alannah Leos is a 84 year old female who presents today for Mohs micrographic surgery consultation for a recent diagnosis of skin cancer.  - Skin cancer(s): SCC  - Location(s): left parietal scalp medial  - Skin cancer(s): BCC  - Location(s): left upper back  - daughter present on phone call as well  - no other concerns today         Objective:   Skin: Focused examination of the left parietal scalp medial and left upper back within the teledermatology photograph(s) on 9/18/24 and 10/2/24 was performed.   - left parietal scalp medial, there is an erythematous plaque with overlying crust  - left upper back, there is pearly pink papule        [id number] Path report from 9/18/24:   A(1). Skin, left parietal scalp medial: Squamous cell carcinoma, well-differentiated   The specimen is received in formalin with proper patient identification, labeled \"left parietal scalp medial\".  The specimen consists of a 0.6 x 0.5 cm skin shave specimen containing a 0.3 x 0.3 cm tan, crusted lesion.  The resection margin is inked and it is bisected and submitted in A1.      [id number] Path report from 10/2/24:   A(1). Skin, left " "upper back: Basal cell carcinoma, nodular and infiltrating types   The specimen is received in formalin with proper patient identification, labeled \"left upper back\".  The specimen consists of a 0.6 x 0.5 x 0.2 cm skin shave.  The white skin surface contains a 0.4 x 0.2 cm white-pink plaque.  The resection margin is inked blue.  The specimen is bisected and entirely submitted in cassette A1.               Assessment and Plan:     1. SCC, left parietal scalp medial   - Plan for Mohs micrographic surgery for skin cancer(s) above:  *Review lab result(s): Dermpath report   - We discussed the nature of the diagnosis/condition above. We discussed the treatment options, including the risks benefits and expectations of these options. We recommend micrographic surgery as the most effective and most tissue sparing option for treatment, and the patient agrees to proceed with this.  The patient is aware of the risks, benefits and expectations of this procedure. The patient will be scheduled for this procedure, if not already done so.  - We anticipate the following closure type: Linear closure, such as complex linear closure (CLC) vs. Secondary intent     2. BCC, left upper back  - Plan for excision for skin cancer(s) above:  *Review lab result(s): Dermpath report   - We discussed the nature of the diagnosis/condition above. We discussed the treatment options, including the risks benefits and expectations of these options. We recommend excision as the most effective and the patient agrees to proceed with this.  The patient is aware of the risks, benefits and expectations of this procedure. The patient will be scheduled for this procedure, if not already done so.  - If the lesion is greater than 2cm on the day of procedure, we will consider converting the case to Mohs instead of excision.   - We anticipate the following closure type: Linear closure, such as complex linear closure (CLC)    The patient was discussed with and " evaluated by attending physician, Tc Guaman DO.    Marilee Villarreal PA-C  [facility]   Dermatology       Staff Physician Comments:   I saw the photos and evaluated the patient with the physician assistant (Marilee Diaz PA-C) and I agree with the assessment and plan. I was present for the key portions of the telephone call.    Tc Guaman DO    Department of Dermatology  [facility]: Phone: [phone], Fax:[phone]  [facility]: Phone: [phone], Fax: [phone]

## 2024-11-06 NOTE — NURSING NOTE
Excision/Mohs previsit information                                                    Diagnosis: squamous cell carcinoma and Basal Cell carcinoma  Site(s): left parietal scalp medial, SCC (mohs)  and L upper back, BCC  (excision)    Is the surgical site below the waist?  NO  If yes, instruct the patient to purchase over the counter chlorhexidine surgical soap and wash all skin below the belly button twice before surgery    Allergies   Allergen Reactions    Decadrol [Dexamethasone Sodium Phosphate] Hives    Shrimp Itching       Review and update allergy and medication list    Do you take the following medications:  Coumadin, Eliquis, Pradaxa, Xarelto:  NO.  If on Coumadin, INR should be checked within 7 days of surgery.  Range should be 3.5 or less or within therapeutic range.    Do you currently or have you previously had any of the following conditions:  Hepatitis:  NO  HIV/AIDS:  NO  Prolonged bleeding or bleeding disorder:  NO  Pacemaker: NO  Defibrillator:  NO  History of artificial or heart valve replacement:  NO  Endocarditis (inflammation of the inner lining of the heart's chambers and valves):  NO  Have you ever had a prosthetic joint infection:  NO  Pregnant or Breastfeeding:  NO  Mobility device (wheelchair, transfer difficulty): YES    Important Reminders:                                                      -Ok to take all of their medications as prescribed  -Patients can eat, no need to be fasting  -If face is being treated, please come with a make-up free face  -If scalp is being treated, please come with clean hair free from product  -Patient will not be able to get the site wet for 48 hrs  -No submerging wound in standing water (lake, pool, bathtub, hot tub) for 2 weeks  -No physical activity for 48 hrs (further restrictions will be discussed by MD at time of visit)    If any positives, send to RN for further review  Berkley Aquino RN

## 2024-11-07 ENCOUNTER — OFFICE VISIT (OUTPATIENT)
Dept: ORTHOPEDICS | Facility: CLINIC | Age: 84
End: 2024-11-07
Payer: COMMERCIAL

## 2024-11-07 VITALS — BODY MASS INDEX: 18.89 KG/M2 | WEIGHT: 100 LBS

## 2024-11-07 DIAGNOSIS — M19.012 ARTHRITIS OF LEFT SHOULDER REGION: Primary | ICD-10-CM

## 2024-11-07 PROCEDURE — 20611 DRAIN/INJ JOINT/BURSA W/US: CPT | Mod: LT | Performed by: FAMILY MEDICINE

## 2024-11-07 RX ORDER — ROPIVACAINE HYDROCHLORIDE 5 MG/ML
4 INJECTION, SOLUTION EPIDURAL; INFILTRATION; PERINEURAL
Status: COMPLETED | OUTPATIENT
Start: 2024-11-07 | End: 2024-11-07

## 2024-11-07 RX ORDER — BETAMETHASONE SODIUM PHOSPHATE AND BETAMETHASONE ACETATE 3; 3 MG/ML; MG/ML
6 INJECTION, SUSPENSION INTRA-ARTICULAR; INTRALESIONAL; INTRAMUSCULAR; SOFT TISSUE
Status: COMPLETED | OUTPATIENT
Start: 2024-11-07 | End: 2024-11-07

## 2024-11-07 RX ADMIN — BETAMETHASONE SODIUM PHOSPHATE AND BETAMETHASONE ACETATE 6 MG: 3; 3 INJECTION, SUSPENSION INTRA-ARTICULAR; INTRALESIONAL; INTRAMUSCULAR; SOFT TISSUE at 15:25

## 2024-11-07 RX ADMIN — ROPIVACAINE HYDROCHLORIDE 4 ML: 5 INJECTION, SOLUTION EPIDURAL; INFILTRATION; PERINEURAL at 15:25

## 2024-11-07 NOTE — PROGRESS NOTES
ASSESSMENT & PLAN    Alannah was seen today for pain and follow up.    Diagnoses and all orders for this visit:    Arthritis of left shoulder region  -     Large Joint Injection/Arthocentesis: L glenohumeral joint        # Left Shoulder Arthritis Flare: Alannah Leos  was seen today for left shoulder pain. Symptoms had been going on for 3+ mon worsening with pain debilitating. On examination there are positive findings of significant tenderness to palpation and limited shoulder range of motion due to pain/stiffness. Imaging findings showed severe left shoulder arthritis. Patient has RA likely contributing to advanced arthritis in her shoulder. Likely cause of patient's condition due to flare of shoulder arthritis. Counseled patient and daughter on nature of condition and treatment options.  Given this plan as below, follow-up 3 mon as needed    Image Findings: severe left shoulder arthritis  Treatment: Activities as tolerated, light shoulder range of motion  Medications/Injections: Limited tylenol/ibuprofen for pain for 1-2 weeks, Topical Voltaren gel, left shoulder joint steroid injection  Follow-up: In 3+ mon if symptoms do not improve, sooner if worsening  Can consider repeat steroid injections     -----    SUBJECTIVE:  Alannah Leos is a 84 year old female who is seen in follow-up for left shoulder pain. They were last seen 8/3/2024 and left subacromial bursa steroid injection was performed.  Provided good relief. The patient is seen with their daughter.    Since their last visit reports improved shoulder pain.  They have tried left subacromial bursa steroid injection 8/3/24, rest/activity avoidance, motrin.        Patient's past medical, surgical, social, and family histories were reviewed today and no changes are noted.    REVIEW OF SYSTEMS:  Constitutional: NEGATIVE for fever, chills, change in weight  Skin: NEGATIVE for worrisome rashes, moles or lesions  GI/: NEGATIVE for bowel or bladder  changes  Neuro: NEGATIVE for weakness, dizziness or paresthesias    OBJECTIVE:  Wt 45.4 kg (100 lb)   LMP  (LMP Unknown)   BMI 18.89 kg/m     General: healthy, alert and in no distress  HEENT: no scleral icterus or conjunctival erythema  Skin: no suspicious lesions or rash. No jaundice.  CV: regular rhythm by palpation, no pedal edema  Resp: normal respiratory effort without conversational dyspnea   Psych: normal mood and affect  Gait: normal steady gait with appropriate coordination and balance  Neuro: normal light touch sensory exam of the extremities.    MSK:    LEFT SHOULDER  Inspection:    no swelling, bruising, discoloration, or obvious deformity or asymmetry  Palpation:    Tender about the posterior shoulder. Remainder of bony and tendinous landmarks are nontender.  Active Range of Motion:   Limited 2/2 pain  Strength:   Limited 2/2 pain  Inspection:    normal cervical lordosis present, rounded shoulders, forward head posture  Palpation:    Nontender.  Range of Motion:     Flexion full    Extension full    Right side bend full    Left side bend full    Right rotation full    Left rotation full  Strength:    Full strength throughout all neck muscles  Special Tests:    Positive: None    Negative: Spurling's (bilateral)    Independent visualization of the below image:  EXAM: XR SHOULDER LEFT G/E 3 VIEWS  LOCATION: [facility]  DATE: 8/1/2024     INDICATION: Left shoulder pain, no known injury.  COMPARISON: None.                                                                      IMPRESSION: Advanced degenerative change at the left glenohumeral joint with bone-on-bone contact and bony remodeling. Additional complete effacement of the subacromial space worrisome for high-grade rotator cuff tearing but this is likely chronic. No   evidence for acute fracture or dislocation.    Impression    No acute fracture or dislocation. Old healed fracture deformity of the surgical neck of the humerus.  Very advanced degenerative changes at the glenohumeral joint with extensive degenerative remodeling of the glenoid and humeral head. Osteopenia.  Narrative    For Patients: As a result of the 21st Century Cures Act, medical imaging exams and procedure reports are released immediately into your electronic medical record. You may view this report before your referring provider. If you have questions, please contact your health care provider.    EXAM: XR SHOULDER 3 VIEWS RIGHT  LOCATION: [facility]  DATE: 09/28/2024    INDICATION: Right shoulder pain.  COMPARISON: None.         Flavio Garrison MD, [facility]    Disclaimer: This note consists of symbols derived from keyboarding, dictation and/or voice recognition software. As a result, there may be errors in the script that have gone undetected. Please consider this when interpreting information found in this chart.    Large Joint Injection/Arthocentesis: L glenohumeral joint    Date/Time: 11/7/2024 3:25 PM    Performed by: Flavio Garrison MD  Authorized by: Flavio Garrison MD    Indications:  Pain  Needle Size:  25 G  Guidance: ultrasound    Approach:  Lateral  Location:  Shoulder      Site:  L glenohumeral joint  Medications:  6 mg betamethasone acet & sod phos 6 (3-3) MG/ML; 4 mL ROPivacaine 5 MG/ML  Outcome:  Tolerated well, no immediate complications  Procedure discussed: discussed risks, benefits, and alternatives    Consent Given by:  Patient  Timeout: timeout called immediately prior to procedure    Prep: patient was prepped and draped in usual sterile fashion     Ultrasound images of procedure were permanently stored.     Patient reported significant improvement of pain after the numbing portion left glenohumeral joint steroid injection.  Ultrasound guided images were permanently stored.   Aftercare instructions given to patient.  Plan to follow-up as discussed above.     MD EILEEN SpencePond[facility]

## 2024-11-07 NOTE — PATIENT INSTRUCTIONS
# Left Shoulder Arthritis Flare: Alannah Leos  was seen today for left shoulder pain. Symptoms had been going on for 3+ mon worsening with pain debilitating. On examination there are positive findings of significant tenderness to palpation and limited shoulder range of motion due to pain/stiffness. Imaging findings showed severe left shoulder arthritis. Patient has RA likely contributing to advanced arthritis in her shoulder. Likely cause of patient's condition due to flare of shoulder arthritis. Counseled patient and daughter on nature of condition and treatment options.  Given this plan as below, follow-up 3 mon as needed    Image Findings: severe left shoulder arthritis  Treatment: Activities as tolerated, light shoulder range of motion  Medications/Injections: Limited tylenol/ibuprofen for pain for 1-2 weeks, Topical Voltaren gel, left shoulder joint steroid injection  Follow-up: In 3+ mon if symptoms do not improve, sooner if worsening  Can consider repeat steroid injections     Please call [phone]   Ask for my team if you have any questions or concerns    If you have not yet received the influenza vaccine but would like to get one, please call  [phone] or you can schedule via [employer]    It was great seeing you again today!    Flavio Garrison MD, CAM     [facility] Injection Discharge Instructions    Procedure: left shoulder joint steroid injection     You may shower, however avoid swimming, tub baths or hot tubs for 24 hours following your procedure  You may have a mild to moderate increase in pain for several days following the injection.  It may take up to 14 days for the steroid medication to start working although you may feel the effect as early as a few days after the procedure.  You may use ice packs for 10-15 minutes, 3 to 4 times a day at the injection site for comfort  You may use anti-inflammatory medications (such as Ibuprofen or Aleve or Advil) or Tylenol for pain control if  necessary  If you were fasting, you may resume your normal diet and medications after the procedure  If you have diabetes, check your blood sugar more frequently than usual as your blood sugar may be higher than normal for 10-14 days following a steroid injection. Contact your doctor who manages your diabetes if your blood sugar is higher than usual    If you experience any of the following, call [facility] @ [phone] or [phone]  -Fever over 100 degree F  -Swelling, bleeding, redness, drainage, warmth at the injection site  - New or worsening pain

## 2024-11-14 ENCOUNTER — OFFICE VISIT (OUTPATIENT)
Dept: DERMATOLOGY | Facility: CLINIC | Age: 84
End: 2024-11-14
Payer: MEDICARE

## 2024-11-14 VITALS — SYSTOLIC BLOOD PRESSURE: 147 MMHG | DIASTOLIC BLOOD PRESSURE: 73 MMHG

## 2024-11-14 DIAGNOSIS — C44.519 BASAL CELL CARCINOMA (BCC) OF UPPER BACK: Primary | ICD-10-CM

## 2024-11-14 DIAGNOSIS — C44.42 SCC (SQUAMOUS CELL CARCINOMA), SCALP/NECK: ICD-10-CM

## 2024-11-14 NOTE — LETTER
11/14/2024      Alannah Leos  [address] [address] [facility]  [place] [address]      Dear Colleague,    Thank you for referring your patient, Alannah Leos, to the [facility]. Please see a copy of my visit note below.    [facility] [place] Procedure Note    Dermatology Problem List:  1. 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 Hx of Parkinson's Disease  3. 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 AK, cryo  5. Seborrheic dermatitis: scalp.  - Ketoconazole 2% shampoo 2x week, betamethasone valerate 0.1% lotion  ______________________________    Date of Service:  Nov 14, 2024  Surgery: Mohs micrographic surgery    Case 1  Repair Type: intermediate  Repair Size: 1.5 cm  Suture Material: 3-0 vicryl, 4-0 monocryl, Fast Absorbing Gut 5-0  Tumor Type: SCC - Squamous cell carcinoma  Location: left parietal scalp medial  Derm-Path Accession #: [id number]  PreOp Size: 2.1x1.5 cm  PostOp Size: 2.5x1.5 cm  Mohs Accession #: [id number]  Level of Defect: muscle      Procedure:  We discussed the principles of treatment and most likely complications including scarring, bleeding, infection, swelling, pain, crusting, nerve damage, large wound,  incomplete excision, wound dehiscence,  nerve damage, recurrence, and a second procedure may be recommended to obtain the best cosmetic or functional result.    Informed consent was obtained and the patient underwent the procedure as follows:  The patient was placed supine on the operating table.  The cancer was identified, outlined with a marker, and verified by the patient.  The entire surgical field was prepped with ChoraPrep.  The surgical site was anesthetized using Lidocaine 1% with epi 1:100,000.      The area of clinically apparent tumor was debulked. The layer of tissue was then surgically excised using a #15 blade and was then  transferred onto a specimen sheet maintaining the orientation of the specimen. Hemostasis was obtained using bipolar electrocoagulation. The wound site was then covered with a dressing while the tissue samples were processed for examination.    [facility] maintaining the tissue orientation.  The tissue specimen was relaxed so that the entire surgical margin was in a a single horizontal plane for sectioning and inked for precise mapping.  A precise reference map was drawn to reflect the sectioning of the specimen, colored inking of the margins, and orientation on the patient. The tissue was processed using horizontal sectioning of the base and continuous peripheral margins.  The histopathologic sections were reviewed in conjunction with the reference map.    Total blocks: 1    Total slides:  1    There were no cancer cells visualized on examination, therefore Mohs surgery was complete.    Reconstruction: Intermediate Linear Closure      The patient was taken to the operative suite and placed supine on the operating room table. The defect was identified. Appropriate markings were made with a marking pen to plan the repair. The area was infiltrated with Lidocaine 1% with epi 1:100,000 and prepped with ChoraPrep and draped with sterile towels.     The wound was debeveled and undermined widely. Cones were excised within relaxed skin tension lines on both sides of the defect. Hemostasis was obtained using bipolar electrocoagulation. The deeper layers of subcutaneous and superficial (nonmuscle) fascia tissues were then approximated using monocryl 4-0;vicryl 3-0 buried vertical mattress sutures (deep layer suturing). The wound edges were then approximated; additional buried sutures were placed in a similar fashion where needed. Fast Absorbing Gut 5-0 simple running (superficial layer suturing) were carefully placed for maximum eversion and meticulous approximation.  The center of the wound was unable to be sutured due to high tension, therefore, will heal by second intention.      Estimated blood loss was less than 10 ml for all surgical sites. A sterile pressure dressing was applied and wound care instructions, with a written handout, were given. The patient was discharged from the [facility] alert and ambulatory.    The patient elected for pathology results to automatically release and understands that the clinical staff will contact them as soon as possible to notify them of the results.    Repair Size: 1.5 cm    The wound was cleansed with saline and ointment was applied along the wound surface.     A sterile pressure dressing was applied.  Wound care instructions were given verbally and in writing.  The patient left the operating suite in stable condition.  Patient was informed that additional refinement of the resulting surgical scar may be used as a second stage of this reconstruction.     The attending surgeon was present  for the entire procedure, key portions of the reconstruction and always immediately available.       DERMATOLOGY EXCISION PROCEDURE NOTE    NAME OF PROCEDURE: Excision with intermediate linear closure  Staff surgeon: Tc Guaman DO  Resident: Dr. Juni Hauser   Scrub Nurse: Miguelina    PRE-OPERATIVE DIAGNOSIS:  Basal Cell Carcinoma  POST-OPERATIVE DIAGNOSIS: Same   LOCATION: left upper back   FINAL EXCISION SIZE(DEFECT SIZE): 3.6x4.5 cm  MARGIN: 1 cm  FINAL REPAIR LENGTH: 6.6 cm   ANESTHESIA: 14 mL 1% lidocaine with 1:100,000 epinephrine    INDICATIONS: This patient presented with a 1.6x2.5 cm Basal Cell Carcinoma. Excision was indicated. We discussed the principles of treatment and most likely complications including scarring, bleeding, infection, incomplete excision, wound dehiscence, pain, nerve damage, and recurrence. Informed consent was obtained and the patient underwent the procedure as follows:    PROCEDURE: The patient was  taken to the operative suite. Time-out was performed. The treatment area was anesthetized with 1% lidocaine with epinephrine. The area was prepped with Chlorhexidine and rinsed with sterile saline and draped with sterile towels. The lesion was delineated and excised down to fascia in a elliptical manner. Hemostasis was obtained by electrocoagulation.     REPAIR: An intermediate layered linear closure was selected as the procedure which would maximally preserve both function and cosmesis.    After the excision of the tumor, the area was carefully undermined. Hemostasis was obtained with bipolar electrocoagulation. Closure was oriented so that the wound was in the patient's natural skin tension lines. The deeper layers of subcutaneous and superficial (nonmuscle) fascia tissues were then approximated using 4-0 Monocryl sutures (deep layer suturing). The wound edges were then approximated; additional buried sutures were placed in a similar fashion where needed. 5-0 FAG sutures (superficial layer suturing) were carefully placed for maximum eversion and meticulous approximation.    Estimated blood loss was less than 10 ml for all surgical sites. A sterile pressure dressing was applied and wound care instructions, with a written handout, were given. The patient was discharged from the [facility] [place] alert and ambulatory.    The patient elected for pathology results to automatically release and understands that the clinical staff will contact them as soon as possible to notify them of the results.    Estimated blood loss was less than 10 ml for all surgical sites. A sterile pressure dressing was applied and wound care instructions, with a written handout, were given. [facility] [place] [facility] and [facility].    The patient elected for pathology results to automatically release and understands that the clinical staff will contact them as soon as possible to notify them  of the results.    Follow-up: PRN    Dr. Tc Guaman was immediately available for the entire surgery and was physicially present for the key portions of the procedure.    Anatomic Pathology Results: pending    Clinical Follow-Up: N/A     Staff Involved:  Scribe/Staff    Scribe Disclosure:   I, Melissa Campbellford, am serving as a scribe; to document services personally performed by Dr. Tc Guaman - based on data collection and the provider's statements to me.     Staff Physician Comments:   I saw and evaluated the patient with the Mohs Surgery Fellow (Dr. Juni Hauser) and I agree with the assessment and plan and the above description of the procedure as documented by the scribe. I was present for the key portions of the procedures and entire exam. I was immediately available in the clinic throughout the procedures.     Tc Guaman DO    Department of Dermatology  [facility]: Phone: [phone], Fax:[phone]  [facility]: Phone: [phone], Fax: [phone]    Care and Laboratory Testing Performed at:  [facility]   Dermatology Clinic  [address] [place] [address]      Again, thank you for allowing me to participate in the care of your patient.        Sincerely,        Tc Guaman MD

## 2024-11-14 NOTE — PROGRESS NOTES
[facility] [place] Procedure Note    Dermatology Problem List:  1. 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 Hx of Parkinson's Disease  3. 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 AK, cryo  5. Seborrheic dermatitis: scalp.  - Ketoconazole 2% shampoo 2x week, betamethasone valerate 0.1% lotion  ______________________________    Date of Service:  Nov 14, 2024  Surgery: Mohs micrographic surgery    Case 1  Repair Type: intermediate  Repair Size: 1.5 cm  Suture Material: 3-0 vicryl, 4-0 monocryl, Fast Absorbing Gut 5-0  Tumor Type: SCC - Squamous cell carcinoma  Location: left parietal scalp medial  Derm-Path Accession #: [id number]  PreOp Size: 2.1x1.5 cm  PostOp Size: 2.5x1.5 cm  Mohs Accession #: [id number]  Level of Defect: muscle      Procedure:  We discussed the principles of treatment and most likely complications including scarring, bleeding, infection, swelling, pain, crusting, nerve damage, large wound,  incomplete excision, wound dehiscence,  nerve damage, recurrence, and a second procedure may be recommended to obtain the best cosmetic or functional result.    Informed consent was obtained and the patient underwent the procedure as follows:  The patient was placed supine on the operating table.  The cancer was identified, outlined with a marker, and verified by the patient.  The entire surgical field was prepped with ChoraPrep.  The surgical site was anesthetized using Lidocaine 1% with epi 1:100,000.      The area of clinically apparent tumor was debulked. The layer of tissue was then surgically excised using a #15 blade and was then transferred onto a specimen sheet maintaining the orientation of the specimen. Hemostasis was obtained using bipolar electrocoagulation. The wound site was then covered with a dressing while the tissue samples were processed for  examination.    The excised tissue was transported to the [facility]s histology laboratory maintaining the tissue orientation.  The tissue specimen was relaxed so that the entire surgical margin was in a a single horizontal plane for sectioning and inked for precise mapping.  A precise reference map was drawn to reflect the sectioning of the specimen, colored inking of the margins, and orientation on the patient. The tissue was processed using horizontal sectioning of the base and continuous peripheral margins.  The histopathologic sections were reviewed in conjunction with the reference map.    Total blocks: 1    Total slides:  1    There were no cancer cells visualized on examination, therefore Mohs surgery was complete.    Reconstruction: Intermediate Linear Closure      The patient was taken to the operative suite and placed supine on the operating room table. The defect was identified. Appropriate markings were made with a marking pen to plan the repair. The area was infiltrated with Lidocaine 1% with epi 1:100,000 and prepped with ChoraPrep and draped with sterile towels.     The wound was debeveled and undermined widely. Cones were excised within relaxed skin tension lines on both sides of the defect. Hemostasis was obtained using bipolar electrocoagulation. The deeper layers of subcutaneous and superficial (nonmuscle) fascia tissues were then approximated using monocryl 4-0;vicryl 3-0 buried vertical mattress sutures (deep layer suturing). The wound edges were then approximated; additional buried sutures were placed in a similar fashion where needed. Fast Absorbing Gut 5-0 simple running (superficial layer suturing) were carefully placed for maximum eversion and meticulous approximation. The center of the wound was unable to be sutured due to high tension, therefore, will heal by second intention.      Estimated blood loss was less than 10 ml for all surgical sites. A sterile pressure dressing was applied and wound  care instructions, with a written handout, were given. The patient was discharged from the [facility] Surgery Center alert and ambulatory.    The patient elected for pathology results to automatically release and understands that the clinical staff will contact them as soon as possible to notify them of the results.    Repair Size: 1.5 cm    The wound was cleansed with saline and ointment was applied along the wound surface.     A sterile pressure dressing was applied.  Wound care instructions were given verbally and in writing.  The patient left the operating suite in stable condition.  Patient was informed that additional refinement of the resulting surgical scar may be used as a second stage of this reconstruction.     The attending surgeon was present  for the entire procedure, key portions of the reconstruction and always immediately available.       DERMATOLOGY EXCISION PROCEDURE NOTE    NAME OF PROCEDURE: Excision with intermediate linear closure  Staff surgeon: Tc Guaman DO  Resident: Dr. Juni Hauser   Scrub Nurse: Miguelina    PRE-OPERATIVE DIAGNOSIS:  Basal Cell Carcinoma  POST-OPERATIVE DIAGNOSIS: Same   LOCATION: left upper back   FINAL EXCISION SIZE(DEFECT SIZE): 3.6x4.5 cm  MARGIN: 1 cm  FINAL REPAIR LENGTH: 6.6 cm   ANESTHESIA: 14 mL 1% lidocaine with 1:100,000 epinephrine    INDICATIONS: This patient presented with a 1.6x2.5 cm Basal Cell Carcinoma. Excision was indicated. We discussed the principles of treatment and most likely complications including scarring, bleeding, infection, incomplete excision, wound dehiscence, pain, nerve damage, and recurrence. Informed consent was obtained and the patient underwent the procedure as follows:    PROCEDURE: The patient was taken to the operative suite. Time-out was performed. The treatment area was anesthetized with 1% lidocaine with epinephrine. The area was prepped with Chlorhexidine and rinsed with sterile saline and draped with sterile towels. The  lesion was delineated and excised down to fascia in a elliptical manner. Hemostasis was obtained by electrocoagulation.     REPAIR: An intermediate layered linear closure was selected as the procedure which would maximally preserve both function and cosmesis.    After the excision of the tumor, the area was carefully undermined. Hemostasis was obtained with bipolar electrocoagulation. Closure was oriented so that the wound was in the patient's natural skin tension lines. The deeper layers of subcutaneous and superficial (nonmuscle) fascia tissues were then approximated using 4-0 Monocryl sutures (deep layer suturing). The wound edges were then approximated; additional buried sutures were placed in a similar fashion where needed. 5-0 FAG sutures (superficial layer suturing) were carefully placed for maximum eversion and meticulous approximation.    Estimated blood loss was less than 10 ml for all surgical sites. A sterile pressure dressing was applied and wound care instructions, with a written handout, were given. The patient was discharged from the [facility] Center alert and ambulatory.    The patient elected for pathology results to automatically release and understands that the clinical staff will contact them as soon as possible to notify them of the results.    Estimated blood loss was less than 10 ml for all surgical sites. A sterile pressure dressing was applied and wound care instructions, with a written handout, were given. The patient was discharged from the [facility]atologic Surgery Center alert and ambulatory.    The patient elected for pathology results to automatically release and understands that the clinical staff will contact them as soon as possible to notify them of the results.    Follow-up: PRN    Dr. Tc Guaman was immediately available for the entire surgery and was physicially present for the key portions of the procedure.    Anatomic Pathology Results: pending    Clinical Follow-Up:  N/A     Staff Involved:  Scribe/Staff    Scribe Disclosure:   I, Melissa Norwood, am serving as a scribe; to document services personally performed by Dr. Tc Guaman - based on data collection and the provider's statements to me.     Staff Physician Comments:   I saw and evaluated the patient with the Mohs Surgery Fellow (Dr. Juni Hauser) and I agree with the assessment and plan and the above description of the procedure as documented by the scribe. I was present for the key portions of the procedures and entire exam. I was immediately available in the clinic throughout the procedures.     Tc Guaman DO    Department of Dermatology  [facility]: Phone: [phone], Fax:[phone]  [facility]: Phone: [phone], Fax: [phone]    Care and Laboratory Testing Performed at:  [facility]   Dermatology Clinic  [address] [place] [address]

## 2024-11-14 NOTE — NURSING NOTE
The following medication was given:     MEDICATION:  Lidocaine with epinephrine 1% [id number]  ROUTE: SQ  SITE: see procedure note  DOSE: 20 mL  LOT #: [id number]  : [employer]  EXPIRATION DATE: 4/30/25  NDC#: 80949-579-18  Was there drug waste? No  Multi-dose vial: Yes    Mastisol, Steri-Strips, Tegaderm and pressure dressing applied to excision site on left mid back and Vaseline and pressure dressing applied to Mohs site on scalp.  Wound care instructions reviewed with patient and AVS provided.  Patient verbalized understanding.  Patient will follow up for suture removal: N/A.  No further questions or concerns at this time.    Miguelina Goss RN  November 14, 2024

## 2024-11-14 NOTE — NURSING NOTE
Alannah Leos's goals for this visit include:   Chief Complaint   Patient presents with    Procedure     Mohs - SCC left parietal scalp medial and excision of BCC left upper back       She requests these members of her care team be copied on today's visit information: n/a    PCP: System, Provider Not In    Referring Provider:  Nakia Trevizo PA-C  [address] [facility] [place] [address]    BP (!) 147/73   LMP  (LMP Unknown)     Do you need any medication refills at today's visit? No  Miguelina Goss RN

## 2024-11-14 NOTE — PATIENT INSTRUCTIONS
Head:  Caring for your skin after surgery    After your surgery, a pressure bandage will be placed over the area. This will prevent bleeding. Please follow these instructions over the next 1 to 2 weeks. Following this regimen will help to prevent complications as your wound heals.     For the first 48 hours after your surgery:    Leave the pressure dressing on and keep it dry. If it should come loose, you may re-tape it, but do not take it off.  Relax and take it easy. Do not do any vigorous exercise, heavy lifting or bending forward. This could cause the wound to bleed.  Post-operative pain is usually mild. You may alternate between 1000 mg of Tylenol (acetaminophen) and 400 mg of Ibuprofen every 4 hours.  Do not take more than 4,000 mg of acetaminophen in a 24-hour period or 3200 mg of Ibuprofen in a 24-hr period.  Avoid alcohol and vitamin E as these may increase your tendency to bleed.  You may put an ice pack around the bandaged area for 20 minutes at a time as needed. This may help reduce swelling, bruising, and pain. Make sure the ice pack is waterproof so that the pressure bandage doesn't get wet.  You may see a small amount of drainage or blood on your pressure bandage. This is normal. However:  If drainage or bleeding continues or saturates the bandage, you will need to apply firm pressure over the bandage with a clean washcloth for 15 minutes.  If bleeding continues after applying pressure for 15 minutes, apply an ice pack with gentle pressure to the bandaged area for another 15 minutes.  If bleeding still continues, call our office or go to the nearest emergency room.    48 Hours After Surgery:  Carefully remove the pressure bandage. If it seems sticky or too difficult to get off, you may need to soak it off in the shower.  Wash wound with a mild soap and water.  Use caution when washing the wound, be gentle and do not let the forceful shower stream hit the wound directly.  Pat dry.  Apply Vaseline (from  a new container or tube) over the suture line with a Q-tip.  Cover the site with a bandage.  Do this daily until the sutures have dissolved.      What to expect:    The first couple of days your wound may be tender and may bleed slightly when doing wound care.  There may be swelling and bruising around the wound, especially if it is near the eyes. For your comfort, you may apply ice or cold compresses to the area.  The area around your wound may be numb for several weeks or even months.  You may experience periodic sharp pain or mild itching around the wound as it heals.   The suture line will look dark pink at first and the edges of the wound will be reddened. This will lighten up each day.    Call Us If:    You have bleeding that will not stop after applying pressure and ice.  You have pain that is not controlled with Tylenol and Ibuprofen.  You have signs or symptoms of an infection such as fever over 100 degrees Fahrenheit, redness, warmth or foul-smelling drainage from the wound    [facility]: [phone]   [facility]: [phone]  For urgent needs outside of business hours call the [facility] at [phone] and ask to speak with the dermatology resident on call       Back: Caring for your skin after surgery    After your surgery, a pressure bandage will be placed over the area. This will prevent bleeding. Please follow these instructions over the next 1 to 2 weeks. Following this regimen will help to prevent complications as your wound heals.     For the first 48 hours after your surgery:    Leave the pressure dressing on and keep it dry. If it should come loose, you may re-tape it, but do not take it off.  Relax and take it easy. Do not do any vigorous exercise, heavy lifting or bending forward. This could cause the wound to bleed.  Post-operative pain is usually mild. You may alternate between 1000 mg of Tylenol (acetaminophen) and 400  mg of Ibuprofen every 4 hours.  Do not take more than 4,000 mg of acetaminophen in a 24-hour period or 3200 mg of Ibuprofen in a 24-hr period.  Avoid alcohol and vitamin E as these may increase your tendency to bleed.  You may put an ice pack around the bandaged area for 20 minutes at a time as needed. This may help reduce swelling, bruising, and pain. Make sure the ice pack is waterproof so that the pressure bandage doesn't get wet.  You may see a small amount of drainage or blood on your pressure bandage. This is normal. However:  If drainage or bleeding continues or saturates the bandage, you will need to apply firm pressure over the bandage with a clean washcloth for 15 minutes.  If bleeding continues after applying pressure for 15 minutes, apply an ice pack with gentle pressure to the bandaged area for another 15 minutes.  If bleeding still continues, call our office or go to the nearest emergency room.    48 Hours After Surgery:    Remove outer white bandage down to clear plastic film (Tegaderm).  You may notice dark brown, dried blood under the Tegaderm.  This is normal.    Leave the clear plastic film (Tegaderm) on for up to 2 weeks, as long as it is intact.  If it falls off prior to 2 weeks follow daily wound care below.  If it stays intact for the full 2 weeks, then remove and treat as normal, healthy skin.    Daily Wound Care (if Tegaderm and Steri-Strips fall off prior to 2 weeks):    Wash wound with a mild soap and water.  Use caution when washing the wound, be gentle and do not let the forceful shower stream hit the wound directly. DO NOT WASH WITH HYDROGEN PEROXIDE AS THIS MIGHT CAUSE THE STITCHES TO DISSOLVE FASTER THAN WHAT WE WANT.    Pat dry.    Apply Vaseline (from a new container or tube) over the suture line with a Q-tip until it is completely healed. It is very important to keep the wound continuously moist, as wounds heal best in a moist environment.    Keep the site covered until it's  healed.  You can cover it with a Telfa (non-stick) dressing and tape or a band-aid until healed with normal, healthy skin.      Call Us If:    You have bleeding that will not stop after applying pressure and ice.  You have pain that is not controlled with Tylenol and Ibuprofen.  You have signs or symptoms of an infection such as fever over 100 degrees Fahrenheit, redness, warmth or foul-smelling drainage from the wound    [facility]: [phone]   [facility]: [phone]  For urgent needs outside of business hours call the [facility] at [phone] and ask to speak with the dermatology resident on call

## 2024-11-15 ENCOUNTER — TELEPHONE (OUTPATIENT)
Dept: DERMATOLOGY | Facility: CLINIC | Age: 84
End: 2024-11-15
Payer: MEDICARE

## 2024-11-15 NOTE — TELEPHONE ENCOUNTER
Alannah is 1 day s/p Mohs and excision.  I called to follow up on how the patient is doing post-procedure.  I left a detailed message requesting a call back if there are questions or concerns.      Berkley Aquino RN on 11/15/2024 at 1:57 PM

## 2024-11-18 LAB
PATH REPORT.COMMENTS IMP SPEC: NORMAL
PATH REPORT.COMMENTS IMP SPEC: NORMAL
PATH REPORT.FINAL DX SPEC: NORMAL
PATH REPORT.GROSS SPEC: NORMAL
PATH REPORT.MICROSCOPIC SPEC OTHER STN: NORMAL
PATH REPORT.RELEVANT HX SPEC: NORMAL

## 2024-12-06 PROCEDURE — 83550 IRON BINDING TEST: CPT | Performed by: INTERNAL MEDICINE

## 2024-12-06 PROCEDURE — 83540 ASSAY OF IRON: CPT | Performed by: INTERNAL MEDICINE

## 2024-12-06 PROCEDURE — 82728 ASSAY OF FERRITIN: CPT | Performed by: INTERNAL MEDICINE

## 2024-12-08 DIAGNOSIS — D64.9 ANEMIA, UNSPECIFIED TYPE: Primary | ICD-10-CM

## 2024-12-09 LAB
FERRITIN SERPL-MCNC: 137 NG/ML (ref 11–328)
IRON BINDING CAPACITY (ROCHE): 271 UG/DL (ref 240–430)
IRON SATN MFR SERPL: 17 % (ref 15–46)
IRON SERPL-MCNC: 47 UG/DL (ref 37–145)

## 2024-12-16 ENCOUNTER — TELEPHONE (OUTPATIENT)
Dept: FAMILY MEDICINE | Facility: CLINIC | Age: 84
End: 2024-12-16
Payer: MEDICARE

## 2024-12-16 NOTE — TELEPHONE ENCOUNTER
Home Care is calling regarding an established patient with [facility].       Requesting orders from: System, Provider Not In  Provider is following patient: No       Orders Requested    Physical Therapy  Request for initial certification (first set of orders)   Frequency:  3x/wk for 1 wks  then 2x/wk for 1 wks  1 x/wk for 1 wk      Information was gathered and will be sent to provider for review.  RN will contact Home Care with information after provider review.  Confirmed ok to leave a detailed message with call back.  Contact information confirmed and updated as needed.    Anisha Simons RN

## 2025-02-03 ENCOUNTER — LAB (OUTPATIENT)
Dept: INFUSION THERAPY | Facility: CLINIC | Age: 85
End: 2025-02-03
Attending: INTERNAL MEDICINE
Payer: MEDICARE

## 2025-02-03 VITALS
DIASTOLIC BLOOD PRESSURE: 87 MMHG | OXYGEN SATURATION: 98 % | TEMPERATURE: 98 F | RESPIRATION RATE: 16 BRPM | HEART RATE: 69 BPM | SYSTOLIC BLOOD PRESSURE: 174 MMHG

## 2025-02-03 DIAGNOSIS — M06.09 RHEUMATOID ARTHRITIS OF MULTIPLE SITES WITH NEGATIVE RHEUMATOID FACTOR (H): ICD-10-CM

## 2025-02-03 DIAGNOSIS — M06.09 RHEUMATOID ARTHRITIS OF MULTIPLE SITES WITH NEGATIVE RHEUMATOID FACTOR (H): Primary | ICD-10-CM

## 2025-02-03 DIAGNOSIS — Z79.899 HIGH RISK MEDICATION USE: ICD-10-CM

## 2025-02-03 LAB
ALBUMIN SERPL BCG-MCNC: 4.3 G/DL (ref 3.5–5.2)
ALP SERPL-CCNC: 83 U/L (ref 40–150)
ALT SERPL W P-5'-P-CCNC: <5 U/L (ref 0–50)
AST SERPL W P-5'-P-CCNC: 20 U/L (ref 0–45)
BASOPHILS # BLD AUTO: 0.1 10E3/UL (ref 0–0.2)
BASOPHILS NFR BLD AUTO: 1 %
BILIRUB DIRECT SERPL-MCNC: <0.2 MG/DL (ref 0–0.3)
BILIRUB SERPL-MCNC: 0.3 MG/DL
CREAT SERPL-MCNC: 0.4 MG/DL (ref 0.51–0.95)
EGFRCR SERPLBLD CKD-EPI 2021: >90 ML/MIN/1.73M2
EOSINOPHIL # BLD AUTO: 0.2 10E3/UL (ref 0–0.7)
EOSINOPHIL NFR BLD AUTO: 3 %
ERYTHROCYTE [DISTWIDTH] IN BLOOD BY AUTOMATED COUNT: 17.1 % (ref 10–15)
HCT VFR BLD AUTO: 33.7 % (ref 35–47)
HGB BLD-MCNC: 11 G/DL (ref 11.7–15.7)
IMM GRANULOCYTES # BLD: 0.1 10E3/UL
IMM GRANULOCYTES NFR BLD: 2 %
LYMPHOCYTES # BLD AUTO: 1.6 10E3/UL (ref 0.8–5.3)
LYMPHOCYTES NFR BLD AUTO: 28 %
MCH RBC QN AUTO: 27.8 PG (ref 26.5–33)
MCHC RBC AUTO-ENTMCNC: 32.6 G/DL (ref 31.5–36.5)
MCV RBC AUTO: 85 FL (ref 78–100)
MONOCYTES # BLD AUTO: 0.4 10E3/UL (ref 0–1.3)
MONOCYTES NFR BLD AUTO: 7 %
NEUTROPHILS # BLD AUTO: 3.5 10E3/UL (ref 1.6–8.3)
NEUTROPHILS NFR BLD AUTO: 59 %
NRBC # BLD AUTO: 0 10E3/UL
NRBC BLD AUTO-RTO: 0 /100
PLATELET # BLD AUTO: 218 10E3/UL (ref 150–450)
PROT SERPL-MCNC: 6.9 G/DL (ref 6.4–8.3)
RBC # BLD AUTO: 3.95 10E6/UL (ref 3.8–5.2)
WBC # BLD AUTO: 5.9 10E3/UL (ref 4–11)

## 2025-02-03 PROCEDURE — 96413 CHEMO IV INFUSION 1 HR: CPT

## 2025-02-03 PROCEDURE — 250N000013 HC RX MED GY IP 250 OP 250 PS 637: Performed by: INTERNAL MEDICINE

## 2025-02-03 PROCEDURE — 85041 AUTOMATED RBC COUNT: CPT

## 2025-02-03 PROCEDURE — 36415 COLL VENOUS BLD VENIPUNCTURE: CPT

## 2025-02-03 PROCEDURE — 82040 ASSAY OF SERUM ALBUMIN: CPT

## 2025-02-03 PROCEDURE — 82565 ASSAY OF CREATININE: CPT

## 2025-02-03 PROCEDURE — 99207 PR NO CHARGE LOS: CPT

## 2025-02-03 PROCEDURE — 82248 BILIRUBIN DIRECT: CPT

## 2025-02-03 PROCEDURE — 85004 AUTOMATED DIFF WBC COUNT: CPT

## 2025-02-03 PROCEDURE — 250N000011 HC RX IP 250 OP 636: Performed by: INTERNAL MEDICINE

## 2025-02-03 PROCEDURE — 258N000003 HC RX IP 258 OP 636: Performed by: INTERNAL MEDICINE

## 2025-02-03 RX ORDER — HEPARIN SODIUM,PORCINE 10 UNIT/ML
5 VIAL (ML) INTRAVENOUS
OUTPATIENT
Start: 2025-03-28

## 2025-02-03 RX ORDER — MEPERIDINE HYDROCHLORIDE 25 MG/ML
25 INJECTION INTRAMUSCULAR; INTRAVENOUS; SUBCUTANEOUS
OUTPATIENT
Start: 2025-03-28

## 2025-02-03 RX ORDER — EPINEPHRINE 1 MG/ML
0.3 INJECTION, SOLUTION INTRAMUSCULAR; SUBCUTANEOUS EVERY 5 MIN PRN
OUTPATIENT
Start: 2025-03-28

## 2025-02-03 RX ORDER — METHYLPREDNISOLONE SODIUM SUCCINATE 40 MG/ML
40 INJECTION INTRAMUSCULAR; INTRAVENOUS
Start: 2025-03-28

## 2025-02-03 RX ORDER — HEPARIN SODIUM (PORCINE) LOCK FLUSH IV SOLN 100 UNIT/ML 100 UNIT/ML
5 SOLUTION INTRAVENOUS
OUTPATIENT
Start: 2025-03-28

## 2025-02-03 RX ORDER — ACETAMINOPHEN 325 MG/1
650 TABLET ORAL ONCE
Start: 2025-03-28 | End: 2025-03-28

## 2025-02-03 RX ORDER — ALBUTEROL SULFATE 90 UG/1
1-2 INHALANT RESPIRATORY (INHALATION)
Start: 2025-03-28

## 2025-02-03 RX ORDER — DIPHENHYDRAMINE HCL 25 MG
25 CAPSULE ORAL ONCE
Start: 2025-03-28 | End: 2025-03-28

## 2025-02-03 RX ORDER — ALBUTEROL SULFATE 0.83 MG/ML
2.5 SOLUTION RESPIRATORY (INHALATION)
OUTPATIENT
Start: 2025-03-28

## 2025-02-03 RX ORDER — DIPHENHYDRAMINE HYDROCHLORIDE 50 MG/ML
25 INJECTION INTRAMUSCULAR; INTRAVENOUS
Start: 2025-03-28

## 2025-02-03 RX ORDER — DIPHENHYDRAMINE HYDROCHLORIDE 50 MG/ML
50 INJECTION INTRAMUSCULAR; INTRAVENOUS
Start: 2025-03-28

## 2025-02-03 RX ORDER — ACETAMINOPHEN 325 MG/1
650 TABLET ORAL ONCE
Status: COMPLETED | OUTPATIENT
Start: 2025-02-03 | End: 2025-02-03

## 2025-02-03 RX ADMIN — INFLIXIMAB 200 MG: 100 INJECTION, POWDER, LYOPHILIZED, FOR SOLUTION INTRAVENOUS at 15:38

## 2025-02-03 RX ADMIN — ACETAMINOPHEN 650 MG: 325 TABLET ORAL at 15:30

## 2025-02-03 NOTE — PROGRESS NOTES
"Infusion Nursing Note:  Alannah Leos presents today for Rapid Infliximab.    Patient seen by provider today: No   present during visit today: Not Applicable.    Note: Patient is mostly bed bound lately according to daughter. Roomed in clinic 3 with a bed for patient comfort as no additional beds were open. 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 Still recovering post MOHs procedure on her scalp as they could not close the wound a few months ago when it was completed. No symptoms of infection. Requested Tylenol prior to infusion, but daughter states that Benadryl makes her \"anxious and hyper\" and that \"she should never have it again\".     In addition, patient and daughter were upset that they came 20 minutes early for the 2:15 labs today and had to wait in the lobby until 3pm for their 3pm infusion, as it is painful for patient to sit in the chair in the lobby. Empathy expressed and writer discussed possibly doing labs closer to infusion start time or off IV start next time instead to potentially reduce time in chair in lobby. Spoke with charge RN about this. Additional pillows given in patient's bed as well as multiple warm blankets for comfort; instructed on call light usage as daughter needed to go upstairs to make a derm followup appointment. Patient was incontinent of urine during infusion three separate times. RN's helped patient with new briefs and to be cleansed.    Daughter also hoping for help getting a hold of Dr. Del Cid for medication questions; inbasket sent to him to contact her or patient regarding these questions.       Intravenous Access:  Peripheral IV placed.    Treatment Conditions:  Biological Infusion Checklist:  ~~~ NOTE: If the patient answers yes to any of the questions below, hold the infusion and contact ordering provider or on-call provider.    Have you recently had an elevated temperature, fever, chills, productive cough, coughing for 3 " weeks or longer or hemoptysis,  abnormal vital signs, night sweats,  chest pain or have you noticed a decrease in your appetite, unexplained weight loss or fatigue? No  Do you have any open wounds or new incisions? No  Do you have any upcoming hospitalizations or surgeries? Does not include esophagogastroduodenoscopy, colonoscopy, endoscopic retrograde cholangiopancreatography (ERCP), endoscopic ultrasound (EUS), dental procedures or joint aspiration/steroid injections No  Do you currently have any signs of illness or infection or are you on any antibiotics? No  Have you had any new, sudden or worsening abdominal pain? No  Have you or anyone in your household received a live vaccination in the past 4 weeks? Please note: No live vaccines while on biologic/chemotherapy until 6 months after the last treatment. Patient can receive the flu vaccine (shot only), pneumovax and the Covid vaccine. It is optimal for the patient to get these vaccines mid cycle, but they can be given at any time as long as it is not on the day of the infusion. No  Have you recently been diagnosed with any new nervous system diseases (ie. Multiple sclerosis, Guillain Talmage, seizures, neurological changes) or cancer diagnosis? Are you on any form of radiation or chemotherapy? No  Are you pregnant or breast feeding or do you have plans of pregnancy in the future? No  Have you been having any signs of worsening depression or suicidal ideations?  (benlysta only) No  Have there been any other new onset medical symptoms? No  Have you had any new blood clots? (IVIG only) No      Report given to infusion RN.       Nicole Jackson RN

## 2025-02-03 NOTE — PROGRESS NOTES
Infusion Nursing Note:    Post Infusion Assessment:  Patient tolerated infusion without incident.  Site patent and intact, free from redness, edema or discomfort.  No evidence of extravasations.  Access discontinued per protocol.     Discharge Plan:   Discharge instructions reviewed with: Patient.  Patient and/or family verbalized understanding of discharge instructions and all questions answered.  Patient discharged in stable condition accompanied by: daughter.  Departure Mode: Wheelchair.  Future appts have been reviewed and crosschecked with appt note and plan.    Jasmyne Burks RN

## 2025-02-04 ENCOUNTER — TELEPHONE (OUTPATIENT)
Dept: RHEUMATOLOGY | Facility: CLINIC | Age: 85
End: 2025-02-04
Payer: MEDICARE

## 2025-02-04 NOTE — TELEPHONE ENCOUNTER
Returned call and had long conversation with daughter about pt trans to [facility] care (mostly d/ the staff ratio).  [facility]- [place]   [facility]- fax   Physician service is [facility]- not using at this time.  Pharmacy [facility]  Daughter wants to know if we can order structured tylenol/IBU with her other meds.    25-Apr-2022 00:16

## 2025-02-04 NOTE — TELEPHONE ENCOUNTER
QUIQUE Vasquez: please triage.   Also please inform Alannah that the rheumatology labs performed yesterday are stable.     Merrick Del Cid MD  2/4/2025

## 2025-02-04 NOTE — TELEPHONE ENCOUNTER
----- Message from Nicole BREAUX sent at 2/3/2025  3:44 PM CST -----  Regarding: Questions  Hi there,    I have Alannah and her daughter at [facility] infusion here. She has some important medication questions and having trouble getting a hold of someone. Would someone be able to call her daughter either tomorrow morning or anytime Wednesday?    Thank you,    Nicole LEI

## 2025-02-05 NOTE — TELEPHONE ENCOUNTER
Her PCP is out of this office this week. Though it appears ok to schedule the tylenol, I would hold off on the ibuprofen until she has time to review due to risks at her age with regular use.    Will leave for PCP to review as well when she returns.  Alejandra Barroso MD

## 2025-02-05 NOTE — TELEPHONE ENCOUNTER
OK TO HOLD FOR MARTHA GRIMM< CNP return.   [place]:  # [phone].  C is on file.  She is staying with her mother (Alannah).  DAughter states PCP no longer at [facility] (Dr. Blanca)  states saw VANE Grimm 10/30/24 and states she had said would be willing to continue to see Alannah.  Plan was to continue with Martha Grimm as her PCP.  Alannah will be entering a memory care facility hopefully next week (current plan is next Wednesday) States requires more assistance than assisted living staff can give (ie toileting) so they are placing her in memory care where there is more staff to help.    Daughter currently gives her Motrin and Acetaminophen Xtra Strength and will need orders for care facility in order to continue this schedule.  Rheumatology won't order; states PCP to determine.   - Motrin 200mg; take one tab at 4:30am and one at 4:30pm   - Acetaminophen Xtra St 500mg; take one tab at 10:30am and one at 10:30pm  States other meds PCP will need to order are:   -Calcium (600mg) + Vit D3, take one daily   -Kid Chewable MVI (currently gen [place]) 1 daily    States neurology and rheumatology should be ordering the rest of them.  Will let us know if others.    Willing to bring her mother in for another appointment next week if Martha is requiring it.  Daughter not wanting facility provider to take over; states they require a visit every month and she feels that is not appropriate.  Feels there is no need for monthly appts/medical expenses for this frequency of visits.  Please advise.  Not needing anything faxed at this time.  States will need by admission next week.    Thank you.  Shelby KJose RN

## 2025-02-05 NOTE — TELEPHONE ENCOUNTER
Returned call and discussed  Will send message to last PCP- they are asking for a call to discuss. Rheum orders to be sent.    JOSE CARLOS Barrett RN 2/5/2025 11:58 AM

## 2025-02-11 ENCOUNTER — TELEPHONE (OUTPATIENT)
Dept: FAMILY MEDICINE | Facility: OTHER | Age: 85
End: 2025-02-11
Payer: MEDICARE

## 2025-02-11 NOTE — TELEPHONE ENCOUNTER
Forms/Letter Request    Type of form/letter: Nursing Home/Assisted Living Orders      Do we have the form/letter: Yes: FORMS ARE IN FORMS BIN    Who is the form from? [employer]    Where did/will the form come from? form was faxed in    When is form/letter needed by: ASAP SO THE PATIENT CAN MOVE IN    How would you like the form/letter returned: Fax : [phone]    Patient Notified form requests are processed in 5-7 business days:Yes    Okay to leave a detailed message?: N/A

## 2025-02-11 NOTE — TELEPHONE ENCOUNTER
Patients daughter (Yenni Fraga) calling.    She is hoping that paperwork can be done as soon as possible in the morning as patient is hoping to move in TOMORROW (02-12-25)    Daughter Iman) can be reached via phone [phone].    Or call daughter Willis) [phone]       Tammy Ramirez XRO/

## 2025-02-12 ENCOUNTER — MEDICAL CORRESPONDENCE (OUTPATIENT)
Dept: HEALTH INFORMATION MANAGEMENT | Facility: CLINIC | Age: 85
End: 2025-02-12
Payer: MEDICARE

## 2025-02-12 NOTE — TELEPHONE ENCOUNTER
Faxed form with office notes to [phone]. Added note to next visit for team to fax notes when visit completed.

## 2025-02-12 NOTE — TELEPHONE ENCOUNTER
Signed and scheduled patient for next week as well per MA staff.   Please send OV notes from October and let facility know that we can send OV notes from next week as well after I see patient.       GILES Cox CNP  Questions or concerns please feel free to send me a [place] message or call me  Phone : [phone]
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
## 2025-02-18 ENCOUNTER — TELEPHONE (OUTPATIENT)
Dept: DERMATOLOGY | Facility: CLINIC | Age: 85
End: 2025-02-18
Payer: MEDICARE

## 2025-02-18 NOTE — TELEPHONE ENCOUNTER
"Case Report   Surgical Pathology Report                         Case: [id number]                                   Authorizing Provider:  Marilee Villarreal PA-C    Collected:           02/07/2025 02:17 PM           Ordering Location:     [facility]   Received:            02/07/2025 03:03 PM                                  [place]                                                                     Pathologist:           Jovon Huber MD                                                       Specimen:    Skin, left parietal scalp anterior                                                        Final Diagnosis   A(1). Skin, left parietal scalp anterior, shave:  - Squamous cell carcinoma, well-differentiated - (see description)      Electronically signed by Jovon Huber MD on 2/11/2025 at  5:34 PM   Clinical Information  [facility]   The patient is a 84 year old female   Gross Description  [facility]   A(1). Skin, left parietal scalp anterior:  The specimen is received in formalin with proper patient identification labeled \" left parietal scalp anterior\".  The specimen consists of a 1.3 x 1.0 cm irregular, tan skin shave.  The cutaneous surface is entirely comprised of a 1.1 x 1.0 x 0.1 cm tan-red, scaly lesion.  The resected surface is inked blue, the specimen is quadrisected and entirely submitted in cassette A1.      Microscopic Description  [facility]   The specimen exhibits scale crust and an endophytic, undermining proliferation of atypical squamous epithelium with foci of keratinization and dermal islands of atypical keratinocytes.    Performing Labs  [facility]   The technical component of this testing was completed at [facility].     Stain controls for all stains resulted within this report have been reviewed and show appropriate reactivity.              Specimen Collected: 02/07/25  " 2:17 PM Last Resulted: 02/11/25  5:34 PM

## 2025-02-24 ENCOUNTER — MEDICAL CORRESPONDENCE (OUTPATIENT)
Dept: HEALTH INFORMATION MANAGEMENT | Facility: CLINIC | Age: 85
End: 2025-02-24
Payer: MEDICARE

## 2025-02-26 ENCOUNTER — TRANSITIONAL CARE UNIT VISIT (OUTPATIENT)
Dept: GERIATRICS | Facility: CLINIC | Age: 85
End: 2025-02-26
Payer: MEDICARE

## 2025-02-26 ENCOUNTER — DOCUMENTATION ONLY (OUTPATIENT)
Dept: GERIATRICS | Facility: CLINIC | Age: 85
End: 2025-02-26

## 2025-02-26 DIAGNOSIS — R41.89 COGNITIVE IMPAIRMENT: ICD-10-CM

## 2025-02-26 DIAGNOSIS — G20.B2 PARKINSON'S DISEASE WITH DYSKINESIA AND FLUCTUATING MANIFESTATIONS (H): ICD-10-CM

## 2025-02-26 DIAGNOSIS — S82.832S CLOSED FRACTURE OF DISTAL END OF LEFT FIBULA, UNSPECIFIED FRACTURE MORPHOLOGY, SEQUELA: ICD-10-CM

## 2025-02-26 DIAGNOSIS — R54 FRAILTY: ICD-10-CM

## 2025-02-26 DIAGNOSIS — S72.402S CLOSED FRACTURE OF DISTAL END OF LEFT FEMUR, UNSPECIFIED FRACTURE MORPHOLOGY, SEQUELA: Primary | ICD-10-CM

## 2025-02-26 PROBLEM — S72.402A CLOSED FRACTURE OF DISTAL END OF LEFT FEMUR (H): Status: ACTIVE | Noted: 2025-02-16

## 2025-02-26 PROBLEM — S82.832A CLOSED FRACTURE OF DISTAL END OF LEFT FIBULA: Status: ACTIVE | Noted: 2025-02-16

## 2025-02-26 PROCEDURE — 99310 SBSQ NF CARE HIGH MDM 45: CPT | Performed by: NURSE PRACTITIONER

## 2025-02-26 RX ORDER — CARBIDOPA AND LEVODOPA 50; 200 MG/1; MG/1
1 TABLET, EXTENDED RELEASE ORAL DAILY
COMMUNITY

## 2025-02-26 RX ORDER — OXYCODONE HYDROCHLORIDE 5 MG/1
2.5 TABLET ORAL EVERY 8 HOURS PRN
COMMUNITY

## 2025-02-26 RX ORDER — ASPIRIN 81 MG/1
81 TABLET ORAL 2 TIMES DAILY
COMMUNITY
End: 2025-03-20

## 2025-02-26 RX ORDER — SENNOSIDES A AND B 8.6 MG/1
2 TABLET, FILM COATED ORAL 2 TIMES DAILY
COMMUNITY

## 2025-02-26 RX ORDER — CARBIDOPA AND LEVODOPA 25; 100 MG/1; MG/1
2 TABLET ORAL
COMMUNITY

## 2025-02-26 RX ORDER — LIDOCAINE 4 G/G
1 PATCH TOPICAL EVERY 24 HOURS
COMMUNITY

## 2025-02-26 RX ORDER — METHOCARBAMOL 750 MG/1
750 TABLET, FILM COATED ORAL EVERY 8 HOURS PRN
COMMUNITY
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
## 2025-02-27 ENCOUNTER — TELEPHONE (OUTPATIENT)
Dept: FAMILY MEDICINE | Facility: OTHER | Age: 85
End: 2025-02-27
Payer: MEDICARE

## 2025-02-27 DIAGNOSIS — M06.09 RHEUMATOID ARTHRITIS OF MULTIPLE SITES WITH NEGATIVE RHEUMATOID FACTOR (H): Primary | ICD-10-CM
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
## 2025-02-28 ENCOUNTER — MEDICAL CORRESPONDENCE (OUTPATIENT)
Dept: HEALTH INFORMATION MANAGEMENT | Facility: CLINIC | Age: 85
End: 2025-02-28

## 2025-02-28 ENCOUNTER — TELEPHONE (OUTPATIENT)
Dept: FAMILY MEDICINE | Facility: CLINIC | Age: 85
End: 2025-02-28
Payer: MEDICARE

## 2025-02-28 NOTE — TELEPHONE ENCOUNTER
,  Home Care is calling regarding an established patient with [facility].  Requesting orders from:  Kaur Grimm RN APPROVED: RN able to provide verbal orders.  Home Care will send orders for signature.  RN will close encounter.  Is this a request for a temporary pause in the home care episode?  No    Orders Requested    Skilled Nursing  Request for initial certification (first set of orders)   Frequency: 1 time per week for 8 weeks   3 PRN visits    RN gave verbal order: Yes, however patient was last seen in clinic by you in October. I did let home care nurse know that she needs to have patient get in ASAP to have another face to face      Physical Therapy  Request for initial evaluation and treatment (one time)   RN gave verbal order: Yes      Occupational Therapy  Request for initial evaluation and treatment (one time)   RN gave verbal order: Yes      Speech Therapy  Request for initial evaluation and treatment (one time)   RN gave verbal order: Yes         Caller: [facility]  Home Care Agency: [facility]  Phone number Home Care can be reached at: [phone]  Okay to leave a detailed message?: Yes    Joby Anthony RN

## 2025-02-28 NOTE — TELEPHONE ENCOUNTER
Given the circumstances I am curious if it would be best if she had care from the provider that is provided by the facility. Otherwise I can assist just let me know what they need.       GILES Cox CNP  Questions or concerns please feel free to send me a [employer] message or call me  Phone : [phone]

## 2025-02-28 NOTE — TELEPHONE ENCOUNTER
Patient is no longer at TCU and is home with daughter.     When calling back with any update, can ask specifically for [place].     MARLON WyattN, RN

## 2025-02-28 NOTE — TELEPHONE ENCOUNTER
They will need to schedule a hospital follow up next week with me       GILES Cox CNP  Questions or concerns please feel free to send me a [employer] message or call me  Phone : [phone]

## 2025-02-28 NOTE — TELEPHONE ENCOUNTER
RN relayed message to home care. Home care is requesting if clinic can assist in scheduling patient for hospital as requested below by provider.     MARLON WyattN, RN

## 2025-03-03 ENCOUNTER — TELEPHONE (OUTPATIENT)
Dept: FAMILY MEDICINE | Facility: OTHER | Age: 85
End: 2025-03-03
Payer: MEDICARE

## 2025-03-03 NOTE — TELEPHONE ENCOUNTER
Patient's daughter, Vivian, is calling back.  Informed Vivian (C2C on file) of documentation per Milly LEI as stated below.  Patient's daughter stated understanding.    Aggie Del Angel RN

## 2025-03-03 NOTE — TELEPHONE ENCOUNTER
RN called [facility] and talked with Clinical Manager Bruno. She reports the patient has upcoming appointment for evaluations with home care on March 5th, and the 17th. She reports the mobility evaluation will be included in those visits. They will then complete the letter of medical necessity, this can take 5-7 days to complete. RN updated provider, patient will need to have these evaluations completed and paperwork filled out to be able to place the requested order.    RN attempted to call mercedes Hyman to update per request, consent to communicate is on file. No answer, left voicemail with information to call the clinic and ask to speak to a Triage Nurse to relay update.      Milly Moran RN on 3/3/2025 at 1:31 PM

## 2025-03-03 NOTE — TELEPHONE ENCOUNTER
I would need homecare to do a mobility evaluation, I thought that she was getting homecare and PT/OT. Can we contact her homecare company and see if they can do this so I can put an order in. They have to have this before we can do the order.       GILES Cox CNP  Questions or concerns please feel free to send me a [employer] message or call me  Phone : [phone]

## 2025-03-03 NOTE — TELEPHONE ENCOUNTER
For a Medical bed, need for actual Medical bed. That has railing and goes up and down.  Please fax to [phone] : Yohan Calix, [facility] Please call [phone] Daughter Yenni to let know that order was sent.

## 2025-03-03 NOTE — TELEPHONE ENCOUNTER
Patient is scheduled on 3/5 for video visit already. Is this okay?   Next 5 appointments (look out 90 days)      Mar 05, 2025 1:00 PM  (Arrive by 12:55 PM)  Provider Visit with GILES Luna CNP  [facility] ([facility]) [address] [address]  [phone]              Jami Morrison MA

## 2025-03-05 ENCOUNTER — VIRTUAL VISIT (OUTPATIENT)
Dept: FAMILY MEDICINE | Facility: OTHER | Age: 85
End: 2025-03-05
Payer: COMMERCIAL

## 2025-03-05 ENCOUNTER — DOCUMENTATION ONLY (OUTPATIENT)
Dept: OTHER | Facility: CLINIC | Age: 85
End: 2025-03-05

## 2025-03-05 ENCOUNTER — TELEPHONE (OUTPATIENT)
Dept: FAMILY MEDICINE | Facility: OTHER | Age: 85
End: 2025-03-05
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
## 2025-03-05 DIAGNOSIS — S82.832D CLOSED FRACTURE OF DISTAL END OF LEFT FIBULA WITH ROUTINE HEALING, UNSPECIFIED FRACTURE MORPHOLOGY, SUBSEQUENT ENCOUNTER: ICD-10-CM

## 2025-03-05 DIAGNOSIS — D50.8 OTHER IRON DEFICIENCY ANEMIA: ICD-10-CM

## 2025-03-05 DIAGNOSIS — E11.9 TYPE 2 DIABETES, DIET CONTROLLED (H): ICD-10-CM

## 2025-03-05 DIAGNOSIS — R73.01 IMPAIRED FASTING GLUCOSE: ICD-10-CM

## 2025-03-05 DIAGNOSIS — E78.5 HYPERLIPIDEMIA LDL GOAL <130: ICD-10-CM

## 2025-03-05 DIAGNOSIS — R54 FRAILTY: ICD-10-CM

## 2025-03-05 DIAGNOSIS — G20.B2 PARKINSON'S DISEASE WITH DYSKINESIA AND FLUCTUATING MANIFESTATIONS (H): Primary | ICD-10-CM

## 2025-03-05 DIAGNOSIS — M06.09 RHEUMATOID ARTHRITIS OF MULTIPLE SITES WITH NEGATIVE RHEUMATOID FACTOR (H): ICD-10-CM

## 2025-03-05 DIAGNOSIS — M15.0 PRIMARY OSTEOARTHRITIS INVOLVING MULTIPLE JOINTS: ICD-10-CM

## 2025-03-05 PROCEDURE — 1111F DSCHRG MED/CURRENT MED MERGE: CPT | Mod: 95 | Performed by: NURSE PRACTITIONER

## 2025-03-05 PROCEDURE — 98006 SYNCH AUDIO-VIDEO EST MOD 30: CPT | Performed by: NURSE PRACTITIONER

## 2025-03-05 RX ORDER — LIDOCAINE 4 G/G
1 PATCH TOPICAL EVERY 24 HOURS
Qty: 30 PATCH | Refills: 0 | Status: SHIPPED | OUTPATIENT
Start: 2025-03-05

## 2025-03-05 NOTE — PROGRESS NOTES
Alannah is a 84 year old who is being evaluated via a billable video visit.    How would you like to obtain your AVS? Mail a copy  If the video visit is dropped, the invitation should be resent by: (Please text link invite to [phone]) Patient's daughter, Shiloh is with her today.   Will anyone else be joining your video visit? No  {If patient encounters technical issues they should call [phone] :885290}    {PROVIDER CHARTING PREFERENCE:180026}    Subjective   Alannah is a 84 year old, presenting for the following health issues:  Hospital F/U        3/5/2025     9:17 AM   Additional Questions   Roomed by Joyce   Accompanied by Daughter         3/5/2025   Forms   Any forms needing to be completed Yes       Video Start Time: 1:04 PM    History of Present Illness       Reason for visit:  Hospital follow up   She is taking medications regularly.            Hospital Follow-up Visit:    Hospital/Nursing Home/IP Rehab Facility:  [facility]   Date of Admission: 02/16/25  Date of Discharge: 02/25/25  Reason(s) for Admission: Fall from bed  Was the patient in the ICU or did the patient experience delirium during hospitalization?  No  Do you have any other stressors you would like to discuss with your provider? OTHER: Hospital bed for at home   concerns with how they are going to get the staples out of her leg.     Problems taking medications regularly:  None  Medication changes since discharge: None  Problems adhering to non-medication therapy:  None    Summary of hospitalization:  CareEverywhere information obtained and reviewed  Diagnostic Tests/Treatments reviewed.  Follow up needed: PCP and Ortho  Other Healthcare Providers Involved in Patient s Care:         Homecare  Update since discharge: stable.     Plan of care communicated with patient and family     Surgical repair at the hospital at [facility].   Femur break repair- Plate and anu. Two breaks in the fibula non-surgical. Staples in  the knee area.   Foot was in her armpit and reset it.   Having the Mobility assessment for the hospital bed. Right now sleeping on a mattress on the floor so she can't fall at her daughters house.   Home care- Nurse coming by weekly.   Starting PT and PT.   Review of Systems  Constitutional, HEENT, cardiovascular, pulmonary, gi and gu systems are negative, except as otherwise noted.      Objective           Vitals:  No vitals were obtained today due to virtual visit.    Physical Exam   {video visit exam brief selected:663521}    {Diagnostic Test Results (Optional):307747}      Video-Visit Details    Type of service:  Video Visit   Video End Time:1:32 PM  Originating Location (pt. Location): Home  {PROVIDER LOCATION On-site should be selected for visits conducted from your clinic location or adjoining [facility] hospital, academic office, or other nearby [facility] building. Off-site should be selected for all other provider locations, including home:813369}  Distant Location (provider location):  Off-site  Platform used for Video Visit: Kalyan  Signed Electronically by: GILES Cox CNP  {Email feedback regarding this note to [email or web address]   :679717}   Lorena.   Femur break repair- Plate and anu. Two breaks in the fibula non-surgical. Staples in the knee area.   Foot was in her armpit and reset it.   Having the Mobility assessment for the hospital bed. Right now sleeping on a mattress on the floor so she can't fall at her daughters house.   Home care- Nurse coming by weekly.   Starting PT and PT.   Review of Systems  Constitutional, HEENT, cardiovascular, pulmonary, gi and gu systems are negative, except as otherwise noted.      Objective           Vitals:  No vitals were obtained today due to virtual visit.    Physical Exam   GENERAL: elderly  NEURO: Cranial nerves grossly intact.  Mentation and speech appropriate for age.  PSYCH: Appropriate affect, tone, and pace of words    Office Visit on 02/07/2025   Component Date Value Ref Range Status    Case Report 02/07/2025    Final                    Value:Surgical Pathology Report                         Case: [id number]                                  Authorizing Provider:  Marilee Villarreal PA-C    Collected:           02/07/2025 02:17 PM          Ordering Location:     [facility]   Received:            02/07/2025 03:03 PM                                 [place]                                                                    Pathologist:           Jovon Huber MD                                                      Specimen:    Skin, left parietal scalp anterior                                                         Final Diagnosis 02/07/2025    Final                    Value:A(1). Skin, left parietal scalp anterior, shave:  - Squamous cell carcinoma, well-differentiated - (see description)        Clinical Information 02/07/2025    Final                    Value:The patient is a 84 year old female      Gross Description 02/07/2025    Final                    Value:A(1). Skin, left parietal scalp anterior:  The specimen is received in formalin with proper patient identification  "labeled \" left parietal scalp anterior\".  The specimen consists of a 1.3 x 1.0 cm irregular, tan skin shave.  The cutaneous surface is entirely comprised of a 1.1 x 1.0 x 0.1 cm tan-red, scaly lesion.  The resected surface is inked blue, the specimen is quadrisected and entirely submitted in cassette A1.        Microscopic Description 02/07/2025    Final                    Value:The specimen exhibits scale crust and an endophytic, undermining proliferation of atypical squamous epithelium with foci of keratinization and dermal islands of atypical keratinocytes.       Performing Labs 02/07/2025    Final                    Value:The technical component of this testing was completed at [facility].    Stain controls for all stains resulted within this report have been reviewed and show appropriate reactivity.            Video-Visit Details    Type of service:  Video Visit   Video End Time:1:32 PM  Originating Location (pt. Location): Home    Distant Location (provider location):  Off-site  Platform used for Video Visit: Kalyan  Signed Electronically by: GILES Cox CNP    "

## 2025-03-05 NOTE — TELEPHONE ENCOUNTER
Home Care is calling regarding an established patient with [facility].  Requesting orders from: System, Provider Not In  RN APPROVED: RN able to provide verbal orders.  Home Care will send orders for signature.  RN will close encounter.  Is this a request for a temporary pause in the home care episode?  No    Orders Requested    Physical Therapy  Request for initial certification (first set of orders)   Frequency: 1x/wk x 1 week  2x/wk x 6 weeks  1x/wk x1 week  For Balancing and strengthening.   RN gave verbal order: Yes         Caller: Akila physical therapist  Home Care Agency: [facility]  Phone number Home Care can be reached at: [phone]  Okay to leave a detailed message?: Yes    Joana Shelton RN

## 2025-03-11 ENCOUNTER — TELEPHONE (OUTPATIENT)
Dept: FAMILY MEDICINE | Facility: OTHER | Age: 85
End: 2025-03-11
Payer: MEDICARE

## 2025-03-11 NOTE — TELEPHONE ENCOUNTER
INCOMING FORMS    Sender: charity    Type of Form, letter or note (What is requested?): HHC/POC    How was the form received?: Fax    How should forms be returned?:  Fax : [phone]    Form placed in K[facility] bin for review/signature if appropriate.

## 2025-03-17 DIAGNOSIS — Z53.9 DIAGNOSIS NOT YET DEFINED: Primary | ICD-10-CM

## 2025-03-17 PROCEDURE — G0180 MD CERTIFICATION HHA PATIENT: HCPCS | Performed by: NURSE PRACTITIONER

## 2025-03-20 ENCOUNTER — TRANSFERRED RECORDS (OUTPATIENT)
Dept: HEALTH INFORMATION MANAGEMENT | Facility: CLINIC | Age: 85
End: 2025-03-20
Payer: MEDICARE

## 2025-03-21 ENCOUNTER — TELEPHONE (OUTPATIENT)
Dept: FAMILY MEDICINE | Facility: OTHER | Age: 85
End: 2025-03-21
Payer: MEDICARE

## 2025-03-21 NOTE — TELEPHONE ENCOUNTER
Order/Referral Request    Who is requesting: Home Care    Orders being requested: Occupational Therapy     Reason service is needed/diagnosis: For strengthening Falls prevention ADL retraining for 1 time a week for 4 weeks and 1 month 1    When are orders needed by: ASAP    Has this been discussed with Provider: No    Does patient have a preference on a Group/Provider/Facility? [facility]     Does patient have an appointment scheduled?: No    Where to send orders: Place orders within Epic    Okay to leave a detailed message?: No at Cell number on file:    Telephone Information:   Mobile [phone]

## 2025-03-24 NOTE — TELEPHONE ENCOUNTER
Kaur Grimm, APRN [facility]3 days ago     Agree with plan     RN called and relayed message to [place] for OT orders.     MARLON WyattN, RN

## 2025-03-28 ENCOUNTER — MEDICAL CORRESPONDENCE (OUTPATIENT)
Dept: HEALTH INFORMATION MANAGEMENT | Facility: CLINIC | Age: 85
End: 2025-03-28
Payer: MEDICARE

## 2025-03-31 ENCOUNTER — TELEPHONE (OUTPATIENT)
Dept: RHEUMATOLOGY | Facility: CLINIC | Age: 85
End: 2025-03-31
Payer: MEDICARE

## 2025-03-31 NOTE — TELEPHONE ENCOUNTER
LUCY Franco, notified that patient had Mohs surgery on 3/27/2025.  Infliximab can be restarted no sooner than 2 weeks after the surgery is completed and only in the absence of infection and absence of delayed wound healing.    Merrick Del Cid MD  3/31/2025

## 2025-04-02 ENCOUNTER — OFFICE VISIT (OUTPATIENT)
Dept: DERMATOLOGY | Facility: CLINIC | Age: 85
End: 2025-04-02
Payer: COMMERCIAL

## 2025-04-02 VITALS — DIASTOLIC BLOOD PRESSURE: 62 MMHG | HEART RATE: 69 BPM | OXYGEN SATURATION: 96 % | SYSTOLIC BLOOD PRESSURE: 108 MMHG

## 2025-04-02 DIAGNOSIS — C44.42 SQUAMOUS CELL CARCINOMA OF SCALP: Primary | ICD-10-CM

## 2025-04-02 PROCEDURE — 17311 MOHS 1 STAGE H/N/HF/G: CPT | Performed by: DERMATOLOGY

## 2025-04-02 PROCEDURE — 3078F DIAST BP <80 MM HG: CPT | Performed by: DERMATOLOGY

## 2025-04-02 PROCEDURE — 3074F SYST BP LT 130 MM HG: CPT | Performed by: DERMATOLOGY

## 2025-04-02 RX ORDER — PEDI MULTIVIT NO.7/FOLIC ACID 100 MCG
1 TABLET,CHEWABLE ORAL DAILY
COMMUNITY

## 2025-04-02 RX ORDER — POLYETHYLENE GLYCOL 3350 17 G/17G
17 POWDER, FOR SOLUTION ORAL DAILY
COMMUNITY

## 2025-04-02 NOTE — LETTER
4/2/2025      Alannah Leos  [address] [address]      Dear Colleague,    Thank you for referring your patient, Alannah Leos, to the [facility]. Please see a copy of my visit note below.    [facility] [place] Procedure Note    Dermatology Problem List:     1. 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 Hx of Parkinson's Disease  3. History of benign biopsies  - Mild irregular epidermal hyperplasia and upper dermal fibrosis consistent with scar, left parietal scalp lateral, bx 9/18/2024   - Lentiginous Junctional melanocytic nevus, R upper back, s/p bx 7/3/17  4. AK, cryo  5. Seborrheic dermatitis: scalp.  - Ketoconazole 2% shampoo 2x week, betamethasone valerate 0.1% lotion  ____________________    Date of Service:  Apr 2, 2025  Surgery: Mohs micrographic surgery    Case 1  Repair Type: secondary  Repair Size: N/A   Suture Material: N/A   Tumor Type: SCC - Squamous cell carcinoma  Location: left parietal scalp anterior  Derm-Path Accession #: [id number]  PreOp Size: 1.7x1.4 cm  PostOp Size: 2.4x1.6 cm  Mohs Accession #: [id number]  Level of Defect: perichondrium      Procedure:  We discussed the principles of treatment and most likely complications including scarring, bleeding, infection, swelling, pain, crusting, nerve damage, large wound,  incomplete excision, wound dehiscence,  nerve damage, recurrence, and a second procedure may be recommended to obtain the best cosmetic or functional result.    Informed consent was obtained and the patient underwent the procedure as follows:  The patient was placed Other (comments) (seated) on the operating table.  The cancer was identified, outlined with a marker, and verified by the patient.  The entire surgical field was prepped with  ChoraPrep.  The surgical site was anesthetized using Lidocaine 1% with epi 1:100,000.      The area of clinically apparent tumor was debulked. The layer of tissue was then surgically excised using a #15 blade and was then transferred onto a specimen sheet maintaining the orientation of the specimen. Hemostasis was obtained using bipolar electrocoagulation. The wound site was then covered with a dressing while the tissue samples were processed for examination.    The excised tissue was transported to the [facility] histology laboratory maintaining the tissue orientation.  The tissue specimen was relaxed so that the entire surgical margin was in a a single horizontal plane for sectioning and inked for precise mapping.  A precise reference map was drawn to reflect the sectioning of the specimen, colored inking of the margins, and orientation on the patient. The tissue was processed using horizontal sectioning of the base and continuous peripheral margins.  The histopathologic sections were reviewed in conjunction with the reference map.    Total blocks: 1    Total slides:  2    There were no cancer cells visualized on examination, therefore Mohs surgery was complete.    Mohs repair type: EVALUATION OF MOHS DEFECT FOR RECONSTRUCTION    The patient is status post Mohs micrographic surgery.  The surgical site was examined with attention to normal anatomic and functional relationships.  After consideration and discussion of multiple options with the patient, it was determined that healing by second intention would offer the best chance for preservation/restoration of all normal anatomic and functional relationships.  The patient verbalized understanding and agrees with this plan. It is also understood that should second intention healing be sub-optimal, additional procedures such as scar revision, steroid injection or dermabrasion may be recommended.    The open wound was cleaned with Chlorhexidine and rinsed with sterile  saline, and a thick layer of ointment was applied.  A pressure dressing consisting of non-adherent gauze, gauze and hypafixwas applied.   Wound care was discussed with patient both orally and in writing.  The patient stated understanding and agreement of the course of care.     Scribe Disclosure:   I, Melissadora Norwood, am serving as a scribe; to document services personally performed by Dr. Tc Guaman - -based on data collection and the provider's statements to me.     Provider Disclosure:   The documentation recorded by the scribe accurately reflects the services I personally performed and the decisions made by me. I personally performed the procedures today.    Tc Guaman DO    Department of Dermatology  [facility]: Phone: [phone], Fax:[phone]  [facility]: Phone: [phone], Fax: [phone]    Care and Laboratory Testing Performed at:  [facility]   Dermatology Clinic  [address] [place] [address]      Again, thank you for allowing me to participate in the care of your patient.        Sincerely,        Tc Guaman MD    Electronically signed

## 2025-04-02 NOTE — PROGRESS NOTES
[facility] Dermatologic Surgery [facility] [place] Procedure Note    Dermatology Problem List:     1. 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 Hx of Parkinson's Disease  3. History of benign biopsies  - Mild irregular epidermal hyperplasia and upper dermal fibrosis consistent with scar, left parietal scalp lateral, bx 9/18/2024   - Lentiginous Junctional melanocytic nevus, R upper back, s/p bx 7/3/17  4. AK, cryo  5. Seborrheic dermatitis: scalp.  - Ketoconazole 2% shampoo 2x week, betamethasone valerate 0.1% lotion  ____________________    Date of Service:  Apr 2, 2025  Surgery: Mohs micrographic surgery    Case 1  Repair Type: secondary  Repair Size: N/A   Suture Material: N/A   Tumor Type: SCC - Squamous cell carcinoma  Location: left parietal scalp anterior  Derm-Path Accession #: [id number]  PreOp Size: 1.7x1.4 cm  PostOp Size: 2.4x1.6 cm  Mohs Accession #: [id number]  Level of Defect: perichondrium      Procedure:  We discussed the principles of treatment and most likely complications including scarring, bleeding, infection, swelling, pain, crusting, nerve damage, large wound,  incomplete excision, wound dehiscence,  nerve damage, recurrence, and a second procedure may be recommended to obtain the best cosmetic or functional result.    Informed consent was obtained and the patient underwent the procedure as follows:  The patient was placed Other (comments) (seated) on the operating table.  The cancer was identified, outlined with a marker, and verified by the patient.  The entire surgical field was prepped with ChoraPrep.  The surgical site was anesthetized using Lidocaine 1% with epi 1:100,000.      The area of clinically apparent tumor was debulked. The layer of tissue was then surgically excised using a #15 blade and was then transferred onto a  specimen sheet maintaining the orientation of the specimen. Hemostasis was obtained using bipolar electrocoagulation. The wound site was then covered with a dressing while the tissue samples were processed for examination.    The excised tissue was transported to the [facility] histology laboratory maintaining the tissue orientation.  The tissue specimen was relaxed so that the entire surgical margin was in a a single horizontal plane for sectioning and inked for precise mapping.  A precise reference map was drawn to reflect the sectioning of the specimen, colored inking of the margins, and orientation on the patient. The tissue was processed using horizontal sectioning of the base and continuous peripheral margins.  The histopathologic sections were reviewed in conjunction with the reference map.    Total blocks: 1    Total slides:  2    There were no cancer cells visualized on examination, therefore Mohs surgery was complete.    Mohs repair type: EVALUATION OF MOHS DEFECT FOR RECONSTRUCTION    The patient is status post Mohs micrographic surgery.  The surgical site was examined with attention to normal anatomic and functional relationships.  After consideration and discussion of multiple options with the patient, it was determined that healing by second intention would offer the best chance for preservation/restoration of all normal anatomic and functional relationships.  The patient verbalized understanding and agrees with this plan. It is also understood that should second intention healing be sub-optimal, additional procedures such as scar revision, steroid injection or dermabrasion may be recommended.    The open wound was cleaned with Chlorhexidine and rinsed with sterile saline, and a thick layer of ointment was applied.  A pressure dressing consisting of non-adherent gauze, gauze and hypafixwas applied.   Wound care was discussed with patient both orally and in writing.  The patient stated understanding and  agreement of the course of care.     Scribe Disclosure:   I, Melissa Norwood, am serving as a scribe; to document services personally performed by Dr. Tc Guaman - -based on data collection and the provider's statements to me.     Provider Disclosure:   The documentation recorded by the scribe accurately reflects the services I personally performed and the decisions made by me. I personally performed the procedures today.    Tc Guaman DO    Department of Dermatology  [facility]: Phone: [phone], Fax:[phone]  [facility]: Phone: [phone], Fax: [phone]    Care and Laboratory Testing Performed at:  [facility]   Dermatology Clinic  [address] [place] [address]

## 2025-04-02 NOTE — PATIENT INSTRUCTIONS
Caring for your skin after surgery    For the first 48 hours after your surgery:  Leave the pressure dressing on and keep it dry. If it should come loose, you may re-tape it, but do not take it off.  Relax and take it easy.  Do not do any vigorous exercise, heavy lifting or bending forward. This could cause the wound to bleed.  If the wound is on your head, sleeping with your head elevated for the first few nights will help with swelling and bleeding. (Use linens/pillow cases that would be ok to get blood on in the event there is some oozing from the bandage).  Post-operative pain is usually mild.  You may alternate between 1000 mg of Tylenol (acetaminophen) and 400 mg of Ibuprofen every 4 hours.  This means, for example, that you could take the followin,000 mg of Tylenol, followed 4 hours later by 400 mg Ibuprofen, followed 4 hours later with 1,000 mg of Tylenol, and so forth.  Do not take more than 4,000 mg of acetaminophen in a 24-hour period or 3200 mg of Ibuprofen in a 24-hr period.    Avoid alcohol and vitamin E as these may increase your tendency to bleed.  Apply an ice pack for 20 min every 2-3 hours while awake.  This may help reduce swelling, bruising, and pain.  Make sure the ice pack is waterproof so that the pressure bandage doesn't get wet.  You may see a small amount of drainage or blood on your pressure bandage. This is normal. However:  If drainage or bleeding continues or saturates the bandage, you will need to apply firm pressure over the bandage with a clean washcloth for 15 minutes.    Remove the saturated bandage.  If bleeding has stopped, apply Vaseline over the suture line and cover with a non-stick bandage.  To add some pressure over the wound, fold a piece of gauze and tape over the area.  If bleeding continues after applying pressure for 15 minutes, apply an ice pack with gentle pressure to the bandaged area for another 15 minutes.  If bleeding still continues, call our office or go to  the nearest emergency room.    48 Hours After Surgery:  Remove bandage.  Wash the wound gently with mild soap and water.  Do not use a washcloth, just your hands.  Let the wound be the last thing you wash before getting out of the shower.  Rinse well and pat dry.  Apply Vaseline or Aquaphor ointment (from a new container or tube) over the wound and the surrounding tissue with a Q-tip  Cover with a bandage.  Do this daily until the wound is healed.    What to expect:  The first couple of days your wound may be tender and may bleed slightly when doing wound care.  There may be swelling and bruising around the wound. For your comfort, you may apply ice or cold compresses to the area.  The area around your wound may be numb for several weeks or even months.  You may experience periodic sharp pain or mild itching around the wound as it heals.    Call Us If:  You have bleeding that will not stop after applying pressure and ice.  You have pain that is not controlled with Tylenol and Ibuprofen.  You have signs or symptoms of an infection such as fever over 100 degrees Fahrenheit, redness, swelling, or warmth spreading from the wound, increasing pain after the first 48 hours, or white/yellow/green drainage from the wound (may or may not have a foul odor).    [facility], Dermatology [phone].  Available .  For urgent needs outside of business hours, call the [facility] at [phone] and ask to speak with/page the dermatology resident on call.

## 2025-04-02 NOTE — NURSING NOTE
Alannah Leos's goals for this visit include:   Chief Complaint   Patient presents with    Procedure     SCC left parietal scalp anterior       She requests these members of her care team be copied on today's visit information:     PCP: Kaur Grimm    Referring Provider:  Referred Self, MD  No address on file    /62   Pulse 69   LMP  (LMP Unknown)   SpO2 96%     Do you need any medication refills at today's visit?       Radha Caro EMT    The following medication was given:     MEDICATION:  Lidocaine with epinephrine 1% 1:550759  ROUTE: SQ  SITE: see procedure note  DOSE: 3.5  LOT #: [id number]  : [employer]  EXPIRATION DATE: 11/30/2025  NDC#: 0020-7523-15  Was there drug waste? Yes 2.5  Multi-dose vial: Yes    Radha Caro  April 2, 2025     Vaseline and pressure dressing applied to Mohs site on L Parietal scalp anterior.  Wound care instructions reviewed with patient and AVS provided.  Patient verbalized understanding.  Post op appointment scheduled N/A. Patient will follow up for suture removal: N/A.  No further questions or concerns at this time.

## 2025-04-03 ENCOUNTER — TELEPHONE (OUTPATIENT)
Dept: DERMATOLOGY | Facility: CLINIC | Age: 85
End: 2025-04-03
Payer: MEDICARE

## 2025-04-03 NOTE — TELEPHONE ENCOUNTER
Alannah is 1 day s/p Mohs on left parietal scalp anterior.  It is healing by secondary intent.    What are you doing to manage your pain?  No new or worsening pain.    Are you applying ice? Yes    Have you had any noticeable bleeding through the bandage?  No, dressing is dry and intact.    Do you have any concerns?  No     Wound care directions reviewed.  Patient declined a post op appointment.  Next skin check has been scheduled.     Miguelina Goss RN

## 2025-04-10 ENCOUNTER — TELEPHONE (OUTPATIENT)
Dept: FAMILY MEDICINE | Facility: OTHER | Age: 85
End: 2025-04-10
Payer: MEDICARE

## 2025-04-10 NOTE — TELEPHONE ENCOUNTER
Home Care is calling regarding an established patient with [facility].  Requesting orders from: Kaur Grimm RN APPROVED: RN able to provide verbal orders.  Home Care will send orders for signature.  RN will close encounter.  Is this a request for a temporary pause in the home care episode?  No    Orders Requested    Skilled Nursing  Request for continuation of care with increase in frequency  Frequency: 1 additional nurse visit  RN gave verbal order: Yes      Okay to leave a detailed message?: Yes    Contacts       Contact Date/Time Type Contact Phone/Fax    04/10/2025 12:54 PM CDT Phone (Incoming) [facility] [phone]          Payton Onofre, QUIQUE

## 2025-04-11 ENCOUNTER — TELEPHONE (OUTPATIENT)
Dept: RHEUMATOLOGY | Facility: CLINIC | Age: 85
End: 2025-04-11
Payer: MEDICARE

## 2025-04-11 ENCOUNTER — TELEPHONE (OUTPATIENT)
Dept: FAMILY MEDICINE | Facility: OTHER | Age: 85
End: 2025-04-11
Payer: MEDICARE

## 2025-04-11 DIAGNOSIS — Z79.899 HIGH RISK MEDICATION USE: Primary | ICD-10-CM

## 2025-04-11 DIAGNOSIS — M06.09 RHEUMATOID ARTHRITIS OF MULTIPLE SITES WITH NEGATIVE RHEUMATOID FACTOR (H): ICD-10-CM

## 2025-04-11 RX ORDER — METHOTREXATE 2.5 MG/1
20 TABLET ORAL WEEKLY
Qty: 104 TABLET | Refills: 0 | Status: SHIPPED | OUTPATIENT
Start: 2025-04-11

## 2025-04-11 NOTE — TELEPHONE ENCOUNTER
INCOMING FORMS    Sender: [facility]     Type of Form, letter or note (What is requested?): order    How was the form received?: Fax    How should forms be returned?:  Fax : [phone]    Form placed in [facility] bin for review/signature if appropriate.

## 2025-04-11 NOTE — TELEPHONE ENCOUNTER
Per MD after review 3 month supply of methotrexate and  cbc with diff, CR, and hepatic panel ordered. Needs appt. Will call again vm option says to call back again then beeps, but doesn't disconnect.    JOSE CARLOS Barrett RN 4/11/2025 2:31 PM

## 2025-04-11 NOTE — LETTER
[facility]  [address]  [place] [address]  Phone: [phone]  Fax: [phone]    April 23, 2025    Alannah Leos                                                                                                                                                       [address] [place] [address]              Dear Alannah,    We have been unsuccessful in reaching you by phone. You are over due for labs and an office appt with Dr Del Cid. Refills of medication will be denied for safety reasons. Please call and make an appt with us unless you have other arrangements for your rheumatology care.    Sincerely,    JOSE CARLOS Barrett RN 4/23/2025 9:43 AM

## 2025-04-11 NOTE — TELEPHONE ENCOUNTER
[facility]    Phone Message    May a detailed message be left on voicemail: yes     Reason for Call: Medication Refill Request    Has the patient contacted the pharmacy for the refill? Yes   Name of medication being requested: methotrexate 2.5 MG tablet  Provider who prescribed the medication: Merrick Del Cid MD  NEW Pharmacy: [facility]: [facility] [address], [place] [address]  Date medication is needed: 4/11/25. Per pt's daughter, she will not have any for this Sunday. Wondering if she could get 8 tablets in the meantime so she can have for Sunday if they need more time for a full refill. Please advise.    Action Taken: Message routed to:  Other: RHEUM    Travel Screening: Not Applicable     Date of Service: 4/11/25

## 2025-04-14 ENCOUNTER — MEDICAL CORRESPONDENCE (OUTPATIENT)
Dept: HEALTH INFORMATION MANAGEMENT | Facility: CLINIC | Age: 85
End: 2025-04-14
Payer: MEDICARE

## 2025-04-14 NOTE — TELEPHONE ENCOUNTER
Signed please fax.     GILES Cox CNP  Questions or concerns please feel free to send me a Miro message or call me  Phone : [phone]

## 2025-04-14 NOTE — TELEPHONE ENCOUNTER
Called and spoke with Shiloh's  Lam. He took the message and call back number.    JOSE CARLOS Barrett RN 4/14/2025 1:54 PM

## 2025-04-15 ENCOUNTER — TELEPHONE (OUTPATIENT)
Dept: DERMATOLOGY | Facility: CLINIC | Age: 85
End: 2025-04-15
Payer: MEDICARE

## 2025-04-15 NOTE — TELEPHONE ENCOUNTER
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  It is healing by secondary intent.  Her daughter, Shiloh, called and emailed the attached photos.    She wanted to know if her wound was healing appropriately.  Alannah denies pain, fever, chills.  Shiloh has been cleaning the wound daily with chlorhexidine, applying Vaseline and covering with a bandage.    Granulation tissue present in wound bed.  Wound edges are pink.  Minimal amount of serosanguineous drainage present on bandage.    No signs or symptoms of infection are present.  I provided reassurance and notified Shiloh to continue with her daily cares.    Miguelina Goss RN

## 2025-04-16 DIAGNOSIS — M06.09 RHEUMATOID ARTHRITIS OF MULTIPLE SITES WITH NEGATIVE RHEUMATOID FACTOR (H): ICD-10-CM

## 2025-04-16 NOTE — TELEPHONE ENCOUNTER
Patient would like her folic acid sent to her new pharmacy, [facility] [address] [place] [address].    Joan Bucio, [facility]  4/16/2025

## 2025-04-17 ENCOUNTER — LAB (OUTPATIENT)
Dept: INFUSION THERAPY | Facility: CLINIC | Age: 85
End: 2025-04-17
Attending: INTERNAL MEDICINE
Payer: MEDICARE

## 2025-04-17 VITALS
OXYGEN SATURATION: 97 % | TEMPERATURE: 97.4 F | RESPIRATION RATE: 16 BRPM | SYSTOLIC BLOOD PRESSURE: 122 MMHG | BODY MASS INDEX: 17.89 KG/M2 | HEART RATE: 73 BPM | WEIGHT: 88.6 LBS | DIASTOLIC BLOOD PRESSURE: 61 MMHG

## 2025-04-17 DIAGNOSIS — M06.09 RHEUMATOID ARTHRITIS OF MULTIPLE SITES WITH NEGATIVE RHEUMATOID FACTOR (H): Primary | ICD-10-CM

## 2025-04-17 DIAGNOSIS — E78.5 HYPERLIPIDEMIA LDL GOAL <130: ICD-10-CM

## 2025-04-17 DIAGNOSIS — S82.832D CLOSED FRACTURE OF DISTAL END OF LEFT FIBULA WITH ROUTINE HEALING, UNSPECIFIED FRACTURE MORPHOLOGY, SUBSEQUENT ENCOUNTER: ICD-10-CM

## 2025-04-17 DIAGNOSIS — G20.B2 PARKINSON'S DISEASE WITH DYSKINESIA AND FLUCTUATING MANIFESTATIONS (H): ICD-10-CM

## 2025-04-17 DIAGNOSIS — Z79.899 HIGH RISK MEDICATION USE: ICD-10-CM

## 2025-04-17 DIAGNOSIS — M06.09 RHEUMATOID ARTHRITIS OF MULTIPLE SITES WITH NEGATIVE RHEUMATOID FACTOR (H): ICD-10-CM
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
## 2025-04-17 DIAGNOSIS — D50.8 OTHER IRON DEFICIENCY ANEMIA: ICD-10-CM

## 2025-04-17 DIAGNOSIS — R73.01 IMPAIRED FASTING GLUCOSE: ICD-10-CM

## 2025-04-17 LAB
ALBUMIN SERPL BCG-MCNC: 3.8 G/DL (ref 3.5–5.2)
ALP SERPL-CCNC: 113 U/L (ref 40–150)
ALT SERPL W P-5'-P-CCNC: <5 U/L (ref 0–50)
ANION GAP SERPL CALCULATED.3IONS-SCNC: 10 MMOL/L (ref 7–15)
AST SERPL W P-5'-P-CCNC: 17 U/L (ref 0–45)
BASOPHILS # BLD AUTO: 0.1 10E3/UL (ref 0–0.2)
BASOPHILS NFR BLD AUTO: 2 %
BILIRUB DIRECT SERPL-MCNC: 0.15 MG/DL (ref 0–0.3)
BILIRUB SERPL-MCNC: 0.3 MG/DL
BUN SERPL-MCNC: 14.1 MG/DL (ref 8–23)
BURR CELLS BLD QL SMEAR: SLIGHT
CALCIUM SERPL-MCNC: 9.3 MG/DL (ref 8.8–10.4)
CHLORIDE SERPL-SCNC: 102 MMOL/L (ref 98–107)
CHOLEST SERPL-MCNC: 176 MG/DL
CREAT SERPL-MCNC: 0.31 MG/DL (ref 0.51–0.95)
EGFRCR SERPLBLD CKD-EPI 2021: >90 ML/MIN/1.73M2
ELLIPTOCYTES BLD QL SMEAR: SLIGHT
EOSINOPHIL # BLD AUTO: 0.2 10E3/UL (ref 0–0.7)
EOSINOPHIL NFR BLD AUTO: 5 %
ERYTHROCYTE [DISTWIDTH] IN BLOOD BY AUTOMATED COUNT: 17.9 % (ref 10–15)
EST. AVERAGE GLUCOSE BLD GHB EST-MCNC: 108 MG/DL
FASTING STATUS PATIENT QL REPORTED: NO
FASTING STATUS PATIENT QL REPORTED: NO
GLUCOSE SERPL-MCNC: 131 MG/DL (ref 70–99)
HBA1C MFR BLD: 5.4 % (ref 0–5.6)
HCO3 SERPL-SCNC: 25 MMOL/L (ref 22–29)
HCT VFR BLD AUTO: 32.1 % (ref 35–47)
HDLC SERPL-MCNC: 54 MG/DL
HGB BLD-MCNC: 10.4 G/DL (ref 11.7–15.7)
HOLD SPECIMEN: NORMAL
IMM GRANULOCYTES # BLD: 0 10E3/UL
IMM GRANULOCYTES NFR BLD: 0 %
IRON BINDING CAPACITY (ROCHE): 218 UG/DL (ref 240–430)
IRON SATN MFR SERPL: 27 % (ref 15–46)
IRON SERPL-MCNC: 59 UG/DL (ref 37–145)
LDLC SERPL CALC-MCNC: 105 MG/DL
LYMPHOCYTES # BLD AUTO: 1.7 10E3/UL (ref 0.8–5.3)
LYMPHOCYTES NFR BLD AUTO: 36 %
MCH RBC QN AUTO: 28.2 PG (ref 26.5–33)
MCHC RBC AUTO-ENTMCNC: 32.4 G/DL (ref 31.5–36.5)
MCV RBC AUTO: 87 FL (ref 78–100)
MONOCYTES # BLD AUTO: 0.3 10E3/UL (ref 0–1.3)
MONOCYTES NFR BLD AUTO: 7 %
NEUTROPHILS # BLD AUTO: 2.3 10E3/UL (ref 1.6–8.3)
NEUTROPHILS NFR BLD AUTO: 50 %
NONHDLC SERPL-MCNC: 122 MG/DL
NRBC # BLD AUTO: 0 10E3/UL
NRBC BLD AUTO-RTO: 0 /100
PLAT MORPH BLD: ABNORMAL
PLATELET # BLD AUTO: 241 10E3/UL (ref 150–450)
POTASSIUM SERPL-SCNC: 4 MMOL/L (ref 3.4–5.3)
PROT SERPL-MCNC: 6.2 G/DL (ref 6.4–8.3)
RBC # BLD AUTO: 3.69 10E6/UL (ref 3.8–5.2)
RBC MORPH BLD: ABNORMAL
SODIUM SERPL-SCNC: 137 MMOL/L (ref 135–145)
TARGETS BLD QL SMEAR: SLIGHT
TRIGL SERPL-MCNC: 87 MG/DL
WBC # BLD AUTO: 4.7 10E3/UL (ref 4–11)

## 2025-04-17 PROCEDURE — 258N000003 HC RX IP 258 OP 636: Performed by: INTERNAL MEDICINE

## 2025-04-17 PROCEDURE — 83036 HEMOGLOBIN GLYCOSYLATED A1C: CPT

## 2025-04-17 PROCEDURE — 84155 ASSAY OF PROTEIN SERUM: CPT

## 2025-04-17 PROCEDURE — 83550 IRON BINDING TEST: CPT

## 2025-04-17 PROCEDURE — 82465 ASSAY BLD/SERUM CHOLESTEROL: CPT

## 2025-04-17 PROCEDURE — 80048 BASIC METABOLIC PNL TOTAL CA: CPT

## 2025-04-17 PROCEDURE — 84450 TRANSFERASE (AST) (SGOT): CPT

## 2025-04-17 PROCEDURE — 85041 AUTOMATED RBC COUNT: CPT

## 2025-04-17 PROCEDURE — 250N000011 HC RX IP 250 OP 636: Performed by: INTERNAL MEDICINE

## 2025-04-17 PROCEDURE — 250N000013 HC RX MED GY IP 250 OP 250 PS 637: Performed by: INTERNAL MEDICINE

## 2025-04-17 PROCEDURE — 85004 AUTOMATED DIFF WBC COUNT: CPT

## 2025-04-17 PROCEDURE — 83718 ASSAY OF LIPOPROTEIN: CPT

## 2025-04-17 PROCEDURE — 82565 ASSAY OF CREATININE: CPT

## 2025-04-17 RX ORDER — EPINEPHRINE 1 MG/ML
0.3 INJECTION, SOLUTION INTRAMUSCULAR; SUBCUTANEOUS EVERY 5 MIN PRN
OUTPATIENT
Start: 2025-05-26

## 2025-04-17 RX ORDER — DIPHENHYDRAMINE HYDROCHLORIDE 50 MG/ML
50 INJECTION, SOLUTION INTRAMUSCULAR; INTRAVENOUS
Start: 2025-05-26

## 2025-04-17 RX ORDER — METHYLPREDNISOLONE SODIUM SUCCINATE 40 MG/ML
40 INJECTION INTRAMUSCULAR; INTRAVENOUS
Start: 2025-05-26

## 2025-04-17 RX ORDER — DIPHENHYDRAMINE HCL 25 MG
25 CAPSULE ORAL ONCE
Start: 2025-05-26 | End: 2025-05-26

## 2025-04-17 RX ORDER — ALBUTEROL SULFATE 90 UG/1
1-2 INHALANT RESPIRATORY (INHALATION)
Start: 2025-05-26

## 2025-04-17 RX ORDER — ACETAMINOPHEN 325 MG/1
650 TABLET ORAL ONCE
Status: COMPLETED | OUTPATIENT
Start: 2025-04-17 | End: 2025-04-17

## 2025-04-17 RX ORDER — DIPHENHYDRAMINE HYDROCHLORIDE 50 MG/ML
25 INJECTION, SOLUTION INTRAMUSCULAR; INTRAVENOUS
Start: 2025-05-26

## 2025-04-17 RX ORDER — HEPARIN SODIUM (PORCINE) LOCK FLUSH IV SOLN 100 UNIT/ML 100 UNIT/ML
5 SOLUTION INTRAVENOUS
OUTPATIENT
Start: 2025-05-26

## 2025-04-17 RX ORDER — HEPARIN SODIUM,PORCINE 10 UNIT/ML
5 VIAL (ML) INTRAVENOUS
OUTPATIENT
Start: 2025-05-26

## 2025-04-17 RX ORDER — ALBUTEROL SULFATE 0.83 MG/ML
2.5 SOLUTION RESPIRATORY (INHALATION)
OUTPATIENT
Start: 2025-05-26

## 2025-04-17 RX ORDER — ACETAMINOPHEN 325 MG/1
650 TABLET ORAL ONCE
Start: 2025-05-26 | End: 2025-05-26

## 2025-04-17 RX ORDER — MEPERIDINE HYDROCHLORIDE 25 MG/ML
25 INJECTION INTRAMUSCULAR; INTRAVENOUS; SUBCUTANEOUS
OUTPATIENT
Start: 2025-05-26

## 2025-04-17 RX ADMIN — ACETAMINOPHEN 650 MG: 325 TABLET ORAL at 14:46

## 2025-04-17 RX ADMIN — SODIUM CHLORIDE 250 ML: 0.9 INJECTION, SOLUTION INTRAVENOUS at 14:46

## 2025-04-17 RX ADMIN — INFLIXIMAB 120 MG: 100 INJECTION, POWDER, LYOPHILIZED, FOR SOLUTION INTRAVENOUS at 14:56

## 2025-04-17 NOTE — PROGRESS NOTES
Infusion Nursing Note:  Alannah Leos presents today for Rapid Infliximab.    Patient seen by provider today: No   present during visit today: Not Applicable.    Note: Patient's weight down approx. 20 lbs from last check. Patient's daughter emotional about this today, states it has been a long few months. Patient had a fall with a leg fracture a few days after her infusion in February that is healing. She also had a Mohs procedure done 4/2 that is healing well according to MyChart communication from 4/15. Previous Mohs procedure done in March and Mychart communication at that time with Dr. Del Cid says to wait 2 weeks before receiving Remicade (3/31/25). After review with the charge RN, as it has been 2 weeks and 1 day since most recent Mohs procedure and site is healing as it should, MD was not paged.    Intravenous Access:  Peripheral IV placed.    Treatment Conditions:  Biological Infusion Checklist:  ~~~ NOTE: If the patient answers yes to any of the questions below, hold the infusion and contact ordering provider or on-call provider.    Have you recently had an elevated temperature, fever, chills, productive cough, coughing for 3 weeks or longer or hemoptysis,  abnormal vital signs, night sweats,  chest pain or have you noticed a decrease in your appetite, unexplained weight loss or fatigue? No  Do you have any open wounds or new incisions? Yes, Moh's procedure done 4/2/25; see MyChart communication from 4/15 that states no signs/symptoms of infection and is healing the way dermatology wants it to  Do you have any upcoming hospitalizations or surgeries? Does not include esophagogastroduodenoscopy, colonoscopy, endoscopic retrograde cholangiopancreatography (ERCP), endoscopic ultrasound (EUS), dental procedures or joint aspiration/steroid injections No  Do you currently have any signs of illness or infection or are you on any antibiotics? No  Have you had any new, sudden or worsening abdominal  pain? No  Have you or anyone in your household received a live vaccination in the past 4 weeks? Please note: No live vaccines while on biologic/chemotherapy until 6 months after the last treatment. Patient can receive the flu vaccine (shot only), pneumovax and the Covid vaccine. It is optimal for the patient to get these vaccines mid cycle, but they can be given at any time as long as it is not on the day of the infusion. No  Have you recently been diagnosed with any new nervous system diseases (ie. Multiple sclerosis, Guillain [place], seizures, neurological changes) or cancer diagnosis? Are you on any form of radiation or chemotherapy? No  Are you pregnant or breast feeding or do you have plans of pregnancy in the future? No  Have you been having any signs of worsening depression or suicidal ideations?  (benlysta only) No  Have there been any other new onset medical symptoms? No  Have you had any new blood clots? (IVIG only) No    Post Infusion Assessment:  Patient tolerated infusion without incident.  Site patent and intact, free from redness, edema or discomfort.  No evidence of extravasations.  Access discontinued per protocol.  Biologic Infusion Post Education: Call the triage nurse at your clinic or seek medical attention if you have chills and/or temperature greater than or equal to 100.5, uncontrolled nausea/vomiting, diarrhea, constipation, dizziness, shortness of breath, chest pain, heart palpitations, weakness or any other new or concerning symptoms, questions or concerns.  You cannot have any live virus vaccines prior to or during treatment or up to 6 months post infusion.  If you have an upcoming surgery, medical procedure or dental procedure during treatment, this should be discussed with your ordering physician and your surgeon/dentist.  If you are having any concerning symptom, if you are unsure if you should get your next infusion or wish to speak to a provider before your next infusion, please call  your care coordinator or triage nurse at your clinic to notify them so we can adequately serve you.     Discharge Plan:   Future appts have been reviewed and crosschecked with appt note and plan.  AVS to patient via [employer].  Patient will return 6/16/25 for next appointment.   Patient discharged in stable condition accompanied by: daughter.  Departure Mode: Wheelchair.      Susie Meyer RN BSN OCN

## 2025-04-18 NOTE — TELEPHONE ENCOUNTER
Fax cover sent to [facility]. RE pt is overr due for appt. No refills to be given after aug. Appt number on fax.

## 2025-04-21 RX ORDER — FOLIC ACID 1 MG/1
1 TABLET ORAL DAILY
Qty: 90 TABLET | Refills: 1 | Status: SHIPPED | OUTPATIENT
Start: 2025-04-21

## 2025-04-21 NOTE — TELEPHONE ENCOUNTER
Signed Prescriptions:                        Disp   Refills    folic acid (FOLVITE) 1 MG tablet           90 tab*1        Sig: Take 1 tablet (1 mg) by mouth daily.  Authorizing Provider: NICOLE ZALDIVAR  Ordering User: MATEO DANIEL RN refilled medication per [facility] Refill Protocol.     Mateo NAVARRO RN, [facility] 04/21/25 3:36 PM

## 2025-04-25 ENCOUNTER — TELEPHONE (OUTPATIENT)
Dept: FAMILY MEDICINE | Facility: OTHER | Age: 85
End: 2025-04-25
Payer: MEDICARE

## 2025-04-25 NOTE — TELEPHONE ENCOUNTER
"  Home Care is calling regarding an established patient with [facility].  Requesting orders from: Kaur Grimm RN APPROVED: RN able to provide verbal orders.  Home Care will send orders for signature.  RN will close encounter.  Is this a request for a temporary pause in the home care episode?  No    Orders Requested    Occupational Therapy  Request for continuation of care with decrease in frequency   Goals have been met/progressing. \"Progressing slowly.\"  Frequency: 1x per week, every other week, for 4 visits total.   RN gave verbal order: Yes        Phone number Home Care can be reached at: [phone]   Okay to leave a detailed message?: Yes      JOSE CARLOS Davila, RN        "

## 2025-04-25 NOTE — TELEPHONE ENCOUNTER
Order/Referral Request    Who is requesting: Pt - Winifred     Orders being requested: Physical therapy start date of 4/29 and it's once a week for 5 weeks and once a month for two months    Reason service is needed/diagnosis: Left femur fracture     When are orders needed by: 4/28-4/29    Has this been discussed with Provider: Yes    Does patient have a preference on a Group/Provider/Facility? [facility] home health provided in home    Does patient have an appointment scheduled?: Yes: 5/19    Where to send orders:  Call back and they will fax over an order to sign - call [phone](confidential line, detail message can be left) Winifred    Okay to leave a detailed message?: Yes at Other phone number:  [phone]

## 2025-04-28 NOTE — TELEPHONE ENCOUNTER
Home Care is calling regarding an established patient with [facility].  Requesting orders from: Kaur Grimm RN APPROVED: RN able to provide verbal orders.  Home Care will send orders for signature.  RN will close encounter.  Is this a request for a temporary pause in the home care episode?  No    Orders Requested      Physical Therapy  Request for initial certification (first set of orders)   Frequency: 1x/week for 5 weeks; then 1x/month for 2 months  For left fremur fracture  RN gave verbal order: Yes        Contacts       Contact Date/Time Type Contact Phone/Fax    04/25/2025 04:06 PM CDT Phone (Incoming) HODAN Cain (Home Care) [phone]     [facility]     04/25/2025 04:15 PM CDT Phone (Incoming) HODAN Cain (Home Care) [phone]          Lexie Osullivan RN

## 2025-04-29 ENCOUNTER — MEDICAL CORRESPONDENCE (OUTPATIENT)
Dept: HEALTH INFORMATION MANAGEMENT | Facility: CLINIC | Age: 85
End: 2025-04-29

## 2025-05-19 ENCOUNTER — OFFICE VISIT (OUTPATIENT)
Dept: FAMILY MEDICINE | Facility: OTHER | Age: 85
End: 2025-05-19
Payer: MEDICARE

## 2025-05-19 ENCOUNTER — ANCILLARY PROCEDURE (OUTPATIENT)
Dept: GENERAL RADIOLOGY | Facility: OTHER | Age: 85
End: 2025-05-19
Attending: NURSE PRACTITIONER
Payer: MEDICARE

## 2025-05-19 VITALS
TEMPERATURE: 97 F | SYSTOLIC BLOOD PRESSURE: 132 MMHG | WEIGHT: 91.75 LBS | OXYGEN SATURATION: 98 % | DIASTOLIC BLOOD PRESSURE: 74 MMHG | BODY MASS INDEX: 18.52 KG/M2 | HEART RATE: 86 BPM

## 2025-05-19 DIAGNOSIS — S90.129A BRUISE OF TOE: ICD-10-CM

## 2025-05-19 DIAGNOSIS — L08.9 TOE INFECTION: ICD-10-CM

## 2025-05-19 DIAGNOSIS — G20.B2 PARKINSON'S DISEASE WITH DYSKINESIA AND FLUCTUATING MANIFESTATIONS (H): ICD-10-CM

## 2025-05-19 DIAGNOSIS — E11.9 TYPE 2 DIABETES, DIET CONTROLLED (H): ICD-10-CM

## 2025-05-19 DIAGNOSIS — M06.09 RHEUMATOID ARTHRITIS OF MULTIPLE SITES WITH NEGATIVE RHEUMATOID FACTOR (H): ICD-10-CM

## 2025-05-19 DIAGNOSIS — Z00.00 ENCOUNTER FOR MEDICARE ANNUAL WELLNESS EXAM: Primary | ICD-10-CM

## 2025-05-19 PROCEDURE — 73630 X-RAY EXAM OF FOOT: CPT | Mod: TC | Performed by: RADIOLOGY

## 2025-05-19 RX ORDER — CEPHALEXIN 500 MG/1
500 CAPSULE ORAL 2 TIMES DAILY
Qty: 20 CAPSULE | Refills: 0 | Status: SHIPPED | OUTPATIENT
Start: 2025-05-19 | End: 2025-05-29

## 2025-05-19 RX ORDER — LIDOCAINE 4 G/G
1 PATCH TOPICAL SEE ADMIN INSTRUCTIONS
COMMUNITY
Start: 2025-02-24 | End: 2025-05-19

## 2025-05-19 ASSESSMENT — PAIN SCALES - GENERAL: PAINLEVEL_OUTOF10: NO PAIN (0)

## 2025-05-19 NOTE — PROGRESS NOTES
{PROVIDER CHARTING PREFERENCE:379336}    Deb Jaffe is a 84 year old, presenting for the following health issues:  Hospital Follow up  and Annual wellness     History of Present Illness       Reason for visit:  Check up   She is taking medications regularly.            Hospital Follow-up Visit:    Hospital/Nursing Home/IP Rehab Facility: [facility]   Date of Admission: 02/16/2025  Date of Discharge: 02/26/2025  Reason(s) for Admission:\Femur Fracture   Was the patient in the ICU or did the patient experience delirium during hospitalization?  No  Do you have any other stressors you would like to discuss with your provider? No    Problems taking medications regularly:  None  Medication changes since discharge: None  Problems adhering to non-medication therapy:  None    Summary of hospitalization:  CareEverywhere information obtained and reviewed  Diagnostic Tests/Treatments reviewed.  Follow up needed: Yes   Other Healthcare Providers Involved in Patient s Care:         Homecare, Specialist appointment - Orthopedic, and Physical Therapy  Update since discharge: improved.     Plan of care communicated with patient and family       Wt Readings from Last 2 Encounters:   05/19/25 41.6 kg (91 lb 12 oz)   04/17/25 40.2 kg (88 lb 9.6 oz)     She is going in the right direction recently and has been better  Before the fracture was 104. Lowest at the hospital was 86. Since then has improved.     OT every 2 weeks and PT weekly.  Walking with assistance   Was sick a couple weeks ago. Has had more diarrhea then usual will eat and then will have to go. This has resolved since then.   Has follow up with neurologist in June.      Right foot with second toe redness and swelling. Big toe pushes onto it and think that it may have caused an infection with the nail into the skin from rubbing against each other. Bruising and redness/tender on the second toe extending bruising into the top of the toe.     Left  foot- dropped toe on the larger toe.     Sundowning.      Annual Wellness Visit     Patient has been advised of split billing requirements and indicates understanding: Yes     Health Care Directive    Document on file is a Health Care Directive or POLST.  In general, how would you rate your overall physical health? (!) POOR   Discussed with patient their rating of physical health; information has been provided.   Do you have a special diet?  Regular (no restrictions)         No data to display              Do you see a dentist two times every year?  (!) NO  The patient was instructed to see the dentist every 6 months.   Have you been more tired than usual lately?  No  If you drink alcohol do you typically have >3 drinks per day or >7 drinks per week? No  Do you have a current opioid prescription? No  Do you use any other controlled substances or medications that are not prescribed by a provider? None  Social History     Tobacco Use    Smoking status: Never    Smokeless tobacco: Never   Vaping Use    Vaping status: Never Used   Substance Use Topics    Alcohol use: No    Drug use: No       Needs assistance for the following daily activities: All   Which of the following safety concerns are present in your home?  none identified   Do you (or your family members) have any concerns about your safety while driving?  No  Do you have any of the following hearing concerns?: No hearing concerns, Daughter thinks so they repeat   In the past 6 months, have you been bothered by leaking of urine? (!) YES   Information on urinary incontinence and treatment options given to patient.        10/30/2024   Social Factors   Worry food won't last until get money to buy more Patient declined   Food not last or not have enough money for food? Patient declined   Do you have housing? (Housing is defined as stable permanent housing and does not include staying outside in a car, in a tent, in an abandoned building, in an overnight shelter, or  couch-surfing.) Patient declined   Are you worried about losing your housing? Patient declined   Lack of transportation? Patient declined   Unable to get utilities (heat,electricity)? Patient declined         3/5/2025   Fall Risk   Fallen 2 or more times in the past year? No    Screen not completed for medical reason(s)    No    Trouble with walking or balance? Yes    Yes        Proxy-reported    Multiple values from one day are sorted in reverse-chronological order        Today's PHQ-2 Score:       3/5/2025     9:16 AM   PHQ-2 ( 1999 [employer])   Q1: Little interest or pleasure in doing things 0    Q2: Feeling down, depressed or hopeless 0    PHQ-2 Score 0    Q1: Little interest or pleasure in doing things Not at all   Q2: Feeling down, depressed or hopeless Not at all   PHQ-2 Score 0       Proxy-reported        Mammogram Screening - After age 74- determine frequency with patient based on health status, life expectancy and patient goals        Reviewed and updated as needed this visit by Provider                Remicade- Autoimmune disorders  Iron infusions  All through rheumatology.       Past Medical History:   Diagnosis Date    Hyperlipidemia     Murmur, heart     hsm    Nonsenile cataract     Osteoarthrosis, unspecified whether generalized or localized, lower leg     Osteopenia     Rheumatoid arthritis(714.0)     Type 2 diabetes, diet controlled (H) 3/5/2025       Current providers sharing in care for this patient include:  Patient Care Team:  Kaur Grimm APRN CNP as PCP - General (Family Medicine)  Merrick Del Cid MD as MD (Rheumatology)  Merrick Del Cid MD as Assigned Rheumatology Provider  Fadia Waldron MD as MD (Neurology)  Flavio Garrison MD as Assigned Musculoskeletal Provider  Kaur Grimm APRN CNP as Assigned PCP  DULCE Hughes MD as Assigned Surgical Provider  Tc Guaman MD as Assigned Dermatology Provider    The following health maintenance items are reviewed in  Epic and correct as of today:  Health Maintenance   Topic Date Due    ZOSTER IMMUNIZATION (1 of 2) Never done    MICROALBUMIN  04/26/2014    DIABETIC FOOT EXAM  10/28/2014    RSV VACCINE (1 - 1-dose 75+ series) Never done    MEDICARE ANNUAL WELLNESS VISIT  05/27/2017    EYE EXAM  09/28/2023    COVID-19 Vaccine (4 - 2024-25 season) 09/01/2024    A1C  07/17/2025    INFLUENZA VACCINE (Season Ended) 09/01/2025    ANNUAL REVIEW OF HM ORDERS  03/05/2026    FALL RISK ASSESSMENT  03/05/2026    BMP  04/17/2026    LIPID  04/17/2026    ADVANCE CARE PLANNING  03/05/2030    DEXA  01/29/2033    DTAP/TDAP/TD IMMUNIZATION (4 - Td or Tdap) 06/14/2033    PHQ-2 (once per calendar year)  Completed    Pneumococcal Vaccine: 50+ Years  Completed    HPV IMMUNIZATION  Aged Out    MENINGITIS IMMUNIZATION  Aged Out    MAMMO SCREENING  Discontinued       Appropriate preventive services were discussed with this patient, including applicable screening as appropriate for fall prevention, nutrition, physical activity, Tobacco-use cessation, weight loss and cognition.  Checklist reviewing preventive services available has been given to the patient.        5/19/2025   Mini Cog   Mini-Cog Not Completed (choose reason) Patient declines                  Review of Systems  {ROS (Optional):480329}      Objective    BP (!) 148/80   Pulse 86   Temp 97  F (36.1  C) (Temporal)   Wt 41.6 kg (91 lb 12 oz)   LMP  (LMP Unknown)   SpO2 98%   BMI 18.52 kg/m    Body mass index is 18.52 kg/m .  Physical Exam   {Exam List (Optional):381100}    {Diagnostic Test Results (Optional):845625}        Signed Electronically by: GILES Cox CNP

## 2025-05-19 NOTE — PATIENT INSTRUCTIONS
Patient Education   Preventive Care Advice   This is general advice given by our system to help you stay healthy. However, your care team may have specific advice just for you. Please talk to your care team about your preventive care needs.  Nutrition  Eat 5 or more servings of fruits and vegetables each day.  Try wheat bread, brown rice and whole grain pasta (instead of white bread, rice, and pasta).  Get enough calcium and vitamin D. Check the label on foods and aim for 100% of the RDA (recommended daily allowance).  Lifestyle  Exercise at least 150 minutes each week  (30 minutes a day, 5 days a week).  Do muscle strengthening activities 2 days a week. These help control your weight and prevent disease.  No smoking.  Wear sunscreen to prevent skin cancer.  Have a dental exam and cleaning every 6 months.  Yearly exams  See your health care team every year to talk about:  Any changes in your health.  Any medicines your care team has prescribed.  Preventive care, family planning, and ways to prevent chronic diseases.  Shots (vaccines)   HPV shots (up to age 26), if you've never had them before.  Hepatitis B shots (up to age 59), if you've never had them before.  COVID-19 shot: Get this shot when it's due.  Flu shot: Get a flu shot every year.  Tetanus shot: Get a tetanus shot every 10 years.  Pneumococcal, hepatitis A, and RSV shots: Ask your care team if you need these based on your risk.  Shingles shot (for age 50 and up)  General health tests  Diabetes screening:  Starting at age 35, Get screened for diabetes at least every 3 years.  If you are younger than age 35, ask your care team if you should be screened for diabetes.  Cholesterol test: At age 39, start having a cholesterol test every 5 years, or more often if advised.  Bone density scan (DEXA): At age 50, ask your care team if you should have this scan for osteoporosis (brittle bones).  Hepatitis C: Get tested at least once in your life.  STIs (sexually  transmitted infections)  Before age 24: Ask your care team if you should be screened for STIs.  After age 24: Get screened for STIs if you're at risk. You are at risk for STIs (including HIV) if:  You are sexually active with more than one person.  You don't use condoms every time.  You or a partner was diagnosed with a sexually transmitted infection.  If you are at risk for HIV, ask about PrEP medicine to prevent HIV.  Get tested for HIV at least once in your life, whether you are at risk for HIV or not.  Cancer screening tests  Cervical cancer screening: If you have a cervix, begin getting regular cervical cancer screening tests starting at age 21.  Breast cancer scan (mammogram): If you've ever had breasts, begin having regular mammograms starting at age 40. This is a scan to check for breast cancer.  Colon cancer screening: It is important to start screening for colon cancer at age 45.  Have a colonoscopy test every 10 years (or more often if you're at risk) Or, ask your provider about stool tests like a FIT test every year or Cologuard test every 3 years.  To learn more about your testing options, visit:   .  For help making a decision, visit:   https://bit.ly/fb33935.  Prostate cancer screening test: If you have a prostate, ask your care team if a prostate cancer screening test (PSA) at age 55 is right for you.  Lung cancer screening: If you are a current or former smoker ages 50 to 80, ask your care team if ongoing lung cancer screenings are right for you.  For informational purposes only. Not to replace the advice of your health care provider. Copyright   2023 [facility] [employer]. All rights reserved. Clinically reviewed by the [facility]. Incont 820265 - REV 01/24.  Bladder Training: Care Instructions  Your Care Instructions     Bladder training is used to treat urge incontinence and stress incontinence. Urge incontinence means that the need to urinate comes on so fast  that you can't get to a toilet in time. Stress incontinence means that you leak urine because of pressure on your bladder. For example, it may happen when you laugh, cough, or lift something heavy.  Bladder training can increase how long you can wait before you have to urinate. It can also help your bladder hold more urine. And it can give you better control over the urge to urinate.  It is important to remember that bladder training takes a few weeks to a few months to make a difference. You may not see results right away, but don't give up.  Follow-up care is a key part of your treatment and safety. Be sure to make and go to all appointments, and call your doctor if you are having problems. It's also a good idea to know your test results and keep a list of the medicines you take.  How can you care for yourself at home?  Work with your doctor to come up with a bladder training program that is right for you. You may use one or more of the following methods.  Delayed urination  In the beginning, try to keep from urinating for 5 minutes after you first feel the need to go.  While you wait, take deep, slow breaths to relax. Kegel exercises can also help you delay the need to go to the bathroom.  After some practice, when you can easily wait 5 minutes to urinate, try to wait 10 minutes before you urinate.  Slowly increase the waiting period until you are able to control when you have to urinate.  Scheduled urination  Empty your bladder when you first wake up in the morning.  Schedule times throughout the day when you will urinate.  Start by going to the bathroom every hour, even if you don't need to go.  Slowly increase the time between trips to the bathroom.  When you have found a schedule that works well for you, keep doing it.  If you wake up during the night and have to urinate, do it. Apply your schedule to waking hours only.  Kegel exercises  These tighten and strengthen pelvic muscles, which can help you control  "the flow of urine. (If doing these exercises causes pain, stop doing them and talk with your doctor.) To do Kegel exercises:  Squeeze your muscles as if you were trying not to pass gas. Or squeeze your muscles as if you were stopping the flow of urine. Your belly, legs, and buttocks shouldn't move.  Hold the squeeze for 3 seconds, then relax for 5 to 10 seconds.  Start with 3 seconds, then add 1 second each week until you are able to squeeze for 10 seconds.  Repeat the exercise 10 times a session. Do 3 to 8 sessions a day.  When should you call for help?  Watch closely for changes in your health, and be sure to contact your doctor if:    Your incontinence is getting worse.     You do not get better as expected.   Where can you learn more?  Go to https://www.[employer]com.net/patiented  Enter V684 in the search box to learn more about \"Bladder Training: Care Instructions.\"  Current as of: April 30, 2024  Content Version: 14.4    9204-2007 [employer].   Care instructions adapted under license by your healthcare professional. If you have questions about a medical condition or this instruction, always ask your healthcare professional. [employer] disclaims any warranty or liability for your use of this information.       "

## 2025-05-20 ENCOUNTER — TELEPHONE (OUTPATIENT)
Dept: PODIATRY | Facility: CLINIC | Age: 85
End: 2025-05-20
Payer: MEDICARE

## 2025-05-20 ENCOUNTER — RESULTS FOLLOW-UP (OUTPATIENT)
Dept: FAMILY MEDICINE | Facility: OTHER | Age: 85
End: 2025-05-20
Payer: MEDICARE

## 2025-05-20 NOTE — TELEPHONE ENCOUNTER
Called and spoke with daughter, Shiloh. C2C on file.   Informed of result note. They have not started the antibiotics yet, Shiloh states pharmacy has not called indicating these are ready. Shiloh will call pharmacy this afternoon to see if this is ready for . Patient is scheduled to see podiatry next week.   Shiloh will call back if anything further is needed.     Radha MASONN, RN

## 2025-05-20 NOTE — TELEPHONE ENCOUNTER
Called and spoke with Shiloh, offered appointment with Dr. Leos in [place] this week. Appointment was declined as Shiloh would prefer to stick with [facility]. She also states that patient has several other appointments this week and would not be available until Friday. Appointment has been kept for 5/28/25 and Shiloh will call with any changes to patient's condition in the meantime.     Cristina Oakes RN   [facility]

## 2025-05-20 NOTE — TELEPHONE ENCOUNTER
Other: Patient with urgent referral for possible broken toe and injection scheduled 5/28. Patient daughter preferred scheduling this date and declined wait list. Sending TE per red [facility] protocol for clinic awareness.     Could we send this information to you in [employer] or would you prefer to receive a phone call?:   Patient would prefer a phone call   Okay to leave a detailed message?: Yes at Cell number on file:    Telephone Information:   Mobile [phone]   If no answer please call [phone]  Vm ok on either #  Call back not needed unless clinic would like to reach out

## 2025-05-21 ENCOUNTER — OFFICE VISIT (OUTPATIENT)
Dept: DERMATOLOGY | Facility: CLINIC | Age: 85
End: 2025-05-21
Payer: MEDICARE

## 2025-05-21 DIAGNOSIS — D18.01 CHERRY ANGIOMA: ICD-10-CM

## 2025-05-21 DIAGNOSIS — Z85.828 HISTORY OF NONMELANOMA SKIN CANCER: ICD-10-CM

## 2025-05-21 DIAGNOSIS — D22.9 MULTIPLE BENIGN NEVI: Primary | ICD-10-CM

## 2025-05-21 DIAGNOSIS — Z12.83 SKIN CANCER SCREENING: ICD-10-CM

## 2025-05-21 DIAGNOSIS — L82.1 SEBORRHEIC KERATOSES: ICD-10-CM

## 2025-05-21 NOTE — PROGRESS NOTES
[facility] Dermatology Note  Encounter Date: May 21, 2025  Office Visit     Reviewed patients past medical history and pertinent chart review prior to patients visit today.     Dermatology Problem List:  Last skin check: 5/21/25    1. 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 Hx of Parkinson's Disease  3. History of benign biopsies  - Mild irregular epidermal hyperplasia and upper dermal fibrosis consistent with scar, left parietal scalp lateral, bx 9/18/2024   - Lentiginous Junctional melanocytic nevus, R upper back, s/p bx 7/3/17  4. AK, cryo  5. Seborrheic dermatitis: scalp.  - Ketoconazole 2% shampoo 2x week, betamethasone valerate 0.1% lotion    ____________________________________________      CC: Skin Check    HPI:  Ms. Alannah Leos is a(n) 84 year old female who presents today as a return patient for a full body skin cancer screening. 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  She is accompanied by her daughter today.  They state that her scalp is healing well.  The patient does have some flaking involving this area, but has avoided using antidandruff shampoo due to the healing surgical site.  Her daughter also has a concern involving the left suprapubic area today.  This lesion is new, but not bothersome to the patient.    Medications:  Current Outpatient Medications   Medication Sig Dispense Refill    ACE NOT PRESCRIBED, INTENTIONAL, 1 each continuous prn. ACE Inhibitor not prescribed due to Refusal by patient       0 each 0    acetaminophen (TYLENOL) 500 MG tablet Take 1,000 mg by mouth every 8 hours.      calcium carbonate-vitamin D (OSCAL) 500-5 MG-MCG tablet Take 1 tablet by mouth daily.       carbidopa-levodopa (SINEMET CR)  MG CR tablet Take 1 tablet by mouth daily. At 4:30pm      carbidopa-levodopa (SINEMET)  MG tablet Take 2 tablets by mouth. 7 times per day      cephALEXin (KEFLEX) 500 MG capsule Take 1 capsule (500 mg) by mouth 2 times daily for 10 days. 20 capsule 0    Cyanocobalamin (VITAMIN B 12) 250 MCG LOZG       folic acid (FOLVITE) 1 MG tablet Take 1 tablet (1 mg) by mouth daily. 90 tablet 1    methotrexate 2.5 MG tablet Take 8 tablets (20 mg) by mouth once a week. .  This is a once a week medication, taken on the same day of each week. 104 tablet 0    Pediatric Multivit-Minerals (FLINTSTONES SOUR GUMMIES) CHEW Take 1 tablet by mouth daily.      polyethylene glycol (MIRALAX) 17 g packet Take 17 g by mouth daily.      QUEtiapine (SEROQUEL) 25 MG tablet Take 25 mg by mouth See Admin Instructions. 25 mg at 4:30 am and 12.5 mg at 4:30 pm      vitamin C (ASCORBIC ACID) 1000 MG TABS Take 1,000 mg by mouth 2 times daily       No current facility-administered medications for this visit.      Past Medical History:   Patient Active Problem List   Diagnosis    Osteoporosis    Rheumatoid arthritis (H)    Parkinson disease (H)    Osteoarthritis of wrist    Osteoarthritis of shoulder    History of total knee arthroplasty    Osteoarthritis of left knee    CARDIOVASCULAR SCREENING; LDL GOAL LESS THAN 160    High risk medication use    Underweight    Impingement syndrome, shoulder, left    Combined forms of age-related cataract of both eyes    Seborrheic dermatitis    Anemia in other chronic diseases classified elsewhere    Ankylosing spondylitis of cervical region (H)    Failure to thrive in adult    Physical deconditioning    History of iron deficiency anemia    Closed fracture of left clavicle    Closed fracture of sixth cervical vertebra (H)    Iron deficiency anemia    Lower extremity pain    Fall    Closed fracture of distal end of left femur (H)    Closed fracture of distal end of left  fibula    Cognitive impairment    Frailty    Type 2 diabetes, diet controlled (H)     Past Medical History:   Diagnosis Date    Hyperlipidemia     Murmur, heart     hsm    Nonsenile cataract     Osteoarthrosis, unspecified whether generalized or localized, lower leg     Osteopenia     Rheumatoid arthritis(714.0)     Type 2 diabetes, diet controlled (H) 3/5/2025       ____________________________________________     Physical Exam:  Vitals: LMP  (LMP Unknown)    SKIN: Total skin including the genitalia areas was performed. The exam included the scalp, face, neck, bilateral arms, chest, back, abdomen, bilateral legs, digits, mons pubis, buttocks, and nails.   - Wilde II.  - Multiple tan/brown macules and papules scattered throughout exam, consistent with benign nevi, many of which were examined under dermoscopy with no concerning features found  - Scattered tan, homogenous macules on sun exposed skin, consistent with solar lentigines  - Scattered waxy, stuck on appearing papules and patches, consistent with seborrheic keratoses  - Several 1-2 mm red dome shaped symmetric papules, consistent with cherry angiomas  -Left suprapubic area, flesh colored, slightly firm, mobile subcutaneous nodules with 2 overlying punctums most consistent with epidermal cysts  - Prior surgical sites on scalp appear to be healing well  - Scalp, scattered yellow to white scales    - No other lesions of concern on areas examined    ____________________________________________      Assessment & Plan:   # Personal history of nonmelanoma skin cancer  - No signs of recurrence. Continued observation recommended.   - Sun protection: Counseled SPF 30+ sunscreen, UPF clothing, sun avoidance, tanning bed avoidance.    # Nevi, trunk and extremities  # Solar lentigines  - Nevi demonstrate no concerning features on dermoscopy. We discussed the importance of self exams at home.   - ABCDEs: Counseled ABCDEs of melanoma: Asymmetry, Border  (irregularity), Color (not uniform, changes in color), Diameter (greater than 6 mm which is about the size of a pencil eraser), and Evolving (any changes in preexisting moles).    # Cherry angiomas  # Seborrheic keratoses  - We discussed the benign nature of the skin lesions. No treatment required. Continued observation recommended. Follow up with any concerns or changes.      # Epidermal cysts, left suprapubic area   - Benign, no further treatment needed  - If lesions become bothersome, could consider excision in future  - Return for reassessment if changes or symptoms occur     # Seborrheic dermatitis/retention keratoses, scalp  Since the Mohs micrographic surgery site on the patient's left parietal scalp anterior is healing well, the patient may restart ketoconazole 2% shampoo a few times weekly.  This should be lathered on the scalp for 5 minutes prior to rinsing.  Topical steroids were prescribed in the past, but expensive and not effective per patient's daughter.  This is a benign and often chronic condition with a goal of maintaining it well.    Follow-up: 6 months for follow up full body skin exam, as needed for new or changing lesions or new concerns    All risks, benefits and alternatives were discussed with patient.  Patient is in agreement and understands the assessment and plan.  All questions were answered.    Dea Jaquez PA-C  [facility]erm[facility]      CC Referred Self, MD  No address on file on close of this encounter.

## 2025-05-21 NOTE — LETTER
5/21/2025      Alannah Leos  [address] [address] [id number]      Dear Colleague,    Thank you for referring your patient, Alannah Leos, to the [facility]. Please see a copy of my visit note below.    [facility] Dermatology Note  Encounter Date: May 21, 2025  Office Visit     Reviewed patients past medical history and pertinent chart review prior to patients visit today.     Dermatology Problem List:  Last skin check: 5/21/25    1. 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 Hx of Parkinson's Disease  3. History of benign biopsies  - Mild irregular epidermal hyperplasia and upper dermal fibrosis consistent with scar, left parietal scalp lateral, bx 9/18/2024   - Lentiginous Junctional melanocytic nevus, R upper back, s/p bx 7/3/17  4. AK, cryo  5. Seborrheic dermatitis: scalp.  - Ketoconazole 2% shampoo 2x week, betamethasone valerate 0.1% lotion    ____________________________________________      CC: Skin Check    HPI:  Ms. Alannah Leos is a(n) 84 year old female who presents today as a return patient for a full body skin cancer screening. 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  She is accompanied by her daughter today.  They state that her scalp is healing well.  The patient does have some flaking involving this area, but has avoided using antidandruff shampoo due to the healing surgical site.  Her daughter also has a concern involving the left suprapubic area today.  This lesion is new, but not bothersome to the patient.    Medications:  Current Outpatient Medications   Medication Sig Dispense Refill     ACE NOT PRESCRIBED, INTENTIONAL, 1 each continuous prn. ACE Inhibitor not  prescribed due to Refusal by patient       0 each 0     acetaminophen (TYLENOL) 500 MG tablet Take 1,000 mg by mouth every 8 hours.       calcium carbonate-vitamin D (OSCAL) 500-5 MG-MCG tablet Take 1 tablet by mouth daily.       carbidopa-levodopa (SINEMET CR)  MG CR tablet Take 1 tablet by mouth daily. At 4:30pm       carbidopa-levodopa (SINEMET)  MG tablet Take 2 tablets by mouth. 7 times per day       cephALEXin (KEFLEX) 500 MG capsule Take 1 capsule (500 mg) by mouth 2 times daily for 10 days. 20 capsule 0     Cyanocobalamin (VITAMIN B 12) 250 MCG LOZG        folic acid (FOLVITE) 1 MG tablet Take 1 tablet (1 mg) by mouth daily. 90 tablet 1     methotrexate 2.5 MG tablet Take 8 tablets (20 mg) by mouth once a week. .  This is a once a week medication, taken on the same day of each week. 104 tablet 0     Pediatric Multivit-Minerals (FLINTSTONES SOUR GUMMIES) CHEW Take 1 tablet by mouth daily.       polyethylene glycol (MIRALAX) 17 g packet Take 17 g by mouth daily.       QUEtiapine (SEROQUEL) 25 MG tablet Take 25 mg by mouth See Admin Instructions. 25 mg at 4:30 am and 12.5 mg at 4:30 pm       vitamin C (ASCORBIC ACID) 1000 MG TABS Take 1,000 mg by mouth 2 times daily       No current facility-administered medications for this visit.      Past Medical History:   Patient Active Problem List   Diagnosis     Osteoporosis     Rheumatoid arthritis (H)     Parkinson disease (H)     Osteoarthritis of wrist     Osteoarthritis of shoulder     History of total knee arthroplasty     Osteoarthritis of left knee     CARDIOVASCULAR SCREENING; LDL GOAL LESS THAN 160     High risk medication use     Underweight     Impingement syndrome, shoulder, left     Combined forms of age-related cataract of both eyes     Seborrheic dermatitis     Anemia in other chronic diseases classified elsewhere     Ankylosing spondylitis of cervical region (H)     Failure to thrive in adult     Physical deconditioning     History of iron  deficiency anemia     Closed fracture of left clavicle     Closed fracture of sixth cervical vertebra (H)     Iron deficiency anemia     Lower extremity pain     Fall     Closed fracture of distal end of left femur (H)     Closed fracture of distal end of left fibula     Cognitive impairment     Frailty     Type 2 diabetes, diet controlled (H)     Past Medical History:   Diagnosis Date     Hyperlipidemia      Murmur, heart     hsm     Nonsenile cataract      Osteoarthrosis, unspecified whether generalized or localized, lower leg      Osteopenia      Rheumatoid arthritis(714.0)      Type 2 diabetes, diet controlled (H) 3/5/2025       ____________________________________________     Physical Exam:  Vitals: LMP  (LMP Unknown)    SKIN: Total skin including the genitalia areas was performed. The exam included the scalp, face, neck, bilateral arms, chest, back, abdomen, bilateral legs, digits, mons pubis, buttocks, and nails.   - Wilde II.  - Multiple tan/brown macules and papules scattered throughout exam, consistent with benign nevi, many of which were examined under dermoscopy with no concerning features found  - Scattered tan, homogenous macules on sun exposed skin, consistent with solar lentigines  - Scattered waxy, stuck on appearing papules and patches, consistent with seborrheic keratoses  - Several 1-2 mm red dome shaped symmetric papules, consistent with cherry angiomas  -Left suprapubic area, flesh colored, slightly firm, mobile subcutaneous nodules with 2 overlying punctums most consistent with epidermal cysts  - Prior surgical sites on scalp appear to be healing well  - Scalp, scattered yellow to white scales    - No other lesions of concern on areas examined    ____________________________________________      Assessment & Plan:   # Personal history of nonmelanoma skin cancer  - No signs of recurrence. Continued observation recommended.   - Sun protection: Counseled SPF 30+ sunscreen, UPF clothing, sun  avoidance, tanning bed avoidance.    # Nevi, trunk and extremities  # Solar lentigines  - Nevi demonstrate no concerning features on dermoscopy. We discussed the importance of self exams at home.   - ABCDEs: Counseled ABCDEs of melanoma: Asymmetry, Border (irregularity), Color (not uniform, changes in color), Diameter (greater than 6 mm which is about the size of a pencil eraser), and Evolving (any changes in preexisting moles).    # Cherry angiomas  # Seborrheic keratoses  - We discussed the benign nature of the skin lesions. No treatment required. Continued observation recommended. Follow up with any concerns or changes.      # Epidermal cysts, left suprapubic area   - Benign, no further treatment needed  - If lesions become bothersome, could consider excision in future  - Return for reassessment if changes or symptoms occur     # Seborrheic dermatitis/retention keratoses, scalp  Since the Mohs micrographic surgery site on the patient's left parietal scalp anterior is healing well, the patient may restart ketoconazole 2% shampoo a few times weekly.  This should be lathered on the scalp for 5 minutes prior to rinsing.  Topical steroids were prescribed in the past, but expensive and not effective per patient's daughter.  This is a benign and often chronic condition with a goal of maintaining it well.    Follow-up: 6 months for follow up full body skin exam, as needed for new or changing lesions or new concerns    All risks, benefits and alternatives were discussed with patient.  Patient is in agreement and understands the assessment and plan.  All questions were answered.    Dea Jaquez PA-C  [facility] Dermat[facility]      CC Referred Self, MD  No address on file on close of this encounter.    Again, thank you for allowing me to participate in the care of your patient.        Sincerely,        Dea Jaquez PA-C    Electronically signed

## 2025-05-21 NOTE — PATIENT INSTRUCTIONS
Proper skin care from [facility]:    -Eliminate harsh soaps as they strip the natural oils from the skin, often resulting in dry itchy skin ( i.e. Dial, Zest, [place])  -Use mild soaps such as Cetaphil or Dove Sensitive Skin in the shower. You do not need to use soap on arms, legs, and trunk every time you shower unless visibly soiled.   -Avoid hot or cold showers.  -After showering, lightly dry off and apply moisturizing within 2-3 minutes. This will help trap moisture in the skin.   -Aggressive use of a moisturizer at least 1-2 times a day to the entire body (including -Vanicream, Cetaphil, Aquaphor or Cerave) and moisturize hands after every washing.  -We recommend using moisturizers that come in a tub that needs to be scooped out, not a pump. This has more of an oil base. It will hold moisture in your skin much better than a water base moisturizer. The above recommended are non-pore clogging.      Wear a sunscreen with at least SPF 30 on your face, ears, neck and V of the chest daily. Wear sunscreen on other areas of the body if those areas are exposed to the sun throughout the day. Sunscreens can contain physical and/or chemical blockers. Physical blockers are less likely to clog pores, these include zinc oxide and titanium dioxide. Reapply every two hour and after swimming.     Sunscreen examples: https://www.ewg.org/sunscreen/    UV radiation  UVA radiation remains constant throughout the day and throughout the year. It is a longer wavelength than UVB and therefore penetrates deeper into the skin leading to immediate and delayed tanning, photoaging, and skin cancer. 70-80% of UVA and UVB radiation occurs between the hours of 10am-2pm.  UVB radiation  UVB radiation causes the most harmful effects and is more significant during the summer months. However, snow and ice can reflect UVB radiation leading to skin damage during the winter months as well. UVB radiation is responsible for tanning,  burning, inflammation, delayed erythema (pinkness), pigmentation (brown spots), and skin cancer.     I recommend self monthly full body exams and yearly full body exams with a dermatology provider. If you develop a new or changing lesion please follow up for examination. Most skin cancers are pink and scaly or pink and pearly. However, we do see blue/brown/black skin cancers.  Consider the ABCDEs of melanoma when giving yourself your monthly full body exam ( don't forget the groin, buttocks, feet, toes, etc). A-asymmetry, B-borders, C-color, D-diameter, E-elevation or evolving. If you see any of these changes please follow up in clinic. If you cannot see your back I recommend purchasing a hand held mirror to use with a larger wall mirror.       Checking for Skin Cancer  You can find cancer early by checking your skin each month. There are 3 kinds of skin cancer. They are melanoma, basal cell carcinoma, and squamous cell carcinoma. Doing monthly skin checks is the best way to find new marks or skin changes. Follow the instructions below for checking your skin.   The ABCDEs of checking moles for melanoma   Check your moles or growths for signs of melanoma using ABCDE:   Asymmetry: the sides of the mole or growth don t match  Border: the edges are ragged, notched, or blurred  Color: the color within the mole or growth varies  Diameter: the mole or growth is larger than 6 mm (size of a pencil eraser)  Evolving: the size, shape, or color of the mole or growth is changing (evolving is not shown in the images below)    Checking for other types of skin cancer  Basal cell carcinoma or squamous cell carcinoma have symptoms such as:     A spot or mole that looks different from all other marks on your skin  Changes in how an area feels, such as itching, tenderness, or pain  Changes in the skin's surface, such as oozing, bleeding, or scaliness  A sore that does not heal  New swelling or redness beyond the border of a  mole    Who s at risk?  Anyone can get skin cancer. But you are at greater risk if you have:   Fair skin, light-colored hair, or light-colored eyes  Many moles or abnormal moles on your skin  A history of sunburns from sunlight or tanning beds  A family history of skin cancer  A history of exposure to radiation or chemicals  A weakened immune system  If you have had skin cancer in the past, you are at risk for recurring skin cancer.   How to check your skin  Do your monthly skin checkups in front of a full-length mirror. Check all parts of your body, including your:   Head (ears, face, neck, and scalp)  Torso (front, back, and sides)  Arms (tops, undersides, upper, and lower armpits)  Hands (palms, backs, and fingers, including under the nails)  Buttocks and genitals  Legs (front, back, and sides)  Feet (tops, soles, toes, including under the nails, and between toes)  If you have a lot of moles, take digital photos of them each month. Make sure to take photos both up close and from a distance. These can help you see if any moles change over time.   Most skin changes are not cancer. But if you see any changes in your skin, call your doctor right away. Only he or she can diagnose a problem. If you have skin cancer, seeing your doctor can be the first step toward getting the treatment that could save your life.   [employer] last reviewed this educational content on 4/1/2019 2000-2020 [employer]. [address] [place] [address], [place] [address]. All rights reserved. This information is not intended as a substitute for professional medical care. Always follow your healthcare professional's instructions.       When should I call my doctor?  If you are worsening or not improving, please, contact us or seek urgent care as noted below.     Who should I call with questions (adults)?    [facility] [phone]  For urgent needs outside of business hours call the [facility] at  [phone] and ask for the dermatology resident on call to be paged  If this is a medical emergency and you are unable to reach an ER, Call [phone]      If you need a prescription refill, please contact your pharmacy. Refills are approved or denied by our Physicians during normal business hours, Monday through Friday.  Per office policy, refills will not be granted if you have not been seen within the past year (or sooner depending on the condition).

## 2025-05-28 ENCOUNTER — OFFICE VISIT (OUTPATIENT)
Dept: PODIATRY | Facility: CLINIC | Age: 85
End: 2025-05-28
Attending: NURSE PRACTITIONER
Payer: MEDICARE

## 2025-05-28 VITALS — BODY MASS INDEX: 18.5 KG/M2 | HEIGHT: 59 IN | TEMPERATURE: 98.2 F | WEIGHT: 91.75 LBS

## 2025-05-28 DIAGNOSIS — R62.7 FAILURE TO THRIVE IN ADULT: ICD-10-CM

## 2025-05-28 DIAGNOSIS — M06.09 RHEUMATOID ARTHRITIS OF MULTIPLE SITES WITH NEGATIVE RHEUMATOID FACTOR (H): Primary | ICD-10-CM

## 2025-05-28 DIAGNOSIS — M20.41 HAMMER TOE OF RIGHT FOOT: ICD-10-CM

## 2025-05-28 DIAGNOSIS — E11.9 TYPE 2 DIABETES, DIET CONTROLLED (H): ICD-10-CM

## 2025-05-28 PROCEDURE — 1126F AMNT PAIN NOTED NONE PRSNT: CPT | Performed by: PODIATRIST

## 2025-05-28 PROCEDURE — 99203 OFFICE O/P NEW LOW 30 MIN: CPT | Performed by: PODIATRIST

## 2025-05-28 ASSESSMENT — PAIN SCALES - GENERAL: PAINLEVEL_OUTOF10: NO PAIN (0)

## 2025-05-28 NOTE — PROGRESS NOTES
HPI:  Alannah Leos is a 84 year old female who is seen in consultation at the request of GILES Luna, CNP    Pt presents for eval of:   (Onset, Location, L/R, Character, Treatments, Injury if yes)    XR Right foot 5/19/2025    DM type 2     Onset early May 2025, Right great toenail digging into 2nd toe. 5/14/2025 2nd toe redness and bruising. Presents with increase in redness, swelling, denies drainage. Accompanied with her daughter. Catracho taping to 3rd toe.     5/19/2025 started Keflex 500 mg, 1 capsule 2 times daily for 10 days.      Retired. Patient resides at her own home.    Review of Systems:  Patient denies fever, chills, rash, wound, stiffness, limping, numbness, weakness, heart burn, blood in stool, chest pain with activity, calf pain when walking, shortness of breath with activity, chronic cough, easy bleeding/bruising, swelling of ankles, excessive thirst, fatigue, depression, anxiety.  Patient admits only to symptoms noted in history.     Patient Active Problem List   Diagnosis    Osteoporosis    Rheumatoid arthritis (H)    Parkinson disease (H)    Osteoarthritis of wrist    Osteoarthritis of shoulder    History of total knee arthroplasty    Osteoarthritis of left knee    CARDIOVASCULAR SCREENING; LDL GOAL LESS THAN 160    High risk medication use    Underweight    Impingement syndrome, shoulder, left    Combined forms of age-related cataract of both eyes    Seborrheic dermatitis    Anemia in other chronic diseases classified elsewhere    Ankylosing spondylitis of cervical region (H)    Failure to thrive in adult    Physical deconditioning    History of iron deficiency anemia    Closed fracture of left clavicle    Closed fracture of sixth cervical vertebra (H)    Iron deficiency anemia    Lower extremity pain    Fall    Closed fracture of distal end of left femur (H)    Closed fracture of distal end of left fibula    Cognitive impairment    Frailty    Type 2 diabetes, diet controlled (H)      PAST MEDICAL HISTORY:   Past Medical History:   Diagnosis Date    Hyperlipidemia     Murmur, heart     hsm    Nonsenile cataract     Osteoarthrosis, unspecified whether generalized or localized, lower leg     Osteopenia     Rheumatoid arthritis(714.0)     Type 2 diabetes, diet controlled (H) 3/5/2025     PAST SURGICAL HISTORY:   Past Surgical History:   Procedure Laterality Date    GALLBLADDER SURGERY      HYSTERECTOMY      KNEE SURGERY      left     LAMINECTOMY LUMBAR ONE LEVEL      TONSILLECTOMY       MEDICATIONS:   Current Outpatient Medications:     acetaminophen (TYLENOL) 500 MG tablet, Take 1,000 mg by mouth every 8 hours., Disp: , Rfl:     calcium carbonate-vitamin D (OSCAL) 500-5 MG-MCG tablet, Take 1 tablet by mouth daily., Disp: , Rfl:     carbidopa-levodopa (SINEMET CR)  MG CR tablet, Take 1 tablet by mouth daily. At 4:30pm, Disp: , Rfl:     carbidopa-levodopa (SINEMET)  MG tablet, Take 2 tablets by mouth. 7 times per day, Disp: , Rfl:     cephALEXin (KEFLEX) 500 MG capsule, Take 1 capsule (500 mg) by mouth 2 times daily for 10 days., Disp: 20 capsule, Rfl: 0    Cyanocobalamin (VITAMIN B 12) 250 MCG LOZG, , Disp: , Rfl:     folic acid (FOLVITE) 1 MG tablet, Take 1 tablet (1 mg) by mouth daily., Disp: 90 tablet, Rfl: 1    methotrexate 2.5 MG tablet, Take 8 tablets (20 mg) by mouth once a week. .  This is a once a week medication, taken on the same day of each week., Disp: 104 tablet, Rfl: 0    Pediatric Multivit-Minerals (FLINTSTONES SOUR GUMMIES) CHEW, Take 1 tablet by mouth daily., Disp: , Rfl:     polyethylene glycol (MIRALAX) 17 g packet, Take 17 g by mouth daily., Disp: , Rfl:     QUEtiapine (SEROQUEL) 25 MG tablet, Take 25 mg by mouth See Admin Instructions. 25 mg at 4:30 am and 12.5 mg at 4:30 pm, Disp: , Rfl:     vitamin C (ASCORBIC ACID) 1000 MG TABS, Take 1,000 mg by mouth 2 times daily, Disp: , Rfl:     ACE NOT PRESCRIBED, INTENTIONAL,, 1 each continuous prn. ACE Inhibitor not  prescribed due to Refusal by patient    (Patient not taking: Reported on 5/28/2025), Disp: 0 each, Rfl: 0  ALLERGIES:    Allergies   Allergen Reactions    Decadrol [Dexamethasone Sodium Phosphate] Hives    Diphenhydramine     Latex Other (See Comments)     sensitivity    Other Food Allergy Itching     Strawberries    Senna      Per patient       Shrimp Itching     SOCIAL HISTORY:   Social History     Socioeconomic History    Marital status:      Spouse name: Willis     Number of children: 2    Years of education: 12    Highest education level: Not on file   Occupational History    Occupation:      Employer: RETIRED     Comment: [employer], retired in 12/2006   Tobacco Use    Smoking status: Never    Smokeless tobacco: Never   Vaping Use    Vaping status: Never Used   Substance and Sexual Activity    Alcohol use: No    Drug use: No    Sexual activity: Not Currently     Partners: Male     Birth control/protection: None   Other Topics Concern    Parent/sibling w/ CABG, MI or angioplasty before 65F 55M? Not Asked     Service No    Blood Transfusions Yes    Caffeine Concern No    Occupational Exposure No    Hobby Hazards No    Sleep Concern No    Stress Concern No    Weight Concern No    Special Diet Yes     Comment: Diabetic     Back Care Yes     Comment: Back surgeries x2    Exercise Yes    Bike Helmet No    Seat Belt Yes    Self-Exams Yes   Social History Narrative    Not on file     Social Drivers of Health     Financial Resource Strain: Low Risk  (12/10/2024)    [facility]    Financial Resource Strain     Difficulty of Paying Living Expenses: 3     Difficulty of Paying Living Expenses: Not on file   Food Insecurity: No Food Insecurity (2/16/2025)    Received from [facility]     Hunger Vital Sign     Worried About Running Out of Food in the Last Year: Never true     Ran Out of Food in the Last Year: Never true   Transportation Needs: No  "Transportation Needs (2/16/2025)    Received from [facility]     PRAPARE - Transportation     Lack of Transportation (Medical): No     Lack of Transportation (Non-Medical): No   Physical Activity: Not on file   Stress: Not on file   Social Connections: Socially Integrated (12/10/2024)    Received from [facility]    Social Connections     Do you often feel lonely or isolated from those around you?: 0   Interpersonal Safety: Patient Unable To Answer (2/16/2025)    Received from [facility]     Humiliation, Afraid, Rape, and Kick questionnaire     Fear of Current or Ex-Partner: Patient unable to answer     Emotionally Abused: Patient unable to answer     Physically Abused: Patient unable to answer     Sexually Abused: Patient unable to answer   Housing Stability: Low Risk  (2/16/2025)    Received from [facility]     Housing Stability Vital Sign     Unable to Pay for Housing in the Last Year: No     Number of Times Moved in the Last Year: 1     Homeless in the Last Year: No     FAMILY HISTORY:   Family History   Problem Relation Age of Onset    Cancer Sister         pancreatic    Diabetes No family hx of     Hypertension No family hx of      EXAM:Vitals: Temp 98.2  F (36.8  C) (Temporal)   Ht 1.499 m (4' 11.02\")   Wt 41.6 kg (91 lb 12 oz)   LMP  (LMP Unknown)   BMI 18.52 kg/m    BMI= Body mass index is 18.52 kg/m .    General appearance: Patient is alert and fully cooperative with history & exam.  No sign of distress is noted during the visit.    Psychiatric: Affect is pleasant & appropriate.  Patient appears motivated to improve health.    Respiratory: Breathing is regular & unlabored while sitting.    HEENT: Hearing is intact to spoken word.  Speech is clear.  No gross evidence of visual impairment that would impact ambulation.    Vascular: DP 2/4 & PT 1/4 left & right.  CFT about 3 seconds, temperature warm to cool proximal to distal.  Diminished " hair growth distal to both knees.  Minimal edema noted bilateral.  Minimal hemosiderin pigmentation and varicosities bilateral.     Neurologic: Normal plantar response bilateral.  Intact protective threshold plus 8/10 applications of a 5.07 monofilament.  Pt admits burning and paraesthesias about the feet and toes with palpation.    Dermatologic: All 10 nails are thickened, and dystrophic dry and brittle.  No acute abscess or drainage.  They have been recently cut.  It appears the right hallux has been quite elongated and possibly ulcerated the distal right second toe that is now repaired closed and recuperating.  Subtle ecchymosis is noted just proximal to the right second toe at the MPJ and subtle discomfort here minimal edema.  Localized erythema about the medial second toe PIPJ as the hallux presses against it.  Some irritation from tape as well.  No open wounds.    Musculoskeletal: Patient is ambulatory without assistive device or brace.  There is semi reducible contracture of the lesser digits.  Subtle hallux valgus noted with lateral deviation of the hallux and this is moderately rigid.    Radiographs: 3 views right foot demonstrate no acute cortical reaction or fracture.  No joint diastases.  Subtle hallux valgus.  Generalized osteopenia noted throughout the foot.    Hemoglobin A1C (%)   Date Value   04/17/2025 5.4   10/13/2022 6.0 (H)   05/27/2016 5.9   05/26/2015 6.0   10/28/2013 5.8   04/26/2013 6.3 (H)   10/25/2012 5.9   04/26/2012 6.3 (H)   10/26/2011 5.9   04/26/2011 6.0     Creatinine (mg/dL)   Date Value   04/17/2025 0.31 (L)   02/03/2025 0.40 (L)   12/06/2024 0.41 (L)   09/23/2024 0.37 (L)   07/29/2024 0.34 (L)   04/08/2024 0.43 (L)   05/17/2021 0.63   05/12/2021 0.65   02/22/2021 0.58   12/07/2020 0.52   07/27/2020 0.48 (L)   05/18/2020 0.57       ASSESSMENT:     ICD-10-CM    1. Rheumatoid arthritis of multiple sites with negative rheumatoid factor (H)  M06.09       2. Hammer toe of right foot   M20.41       3. Failure to thrive in adult  R62.7       4. Type 2 diabetes, diet controlled (H)  E11.9         PLAN:    5/28/2025  Interpreted radiographs demonstrating no acute fracture or joint diastases.  There is rigid deformity and generalized osteopenia consistent with aging.  Skin is fairly fragile.  Recommend avoiding any buddy splinting or adhesive.  May utilize lambswool or interdigital spacer as long as no irritation is noted from it.  Avoid placing a spacer of any sort directly over the knuckle itself.  Recommend wearing shoes when she is ambulating.  She is only wearing socks and she states she often jams the toe into the floor trim.  Therefore wearing a shoe when she is ambulating would be helpful for this but she states she does not want to do that.  May follow-up as needed if an open wound forms or irritation persists or worsens.  Written instructions regarding how to obtain different devices to assist with protection.    Abilio Leos DPM

## 2025-05-28 NOTE — LETTER
5/28/2025      Alannah Leos  [id number] [facility] [id number]      Dear Colleague,    Thank you for referring your patient, Alannah Leos, to the [facility]. Please see a copy of my visit note below.    HPI:  Alannah Leos is a 84 year old female who is seen in consultation at the request of Kaur Grimm, APRN, CNP    Pt presents for eval of:   (Onset, Location, L/R, Character, Treatments, Injury if yes)    XR Right foot 5/19/2025    DM type 2     Onset early May 2025, Right great toenail digging into 2nd toe. 5/14/2025 2nd toe redness and bruising. Presents with increase in redness, swelling, denies drainage. Accompanied with her daughter. Catracho taping to 3rd toe.     5/19/2025 started Keflex 500 mg, 1 capsule 2 times daily for 10 days.      Retired. Patient resides at her own home.    Review of Systems:  Patient denies fever, chills, rash, wound, stiffness, limping, numbness, weakness, heart burn, blood in stool, chest pain with activity, calf pain when walking, shortness of breath with activity, chronic cough, easy bleeding/bruising, swelling of ankles, excessive thirst, fatigue, depression, anxiety.  Patient admits only to symptoms noted in history.     Patient Active Problem List   Diagnosis     Osteoporosis     Rheumatoid arthritis (H)     Parkinson disease (H)     Osteoarthritis of wrist     Osteoarthritis of shoulder     History of total knee arthroplasty     Osteoarthritis of left knee     CARDIOVASCULAR SCREENING; LDL GOAL LESS THAN 160     High risk medication use     Underweight     Impingement syndrome, shoulder, left     Combined forms of age-related cataract of both eyes     Seborrheic dermatitis     Anemia in other chronic diseases classified elsewhere     Ankylosing spondylitis of cervical region (H)     Failure to thrive in adult     Physical deconditioning     History of iron deficiency anemia     Closed fracture of left clavicle     Closed fracture of sixth  cervical vertebra (H)     Iron deficiency anemia     Lower extremity pain     Fall     Closed fracture of distal end of left femur (H)     Closed fracture of distal end of left fibula     Cognitive impairment     Frailty     Type 2 diabetes, diet controlled (H)     PAST MEDICAL HISTORY:   Past Medical History:   Diagnosis Date     Hyperlipidemia      Murmur, heart     hsm     Nonsenile cataract      Osteoarthrosis, unspecified whether generalized or localized, lower leg      Osteopenia      Rheumatoid arthritis(714.0)      Type 2 diabetes, diet controlled (H) 3/5/2025     PAST SURGICAL HISTORY:   Past Surgical History:   Procedure Laterality Date     GALLBLADDER SURGERY       HYSTERECTOMY       KNEE SURGERY      left      LAMINECTOMY LUMBAR ONE LEVEL       TONSILLECTOMY       MEDICATIONS:   Current Outpatient Medications:      acetaminophen (TYLENOL) 500 MG tablet, Take 1,000 mg by mouth every 8 hours., Disp: , Rfl:      calcium carbonate-vitamin D (OSCAL) 500-5 MG-MCG tablet, Take 1 tablet by mouth daily., Disp: , Rfl:      carbidopa-levodopa (SINEMET CR)  MG CR tablet, Take 1 tablet by mouth daily. At 4:30pm, Disp: , Rfl:      carbidopa-levodopa (SINEMET)  MG tablet, Take 2 tablets by mouth. 7 times per day, Disp: , Rfl:      cephALEXin (KEFLEX) 500 MG capsule, Take 1 capsule (500 mg) by mouth 2 times daily for 10 days., Disp: 20 capsule, Rfl: 0     Cyanocobalamin (VITAMIN B 12) 250 MCG LOZG, , Disp: , Rfl:      folic acid (FOLVITE) 1 MG tablet, Take 1 tablet (1 mg) by mouth daily., Disp: 90 tablet, Rfl: 1     methotrexate 2.5 MG tablet, Take 8 tablets (20 mg) by mouth once a week. .  This is a once a week medication, taken on the same day of each week., Disp: 104 tablet, Rfl: 0     Pediatric Multivit-Minerals (FLINTSTONES SOUR GUMMIES) CHEW, Take 1 tablet by mouth daily., Disp: , Rfl:      polyethylene glycol (MIRALAX) 17 g packet, Take 17 g by mouth daily., Disp: , Rfl:      QUEtiapine (SEROQUEL) 25  MG tablet, Take 25 mg by mouth See Admin Instructions. 25 mg at 4:30 am and 12.5 mg at 4:30 pm, Disp: , Rfl:      vitamin C (ASCORBIC ACID) 1000 MG TABS, Take 1,000 mg by mouth 2 times daily, Disp: , Rfl:      ACE NOT PRESCRIBED, INTENTIONAL,, 1 each continuous prn. ACE Inhibitor not prescribed due to Refusal by patient    (Patient not taking: Reported on 5/28/2025), Disp: 0 each, Rfl: 0  ALLERGIES:    Allergies   Allergen Reactions     Decadrol [Dexamethasone Sodium Phosphate] Hives     Diphenhydramine      Latex Other (See Comments)     sensitivity     Other Food Allergy Itching     Strawberries     Senna      Per patient        Shrimp Itching     SOCIAL HISTORY:   Social History     Socioeconomic History     Marital status:      Spouse name: Willis      Number of children: 2     Years of education: 12     Highest education level: Not on file   Occupational History     Occupation:      Employer: RETIRED     Comment: [employer], retired in 12/2006   Tobacco Use     Smoking status: Never     Smokeless tobacco: Never   Vaping Use     Vaping status: Never Used   Substance and Sexual Activity     Alcohol use: No     Drug use: No     Sexual activity: Not Currently     Partners: Male     Birth control/protection: None   Other Topics Concern     Parent/sibling w/ CABG, MI or angioplasty before 65F 55M? Not Asked      Service No     Blood Transfusions Yes     Caffeine Concern No     Occupational Exposure No     Hobby Hazards No     Sleep Concern No     Stress Concern No     Weight Concern No     Special Diet Yes     Comment: Diabetic      Back Care Yes     Comment: Back surgeries x2     Exercise Yes     Bike Helmet No     Seat Belt Yes     Self-Exams Yes   Social History Narrative     Not on file     Social Drivers of Health     Financial Resource Strain: Low Risk  (12/10/2024)    Received from [facility]    Financial Resource Strain      Difficulty of Paying Living  "Expenses: 3      Difficulty of Paying Living Expenses: Not on file   Food Insecurity: No Food Insecurity (2/16/2025)    Received from [facility]     Hunger Vital Sign      Worried About Running Out of Food in the Last Year: Never true      Ran Out of Food in the Last Year: Never true   Transportation Needs: No Transportation Needs (2/16/2025)    Received from [facility]     PRAPARE - Transportation      Lack of Transportation (Medical): No      Lack of Transportation (Non-Medical): No   Physical Activity: Not on file   Stress: Not on file   Social Connections: Socially Integrated (12/10/2024)    Received from [facility]    Social Connections      Do you often feel lonely or isolated from those around you?: 0   Interpersonal Safety: Patient Unable To Answer (2/16/2025)    Received from [facility]     Humiliation, Afraid, Rape, and Kick questionnaire      Fear of Current or Ex-Partner: Patient unable to answer      Emotionally Abused: Patient unable to answer      Physically Abused: Patient unable to answer      Sexually Abused: Patient unable to answer   Housing Stability: Low Risk  (2/16/2025)    Received from [facility]     Housing Stability Vital Sign      Unable to Pay for Housing in the Last Year: No      Number of Times Moved in the Last Year: 1      Homeless in the Last Year: No     FAMILY HISTORY:   Family History   Problem Relation Age of Onset     Cancer Sister         pancreatic     Diabetes No family hx of      Hypertension No family hx of      EXAM:Vitals: Temp 98.2  F (36.8  C) (Temporal)   Ht 1.499 m (4' 11.02\")   Wt 41.6 kg (91 lb 12 oz)   LMP  (LMP Unknown)   BMI 18.52 kg/m    BMI= Body mass index is 18.52 kg/m .    General appearance: Patient is alert and fully cooperative with history & exam.  No sign of distress is noted during the visit.    Psychiatric: Affect is pleasant & appropriate.  Patient appears motivated to " improve health.    Respiratory: Breathing is regular & unlabored while sitting.    HEENT: Hearing is intact to spoken word.  Speech is clear.  No gross evidence of visual impairment that would impact ambulation.    Vascular: DP 2/4 & PT 1/4 left & right.  CFT about 3 seconds, temperature warm to cool proximal to distal.  Diminished hair growth distal to both knees.  Minimal edema noted bilateral.  Minimal hemosiderin pigmentation and varicosities bilateral.     Neurologic: Normal plantar response bilateral.  Intact protective threshold plus 8/10 applications of a 5.07 monofilament.  Pt admits burning and paraesthesias about the feet and toes with palpation.    Dermatologic: All 10 nails are thickened, and dystrophic dry and brittle.  No acute abscess or drainage.  They have been recently cut.  It appears the right hallux has been quite elongated and possibly ulcerated the distal right second toe that is now repaired closed and recuperating.  Subtle ecchymosis is noted just proximal to the right second toe at the MPJ and subtle discomfort here minimal edema.  Localized erythema about the medial second toe PIPJ as the hallux presses against it.  Some irritation from tape as well.  No open wounds.    Musculoskeletal: Patient is ambulatory without assistive device or brace.  There is semi reducible contracture of the lesser digits.  Subtle hallux valgus noted with lateral deviation of the hallux and this is moderately rigid.    Radiographs: 3 views right foot demonstrate no acute cortical reaction or fracture.  No joint diastases.  Subtle hallux valgus.  Generalized osteopenia noted throughout the foot.    Hemoglobin A1C (%)   Date Value   04/17/2025 5.4   10/13/2022 6.0 (H)   05/27/2016 5.9   05/26/2015 6.0   10/28/2013 5.8   04/26/2013 6.3 (H)   10/25/2012 5.9   04/26/2012 6.3 (H)   10/26/2011 5.9   04/26/2011 6.0     Creatinine (mg/dL)   Date Value   04/17/2025 0.31 (L)   02/03/2025 0.40 (L)   12/06/2024 0.41 (L)    09/23/2024 0.37 (L)   07/29/2024 0.34 (L)   04/08/2024 0.43 (L)   05/17/2021 0.63   05/12/2021 0.65   02/22/2021 0.58   12/07/2020 0.52   07/27/2020 0.48 (L)   05/18/2020 0.57       ASSESSMENT:     ICD-10-CM    1. Rheumatoid arthritis of multiple sites with negative rheumatoid factor (H)  M06.09       2. Hammer toe of right foot  M20.41       3. Failure to thrive in adult  R62.7       4. Type 2 diabetes, diet controlled (H)  E11.9         PLAN:    5/28/2025  Interpreted radiographs demonstrating no acute fracture or joint diastases.  There is rigid deformity and generalized osteopenia consistent with aging.  Skin is fairly fragile.  Recommend avoiding any buddy splinting or adhesive.  May utilize lambswool or interdigital spacer as long as no irritation is noted from it.  Avoid placing a spacer of any sort directly over the knuckle itself.  Recommend wearing shoes when she is ambulating.  She is only wearing socks and she states she often jams the toe into the floor trim.  Therefore wearing a shoe when she is ambulating would be helpful for this but she states she does not want to do that.  May follow-up as needed if an open wound forms or irritation persists or worsens.  Written instructions regarding how to obtain different devices to assist with protection.    Abilio Leos DPM          Again, thank you for allowing me to participate in the care of your patient.        Sincerely,        Abilio Leos DPM    Electronically signed

## 2025-05-28 NOTE — PATIENT INSTRUCTIONS
"Nail Debridement    A high quality instrument makes trimming toenails MUCH easier.  Search [employer] for any 5\" nail nipper manufactured by reliable brands such as Miltex, Integra or Jarit as these quality instruments will help manage difficult nails more effectively and comfortably. We use Miltex -SS.  A physician is not necessary to trim nails even if you are taking blood thinners or are diabetic.  Your family or care givers may help manage your toenails.      Trim or sand the nails once weekly.  Do not wait until they are long and painful or trimming will become too difficult and painful and will increase your risk of complications or infection.  A course file or 120 grit sandpaper on a sanding block can be helpful.  For very thick nails many people prefer battery operated bateman such as an Amope', Personal Pedi and Emjoi for regular use or heavy painful callouses or thick toenails.    Trim or skive any portion of nail that is thick, loose, crumbling, or not well attached. Do not tear the nail away, but rather cut them with a nail nipperor sand or sand them down.  You may follow up with your Podiatric Physician if you have pain, bleeding, infection, questions or other concerns.      You may also contact the following Registered Nurses for further help with nail debridement and minor hygiene concnerns.  They may come to your home or meet them at their clinic to trim your toenails and soak your feet, as well as monitor for any complications that would require evaluation by a Physician.      [facility]  Melanie Harrison RN  Phone & text [phone]    [facility]  Anita Montanez RN  Office [phone]    For up to date list and to find foot care nurses in other communities visit [employer] website:  afcna.org.     Calluses, Corns, IPKs, Porokeratosis    When there is excessive friction or pressure on the skin, the body responds by making the skin thicker.  " While this may protect the deeper structures, the thickened skin can take up more space and thus increase pressure over a bony prominence or become an open sore or skin ulcer as this skin becomes less flexible.    Flat, diffuse thickening are simple calluses and they are usually caused by friction.  Often these are the result of rubbing on a shoe or going barefoot.    Calluses with a central core between the toes are called corns.  These often result from prominent joints on adjacent toes rubbing together.  Theses are often a symptom of bone malalignment and will usually recur unless the underlying bones are addressed.    Many of these lesions can be kept comfortable with routine maintenance. This consists of filing them with a Ped Egg, callus file, or 120 grit sandpaper on a block, every day during your bath or shower.  Most people prefer battery operated bateman such as an Amope', Personal Pedi and Emjoi for regular use or heavy painful callouses.  Heavy creams or ointments can be applied 1-2 times every day to keep them soft. Toe spacers can be used for corns, gel pads can be used for other lesions on the bottom of the foot. If there is a deformity noted, such as a prominent bone, often this can be addressed to minimize recurrence. However, sometimes the pressure and lesion simply migrates to another spot after surgery, so it is not a guaranteed cure.     If you have severe callouses and cracking, you may apply heavy ointments that you scoop up such as Cetaphil cream, Eucerin, Aquaphor or Vaseline.  Be sure to obtain cream or ointment in these brands and not lotion (lotion is water based and not durable enough for feet). For more aggressive help apply heavy creams or ointment under occlusive dressings such as Saran Wrap or Jelly Feet while sleeping.   Jelly Feet can be obtained at www.jellyfeet.com.     To be successful with managing hyperkeratotic skin, you must manage hygiene daily.  Apply the cream once or  twice EVERY day.  At your bath or shower time is the easiest time to work on this when skin is most soft.  There is no medical or surgical treatment that will absolutely eliminate many of these symptoms.      Pedifix is a reliable source for all sorts of foot pads, cushions, or interdigital spacers and foot appliances. Go to www.pedifix.Uni2 or request a catalog at 3-699-PEDImmunomedics.        Please call with any additional questions.

## 2025-05-30 ENCOUNTER — MEDICAL CORRESPONDENCE (OUTPATIENT)
Dept: HEALTH INFORMATION MANAGEMENT | Facility: CLINIC | Age: 85
End: 2025-05-30
Payer: MEDICARE

## 2025-06-16 ENCOUNTER — LAB (OUTPATIENT)
Dept: INFUSION THERAPY | Facility: CLINIC | Age: 85
End: 2025-06-16
Attending: INTERNAL MEDICINE
Payer: MEDICARE

## 2025-06-16 ENCOUNTER — RESULTS FOLLOW-UP (OUTPATIENT)
Dept: RHEUMATOLOGY | Facility: CLINIC | Age: 85
End: 2025-06-16

## 2025-06-16 VITALS
HEART RATE: 76 BPM | WEIGHT: 85.3 LBS | BODY MASS INDEX: 17.22 KG/M2 | RESPIRATION RATE: 16 BRPM | TEMPERATURE: 98.5 F | DIASTOLIC BLOOD PRESSURE: 63 MMHG | OXYGEN SATURATION: 96 % | SYSTOLIC BLOOD PRESSURE: 135 MMHG

## 2025-06-16 DIAGNOSIS — Z79.899 HIGH RISK MEDICATION USE: ICD-10-CM

## 2025-06-16 DIAGNOSIS — M06.09 RHEUMATOID ARTHRITIS OF MULTIPLE SITES WITH NEGATIVE RHEUMATOID FACTOR (H): Primary | ICD-10-CM

## 2025-06-16 DIAGNOSIS — M06.09 RHEUMATOID ARTHRITIS OF MULTIPLE SITES WITH NEGATIVE RHEUMATOID FACTOR (H): ICD-10-CM

## 2025-06-16 LAB
ALBUMIN SERPL BCG-MCNC: 3.8 G/DL (ref 3.5–5.2)
ALP SERPL-CCNC: 74 U/L (ref 40–150)
ALT SERPL W P-5'-P-CCNC: <5 U/L (ref 0–50)
AST SERPL W P-5'-P-CCNC: 17 U/L (ref 0–45)
BASOPHILS # BLD AUTO: 0.1 10E3/UL (ref 0–0.2)
BASOPHILS NFR BLD AUTO: 2 %
BILIRUB SERPL-MCNC: 0.2 MG/DL
BILIRUBIN DIRECT (ROCHE PRO & PURE): 0.12 MG/DL (ref 0–0.45)
CREAT SERPL-MCNC: 0.32 MG/DL (ref 0.51–0.95)
CRP SERPL-MCNC: <3 MG/L
EGFRCR SERPLBLD CKD-EPI 2021: >90 ML/MIN/1.73M2
EOSINOPHIL # BLD AUTO: 0.2 10E3/UL (ref 0–0.7)
EOSINOPHIL NFR BLD AUTO: 3 %
ERYTHROCYTE [DISTWIDTH] IN BLOOD BY AUTOMATED COUNT: 19.1 % (ref 10–15)
ERYTHROCYTE [SEDIMENTATION RATE] IN BLOOD BY WESTERGREN METHOD: 5 MM/HR (ref 0–30)
HCT VFR BLD AUTO: 30.5 % (ref 35–47)
HGB BLD-MCNC: 9.9 G/DL (ref 11.7–15.7)
HOLD SPECIMEN: NORMAL
IMM GRANULOCYTES # BLD: 0.1 10E3/UL
IMM GRANULOCYTES NFR BLD: 1 %
LYMPHOCYTES # BLD AUTO: 1.6 10E3/UL (ref 0.8–5.3)
LYMPHOCYTES NFR BLD AUTO: 25 %
MCH RBC QN AUTO: 28.2 PG (ref 26.5–33)
MCHC RBC AUTO-ENTMCNC: 32.5 G/DL (ref 31.5–36.5)
MCV RBC AUTO: 87 FL (ref 78–100)
MONOCYTES # BLD AUTO: 0.4 10E3/UL (ref 0–1.3)
MONOCYTES NFR BLD AUTO: 6 %
NEUTROPHILS # BLD AUTO: 4 10E3/UL (ref 1.6–8.3)
NEUTROPHILS NFR BLD AUTO: 64 %
NRBC # BLD AUTO: 0 10E3/UL
NRBC BLD AUTO-RTO: 0 /100
PLATELET # BLD AUTO: 208 10E3/UL (ref 150–450)
PROT SERPL-MCNC: 6 G/DL (ref 6.4–8.3)
RBC # BLD AUTO: 3.51 10E6/UL (ref 3.8–5.2)
WBC # BLD AUTO: 6.3 10E3/UL (ref 4–11)

## 2025-06-16 PROCEDURE — 99207 PR NO CHARGE LOS: CPT

## 2025-06-16 PROCEDURE — 86140 C-REACTIVE PROTEIN: CPT

## 2025-06-16 PROCEDURE — 250N000011 HC RX IP 250 OP 636: Mod: JZ | Performed by: INTERNAL MEDICINE

## 2025-06-16 PROCEDURE — 36415 COLL VENOUS BLD VENIPUNCTURE: CPT

## 2025-06-16 PROCEDURE — 85652 RBC SED RATE AUTOMATED: CPT

## 2025-06-16 PROCEDURE — 82565 ASSAY OF CREATININE: CPT

## 2025-06-16 PROCEDURE — 96413 CHEMO IV INFUSION 1 HR: CPT

## 2025-06-16 PROCEDURE — 85025 COMPLETE CBC W/AUTO DIFF WBC: CPT

## 2025-06-16 PROCEDURE — 82040 ASSAY OF SERUM ALBUMIN: CPT

## 2025-06-16 PROCEDURE — 250N000013 HC RX MED GY IP 250 OP 250 PS 637: Performed by: INTERNAL MEDICINE

## 2025-06-16 PROCEDURE — 258N000003 HC RX IP 258 OP 636: Performed by: INTERNAL MEDICINE

## 2025-06-16 RX ORDER — MEPERIDINE HYDROCHLORIDE 25 MG/ML
25 INJECTION INTRAMUSCULAR; INTRAVENOUS; SUBCUTANEOUS
OUTPATIENT
Start: 2025-08-07

## 2025-06-16 RX ORDER — EPINEPHRINE 1 MG/ML
0.3 INJECTION, SOLUTION INTRAMUSCULAR; SUBCUTANEOUS EVERY 5 MIN PRN
OUTPATIENT
Start: 2025-08-07

## 2025-06-16 RX ORDER — DIPHENHYDRAMINE HYDROCHLORIDE 50 MG/ML
50 INJECTION, SOLUTION INTRAMUSCULAR; INTRAVENOUS
Start: 2025-08-07

## 2025-06-16 RX ORDER — DIPHENHYDRAMINE HCL 25 MG
25 CAPSULE ORAL ONCE
Start: 2025-08-07 | End: 2025-08-07

## 2025-06-16 RX ORDER — METHYLPREDNISOLONE SODIUM SUCCINATE 40 MG/ML
40 INJECTION INTRAMUSCULAR; INTRAVENOUS
Start: 2025-08-07

## 2025-06-16 RX ORDER — ACETAMINOPHEN 325 MG/1
650 TABLET ORAL ONCE
Status: COMPLETED | OUTPATIENT
Start: 2025-06-16 | End: 2025-06-16

## 2025-06-16 RX ORDER — ALBUTEROL SULFATE 0.83 MG/ML
2.5 SOLUTION RESPIRATORY (INHALATION)
OUTPATIENT
Start: 2025-08-07

## 2025-06-16 RX ORDER — HEPARIN SODIUM,PORCINE 10 UNIT/ML
5 VIAL (ML) INTRAVENOUS
OUTPATIENT
Start: 2025-08-07

## 2025-06-16 RX ORDER — HEPARIN SODIUM (PORCINE) LOCK FLUSH IV SOLN 100 UNIT/ML 100 UNIT/ML
5 SOLUTION INTRAVENOUS
OUTPATIENT
Start: 2025-08-07

## 2025-06-16 RX ORDER — ACETAMINOPHEN 325 MG/1
650 TABLET ORAL ONCE
Start: 2025-08-07 | End: 2025-08-07

## 2025-06-16 RX ORDER — ALBUTEROL SULFATE 90 UG/1
1-2 INHALANT RESPIRATORY (INHALATION)
Start: 2025-08-07

## 2025-06-16 RX ORDER — DIPHENHYDRAMINE HYDROCHLORIDE 50 MG/ML
25 INJECTION, SOLUTION INTRAMUSCULAR; INTRAVENOUS
Start: 2025-08-07

## 2025-06-16 RX ADMIN — INFLIXIMAB 100 MG: 100 INJECTION, POWDER, LYOPHILIZED, FOR SOLUTION INTRAVENOUS at 14:43

## 2025-06-16 RX ADMIN — ACETAMINOPHEN 650 MG: 325 TABLET ORAL at 14:44

## 2025-06-16 RX ADMIN — SODIUM CHLORIDE 250 ML: 0.9 INJECTION, SOLUTION INTRAVENOUS at 14:38

## 2025-06-16 NOTE — PROGRESS NOTES
"Infusion Nursing Note:  Alannah Leos presents today for Rapid Infliximab.    Patient seen by provider today: No   present during visit today: Not Applicable.    Note: Patient is still struggling with weight loss over the past few months, but is otherwise at her baseline. Daughters are quite worried about her, but discussing with doctor it being possibly related to her Parkinsons. She does not like the \"chalky\" Ensure drinks. Given 2 different clear Ensures to try with coupons in case she likes those for some protein. Patient eating cookies and puffcorn without difficulty during infusion. Premedicated with Tylenol per MAR; does not like Benadryl.      Intravenous Access:  Peripheral IV placed.    Treatment Conditions:  Biological Infusion Checklist:  ~~~ NOTE: If the patient answers yes to any of the questions below, hold the infusion and contact ordering provider or on-call provider.    Have you recently had an elevated temperature, fever, chills, productive cough, coughing for 3 weeks or longer or hemoptysis,  abnormal vital signs, night sweats,  chest pain or have you noticed a decrease in your appetite, unexplained weight loss or fatigue? No  Do you have any open wounds or new incisions? No  Do you have any upcoming hospitalizations or surgeries? Does not include esophagogastroduodenoscopy, colonoscopy, endoscopic retrograde cholangiopancreatography (ERCP), endoscopic ultrasound (EUS), dental procedures or joint aspiration/steroid injections No  Do you currently have any signs of illness or infection or are you on any antibiotics? No  Have you had any new, sudden or worsening abdominal pain? No  Have you or anyone in your household received a live vaccination in the past 4 weeks? Please note: No live vaccines while on biologic/chemotherapy until 6 months after the last treatment. Patient can receive the flu vaccine (shot only), pneumovax and the Covid vaccine. It is optimal for the patient to get " these vaccines mid cycle, but they can be given at any time as long as it is not on the day of the infusion. No  Have you recently been diagnosed with any new nervous system diseases (ie. Multiple sclerosis, Guillain [place], seizures, neurological changes) or cancer diagnosis? Are you on any form of radiation or chemotherapy? No  Are you pregnant or breast feeding or do you have plans of pregnancy in the future? No  Have you been having any signs of worsening depression or suicidal ideations?  (benlysta only) No  Have there been any other new onset medical symptoms? No  Have you had any new blood clots? (IVIG only) No      Post Infusion Assessment:  Patient tolerated infusion without incident.  Blood return noted pre and post infusion.  Site patent and intact, free from redness, edema or discomfort.  No evidence of extravasations.  Access discontinued per protocol.       Discharge Plan:   AVS to patient via MYCHART.  Patient will return 8/11/25 for next appointment.   Patient discharged in stable condition accompanied by: daughter.  Departure Mode: Wheelchair.      Nicole Jackson RN

## 2025-07-21 DIAGNOSIS — M06.09 RHEUMATOID ARTHRITIS OF MULTIPLE SITES WITH NEGATIVE RHEUMATOID FACTOR (H): ICD-10-CM

## 2025-07-21 RX ORDER — METHOTREXATE 2.5 MG/1
20 TABLET ORAL WEEKLY
Qty: 32 TABLET | Refills: 0 | Status: SHIPPED | OUTPATIENT
Start: 2025-07-21

## 2025-07-21 NOTE — TELEPHONE ENCOUNTER
Medication:   Methotrexate 2.5mg  Last written on:   04/11/2025  Quantity:   104    Refills:   0    Last office visit:   08/29/2024  Next office visit:   07/31/2025  Last labs:   06/16/2025  Next lab:

## 2025-07-21 NOTE — TELEPHONE ENCOUNTER
Refilled medication for one month until patient follow up on 7/31/25. Up to date on labs.     Penny NAVARRO RN, [facility] 07/21/25 2:27 PM

## 2025-07-31 ENCOUNTER — OFFICE VISIT (OUTPATIENT)
Dept: RHEUMATOLOGY | Facility: CLINIC | Age: 85
End: 2025-07-31
Payer: MEDICARE

## 2025-07-31 VITALS
SYSTOLIC BLOOD PRESSURE: 117 MMHG | BODY MASS INDEX: 18.17 KG/M2 | DIASTOLIC BLOOD PRESSURE: 70 MMHG | WEIGHT: 90 LBS | OXYGEN SATURATION: 96 % | HEART RATE: 84 BPM

## 2025-07-31 DIAGNOSIS — Z72.89 OTHER PROBLEMS RELATED TO LIFESTYLE: ICD-10-CM

## 2025-07-31 DIAGNOSIS — Z11.59 ENCOUNTER FOR SCREENING FOR OTHER VIRAL DISEASES: ICD-10-CM

## 2025-07-31 DIAGNOSIS — Z79.83 LONG-TERM CURRENT USE OF BISPHOSPHONATE: ICD-10-CM

## 2025-07-31 DIAGNOSIS — M81.0 AGE RELATED OSTEOPOROSIS, UNSPECIFIED PATHOLOGICAL FRACTURE PRESENCE: ICD-10-CM

## 2025-07-31 DIAGNOSIS — M06.09 RHEUMATOID ARTHRITIS OF MULTIPLE SITES WITH NEGATIVE RHEUMATOID FACTOR (H): Primary | ICD-10-CM

## 2025-07-31 DIAGNOSIS — Z78.0 POSTMENOPAUSAL: ICD-10-CM

## 2025-07-31 DIAGNOSIS — Z79.899 HIGH RISK MEDICATION USE: ICD-10-CM

## 2025-07-31 RX ORDER — ZOLEDRONIC ACID 0.05 MG/ML
5 INJECTION, SOLUTION INTRAVENOUS ONCE
Start: 2025-12-06

## 2025-07-31 RX ORDER — DIPHENHYDRAMINE HYDROCHLORIDE 50 MG/ML
25 INJECTION, SOLUTION INTRAMUSCULAR; INTRAVENOUS
Start: 2025-12-06

## 2025-07-31 RX ORDER — HEPARIN SODIUM,PORCINE 10 UNIT/ML
5-20 VIAL (ML) INTRAVENOUS DAILY PRN
OUTPATIENT
Start: 2025-12-06

## 2025-07-31 RX ORDER — ALBUTEROL SULFATE 0.83 MG/ML
2.5 SOLUTION RESPIRATORY (INHALATION)
OUTPATIENT
Start: 2025-12-06

## 2025-07-31 RX ORDER — METHYLPREDNISOLONE SODIUM SUCCINATE 40 MG/ML
40 INJECTION INTRAMUSCULAR; INTRAVENOUS
Start: 2025-12-06

## 2025-07-31 RX ORDER — HEPARIN SODIUM (PORCINE) LOCK FLUSH IV SOLN 100 UNIT/ML 100 UNIT/ML
5 SOLUTION INTRAVENOUS
OUTPATIENT
Start: 2025-12-06

## 2025-07-31 RX ORDER — MEPERIDINE HYDROCHLORIDE 25 MG/ML
25 INJECTION INTRAMUSCULAR; INTRAVENOUS; SUBCUTANEOUS
OUTPATIENT
Start: 2025-12-06

## 2025-07-31 RX ORDER — EPINEPHRINE 1 MG/ML
0.3 INJECTION, SOLUTION, CONCENTRATE INTRAVENOUS EVERY 5 MIN PRN
OUTPATIENT
Start: 2025-12-06

## 2025-07-31 RX ORDER — FOLIC ACID 1 MG/1
1 TABLET ORAL DAILY
Qty: 90 TABLET | Refills: 3 | Status: SHIPPED | OUTPATIENT
Start: 2025-07-31

## 2025-07-31 RX ORDER — METHOTREXATE 2.5 MG/1
20 TABLET ORAL WEEKLY
Qty: 104 TABLET | Refills: 1 | Status: SHIPPED | OUTPATIENT
Start: 2025-07-31

## 2025-07-31 RX ORDER — DIPHENHYDRAMINE HYDROCHLORIDE 50 MG/ML
50 INJECTION, SOLUTION INTRAMUSCULAR; INTRAVENOUS
Start: 2025-12-06

## 2025-07-31 RX ORDER — ALBUTEROL SULFATE 90 UG/1
1-2 INHALANT RESPIRATORY (INHALATION)
Start: 2025-12-06

## 2025-07-31 NOTE — PROGRESS NOTES
Rheumatology Clinic Visit      Alannah Leos MRN# [id number]   YOB: 1940 Age: 84 year old      Date of visit: 7/31/25   PCP: Dr. Leah Blanca    Chief Complaint   Patient presents with:  Rheumatoid Arthritis: Comes and goes but mostly there all the time    Assessment and Plan     1. 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 Previously on methotrexate when first diagnosed (unclear if ineffective or not, stopped by patient preference to not treat her RA at that time).  Synovitis, subluxation, and fixed flexion deformities on initial exam on 10/12/2016; steroid responsive. 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 Currently on methotrexate 20 mg once weekly, and hydroxychloroquine 200 mg daily. Previously on sulfasalazine (felt poorly on it).  At many visits in the past I had encouraged her to escalate treatment for her active arthritis but she was reluctant to do so because of possible risks from the medication; she understood that the progression of her disease was not likely to be reversible as seen by the changes of her spine.  At one point she was going to proceed with SQ bDMARD but she never started, then she was going to proceed with an infusion bDMARD but she didn't go through with it.  Eventually, she did start Remicade 3 mg/kg IV, with the first dose on 7/17/2023 and has continued on this with significant improvement of inflammatory arthritis and resolution of synovitis of the hands.  The patient also reported having improvement of general pain with infliximab.  After starting infliximab, her daughter reported that the patient was finally able to open the refrigerator by herself and she was able to get up and wash her hands by herself instead of using a wet wipe. Note that she had a general ill feeling and GI upset for about 4 days after the first infliximab infusion,  then for a period of 4 days that started 2 weeks after the second infusion, and then no reaction after the third infusion.  Currently with no synovitis on exam.  Chronic degenerative changes.   Chronic illness    - Continue methotrexate 20 mg once weekly (split dosing within the same 24-hours of each week)  - Continue folic acid 1mg daily  - Continue Remicade 3mg/kg IV every 8 weeks (Tylenol 650 mg and Benadryl 25 mg premedication)  - Labs with Remicade infusions every 8 weeks:  CBC, Creatinine, Hepatic Panel  - Labs with August 2025 infusion: Hepatitis B core antibody and surface antigen, hepatitis C antibody, QuantiFERON-TB gold plus    Estimated Creatinine Clearance: 85.8 mL/min (A) (based on SCr of 0.32 mg/dL (L)).     High risk medication requiring intensive toxicity monitoring at least quarterly.    2. Positive PIERO and dsDNA: These were noted on record review. She does not have symptoms to support an PIERO-associated rheumatologic disease at this time.     3. Diffuse neck/back pain: No evidence of instability by x-rays on 10/12/2016.  Patient to continue following with the pain clinic for chronic neck/back pain management.  She has seen neurosurgery in the past and will follow with neurosurgery as needed    4. Parkinson's Disease: Followed by Dr. Ricardo Johnson at the [facility] previously, and now at [facility].  Management per neurology    5. Osteoporosis: Based on 1/29/2018 DEXA.  Was on fosamax from 7826-2740; reclast then started 2021 with the first dose received 5/12/2021, followed by the second dose on 5/16/2022, third dose received on 8/29/2023, fourth dose received 12/6/2024.  Fifth dose to be received in 2025 after which point likely will need to transition to Prolia.  Repeat DEXA needed.  - Continue reclast 5mg IV yearly, next due 12/6/2025 or shortly thereafter  - Continue vitamin D 1000 IU daily   - Continue calcium 1000mg daily  - DEXA ordered  - Lab: Vitamin D    6.   Severe osteoarthritis of multiple joints: Has been evaluated by surgeons and is now followed in the pain clinic.  Severe osteoarthritis that is limiting daily activities.    7.  Left knee osteoarthritis: 8/30/2023 steroid injection was effective; repeated on 2/29/2024 and patient request repeat today on 8/29/2024.  Steroid injection as documented in the procedure section.     8.  Severe primary and secondary osteoarthritis (secondary to rheumatoid arthritis): Significantly impacting her quality of life.  Function limited due to degenerative changes.  Has seen palliative care in the past.  Note that her inflammatory arthritis appears controlled but the degenerative changes from the inflammatory arthritis remain.    9.  Vaccinations: Vaccinations reviewed with Ms. Leos.  Risks and benefits of vaccinations were discussed.    - Influenza: encouraged yearly vaccination  - [id number]: up to date  - [id number]: up to date  - COVID-19: Advised keeping updated    10.  Left thumb subluxated at the MCP: Cannot use during the day for tasks such as putting on her clothes.  May benefit from fusion but may not to be a good surgical candidate.  Advised consultation with a hand surgeon previously and a referral was placed previously    Total minutes spent in evaluation with patient, documentation, , and review of pertinent studies and chart notes: 24  The longitudinal plan of care for the rheumatology problem(s) were addressed during this visit.  Due to added complexity of care, we will continue to support the patient and the subsequent management of this condition with ongoing continuity of care.        Ms. Leos verbalized agreement with and understanding of the rational for the diagnosis and treatment plan.  All questions were answered to best of my ability and the patient's satisfaction. Ms. Leos was advised to contact the clinic with any questions that may arise after the clinic visit.      Thank you for  involving me in the care of the patient    Return to clinic: 11-12 months, per patient preference      HPI   Alannah Leos is a 84 year old female with a medical history significant for Parkinson's disease, osteoarthritis, status post TKA, osteoporosis, and rheumatoid arthritis who presents for follow-up of rheumatoid arthritis.    4/21/2022: Trigger point injections from the pain clinic are helpful but she is worried that her new pain clinic provider will not schedule appointments every 3 weeks that have been helpful for her, but rather she is being scheduled every 5 weeks and she has too much pain to go every 5 weeks between injections.  She is wondering if there is another provider that has more availability to allow her to come in every 3 weeks for these injections; she says that she will also speak with her current pain management provider who she has only seen once so far.  Severe pain in the right shoulder and hardly able to use it now.  Pain in her wrists, MCPs, and PIPs where she also has swelling.  Stiffness lasts for all day, worse in the morning when she first wakes.  Wrists, elbows, shoulders, knees, ankles, MTPs, neck, and fingers all hurt.  She is okay with continuing Reclast for osteoporosis management but is hesitant to use biologic DMARD or escalate treatment for RA in any way at this time because she is concerned that it may not work.  She verbalized understanding that untreated inflammatory arthritis will potentially cause worsening irreversible damage.    She feels that her arthritis is fairly unchanged.  She says that she does not feel well because she cannot raise her head due to degenerative changes in her neck.  She says that her hands have worsened slightly.  She is only taking methotrexate now for inflammatory arthritis.  She was hospitalized recently for worsening Parkinson's disease and plans to follow-up with neurology as an outpatient.  She and her daughter voiced frustration with  recent hospitalization, where they did not feel that much was done.    6/14/2023:  no change in arthritis symptoms per patient.  Pain at the MCPs, PIPs, wrists, shoulders, knees.  Occasional lower extremity edema but none now.  Was going to have her infliximab infusion but had a open wound on her bottom that is nearly resolved at this point so has rescheduled the infliximab infusion.    8/30/2023: Has received 3 doses of Remicade.  After the first dose of Remicade she had 4 days of GI upset.  2 weeks after the second dose of Remicade she had 4 days of GI upset.  She does have the third dose of Remicade yesterday.  She had Reclast yesterday.  Overall feeling better with less joint pain.  Her daughter who is present with her today states that this is the first time in years that she has gotten up to wash her hands at the sink and open the refrigerator.  The patient voiced some dissatisfaction with the fact that she will have to continue these infusions every 8 weeks, but also verbalized understanding that she has the choice to not continue the infusions; ultimately she says she would like to continue the Remicade infusions.  She also reports having left knee pain that is bothersome all the time but is not swollen and was without increased warmth or overlying erythema; she would like a steroid injection in the left knee because they have helped in the past, with the last steroid injections being many years ago.    2/29/2024: Inflammatory arthritis is better controlled since being on Remicade.  No longer with joint swelling.  Still with diffuse body pain and limited range of motion of the neck.  Dizziness has been a chronic issue despite evaluation with ENT and neurology.  Sometimes too much of the Parkinson's medication results and hallucinations.  She says that she is a burden on her family.  Does not feel like many of the primary care providers actually listen to her and she does not have an assigned primary care  "provider at this time.  She would like to continue Remicade infusions.  She would like to continue methotrexate.  She would like a steroid injection of the left knee because it was very helpful for pain management when previously given in 2023.    8/29/2024: Subluxation of the left thumb makes it difficult to pull up her underwear.  Mild pain and swelling at the left second MCP.  Missed the June Remicade infusion.  Morning stiffness for most of the day.  Limited range of motion of the neck.  Felt better at her last visit prior to missing a Remicade infusion.  Working with sports medicine for shoulder osteoarthritis.  Steroid injection of the shoulder with mild benefit.  Previous steroid injection of the knee was very helpful and she would like this repeated today, on the left.    Today, 7/31/2025: Had a femur fracture repaired surgically.  No joint swelling.  Still with degenerative changes of the neck and shoulders and hands that limit mobility and reduce quality life.  Feels slightly \"icky\" after each infliximab infusion but she says it is mild and resolved spontaneously and she would like to continue with the infusion.  No side effects from methotrexate.    Denies fevers, chills, nausea, vomiting, constipation, diarrhea. No abdominal pain. No chest pain/pressure, palpitations, or shortness of breath.   No oral or nasal sores.  No rash. No sicca symptoms.      Daughter is present with her today.    Tobacco: none  EtOH: 1 drink at [place] only  Drugs: none    ROS   12 point review of system was completed and negative except as noted in the HPI     Active Problem List     Patient Active Problem List   Diagnosis    Osteoporosis    Rheumatoid arthritis (H)    Parkinson disease (H)    Osteoarthritis of wrist    Osteoarthritis of shoulder    History of total knee arthroplasty    Osteoarthritis of left knee    CARDIOVASCULAR SCREENING; LDL GOAL LESS THAN 160    High risk medication use    Underweight    Impingement " syndrome, shoulder, left    Combined forms of age-related cataract of both eyes    Seborrheic dermatitis    Anemia in other chronic diseases classified elsewhere    Ankylosing spondylitis of cervical region (H)    Failure to thrive in adult    Physical deconditioning    History of iron deficiency anemia    Closed fracture of left clavicle    Closed fracture of sixth cervical vertebra (H)    Iron deficiency anemia    Lower extremity pain    Fall    Closed fracture of distal end of left femur (H)    Closed fracture of distal end of left fibula    Cognitive impairment    Frailty    Type 2 diabetes, diet controlled (H)     Past Medical History     Past Medical History:   Diagnosis Date    Hyperlipidemia     Murmur, heart     hsm    Nonsenile cataract     Osteoarthrosis, unspecified whether generalized or localized, lower leg     Osteopenia     Rheumatoid arthritis(714.0)     Type 2 diabetes, diet controlled (H) 3/5/2025     Past Surgical History     Past Surgical History:   Procedure Laterality Date    GALLBLADDER SURGERY      HYSTERECTOMY      KNEE SURGERY      left     LAMINECTOMY LUMBAR ONE LEVEL      TONSILLECTOMY       Allergy     Allergies   Allergen Reactions    Decadrol [Dexamethasone Sodium Phosphate] Hives    Diphenhydramine     Latex Other (See Comments)     sensitivity    Other Food Allergy Itching     Strawberries    Senna      Per patient       Shrimp Itching     Current Medication List     Current Outpatient Medications   Medication Sig Dispense Refill    ACE NOT PRESCRIBED, INTENTIONAL, 1 each continuous prn. ACE Inhibitor not prescribed due to Refusal by patient       0 each 0    acetaminophen (TYLENOL) 500 MG tablet Take 1,000 mg by mouth every 8 hours.      calcium carbonate-vitamin D (OSCAL) 500-5 MG-MCG tablet Take 1 tablet by mouth daily.      carbidopa-levodopa (SINEMET CR)  MG CR tablet Take 1 tablet by mouth daily. At 4:30pm      carbidopa-levodopa (SINEMET)  MG tablet Take 2 tablets  "by mouth. 7 times per day      Cyanocobalamin (VITAMIN B 12) 250 MCG LOZG       folic acid (FOLVITE) 1 MG tablet Take 1 tablet (1 mg) by mouth daily. 90 tablet 1    methotrexate 2.5 MG tablet Take 8 tablets (20 mg) by mouth once a week. .  This is a once a week medication, taken on the same day of each week. 32 tablet 0    Pediatric Multivit-Minerals (FLINTSTONES SOUR GUMMIES) CHEW Take 1 tablet by mouth daily.      polyethylene glycol (MIRALAX) 17 g packet Take 17 g by mouth daily.      QUEtiapine (SEROQUEL) 25 MG tablet Take 25 mg by mouth See Admin Instructions. 25 mg at 4:30 am and 12.5 mg at 4:30 pm      vitamin C (ASCORBIC ACID) 1000 MG TABS Take 1,000 mg by mouth 2 times daily       No current facility-administered medications for this visit.     Social History   See HPI    Family History     Family History   Problem Relation Age of Onset    Cancer Sister         pancreatic    Diabetes No family hx of     Hypertension No family hx of        Physical Exam     Temp Readings from Last 3 Encounters:   06/16/25 98.5  F (36.9  C) (Oral)   05/28/25 98.2  F (36.8  C) (Temporal)   05/19/25 97  F (36.1  C) (Temporal)     BP Readings from Last 5 Encounters:   07/31/25 117/70   06/16/25 135/63   05/19/25 132/74   04/17/25 122/61   04/02/25 108/62     Pulse Readings from Last 1 Encounters:   07/31/25 84     Resp Readings from Last 1 Encounters:   06/16/25 16     Estimated body mass index is 18.17 kg/m  as calculated from the following:    Height as of 5/28/25: 1.499 m (4' 11.02\").    Weight as of this encounter: 40.8 kg (90 lb).    GEN: NAD, thin and frail appearing.  Sitting in a wheelchair.  HEENT:  Anicteric, noninjected sclera  PULM: No increased work of breathing.    MSK: MCPs, PIPs, DIPs, and wrists without swelling or tenderness to palpation.    Subluxation of the left thumb at the MCP.  Elbows without swelling or tenderness to palpation.  Shoulders nontender to palpation and minimal ROM; no swelling or increased " warmth.  Knees with medial joint line tenderness; left knee with crepitation.  Knees without effusion, increased warmth, or overlying erythema.  Ankles and MTPs without swelling or tenderness to palpation.  Holds neck in a flexed position.   SKIN: Diffuse hair thinning.  Skin lesion on the scalp, top of her head, just left of midline   EXT: No lower extremity edema  PSYCH: Alert. Appropriate.      Labs / Imaging (select studies)     CBC  Recent Labs   Lab Test 06/16/25  1400 04/17/25  1406 02/03/25  1421   WBC 6.3 4.7 5.9   RBC 3.51* 3.69* 3.95   HGB 9.9* 10.4* 11.0*   HCT 30.5* 32.1* 33.7*   MCV 87 87 85   RDW 19.1* 17.9* 17.1*    241 218   MCH 28.2 28.2 27.8   MCHC 32.5 32.4 32.6   NEUTROPHIL 64 50 59   LYMPH 25 36 28   MONOCYTE 6 7 7   EOSINOPHIL 3 5 3   BASOPHIL 2 2 1   ANEU 4.0 2.3 3.5   ALYM 1.6 1.7 1.6   DORITA 0.4 0.3 0.4   AEOS 0.2 0.2 0.2   ABAS 0.1 0.1 0.1     CMP  Recent Labs   Lab Test 06/16/25  1400 04/17/25  1406 02/03/25  1421 12/06/24  1334 04/08/24  1442 02/12/24  1338 02/01/24  1033 08/18/21  1432 05/17/21  1342 05/12/21  1334 03/12/21  1328 02/22/21  1339   NA  --  137  --   --   --  137 134*   < >  --   --   --   --    POTASSIUM  --  4.0  --   --   --  3.9 4.0   < >  --   --   --   --    CHLORIDE  --  102  --   --   --  102 98   < >  --   --   --   --    CO2  --  25  --   --   --  26 26   < >  --   --   --   --    ANIONGAP  --  10  --   --   --  9 10   < >  --   --   --   --    GLC  --  131*  --   --   --  105* 109*   < >  --   --   --   --    BUN  --  14.1  --   --   --  13.7 24.0*   < >  --   --   --   --    CR 0.32* 0.31* 0.40* 0.41*   < > 0.43* 0.52   < > 0.63 0.65  --  0.58   GFRESTIMATED >90 >90 >90 >90   < > >90 >90   < > 85 84  --  88   GFRESTBLACK  --   --   --   --   --   --   --   --  >90 >90  --  >90   PAKO  --  9.3  --  10.0  --  9.6 9.5   < >  --  9.2   < >  --    BILITOTAL 0.2 0.3 0.3 0.4   < > 0.5 0.4   < > 0.4  --   --  0.4   ALBUMIN 3.8 3.8 4.3 4.3   < > 4.3 4.3   < > 3.7   --   --  4.1   PROTTOTAL 6.0* 6.2* 6.9 6.9   < > 7.0 6.8   < > 7.3  --   --  7.9   ALKPHOS 74 113 83 67   < > 76 84   < > 77  --   --  91   AST 17 17 20 19   < > 21 24   < > 16  --   --  18   ALT <5 <5 <5 <5   < > <5 <5   < > 12  --   --  10    < > = values in this interval not displayed.     Calcium/VitaminD  Recent Labs   Lab Test 04/17/25  1406 12/06/24  1334 02/12/24  1338 05/12/22  1709 12/15/21  1553 05/12/21  1334 03/12/21  1328 01/15/20  1403   PAKO 9.3 10.0 9.6   < > 9.6   < > 9.6 9.5  10.2*   VITDT  --   --   --   --  40  --  37 39    < > = values in this interval not displayed.     ESR/CRP  Recent Labs   Lab Test 06/16/25  1400 07/29/24  1408 04/08/24  1442 07/17/23  1422 03/07/23  1429 10/27/22  1406 04/21/22  1508   SED 5 31* 2   < > 20 66* 44*   CRP  --   --   --   --  18.6* 43.3* 11.7*   CRPI <3.00 36.60* <3.00   < >  --   --   --     < > = values in this interval not displayed.       Tuberculosis Screening  Recent Labs   Lab Test 03/07/23  1429 03/12/21  1328 01/25/18  1352   TBRES Negative  --   --    TBRSLT  --   --  Negative   TBAGN  --   --  0.00   TBRST  --  Negative  --        Immunization History     Immunization History   Administered Date(s) Administered    COVID-19 MONOVALENT 12+ (Pfizer) 03/12/2021, 04/02/2021, 10/13/2021    Pneumo Conj 13-V (2010&after) 11/03/2016    Pneumococcal 23 valent 11/01/2007, 11/13/2007, 03/12/2018    TD,PF 7+ (Tenivac) 11/13/2007    TDAP (Adacel,Boostrix) 11/13/2007, 06/14/2023            Chart documentation done in part with Dragon Voice recognition Software. Although reviewed after completion, some word and grammatical error may remain.    Merrick Del Cid MD

## 2025-07-31 NOTE — PATIENT INSTRUCTIONS
RHEUMATOLOGY    [facility]  [address] [place] [address]  [place] [address]    Phone number: [phone]  Fax number: [phone]    If you need a medication refill, please contact us as you may need lab work and/or a follow up visit prior to your refill.      Thank you for choosing [facility]!    Joan Bucio CMA Rheumatology

## 2025-08-11 ENCOUNTER — INFUSION THERAPY VISIT (OUTPATIENT)
Dept: INFUSION THERAPY | Facility: CLINIC | Age: 85
End: 2025-08-11
Attending: INTERNAL MEDICINE
Payer: MEDICARE

## 2025-08-11 VITALS
DIASTOLIC BLOOD PRESSURE: 71 MMHG | SYSTOLIC BLOOD PRESSURE: 170 MMHG | HEART RATE: 67 BPM | BODY MASS INDEX: 19.38 KG/M2 | TEMPERATURE: 97.9 F | RESPIRATION RATE: 18 BRPM | WEIGHT: 96 LBS | OXYGEN SATURATION: 99 %

## 2025-08-11 DIAGNOSIS — Z11.59 ENCOUNTER FOR SCREENING FOR OTHER VIRAL DISEASES: ICD-10-CM

## 2025-08-11 DIAGNOSIS — Z72.89 OTHER PROBLEMS RELATED TO LIFESTYLE: ICD-10-CM

## 2025-08-11 DIAGNOSIS — M06.09 RHEUMATOID ARTHRITIS OF MULTIPLE SITES WITH NEGATIVE RHEUMATOID FACTOR (H): Primary | ICD-10-CM

## 2025-08-11 DIAGNOSIS — M06.09 RHEUMATOID ARTHRITIS OF MULTIPLE SITES WITH NEGATIVE RHEUMATOID FACTOR (H): ICD-10-CM

## 2025-08-11 DIAGNOSIS — Z79.899 HIGH RISK MEDICATION USE: ICD-10-CM

## 2025-08-11 DIAGNOSIS — M81.0 AGE RELATED OSTEOPOROSIS, UNSPECIFIED PATHOLOGICAL FRACTURE PRESENCE: ICD-10-CM

## 2025-08-11 LAB
ALBUMIN SERPL BCG-MCNC: 4.1 G/DL (ref 3.5–5.2)
ALP SERPL-CCNC: 87 U/L (ref 40–150)
ALT SERPL W P-5'-P-CCNC: <5 U/L (ref 0–50)
AST SERPL W P-5'-P-CCNC: 28 U/L (ref 0–45)
BASOPHILS # BLD MANUAL: 0.1 10E3/UL (ref 0–0.2)
BASOPHILS NFR BLD MANUAL: 2 %
BILIRUB SERPL-MCNC: 0.3 MG/DL
BILIRUBIN DIRECT (ROCHE PRO & PURE): 0.15 MG/DL (ref 0–0.45)
CREAT SERPL-MCNC: 0.34 MG/DL (ref 0.51–0.95)
EGFRCR SERPLBLD CKD-EPI 2021: >90 ML/MIN/1.73M2
ELLIPTOCYTES BLD QL SMEAR: SLIGHT
EOSINOPHIL # BLD MANUAL: 0.3 10E3/UL (ref 0–0.7)
EOSINOPHIL NFR BLD MANUAL: 4 %
ERYTHROCYTE [DISTWIDTH] IN BLOOD BY AUTOMATED COUNT: 16.4 % (ref 10–15)
GIANT PLATELETS BLD QL SMEAR: SLIGHT
HBV CORE AB SERPL QL IA: NONREACTIVE
HBV SURFACE AG SERPL QL IA: NONREACTIVE
HCT VFR BLD AUTO: 34.4 % (ref 35–47)
HCV AB SERPL QL IA: NONREACTIVE
HGB BLD-MCNC: 11.3 G/DL (ref 11.7–15.7)
LYMPHOCYTES # BLD MANUAL: 2 10E3/UL (ref 0.8–5.3)
LYMPHOCYTES NFR BLD MANUAL: 28 %
MCH RBC QN AUTO: 28.3 PG (ref 26.5–33)
MCHC RBC AUTO-ENTMCNC: 32.8 G/DL (ref 31.5–36.5)
MCV RBC AUTO: 86 FL (ref 78–100)
MONOCYTES # BLD MANUAL: 0.5 10E3/UL (ref 0–1.3)
MONOCYTES NFR BLD MANUAL: 7 %
NEUTROPHILS # BLD MANUAL: 4.1 10E3/UL (ref 1.6–8.3)
NEUTROPHILS NFR BLD MANUAL: 59 %
PLAT MORPH BLD: ABNORMAL
PLATELET # BLD AUTO: 199 10E3/UL (ref 150–450)
PROT SERPL-MCNC: 7 G/DL (ref 6.4–8.3)
RBC # BLD AUTO: 3.99 10E6/UL (ref 3.8–5.2)
RBC MORPH BLD: ABNORMAL
SMUDGE CELLS BLD QL SMEAR: PRESENT
TARGETS BLD QL SMEAR: SLIGHT
VARIANT LYMPHS BLD QL SMEAR: PRESENT
VIT D+METAB SERPL-MCNC: 43 NG/ML (ref 20–50)
WBC # BLD AUTO: 7 10E3/UL (ref 4–11)

## 2025-08-11 PROCEDURE — 258N000003 HC RX IP 258 OP 636: Performed by: INTERNAL MEDICINE

## 2025-08-11 PROCEDURE — 85007 BL SMEAR W/DIFF WBC COUNT: CPT

## 2025-08-11 PROCEDURE — 86704 HEP B CORE ANTIBODY TOTAL: CPT

## 2025-08-11 PROCEDURE — 250N000013 HC RX MED GY IP 250 OP 250 PS 637: Performed by: INTERNAL MEDICINE

## 2025-08-11 PROCEDURE — 87340 HEPATITIS B SURFACE AG IA: CPT

## 2025-08-11 PROCEDURE — 36415 COLL VENOUS BLD VENIPUNCTURE: CPT

## 2025-08-11 PROCEDURE — 85018 HEMOGLOBIN: CPT

## 2025-08-11 PROCEDURE — 86803 HEPATITIS C AB TEST: CPT

## 2025-08-11 PROCEDURE — 82565 ASSAY OF CREATININE: CPT

## 2025-08-11 PROCEDURE — 86481 TB AG RESPONSE T-CELL SUSP: CPT

## 2025-08-11 PROCEDURE — 82306 VITAMIN D 25 HYDROXY: CPT

## 2025-08-11 PROCEDURE — 82248 BILIRUBIN DIRECT: CPT

## 2025-08-11 PROCEDURE — 250N000011 HC RX IP 250 OP 636: Mod: JZ | Performed by: INTERNAL MEDICINE

## 2025-08-11 RX ORDER — ALBUTEROL SULFATE 0.83 MG/ML
2.5 SOLUTION RESPIRATORY (INHALATION)
OUTPATIENT
Start: 2025-10-06

## 2025-08-11 RX ORDER — ACETAMINOPHEN 325 MG/1
650 TABLET ORAL ONCE
Status: COMPLETED | OUTPATIENT
Start: 2025-08-11 | End: 2025-08-11

## 2025-08-11 RX ORDER — HEPARIN SODIUM (PORCINE) LOCK FLUSH IV SOLN 100 UNIT/ML 100 UNIT/ML
5 SOLUTION INTRAVENOUS
OUTPATIENT
Start: 2025-10-06

## 2025-08-11 RX ORDER — METHYLPREDNISOLONE SODIUM SUCCINATE 40 MG/ML
40 INJECTION INTRAMUSCULAR; INTRAVENOUS
Start: 2025-10-06

## 2025-08-11 RX ORDER — MEPERIDINE HYDROCHLORIDE 25 MG/ML
25 INJECTION INTRAMUSCULAR; INTRAVENOUS; SUBCUTANEOUS
OUTPATIENT
Start: 2025-10-06

## 2025-08-11 RX ORDER — DIPHENHYDRAMINE HYDROCHLORIDE 50 MG/ML
25 INJECTION, SOLUTION INTRAMUSCULAR; INTRAVENOUS
Start: 2025-10-06

## 2025-08-11 RX ORDER — ACETAMINOPHEN 325 MG/1
650 TABLET ORAL ONCE
Start: 2025-10-06 | End: 2025-10-06

## 2025-08-11 RX ORDER — DIPHENHYDRAMINE HYDROCHLORIDE 50 MG/ML
50 INJECTION, SOLUTION INTRAMUSCULAR; INTRAVENOUS
Start: 2025-10-06

## 2025-08-11 RX ORDER — DIPHENHYDRAMINE HCL 25 MG
25 CAPSULE ORAL ONCE
Start: 2025-10-06 | End: 2025-10-06

## 2025-08-11 RX ORDER — EPINEPHRINE 1 MG/ML
0.3 INJECTION, SOLUTION INTRAMUSCULAR; SUBCUTANEOUS EVERY 5 MIN PRN
OUTPATIENT
Start: 2025-10-06

## 2025-08-11 RX ORDER — ALBUTEROL SULFATE 90 UG/1
1-2 INHALANT RESPIRATORY (INHALATION)
Start: 2025-10-06

## 2025-08-11 RX ORDER — HEPARIN SODIUM,PORCINE 10 UNIT/ML
5 VIAL (ML) INTRAVENOUS
OUTPATIENT
Start: 2025-10-06

## 2025-08-11 RX ADMIN — SODIUM CHLORIDE 250 ML: 0.9 INJECTION, SOLUTION INTRAVENOUS at 15:42

## 2025-08-11 RX ADMIN — ACETAMINOPHEN 650 MG: 325 TABLET ORAL at 15:31

## 2025-08-11 RX ADMIN — INFLIXIMAB 100 MG: 100 INJECTION, POWDER, LYOPHILIZED, FOR SOLUTION INTRAVENOUS at 15:55

## 2025-08-11 ASSESSMENT — PAIN SCALES - GENERAL: PAINLEVEL_OUTOF10: MODERATE PAIN (5)

## 2025-08-13 LAB
GAMMA INTERFERON BACKGROUND BLD IA-ACNC: 0.04 IU/ML
M TB IFN-G BLD-IMP: NEGATIVE
M TB IFN-G CD4+ BCKGRND COR BLD-ACNC: 9.96 IU/ML
MITOGEN IGNF BCKGRD COR BLD-ACNC: 0.01 IU/ML
MITOGEN IGNF BCKGRD COR BLD-ACNC: 0.01 IU/ML
QUANTIFERON MITOGEN: 10 IU/ML
QUANTIFERON NIL TUBE: 0.04 IU/ML
QUANTIFERON TB1 TUBE: 0.05 IU/ML
QUANTIFERON TB2 TUBE: 0.05

## (undated) RX ORDER — PROPOFOL 10 MG/ML
INJECTION, EMULSION INTRAVENOUS
Status: DISPENSED
Start: 2024-09-25

## (undated) RX ORDER — LIDOCAINE HYDROCHLORIDE 20 MG/ML
INJECTION, SOLUTION INFILTRATION; PERINEURAL
Status: DISPENSED
Start: 2024-09-25